# Patient Record
Sex: MALE | Race: WHITE | NOT HISPANIC OR LATINO | Employment: OTHER | URBAN - METROPOLITAN AREA
[De-identification: names, ages, dates, MRNs, and addresses within clinical notes are randomized per-mention and may not be internally consistent; named-entity substitution may affect disease eponyms.]

---

## 2018-07-26 ENCOUNTER — TRANSCRIBE ORDERS (OUTPATIENT)
Dept: ADMINISTRATIVE | Facility: HOSPITAL | Age: 78
End: 2018-07-26

## 2018-07-26 DIAGNOSIS — M77.9 TENDONITIS: ICD-10-CM

## 2018-07-26 DIAGNOSIS — I82.401 ACUTE DEEP VEIN THROMBOSIS (DVT) OF RIGHT LOWER EXTREMITY, UNSPECIFIED VEIN (HCC): Primary | ICD-10-CM

## 2018-07-30 ENCOUNTER — HOSPITAL ENCOUNTER (OUTPATIENT)
Dept: NON INVASIVE DIAGNOSTICS | Facility: CLINIC | Age: 78
Discharge: HOME | End: 2018-07-30
Payer: MEDICARE

## 2018-07-30 DIAGNOSIS — I82.401 ACUTE DEEP VEIN THROMBOSIS (DVT) OF RIGHT LOWER EXTREMITY, UNSPECIFIED VEIN (HCC): ICD-10-CM

## 2018-07-30 PROCEDURE — 93971 EXTREMITY STUDY: CPT | Performed by: SURGERY

## 2018-07-30 PROCEDURE — 93971 EXTREMITY STUDY: CPT

## 2018-08-09 ENCOUNTER — HOSPITAL ENCOUNTER (OUTPATIENT)
Dept: NON INVASIVE DIAGNOSTICS | Facility: CLINIC | Age: 78
Discharge: HOME | End: 2018-08-09
Payer: MEDICARE

## 2018-08-09 DIAGNOSIS — M77.9 TENDONITIS: ICD-10-CM

## 2018-08-09 DIAGNOSIS — I82.401 ACUTE DEEP VEIN THROMBOSIS (DVT) OF RIGHT LOWER EXTREMITY, UNSPECIFIED VEIN (HCC): ICD-10-CM

## 2018-08-09 PROCEDURE — 93925 LOWER EXTREMITY STUDY: CPT

## 2018-08-09 PROCEDURE — 93923 UPR/LXTR ART STDY 3+ LVLS: CPT

## 2018-08-10 PROCEDURE — 93925 LOWER EXTREMITY STUDY: CPT | Performed by: SURGERY

## 2018-08-10 PROCEDURE — 93922 UPR/L XTREMITY ART 2 LEVELS: CPT | Performed by: SURGERY

## 2020-01-13 ENCOUNTER — TELEPHONE (OUTPATIENT)
Dept: FAMILY MEDICINE CLINIC | Facility: CLINIC | Age: 80
End: 2020-01-13

## 2020-01-13 ENCOUNTER — OFFICE VISIT (OUTPATIENT)
Dept: FAMILY MEDICINE CLINIC | Facility: CLINIC | Age: 80
End: 2020-01-13
Payer: MEDICARE

## 2020-01-13 VITALS
WEIGHT: 219 LBS | OXYGEN SATURATION: 92 % | TEMPERATURE: 98.7 F | HEART RATE: 72 BPM | HEIGHT: 69 IN | BODY MASS INDEX: 32.44 KG/M2 | DIASTOLIC BLOOD PRESSURE: 80 MMHG | SYSTOLIC BLOOD PRESSURE: 120 MMHG

## 2020-01-13 DIAGNOSIS — J06.9 VIRAL URI WITH COUGH: Primary | ICD-10-CM

## 2020-01-13 PROBLEM — I25.10 CORONARY ARTERY DISEASE WITHOUT ANGINA PECTORIS: Status: ACTIVE | Noted: 2020-01-13

## 2020-01-13 PROBLEM — I73.9 PAD (PERIPHERAL ARTERY DISEASE) (HCC): Status: ACTIVE | Noted: 2020-01-13

## 2020-01-13 PROCEDURE — 99202 OFFICE O/P NEW SF 15 MIN: CPT | Performed by: NURSE PRACTITIONER

## 2020-01-13 RX ORDER — AZITHROMYCIN 250 MG/1
TABLET, FILM COATED ORAL
Qty: 6 TABLET | Refills: 0 | Status: SHIPPED | OUTPATIENT
Start: 2020-01-13 | End: 2020-01-17

## 2020-01-13 RX ORDER — FEBUXOSTAT 40 MG/1
1 TABLET ORAL DAILY
COMMUNITY
End: 2020-01-31 | Stop reason: ALTCHOICE

## 2020-01-13 RX ORDER — LISINOPRIL 10 MG/1
1 TABLET ORAL DAILY
COMMUNITY
End: 2020-08-26 | Stop reason: HOSPADM

## 2020-01-13 NOTE — ASSESSMENT & PLAN NOTE
Did discuss viral symptoms vs bacterial  He is to start flonase and otc cough and cold medication for the next three days  If no improvement or symptoms worsen, may start antbx

## 2020-01-13 NOTE — PROGRESS NOTES
OFFICE VISIT  Alexa Rausch 78 y o  male MRN: [id number]          Assessment / Plan:  Problem List Items Addressed This Visit        Respiratory    Viral URI with cough - Primary     Did discuss viral symptoms vs bacterial  He is to start flonase and otc cough and cold medication for the next three days  If no improvement or symptoms worsen, may start antbx  Relevant Medications    azithromycin (ZITHROMAX) 250 mg tablet        Here for sick visit only, [facility]  Reason For Visit / Chief Complaint  Chief Complaint   Patient presents with   [address]     pt is in office today to [facility] pt is c/o cough and sinus drainage for the past couple weeks  HPI:  Alexa Rausch is a 78 y o  male who presents today for acute sick visit  He reports feeling ill for one week  He reports having a cough, sinus pressure and drainage  He has been using otc allergy medication with no relief  He has a productive cough  He has stopped his lisinopril for a short while with no improvement  He is est with the [facility], for chronic medical problems  Historical Information   History reviewed  No pertinent past medical history  History reviewed  No pertinent surgical history    Social History   Social History     Substance and Sexual Activity   Alcohol Use Not Currently    Frequency: Never     Social History     Substance and Sexual Activity   Drug Use Never     Social History     Tobacco Use   Smoking Status Never Smoker   Smokeless Tobacco Never Used     Family History   Problem Relation Age of Onset    No Known Problems Mother     No Known Problems Father        Meds/Allergies   Allergies   Allergen Reactions    Shellfish-Derived Products        Meds:    Current Outpatient Medications:     aspirin 81 MG tablet, Take 2 tablets by mouth daily, Disp: , Rfl:     febuxostat (ULORIC) 40 mg tablet, Take 1 tablet by mouth daily, Disp: , Rfl:     lisinopril (ZESTRIL) 10 mg tablet, Take 1 tablet by mouth daily, Disp: , Rfl:     metoprolol tartrate (LOPRESSOR) 25 mg tablet, Take 1 tablet by mouth 2 (two) times a day, Disp: , Rfl:     Omega-3 Fatty Acids (FISH OIL) 645 MG CAPS, Take by mouth, Disp: , Rfl:     azithromycin (ZITHROMAX) 250 mg tablet, Take 2 tablets today then 1 tablet daily x 4 days, Disp: 6 tablet, Rfl: 0      REVIEW OF SYSTEMS  Review of Systems   Constitutional: Negative for appetite change, fatigue and fever  HENT: Positive for congestion and rhinorrhea  Negative for ear discharge, ear pain and postnasal drip  Eyes: Negative for pain, discharge, redness, itching and visual disturbance  Respiratory: Positive for cough  Negative for chest tightness, shortness of breath and wheezing  Cardiovascular: Negative for chest pain, palpitations and leg swelling  Gastrointestinal: Negative for abdominal distention, abdominal pain, blood in stool, diarrhea, nausea and vomiting  Endocrine: Negative for cold intolerance, heat intolerance, polydipsia, polyphagia and polyuria  Genitourinary: Negative for decreased urine volume, difficulty urinating, dysuria, frequency, hematuria, testicular pain and urgency  Musculoskeletal: Negative for arthralgias, back pain, myalgias, neck pain and neck stiffness  Skin: Negative for color change, pallor, rash and wound  Neurological: Negative for dizziness, light-headedness, numbness and headaches  Hematological: Negative for adenopathy  Does not bruise/bleed easily  Psychiatric/Behavioral: Negative for agitation, behavioral problems, self-injury, sleep disturbance and suicidal ideas  The patient is not nervous/anxious              Current Vitals:   Blood Pressure: 120/80 (01/13/20 1251)  Pulse: 72 (01/13/20 1251)  Temperature: 98 7 °F (37 1 °C) (01/13/20 1251)  Height: 5' 9" (175 3 cm) (01/13/20 1251)  Weight - Scale: 99 3 kg (219 lb) (01/13/20 1251)  SpO2: 92 % (01/13/20 1251)  [unfilled]    [facility] [place] EXAMS:  Physical Exam   Constitutional: He is oriented to person, place, and time  He appears well-developed and well-nourished  HENT:   Head: Normocephalic and atraumatic  Right Ear: External ear normal    Left Ear: External ear normal    Nose: Nose normal    Mouth/Throat: Oropharynx is clear and moist    PND   Eyes: Pupils are equal, round, and reactive to light  Conjunctivae are normal  Right eye exhibits no discharge  Left eye exhibits no discharge  Neck: Normal range of motion  Neck supple  No thyromegaly present  Cardiovascular: Normal rate, regular rhythm and normal heart sounds  Pulmonary/Chest: Effort normal and breath sounds normal    Abdominal: Soft  Bowel sounds are normal  He exhibits no distension  There is no tenderness  Musculoskeletal: Normal range of motion  He exhibits no edema, tenderness or deformity  Neurological: He is alert and oriented to person, place, and time  Skin: Skin is warm and dry  No rash noted  No erythema  Psychiatric: He has a normal mood and affect  His behavior is normal            Lab, imaging and other studies: I have personally reviewed pertinent reports  Silvana Coyle BMI Counseling: Body mass index is 32 34 kg/m²  The BMI is above normal  Nutrition recommendations include reducing portion sizes  Exercise recommendations include exercising 3-5 times per week

## 2020-01-31 ENCOUNTER — OFFICE VISIT (OUTPATIENT)
Dept: FAMILY MEDICINE CLINIC | Facility: CLINIC | Age: 80
End: 2020-01-31
Payer: MEDICARE

## 2020-01-31 VITALS
TEMPERATURE: 97.8 F | HEART RATE: 68 BPM | OXYGEN SATURATION: 98 % | DIASTOLIC BLOOD PRESSURE: 84 MMHG | BODY MASS INDEX: 32.05 KG/M2 | HEIGHT: 69 IN | WEIGHT: 216.4 LBS | SYSTOLIC BLOOD PRESSURE: 142 MMHG

## 2020-01-31 DIAGNOSIS — J45.20 MILD INTERMITTENT REACTIVE AIRWAY DISEASE WITHOUT COMPLICATION: Primary | ICD-10-CM

## 2020-01-31 PROBLEM — J45.909 REACTIVE AIRWAY DISEASE WITHOUT COMPLICATION: Status: ACTIVE | Noted: 2020-01-31

## 2020-01-31 PROCEDURE — 99213 OFFICE O/P EST LOW 20 MIN: CPT | Performed by: FAMILY MEDICINE

## 2020-01-31 RX ORDER — ALLOPURINOL 300 MG/1
300 TABLET ORAL DAILY
COMMUNITY
End: 2022-01-01

## 2020-01-31 RX ORDER — SIMVASTATIN 80 MG
80 TABLET ORAL
COMMUNITY
End: 2020-08-26 | Stop reason: HOSPADM

## 2020-01-31 NOTE — ASSESSMENT & PLAN NOTE
Reactive airway disease with cough productive at times with clear white mucus production at this point will initiate a beta agonist with steroid inhaler and he will contact me if not improved after 5 days,Anoro inhaler 7 days

## 2020-01-31 NOTE — PROGRESS NOTES
Assessment/Plan:       Problem List Items Addressed This Visit        Respiratory    Reactive airway disease without complication - Primary     Reactive airway disease with cough productive at times with clear white mucus production at this point will initiate a beta agonist with steroid inhaler and he will contact me if not improved after 5 days,Anoro inhaler 7 days  Subjective:      Patient ID: Ayaka Howell is a 78 y o  male  Patient presents for general coughing non infectious no fever associated with this he does work in a wood shop regularly however he has had a slight sore throat with this and production of mucus ongoing over several weeks not resolving here for follow-up evaluation and discussion on treatment plan      The following portions of the patient's history were reviewed and updated as appropriate: allergies, current medications, past family history, past medical history, past social history, past surgical history and problem list     Review of Systems   Constitutional: Negative for chills, fatigue and fever  HENT: Negative for congestion, nosebleeds, rhinorrhea, sinus pressure and sore throat  Eyes: Negative for discharge and redness  Respiratory: Positive for cough  Negative for shortness of breath  Cardiovascular: Negative for chest pain, palpitations and leg swelling  Gastrointestinal: Negative for abdominal pain, blood in stool and nausea  Endocrine: Negative for cold intolerance, heat intolerance and polyuria  Genitourinary: Negative for dysuria and frequency  Musculoskeletal: Negative for arthralgias, back pain and myalgias  Skin: Negative for rash  Neurological: Negative for dizziness, weakness and headaches  Hematological: Negative for adenopathy  Psychiatric/Behavioral: Negative for behavioral problems and sleep disturbance  The patient is not nervous/anxious            Objective:      /84   Pulse 68   Temp 97 8 °F (36 6 °C)   Ht 5' 9" (1 753 m)   Wt 98 2 kg (216 lb 6 4 oz)   SpO2 98%   BMI 31 96 kg/m²        Physical Exam   Constitutional: He is oriented to person, place, and time  He appears well-developed and well-nourished  HENT:   Head: Normocephalic and atraumatic  Right Ear: External ear normal    Left Ear: External ear normal    Nose: Nose normal    Mouth/Throat: Oropharynx is clear and moist    Eyes: Pupils are equal, round, and reactive to light  Conjunctivae and EOM are normal  No scleral icterus  Neck: Normal range of motion  Neck supple  No JVD present  No thyromegaly present  Cardiovascular: Normal rate, regular rhythm and normal heart sounds  No murmur heard  Pulmonary/Chest: Effort normal  He has no wheezes  He has rales  He exhibits no tenderness  Abdominal: Soft  Bowel sounds are normal  He exhibits no distension and no mass  There is no tenderness  There is no rebound and no guarding  Musculoskeletal: Normal range of motion  He exhibits no edema, tenderness or deformity  Lymphadenopathy:     He has no cervical adenopathy  Neurological: He is alert and oriented to person, place, and time  He has normal reflexes  He displays normal reflexes  No cranial nerve deficit  Skin: Skin is warm and dry  No rash noted  No erythema  Psychiatric: He has a normal mood and affect  His behavior is normal  Judgment and thought content normal    Nursing note and vitals reviewed  Data:    Laboratory Results: I have personally reviewed the pertinent laboratory results/reports   Radiology/Other Diagnostic Testing Results: I have personally reviewed pertinent reports         No results found for: WBC, HGB, HCT, MCV, PLT  No results found for: NA, K, CL, CO2, ANIONGAP, BUN, CREATININE, GLUCOSE, GLUF, CALCIUM, CORRECTEDCA, AST, ALT, ALKPHOS, PROT, BILITOT, EGFR  No results found for: CHOLESTEROL  No results found for: HDL  No results found for: LDLCALC  No results found for: TRIG  No results found for: CHOLHDL  No results found for: [id number], TSH  No results found for: HGBA1C  No results found for: PSA    [facility] Lola, DO

## 2020-01-31 NOTE — PATIENT INSTRUCTIONS
Acute Cough   AMBULATORY CARE:   An acute cough  can last up to 3 weeks  Common causes of an acute cough include a cold, allergies, or a lung infection  Seek care immediately if:   · You have trouble breathing or feel short of breath  · You cough up blood, or you see blood in your mucus  · You faint or feel weak or dizzy  · You have chest pain when you cough or take a deep breath  · You have new wheezing  Contact your healthcare provider if:   · You have a fever  · Your cough lasts longer than 4 weeks  · Your symptoms do not improve with treatment  · You have questions or concerns about your condition or care  Treatment:  An acute cough usually goes away on its own  Ask your healthcare provider about medicines you can take to decrease your cough  You may need medicine to stop the cough, decrease swelling in your airways, or help open your airways  Medicine may also be given to help you cough up mucus  If you have an infection caused by bacteria, you may need antibiotics  Manage your symptoms:   · Do not smoke and stay away from others who smoke  Nicotine and other chemicals in cigarettes and cigars can cause lung damage and make your cough worse  Ask your healthcare provider for information if you currently smoke and need help to quit  E-cigarettes or smokeless tobacco still contain nicotine  Talk to your healthcare provider before you use these products  · Drink extra liquids as directed  Liquids will help thin and loosen mucus so you can cough it up  Liquids will also help prevent dehydration  Examples of good liquids to drink include water, fruit juice, and broth  Do not drink liquids that contain caffeine  Caffeine can increase your risk for dehydration  Ask your healthcare provider how much liquid to drink each day  · Rest as directed  Do not do activities that make your cough worse, such as exercise  · Use a humidifier or vaporizer    Use a cool mist humidifier or a vaporizer to increase air moisture in your home  This may make it easier for you to breathe and help decrease your cough  · Eat 2 to 5 mL of honey 2 times each day  Honey can help thin mucus and decrease your cough  · Use cough drops or lozenges  These can help decrease throat irritation and your cough  Follow up with your healthcare provider as directed:  Write down your questions so you remember to ask them during your visits  © 2017 [facility] Information is for End User's use only and may not be sold, redistributed or otherwise used for commercial purposes  All illustrations and images included in CareNotes® are the copyrighted property of A D A M , Inc  or Basil Larkin  The above information is an  only  It is not intended as medical advice for individual conditions or treatments  Talk to your doctor, nurse or pharmacist before following any medical regimen to see if it is safe and effective for you

## 2020-02-10 ENCOUNTER — TELEPHONE (OUTPATIENT)
Dept: FAMILY MEDICINE CLINIC | Facility: CLINIC | Age: 80
End: 2020-02-10

## 2020-02-10 NOTE — TELEPHONE ENCOUNTER
This patient can stop in for a Spiriva inhaler the ANOROs out of SAMPLE and both inhalers would be expensive for him on Medicare I can give him a sample to use for just 1 or 2 more weeks and this should help prior to him having a need to buy something

## 2020-02-10 NOTE — TELEPHONE ENCOUNTER
Wife stated that the inhaler worked and is asking if he can have another sample or if you would send in a RX for another one  Pt feels better but still not 100% and is unsure if he was using it correctly in the first place, but now Is more comfortable

## 2020-02-20 ENCOUNTER — TELEPHONE (OUTPATIENT)
Dept: FAMILY MEDICINE CLINIC | Facility: CLINIC | Age: 80
End: 2020-02-20

## 2020-02-20 ENCOUNTER — HOSPITAL ENCOUNTER (OUTPATIENT)
Dept: RADIOLOGY | Facility: HOSPITAL | Age: 80
Discharge: HOME | End: 2020-02-20
Attending: FAMILY MEDICINE
Payer: MEDICARE

## 2020-02-20 DIAGNOSIS — J06.9 VIRAL URI WITH COUGH: Primary | ICD-10-CM

## 2020-02-20 DIAGNOSIS — J45.20 MILD INTERMITTENT REACTIVE AIRWAY DISEASE WITHOUT COMPLICATION: ICD-10-CM

## 2020-02-20 DIAGNOSIS — J06.9 VIRAL URI WITH COUGH: ICD-10-CM

## 2020-02-20 PROCEDURE — 71046 X-RAY EXAM CHEST 2 VIEWS: CPT

## 2020-02-20 NOTE — TELEPHONE ENCOUNTER
Seen on 1/31/20 for chest congestion and cough   Inhaler was helping now it is getting worse  Requesting a chest xray   Also if agreeable please make xray stat due to 5-7 day turn around time with radiology Please advise

## 2020-02-20 NOTE — TELEPHONE ENCOUNTER
This patient is a new patient here and he is 78years old have him go for chest x-ray at the [facility] so that I get the report tomorrow otherwise he will be waiting till after the weekend if he goes to an outpatient urgent care center explained that to him and ask him to call me tomorrow by noon to go over the results and if he does not feel better he can come in for me to re-evaluate him today or tomorrow

## 2020-02-21 ENCOUNTER — TELEPHONE (OUTPATIENT)
Dept: FAMILY MEDICINE CLINIC | Facility: CLINIC | Age: 80
End: 2020-02-21

## 2020-02-21 DIAGNOSIS — J45.20 MILD INTERMITTENT REACTIVE AIRWAY DISEASE WITHOUT COMPLICATION: Primary | ICD-10-CM

## 2020-02-21 RX ORDER — LEVOFLOXACIN 500 MG/1
500 TABLET, FILM COATED ORAL EVERY 24 HOURS
Qty: 7 TABLET | Refills: 1 | Status: SHIPPED | OUTPATIENT
Start: 2020-02-21 | End: 2020-02-28

## 2020-02-21 NOTE — TELEPHONE ENCOUNTER
Wife wants to know if there are any restrictions or recommendations on treatment, such as staying home, bed rest or anything else

## 2020-02-21 NOTE — TELEPHONE ENCOUNTER
I contacted Radiology to have them read the x-ray and call our office or send a report to us watch for this and notify me

## 2020-02-21 NOTE — TELEPHONE ENCOUNTER
No change in treatment continue normal activities better to keep moving  Avoid crowds for nest 3 days

## 2020-03-03 ENCOUNTER — OFFICE VISIT (OUTPATIENT)
Dept: FAMILY MEDICINE CLINIC | Facility: CLINIC | Age: 80
End: 2020-03-03
Payer: MEDICARE

## 2020-03-03 VITALS
HEIGHT: 69 IN | BODY MASS INDEX: 31.81 KG/M2 | HEART RATE: 56 BPM | DIASTOLIC BLOOD PRESSURE: 62 MMHG | OXYGEN SATURATION: 96 % | SYSTOLIC BLOOD PRESSURE: 104 MMHG | WEIGHT: 214.8 LBS

## 2020-03-03 DIAGNOSIS — I73.9 PAD (PERIPHERAL ARTERY DISEASE) (HCC): ICD-10-CM

## 2020-03-03 DIAGNOSIS — I10 ESSENTIAL HYPERTENSION: Primary | ICD-10-CM

## 2020-03-03 DIAGNOSIS — E78.2 MIXED HYPERLIPIDEMIA: ICD-10-CM

## 2020-03-03 DIAGNOSIS — Z00.00 MEDICARE ANNUAL WELLNESS VISIT, SUBSEQUENT: ICD-10-CM

## 2020-03-03 DIAGNOSIS — I51.7 CARDIOMEGALY: ICD-10-CM

## 2020-03-03 DIAGNOSIS — F41.9 ANXIETY: ICD-10-CM

## 2020-03-03 PROCEDURE — 1123F ACP DISCUSS/DSCN MKR DOCD: CPT | Performed by: FAMILY MEDICINE

## 2020-03-03 PROCEDURE — 1160F RVW MEDS BY RX/DR IN RCRD: CPT | Performed by: FAMILY MEDICINE

## 2020-03-03 PROCEDURE — 1125F AMNT PAIN NOTED PAIN PRSNT: CPT | Performed by: FAMILY MEDICINE

## 2020-03-03 PROCEDURE — 3078F DIAST BP <80 MM HG: CPT | Performed by: FAMILY MEDICINE

## 2020-03-03 PROCEDURE — 1170F FXNL STATUS ASSESSED: CPT | Performed by: FAMILY MEDICINE

## 2020-03-03 PROCEDURE — 1036F TOBACCO NON-USER: CPT | Performed by: FAMILY MEDICINE

## 2020-03-03 PROCEDURE — 99214 OFFICE O/P EST MOD 30 MIN: CPT | Performed by: FAMILY MEDICINE

## 2020-03-03 PROCEDURE — 3074F SYST BP LT 130 MM HG: CPT | Performed by: FAMILY MEDICINE

## 2020-03-03 PROCEDURE — G0438 PPPS, INITIAL VISIT: HCPCS | Performed by: FAMILY MEDICINE

## 2020-03-03 RX ORDER — LEVOFLOXACIN 500 MG/1
TABLET, FILM COATED ORAL
COMMUNITY
Start: 2020-02-28 | End: 2020-08-26 | Stop reason: HOSPADM

## 2020-03-03 NOTE — PROGRESS NOTES
Assessment and Plan:     Problem List Items Addressed This Visit        Cardiovascular and Mediastinum    Hypertension - Primary    Cardiomegaly    PAD (peripheral artery disease) ([facility] [address] )       Other    Hyperlipidemia    Anxiety           Preventive health issues were discussed with patient, and age appropriate screening tests were ordered as noted in patient's After Visit Summary  Personalized health advice and appropriate referrals for health education or preventive services given if needed, as noted in patient's After Visit Summary  History of Present Illness:     Patient presents for Medicare Annual Wellness visit    Patient Care Team:  Gissel Davila DO as PCP - General (Family Medicine)  Carolyn Parkinson MD     Problem List:     Patient Active Problem List   Diagnosis    Hypertension    Hyperlipidemia    Cardiomegaly    Anxiety    Abnormal electrocardiogram    Coronary artery disease without angina pectoris    PAD (peripheral artery disease) ([facility] [address] )    Viral URI with cough    Reactive airway disease without complication      Past Medical and Surgical History:     History reviewed  No pertinent past medical history  History reviewed  No pertinent surgical history     Family History:     Family History   Problem Relation Age of Onset    No Known Problems Mother     No Known Problems Father       Social History:        Social History     Socioeconomic History    Marital status: /Civil Union     Spouse name: None    Number of children: None    Years of education: None    Highest education level: None   Occupational History    None   Social Needs    Financial resource strain: None    Food insecurity:     Worry: None     Inability: None    Transportation needs:     Medical: None     Non-medical: None   Tobacco Use    Smoking status: Never Smoker    Smokeless tobacco: Never Used   Substance and Sexual Activity    Alcohol use: Not Currently     Frequency: Never    Drug use: Never  Sexual activity: None   Lifestyle    Physical activity:     Days per week: None     Minutes per session: None    Stress: None   Relationships    Social connections:     Talks on phone: None     Gets together: None     Attends [employer] service: None     Active member of club or organization: None     Attends meetings of clubs or organizations: None     Relationship status: None    Intimate partner violence:     Fear of current or ex partner: None     Emotionally abused: None     Physically abused: None     Forced sexual activity: None   Other Topics Concern    None   Social History Narrative    None      Medications and Allergies:     Current Outpatient Medications   Medication Sig Dispense Refill    allopurinol (ZYLOPRIM) 300 mg tablet Take 300 mg by mouth daily      aspirin 81 MG tablet Take 2 tablets by mouth daily      levofloxacin (LEVAQUIN) 500 mg tablet       lisinopril (ZESTRIL) 10 mg tablet Take 1 tablet by mouth daily      metoprolol tartrate (LOPRESSOR) 25 mg tablet Take 1 tablet by mouth 2 (two) times a day      Omega-3 Fatty Acids (FISH OIL) 645 MG CAPS Take by mouth      simvastatin (ZOCOR) 80 mg tablet Take 80 mg by mouth daily at bedtime       No current facility-administered medications for this visit  Allergies   Allergen Reactions    Shellfish-Derived Products       Immunizations:     Immunization History   Administered Date(s) Administered    INFLUENZA 11/01/2019    Influenza Split High Dose Preservative Free IM 10/26/2019    Zoster 11/15/2013      Health Maintenance: There are no preventive care reminders to display for this patient        Topic Date Due    DTaP,Tdap,and Td Vaccines (1 - Tdap) 07/19/1951    Pneumococcal Vaccine: 65+ Years (1 of 2 - PCV13) 07/19/2003      Medicare Health Risk Assessment:     /62   Pulse 56   Ht 5' 9" (1 753 m)   Wt 97 4 kg (214 lb 12 8 oz)   SpO2 96%   BMI 31 72 kg/m²          Health Risk Assessment:   Patient rates overall health as good  Patient feels that their physical health rating is same  Eyesight was rated as same  Hearing was rated as same  Patient feels that their emotional and mental health rating is same  Pain experienced in the last 7 days has been none  Patient states that he has experienced no weight loss or gain in last 6 months  Depression Screening:   PHQ-2 Score: 0      Fall Risk Screening: In the past year, patient has experienced: no history of falling in past year      Home Safety:  Patient does not have trouble with stairs inside or outside of their home  Patient has working smoke alarms and has working carbon monoxide detector  Home safety hazards include: none  Nutrition:   Current diet is Regular  Medications:   Patient is currently taking over-the-counter supplements  OTC medications include: see medication list  Patient is able to manage medications  Activities of Daily Living (ADLs)/Instrumental Activities of Daily Living (IADLs):   Walk and transfer into and out of bed and chair?: Yes  Dress and groom yourself?: Yes    Bathe or shower yourself?: Yes    Feed yourself? Yes  Do your laundry/housekeeping?: Yes  Manage your money, pay your bills and track your expenses?: Yes  Make your own meals?: Yes    Do your own shopping?: Yes    Previous Hospitalizations:   Any hospitalizations or ED visits within the last 12 months?: No      Advance Care Planning:   Living will: Yes    Durable POA for healthcare:  Yes    Advanced directive: No      PREVENTIVE SCREENINGS      Cardiovascular Screening:    General: Screening Not Indicated and History Lipid Disorder      Prostate Cancer Screening:    General: Screening Not Indicated      Osteoporosis Screening:    General: Risks and Benefits Discussed      Lung Cancer Screening:     General: Risks and Benefits Discussed      Hepatitis C Screening:    General: Risks and Benefits Discussed    Other Counseling Topics:   Regular weightbearing exercise and calcium and vitamin D intake         [facility] LOUPanfilo

## 2020-03-03 NOTE — PROGRESS NOTES
Assessment/Plan:       Problem List Items Addressed This Visit        Cardiovascular and Mediastinum    Hypertension - Primary    Cardiomegaly    PAD (peripheral artery disease) ([address] )       Other    Hyperlipidemia    Anxiety            Subjective:      Patient ID: Amelia Pallas is a 78 y o  male  Patient presents for Medicare wellness visit today and overall review on laboratory work      The following portions of the patient's history were reviewed and updated as appropriate: allergies, current medications, past family history, past medical history, past social history, past surgical history and problem list     Review of Systems   Constitutional: Negative for chills, fatigue and fever  HENT: Negative for congestion, nosebleeds, rhinorrhea, sinus pressure and sore throat  Eyes: Negative for discharge and redness  Respiratory: Negative for cough and shortness of breath  Cardiovascular: Negative for chest pain, palpitations and leg swelling  Gastrointestinal: Negative for abdominal pain, blood in stool and nausea  Endocrine: Negative for cold intolerance, heat intolerance and polyuria  Genitourinary: Negative for dysuria and frequency  Musculoskeletal: Positive for arthralgias  Negative for back pain and myalgias  Skin: Negative for rash  Neurological: Negative for dizziness, weakness and headaches  Hematological: Negative for adenopathy  Psychiatric/Behavioral: Negative for behavioral problems and sleep disturbance  The patient is not nervous/anxious  Objective:      /62   Pulse 56   Ht 5' 9" (1 753 m)   Wt 97 4 kg (214 lb 12 8 oz)   SpO2 96%   BMI 31 72 kg/m²        Physical Exam   Constitutional: He is oriented to person, place, and time  He appears well-developed and well-nourished  HENT:   Head: Normocephalic and atraumatic     Right Ear: External ear normal    Left Ear: External ear normal    Nose: Nose normal    Mouth/Throat: Oropharynx is clear and moist  Eyes: Pupils are equal, round, and reactive to light  Conjunctivae and EOM are normal  No scleral icterus  Neck: Normal range of motion  Neck supple  No JVD present  No thyromegaly present  Cardiovascular: Normal rate, regular rhythm and normal heart sounds  No murmur heard  Pulmonary/Chest: Effort normal and breath sounds normal  He has no wheezes  He has no rales  He exhibits no tenderness  Abdominal: Soft  Bowel sounds are normal  He exhibits no distension and no mass  There is no tenderness  There is no rebound and no guarding  Musculoskeletal: Normal range of motion  He exhibits no edema, tenderness or deformity  Lymphadenopathy:     He has no cervical adenopathy  Neurological: He is alert and oriented to person, place, and time  He has normal reflexes  He displays normal reflexes  No cranial nerve deficit  Skin: Skin is warm and dry  No rash noted  No erythema  Psychiatric: He has a normal mood and affect  His behavior is normal  Judgment and thought content normal    Nursing note and vitals reviewed  Data:    Laboratory Results: I have personally reviewed the pertinent laboratory results/reports   Radiology/Other Diagnostic Testing Results: I have personally reviewed pertinent reports         No results found for: WBC, HGB, HCT, MCV, PLT  No results found for: NA, K, CL, CO2, ANIONGAP, BUN, CREATININE, GLUCOSE, GLUF, CALCIUM, CORRECTEDCA, AST, ALT, ALKPHOS, PROT, BILITOT, EGFR  No results found for: CHOLESTEROL  No results found for: HDL  No results found for: LDLCALC  No results found for: TRIG  No results found for: CHOLHDL  No results found for: [id number], TSH  No results found for: HGBA1C  No results found for: PSA    Sharmin Otoole DO

## 2020-03-05 NOTE — PATIENT INSTRUCTIONS
Heart Healthy Diet   WHAT YOU NEED TO KNOW:   A heart healthy diet is an eating plan low in total fat, unhealthy fats, and sodium (salt)  A heart healthy diet helps decrease your risk for heart disease and stroke  Limit the amount of fat you eat to 25% to 35% of your total daily calories  Limit sodium to less than 2,300 mg each day  DISCHARGE INSTRUCTIONS:   Healthy fats:  Healthy fats can help improve cholesterol levels  The risk for heart disease is decreased when cholesterol levels are normal  Choose healthy fats, such as the following:  · Unsaturated fat  is found in foods such as soybean, canola, olive, corn, and safflower oils  It is also found in soft tub margarine that is made with liquid vegetable oil  · Omega-3 fat  is found in certain fish, such as salmon, tuna, and trout, and in walnuts and flaxseed  Unhealthy fats:  Unhealthy fats can cause unhealthy cholesterol levels in your blood and increase your risk of heart disease  Limit unhealthy fats, such as the following:  · Cholesterol  is found in animal foods, such as eggs and lobster, and in dairy products made from whole milk  Limit cholesterol to less than 300 milligrams (mg) each day  You may need to limit cholesterol to 200 mg each day if you have heart disease  · Saturated fat  is found in meats, such as anton and hamburger  It is also found in chicken or turkey skin, whole milk, and butter  Limit saturated fat to less than 7% of your total daily calories  Limit saturated fat to less than 6% if you have heart disease or are at increased risk for it  · Trans fat  is found in packaged foods, such as potato chips and cookies  It is also in hard margarine, some fried foods, and shortening  Avoid trans fats as much as possible    Heart healthy foods and drinks to include:  Ask your dietitian or healthcare provider how many servings to have from each of the following food groups:  · Grains:      ¨ Whole-wheat breads, cereals, and pastas, and brown rice    ¨ Low-fat, low-sodium crackers and chips    · Vegetables:      ¨ Broccoli, green beans, green peas, and spinach    ¨ Collards, kale, and lima beans    ¨ Carrots, sweet potatoes, tomatoes, and peppers    ¨ Canned vegetables with no salt added    · Fruits:      ¨ Bananas, peaches, pears, and pineapple    ¨ Grapes, raisins, and dates    ¨ Oranges, tangerines, grapefruit, orange juice, and grapefruit juice    ¨ Apricots, mangoes, melons, and papaya    ¨ Raspberries and strawberries    ¨ Canned fruit with no added sugar    · Low-fat dairy products:      ¨ Nonfat (skim) milk, 1% milk, and low-fat almond, cashew, or soy milks fortified with calcium    ¨ Low-fat cheese, regular or frozen yogurt, and cottage cheese    · Meats and proteins , such as lean cuts of beef and pork (loin, leg, round), skinless chicken and turkey, legumes, soy products, egg whites, and nuts  Foods and drinks to limit or avoid:  Ask your dietitian or healthcare provider about these and other foods that are high in unhealthy fat, sodium, and sugar:  · Snack or packaged foods , such as frozen dinners, cookies, macaroni and cheese, and cereals with more than 300 mg of sodium per serving    · Canned or dry mixes  for cakes, soups, sauces, or gravies    · Vegetables with added sodium , such as instant potatoes, vegetables with added sauces, or regular canned vegetables    · Other foods high in sodium , such as ketchup, barbecue sauce, salad dressing, pickles, olives, soy sauce, and miso    · High-fat dairy foods  such as whole or 2% milk, cream cheese, or sour cream, and cheeses     · High-fat protein foods  such as high-fat cuts of beef (T-bone steaks, ribs), chicken or turkey with skin, and organ meats, such as liver    · Cured or smoked meats , such as hot dogs, anton, and sausage    · Unhealthy fats and oils , such as butter, stick margarine, shortening, and cooking oils such as coconut or palm oil    · Food and drinks high in sugar , such as soft drinks (soda), sports drinks, sweetened tea, candy, cake, cookies, pies, and doughnuts  Other diet guidelines to follow:   · Eat more foods containing omega-3 fats  Eat fish high in omega-3 fats at least 2 times a week  · Limit alcohol  Too much alcohol can damage your heart and raise your blood pressure  Women should limit alcohol to 1 drink a day  Men should limit alcohol to 2 drinks a day  A drink of alcohol is 12 ounces of beer, 5 ounces of wine, or 1½ ounces of liquor  · Choose low-sodium foods  High-sodium foods can lead to high blood pressure  Add little or no salt to food you prepare  Use herbs and spices in place of salt  · Eat more fiber  to help lower cholesterol levels  Eat at least 5 servings of fruits and vegetables each day  Eat 3 ounces of whole-grain foods each day  Legumes (beans) are also a good source of fiber  Lifestyle guidelines:   · Do not smoke  Nicotine and other chemicals in cigarettes and cigars can cause lung and heart damage  Ask your healthcare provider for information if you currently smoke and need help to quit  E-cigarettes or smokeless tobacco still contain nicotine  Talk to your healthcare provider before you use these products  · Exercise regularly  to help you maintain a healthy weight and improve your blood pressure and cholesterol levels  Ask your healthcare provider about the best exercise plan for you  Do not start an exercise program without asking your healthcare provider  Follow up with your healthcare provider as directed:  Write down your questions so you remember to ask them during your visits  © 2017 [address] [employer] Information is for End User's use only and may not be sold, redistributed or otherwise used for commercial purposes  All illustrations and images included in CareNotes® are the copyrighted property of A D [employer] M [employer]  or Basil Larkin  The above information is an  only   It is not intended as medical advice for individual conditions or treatments  Talk to your doctor, nurse or pharmacist before following any medical regimen to see if it is safe and effective for you

## 2020-06-02 LAB — HBA1C MFR BLD HPLC: 7 %

## 2020-08-05 ENCOUNTER — OFFICE VISIT (OUTPATIENT)
Dept: FAMILY MEDICINE CLINIC | Facility: CLINIC | Age: 80
End: 2020-08-05
Payer: MEDICARE

## 2020-08-05 VITALS
OXYGEN SATURATION: 97 % | HEART RATE: 56 BPM | HEIGHT: 69 IN | WEIGHT: 214 LBS | SYSTOLIC BLOOD PRESSURE: 130 MMHG | BODY MASS INDEX: 31.7 KG/M2 | DIASTOLIC BLOOD PRESSURE: 80 MMHG | TEMPERATURE: 97 F

## 2020-08-05 DIAGNOSIS — I10 ESSENTIAL HYPERTENSION: ICD-10-CM

## 2020-08-05 DIAGNOSIS — I73.9 PAD (PERIPHERAL ARTERY DISEASE) (HCC): ICD-10-CM

## 2020-08-05 DIAGNOSIS — R73.9 HYPERGLYCEMIA: ICD-10-CM

## 2020-08-05 DIAGNOSIS — J45.20 MILD INTERMITTENT REACTIVE AIRWAY DISEASE WITHOUT COMPLICATION: Primary | ICD-10-CM

## 2020-08-05 DIAGNOSIS — I51.7 CARDIOMEGALY: ICD-10-CM

## 2020-08-05 DIAGNOSIS — M25.471 ANKLE EDEMA, BILATERAL: ICD-10-CM

## 2020-08-05 DIAGNOSIS — I25.10 CORONARY ARTERY DISEASE INVOLVING NATIVE HEART WITHOUT ANGINA PECTORIS, UNSPECIFIED VESSEL OR LESION TYPE: ICD-10-CM

## 2020-08-05 DIAGNOSIS — E78.2 MIXED HYPERLIPIDEMIA: ICD-10-CM

## 2020-08-05 DIAGNOSIS — M25.472 ANKLE EDEMA, BILATERAL: ICD-10-CM

## 2020-08-05 PROCEDURE — 99214 OFFICE O/P EST MOD 30 MIN: CPT | Performed by: FAMILY MEDICINE

## 2020-08-05 PROCEDURE — 1036F TOBACCO NON-USER: CPT | Performed by: FAMILY MEDICINE

## 2020-08-05 PROCEDURE — 4040F PNEUMOC VAC/ADMIN/RCVD: CPT | Performed by: FAMILY MEDICINE

## 2020-08-05 PROCEDURE — 3008F BODY MASS INDEX DOCD: CPT | Performed by: FAMILY MEDICINE

## 2020-08-05 PROCEDURE — 3079F DIAST BP 80-89 MM HG: CPT | Performed by: FAMILY MEDICINE

## 2020-08-05 PROCEDURE — 3075F SYST BP GE 130 - 139MM HG: CPT | Performed by: FAMILY MEDICINE

## 2020-08-05 PROCEDURE — 1160F RVW MEDS BY RX/DR IN RCRD: CPT | Performed by: FAMILY MEDICINE

## 2020-08-05 RX ORDER — KETOCONAZOLE 20 MG/ML
SHAMPOO TOPICAL
COMMUNITY
Start: 2020-06-30 | End: 2020-08-26 | Stop reason: HOSPADM

## 2020-08-05 NOTE — ASSESSMENT & PLAN NOTE
Generalized shortness of breath at times with exacerbations brought on by various allergic factors causing reactive airway disease    He is stable currently no worsening shortness of breath wheezing or need for any inhaler at this time remains stable he understands to avoid certain environmental sources that would stimulator aggravate his symptoms

## 2020-08-05 NOTE — ASSESSMENT & PLAN NOTE
Mild bilateral ankle edema patient eats a lot of sodium in his diet between pickles herring and locks among other items he will cut back on his sodium intake and re-evaluate this at next office visit additionally he will start walking more and becoming more active

## 2020-08-05 NOTE — PATIENT INSTRUCTIONS
DASH Eating Plan   WHAT YOU NEED TO KNOW:   The DASH (Dietary Approaches to Stop Hypertension) Eating Plan is designed to help prevent or lower high blood pressure  It can also help to lower LDL (bad) cholesterol and decrease your risk of heart disease  The plan is low in sodium, sugar, unhealthy fats, and total fat  It is high in potassium, calcium, magnesium, and fiber  These nutrients are added when you eat more fruits, vegetables, and whole grains  DISCHARGE INSTRUCTIONS:   Your sodium limit each day: Your dietitian will tell you how much sodium is safe for you to have each day  People with high blood pressure should have no more than 1,500 to 2,300 mg of sodium in a day  A teaspoon (tsp) of salt has 2,300 mg of sodium  This may seem like a difficult goal, but small changes to the foods you eat can make a big difference  Your healthcare provider or dietitian can help you create a meal plan that follows your sodium limit  How to limit sodium:   · Read food labels  Food labels can help you choose foods that are low in sodium  The amount of sodium is listed in milligrams (mg)  The % Daily Value (DV) column tells you how much of your daily needs are met by 1 serving of the food for each nutrient listed  Choose foods that have less than 5% of the DV of sodium  These foods are considered low in sodium  Foods that have 20% or more of the DV of sodium are considered high in sodium  Avoid foods that have more than 300 mg of sodium in each serving  Choose foods that say low-sodium, reduced-sodium, or no salt added on the food label  · Avoid salt  Do not salt food at the table, and add very little salt to foods during cooking  Use herbs and spices, such as onions, garlic, and salt-free seasonings to add flavor to foods  Try lemon or lime juice or vinegar to give foods a tart flavor  Use hot peppers or a small amount of hot pepper sauce to add a spicy flavor to foods  · Ask about salt substitutes    Ask your healthcare provider if you may use salt substitutes  Some salt substitutes have ingredients that can be harmful if you have certain health conditions  · Choose foods carefully at restaurants  Meals from restaurants, especially fast food restaurants, are often high in sodium  Some restaurants have nutrition information that tells you the amount of sodium in their foods  Ask to have your food prepared with less, or no salt  What you need to know about fats:   · Include healthy fats  Examples are unsaturated fats and omega-3 fatty acids  Unsaturated fats are found in soybean, canola, olive, or sunflower oil, and liquid and soft tub margarines  Omega-3 fatty acids are found in fatty fish, such as salmon, tuna, mackerel, and sardines  It is also found in flaxseed oil and ground flaxseed  · Avoid unhealthy fats  Do not eat unhealthy fats, such as saturated fats and trans fats  Saturated fats are found in foods that contain fat from animals  Examples are fatty meats, whole milk, butter, cream, and other dairy foods  It is also found in shortening, stick margarine, palm oil, and coconut oil  Trans fats are found in fried foods, crackers, chips, and baked goods made with margarine or shortening  Foods to include: With the DASH eating plan, you need to eat a certain number of servings from each food group  This will help you get enough of certain nutrients and limit others  The amount of servings you should eat depends on how many calories you need  Your dietitian can tell you how many calories you need  The number of servings listed next to the food groups below are for people who need about 2,000 calories each day    · Grains:  6 to 8 servings (3 of these servings should be whole-grain foods)    ¨ 1 slice of whole-grain bread     ¨ 1 ounce of dry cereal    ¨ ½ cup of cooked cereal, pasta, or brown rice    · Vegetables and fruits:  4 to 5 servings of fruits and 4 to 5 servings of vegetables    ¨ 1 medium fruit    ¨ ½ cup of frozen, canned (no added salt), or chopped fresh vegetables     ¨ ½ cup of fresh, frozen, dried, or canned fruit (canned in light syrup or fruit juice)    ¨ ½ cup of vegetable or fruit juice    · Dairy:  2 to 3 servings    ¨ 1 cup of nonfat (skim) or 1% milk    ¨ 1½ ounces of fat-free or low-fat cheese    ¨ 6 ounces of nonfat or low-fat yogurt    · Lean meat, poultry, and fish:  6 ounces or less    [employer] (chicken, turkey) with no skin    ¨ Fish (especially fatty fish, such as salmon, fresh tuna, or mackerel)    ¨ Lean beef and pork (loin, round, extra lean hamburger)    ¨ Egg whites and egg substitutes    · Nuts, seeds, and legumes:  4 to 5 servings each week    ¨ ½ cup of cooked beans and peas    ¨ 1½ ounces of unsalted nuts    ¨ 2 tablespoons of peanut butter or seeds    · Sweets and added sugars:  5 or less each week    ¨ 1 tablespoon of sugar, jelly, or jam    ¨ ½ cup of sorbet or gelatin    ¨ 1 cup of lemonade    · Fats:  2 to 3 servings each week    ¨ 1 teaspoon of soft margarine or vegetable oil    ¨ 1 tablespoon of mayonnaise    ¨ 2 tablespoons of salad dressing  Foods to avoid:   · Grains:      [employer], such as doughnuts, pastries, cookies, and biscuits (high in fat and sugar)    ¨ Mixes for cornbread and biscuits, packaged foods, such as bread stuffing, rice and pasta mixes, macaroni and cheese, and instant cereals (high in sodium)    · Fruits and vegetables:      ¨ Regular, canned vegetables (high in sodium)    ¨ Sauerkraut, pickled vegetables, and other foods prepared in brine (high in sodium)    ¨ Fried vegetables or vegetables in butter or high-fat sauces    ¨ Fruit in cream or butter sauce (high in fat)    · Dairy:      ¨ Whole milk, 2% milk, and cream (high in fat)    ¨ Regular cheese and processed cheese (high in fat and sodium)    · Meats and protein foods:      ¨ Smoked or cured meat, such as corned beef, anton, ham, hot dogs, and sausage (high in fat and sodium)    ¨ Canned beans and canned meats or spreads, such as potted meats, sardines, anchovies, and imitation seafood (high in sodium)    ¨ Deli or lunch meats, such as bologna, ham, turkey, and roast beef (high in sodium)    ¨ High-fat meat (T-bone steak, regular hamburger, and ribs)    ¨ Whole eggs and egg yolks (high in fat)    · Other:      ¨ Seasonings made with salt, such as garlic salt, celery salt, onion salt, seasoned salt, meat tenderizers, and monosodium glutamate (MSG)    ¨ Miso soup and canned or dried soup mixes (high in sodium)    ¨ Regular soy sauce, barbecue sauce, teriyaki sauce, steak sauce, Worcestershire sauce, and most flavored vinegars (high in sodium)    ¨ Regular condiments, such as mustard, ketchup, and salad dressings (high in sodium)    ¨ Gravy and sauces, such as Andre or cheese sauces (high in sodium and fat)    ¨ Drinks high in sugar, such as soda or fruit drinks    [employer] foods, such as salted chips, popcorn, pretzels, pork rinds, salted crackers, and salted nuts    ¨ Frozen foods, such as dinners, entrees, vegetables with sauces, and breaded meats (high in sodium)  Other guidelines to follow:   · Maintain a healthy weight  Your risk for heart disease is higher if you are overweight  Your healthcare provider may suggest that you lose weight if you are overweight  You can lose weight by eating fewer calories and foods that have added sugars and fat  The DASH meal plan can help you do this  Decrease calories by eating smaller portions at each meal and fewer snacks  Ask your healthcare provider for more information about how to lose weight  · Exercise regularly  Regular exercise can help you reach or maintain a healthy weight  Regular exercise can also help decrease your blood pressure and improve your cholesterol levels  Get 30 minutes or more of moderate exercise each day of the week  To lose weight, get at least 60 minutes of exercise   Talk to your healthcare provider about the best exercise program for you  · Limit alcohol  Women should limit alcohol to 1 drink a day  Men should limit alcohol to 2 drinks a day  A drink of alcohol is 12 ounces of beer, 5 ounces of wine, or 1½ ounces of liquor  © 2017 [address] [employer] Information is for End User's use only and may not be sold, redistributed or otherwise used for commercial purposes  All illustrations and images included in CareNotes® are the copyrighted property of A D A M [employer] Inc  or Basil Larkin  The above information is an  only  It is not intended as medical advice for individual conditions or treatments  Talk to your doctor, nurse or pharmacist before following any medical regimen to see if it is safe and effective for you

## 2020-08-05 NOTE — PROGRESS NOTES
Assessment/Plan:       Problem List Items Addressed This Visit        Respiratory    Reactive airway disease without complication - Primary      Generalized shortness of breath at times with exacerbations brought on by various allergic factors causing reactive airway disease  He is stable currently no worsening shortness of breath wheezing or need for any inhaler at this time remains stable he understands to avoid certain environmental sources that would stimulator aggravate his symptoms            Cardiovascular and Mediastinum    Hypertension      Hypertension stable continue with current medications as directed         Cardiomegaly    Coronary artery disease without angina pectoris      Coronary artery disease by history patient will continue with his current medication regimen including 81 mg aspirin and follow-up with Cardiology as scheduled         PAD (peripheral artery disease) ([facility] [address] )       Other    Hyperlipidemia      Mixed hyperlipidemia maintain good profile with Omega 3 fatty acids and Zocor 80 mg         Hyperglycemia     Hyperglycemia with A1c at 6 0 he will be watching diet now and working and moving more frequently his lab work is done through the [place] and he will have a follow-up in 6 months         Ankle edema, bilateral     Mild bilateral ankle edema patient eats a lot of sodium in his diet between pickles herring and locks among other items he will cut back on his sodium intake and re-evaluate this at next office visit additionally he will start walking more and becoming more active                 Subjective:      Patient ID: Chaparro Real is a 78 y o  male       Patient presents for general checkup today evaluation of medications it review of laboratory work he is doing well without shortness of breath recently      The following portions of the patient's history were reviewed and updated as appropriate: allergies, current medications, past family history, past medical history, past social history, past surgical history and problem list     Review of Systems   Constitutional: Negative for chills, fatigue and fever  HENT: Negative for congestion, nosebleeds, rhinorrhea, sinus pressure and sore throat  Eyes: Negative for discharge and redness  Respiratory: Negative for cough and shortness of breath  Cardiovascular: Negative for chest pain, palpitations and leg swelling  Gastrointestinal: Negative for abdominal pain, blood in stool and nausea  Endocrine: Negative for cold intolerance, heat intolerance and polyuria  Genitourinary: Negative for dysuria and frequency  Musculoskeletal: Positive for arthralgias and myalgias  Negative for back pain  Skin: Negative for rash  Neurological: Negative for dizziness, weakness and headaches  Hematological: Negative for adenopathy  Psychiatric/Behavioral: Negative for behavioral problems and sleep disturbance  The patient is not nervous/anxious  Objective:      /80 (BP Location: Left arm, Patient Position: Sitting)   Pulse 56   Temp (!) 97 °F (36 1 °C)   Ht 5' 9"   Wt 97 1 kg (214 lb)   SpO2 97%   BMI 31 60 kg/m²        Physical Exam   Constitutional: He is oriented to person, place, and time  He appears well-developed  HENT:   Head: Normocephalic and atraumatic  Right Ear: Tympanic membrane, external ear and ear canal normal    Left Ear: Tympanic membrane, external ear and ear canal normal    Nose: Nose normal    Mouth/Throat: Mucous membranes are moist  Oropharynx is clear  Eyes: Pupils are equal, round, and reactive to light  Conjunctivae are normal  No scleral icterus  Neck: Normal range of motion  Neck supple  No JVD present  No thyromegaly present  Cardiovascular: Normal rate, regular rhythm and normal heart sounds  No murmur heard  Pulmonary/Chest: Effort normal and breath sounds normal  He has no wheezes  He has no rales  He exhibits no tenderness  Abdominal: Soft   Normal appearance and bowel sounds are normal  He exhibits no distension and no mass  There is no abdominal tenderness  There is no rebound and no guarding  Musculoskeletal: Normal range of motion  General: No tenderness or deformity  Lymphadenopathy:     He has no cervical adenopathy  Neurological: He is alert and oriented to person, place, and time  He has normal reflexes  He displays normal reflexes  No cranial nerve deficit  Skin: Skin is warm and dry  Rash noted  No erythema  Psychiatric: His behavior is normal  Judgment and thought content normal    Nursing note and vitals reviewed  Data:    Laboratory Results: I have personally reviewed the pertinent laboratory results/reports   Radiology/Other Diagnostic Testing Results: I have personally reviewed pertinent reports         No results found for: WBC, HGB, HCT, MCV, PLT  No results found for: NA, K, CL, CO2, ANIONGAP, BUN, CREATININE, GLUCOSE, GLUF, CALCIUM, CORRECTEDCA, AST, ALT, ALKPHOS, PROT, BILITOT, EGFR  No results found for: CHOLESTEROL  No results found for: HDL  No results found for: LDLCALC  No results found for: TRIG  No results found for: CHOLHDL  No results found for: [id number], TSH  No results found for: HGBA1C  No results found for: PSA    [place], DO

## 2020-08-05 NOTE — PROGRESS NOTES
BMI Counseling: Body mass index is 31 6 kg/m²  The BMI is above normal  Nutrition recommendations include reducing portion sizes, decreasing overall calorie intake, 3-5 servings of fruits/vegetables daily, increasing intake of lean protein, reducing intake of saturated fat and trans fat and reducing intake of cholesterol  Exercise recommendations include exercising 3-5 times per week

## 2020-08-05 NOTE — ASSESSMENT & PLAN NOTE
Hyperglycemia with A1c at 6 0 he will be watching diet now and working and moving more frequently his lab work is done through the [place] and he will have a follow-up in 6 months

## 2020-08-05 NOTE — ASSESSMENT & PLAN NOTE
Coronary artery disease by history patient will continue with his current medication regimen including 81 mg aspirin and follow-up with Cardiology as scheduled

## 2020-08-22 ENCOUNTER — HOSPITAL ENCOUNTER (INPATIENT)
Facility: HOSPITAL | Age: 80
LOS: 3 days | Discharge: HOME HEALTH | DRG: 871 | End: 2020-08-26
Attending: EMERGENCY MEDICINE | Admitting: ANESTHESIOLOGY
Payer: MEDICARE

## 2020-08-22 ENCOUNTER — APPOINTMENT (EMERGENCY)
Dept: RADIOLOGY | Facility: HOSPITAL | Age: 80
DRG: 871 | End: 2020-08-22
Payer: MEDICARE

## 2020-08-22 ENCOUNTER — APPOINTMENT (EMERGENCY)
Dept: CT IMAGING | Facility: HOSPITAL | Age: 80
DRG: 871 | End: 2020-08-22
Payer: MEDICARE

## 2020-08-22 DIAGNOSIS — R16.0 HYPODENSE MASS OF LIVER: ICD-10-CM

## 2020-08-22 DIAGNOSIS — R65.21 SEPTIC SHOCK (HCC): Primary | ICD-10-CM

## 2020-08-22 DIAGNOSIS — A41.9 SEPTIC SHOCK (HCC): Primary | ICD-10-CM

## 2020-08-22 DIAGNOSIS — R78.81 GRAM-NEGATIVE BACTEREMIA: ICD-10-CM

## 2020-08-22 DIAGNOSIS — K76.9 HEPATIC LESION: ICD-10-CM

## 2020-08-22 DIAGNOSIS — R74.01 TRANSAMINITIS: ICD-10-CM

## 2020-08-22 DIAGNOSIS — I25.10 CORONARY ARTERY DISEASE WITHOUT ANGINA PECTORIS: ICD-10-CM

## 2020-08-22 LAB
ALBUMIN SERPL BCP-MCNC: 3 G/DL (ref 3.5–5)
ALP SERPL-CCNC: 204 U/L (ref 46–116)
ALT SERPL W P-5'-P-CCNC: 526 U/L (ref 12–78)
ANION GAP SERPL CALCULATED.3IONS-SCNC: 15 MMOL/L (ref 4–13)
APTT PPP: 32 SECONDS (ref 23–37)
AST SERPL W P-5'-P-CCNC: 258 U/L (ref 5–45)
BASOPHILS # BLD MANUAL: 0 THOUSAND/UL (ref 0–0.1)
BASOPHILS NFR MAR MANUAL: 0 % (ref 0–1)
BILIRUB SERPL-MCNC: 1.8 MG/DL (ref 0.2–1)
BUN SERPL-MCNC: 31 MG/DL (ref 5–25)
CALCIUM SERPL-MCNC: 8.1 MG/DL (ref 8.3–10.1)
CHLORIDE SERPL-SCNC: 103 MMOL/L (ref 100–108)
CO2 SERPL-SCNC: 21 MMOL/L (ref 21–32)
CREAT SERPL-MCNC: 2.11 MG/DL (ref 0.6–1.3)
EOSINOPHIL # BLD MANUAL: 0 THOUSAND/UL (ref 0–0.4)
EOSINOPHIL NFR BLD MANUAL: 0 % (ref 0–6)
ERYTHROCYTE [DISTWIDTH] IN BLOOD BY AUTOMATED COUNT: 14.9 % (ref 11.6–15.1)
GFR SERPL CREATININE-BSD FRML MDRD: 28 ML/MIN/1.73SQ M
GLUCOSE SERPL-MCNC: 146 MG/DL (ref 65–140)
HCT VFR BLD AUTO: 42.9 % (ref 36.5–49.3)
HGB BLD-MCNC: 14.6 G/DL (ref 12–17)
INR PPP: 1.15 (ref 0.84–1.19)
LACTATE SERPL-SCNC: 4 MMOL/L (ref 0.5–2)
LYMPHOCYTES # BLD AUTO: 0.31 THOUSAND/UL (ref 0.6–4.47)
LYMPHOCYTES # BLD AUTO: 4 % (ref 14–44)
MCH RBC QN AUTO: 31.7 PG (ref 26.8–34.3)
MCHC RBC AUTO-ENTMCNC: 34 G/DL (ref 31.4–37.4)
MCV RBC AUTO: 93 FL (ref 82–98)
MONOCYTES # BLD AUTO: 0.08 THOUSAND/UL (ref 0–1.22)
MONOCYTES NFR BLD: 1 % (ref 4–12)
NEUTROPHILS # BLD MANUAL: 7.43 THOUSAND/UL (ref 1.85–7.62)
NEUTS BAND NFR BLD MANUAL: 5 % (ref 0–8)
NEUTS SEG NFR BLD AUTO: 90 % (ref 43–75)
PLATELET # BLD AUTO: 120 THOUSANDS/UL (ref 149–390)
PLATELET BLD QL SMEAR: ABNORMAL
PMV BLD AUTO: 11.4 FL (ref 8.9–12.7)
POTASSIUM SERPL-SCNC: 3.6 MMOL/L (ref 3.5–5.3)
PROT SERPL-MCNC: 6.8 G/DL (ref 6.4–8.2)
PROTHROMBIN TIME: 14.9 SECONDS (ref 11.6–14.5)
RBC # BLD AUTO: 4.61 MILLION/UL (ref 3.88–5.62)
RBC MORPH BLD: NORMAL
SODIUM SERPL-SCNC: 139 MMOL/L (ref 136–145)
TOTAL CELLS COUNTED SPEC: 100
TROPONIN I SERPL-MCNC: 0.2 NG/ML
WBC # BLD AUTO: 7.82 THOUSAND/UL (ref 4.31–10.16)

## 2020-08-22 PROCEDURE — 84145 PROCALCITONIN (PCT): CPT | Performed by: EMERGENCY MEDICINE

## 2020-08-22 PROCEDURE — 93005 ELECTROCARDIOGRAM TRACING: CPT

## 2020-08-22 PROCEDURE — 94760 N-INVAS EAR/PLS OXIMETRY 1: CPT

## 2020-08-22 PROCEDURE — 36415 COLL VENOUS BLD VENIPUNCTURE: CPT | Performed by: EMERGENCY MEDICINE

## 2020-08-22 PROCEDURE — 87186 SC STD MICRODIL/AGAR DIL: CPT | Performed by: EMERGENCY MEDICINE

## 2020-08-22 PROCEDURE — 85730 THROMBOPLASTIN TIME PARTIAL: CPT | Performed by: EMERGENCY MEDICINE

## 2020-08-22 PROCEDURE — 85007 BL SMEAR W/DIFF WBC COUNT: CPT | Performed by: EMERGENCY MEDICINE

## 2020-08-22 PROCEDURE — 87040 BLOOD CULTURE FOR BACTERIA: CPT | Performed by: EMERGENCY MEDICINE

## 2020-08-22 PROCEDURE — 87077 CULTURE AEROBIC IDENTIFY: CPT | Performed by: EMERGENCY MEDICINE

## 2020-08-22 PROCEDURE — 71045 X-RAY EXAM CHEST 1 VIEW: CPT

## 2020-08-22 PROCEDURE — 96365 THER/PROPH/DIAG IV INF INIT: CPT

## 2020-08-22 PROCEDURE — 80053 COMPREHEN METABOLIC PANEL: CPT | Performed by: EMERGENCY MEDICINE

## 2020-08-22 PROCEDURE — 87635 SARS-COV-2 COVID-19 AMP PRB: CPT | Performed by: EMERGENCY MEDICINE

## 2020-08-22 PROCEDURE — 94660 CPAP INITIATION&MGMT: CPT

## 2020-08-22 PROCEDURE — 84484 ASSAY OF TROPONIN QUANT: CPT | Performed by: EMERGENCY MEDICINE

## 2020-08-22 PROCEDURE — 99285 EMERGENCY DEPT VISIT HI MDM: CPT | Performed by: EMERGENCY MEDICINE

## 2020-08-22 PROCEDURE — 80329 ANALGESICS NON-OPIOID 1 OR 2: CPT | Performed by: INTERNAL MEDICINE

## 2020-08-22 PROCEDURE — 99285 EMERGENCY DEPT VISIT HI MDM: CPT

## 2020-08-22 PROCEDURE — 83605 ASSAY OF LACTIC ACID: CPT | Performed by: EMERGENCY MEDICINE

## 2020-08-22 PROCEDURE — 85027 COMPLETE CBC AUTOMATED: CPT | Performed by: EMERGENCY MEDICINE

## 2020-08-22 PROCEDURE — 85610 PROTHROMBIN TIME: CPT | Performed by: EMERGENCY MEDICINE

## 2020-08-22 RX ORDER — ACETAMINOPHEN 325 MG/1
975 TABLET ORAL ONCE
Status: COMPLETED | OUTPATIENT
Start: 2020-08-22 | End: 2020-08-22

## 2020-08-22 RX ORDER — ASPIRIN 81 MG/1
81 TABLET, CHEWABLE ORAL ONCE
Status: COMPLETED | OUTPATIENT
Start: 2020-08-22 | End: 2020-08-23

## 2020-08-22 RX ORDER — SODIUM CHLORIDE 9 MG/ML
3 INJECTION INTRAVENOUS
Status: DISCONTINUED | OUTPATIENT
Start: 2020-08-22 | End: 2020-08-26 | Stop reason: HOSPADM

## 2020-08-22 RX ADMIN — ACETAMINOPHEN 975 MG: 325 TABLET, FILM COATED ORAL at 23:04

## 2020-08-22 RX ADMIN — CEFEPIME HYDROCHLORIDE 2000 MG: 2 INJECTION, POWDER, FOR SOLUTION INTRAVENOUS at 23:08

## 2020-08-23 ENCOUNTER — APPOINTMENT (EMERGENCY)
Dept: CT IMAGING | Facility: HOSPITAL | Age: 80
DRG: 871 | End: 2020-08-23
Payer: MEDICARE

## 2020-08-23 ENCOUNTER — APPOINTMENT (INPATIENT)
Dept: ULTRASOUND IMAGING | Facility: HOSPITAL | Age: 80
DRG: 871 | End: 2020-08-23
Payer: MEDICARE

## 2020-08-23 ENCOUNTER — APPOINTMENT (INPATIENT)
Dept: RADIOLOGY | Facility: HOSPITAL | Age: 80
DRG: 871 | End: 2020-08-23
Payer: MEDICARE

## 2020-08-23 ENCOUNTER — APPOINTMENT (INPATIENT)
Dept: NON INVASIVE DIAGNOSTICS | Facility: HOSPITAL | Age: 80
DRG: 871 | End: 2020-08-23
Payer: MEDICARE

## 2020-08-23 ENCOUNTER — APPOINTMENT (INPATIENT)
Dept: MRI IMAGING | Facility: HOSPITAL | Age: 80
DRG: 871 | End: 2020-08-23
Payer: MEDICARE

## 2020-08-23 PROBLEM — E87.20 LACTIC ACIDOSIS: Status: ACTIVE | Noted: 2020-08-23

## 2020-08-23 PROBLEM — A41.9 SEPTIC SHOCK (HCC): Status: ACTIVE | Noted: 2020-08-23

## 2020-08-23 PROBLEM — R65.21 SEPTIC SHOCK (HCC): Status: ACTIVE | Noted: 2020-08-23

## 2020-08-23 PROBLEM — E87.2 LACTIC ACIDOSIS: Status: ACTIVE | Noted: 2020-08-23

## 2020-08-23 PROBLEM — N17.9 AKI (ACUTE KIDNEY INJURY) (HCC): Status: ACTIVE | Noted: 2020-08-23

## 2020-08-23 PROBLEM — R57.9 SHOCK (HCC): Status: ACTIVE | Noted: 2020-08-23

## 2020-08-23 PROBLEM — R77.8 ELEVATED TROPONIN: Status: ACTIVE | Noted: 2020-08-23

## 2020-08-23 PROBLEM — R74.01 TRANSAMINITIS: Status: ACTIVE | Noted: 2020-08-23

## 2020-08-23 PROBLEM — R06.89 RESPIRATORY INSUFFICIENCY: Status: ACTIVE | Noted: 2020-08-23

## 2020-08-23 LAB
ALBUMIN SERPL BCP-MCNC: 2.5 G/DL (ref 3.5–5)
ALP SERPL-CCNC: 131 U/L (ref 46–116)
ALT SERPL W P-5'-P-CCNC: 407 U/L (ref 12–78)
AMORPH URATE CRY URNS QL MICRO: ABNORMAL /HPF
ANION GAP SERPL CALCULATED.3IONS-SCNC: 9 MMOL/L (ref 4–13)
ANION GAP SERPL CALCULATED.3IONS-SCNC: 9 MMOL/L (ref 4–13)
APAP SERPL-MCNC: <2 UG/ML (ref 10–20)
AST SERPL W P-5'-P-CCNC: 195 U/L (ref 5–45)
ATRIAL RATE: 128 BPM
ATRIAL RATE: 86 BPM
BACTERIA UR QL AUTO: ABNORMAL /HPF
BASE EX.OXY STD BLDV CALC-SCNC: 81.4 % (ref 60–80)
BASE EX.OXY STD BLDV CALC-SCNC: 95.3 % (ref 60–80)
BASE EXCESS BLDV CALC-SCNC: -5 MMOL/L
BASE EXCESS BLDV CALC-SCNC: -5.3 MMOL/L
BASOPHILS # BLD AUTO: 0.03 THOUSANDS/ΜL (ref 0–0.1)
BASOPHILS NFR BLD AUTO: 0 % (ref 0–1)
BILIRUB SERPL-MCNC: 1.4 MG/DL (ref 0.2–1)
BILIRUB UR QL STRIP: NEGATIVE
BODY TEMPERATURE: 97.4 DEGREES FEHRENHEIT
BUN SERPL-MCNC: 23 MG/DL (ref 5–25)
BUN SERPL-MCNC: 28 MG/DL (ref 5–25)
CA-I BLD-SCNC: 1.06 MMOL/L (ref 1.12–1.32)
CALCIUM SERPL-MCNC: 7.6 MG/DL (ref 8.3–10.1)
CALCIUM SERPL-MCNC: 8 MG/DL (ref 8.3–10.1)
CHLORIDE SERPL-SCNC: 106 MMOL/L (ref 100–108)
CHLORIDE SERPL-SCNC: 107 MMOL/L (ref 100–108)
CLARITY UR: ABNORMAL
CO2 SERPL-SCNC: 23 MMOL/L (ref 21–32)
CO2 SERPL-SCNC: 24 MMOL/L (ref 21–32)
COARSE GRAN CASTS URNS QL MICRO: ABNORMAL /LPF
COLOR UR: YELLOW
CREAT SERPL-MCNC: 1.47 MG/DL (ref 0.6–1.3)
CREAT SERPL-MCNC: 2.09 MG/DL (ref 0.6–1.3)
EOSINOPHIL # BLD AUTO: 0 THOUSAND/ΜL (ref 0–0.61)
EOSINOPHIL NFR BLD AUTO: 0 % (ref 0–6)
ERYTHROCYTE [DISTWIDTH] IN BLOOD BY AUTOMATED COUNT: 14.4 % (ref 11.6–15.1)
FINE GRAN CASTS URNS QL MICRO: ABNORMAL /LPF
GFR SERPL CREATININE-BSD FRML MDRD: 29 ML/MIN/1.73SQ M
GFR SERPL CREATININE-BSD FRML MDRD: 44 ML/MIN/1.73SQ M
GLUCOSE SERPL-MCNC: 107 MG/DL (ref 65–140)
GLUCOSE SERPL-MCNC: 148 MG/DL (ref 65–140)
GLUCOSE UR STRIP-MCNC: NEGATIVE MG/DL
HBV SURFACE AB SER-ACNC: <3.1 MIU/ML
HBV SURFACE AG SER QL: NORMAL
HCO3 BLDV-SCNC: 19.7 MMOL/L (ref 24–30)
HCO3 BLDV-SCNC: 20.6 MMOL/L (ref 24–30)
HCT VFR BLD AUTO: 39.6 % (ref 36.5–49.3)
HCV AB SER QL: NORMAL
HFNC FLOW LPM: 40
HGB BLD-MCNC: 12.6 G/DL (ref 12–17)
HGB UR QL STRIP.AUTO: ABNORMAL
HYALINE CASTS #/AREA URNS LPF: ABNORMAL /LPF
IMM GRANULOCYTES # BLD AUTO: 0.21 THOUSAND/UL (ref 0–0.2)
IMM GRANULOCYTES NFR BLD AUTO: 1 % (ref 0–2)
KETONES UR STRIP-MCNC: ABNORMAL MG/DL
LACTATE SERPL-SCNC: 1.5 MMOL/L (ref 0.5–2)
LACTATE SERPL-SCNC: 2.8 MMOL/L (ref 0.5–2)
LEUKOCYTE ESTERASE UR QL STRIP: NEGATIVE
LYMPHOCYTES # BLD AUTO: 0.96 THOUSANDS/ΜL (ref 0.6–4.47)
LYMPHOCYTES NFR BLD AUTO: 5 % (ref 14–44)
MAGNESIUM SERPL-MCNC: 1.8 MG/DL (ref 1.6–2.6)
MCH RBC QN AUTO: 31 PG (ref 26.8–34.3)
MCHC RBC AUTO-ENTMCNC: 31.8 G/DL (ref 31.4–37.4)
MCV RBC AUTO: 97 FL (ref 82–98)
MONOCYTES # BLD AUTO: 1.71 THOUSAND/ΜL (ref 0.17–1.22)
MONOCYTES NFR BLD AUTO: 8 % (ref 4–12)
NEUTROPHILS # BLD AUTO: 17.74 THOUSANDS/ΜL (ref 1.85–7.62)
NEUTS SEG NFR BLD AUTO: 86 % (ref 43–75)
NITRITE UR QL STRIP: NEGATIVE
NON VENT HFNC FIO2: 50
NON VENT TYPE HFNC: ABNORMAL
NON-SQ EPI CELLS URNS QL MICRO: ABNORMAL /HPF
NRBC BLD AUTO-RTO: 0 /100 WBCS
O2 CT BLDV-SCNC: 15.3 ML/DL
O2 CT BLDV-SCNC: 17.5 ML/DL
P AXIS: 51 DEGREES
P AXIS: 52 DEGREES
PCO2 BLDV: 35.3 MM HG (ref 42–50)
PCO2 BLDV: 41.2 MM HG (ref 42–50)
PH BLDV: 7.32 [PH] (ref 7.3–7.4)
PH BLDV: 7.36 [PH] (ref 7.3–7.4)
PH UR STRIP.AUTO: 5.5 [PH]
PLATELET # BLD AUTO: 112 THOUSANDS/UL (ref 149–390)
PMV BLD AUTO: 10.9 FL (ref 8.9–12.7)
PO2 BLDV: 100.9 MM HG (ref 35–45)
PO2 BLDV: 48.7 MM HG (ref 35–45)
POTASSIUM SERPL-SCNC: 3.9 MMOL/L (ref 3.5–5.3)
POTASSIUM SERPL-SCNC: 4.5 MMOL/L (ref 3.5–5.3)
PR INTERVAL: 128 MS
PR INTERVAL: 148 MS
PROCALCITONIN SERPL-MCNC: 5.83 NG/ML
PROT SERPL-MCNC: 5.9 G/DL (ref 6.4–8.2)
PROT UR STRIP-MCNC: ABNORMAL MG/DL
QRS AXIS: 77 DEGREES
QRS AXIS: 81 DEGREES
QRSD INTERVAL: 76 MS
QRSD INTERVAL: 82 MS
QT INTERVAL: 280 MS
QT INTERVAL: 370 MS
QTC INTERVAL: 408 MS
QTC INTERVAL: 442 MS
RBC # BLD AUTO: 4.07 MILLION/UL (ref 3.88–5.62)
RBC #/AREA URNS AUTO: ABNORMAL /HPF
SARS-COV-2 RNA RESP QL NAA+PROBE: NEGATIVE
SODIUM SERPL-SCNC: 139 MMOL/L (ref 136–145)
SODIUM SERPL-SCNC: 139 MMOL/L (ref 136–145)
SP GR UR STRIP.AUTO: 1.02 (ref 1–1.03)
T WAVE AXIS: 10 DEGREES
T WAVE AXIS: 58 DEGREES
TROPONIN I SERPL-MCNC: 0.37 NG/ML
TROPONIN I SERPL-MCNC: 0.41 NG/ML
UROBILINOGEN UR QL STRIP.AUTO: 0.2 E.U./DL
VENTRICULAR RATE: 128 BPM
VENTRICULAR RATE: 86 BPM
WBC # BLD AUTO: 20.65 THOUSAND/UL (ref 4.31–10.16)
WBC #/AREA URNS AUTO: ABNORMAL /HPF

## 2020-08-23 PROCEDURE — 83735 ASSAY OF MAGNESIUM: CPT | Performed by: PHYSICIAN ASSISTANT

## 2020-08-23 PROCEDURE — 74176 CT ABD & PELVIS W/O CONTRAST: CPT

## 2020-08-23 PROCEDURE — 84484 ASSAY OF TROPONIN QUANT: CPT | Performed by: PHYSICIAN ASSISTANT

## 2020-08-23 PROCEDURE — 81001 URINALYSIS AUTO W/SCOPE: CPT | Performed by: EMERGENCY MEDICINE

## 2020-08-23 PROCEDURE — 36556 INSERT NON-TUNNEL CV CATH: CPT | Performed by: PHYSICIAN ASSISTANT

## 2020-08-23 PROCEDURE — G1004 CDSM NDSC: HCPCS

## 2020-08-23 PROCEDURE — 76705 ECHO EXAM OF ABDOMEN: CPT

## 2020-08-23 PROCEDURE — 82330 ASSAY OF CALCIUM: CPT | Performed by: PHYSICIAN ASSISTANT

## 2020-08-23 PROCEDURE — 86803 HEPATITIS C AB TEST: CPT | Performed by: INTERNAL MEDICINE

## 2020-08-23 PROCEDURE — 36415 COLL VENOUS BLD VENIPUNCTURE: CPT | Performed by: EMERGENCY MEDICINE

## 2020-08-23 PROCEDURE — 93010 ELECTROCARDIOGRAM REPORT: CPT | Performed by: INTERNAL MEDICINE

## 2020-08-23 PROCEDURE — 80053 COMPREHEN METABOLIC PANEL: CPT | Performed by: PHYSICIAN ASSISTANT

## 2020-08-23 PROCEDURE — 71045 X-RAY EXAM CHEST 1 VIEW: CPT

## 2020-08-23 PROCEDURE — 02HV33Z INSERTION OF INFUSION DEVICE INTO SUPERIOR VENA CAVA, PERCUTANEOUS APPROACH: ICD-10-PCS | Performed by: ANESTHESIOLOGY

## 2020-08-23 PROCEDURE — 82805 BLOOD GASES W/O2 SATURATION: CPT | Performed by: PHYSICIAN ASSISTANT

## 2020-08-23 PROCEDURE — 87340 HEPATITIS B SURFACE AG IA: CPT | Performed by: INTERNAL MEDICINE

## 2020-08-23 PROCEDURE — 99291 CRITICAL CARE FIRST HOUR: CPT | Performed by: ANESTHESIOLOGY

## 2020-08-23 PROCEDURE — 85025 COMPLETE CBC W/AUTO DIFF WBC: CPT | Performed by: PHYSICIAN ASSISTANT

## 2020-08-23 PROCEDURE — 83605 ASSAY OF LACTIC ACID: CPT | Performed by: EMERGENCY MEDICINE

## 2020-08-23 PROCEDURE — 93005 ELECTROCARDIOGRAM TRACING: CPT

## 2020-08-23 PROCEDURE — 83605 ASSAY OF LACTIC ACID: CPT | Performed by: PHYSICIAN ASSISTANT

## 2020-08-23 PROCEDURE — 86706 HEP B SURFACE ANTIBODY: CPT | Performed by: INTERNAL MEDICINE

## 2020-08-23 PROCEDURE — 93306 TTE W/DOPPLER COMPLETE: CPT | Performed by: INTERNAL MEDICINE

## 2020-08-23 PROCEDURE — 96365 THER/PROPH/DIAG IV INF INIT: CPT

## 2020-08-23 PROCEDURE — 94760 N-INVAS EAR/PLS OXIMETRY 1: CPT

## 2020-08-23 PROCEDURE — 80048 BASIC METABOLIC PNL TOTAL CA: CPT | Performed by: NURSE PRACTITIONER

## 2020-08-23 PROCEDURE — A9585 GADOBUTROL INJECTION: HCPCS | Performed by: NURSE PRACTITIONER

## 2020-08-23 PROCEDURE — 99223 1ST HOSP IP/OBS HIGH 75: CPT | Performed by: INTERNAL MEDICINE

## 2020-08-23 PROCEDURE — 74183 MRI ABD W/O CNTR FLWD CNTR: CPT

## 2020-08-23 PROCEDURE — C8929 TTE W OR WO FOL WCON,DOPPLER: HCPCS

## 2020-08-23 RX ORDER — CHLORHEXIDINE GLUCONATE 0.12 MG/ML
15 RINSE ORAL EVERY 12 HOURS SCHEDULED
Status: DISCONTINUED | OUTPATIENT
Start: 2020-08-23 | End: 2020-08-24

## 2020-08-23 RX ORDER — SODIUM CHLORIDE, SODIUM GLUCONATE, SODIUM ACETATE, POTASSIUM CHLORIDE, MAGNESIUM CHLORIDE, SODIUM PHOSPHATE, DIBASIC, AND POTASSIUM PHOSPHATE .53; .5; .37; .037; .03; .012; .00082 G/100ML; G/100ML; G/100ML; G/100ML; G/100ML; G/100ML; G/100ML
50 INJECTION, SOLUTION INTRAVENOUS CONTINUOUS
Status: DISCONTINUED | OUTPATIENT
Start: 2020-08-23 | End: 2020-08-24

## 2020-08-23 RX ORDER — LORAZEPAM 2 MG/ML
0.5 INJECTION INTRAMUSCULAR ONCE
Status: COMPLETED | OUTPATIENT
Start: 2020-08-23 | End: 2020-08-23

## 2020-08-23 RX ORDER — MAGNESIUM SULFATE HEPTAHYDRATE 40 MG/ML
2 INJECTION, SOLUTION INTRAVENOUS ONCE
Status: COMPLETED | OUTPATIENT
Start: 2020-08-23 | End: 2020-08-23

## 2020-08-23 RX ORDER — SODIUM CHLORIDE, SODIUM GLUCONATE, SODIUM ACETATE, POTASSIUM CHLORIDE, MAGNESIUM CHLORIDE, SODIUM PHOSPHATE, DIBASIC, AND POTASSIUM PHOSPHATE .53; .5; .37; .037; .03; .012; .00082 G/100ML; G/100ML; G/100ML; G/100ML; G/100ML; G/100ML; G/100ML
1000 INJECTION, SOLUTION INTRAVENOUS ONCE
Status: COMPLETED | OUTPATIENT
Start: 2020-08-23 | End: 2020-08-23

## 2020-08-23 RX ORDER — ASPIRIN 81 MG/1
81 TABLET, CHEWABLE ORAL DAILY
Status: DISCONTINUED | OUTPATIENT
Start: 2020-08-23 | End: 2020-08-26 | Stop reason: HOSPADM

## 2020-08-23 RX ORDER — HEPARIN SODIUM 5000 [USP'U]/ML
5000 INJECTION, SOLUTION INTRAVENOUS; SUBCUTANEOUS EVERY 8 HOURS SCHEDULED
Status: DISCONTINUED | OUTPATIENT
Start: 2020-08-23 | End: 2020-08-26 | Stop reason: HOSPADM

## 2020-08-23 RX ORDER — POTASSIUM CHLORIDE 14.9 MG/ML
20 INJECTION INTRAVENOUS ONCE
Status: COMPLETED | OUTPATIENT
Start: 2020-08-23 | End: 2020-08-23

## 2020-08-23 RX ADMIN — PERFLUTREN 0.4 ML/MIN: 6.52 INJECTION, SUSPENSION INTRAVENOUS at 08:35

## 2020-08-23 RX ADMIN — VANCOMYCIN HYDROCHLORIDE 1250 MG: 5 INJECTION, POWDER, LYOPHILIZED, FOR SOLUTION INTRAVENOUS at 23:20

## 2020-08-23 RX ADMIN — HEPARIN SODIUM 5000 UNITS: 5000 INJECTION INTRAVENOUS; SUBCUTANEOUS at 06:09

## 2020-08-23 RX ADMIN — HEPARIN SODIUM 5000 UNITS: 5000 INJECTION INTRAVENOUS; SUBCUTANEOUS at 13:16

## 2020-08-23 RX ADMIN — SODIUM CHLORIDE 1000 ML: 0.9 INJECTION, SOLUTION INTRAVENOUS at 01:50

## 2020-08-23 RX ADMIN — HEPARIN SODIUM 5000 UNITS: 5000 INJECTION INTRAVENOUS; SUBCUTANEOUS at 22:32

## 2020-08-23 RX ADMIN — SODIUM CHLORIDE 500 ML: 0.9 INJECTION, SOLUTION INTRAVENOUS at 01:12

## 2020-08-23 RX ADMIN — CHLORHEXIDINE GLUCONATE 0.12% ORAL RINSE 15 ML: 1.2 LIQUID ORAL at 22:32

## 2020-08-23 RX ADMIN — POTASSIUM CHLORIDE 20 MEQ: 14.9 INJECTION, SOLUTION INTRAVENOUS at 10:16

## 2020-08-23 RX ADMIN — CEFEPIME HYDROCHLORIDE 2000 MG: 2 INJECTION, POWDER, FOR SOLUTION INTRAVENOUS at 11:47

## 2020-08-23 RX ADMIN — CEFEPIME HYDROCHLORIDE 2000 MG: 2 INJECTION, POWDER, FOR SOLUTION INTRAVENOUS at 22:32

## 2020-08-23 RX ADMIN — METRONIDAZOLE 500 MG: 500 INJECTION, SOLUTION INTRAVENOUS at 02:11

## 2020-08-23 RX ADMIN — SODIUM CHLORIDE, SODIUM GLUCONATE, SODIUM ACETATE, POTASSIUM CHLORIDE, MAGNESIUM CHLORIDE, SODIUM PHOSPHATE, DIBASIC, AND POTASSIUM PHOSPHATE 1000 ML: .53; .5; .37; .037; .03; .012; .00082 INJECTION, SOLUTION INTRAVENOUS at 06:34

## 2020-08-23 RX ADMIN — LORAZEPAM 0.5 MG: 2 INJECTION INTRAMUSCULAR; INTRAVENOUS at 14:42

## 2020-08-23 RX ADMIN — CHLORHEXIDINE GLUCONATE 0.12% ORAL RINSE 15 ML: 1.2 LIQUID ORAL at 04:02

## 2020-08-23 RX ADMIN — GADOBUTROL 6 ML: 604.72 INJECTION INTRAVENOUS at 15:48

## 2020-08-23 RX ADMIN — SODIUM CHLORIDE 1000 ML: 0.9 INJECTION, SOLUTION INTRAVENOUS at 00:07

## 2020-08-23 RX ADMIN — ASPIRIN 81 MG 81 MG: 81 TABLET ORAL at 08:47

## 2020-08-23 RX ADMIN — NOREPINEPHRINE BITARTRATE 2 MCG/MIN: 1 INJECTION INTRAVENOUS at 04:02

## 2020-08-23 RX ADMIN — MAGNESIUM SULFATE IN WATER 2 G: 40 INJECTION, SOLUTION INTRAVENOUS at 09:27

## 2020-08-23 RX ADMIN — ASPIRIN 81 MG 81 MG: 81 TABLET ORAL at 00:14

## 2020-08-23 RX ADMIN — VANCOMYCIN HYDROCHLORIDE 1500 MG: 1 INJECTION, POWDER, LYOPHILIZED, FOR SOLUTION INTRAVENOUS at 00:08

## 2020-08-23 RX ADMIN — METRONIDAZOLE 500 MG: 500 INJECTION, SOLUTION INTRAVENOUS at 16:58

## 2020-08-23 RX ADMIN — METRONIDAZOLE 500 MG: 500 INJECTION, SOLUTION INTRAVENOUS at 08:55

## 2020-08-23 RX ADMIN — SODIUM CHLORIDE, SODIUM GLUCONATE, SODIUM ACETATE, POTASSIUM CHLORIDE, MAGNESIUM CHLORIDE, SODIUM PHOSPHATE, DIBASIC, AND POTASSIUM PHOSPHATE 125 ML/HR: .53; .5; .37; .037; .03; .012; .00082 INJECTION, SOLUTION INTRAVENOUS at 13:49

## 2020-08-23 NOTE — H&P
Progress Note - Arleen Russell 1940, 78 y o  male MRN: [id number]    Unit/Bed#:  Encounter: [id number]    Primary Care Provider: Gissel Davila DO   Date and time admitted to hospital: 8/22/2020 10:32 PM        * Shock [facility])  Assessment & Plan  · Septic vs Cardiogenic  · Continue vasopressor therapy to support hemodynamics  ·  Levophed 4 mcg/min  · Will place central line and check SVO2 to help guide fluid management  · Continue broad-spectrum antibiotics cefepime/Flagyl antibiotic day 1  · Trend procalcitonin  Deescalate antibiotics as able  · Will check echocardiogram     Transaminitis  Assessment & Plan  · CT abdomen without acute etiology however concern for cholangitis versus congestive hepatopathy  · Continue broad-spectrum antibiotics  · Trend LFTs  · GI consultation    Lactic acidosis  Assessment & Plan  · Trend and points of resuscitation  · Q2H lactic acid      Elevated troponin  Assessment & Plan  · Trend troponins  · Daily EKG  · Check echocardiogram     Respiratory insufficiency  Assessment & Plan  · Continue high-flow nasal cannula to support oxygen requirements  · Maintain Fio2> 92%  · Encourage good incentive spirometry/pulmonary toileting  EMILIANO (acute kidney injury) ([facility] [address] )  Assessment & Plan  · Gentle IV fluid hydration  · Strict I&Os  · Trend daily be/creatinine    Coronary artery disease without angina pectoris  Assessment & Plan  · 81 mg asa daily  · Hold home BB/ACEI    Hypertension  Assessment & Plan  · Hold home antihypertensive regimen  Hyperlipidemia  Assessment & Plan  · Hold statin (Zocor 80 mg daily) in setting of transaminitis         -------------------------------------------------------------------------------------------------------------  Chief Complaint: Im fine     History of Present Illness   HX and PE limited by: Belinda Leyva is a 78 y o  male who presented to [place] [address] ED 8/23/20 for evaluation generalized fatigue and altered mental status  Patient has significant past medical history of CAD s/p CABG (1996), HTN  HLD, and PAD  History provided by patient's wife  She reports that approximately 2-3 days ago patient experienced chest tightness that has waxed and waned leading up to admission  She noted increasing fatigue and generalized malaise  Yesterday patient was notably confused and did not "make sense " He fell multiple times attempting to get up from a seated position prompting EMS activation  Upon arrival patient was noted to have fever of 103  1  He was tachycardic and borderline hypotensive  Patient was aggressively fluid resuscitated and started on broad-spectrum antibiotics  For elevated lactic acid of 4 0, a new EMILIANO of 2 1, and transaminitis  CT abdomen pelvis were obtained which did not reveal obvious etiology  Throughout the course of his stay here acquired increasing FiO2 requirements and was trialed on high-flow nasal cannula  Patient was subsequently referred to [address] for further evaluation and workup  History obtained from chart review and the patient and spouse   -------------------------------------------------------------------------------------------------------------  Dispo: Admit to Critical Care     Code Status: Level 1 - Full Code  --------------------------------------------------------------------------------------------------------------  Review of Systems   Constitutional: Positive for activity change, fatigue and fever  HENT: Negative  Respiratory: Positive for shortness of breath  Cardiovascular: Positive for chest pain  Gastrointestinal: Negative  Genitourinary: Negative  Musculoskeletal: Negative  Skin: Negative  Neurological: Negative  Physical Exam  Constitutional:       Appearance: Normal appearance  He is not ill-appearing  HENT:      Head: Normocephalic and atraumatic  Eyes:      Extraocular Movements: Extraocular movements intact        Pupils: Pupils are equal, round, and reactive to light  Neck:      Musculoskeletal: Neck supple  Cardiovascular:      Rate and Rhythm: Normal rate and regular rhythm  Pulses: Normal pulses  Heart sounds: No murmur  Pulmonary:      Comments: Fine crackles at the bilateral posterior bases  Abdominal:      General: Bowel sounds are normal  There is no distension  Palpations: Abdomen is soft  Tenderness: There is no abdominal tenderness  There is no guarding  Skin:     General: Skin is warm  Capillary Refill: Capillary refill takes less than 2 seconds  Comments: Abrasion over left and left shin   Neurological:      General: No focal deficit present  Mental Status: He is alert and oriented to person, place, and time  --------------------------------------------------------------------------------------------------------------  Vitals:   Vitals:    08/23/20 0331 08/23/20 0400 08/23/20 0500 08/23/20 0600   BP:  (!) 85/51 (!) 87/52 93/53   BP Location:  Right arm     Pulse: 90 84 76 70   Resp: (!) 28 (!) 24 22 22   Temp:  (!) 97 4 °F (36 3 °C)     TempSrc:  Oral     SpO2: 100% 99% 99% 99%   Weight:       Height:         Temp  Min: 97 4 °F (36 3 °C)  Max: 103 1 °F (39 5 °C)  IBW: 70 7 kg  Height: 5' 9" (175 3 cm)  Body mass index is 31 68 kg/m²      Laboratory and Diagnostics:  Results from last 7 days   Lab Units 08/23/20  0456 08/22/20  2303   WBC Thousand/uL 20 65* 7 82   HEMOGLOBIN g/dL 12 6 14 6   HEMATOCRIT % 39 6 42 9   PLATELETS Thousands/uL 112* 120*   NEUTROS PCT % 86*  --    BANDS PCT %  --  5   MONOS PCT % 8  --    MONO PCT %  --  1*     Results from last 7 days   Lab Units 08/23/20  0456 08/22/20  2303   SODIUM mmol/L 139 139   POTASSIUM mmol/L 3 9 3 6   CHLORIDE mmol/L 107 103   CO2 mmol/L 23 21   ANION GAP mmol/L 9 15*   BUN mg/dL 28* 31*   CREATININE mg/dL 2 09* 2 11*   CALCIUM mg/dL 7 6* 8 1*   GLUCOSE RANDOM mg/dL 148* 146*   ALT U/L 407* 526*   AST U/L 195* 258*   ALK PHOS U/L 131* 204*   ALBUMIN g/dL 2 5* 3 0*   TOTAL BILIRUBIN mg/dL 1 40* 1 80*     Results from last 7 days   Lab Units 08/23/20  0456   MAGNESIUM mg/dL 1 8      Results from last 7 days   Lab Units 08/22/20  2303   INR  1 15   PTT seconds 32      Results from last 7 days   Lab Units 08/23/20  0456 08/23/20  0328 08/22/20  2303   TROPONIN I ng/mL 0 37* 0 41* 0 20*     Results from last 7 days   Lab Units 08/23/20  0456 08/23/20  0112 08/22/20  2303   LACTIC ACID mmol/L 1 5 2 8* 4 0*     ABG:    VBG:  Results from last 7 days   Lab Units 08/23/20  0611   PH TIFFANIE  7 316   PCO2 TIFFANIE mm Hg 41 2*   PO2 TIFFANIE mm Hg 48 7*   HCO3 TIFFANIE mmol/L 20 6*   BASE EXC TIFFANIE mmol/L -5 3           Micro:        EKG: NSR  Imaging: I have personally reviewed pertinent reports  Historical Information   No past medical history on file    Past Surgical History:   Procedure Laterality Date    ABDOMINAL SURGERY      appy    CORONARY ARTERY BYPASS GRAFT       Social History   Social History     Substance and Sexual Activity   Alcohol Use Not Currently    Frequency: Never     Social History     Substance and Sexual Activity   Drug Use Never     Social History     Tobacco Use   Smoking Status Never Smoker   Smokeless Tobacco Never Used     Exercise History: no assistance with ADLs  Family History:   Family History   Problem Relation Age of Onset    No Known Problems Mother     No Known Problems Father        Medications:  Current Facility-Administered Medications   Medication Dose Route Frequency    cefepime (MAXIPIME) 2,000 mg in dextrose 5 % 50 mL IVPB  2,000 mg Intravenous Q12H    chlorhexidine (PERIDEX) 0 12 % oral rinse 15 mL  15 mL Swish & Spit Q12H [address]    heparin (porcine) subcutaneous injection 5,000 Units  5,000 Units Subcutaneous Q8H [address]    metroNIDAZOLE (FLAGYL) IVPB (premix) 500 mg 100 mL  500 mg Intravenous Q8H    multi-electrolyte (ISOLYTE-S PH 7 4) bolus 1,000 mL  1,000 mL Intravenous Once    norepinephrine (LEVOPHED) 4 mg (STANDARD CONCENTRATION) IV in sodium chloride 0 9% 250 mL  1-30 mcg/min Intravenous Titrated    sodium chloride (PF) 0 9 % injection 3 mL  3 mL Intravenous Q1H PRN     Home medications:  Prior to Admission Medications   Prescriptions Last Dose Informant Patient Reported? Taking? Omega-3 Fatty Acids (FISH OIL) 645 MG CAPS   Yes No   Sig: Take by mouth   allopurinol (ZYLOPRIM) 300 mg tablet   Yes No   Sig: Take 300 mg by mouth daily   aspirin 81 MG tablet   Yes No   Sig: Take 2 tablets by mouth daily   ketoconazole (NIZORAL) 2 % shampoo   Yes No   levofloxacin (LEVAQUIN) 500 mg tablet   Yes No   lisinopril (ZESTRIL) 10 mg tablet   Yes No   Sig: Take 1 tablet by mouth daily   metoprolol tartrate (LOPRESSOR) 25 mg tablet   Yes No   Sig: Take 1 tablet by mouth 2 (two) times a day   simvastatin (ZOCOR) 80 mg tablet   Yes No   Sig: Take 80 mg by mouth daily at bedtime      Facility-Administered Medications: None     Allergies: Allergies   Allergen Reactions    Shellfish-Derived Products        ------------------------------------------------------------------------------------------------------------  Advance Directive and Living Will:      Power of :    POLST:    ------------------------------------------------------------------------------------------------------------  Anticipated Length of Stay is > 2 midnights    Care Time Delivered:   Upon my evaluation, this patient had a high probability of imminent or life-threatening deterioration due to Shock,, which required my direct attention, intervention, and personal management  I have personally provided 25 minutes (0300 to 0500) of critical care time, exclusive of procedures, teaching, family meetings, and any prior time recorded by providers other than myself  Neda Mcgarry PA-C        Portions of the record may have been created with voice recognition software    Occasional wrong word or "sound a like" substitutions may have occurred due to the inherent limitations of voice recognition software    Read the chart carefully and recognize, using context, where substitutions have occurred

## 2020-08-23 NOTE — PLAN OF CARE
Problem: PAIN - ADULT  Goal: Verbalizes/displays adequate comfort level or baseline comfort level  Description: Interventions:  - Encourage patient to monitor pain and request assistance  - Assess pain using appropriate pain scale  - Administer analgesics based on type and severity of pain and evaluate response  - Implement non-pharmacological measures as appropriate and evaluate response  - Consider cultural and social influences on pain and pain management  - Notify physician/advanced practitioner if interventions unsuccessful or patient reports new pain  8/23/2020 0730 by Brenna Stephen RN  Outcome: Progressing  8/23/2020 0729 by Brenna Stephen RN  Outcome: Progressing     Problem: INFECTION - ADULT  Goal: Absence or prevention of progression during hospitalization  Description: INTERVENTIONS:  - Assess and monitor for signs and symptoms of infection  - Monitor lab/diagnostic results  - Monitor all insertion sites, i e  indwelling lines, tubes, and drains  - Monitor endotracheal if appropriate and nasal secretions for changes in amount and color  - [place] appropriate cooling/warming therapies per order  - Administer medications as ordered  - Instruct and encourage patient and family to use good hand hygiene technique  - Identify and instruct in appropriate isolation precautions for identified infection/condition  8/23/2020 0730 by Brenna Stephen RN  Outcome: Progressing  8/23/2020 0729 by Brenna Stephen RN  Outcome: Progressing  Goal: Absence of fever/infection during neutropenic period  Description: INTERVENTIONS:  - Monitor WBC    8/23/2020 0730 by Brenna Stephen RN  Outcome: Progressing  8/23/2020 0729 by Brenna Stephen RN  Outcome: Progressing     Problem: SAFETY ADULT  Goal: Patient will remain free of falls  Description: INTERVENTIONS:  - Assess patient frequently for physical needs  -  Identify cognitive and physical deficits and behaviors that affect risk of falls    -  [place] fall precautions as indicated by assessment   - Educate patient/family on patient safety including physical limitations  - Instruct patient to call for assistance with activity based on assessment  - Modify environment to reduce risk of injury  - Consider OT/PT consult to assist with strengthening/mobility  8/23/2020 0730 by Rylan Ramirez RN  Outcome: Progressing  8/23/2020 0729 by Rylan Ramirez RN  Outcome: Progressing  Goal: Maintain or return to baseline ADL function  Description: INTERVENTIONS:  -  Assess patient's ability to carry out ADLs; assess patient's baseline for ADL function and identify physical deficits which impact ability to perform ADLs (bathing, care of mouth/teeth, toileting, grooming, dressing, etc )  - Assess/evaluate cause of self-care deficits   - Assess range of motion  - Assess patient's mobility; develop plan if impaired  - Assess patient's need for assistive devices and provide as appropriate  - Encourage maximum independence but intervene and supervise when necessary  - Involve family in performance of ADLs  - Assess for home care needs following discharge   - Consider OT consult to assist with ADL evaluation and planning for discharge  - Provide patient education as appropriate  8/23/2020 0730 by Rylan Ramirez RN  Outcome: Progressing  8/23/2020 0729 by Rylan Ramirez RN  Outcome: Progressing  Goal: Maintain or return mobility status to optimal level  Description: INTERVENTIONS:  - Assess patient's baseline mobility status (ambulation, transfers, stairs, etc )    - Identify cognitive and physical deficits and behaviors that affect mobility  - Identify mobility aids required to assist with transfers and/or ambulation (gait belt, sit-to-stand, lift, walker, cane, etc )  - [place] fall precautions as indicated by assessment  - Record patient progress and toleration of activity level on Mobility SBAR; progress patient to next Phase/Stage  - Instruct patient to call for assistance with activity based on assessment  - Consider rehabilitation consult to assist with strengthening/weightbearing, etc   8/23/2020 0730 by Davian Stacy RN  Outcome: Progressing  8/23/2020 0729 by Davian Stacy RN  Outcome: Progressing     Problem: DISCHARGE PLANNING  Goal: Discharge to home or other facility with appropriate resources  Description: INTERVENTIONS:  - Identify barriers to discharge w/patient and caregiver  - Arrange for needed discharge resources and transportation as appropriate  - Identify discharge learning needs (meds, wound care, etc )  - Arrange for interpretive services to assist at discharge as needed  - Refer to Case Management Department for coordinating discharge planning if the patient needs post-hospital services based on physician/advanced practitioner order or complex needs related to functional status, cognitive ability, or social support system  8/23/2020 0730 by Davian Stacy RN  Outcome: Progressing  8/23/2020 0729 by Davian Stacy RN  Outcome: Progressing     Problem: Knowledge Deficit  Goal: Patient/family/caregiver demonstrates understanding of disease process, treatment plan, medications, and discharge instructions  Description: Complete learning assessment and assess knowledge base  Interventions:  - Provide teaching at level of understanding  - Provide teaching via preferred learning methods  8/23/2020 0730 by Davian Stacy RN  Outcome: Progressing  8/23/2020 0729 by Davian Stacy RN  Outcome: Progressing     Problem: Nutrition/Hydration-ADULT  Goal: Nutrient/Hydration intake appropriate for improving, restoring or maintaining nutritional needs  Description: Monitor and assess patient's nutrition/hydration status for malnutrition  Collaborate with interdisciplinary team and initiate plan and interventions as ordered  Monitor patient's weight and dietary intake as ordered or per policy  Utilize nutrition screening tool and intervene as necessary   Determine patient's food preferences and provide high-protein, high-caloric foods as appropriate       INTERVENTIONS:  - Monitor oral intake, urinary output, labs, and treatment plans  - Assess nutrition and hydration status and recommend course of action  - Evaluate amount of meals eaten  - Assist patient with eating if necessary   - Allow adequate time for meals  - Recommend/ encourage appropriate diets, oral nutritional supplements, and vitamin/mineral supplements  - Order, calculate, and assess calorie counts as needed  - Recommend, monitor, and adjust tube feedings and TPN/PPN based on assessed needs  - Assess need for intravenous fluids  - Provide specific nutrition/hydration education as appropriate  - Include patient/family/caregiver in decisions related to nutrition  8/23/2020 0730 by Parth Shah RN  Outcome: Progressing  8/23/2020 0729 by Parth Shah RN  Outcome: Progressing

## 2020-08-23 NOTE — PLAN OF CARE
Problem: PAIN - ADULT  Goal: Verbalizes/displays adequate comfort level or baseline comfort level  Description: Interventions:  - Encourage patient to monitor pain and request assistance  - Assess pain using appropriate pain scale  - Administer analgesics based on type and severity of pain and evaluate response  - Implement non-pharmacological measures as appropriate and evaluate response  - Consider cultural and social influences on pain and pain management  - Notify physician/advanced practitioner if interventions unsuccessful or patient reports new pain  8/23/2020 0730 by Reta Strickland RN  Outcome: Progressing  8/23/2020 0730 by Reta Strickland RN  Outcome: Progressing  8/23/2020 0729 by Reta Strickland RN  Outcome: Progressing     Problem: INFECTION - ADULT  Goal: Absence or prevention of progression during hospitalization  Description: INTERVENTIONS:  - Assess and monitor for signs and symptoms of infection  - Monitor lab/diagnostic results  - Monitor all insertion sites, i e  indwelling lines, tubes, and drains  - Monitor endotracheal if appropriate and nasal secretions for changes in amount and color  - [place] appropriate cooling/warming therapies per order  - Administer medications as ordered  - Instruct and encourage patient and family to use good hand hygiene technique  - Identify and instruct in appropriate isolation precautions for identified infection/condition  8/23/2020 0730 by Reta Strickland RN  Outcome: Progressing  8/23/2020 0730 by Reta Strickland RN  Outcome: Progressing  8/23/2020 0729 by Reta Strickland RN  Outcome: Progressing  Goal: Absence of fever/infection during neutropenic period  Description: INTERVENTIONS:  - Monitor WBC    8/23/2020 0730 by Reta Strickland RN  Outcome: Progressing  8/23/2020 0730 by Reta Strickland RN  Outcome: Progressing  8/23/2020 0729 by Reta Strickland RN  Outcome: Progressing     Problem: SAFETY ADULT  Goal: Patient will remain free of falls  Description: INTERVENTIONS:  - Assess patient frequently for physical needs  -  Identify cognitive and physical deficits and behaviors that affect risk of falls    -  [place] fall precautions as indicated by assessment   - Educate patient/family on patient safety including physical limitations  - Instruct patient to call for assistance with activity based on assessment  - Modify environment to reduce risk of injury  - Consider OT/PT consult to assist with strengthening/mobility  8/23/2020 0730 by Rhonda Brian RN  Outcome: Progressing  8/23/2020 0730 by Rhonda Brian RN  Outcome: Progressing  8/23/2020 0729 by Rhonda Brian RN  Outcome: Progressing  Goal: Maintain or return to baseline ADL function  Description: INTERVENTIONS:  -  Assess patient's ability to carry out ADLs; assess patient's baseline for ADL function and identify physical deficits which impact ability to perform ADLs (bathing, care of mouth/teeth, toileting, grooming, dressing, etc )  - Assess/evaluate cause of self-care deficits   - Assess range of motion  - Assess patient's mobility; develop plan if impaired  - Assess patient's need for assistive devices and provide as appropriate  - Encourage maximum independence but intervene and supervise when necessary  - Involve family in performance of ADLs  - Assess for home care needs following discharge   - Consider OT consult to assist with ADL evaluation and planning for discharge  - Provide patient education as appropriate  8/23/2020 0730 by Rhonda Brian RN  Outcome: Progressing  8/23/2020 0730 by Rhonda Brian RN  Outcome: Progressing  8/23/2020 0729 by Rhonda Brian RN  Outcome: Progressing  Goal: Maintain or return mobility status to optimal level  Description: INTERVENTIONS:  - Assess patient's baseline mobility status (ambulation, transfers, stairs, etc )    - Identify cognitive and physical deficits and behaviors that affect mobility  - Identify mobility aids required to assist with transfers and/or ambulation (gait belt, sit-to-stand, lift, walker, cane, etc )  - [place] fall precautions as indicated by assessment  - Record patient progress and toleration of activity level on Mobility SBAR; progress patient to next Phase/Stage  - Instruct patient to call for assistance with activity based on assessment  - Consider rehabilitation consult to assist with strengthening/weightbearing, etc   8/23/2020 0730 by Brenna Stephen RN  Outcome: Progressing  8/23/2020 0730 by Brenna Stephen RN  Outcome: Progressing  8/23/2020 0729 by Brenna Stephen RN  Outcome: Progressing     Problem: DISCHARGE PLANNING  Goal: Discharge to home or other facility with appropriate resources  Description: INTERVENTIONS:  - Identify barriers to discharge w/patient and caregiver  - Arrange for needed discharge resources and transportation as appropriate  - Identify discharge learning needs (meds, wound care, etc )  - Arrange for interpretive services to assist at discharge as needed  - Refer to Case Management Department for coordinating discharge planning if the patient needs post-hospital services based on physician/advanced practitioner order or complex needs related to functional status, cognitive ability, or social support system  8/23/2020 0730 by Brenna Stephen RN  Outcome: Progressing  8/23/2020 0730 by Brenna Stephen RN  Outcome: Progressing  8/23/2020 0729 by Brenna Stephen RN  Outcome: Progressing     Problem: Knowledge Deficit  Goal: Patient/family/caregiver demonstrates understanding of disease process, treatment plan, medications, and discharge instructions  Description: Complete learning assessment and assess knowledge base    Interventions:  - Provide teaching at level of understanding  - Provide teaching via preferred learning methods  8/23/2020 0730 by Brenna Stephen RN  Outcome: Progressing  8/23/2020 0730 by Brenna Stephen RN  Outcome: Progressing  8/23/2020 0729 by Brenna Stephen RN  Outcome: Progressing     Problem: Nutrition/Hydration-ADULT  Goal: Nutrient/Hydration intake appropriate for improving, restoring or maintaining nutritional needs  Description: Monitor and assess patient's nutrition/hydration status for malnutrition  Collaborate with interdisciplinary team and initiate plan and interventions as ordered  Monitor patient's weight and dietary intake as ordered or per policy  Utilize nutrition screening tool and intervene as necessary  Determine patient's food preferences and provide high-protein, high-caloric foods as appropriate       INTERVENTIONS:  - Monitor oral intake, urinary output, labs, and treatment plans  - Assess nutrition and hydration status and recommend course of action  - Evaluate amount of meals eaten  - Assist patient with eating if necessary   - Allow adequate time for meals  - Recommend/ encourage appropriate diets, oral nutritional supplements, and vitamin/mineral supplements  - Order, calculate, and assess calorie counts as needed  - Recommend, monitor, and adjust tube feedings and TPN/PPN based on assessed needs  - Assess need for intravenous fluids  - Provide specific nutrition/hydration education as appropriate  - Include patient/family/caregiver in decisions related to nutrition  8/23/2020 0730 by Shae Fermin, RN  Outcome: Progressing  8/23/2020 0730 by Shae Fermin, RN  Outcome: Progressing  8/23/2020 0729 by Shae Fermin, RN  Outcome: Progressing

## 2020-08-23 NOTE — ASSESSMENT & PLAN NOTE
· Septic vs Cardiogenic  · Continue vasopressor therapy to support hemodynamics  ·  Levophed 4 mcg/min  · Will place central line and check SVO2 to help guide fluid management  · Continue broad-spectrum antibiotics cefepime/Flagyl antibiotic day 1  · Trend procalcitonin  Deescalate antibiotics as able    · Will check echocardiogram

## 2020-08-23 NOTE — PROGRESS NOTES
Vancomycin Assessment    Claudeen Cables is a 78 y o  male who is currently receiving vancomycin vanco 1500mg q24h for MRSA suspected, bacteremia     Relevant clinical data and objective history reviewed:  Creatinine   Date Value Ref Range Status   08/23/2020 2 09 (H) 0 60 - 1 30 mg/dL Final     Comment:     Standardized to IDMS reference method   08/22/2020 2 11 (H) 0 60 - 1 30 mg/dL Final     Comment:     Standardized to IDMS reference method     /55   Pulse 73   Temp 97 7 °F (36 5 °C) (Oral)   Resp 20   Ht 5' 9" (1 753 m)   Wt 97 3 kg (214 lb 8 1 oz)   SpO2 97%   BMI 31 68 kg/m²   I/O last 3 completed shifts: In: 2036 4 [I V :1036 4; IV Piggyback:1000]  Out: -   Lab Results   Component Value Date/Time    BUN 28 (H) 08/23/2020 04:56 AM    WBC 20 65 (H) 08/23/2020 04:56 AM    HGB 12 6 08/23/2020 04:56 AM    HCT 39 6 08/23/2020 04:56 AM    MCV 97 08/23/2020 04:56 AM     (L) 08/23/2020 04:56 AM     Temp Readings from Last 3 Encounters:   08/23/20 97 7 °F (36 5 °C) (Oral)   08/05/20 (!) 97 °F (36 1 °C)   01/31/20 97 8 °F (36 6 °C)     Vancomycin Days of Therapy: 2    Assessment/Plan  The patient is currently on vancomycin utilizing scheduled dosing  Baseline risks associated with therapy include:  EMILIANO   The patient is receiving 1500 mg q24h based on a goal of 15-20 (appropriate for most indications) ; therefore, after clinical evaluation will be changed to vanco 1250mg q24h (used Adj BW pt >130% IBW) vanco T due 8/25 22:30   Pharmacy will continue to follow closely for s/sx of nephrotoxicity, infusion reactions, and appropriateness of therapy  BMP and CBC will be ordered per protocol  Plan for trough as patient approaches steady state, prior to the 4th  dose at approximately 2230 8/25  Pharmacy will continue to follow the patients culture results and clinical progress daily      Doreen Murray, Pharmacist

## 2020-08-23 NOTE — PROCEDURES
Central Line Insertion    Date/Time: 8/23/2020 6:47 AM  Performed by: Adarsh Hinkle PA-C  Authorized by: Adarsh Hinkle PA-C     Patient location:  Bedside  Consent:     Consent obtained:  Verbal    Consent given by:  Patient    Risks discussed:  Arterial puncture, incorrect placement, bleeding, pneumothorax and infection  Universal protocol:     Procedure explained and questions answered to patient or proxy's satisfaction: yes      Relevant documents present and verified: yes      Test results available and properly labeled: yes      Radiology Images displayed and confirmed  If images not available, report reviewed: yes      Site/side marked: yes      Immediately prior to procedure, a time out was called: yes      Patient identity confirmed:  Verbally with patient and arm band  Pre-procedure details:     Skin preparation:  ChloraPrep  Indications:     Central line indications: medications requiring central line and hemodynamic monitoring    Anesthesia (see MAR for exact dosages): Anesthesia method:  Local infiltration    Local anesthetic:  Lidocaine 1% w/o epi  Procedure details:     Location:  Right internal jugular    Vessel type: vein      Laterality:  Right    Approach: percutaneous technique used      Patient position:  Reverse Trendelenburg    Catheter type:  Triple lumen 16cm    Catheter size:  7 Fr    Landmarks identified: yes      Ultrasound guidance: yes      Sterile ultrasound techniques: Sterile gel and sterile probe covers were used      Number of attempts:  1    Successful placement: yes      Vessel of catheter tip end:  Superior vena cava  Post-procedure details:     Post-procedure:  Dressing applied and line sutured    Assessment:  Blood return through all ports, no pneumothorax on x-ray and free fluid flow    Patient tolerance of procedure:   Tolerated well, no immediate complications

## 2020-08-23 NOTE — CONSULTS
Consultation - [facility]  Nu Coronado 78 y o  male MRN: [id number]  Unit/Bed#:  Encounter: [id number]        Consults    Reason for Consult / Principal Problem:     Principal Problem:    Shock [facility])  Active Problems:    Hypertension    Hyperlipidemia    Coronary artery disease without angina pectoris    PAD (peripheral artery disease) (HCC)    Transaminitis    Respiratory insufficiency    Lactic acidosis    EMILIANO (acute kidney injury) ([address] )    Elevated troponin        ASSESSMENT AND PLAN:      Elevated liver enzymes  - differential include shock/ischemic liver versus drug-induced versus congestive hepatopathy vs liver abscess versus choledocholithiasis/cholangitis (less likely)   - concern for liver abscess  - treat underlying cause of shock per primary  - continue hemodynamic support, avoid hypotension, MAP > 65  - hold antihypertensives  - viral serologies ordered  - trend daily liver enzymes    Abnormal CT imaging  Hepatic lobe mass  - unclear etiology, likely will need IR guided liver biopsy/aspiration when hemodynamically stable  - will defer MRI per primary  - right upper quadrant ultrasound ordered and pending  - may be contributing to elevated liver enzymes    ______________________________________________________________________    HPI:      Patient is an 78-year-old male with CAD status post CABG, HTN, HLD, peripheral arterial disease admitted with fatigue and altered mental status, GI is consulted for elevated liver enzymes, abnormal CT imaging  His wife is present at bedside and contributes to history, she describes subacute onset of chest tightness, falls, altered mental status, fatigue and fevers  Upon admission, he was found to be febrile, tachycardic, borderline hypotensive  He was found to have an elevated lactate, and EMILIANO, with transaminitis    He was admitted to the ICU for septic shock, and is currently on Levophed, to support his hemodynamics, as well as broad-spectrum antibiotics with cefepime/Flagyl  CT imaging demonstrates right hepatic lobe, which is concerning for abscess/source of shock per primary  He is otherwise comfortable and denies abdominal pain  REVIEW OF SYSTEMS:    CONSTITUTIONAL: Denies any fever, chills, rigors, and weight loss  HEENT: No earache or tinnitus  Denies hearing loss or visual disturbances  CARDIOVASCULAR: No chest pain or palpitations  RESPIRATORY: Denies any cough, hemoptysis, shortness of breath or dyspnea on exertion  GASTROINTESTINAL: As noted in the History of Present Illness  GENITOURINARY: No problems with urination  Denies any hematuria or dysuria  NEUROLOGIC: No dizziness or vertigo, denies headaches  MUSCULOSKELETAL: Denies any muscle or joint pain  SKIN: Denies skin rashes or itching  ENDOCRINE: Denies excessive thirst  Denies intolerance to heat or cold  PSYCHOSOCIAL: Denies depression or anxiety  Denies any recent memory loss  Historical Information   No past medical history on file    Past Surgical History:   Procedure Laterality Date    ABDOMINAL SURGERY      appy    CORONARY ARTERY BYPASS GRAFT       Social History   Social History     Substance and Sexual Activity   Alcohol Use Not Currently    Frequency: Never     Social History     Substance and Sexual Activity   Drug Use Never     Social History     Tobacco Use   Smoking Status Never Smoker   Smokeless Tobacco Never Used     Family History   Problem Relation Age of Onset    No Known Problems Mother     No Known Problems Father        Meds/Allergies     Medications Prior to Admission   Medication    allopurinol (ZYLOPRIM) 300 mg tablet    aspirin 81 MG tablet    ketoconazole (NIZORAL) 2 % shampoo    levofloxacin (LEVAQUIN) 500 mg tablet    lisinopril (ZESTRIL) 10 mg tablet    metoprolol tartrate (LOPRESSOR) 25 mg tablet    Omega-3 Fatty Acids (FISH OIL) 645 MG CAPS    simvastatin (ZOCOR) 80 mg tablet     Current Facility-Administered Medications   Medication Dose Route Frequency    aspirin chewable tablet 81 mg  81 mg Oral Daily    cefepime (MAXIPIME) 2,000 mg in dextrose 5 % 50 mL IVPB  2,000 mg Intravenous Q12H    chlorhexidine (PERIDEX) 0 12 % oral rinse 15 mL  15 mL Swish & Spit Q12H [address]    heparin (porcine) subcutaneous injection 5,000 Units  5,000 Units Subcutaneous Q8H [address]    metroNIDAZOLE (FLAGYL) IVPB (premix) 500 mg 100 mL  500 mg Intravenous Q8H    norepinephrine (LEVOPHED) 4 mg (STANDARD CONCENTRATION) IV in sodium chloride 0 9% 250 mL  1-30 mcg/min Intravenous Titrated    sodium chloride (PF) 0 9 % injection 3 mL  3 mL Intravenous Q1H PRN       Allergies   Allergen Reactions    Shellfish-Derived Products            Objective     Blood pressure 116/55, pulse 73, temperature 97 7 °F (36 5 °C), temperature source Oral, resp  rate 20, height 5' 9" (1 753 m), weight 97 3 kg (214 lb 8 1 oz), SpO2 97 %  Body mass index is 31 68 kg/m²  Intake/Output Summary (Last 24 hours) at 8/23/2020 7347  Last data filed at 8/23/2020 6906  Gross per 24 hour   Intake 2071 88 ml   Output    Net 2071 88 ml         PHYSICAL EXAM:      General Appearance:   Alert, cooperative, no distress   HEENT:   Normocephalic, atraumatic, anicteric      Neck:  Supple, symmetrical, trachea midline   Lungs:   Clear to auscultation bilaterally; no rales, rhonchi or wheezing; respirations unlabored    Heart[de-identified]   Regular rate and rhythm; no murmur, rub, or gallop     Abdomen:   Soft, non-tender, non-distended; normal bowel sounds; no masses, no organomegaly    Genitalia:   Deferred    Rectal:   Deferred    Extremities:  No cyanosis, clubbing or edema    Pulses:  2+ and symmetric all extremities    Skin:  No jaundice, rashes, or lesions    Lymph nodes:  No palpable cervical lymphadenopathy        Lab Results:     Results from last 7 days   Lab Units 08/23/20  0456 08/22/20  2303   WBC Thousand/uL 20 65* 7 82   HEMOGLOBIN g/dL 12 6 14 6 HEMATOCRIT % 39 6 42 9   PLATELETS Thousands/uL 112* 120*     Results from last 7 days   Lab Units 08/23/20  0456 08/22/20  2303   SODIUM mmol/L 139 139   POTASSIUM mmol/L 3 9 3 6   CHLORIDE mmol/L 107 103   CO2 mmol/L 23 21   BUN mg/dL 28* 31*   CREATININE mg/dL 2 09* 2 11*   CALCIUM mg/dL 7 6* 8 1*   ALK PHOS U/L 131* 204*   ALT U/L 407* 526*   AST U/L 195* 258*     Lab Results   Component Value Date    INR 1 15 08/22/2020    PROTIME 14 9 (H) 08/22/2020       Imaging Studies:     Ct Abdomen Pelvis Wo Contrast    Result Date: 8/23/2020  Narrative: CT ABDOMEN AND PELVIS WITHOUT IV CONTRAST INDICATION:   fever, elevated lfts  COMPARISON:  None  TECHNIQUE:  CT examination of the abdomen and pelvis was performed without intravenous contrast   Axial, sagittal, and coronal 2D reformatted images were created from the source data and submitted for interpretation  Radiation dose length product (DLP) for this visit:  911 mGy-cm   This examination, like all CT scans performed in the [facility], was performed utilizing techniques to minimize radiation dose exposure, including the use of iterative reconstruction and automated exposure control  Enteric contrast was administered  FINDINGS: ABDOMEN LOWER CHEST:  Interstitial markings throughout the visualized lung fields  Pleural calcifications at the left hemithorax  LIVER/BILIARY TREE:  7 2 x 7 7 x 7 1 cm isodense slightly heterogeneous mass within the medial right hepatic lobe  GALLBLADDER:  There are gallstone(s) within the gallbladder, without pericholecystic inflammatory changes  SPLEEN:  Unremarkable  PANCREAS:  Unremarkable  ADRENAL GLANDS:  Unremarkable  KIDNEYS/URETERS:  One or more simple renal cyst(s) is noted  8 mm nonobstructing stone at the inferior pole of left kidney  No hydronephrosis  STOMACH AND BOWEL:  There is colonic diverticulosis without evidence of acute diverticulitis  APPENDIX:  No findings to suggest appendicitis   ABDOMINOPELVIC CAVITY:  No ascites  No pneumoperitoneum  No lymphadenopathy  VESSELS:  Atherosclerotic changes are present  No evidence of aneurysm  PELVIS REPRODUCTIVE ORGANS:  Unremarkable for patient's age  URINARY BLADDER:  Unremarkable  ABDOMINAL WALL/INGUINAL REGIONS:  Unremarkable  OSSEOUS STRUCTURES:  No acute fracture or destructive osseous lesion  Prominent osteophytic ridging and osteophyte complexes at L2-L3 and L5-S1 resulting in at least moderate canal stenosis at these levels  Impression: Indeterminant 7 7 cm mass the medial right hepatic lobe  Contrast-enhanced MR abdomen is recommended for further evaluation  Coarse interstitial markings throughout the visualized lung fields which may represent underlying interstitial lung disease   Workstation performed: [id number]

## 2020-08-23 NOTE — RESPIRATORY THERAPY NOTE
RT Ventilator Management Note  Narciso Teresa 78 y o  male MRN: [id number]  Unit/Bed#: ED 19 Encounter: [id number]      Daily Screen     No data found in the last 10 encounters              Physical Exam:   Assessment Type: Assess only  General Appearance: Awake  Respiratory Pattern: Irregular, Tachypneic, Spontaneous, Assisted  Chest Assessment: Chest expansion symmetrical  Bilateral Breath Sounds: Clear, Diminished  L Breath Sounds: Diminished  Cough: None  O2 Device: HFNC  Subjective Data: awake with mild tachypnea      Resp Comments: called to ED 19 for pateint with hypoxia placed on HFNc pert order DR Brasher in attemept to impoprve pulmonary status pateint awake and alert with noted tachypnea slightly improved with HFNc work of breathing defienetly improved pateint appears to be resting wihtout great apparent respiratory distress

## 2020-08-23 NOTE — PLAN OF CARE
Problem: PAIN - ADULT  Goal: Verbalizes/displays adequate comfort level or baseline comfort level  Description: Interventions:  - Encourage patient to monitor pain and request assistance  - Assess pain using appropriate pain scale  - Administer analgesics based on type and severity of pain and evaluate response  - Implement non-pharmacological measures as appropriate and evaluate response  - Consider cultural and social influences on pain and pain management  - Notify physician/advanced practitioner if interventions unsuccessful or patient reports new pain  8/23/2020 0934 by Tacy Apley, RN  Outcome: Progressing  8/23/2020 0730 by Tacy Apley, RN  Outcome: Progressing  8/23/2020 0730 by Tacy Apley, RN  Outcome: Progressing  8/23/2020 0729 by Tacy Apley, RN  Outcome: Progressing     Problem: INFECTION - ADULT  Goal: Absence or prevention of progression during hospitalization  Description: INTERVENTIONS:  - Assess and monitor for signs and symptoms of infection  - Monitor lab/diagnostic results  - Monitor all insertion sites, i e  indwelling lines, tubes, and drains  - Monitor endotracheal if appropriate and nasal secretions for changes in amount and color  - [place] appropriate cooling/warming therapies per order  - Administer medications as ordered  - Instruct and encourage patient and family to use good hand hygiene technique  - Identify and instruct in appropriate isolation precautions for identified infection/condition  8/23/2020 0934 by Tacy Apley, RN  Outcome: Progressing  8/23/2020 0730 by Tacy Apley, RN  Outcome: Progressing  8/23/2020 0730 by Tacy Apley, RN  Outcome: Progressing  8/23/2020 0729 by Tacy Apley, RN  Outcome: Progressing  Goal: Absence of fever/infection during neutropenic period  Description: INTERVENTIONS:  - Monitor WBC    8/23/2020 0934 by Tacy Apley, RN  Outcome: Progressing  8/23/2020 0730 by Tacy Apley, RN  Outcome: Progressing  8/23/2020 0730 by Tacy Apley, RN  Outcome: Progressing  8/23/2020 0729 by Pedro Solis RN  Outcome: Progressing     Problem: SAFETY ADULT  Goal: Patient will remain free of falls  Description: INTERVENTIONS:  - Assess patient frequently for physical needs  -  Identify cognitive and physical deficits and behaviors that affect risk of falls    -  [place] fall precautions as indicated by assessment   - Educate patient/family on patient safety including physical limitations  - Instruct patient to call for assistance with activity based on assessment  - Modify environment to reduce risk of injury  - Consider OT/PT consult to assist with strengthening/mobility  8/23/2020 0934 by Pedro Solis RN  Outcome: Progressing  8/23/2020 0730 by Pedro Solis RN  Outcome: Progressing  8/23/2020 0730 by Pedro Solis RN  Outcome: Progressing  8/23/2020 0729 by Pedro Solis RN  Outcome: Progressing  Goal: Maintain or return to baseline ADL function  Description: INTERVENTIONS:  -  Assess patient's ability to carry out ADLs; assess patient's baseline for ADL function and identify physical deficits which impact ability to perform ADLs (bathing, care of mouth/teeth, toileting, grooming, dressing, etc )  - Assess/evaluate cause of self-care deficits   - Assess range of motion  - Assess patient's mobility; develop plan if impaired  - Assess patient's need for assistive devices and provide as appropriate  - Encourage maximum independence but intervene and supervise when necessary  - Involve family in performance of ADLs  - Assess for home care needs following discharge   - Consider OT consult to assist with ADL evaluation and planning for discharge  - Provide patient education as appropriate  8/23/2020 0934 by Pedro Solis RN  Outcome: Progressing  8/23/2020 0730 by Pedro Solis RN  Outcome: Progressing  8/23/2020 0730 by Pedro Solis RN  Outcome: Progressing  8/23/2020 0729 by Pedro Solis RN  Outcome: Progressing  Goal: Maintain or return mobility status to optimal level  Description: INTERVENTIONS:  - Assess patient's baseline mobility status (ambulation, transfers, stairs, etc )    - Identify cognitive and physical deficits and behaviors that affect mobility  - Identify mobility aids required to assist with transfers and/or ambulation (gait belt, sit-to-stand, lift, walker, cane, etc )  - [place] fall precautions as indicated by assessment  - Record patient progress and toleration of activity level on Mobility SBAR; progress patient to next Phase/Stage  - Instruct patient to call for assistance with activity based on assessment  - Consider rehabilitation consult to assist with strengthening/weightbearing, etc   8/23/2020 0934 by Davian Stacy RN  Outcome: Progressing  8/23/2020 0730 by Davian Stacy RN  Outcome: Progressing  8/23/2020 0730 by Davian Stacy RN  Outcome: Progressing  8/23/2020 0729 by Davian Stacy RN  Outcome: Progressing     Problem: DISCHARGE PLANNING  Goal: Discharge to home or other facility with appropriate resources  Description: INTERVENTIONS:  - Identify barriers to discharge w/patient and caregiver  - Arrange for needed discharge resources and transportation as appropriate  - Identify discharge learning needs (meds, wound care, etc )  - Arrange for interpretive services to assist at discharge as needed  - Refer to Case Management Department for coordinating discharge planning if the patient needs post-hospital services based on physician/advanced practitioner order or complex needs related to functional status, cognitive ability, or social support system  8/23/2020 0934 by Davian Stacy RN  Outcome: Progressing  8/23/2020 0730 by Davian Stacy RN  Outcome: Progressing  8/23/2020 0730 by Davian Stacy RN  Outcome: Progressing  8/23/2020 0729 by Davian Stacy RN  Outcome: Progressing     Problem: Knowledge Deficit  Goal: Patient/family/caregiver demonstrates understanding of disease process, treatment plan, medications, and discharge instructions  Description: Complete learning assessment and assess knowledge base  Interventions:  - Provide teaching at level of understanding  - Provide teaching via preferred learning methods  8/23/2020 0934 by Linette Gutierrez RN  Outcome: Progressing  8/23/2020 0730 by Linette Gutierrez RN  Outcome: Progressing  8/23/2020 0730 by Linette Gutierrez RN  Outcome: Progressing  8/23/2020 0729 by Linette Gutierrez RN  Outcome: Progressing     Problem: Nutrition/Hydration-ADULT  Goal: Nutrient/Hydration intake appropriate for improving, restoring or maintaining nutritional needs  Description: Monitor and assess patient's nutrition/hydration status for malnutrition  Collaborate with interdisciplinary team and initiate plan and interventions as ordered  Monitor patient's weight and dietary intake as ordered or per policy  Utilize nutrition screening tool and intervene as necessary  Determine patient's food preferences and provide high-protein, high-caloric foods as appropriate       INTERVENTIONS:  - Monitor oral intake, urinary output, labs, and treatment plans  - Assess nutrition and hydration status and recommend course of action  - Evaluate amount of meals eaten  - Assist patient with eating if necessary   - Allow adequate time for meals  - Recommend/ encourage appropriate diets, oral nutritional supplements, and vitamin/mineral supplements  - Order, calculate, and assess calorie counts as needed  - Recommend, monitor, and adjust tube feedings and TPN/PPN based on assessed needs  - Assess need for intravenous fluids  - Provide specific nutrition/hydration education as appropriate  - Include patient/family/caregiver in decisions related to nutrition  8/23/2020 0934 by Linette Gutierrez RN  Outcome: Progressing  8/23/2020 0730 by Linette Gutierrez RN  Outcome: Progressing  8/23/2020 0730 by Linette Gutierrez RN  Outcome: Progressing  8/23/2020 0729 by Linette Gutierrez RN  Outcome: Progressing     Problem: Prexisting or High Potential for Compromised Skin Integrity  Goal: Skin integrity is maintained or improved  Description: INTERVENTIONS:  - Identify patients at risk for skin breakdown  - Assess and monitor skin integrity  - Assess and monitor nutrition and hydration status  - Monitor labs   - Assess for incontinence   - Turn and reposition patient  - Assist with mobility/ambulation  - Relieve pressure over bony prominences  - Avoid friction and shearing  - Provide appropriate hygiene as needed including keeping skin clean and dry  - Evaluate need for skin moisturizer/barrier cream  - Collaborate with interdisciplinary team   - Patient/family teaching  - Consider wound care consult   Outcome: Progressing     Problem: Potential for Falls  Goal: Patient will remain free of falls  Description: INTERVENTIONS:  - Assess patient frequently for physical needs  -  Identify cognitive and physical deficits and behaviors that affect risk of falls    -  [place] fall precautions as indicated by assessment   - Educate patient/family on patient safety including physical limitations  - Instruct patient to call for assistance with activity based on assessment  - Modify environment to reduce risk of injury  - Consider OT/PT consult to assist with strengthening/mobility  8/23/2020 0934 by Pedro Solis RN  Outcome: Progressing  8/23/2020 0730 by Pedro Solis RN  Outcome: Progressing  8/23/2020 0730 by Pedro Solis RN  Outcome: Progressing  8/23/2020 0729 by Pedro Solis RN  Outcome: Progressing

## 2020-08-23 NOTE — ED PROVIDER NOTES
History  Chief Complaint   Patient presents with    Medical Problem     patient brought in by EMS  per EMS patient was disorient, tachypneic and febrile  33-year-old male presenting to the emergency department for evaluation of altered mental status  Per EMS, wife called because he was confused at home, he was found to be disoriented, tachypneic and febrile, he received Tylenol and some fluids and had, now is alert and oriented x3, complains of some shortness of breath and some upper abdominal pain  Prior to Admission Medications   Prescriptions Last Dose Informant Patient Reported? Taking? Omega-3 Fatty Acids (FISH OIL) 645 MG CAPS   Yes No   Sig: Take by mouth   allopurinol (ZYLOPRIM) 300 mg tablet  Self Yes No   Sig: Take 300 mg by mouth daily   aspirin 81 MG tablet   Yes No   Sig: Take 2 tablets by mouth daily   ketoconazole (NIZORAL) 2 % shampoo   Yes No   levofloxacin (LEVAQUIN) 500 mg tablet   Yes No   lisinopril (ZESTRIL) 10 mg tablet   Yes No   Sig: Take 1 tablet by mouth daily   metoprolol tartrate (LOPRESSOR) 25 mg tablet  Self Yes No   Sig: Take 1 tablet by mouth 2 (two) times a day   simvastatin (ZOCOR) 80 mg tablet   Yes No   Sig: Take 80 mg by mouth daily at bedtime      Facility-Administered Medications: None       No past medical history on file  Past Surgical History:   Procedure Laterality Date    ABDOMINAL SURGERY      appy    CORONARY ARTERY BYPASS GRAFT      IR BIOPSY LIVER MASS  8/25/2020       Family History   Problem Relation Age of Onset    No Known Problems Mother     No Known Problems Father      I have reviewed and agree with the history as documented      E-Cigarette/Vaping    E-Cigarette Use Never User      E-Cigarette/Vaping Substances    Nicotine No     THC No     CBD No     Flavoring No     Other No     Unknown No      Social History     Tobacco Use    Smoking status: Never Smoker    Smokeless tobacco: Never Used   Substance Use Topics    Alcohol use: Not Currently     Frequency: Never    Drug use: Never       Review of Systems   Constitutional: Positive for diaphoresis and fever  Negative for appetite change, chills and fatigue  HENT: Negative for sneezing and sore throat  Eyes: Negative for visual disturbance  Respiratory: Negative for cough, choking, chest tightness, shortness of breath and wheezing  Cardiovascular: Negative for chest pain and palpitations  Gastrointestinal: Negative for abdominal pain, constipation, diarrhea, nausea and vomiting  Genitourinary: Negative for difficulty urinating and dysuria  Neurological: Negative for dizziness, weakness, light-headedness, numbness and headaches  Psychiatric/Behavioral: Positive for confusion  All other systems reviewed and are negative  Physical Exam  Physical Exam  Vitals signs and nursing note reviewed  Constitutional:       General: He is not in acute distress  Appearance: He is well-developed  He is not diaphoretic  HENT:      Head: Normocephalic and atraumatic  Eyes:      Pupils: Pupils are equal, round, and reactive to light  Neck:      Vascular: No JVD  Trachea: No tracheal deviation  Cardiovascular:      Rate and Rhythm: Normal rate and regular rhythm  Heart sounds: Normal heart sounds  No murmur  No friction rub  No gallop  Pulmonary:      Effort: Pulmonary effort is normal  No respiratory distress  Breath sounds: Normal breath sounds  No wheezing or rales  Abdominal:      General: Bowel sounds are normal  There is no distension  Palpations: Abdomen is soft  Tenderness: There is no abdominal tenderness  There is no guarding or rebound  Skin:     General: Skin is warm and dry  Coloration: Skin is not pale  Neurological:      Mental Status: He is alert and oriented to person, place, and time  Cranial Nerves: No cranial nerve deficit  Motor: No abnormal muscle tone     Psychiatric:         Behavior: Behavior normal          Vital Signs  ED Triage Vitals   Temperature Pulse Respirations Blood Pressure SpO2   08/22/20 2243 08/22/20 2243 08/22/20 2243 08/22/20 2243 08/22/20 2243   (!) 103 1 °F (39 5 °C) (!) 132 (!) 40 121/57 94 %      Temp Source Heart Rate Source Patient Position - Orthostatic VS BP Location FiO2 (%)   08/23/20 0012 08/22/20 2243 08/23/20 0321 08/23/20 0321 --   Oral Monitor Lying Right arm       Pain Score       08/22/20 2304       Worst Possible Pain           Vitals:    08/25/20 1609 08/25/20 2203 08/25/20 2251 08/26/20 0739   BP: 151/73 118/91 134/73 149/84   Pulse: 65 95 86 95   Patient Position - Orthostatic VS:             Visual Acuity      ED Medications  Medications   acetaminophen (TYLENOL) tablet 975 mg (975 mg Oral Given 8/22/20 2304)   vancomycin (VANCOCIN) 1,500 mg in sodium chloride 0 9 % 250 mL IVPB (0 mg/kg × 101 kg Intravenous Stopped 8/23/20 0138)   cefepime (MAXIPIME) 2,000 mg in dextrose 5 % 50 mL IVPB (0 mg Intravenous Stopped 8/22/20 2338)   sodium chloride 0 9 % bolus 1,000 mL (0 mL Intravenous Stopped 8/23/20 0007)   aspirin chewable tablet 81 mg (81 mg Oral Given 8/23/20 0014)   sodium chloride 0 9 % bolus 500 mL (0 mL Intravenous Stopped 8/23/20 0248)   metroNIDAZOLE (FLAGYL) IVPB (premix) 500 mg 100 mL (0 mg Intravenous Stopped 8/23/20 0241)   sodium chloride 0 9 % bolus 1,000 mL (0 mL Intravenous Stopped 8/23/20 0248)   multi-electrolyte (ISOLYTE-S PH 7 4) bolus 1,000 mL (0 mL Intravenous Stopped 8/23/20 0856)   perflutren lipid microsphere (DEFINITY) injection (0 4 mL/min Intravenous Given 8/23/20 0835)   magnesium sulfate 2 g/50 mL IVPB (premix) 2 g (0 g Intravenous Stopped 8/23/20 1148)   potassium chloride 20 mEq IVPB (premix) (0 mEq Intravenous Stopped 8/23/20 1311)   LORazepam (ATIVAN) injection 0 5 mg (0 5 mg Intravenous Given 8/23/20 1442)   gadobutrol injection (MULTI-DOSE) SOLN 6 mL (6 mL Intravenous Given 8/23/20 1548)   midazolam (VERSED) injection (0 5 mg Intravenous Given 8/25/20 1330)   fentanyl citrate (PF) 100 MCG/2ML (25 mcg Intravenous Given 8/25/20 1330)       Diagnostic Studies  Results Reviewed     Procedure Component Value Units Date/Time    Blood culture #2 [[id number]]  (Abnormal)  (Susceptibility) Collected:  08/22/20 2303    Lab Status:  Final result Specimen:  Blood from Arm, Right Updated:  08/26/20 0932     Blood Culture Escherichia coli     Gram Stain Result Gram negative rods    Susceptibility     Escherichia coli (1)     Antibiotic Interpretation Microscan Method Status    ZID Performed  Yes TEJA Final                   Blood culture #1 [[id number]]  (Abnormal)  (Susceptibility) Collected:  08/22/20 2308    Lab Status:  Final result Specimen:  Blood from Arm, Left Updated:  08/26/20 0931     Blood Culture Escherichia coli     Gram Stain Result Gram negative rods    Susceptibility     Escherichia coli (1)     Antibiotic Interpretation Microscan Method Status    ZID Performed  Yes  TEJA Final    Ampicillin ($$) Susceptible <=8 00 ug/ml TEJA Final    Aztreonam ($$$)  Susceptible <=4 ug/ml TEJA Final    Cefazolin ($) Susceptible <=2 00 ug/ml TEJA Final    Ciprofloxacin ($)  Susceptible <=1 00 ug/ml TEJA Final    Gentamicin ($$) Susceptible <=1 ug/ml TEJA Final    Levofloxacin ($) Susceptible <=0 25 ug/ml TEJA Final    Tetracycline Susceptible <=4 ug/ml TEJA Final    Tobramycin ($) Susceptible <=1 ug/ml TEJA Final    Trimethoprim + Sulfamethoxazole ($$$) Susceptible <=2/38 ug/ml TEJA Final                   Acetaminophen level-"If concentration is detectable, please discuss with medical  on call " [[id number]]  (Abnormal) Collected:  08/22/20 2303    Lab Status:  Final result Specimen:  Blood from Arm, Right Updated:  08/23/20 1328     Acetaminophen Level <2 ug/mL     Procalcitonin [[id number]]  (Abnormal) Collected:  08/22/20 2303    Lab Status:  Final result Specimen:  Blood from Arm, Right Updated:  08/23/20 1321     Procalcitonin 5 83 ng/ml     UA w Reflex to Microscopic w Reflex to Culture [[id number]]  (Abnormal) Collected:  08/23/20 0851    Lab Status:  Final result Specimen:  Urine, Clean Catch Updated:  08/23/20 0858     Color, UA Yellow     Clarity, UA Slightly Cloudy     Specific Gravity, UA 1 025     pH, UA 5 5     Leukocytes, UA Negative     Nitrite, UA Negative     Protein, UA 30 (1+) mg/dl      Glucose, UA Negative mg/dl      Ketones, UA Trace mg/dl      Urobilinogen, UA 0 2 E U /dl      Bilirubin, UA Negative     Blood, UA Moderate    Troponin I [[id number]]  (Abnormal) Collected:  08/23/20 0456    Lab Status:  Final result Specimen:  Blood from Arm, Left Updated:  08/23/20 0520     Troponin I 0 37 ng/mL     Blood gas, venous [[id number]]  (Abnormal) Collected:  08/23/20 0209    Lab Status:  Final result Specimen:  Blood from Arm, Right Updated:  08/23/20 0221     pH, Tre 7 364     pCO2, Tre 35 3 mm Hg      pO2, Tre 100 9 mm Hg      HCO3, Tre 19 7 mmol/L      Base Excess, Tre -5 0 mmol/L      O2 Content, Tre 17 5 ml/dL      O2 HGB, VENOUS 95 3 %     Lactic acid 2 Hours [[id number]]  (Abnormal) Collected:  08/23/20 0112    Lab Status:  Final result Specimen:  Blood from Arm, Left Updated:  08/23/20 0153     LACTIC ACID 2 8 mmol/L     Narrative:       Result may be elevated if tourniquet was used during collection  [facility] [[id number]]  (Normal) Collected:  08/22/20 2303    Lab Status:  Final result Specimen:  Nares from Nasopharyngeal Swab Updated:  08/23/20 0006     SARS-CoV-2 Negative    Narrative: The specimen collection materials, transport medium, and/or testing methodology utilized in the production of these test results have been proven to be reliable in a limited validation with an abbreviated program under the Emergency Utilization Authorization provided by the FDA  Testing reported as "Presumptive positive" will be confirmed with secondary testing with a reference laboratory to ensure result accuracy  Clinical caution and judgement should be used with the interpretation of these results with consideration of the clinical impression and other laboratory testing  Testing reported as "Positive" or "Negative" has been proven to be accurate according to standard laboratory validation requirements  All testing is performed with control materials showing appropriate reactivity at standard intervals  CBC and differential [[id number]]  (Abnormal) Collected:  08/22/20 2303    Lab Status:  Final result Specimen:  Blood from Arm, Right Updated:  08/22/20 2351     WBC 7 82 Thousand/uL      RBC 4 61 Million/uL      Hemoglobin 14 6 g/dL      Hematocrit 42 9 %      MCV 93 fL      MCH 31 7 pg      MCHC 34 0 g/dL      RDW 14 9 %      MPV 11 4 fL      Platelets 853 Thousands/uL     Lactic Acid [[id number]]  (Abnormal) Collected:  08/22/20 2303    Lab Status:  Final result Specimen:  Blood from Arm, Right Updated:  08/22/20 2346     LACTIC ACID 4 0 mmol/L     Narrative:       Result may be elevated if tourniquet was used during collection      Troponin I [[id number]]  (Abnormal) Collected:  08/22/20 2303    Lab Status:  Final result Specimen:  Blood from Arm, Right Updated:  08/22/20 2333     Troponin I 0 20 ng/mL     Comprehensive metabolic panel [[id number]]  (Abnormal) Collected:  08/22/20 2303    Lab Status:  Final result Specimen:  Blood from Arm, Right Updated:  08/22/20 2331     Sodium 139 mmol/L      Potassium 3 6 mmol/L      Chloride 103 mmol/L      CO2 21 mmol/L      ANION GAP 15 mmol/L      BUN 31 mg/dL      Creatinine 2 11 mg/dL      Glucose 146 mg/dL      Calcium 8 1 mg/dL       U/L       U/L      Alkaline Phosphatase 204 U/L      Total Protein 6 8 g/dL      Albumin 3 0 g/dL      Total Bilirubin 1 80 mg/dL      eGFR 28 ml/min/1 73sq m     Narrative:       [place] guidelines for Chronic Kidney Disease (CKD):     Stage 1 with normal or high GFR (GFR > 90 mL/min/1 73 square meters)    Stage 2 Mild CKD (GFR = 60-89 mL/min/1 73 square meters)    Stage 3A Moderate CKD (GFR = 45-59 mL/min/1 73 square meters)    Stage 3B Moderate CKD (GFR = 30-44 mL/min/1 73 square meters)    Stage 4 Severe CKD (GFR = 15-29 mL/min/1 73 square meters)    Stage 5 End Stage CKD (GFR <15 mL/min/1 73 square meters)  Note: GFR calculation is accurate only with a steady state creatinine    Protime-INR [[id number]]  (Abnormal) Collected:  08/22/20 2303    Lab Status:  Final result Specimen:  Blood from Arm, Right Updated:  08/22/20 2329     Protime 14 9 seconds      INR 1 15    APTT [[id number]]  (Normal) Collected:  08/22/20 2303    Lab Status:  Final result Specimen:  Blood from Arm, Right Updated:  08/22/20 2329     PTT 32 seconds                  IR biopsy liver mass   Final Result by Shane Hood MD (08/25 1410)   Impression:    Successful ultrasound-guided right liver mass biopsy  Workstation performed: [id number]         MRI abdomen w wo contrast   Final Result by Kartik Evans MD (08/23 6060)      Postcontrast imaging severely degraded by motion  1   Mixed cystic and solid lesion in the central liver abutting the vijay hepatis and gallbladder with arterial enhancement of the solid portion and wall  This is highly suspicious for primary liver neoplasm, possibly a biliary cystadenocarcinoma  Tissue sampling of the solid portion is recommended  2   Cholelithiasis  The study was marked in [facility] for immediate notification  Workstation performed: [id number]         US right upper quadrant   Final Result by Erica Edwards MD (08/23 6993)      Corresponding to the CT finding, there is a 8 2 x 6 7 x 8 5 cm heterogeneously hypoechoic solid hepatic mass  This remains indeterminate etiology, for which malignancy must be excluded  Recommend abdomen MRI with and without IV contrast, and/or biopsy  Cholelithiasis without evidence of acute cholecystitis        Workstation performed: [id number]         XR chest portable ICU   Final Result by Ricardo Murray MD (08/23 1514)      Right IJ central line has been inserted with tip in the SVC  No pneumothorax  There is interstitial pulmonary edema  Workstation performed: [id number]         CT abdomen pelvis wo contrast    by  (09/06 0954)   Addendum (preliminary) 1 of 1 by Fredy Viera MD (08/23 1336)      Final      XR chest 1 view portable   Final Result by Ricardo Murray MD (08/23 1108)      Mild linear atelectasis at the lung bases, otherwise clear lungs  Workstation performed: [id number]                    Procedures  Procedures         ED Course  ED Course as of Sep 06 0954   Sat Aug 22, 2020   2314 Patient had drop in oxygen saturations with persistent tachypnea, cough respiratory bedside to place patient on NIPPV      2323 60 L 90% on HFNC, wob improved       2343 More likely type 2 from sepsis   Troponin I(!): 0 20       US AUDIT      Most Recent Value   Initial Alcohol Screen: US AUDIT-C    1  How often do you have a drink containing alcohol?  0 Filed at: 08/22/2020 2246   2  How many drinks containing alcohol do you have on a typical day you are drinking? 0 Filed at: 08/22/2020 2246   3a  Male UNDER 65: How often do you have five or more drinks on one occasion? 0 Filed at: 08/22/2020 2246   3b  FEMALE Any Age, or MALE 65+: How often do you have 4 or more drinks on one occassion? 0 Filed at: 08/22/2020 2246   Audit-C Score  0 Filed at: 08/22/2020 2246                  GILBERTO/DAST-10      Most Recent Value   How many times in the past year have you    Used an illegal drug or used a prescription medication for non-medical reasons?   Never Filed at: 08/22/2020 2246                                MDM  Number of Diagnoses or Management Options  Gram-negative bacteremia:   Hepatic lesion:   Septic shock [facility]):   Transaminitis:   Diagnosis management comments: 56-year-old male with fever and mental status, likely from sepsis, will check septic workup including labs, urine, chest x-ray, reassess  Patient has marked transaminitis, will check imaging, concern for possible hepatobiliary obstruction or infection  Disposition  Final diagnoses:   [facility]   Transaminitis   Hepatic lesion   Gram-negative bacteremia     Time reflects when diagnosis was documented in both MDM as applicable and the Disposition within this note     Time User Action Codes Description Comment    8/23/2020  1:42 AM Chris Brasher Add [A41 9,  R65 21] Septic shock ([facility] )     8/23/2020  3:57 AM [facility] Add [R74 0] Transaminitis     8/23/2020 12:03 PM Alan Puffer E Add [R16 0] Hypodense mass of liver     8/24/2020  3:08 PM Cheryl Dustin Add [K76 9] Hepatic lesion     8/24/2020  3:09 PM Cheryl Dustin Add [R78 81] Gram-negative bacteremia     8/25/2020  1:19 PM Karen Wadsworth Modify [A41 9,  R65 21] Septic shock ([facility] )     8/25/2020  1:19 PM Dimajohanna Hawkins Modify [R74 0] Transaminitis     8/25/2020  1:19 PM Dima Lesser Modify [R16 0] Hypodense mass of liver     8/25/2020  1:19 PM Karen Wadsworth Modify [K76 9] Hepatic lesion     8/25/2020  1:19 PM Karen Wadsworth Modify [R78 81] Gram-negative bacteremia     8/26/2020 11:37 AM Byron Valente Modify [A41 9,  R65 21] Septic shock ([facility] )     8/26/2020 11:37 AM Byron Valente Modify [R74 0] Transaminitis     8/26/2020 11:37 AM Byron Valente Modify [R16 0] Hypodense mass of liver     8/26/2020 11:37 AM Byron Valente Modify [K76 9] Hepatic lesion     8/26/2020 11:37 AM Byron Valente Modify [R78 81] Gram-negative bacteremia     8/26/2020 11:37 AM Byron Valente Add [I25 10] Coronary artery disease without angina pectoris       ED Disposition     ED Disposition Condition Date/Time Comment    Admit Stable Sun Aug 23, 2020  1:42 AM Case was discussed with CCM and the patient's admission status was agreed to be Admission Status: inpatient status to the service of Dr Jennifer Robins           Follow-up Information Follow up With Specialties Details Why [address] [facility]  Follow up  [address] [facility]  [address], DO Family Medicine Schedule an appointment as soon as possible for a visit in 1 week(s)  [address] [facility] [address]  [phone]            Discharge Medication List as of 8/26/2020 12:25 PM      START taking these medications    Details   aspirin 81 mg chewable tablet Chew 1 tablet (81 mg total) daily, Starting Thu 8/27/2020, Normal      cephalexin (KEFLEX) 500 mg capsule Take 1 capsule (500 mg total) by mouth every 6 (six) hours for 4 days, Starting Wed 8/26/2020, Until Sun 8/30/2020, Normal         CONTINUE these medications which have NOT CHANGED    Details   allopurinol (ZYLOPRIM) 300 mg tablet Take 300 mg by mouth daily, Historical Med      metoprolol tartrate (LOPRESSOR) 25 mg tablet Take 1 tablet by mouth 2 (two) times a day, Historical Med         STOP taking these medications       aspirin 81 MG tablet Comments:   Reason for Stopping:         ketoconazole (NIZORAL) 2 % shampoo Comments:   Reason for Stopping:         levofloxacin (LEVAQUIN) 500 mg tablet Comments:   Reason for Stopping:         lisinopril (ZESTRIL) 10 mg tablet Comments:   Reason for Stopping:         Omega-3 Fatty Acids (FISH OIL) 645 MG CAPS Comments:   Reason for Stopping:         simvastatin (ZOCOR) 80 mg tablet Comments:   Reason for Stopping:             No discharge procedures on file      PDMP Review     None          ED Provider  Electronically Signed by           Fady John MD  09/06/20 7891

## 2020-08-23 NOTE — ASSESSMENT & PLAN NOTE
· CT abdomen without acute etiology however concern for cholangitis versus congestive hepatopathy  · Continue broad-spectrum antibiotics  · Trend LFTs    · GI consultation

## 2020-08-23 NOTE — ASSESSMENT & PLAN NOTE
· Continue high-flow nasal cannula to support oxygen requirements  · Maintain Fio2> 92%  · Encourage good incentive spirometry/pulmonary toileting

## 2020-08-23 NOTE — RESPIRATORY THERAPY NOTE
Pt transitioned from [facility] to [facility] then weaned to [facility]  Tolerating well  No apparent distress

## 2020-08-23 NOTE — PLAN OF CARE
Problem: PAIN - ADULT  Goal: Verbalizes/displays adequate comfort level or baseline comfort level  Description: Interventions:  - Encourage patient to monitor pain and request assistance  - Assess pain using appropriate pain scale  - Administer analgesics based on type and severity of pain and evaluate response  - Implement non-pharmacological measures as appropriate and evaluate response  - Consider cultural and social influences on pain and pain management  - Notify physician/advanced practitioner if interventions unsuccessful or patient reports new pain  Outcome: Progressing     Problem: INFECTION - ADULT  Goal: Absence or prevention of progression during hospitalization  Description: INTERVENTIONS:  - Assess and monitor for signs and symptoms of infection  - Monitor lab/diagnostic results  - Monitor all insertion sites, i e  indwelling lines, tubes, and drains  - Monitor endotracheal if appropriate and nasal secretions for changes in amount and color  - [place] appropriate cooling/warming therapies per order  - Administer medications as ordered  - Instruct and encourage patient and family to use good hand hygiene technique  - Identify and instruct in appropriate isolation precautions for identified infection/condition  Outcome: Progressing  Goal: Absence of fever/infection during neutropenic period  Description: INTERVENTIONS:  - Monitor WBC    Outcome: Progressing     Problem: SAFETY ADULT  Goal: Patient will remain free of falls  Description: INTERVENTIONS:  - Assess patient frequently for physical needs  -  Identify cognitive and physical deficits and behaviors that affect risk of falls    -  [place] fall precautions as indicated by assessment   - Educate patient/family on patient safety including physical limitations  - Instruct patient to call for assistance with activity based on assessment  - Modify environment to reduce risk of injury  - Consider OT/PT consult to assist with strengthening/mobility  Outcome: Progressing  Goal: Maintain or return to baseline ADL function  Description: INTERVENTIONS:  -  Assess patient's ability to carry out ADLs; assess patient's baseline for ADL function and identify physical deficits which impact ability to perform ADLs (bathing, care of mouth/teeth, toileting, grooming, dressing, etc )  - Assess/evaluate cause of self-care deficits   - Assess range of motion  - Assess patient's mobility; develop plan if impaired  - Assess patient's need for assistive devices and provide as appropriate  - Encourage maximum independence but intervene and supervise when necessary  - Involve family in performance of ADLs  - Assess for home care needs following discharge   - Consider OT consult to assist with ADL evaluation and planning for discharge  - Provide patient education as appropriate  Outcome: Progressing  Goal: Maintain or return mobility status to optimal level  Description: INTERVENTIONS:  - Assess patient's baseline mobility status (ambulation, transfers, stairs, etc )    - Identify cognitive and physical deficits and behaviors that affect mobility  - Identify mobility aids required to assist with transfers and/or ambulation (gait belt, sit-to-stand, lift, walker, cane, etc )  - [place] fall precautions as indicated by assessment  - Record patient progress and toleration of activity level on Mobility SBAR; progress patient to next Phase/Stage  - Instruct patient to call for assistance with activity based on assessment  - Consider rehabilitation consult to assist with strengthening/weightbearing, etc   Outcome: Progressing     Problem: DISCHARGE PLANNING  Goal: Discharge to home or other facility with appropriate resources  Description: INTERVENTIONS:  - Identify barriers to discharge w/patient and caregiver  - Arrange for needed discharge resources and transportation as appropriate  - Identify discharge learning needs (meds, wound care, etc )  - Arrange for interpretive services to assist at discharge as needed  - Refer to Case Management Department for coordinating discharge planning if the patient needs post-hospital services based on physician/advanced practitioner order or complex needs related to functional status, cognitive ability, or social support system  Outcome: Progressing     Problem: Knowledge Deficit  Goal: Patient/family/caregiver demonstrates understanding of disease process, treatment plan, medications, and discharge instructions  Description: Complete learning assessment and assess knowledge base  Interventions:  - Provide teaching at level of understanding  - Provide teaching via preferred learning methods  Outcome: Progressing     Problem: Nutrition/Hydration-ADULT  Goal: Nutrient/Hydration intake appropriate for improving, restoring or maintaining nutritional needs  Description: Monitor and assess patient's nutrition/hydration status for malnutrition  Collaborate with interdisciplinary team and initiate plan and interventions as ordered  Monitor patient's weight and dietary intake as ordered or per policy  Utilize nutrition screening tool and intervene as necessary  Determine patient's food preferences and provide high-protein, high-caloric foods as appropriate       INTERVENTIONS:  - Monitor oral intake, urinary output, labs, and treatment plans  - Assess nutrition and hydration status and recommend course of action  - Evaluate amount of meals eaten  - Assist patient with eating if necessary   - Allow adequate time for meals  - Recommend/ encourage appropriate diets, oral nutritional supplements, and vitamin/mineral supplements  - Order, calculate, and assess calorie counts as needed  - Recommend, monitor, and adjust tube feedings and TPN/PPN based on assessed needs  - Assess need for intravenous fluids  - Provide specific nutrition/hydration education as appropriate  - Include patient/family/caregiver in decisions related to nutrition  Outcome: Progressing

## 2020-08-24 PROBLEM — E87.2 LACTIC ACIDOSIS: Status: RESOLVED | Noted: 2020-08-23 | Resolved: 2020-08-24

## 2020-08-24 PROBLEM — E87.20 LACTIC ACIDOSIS: Status: RESOLVED | Noted: 2020-08-23 | Resolved: 2020-08-24

## 2020-08-24 PROBLEM — K76.9 HEPATIC LESION: Status: ACTIVE | Noted: 2020-08-24

## 2020-08-24 LAB
ANION GAP SERPL CALCULATED.3IONS-SCNC: 7 MMOL/L (ref 4–13)
BUN SERPL-MCNC: 22 MG/DL (ref 5–25)
CALCIUM SERPL-MCNC: 8 MG/DL (ref 8.3–10.1)
CHLORIDE SERPL-SCNC: 105 MMOL/L (ref 100–108)
CO2 SERPL-SCNC: 26 MMOL/L (ref 21–32)
CREAT SERPL-MCNC: 1.22 MG/DL (ref 0.6–1.3)
ERYTHROCYTE [DISTWIDTH] IN BLOOD BY AUTOMATED COUNT: 14.5 % (ref 11.6–15.1)
GFR SERPL CREATININE-BSD FRML MDRD: 55 ML/MIN/1.73SQ M
GLUCOSE SERPL-MCNC: 111 MG/DL (ref 65–140)
HAV AB SER QL IA: NORMAL
HBV CORE AB SER QL: NORMAL
HBV CORE IGM SER QL: NORMAL
HCT VFR BLD AUTO: 38.1 % (ref 36.5–49.3)
HGB BLD-MCNC: 12.4 G/DL (ref 12–17)
MAGNESIUM SERPL-MCNC: 2 MG/DL (ref 1.6–2.6)
MCH RBC QN AUTO: 31.2 PG (ref 26.8–34.3)
MCHC RBC AUTO-ENTMCNC: 32.5 G/DL (ref 31.4–37.4)
MCV RBC AUTO: 96 FL (ref 82–98)
PLATELET # BLD AUTO: 92 THOUSANDS/UL (ref 149–390)
PMV BLD AUTO: 11.4 FL (ref 8.9–12.7)
POTASSIUM SERPL-SCNC: 4.4 MMOL/L (ref 3.5–5.3)
PROCALCITONIN SERPL-MCNC: 16.31 NG/ML
RBC # BLD AUTO: 3.97 MILLION/UL (ref 3.88–5.62)
SODIUM SERPL-SCNC: 138 MMOL/L (ref 136–145)
WBC # BLD AUTO: 11.84 THOUSAND/UL (ref 4.31–10.16)

## 2020-08-24 PROCEDURE — 99233 SBSQ HOSP IP/OBS HIGH 50: CPT | Performed by: STUDENT IN AN ORGANIZED HEALTH CARE EDUCATION/TRAINING PROGRAM

## 2020-08-24 PROCEDURE — 86704 HEP B CORE ANTIBODY TOTAL: CPT | Performed by: INTERNAL MEDICINE

## 2020-08-24 PROCEDURE — 85027 COMPLETE CBC AUTOMATED: CPT | Performed by: NURSE PRACTITIONER

## 2020-08-24 PROCEDURE — 99232 SBSQ HOSP IP/OBS MODERATE 35: CPT | Performed by: INTERNAL MEDICINE

## 2020-08-24 PROCEDURE — 97116 GAIT TRAINING THERAPY: CPT

## 2020-08-24 PROCEDURE — 86256 FLUORESCENT ANTIBODY TITER: CPT | Performed by: INTERNAL MEDICINE

## 2020-08-24 PROCEDURE — 84145 PROCALCITONIN (PCT): CPT | Performed by: EMERGENCY MEDICINE

## 2020-08-24 PROCEDURE — 86708 HEPATITIS A ANTIBODY: CPT | Performed by: INTERNAL MEDICINE

## 2020-08-24 PROCEDURE — 97163 PT EVAL HIGH COMPLEX 45 MIN: CPT

## 2020-08-24 PROCEDURE — 99223 1ST HOSP IP/OBS HIGH 75: CPT | Performed by: INTERNAL MEDICINE

## 2020-08-24 PROCEDURE — 86705 HEP B CORE ANTIBODY IGM: CPT | Performed by: INTERNAL MEDICINE

## 2020-08-24 PROCEDURE — 83735 ASSAY OF MAGNESIUM: CPT | Performed by: NURSE PRACTITIONER

## 2020-08-24 PROCEDURE — 86038 ANTINUCLEAR ANTIBODIES: CPT | Performed by: INTERNAL MEDICINE

## 2020-08-24 PROCEDURE — 86235 NUCLEAR ANTIGEN ANTIBODY: CPT | Performed by: INTERNAL MEDICINE

## 2020-08-24 PROCEDURE — 80048 BASIC METABOLIC PNL TOTAL CA: CPT | Performed by: NURSE PRACTITIONER

## 2020-08-24 RX ORDER — DOCUSATE SODIUM 100 MG/1
100 CAPSULE, LIQUID FILLED ORAL 2 TIMES DAILY
Status: DISCONTINUED | OUTPATIENT
Start: 2020-08-24 | End: 2020-08-26 | Stop reason: HOSPADM

## 2020-08-24 RX ADMIN — HEPARIN SODIUM 5000 UNITS: 5000 INJECTION INTRAVENOUS; SUBCUTANEOUS at 06:28

## 2020-08-24 RX ADMIN — METRONIDAZOLE 500 MG: 500 INJECTION, SOLUTION INTRAVENOUS at 09:06

## 2020-08-24 RX ADMIN — HEPARIN SODIUM 5000 UNITS: 5000 INJECTION INTRAVENOUS; SUBCUTANEOUS at 14:44

## 2020-08-24 RX ADMIN — METOPROLOL TARTRATE 12.5 MG: 25 TABLET ORAL at 09:06

## 2020-08-24 RX ADMIN — ASPIRIN 81 MG 81 MG: 81 TABLET ORAL at 09:06

## 2020-08-24 RX ADMIN — DOCUSATE SODIUM 100 MG: 100 CAPSULE, LIQUID FILLED ORAL at 09:06

## 2020-08-24 RX ADMIN — CEFEPIME HYDROCHLORIDE 2000 MG: 2 INJECTION, POWDER, FOR SOLUTION INTRAVENOUS at 22:58

## 2020-08-24 RX ADMIN — CEFEPIME HYDROCHLORIDE 2000 MG: 2 INJECTION, POWDER, FOR SOLUTION INTRAVENOUS at 11:29

## 2020-08-24 RX ADMIN — METOPROLOL TARTRATE 12.5 MG: 25 TABLET ORAL at 21:08

## 2020-08-24 RX ADMIN — HEPARIN SODIUM 5000 UNITS: 5000 INJECTION INTRAVENOUS; SUBCUTANEOUS at 21:08

## 2020-08-24 RX ADMIN — CHLORHEXIDINE GLUCONATE 0.12% ORAL RINSE 15 ML: 1.2 LIQUID ORAL at 09:06

## 2020-08-24 RX ADMIN — METRONIDAZOLE 500 MG: 500 INJECTION, SOLUTION INTRAVENOUS at 02:50

## 2020-08-24 NOTE — PROGRESS NOTES
GI Progress Note - Alem Sheikh 78 y o  male MRN: [id number]    Unit/Bed#:  Encounter: [id number]    Subjective: He is well appearing, his wife is at bedside  He has no complaints, denies abdominal pain, nausea, vomiting, diarrhea  He ate breakfast this morning  Objective:     Vitals: Blood pressure 140/64, pulse 86, temperature 98 4 °F (36 9 °C), temperature source Oral, resp  rate (!) 33, height 5' 9" (1 753 m), weight 97 3 kg (214 lb 8 1 oz), SpO2 92 %  ,Body mass index is 31 68 kg/m²        Intake/Output Summary (Last 24 hours) at 8/24/2020 1145  Last data filed at 8/24/2020 0906  Gross per 24 hour   Intake 2182 92 ml   Output 2450 ml   Net -267 08 ml       Physical Exam:     General Appearance: Alert, appears stated age and cooperative  Lungs: Clear to auscultation bilaterally, no rales or rhonchi, no labored breathing/accessory muscle use  Heart: Regular rate and rhythm, S1, S2 normal, no murmur, click, rub or gallop  Abdomen: Soft, non-tender, non-distended; bowel sounds normal; no masses or no organomegaly  Extremities: No cyanosis, edema    Invasive Devices     Central Venous Catheter Line            CVC Central Lines 08/23/20 Triple 16cm 1 day          Peripheral Intravenous Line            Peripheral IV 08/22/20 Left Arm 2 days    Peripheral IV 08/22/20 Right Arm 1 day                Lab Results:  Results from last 7 days   Lab Units 08/24/20  0628 08/23/20  0456   WBC Thousand/uL 11 84* 20 65*   HEMOGLOBIN g/dL 12 4 12 6   HEMATOCRIT % 38 1 39 6   PLATELETS Thousands/uL 92* 112*   NEUTROS PCT %  --  86*   LYMPHS PCT %  --  5*   MONOS PCT %  --  8   EOS PCT %  --  0     Results from last 7 days   Lab Units 08/24/20  0628  08/23/20  0456   POTASSIUM mmol/L 4 4   < > 3 9   CHLORIDE mmol/L 105   < > 107   CO2 mmol/L 26   < > 23   BUN mg/dL 22   < > 28*   CREATININE mg/dL 1 22   < > 2 09*   CALCIUM mg/dL 8 0*   < > 7 6*   ALK PHOS U/L  --   --  131*   ALT U/L  --   --  407*   AST U/L  --   -- 195*    < > = values in this interval not displayed  Results from last 7 days   Lab Units 08/22/20  2303   INR  1 15           Imaging Studies: I have personally reviewed pertinent imaging studies  Ct Abdomen Pelvis Wo Contrast    Result Date: 8/23/2020  Impression: Indeterminant 7 7 cm mass the medial right hepatic lobe  Contrast-enhanced MR abdomen is recommended for further evaluation  Coarse interstitial markings throughout the visualized lung fields which may represent underlying interstitial lung disease  Workstation performed: [id number]     Xr Chest 1 View Portable    Result Date: 8/23/2020  Impression: Mild linear atelectasis at the lung bases, otherwise clear lungs  Workstation performed: [id number]     Mri Abdomen W Wo Contrast    Result Date: 8/23/2020  Impression: Postcontrast imaging severely degraded by motion  1   Mixed cystic and solid lesion in the central liver abutting the vijay hepatis and gallbladder with arterial enhancement of the solid portion and wall  This is highly suspicious for primary liver neoplasm, possibly a biliary cystadenocarcinoma  Tissue sampling of the solid portion is recommended  2   Cholelithiasis  The study was marked in [facility] for immediate notification  Workstation performed: [id number]     Xr Chest Portable Icu    Result Date: 8/23/2020  Impression: Right IJ central line has been inserted with tip in the SVC  No pneumothorax  There is interstitial pulmonary edema  Workstation performed: [id number]     Us Right Upper Quadrant    Result Date: 8/23/2020  Impression: Corresponding to the CT finding, there is a 8 2 x 6 7 x 8 5 cm heterogeneously hypoechoic solid hepatic mass  This remains indeterminate etiology, for which malignancy must be excluded  Recommend abdomen MRI with and without IV contrast, and/or biopsy  Cholelithiasis without evidence of acute cholecystitis   Workstation performed: [id number]       Assessment and Plan:     Liver Mass  Elevated LFTs  GNR Bacteremia  - He presented in septic shock with GNR bacteremia and abdominal imaging showing a mixed cystic and solid lesion in the central liver concerning for a primary liver neoplasm but no biliary dilatation  - Based on MRI findings, no clear indication for ERCP  - Recommend IR evaluation for biopsy of the mass  - Continue abx per ICU team  - LFTs improving, continue to trend, will follow up serologies for viral hepatitis, AIH  - Discussed plan above with patient and wife at bedside  - Diet as tolerated      The patient will be seen by Dr Christine Gifford

## 2020-08-24 NOTE — PLAN OF CARE
Problem: PHYSICAL THERAPY ADULT  Goal: Performs mobility at highest level of function for planned discharge setting  See evaluation for individualized goals  Description: Treatment/Interventions: Functional transfer training, LE strengthening/ROM, Elevations, Therapeutic exercise, Endurance training, Patient/family training, Gait training, Spoke to nursing, Spoke to case management  Equipment Recommended: (TBD, none at this time)       See flowsheet documentation for full assessment, interventions and recommendations  Note: Prognosis: Good  Problem List: Decreased strength, Decreased endurance, Impaired balance, Decreased mobility, Impaired hearing  Assessment: Pt is 78 y o  male seen for PT evaluation on 8/24/2020 s/p admit to [facility] on 8/22/2020 w/ Septic shock [facility]); pt presented to ED for evaluation of generalized fatigue and AMS  PT consulted to assess pt's functional mobility and d/c needs  Order placed for PT eval and tx, w/ up w/ A order  PT performed at least 2 patient identifiers during session: Name and wristband  Comorbidities affecting pt's physical performance at time of assessment include: HTN, HLD, CAD s/p CABG, DM type 2, obesity, h/o gout, EMILIANO  PTA, pt was independent w/ all functional mobility w/ no AD or DME, ambulates unrestricted distances and all terrain and lives w/ spouse in bi- level home  Personal factors affecting pt at time of IE include: lives in multi story house, ambulating w/ assistive device, positive fall history and limited insight into impairments  Please find objective findings from PT assessment regarding body systems outlined above with impairments and limitations including weakness, impaired balance, decreased endurance, decreased activity tolerance, decreased functional mobility tolerance and fall risk, as well as mobility assessment (need for cueing for mobility technique)   The following objective measures performed on IE also reveal limitations: Barthel Index: 65/100  Pt's clinical presentation is currently unstable/unpredictable seen in pt's presentation of need for input for task focus and mobility technique, on telemetry monitoring and ongoing medical assessment  Pt to benefit from continued PT tx to address deficits as defined above and maximize level of functional independent mobility and consistency  From PT/mobility standpoint, recommendation at time of d/c would be anticipate no needs pending progress in order to facilitate return to PLOF  Barriers to Discharge: Inaccessible home environment     PT Discharge Recommendation: Return to previous environment with social support(anticipate no skilled PT needs after d/c)     PT - OK to Discharge: No    See flowsheet documentation for full assessment

## 2020-08-24 NOTE — PROGRESS NOTES
Vancomycin Assessment    Macrina Govea is a 78 y o  male who is currently receiving vancomycin 1250 mg IV q24h for MRSA suspected, bacteremia     Relevant clinical data and objective history reviewed:  Creatinine   Date Value Ref Range Status   08/24/2020 1 22 0 60 - 1 30 mg/dL Final     Comment:     Standardized to IDMS reference method   08/23/2020 1 47 (H) 0 60 - 1 30 mg/dL Final     Comment:     Standardized to IDMS reference method   08/23/2020 2 09 (H) 0 60 - 1 30 mg/dL Final     Comment:     Standardized to IDMS reference method     /64   Pulse 86   Temp 98 4 °F (36 9 °C) (Oral)   Resp (!) 33   Ht 5' 9" (1 753 m)   Wt 97 3 kg (214 lb 8 1 oz)   SpO2 92%   BMI 31 68 kg/m²   I/O last 3 completed shifts: In: 4104 8 [I V :2304 8; IV Piggyback:1800]  Out: 2850 [Urine:2850]  Lab Results   Component Value Date/Time    BUN 22 08/24/2020 06:28 AM    WBC 11 84 (H) 08/24/2020 06:28 AM    HGB 12 4 08/24/2020 06:28 AM    HCT 38 1 08/24/2020 06:28 AM    MCV 96 08/24/2020 06:28 AM    PLT 92 (L) 08/24/2020 06:28 AM     Temp Readings from Last 3 Encounters:   08/23/20 98 4 °F (36 9 °C) (Oral)   08/05/20 (!) 97 °F (36 1 °C)   01/31/20 97 8 °F (36 6 °C)     Vancomycin Days of Therapy: 3    Assessment/Plan  The patient is currently on vancomycin utilizing scheduled dosing  Baseline risks associated with therapy include: pre-existing renal impairment, concomitant nephrotoxic medications, and advanced age  The patient is receiving 1250 mg IV q24h  and after clinical evaluation will be changed to 750 mg IV q12h (improved SCr)  Pharmacy will continue to follow closely for s/sx of nephrotoxicity, infusion reactions, and appropriateness of therapy  BMP and CBC will be ordered per protocol  Plan for trough as patient approaches steady state, 30 min before vanco dose due at 2330 on 8-  Pharmacy will continue to follow the patients culture results and clinical progress daily      Claudene Simple, Pharmacist

## 2020-08-24 NOTE — SOCIAL WORK
CM met with pt and wife Rigo Urban at bedside  Pt lives with his wife in a bilevel house with 1 SAÚL and then 7 steps-landing-another 7 steps to reach the main living area  Pt is able to navigate steps and is independent with ADL's  He is normally very active taking care of 20 acres of property and working in his woodworking shop  He went to cardiac rehab in [place] following a CABG  He has never used [facility] services  Denies substance abuse or mental health issues  He quit smoking in the 1970's  Dr Veronika Brooks is his PCP  He gets his medications through the mail from the [place] and uses [address] for immed need medications  He does not have a POA or Advanced Directive  CM supplied info on both  He is retired, but still drives  Wife will transport home when he is medically cleared  CM discussed d/c needs and pt may be open to [facility] [address] [facility] with [facility]  Understands that he has freedom of choice  CM will continue to follow  CM reviewed discharge planning process including the following: identifying help at home, patient preference for discharge planning needs, pharmacy preference, and availability of treatment team to discuss questions or concerns patient and/or family may have regarding understanding medications and recognizing signs and symptoms once discharged  CM also encouraged patient to follow up with all recommended appointments after discharge  Patient advised of importance for patient and family to participate in managing patients medical well being

## 2020-08-24 NOTE — CONSULTS
Consultation - Infectious Disease   Lionel Vasquez 78 y o  male MRN: [id number]  Unit/Bed#:  Encounter: [id number]      IMPRESSION & RECOMMENDATIONS:   1  Septic shock  Patient presented on admission acutely ill and eventually developed hypotension requiring pressors  He has clinically improved  Source of decompensation problem 2  Continue antibiotics as below  Continue to trend fever curve/vitals  Repeat CBC/chemistry tomorrow  Follow up pending culture data  No further repeat cultures required  Additional supportive care as per    2  E coli bacteremia  Two of 2 blood cultures from admission with E coli  Suspect that this is due to translocation in the setting of problem 5  Patient has clinically improved  Susceptibilities are pending  Will continue on cefepime, renally dose adjusted  Will discontinue Flagyl  Continue to trend fever curve/vitals  Repeat CBC/chemistry tomorrow  Follow-up culture susceptibilities  No further repeat cultures required  Patient will ultimately require 7 days of therapy for this issue    3  Acute encephalopathy  Reportedly had acute confusion on admission  Likely due to acute illness as above  Wife present at bedside and reports patient returned to baseline  Continue monitor mental status  Antibiotics as above    4  Acute kidney injury  Acute elevation in creatinine noted from baseline  Likely in the setting of acute illness  This has since down trended  Continue cefepime, renally dose adjust  Repeat chemistry tomorrow  Further dose adjust antibiotics as needed    5  Transaminitis and liver mass  Acute elevation in LFTs noted which is likely multifactorial in setting of liver mass noted on MRI as well as acute illness as above  Recently down trending  Patient pending biopsy to define lesion  Repeat LFTs tomorrow  Serial abdominal exam  IR evaluation pending    Above plan discussed in detail with the patient and his wife at bedside    Will update primary service of the above plan  ID consult service will continue to follow  HISTORY OF PRESENT ILLNESS:  Reason for Consult:  Gram-negative bacteremia    HPI: Jean Pierre Rowe is a 78y o  year old male who presented to the hospital on 08/23 for generalized fatigue along with change in mental status  Patient reportedly also fell a few times at home  On arrival patient was found to be febrile and tachycardic borderline hypotensive  Antibiotics were initiated  Patient was found to have acute kidney injury and transaminitis  Patient was admitted to the critical care service  White count was noted to be elevated  Patient unfortunately ultimately required pressors  Patient was seen by GI given elevated LFTs and imaging was concerning for hepatic lobe mass/abscess  MRI was later done which showed a semi solid lesion concerning for malignancy  Vancomycin was later discontinued  Patient has been recommended for IR biopsy  Fever curve and white count have significantly down trended  Two hundred two blood cultures with E coli  No new imaging overnight  Patient's other vitals are stable  Hepatitis testing negative  Creatinine improved  Labs otherwise unremarkable  Patient has not been seen by our service previously  No prior significant culture data  No other significant data in Care everywhere  We are consulted at this time for further assistance in management  On evaluation, the patient currently denies having any nausea, vomiting, chest pain or shortness of breath  He denies having allergies to antibiotics  His wife is present at bedside and she reports that his mental status has returned to baseline  The patient has been tolerating p o  Without issue  They are aware of the blood culture findings as well as imaging findings in the possibility of biopsy  REVIEW OF SYSTEMS:  A complete 12 point system-based review of systems is negative other than that noted in the HPI      PAST MEDICAL HISTORY:  No past medical history on file  Past Surgical History:   Procedure Laterality Date    ABDOMINAL SURGERY      appy    CORONARY ARTERY BYPASS GRAFT         FAMILY HISTORY:  Non-contributory    SOCIAL HISTORY:  Social History   Social History     Substance and Sexual Activity   Alcohol Use Not Currently    Frequency: Never     Social History     Substance and Sexual Activity   Drug Use Never     Social History     Tobacco Use   Smoking Status Never Smoker   Smokeless Tobacco Never Used       ALLERGIES:  Allergies   Allergen Reactions    Shellfish-Derived Products        MEDICATIONS:  All current active medications have been reviewed  PHYSICAL EXAM:  Temp:  [97 9 °F (36 6 °C)-99 °F (37 2 °C)] 98 1 °F (36 7 °C)  HR:  [] 81  Resp:  [16-33] 32  BP: (102-148)/(52-91) 131/64  SpO2:  [89 %-95 %] 94 %  Temp (24hrs), Av 4 °F (36 9 °C), Min:97 9 °F (36 6 °C), Max:99 °F (37 2 °C)  Current: Temperature: 98 1 °F (36 7 °C)    Intake/Output Summary (Last 24 hours) at 2020 1759  Last data filed at 2020 1201  Gross per 24 hour   Intake 1932 92 ml   Output 2050 ml   Net -117 08 ml       General Appearance:  Appearing well, nontoxic, and in no distress; patient is sitting up in chair comfortably  Head:  Normocephalic, without obvious abnormality, atraumatic   Eyes:  Conjunctiva pink and sclera anicteric, both eyes   Nose: Nares normal, mucosa normal, no drainage   Throat: Oropharynx moist without lesions   Neck: Supple, symmetrical, no adenopathy, no tenderness/mass/nodules   Back:   Symmetric, no curvature, ROM normal, no CVA tenderness; no spinal or paraspinal muscle tenderness to palpation  Lungs:   Clear to auscultation bilaterally, respirations unlabored   Chest Wall:  No tenderness or deformity   Heart:  RRR; no murmur, rub or gallop appreciated   Abdomen:   Soft, non-tender, non-distended, positive bowel sounds    Extremities: No cyanosis, clubbing or edema   Skin: No rashes or lesions  No draining wounds noted  Lymph nodes: Cervical, supraclavicular nodes normal   Neurologic: Alert and oriented times 3, extremity strength 5/5 and symmetric       LABS, IMAGING, & OTHER STUDIES:  Lab Results:  I have personally reviewed pertinent labs  Results from last 7 days   Lab Units 08/24/20  0628 08/23/20 0456 08/22/20  2303   WBC Thousand/uL 11 84* 20 65* 7 82   HEMOGLOBIN g/dL 12 4 12 6 14 6   PLATELETS Thousands/uL 92* 112* 120*     Results from last 7 days   Lab Units 08/24/20  0628 08/23/20 0456 08/22/20  2303   POTASSIUM mmol/L 4 4   < > 3 9 3 6   CHLORIDE mmol/L 105   < > 107 103   CO2 mmol/L 26   < > 23 21   BUN mg/dL 22   < > 28* 31*   CREATININE mg/dL 1 22   < > 2 09* 2 11*   EGFR ml/min/1 73sq m 55   < > 29 28   CALCIUM mg/dL 8 0*   < > 7 6* 8 1*   AST U/L  --   --  195* 258*   ALT U/L  --   --  407* 526*   ALK PHOS U/L  --   --  131* 204*    < > = values in this interval not displayed  Results from last 7 days   Lab Units 08/22/20 2308 08/22/20  2303   BLOOD CULTURE  Escherichia coli* Escherichia coli*   GRAM STAIN RESULT  Gram negative rods* Gram negative rods*       Imaging Studies:   I have personally reviewed pertinent imaging study reports and images in PACS  Other Studies:   I have personally reviewed pertinent reports

## 2020-08-24 NOTE — PLAN OF CARE
Problem: PAIN - ADULT  Goal: Verbalizes/displays adequate comfort level or baseline comfort level  Description: Interventions:  - Encourage patient to monitor pain and request assistance  - Assess pain using appropriate pain scale  - Administer analgesics based on type and severity of pain and evaluate response  - Implement non-pharmacological measures as appropriate and evaluate response  - Consider cultural and social influences on pain and pain management  - Notify physician/advanced practitioner if interventions unsuccessful or patient reports new pain  Outcome: Progressing     Problem: INFECTION - ADULT  Goal: Absence or prevention of progression during hospitalization  Description: INTERVENTIONS:  - Assess and monitor for signs and symptoms of infection  - Monitor lab/diagnostic results  - Monitor all insertion sites, i e  indwelling lines, tubes, and drains  - Monitor endotracheal if appropriate and nasal secretions for changes in amount and color  - [place] appropriate cooling/warming therapies per order  - Administer medications as ordered  - Instruct and encourage patient and family to use good hand hygiene technique  - Identify and instruct in appropriate isolation precautions for identified infection/condition  Outcome: Progressing  Goal: Absence of fever/infection during neutropenic period  Description: INTERVENTIONS:  - Monitor WBC    Outcome: Progressing     Problem: SAFETY ADULT  Goal: Patient will remain free of falls  Description: INTERVENTIONS:  - Assess patient frequently for physical needs  -  Identify cognitive and physical deficits and behaviors that affect risk of falls    -  [place] fall precautions as indicated by assessment   - Educate patient/family on patient safety including physical limitations  - Instruct patient to call for assistance with activity based on assessment  - Modify environment to reduce risk of injury  - Consider OT/PT consult to assist with strengthening/mobility  Outcome: Progressing  Goal: Maintain or return to baseline ADL function  Description: INTERVENTIONS:  -  Assess patient's ability to carry out ADLs; assess patient's baseline for ADL function and identify physical deficits which impact ability to perform ADLs (bathing, care of mouth/teeth, toileting, grooming, dressing, etc )  - Assess/evaluate cause of self-care deficits   - Assess range of motion  - Assess patient's mobility; develop plan if impaired  - Assess patient's need for assistive devices and provide as appropriate  - Encourage maximum independence but intervene and supervise when necessary  - Involve family in performance of ADLs  - Assess for home care needs following discharge   - Consider OT consult to assist with ADL evaluation and planning for discharge  - Provide patient education as appropriate  Outcome: Progressing  Goal: Maintain or return mobility status to optimal level  Description: INTERVENTIONS:  - Assess patient's baseline mobility status (ambulation, transfers, stairs, etc )    - Identify cognitive and physical deficits and behaviors that affect mobility  - Identify mobility aids required to assist with transfers and/or ambulation (gait belt, sit-to-stand, lift, walker, cane, etc )  - [place] fall precautions as indicated by assessment  - Record patient progress and toleration of activity level on Mobility SBAR; progress patient to next Phase/Stage  - Instruct patient to call for assistance with activity based on assessment  - Consider rehabilitation consult to assist with strengthening/weightbearing, etc   Outcome: Progressing     Problem: DISCHARGE PLANNING  Goal: Discharge to home or other facility with appropriate resources  Description: INTERVENTIONS:  - Identify barriers to discharge w/patient and caregiver  - Arrange for needed discharge resources and transportation as appropriate  - Identify discharge learning needs (meds, wound care, etc )  - Arrange for interpretive services to assist at discharge as needed  - Refer to Case Management Department for coordinating discharge planning if the patient needs post-hospital services based on physician/advanced practitioner order or complex needs related to functional status, cognitive ability, or social support system  Outcome: Progressing     Problem: Knowledge Deficit  Goal: Patient/family/caregiver demonstrates understanding of disease process, treatment plan, medications, and discharge instructions  Description: Complete learning assessment and assess knowledge base  Interventions:  - Provide teaching at level of understanding  - Provide teaching via preferred learning methods  Outcome: Progressing     Problem: Nutrition/Hydration-ADULT  Goal: Nutrient/Hydration intake appropriate for improving, restoring or maintaining nutritional needs  Description: Monitor and assess patient's nutrition/hydration status for malnutrition  Collaborate with interdisciplinary team and initiate plan and interventions as ordered  Monitor patient's weight and dietary intake as ordered or per policy  Utilize nutrition screening tool and intervene as necessary  Determine patient's food preferences and provide high-protein, high-caloric foods as appropriate       INTERVENTIONS:  - Monitor oral intake, urinary output, labs, and treatment plans  - Assess nutrition and hydration status and recommend course of action  - Evaluate amount of meals eaten  - Assist patient with eating if necessary   - Allow adequate time for meals  - Recommend/ encourage appropriate diets, oral nutritional supplements, and vitamin/mineral supplements  - Order, calculate, and assess calorie counts as needed  - Recommend, monitor, and adjust tube feedings and TPN/PPN based on assessed needs  - Assess need for intravenous fluids  - Provide specific nutrition/hydration education as appropriate  - Include patient/family/caregiver in decisions related to nutrition  Outcome: Progressing     Problem: Prexisting or High Potential for Compromised Skin Integrity  Goal: Skin integrity is maintained or improved  Description: INTERVENTIONS:  - Identify patients at risk for skin breakdown  - Assess and monitor skin integrity  - Assess and monitor nutrition and hydration status  - Monitor labs   - Assess for incontinence   - Turn and reposition patient  - Assist with mobility/ambulation  - Relieve pressure over bony prominences  - Avoid friction and shearing  - Provide appropriate hygiene as needed including keeping skin clean and dry  - Evaluate need for skin moisturizer/barrier cream  - Collaborate with interdisciplinary team   - Patient/family teaching  - Consider wound care consult   Outcome: Progressing     Problem: Potential for Falls  Goal: Patient will remain free of falls  Description: INTERVENTIONS:  - Assess patient frequently for physical needs  -  Identify cognitive and physical deficits and behaviors that affect risk of falls    -  [place] fall precautions as indicated by assessment   - Educate patient/family on patient safety including physical limitations  - Instruct patient to call for assistance with activity based on assessment  - Modify environment to reduce risk of injury  - Consider OT/PT consult to assist with strengthening/mobility  Outcome: Progressing     Problem: CARDIOVASCULAR - ADULT  Goal: Maintains optimal cardiac output and hemodynamic stability  Description: INTERVENTIONS:  - Monitor I/O, vital signs and rhythm  - Monitor for S/S and trends of decreased cardiac output  - Administer and titrate ordered vasoactive medications to optimize hemodynamic stability  - Assess quality of pulses, skin color and temperature  - Assess for signs of decreased coronary artery perfusion  - Instruct patient to report change in severity of symptoms  Outcome: Progressing  Goal: Absence of cardiac dysrhythmias or at baseline rhythm  Description: INTERVENTIONS:  - Continuous cardiac monitoring, vital signs, obtain 12 lead EKG if ordered  - Administer antiarrhythmic and heart rate control medications as ordered  - Monitor electrolytes and administer replacement therapy as ordered  Outcome: Progressing     Problem: RESPIRATORY - ADULT  Goal: Achieves optimal ventilation and oxygenation  Description: INTERVENTIONS:  - Assess for changes in respiratory status  - Assess for changes in mentation and behavior  - Position to facilitate oxygenation and minimize respiratory effort  - Oxygen administered by appropriate delivery if ordered  - Initiate smoking cessation education as indicated  - Encourage broncho-pulmonary hygiene including cough, deep breathe, Incentive Spirometry  - Assess the need for suctioning and aspirate as needed  - Assess and instruct to report SOB or any respiratory difficulty  - Respiratory Therapy support as indicated  Outcome: Progressing

## 2020-08-24 NOTE — PROGRESS NOTES
Progress Note - Claudeen Cables 1940, 78 y o  male MRN: [id number]    Unit/Bed#:  Encounter: [id number]    Primary Care Provider: Rafa Mays DO   Date and time admitted to hospital: 8/22/2020 10:32 PM        * Septic shock [facility])  Assessment & Plan  · Present on admission evident by fever,tachycardia, and hypotension in setting of + blood cultures  · Etiology of GNR bacteremia suspicious for translocation from hepatic/biliary source  · Continue broad spectrum antibiotics while awaiting speciation; cefepime/Flagyl/vancomycin antibiotic day 2  Would consider discontinuing vancomycin  · No longer requiring vasopressor therapy to support hemodynamics  Okay to discontinue central line  · Trend procalcitonin  · Monitor fever curve and WBC count  Hepatic lesion  Assessment & Plan  · MRI 8/23 revealing mixed cystic and solid lesion in the central liver;  highly suspicious for primary liver neoplasm, specifically biliary cystadenocarcinoma  · Will likely need IR guided biopsy based on discussions with patient and family  · Appreciate GI consultation/recommendations  Transaminitis  Assessment & Plan  · Hepatic lesion concerning for malignancy likely primary etiology of transaminitis  · LFTs are improving; will continue trend  · Appreciate GI consultation/recommendations  Elevated troponin  Assessment & Plan  · Likely secondary to demand  · Troponin peaking at 0 4  Now downtrending  No EKG changes  Respiratory insufficiency  Assessment & Plan  · Continue supplement O2 to maintain maintain Fio2> 92%  · Encourage good incentive spirometry/pulmonary toileting  Lactic acidosisresolved as of 8/24/2020  Assessment & Plan  · Trend and points of resuscitation  · Q2H lactic acid      EMILIANO (acute kidney injury) ([facility] [address] )  Assessment & Plan  · Gentle IV fluid hydration  · Strict I&Os    · Trend daily be/creatinine    Coronary artery disease without angina pectoris  Assessment & Plan  · 81 mg asa daily  · Continue beta blockade metoprolol 25 mg b i d  · Hold ACE-inhibitor in setting acute kidney injury  Resume with improvement in renal function  Hypertension  Assessment & Plan  · Resume home metoprolol 25 mg b i d  · Hold ACE-inhibitor in setting ACEI    Hyperlipidemia  Assessment & Plan  · Hold statin (Zocor 80 mg daily) in setting of transaminitis       ----------------------------------------------------------------------------------------  HPI/24hr events:  Patient admitting in setting of septic shock  Two of 2 blood cultures positive for Gram-positive cocci in cluster  He has remained off vasopressors times 24 hours  Source initially believed to be secondary to hepatic abscess however follow-up MRI concerning for semi solid lesion suspected to be malignant  No acute issues overnight    Disposition: Transfer to Med-Surg   Code Status: Level 1 - Full Code  ---------------------------------------------------------------------------------------  SUBJECTIVE  " I dont have any pain"    ---------------------------------------------------------------------------------------  OBJECTIVE    Vitals   Vitals:    20 0400 20 0500 20 0600 20 0700   BP: 125/59 124/62 135/62 140/64   BP Location: Right arm      Pulse: 78 81 74 86   Resp: (!) 29 (!) 29 (!) 25 (!) 33   Temp:       TempSrc:       SpO2: 91% 92% 92% 92%   Weight:       Height:         Temp (24hrs), Av 9 °F (36 6 °C), Min:97 7 °F (36 5 °C), Max:98 4 °F (36 9 °C)  Current: Temperature: 98 4 °F (36 9 °C)          Respiratory:  Nasal Cannula O2 Flow Rate (L/min): 2 L/min    Invasive/non-invasive ventilation settings   Respiratory    Lab Data (Last 4 hours)    None         O2/Vent Data (Last 4 hours)    None                Physical Exam  Constitutional:       Comments: Pleasant conversational gentleman   HENT:      Head: Normocephalic and atraumatic     Eyes:      Extraocular Movements: Extraocular movements intact  Pupils: Pupils are equal, round, and reactive to light  Neck:      Musculoskeletal: Normal range of motion  Cardiovascular:      Rate and Rhythm: Normal rate  Pulmonary:      Comments: Fine crackles bilateral posterior bases  Abdominal:      General: Abdomen is flat  Bowel sounds are normal  There is no distension  Palpations: There is no mass  Tenderness: There is no abdominal tenderness  There is no guarding or rebound  Musculoskeletal:      Right lower leg: No edema  Left lower leg: No edema  Skin:     General: Skin is warm  Capillary Refill: Capillary refill takes less than 2 seconds  Neurological:      General: No focal deficit present  Mental Status: He is oriented to person, place, and time           Laboratory and Diagnostics:  Results from last 7 days   Lab Units 08/24/20 0628 08/23/20  0456 08/22/20  2303   WBC Thousand/uL 11 84* 20 65* 7 82   HEMOGLOBIN g/dL 12 4 12 6 14 6   HEMATOCRIT % 38 1 39 6 42 9   PLATELETS Thousands/uL 92* 112* 120*   NEUTROS PCT %  --  86*  --    BANDS PCT %  --   --  5   MONOS PCT %  --  8  --    MONO PCT %  --   --  1*     Results from last 7 days   Lab Units 08/24/20  0628 08/23/20  1341 08/23/20  0456 08/22/20  2303   SODIUM mmol/L 138 139 139 139   POTASSIUM mmol/L 4 4 4 5 3 9 3 6   CHLORIDE mmol/L 105 106 107 103   CO2 mmol/L 26 24 23 21   ANION GAP mmol/L 7 9 9 15*   BUN mg/dL 22 23 28* 31*   CREATININE mg/dL 1 22 1 47* 2 09* 2 11*   CALCIUM mg/dL 8 0* 8 0* 7 6* 8 1*   GLUCOSE RANDOM mg/dL 111 107 148* 146*   ALT U/L  --   --  407* 526*   AST U/L  --   --  195* 258*   ALK PHOS U/L  --   --  131* 204*   ALBUMIN g/dL  --   --  2 5* 3 0*   TOTAL BILIRUBIN mg/dL  --   --  1 40* 1 80*     Results from last 7 days   Lab Units 08/24/20 0628 08/23/20 0456   MAGNESIUM mg/dL 2 0 1 8      Results from last 7 days   Lab Units 08/22/20  2303   INR  1 15   PTT seconds 32      Results from last 7 days   Lab Units 08/23/20  0456 08/23/20  0328 08/22/20  2303   TROPONIN I ng/mL 0 37* 0 41* 0 20*     Results from last 7 days   Lab Units 08/23/20  0456 08/23/20  0112 08/22/20  2303   LACTIC ACID mmol/L 1 5 2 8* 4 0*     ABG:    VBG:  Results from last 7 days   Lab Units 08/23/20  0611   PH TIFFANIE  7 316   PCO2 TIFFANIE mm Hg 41 2*   PO2 TIFFANIE mm Hg 48 7*   HCO3 TIFFANIE mmol/L 20 6*   BASE EXC TIFFANIE mmol/L -5 3     Results from last 7 days   Lab Units 08/22/20  2303   PROCALCITONIN ng/ml 5 83*       Micro  Results from last 7 days   Lab Units 08/22/20  2308 08/22/20  2303   GRAM STAIN RESULT  Gram negative rods* Gram negative rods*       EKG: NSR  Imaging: I have personally reviewed pertinent reports  and I have personally reviewed pertinent films in PACS    Intake and Output  I/O       08/22 0701 - 08/23 0700 08/23 0701 - 08/24 0700    I V  (mL/kg) 1036 4 (10 7) 868 4 (8 9)    IV Piggyback 1000 450    Total Intake(mL/kg) 2036 4 (20 9) 1318 4 (13 6)    Urine (mL/kg/hr)  2250 (1)    Total Output  2250    Net +2036 4 -931 6                Height and Weights   Height: 5' 9" (175 3 cm)  IBW: 70 7 kg  Body mass index is 31 68 kg/m²  Weight (last 2 days)     Date/Time   Weight    08/23/20 0232   97 3 (214 51)    08/22/20 2243   101 (221 56)                Nutrition       Diet Orders   (From admission, onward)             Start     Ordered    08/23/20 1653  Diet Cardiovascular; Cardiac  Diet effective now     Question Answer Comment   Diet Type Cardiovascular    Cardiac Cardiac    RD to adjust diet per protocol?  Yes        08/23/20 1652                  Active Medications  Scheduled Meds:  Current Facility-Administered Medications   Medication Dose Route Frequency Provider Last Rate    aspirin  81 mg Oral Daily Zia Aggarwal PA-C      cefepime  2,000 mg Intravenous Q12H Zia Aggarwal PA-C Stopped (08/23/20 2255)    chlorhexidine  15 mL Swish & Spit Q12H [address] Mikaela Byrnes      heparin (porcine)  5,000 Units Subcutaneous [facility] Zia Aggarwal PA-C      metroNIDAZOLE  500 mg Intravenous Q8H Cesar Gonzáles PA-C Stopped (08/24/20 0400)    multi-electrolyte  50 mL/hr Intravenous Continuous Cesar Gonzáles PA-C 50 mL/hr (08/23/20 1904)    sodium chloride (PF)  3 mL Intravenous Q1H PRN Jolynn Agarwal MD      vancomycin  15 mg/kg (Adjusted) Intravenous Q24H BEKA Merino Stopped (08/24/20 0000)     Continuous Infusions:  multi-electrolyte, 50 mL/hr, Last Rate: 50 mL/hr (08/23/20 1904)      PRN Meds:   sodium chloride (PF), 3 mL, Q1H PRN        Invasive Devices Review  Invasive Devices     Central Venous Catheter Line            CVC Central Lines 08/23/20 Triple 16cm 1 day          Peripheral Intravenous Line            Peripheral IV 08/22/20 Left Arm 2 days    Peripheral IV 08/22/20 Right Arm 1 day                Rationale for remaining devices: Okay to remove central line  ---------------------------------------------------------------------------------------  Advance Directive and Living Will:      Power of :    POLST:    ---------------------------------------------------------------------------------------  Care Time Delivered:   Upon my evaluation, this patient had a high probability of imminent or life-threatening deterioration due to 10, which required my direct attention, intervention, and personal management  I have personally provided 10 minutes (0000 to 0700) of critical care time, exclusive of procedures, teaching, family meetings, and any prior time recorded by providers other than myself  Cesar Gonzáles PA-C      Portions of the record may have been created with voice recognition software  Occasional wrong word or "sound a like" substitutions may have occurred due to the inherent limitations of voice recognition software    Read the chart carefully and recognize, using context, where substitutions have occurred

## 2020-08-24 NOTE — ASSESSMENT & PLAN NOTE
· 81 mg asa daily  · Continue beta blockade metoprolol 25 mg b i d  · Hold ACE-inhibitor in setting acute kidney injury  Resume with improvement in renal function

## 2020-08-24 NOTE — ASSESSMENT & PLAN NOTE
· Continue supplement O2 to maintain maintain Fio2> 92%  · Encourage good incentive spirometry/pulmonary toileting

## 2020-08-24 NOTE — PLAN OF CARE
Problem: PAIN - ADULT  Goal: Verbalizes/displays adequate comfort level or baseline comfort level  Description: Interventions:  - Encourage patient to monitor pain and request assistance  - Assess pain using appropriate pain scale  - Administer analgesics based on type and severity of pain and evaluate response  - Implement non-pharmacological measures as appropriate and evaluate response  - Consider cultural and social influences on pain and pain management  - Notify physician/advanced practitioner if interventions unsuccessful or patient reports new pain  Outcome: Progressing     Problem: INFECTION - ADULT  Goal: Absence or prevention of progression during hospitalization  Description: INTERVENTIONS:  - Assess and monitor for signs and symptoms of infection  - Monitor lab/diagnostic results  - Monitor all insertion sites, i e  indwelling lines, tubes, and drains  - Monitor endotracheal if appropriate and nasal secretions for changes in amount and color  - [place] appropriate cooling/warming therapies per order  - Administer medications as ordered  - Instruct and encourage patient and family to use good hand hygiene technique  - Identify and instruct in appropriate isolation precautions for identified infection/condition  Outcome: Progressing  Goal: Absence of fever/infection during neutropenic period  Description: INTERVENTIONS:  - Monitor WBC    Outcome: Progressing     Problem: SAFETY ADULT  Goal: Patient will remain free of falls  Description: INTERVENTIONS:  - Assess patient frequently for physical needs  -  Identify cognitive and physical deficits and behaviors that affect risk of falls    -  [place] fall precautions as indicated by assessment   - Educate patient/family on patient safety including physical limitations  - Instruct patient to call for assistance with activity based on assessment  - Modify environment to reduce risk of injury  - Consider OT/PT consult to assist with strengthening/mobility  Outcome: Progressing  Goal: Maintain or return to baseline ADL function  Description: INTERVENTIONS:  -  Assess patient's ability to carry out ADLs; assess patient's baseline for ADL function and identify physical deficits which impact ability to perform ADLs (bathing, care of mouth/teeth, toileting, grooming, dressing, etc )  - Assess/evaluate cause of self-care deficits   - Assess range of motion  - Assess patient's mobility; develop plan if impaired  - Assess patient's need for assistive devices and provide as appropriate  - Encourage maximum independence but intervene and supervise when necessary  - Involve family in performance of ADLs  - Assess for home care needs following discharge   - Consider OT consult to assist with ADL evaluation and planning for discharge  - Provide patient education as appropriate  Outcome: Progressing  Goal: Maintain or return mobility status to optimal level  Description: INTERVENTIONS:  - Assess patient's baseline mobility status (ambulation, transfers, stairs, etc )    - Identify cognitive and physical deficits and behaviors that affect mobility  - Identify mobility aids required to assist with transfers and/or ambulation (gait belt, sit-to-stand, lift, walker, cane, etc )  - [place] fall precautions as indicated by assessment  - Record patient progress and toleration of activity level on Mobility SBAR; progress patient to next Phase/Stage  - Instruct patient to call for assistance with activity based on assessment  - Consider rehabilitation consult to assist with strengthening/weightbearing, etc   Outcome: Progressing     Problem: DISCHARGE PLANNING  Goal: Discharge to home or other facility with appropriate resources  Description: INTERVENTIONS:  - Identify barriers to discharge w/patient and caregiver  - Arrange for needed discharge resources and transportation as appropriate  - Identify discharge learning needs (meds, wound care, etc )  - Arrange for interpretive services to assist at discharge as needed  - Refer to Case Management Department for coordinating discharge planning if the patient needs post-hospital services based on physician/advanced practitioner order or complex needs related to functional status, cognitive ability, or social support system  Outcome: Progressing     Problem: Knowledge Deficit  Goal: Patient/family/caregiver demonstrates understanding of disease process, treatment plan, medications, and discharge instructions  Description: Complete learning assessment and assess knowledge base  Interventions:  - Provide teaching at level of understanding  - Provide teaching via preferred learning methods  Outcome: Progressing     Problem: Nutrition/Hydration-ADULT  Goal: Nutrient/Hydration intake appropriate for improving, restoring or maintaining nutritional needs  Description: Monitor and assess patient's nutrition/hydration status for malnutrition  Collaborate with interdisciplinary team and initiate plan and interventions as ordered  Monitor patient's weight and dietary intake as ordered or per policy  Utilize nutrition screening tool and intervene as necessary  Determine patient's food preferences and provide high-protein, high-caloric foods as appropriate       INTERVENTIONS:  - Monitor oral intake, urinary output, labs, and treatment plans  - Assess nutrition and hydration status and recommend course of action  - Evaluate amount of meals eaten  - Assist patient with eating if necessary   - Allow adequate time for meals  - Recommend/ encourage appropriate diets, oral nutritional supplements, and vitamin/mineral supplements  - Order, calculate, and assess calorie counts as needed  - Recommend, monitor, and adjust tube feedings and TPN/PPN based on assessed needs  - Assess need for intravenous fluids  - Provide specific nutrition/hydration education as appropriate  - Include patient/family/caregiver in decisions related to nutrition  Outcome: Progressing     Problem: Prexisting or High Potential for Compromised Skin Integrity  Goal: Skin integrity is maintained or improved  Description: INTERVENTIONS:  - Identify patients at risk for skin breakdown  - Assess and monitor skin integrity  - Assess and monitor nutrition and hydration status  - Monitor labs   - Assess for incontinence   - Turn and reposition patient  - Assist with mobility/ambulation  - Relieve pressure over bony prominences  - Avoid friction and shearing  - Provide appropriate hygiene as needed including keeping skin clean and dry  - Evaluate need for skin moisturizer/barrier cream  - Collaborate with interdisciplinary team   - Patient/family teaching  - Consider wound care consult   Outcome: Progressing     Problem: Potential for Falls  Goal: Patient will remain free of falls  Description: INTERVENTIONS:  - Assess patient frequently for physical needs  -  Identify cognitive and physical deficits and behaviors that affect risk of falls    -  [place] fall precautions as indicated by assessment   - Educate patient/family on patient safety including physical limitations  - Instruct patient to call for assistance with activity based on assessment  - Modify environment to reduce risk of injury  - Consider OT/PT consult to assist with strengthening/mobility  Outcome: Progressing

## 2020-08-24 NOTE — PHYSICAL THERAPY NOTE
Physical Therapy Evaluation     Patient's Name: Lauren De Leon    Admitting Diagnosis  Chest pain [R07 9]  Septic shock ([facility] ) [A41 9, R65 21]    Problem List  Patient Active Problem List   Diagnosis    Hypertension    Hyperlipidemia    Cardiomegaly    Anxiety    Abnormal electrocardiogram    Coronary artery disease without angina pectoris    PAD (peripheral artery disease) ([facility] )    Viral URI with cough    Reactive airway disease without complication    Medicare annual wellness visit, subsequent    Hyperglycemia    Ankle edema, bilateral    Transaminitis    Respiratory insufficiency    EMILIANO (acute kidney injury) ([place] [address] )    Elevated troponin    Septic shock ([place] [address] )    Hepatic lesion       Past Medical History  No past medical history on file  Past Surgical History  Past Surgical History:   Procedure Laterality Date    ABDOMINAL SURGERY      appy    CORONARY ARTERY BYPASS GRAFT            08/24/20 1028   Note Type   Note type Eval/Treat   Pain Assessment   Pain Assessment Tool Pain Assessment not indicated - pt denies pain   Pain Score No Pain   Home Living   Type of Home House   Home Layout Two level;Bed/bath upstairs; Access  (FF to 2nd floor)   Bathroom Shower/Tub Tub/shower unit   [employer] Raised   Bathroom Equipment Grab bars in [facility]   (no AD at baseline)   Prior Function   Level of [place] Independent with ADLs and functional mobility   [facility] From Family  (close family support)   ADL Assistance Independent   IADLs Independent   Falls in the last 6 months 0   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)   Comments pt drives   Restrictions/Precautions   [place] Bearing Precautions Per Order No   Other Precautions Fall Risk;Multiple lines;Telemetry; Chair Alarm;Hard of hearing   General   Family/Caregiver Present Yes  (spouse)   Cognition   Overall Cognitive Status [facility] Arousal/Participation Alert   Orientation Level Oriented X4   Memory Within functional limits   Following Commands Follows one step commands without difficulty   Comments pt agreeable to PT session   RLE Assessment   RLE Assessment WFL  (4/5 throughout)   LLE Assessment   LLE Assessment WFL  (4/5 throughout)   Coordination   Movements are Fluid and Coordinated 1   Sensation WFL   Light Touch   RLE Light Touch Grossly intact   LLE Light Touch Grossly intact   Bed Mobility   Supine to Sit Unable to assess  (pt received seated OOB in recliner)   Transfers   Sit to Stand 5  Supervision   Additional items Assist x 1; Armrests; Verbal cues   Stand to Sit 5  Supervision   Additional items Assist x 1; Armrests; Verbal cues   Ambulation/Elevation   Gait pattern Improper Weight shift; Short stride   Gait Assistance 5  Supervision   Additional items Assist x 1;Verbal cues; Tactile cues   Assistive Device   (U/L UE support on IV pole)   Distance 40 ft   Stair Management Assistance Not tested   Balance   Static Sitting Normal   Dynamic Sitting Good   Static Standing Fair +   Dynamic Standing Fair   Ambulatory Fair   Endurance Deficit   Endurance Deficit Yes   Activity Tolerance   Activity Tolerance Patient tolerated treatment well   Nurse Made Aware Yes, RN Edison verbalized pt appropriate for PT session, made aware of outcomes/recs   Assessment   Prognosis Good   Problem List Decreased strength;Decreased endurance; Impaired balance;Decreased mobility; Impaired hearing   Assessment Pt is 78 y o  male seen for PT evaluation on 8/24/2020 s/[facility] w/ Sept[facility]); pt presented to ED for evaluation of generalized fatigue and AMS  PT consulted to assess pt's functional mobility and d/c needs  Order placed for PT eval and tx, w/ up w/ A order  PT performed at least 2 patient identifiers during session: Name and wristband   Comorbidities affecting pt's physical performance at time of assessment include: HTN, HLD, CAD s/p CABG, DM type 2, obesity, h/o gout, EMILIANO  PTA, pt was independent w/ all functional mobility w/ no AD or DME, ambulates unrestricted distances and all terrain and lives w/ spouse in bi- level home  Personal factors affecting pt at time of IE include: lives in multi story house, ambulating w/ assistive device, positive fall history and limited insight into impairments  Please find objective findings from PT assessment regarding body systems outlined above with impairments and limitations including weakness, impaired balance, decreased endurance, decreased activity tolerance, decreased functional mobility tolerance and fall risk, as well as mobility assessment (need for cueing for mobility technique)  The following objective measures performed on IE also reveal limitations: Barthel Index: 65/100  Pt's clinical presentation is currently unstable/unpredictable seen in pt's presentation of need for input for task focus and mobility technique, on telemetry monitoring and ongoing medical assessment  Pt to benefit from continued PT tx to address deficits as defined above and maximize level of functional independent mobility and consistency  From PT/mobility standpoint, recommendation at time of d/c would be anticipate no needs pending progress in order to facilitate return to PLOF     Barriers to Discharge Inaccessible home environment   Goals   Patient Goals "to return home"   STG Expiration Date 09/03/20   Short Term Goal #1 In 7-10 days: Increase bilateral LE strength 1/2 grade to facilitate independent mobility, Perform all transfers modified independent to improve independence, Ambulate > 250 ft  with least restrictive assistive device modified independent w/o LOB and w/ normalized gait pattern 100% of the time, Navigate 7+7 stairs modified independent with unilateral handrail to facilitate return to previous living environment, Increase all balance 1/2 grade to decrease risk for falls, Complete exercise program independently and Tolerate 4 hr OOB to faciliate upright tolerance   PT Treatment Day 1   Plan   Treatment/Interventions Functional transfer training;LE strengthening/ROM; Elevations; Therapeutic exercise; Endurance training;Patient/family training;Gait training;Spoke to nursing;Spoke to case management   PT Frequency   (3-5x/wk)   Recommendation   PT Discharge Recommendation Return to previous environment with social support  (anticipate no skilled PT needs after d/c)   Equipment Recommended   (TBD, none at this time)   PT - OK to Discharge No   Modified Zee Scale   Modified [place] Scale 3   Barthel Index   Feeding 10   Bathing 0   Grooming Score 5   Dressing Score 5   Bladder Score 10   Bowels Score 10   Toilet Use Score 5   Transfers (Bed/Chair) Score 10   Mobility (Level Surface) Score 10   Stairs Score 0   Barthel Index Score 65     Physical Therapy Treatment Note  Time In: 1045  Time Out: 1054  Total Time: 9 min     S:  Pt agreeable to PT treatment session s/p PT eval, in no apparent distress and responsive      O:  Pt seen for PT treatment session this date with interventions consisting of gait training w/ emphasis on improving pt's ability to ambulate level surfaces x 160 ft with close S provided by therapist with U/L UE support on IV pole  No pain reported throughout  VC required for technique      A:  Post session: pt returned back to recliner, chair alarm engaged, all needs in reach and RN notified of session findings/recommendations     P:  Continue to recommend anticipate no needs at time of d/c in order to maximize pt's functional independence and safety w/ mobility  Pt continues to be functioning below baseline level, and remains limited 2* factors listed above  PT will continue to see pt while here in order to address the deficits listed above and provide interventions consistent w/ POC in effort to achieve STGs      Juvencio Smith, PT

## 2020-08-24 NOTE — ASSESSMENT & PLAN NOTE
· MRI 8/23 revealing mixed cystic and solid lesion in the central liver;  highly suspicious for primary liver neoplasm, specifically biliary cystadenocarcinoma  · Will likely need IR guided biopsy based on discussions with patient and family  · Appreciate GI consultation/recommendations

## 2020-08-24 NOTE — ASSESSMENT & PLAN NOTE
· Hepatic lesion concerning for malignancy likely primary etiology of transaminitis  · LFTs are improving; will continue trend  · Appreciate GI consultation/recommendations

## 2020-08-24 NOTE — ASSESSMENT & PLAN NOTE
· Present on admission evident by fever,tachycardia, and hypotension in setting of + blood cultures  · Etiology of GNR bacteremia suspicious for translocation from hepatic/biliary source  · Continue broad spectrum antibiotics while awaiting speciation; cefepime/Flagyl/vancomycin antibiotic day 2  Would consider discontinuing vancomycin  · No longer requiring vasopressor therapy to support hemodynamics  Okay to discontinue central line  · Trend procalcitonin  · Monitor fever curve and WBC count

## 2020-08-25 ENCOUNTER — APPOINTMENT (INPATIENT)
Dept: INTERVENTIONAL RADIOLOGY/VASCULAR | Facility: HOSPITAL | Age: 80
DRG: 871 | End: 2020-08-25
Attending: RADIOLOGY
Payer: MEDICARE

## 2020-08-25 LAB
ACTIN IGG SERPL-ACNC: 19 UNITS (ref 0–19)
ALBUMIN SERPL BCP-MCNC: 2.6 G/DL (ref 3.5–5)
ALP SERPL-CCNC: 116 U/L (ref 46–116)
ALT SERPL W P-5'-P-CCNC: 230 U/L (ref 12–78)
ANION GAP SERPL CALCULATED.3IONS-SCNC: 7 MMOL/L (ref 4–13)
AST SERPL W P-5'-P-CCNC: 85 U/L (ref 5–45)
BASOPHILS # BLD AUTO: 0.03 THOUSANDS/ΜL (ref 0–0.1)
BASOPHILS NFR BLD AUTO: 0 % (ref 0–1)
BILIRUB SERPL-MCNC: 0.6 MG/DL (ref 0.2–1)
BUN SERPL-MCNC: 20 MG/DL (ref 5–25)
CALCIUM SERPL-MCNC: 8.6 MG/DL (ref 8.3–10.1)
CHLORIDE SERPL-SCNC: 104 MMOL/L (ref 100–108)
CO2 SERPL-SCNC: 25 MMOL/L (ref 21–32)
CREAT SERPL-MCNC: 1.13 MG/DL (ref 0.6–1.3)
EOSINOPHIL # BLD AUTO: 0.15 THOUSAND/ΜL (ref 0–0.61)
EOSINOPHIL NFR BLD AUTO: 2 % (ref 0–6)
ERYTHROCYTE [DISTWIDTH] IN BLOOD BY AUTOMATED COUNT: 14.3 % (ref 11.6–15.1)
GFR SERPL CREATININE-BSD FRML MDRD: 60 ML/MIN/1.73SQ M
GLUCOSE SERPL-MCNC: 109 MG/DL (ref 65–140)
HCT VFR BLD AUTO: 41.7 % (ref 36.5–49.3)
HGB BLD-MCNC: 13.5 G/DL (ref 12–17)
IMM GRANULOCYTES # BLD AUTO: 0.03 THOUSAND/UL (ref 0–0.2)
IMM GRANULOCYTES NFR BLD AUTO: 0 % (ref 0–2)
LYMPHOCYTES # BLD AUTO: 1.04 THOUSANDS/ΜL (ref 0.6–4.47)
LYMPHOCYTES NFR BLD AUTO: 12 % (ref 14–44)
MAGNESIUM SERPL-MCNC: 2.1 MG/DL (ref 1.6–2.6)
MCH RBC QN AUTO: 31.1 PG (ref 26.8–34.3)
MCHC RBC AUTO-ENTMCNC: 32.4 G/DL (ref 31.4–37.4)
MCV RBC AUTO: 96 FL (ref 82–98)
MITOCHONDRIA M2 IGG SER-ACNC: <20 UNITS (ref 0–20)
MONOCYTES # BLD AUTO: 0.83 THOUSAND/ΜL (ref 0.17–1.22)
MONOCYTES NFR BLD AUTO: 10 % (ref 4–12)
NEUTROPHILS # BLD AUTO: 6.3 THOUSANDS/ΜL (ref 1.85–7.62)
NEUTS SEG NFR BLD AUTO: 76 % (ref 43–75)
NRBC BLD AUTO-RTO: 0 /100 WBCS
PLATELET # BLD AUTO: 108 THOUSANDS/UL (ref 149–390)
PMV BLD AUTO: 11.3 FL (ref 8.9–12.7)
POTASSIUM SERPL-SCNC: 4.2 MMOL/L (ref 3.5–5.3)
PROT SERPL-MCNC: 6.4 G/DL (ref 6.4–8.2)
RBC # BLD AUTO: 4.34 MILLION/UL (ref 3.88–5.62)
SODIUM SERPL-SCNC: 136 MMOL/L (ref 136–145)
WBC # BLD AUTO: 8.38 THOUSAND/UL (ref 4.31–10.16)

## 2020-08-25 PROCEDURE — 88333 PATH CONSLTJ SURG CYTO XM 1: CPT | Performed by: PATHOLOGY

## 2020-08-25 PROCEDURE — 97116 GAIT TRAINING THERAPY: CPT

## 2020-08-25 PROCEDURE — 88341 IMHCHEM/IMCYTCHM EA ADD ANTB: CPT | Performed by: PATHOLOGY

## 2020-08-25 PROCEDURE — 88341 IMHCHEM/IMCYTCHM EA ADD ANTB: CPT

## 2020-08-25 PROCEDURE — 88307 TISSUE EXAM BY PATHOLOGIST: CPT | Performed by: PATHOLOGY

## 2020-08-25 PROCEDURE — 76942 ECHO GUIDE FOR BIOPSY: CPT | Performed by: RADIOLOGY

## 2020-08-25 PROCEDURE — 88342 IMHCHEM/IMCYTCHM 1ST ANTB: CPT | Performed by: PATHOLOGY

## 2020-08-25 PROCEDURE — 88313 SPECIAL STAINS GROUP 2: CPT

## 2020-08-25 PROCEDURE — NC001 PR NO CHARGE: Performed by: INTERNAL MEDICINE

## 2020-08-25 PROCEDURE — 97165 OT EVAL LOW COMPLEX 30 MIN: CPT

## 2020-08-25 PROCEDURE — 99232 SBSQ HOSP IP/OBS MODERATE 35: CPT | Performed by: INTERNAL MEDICINE

## 2020-08-25 PROCEDURE — 88313 SPECIAL STAINS GROUP 2: CPT | Performed by: PATHOLOGY

## 2020-08-25 PROCEDURE — 80053 COMPREHEN METABOLIC PANEL: CPT | Performed by: PHYSICIAN ASSISTANT

## 2020-08-25 PROCEDURE — 99233 SBSQ HOSP IP/OBS HIGH 50: CPT | Performed by: INTERNAL MEDICINE

## 2020-08-25 PROCEDURE — 99152 MOD SED SAME PHYS/QHP 5/>YRS: CPT | Performed by: RADIOLOGY

## 2020-08-25 PROCEDURE — 97530 THERAPEUTIC ACTIVITIES: CPT

## 2020-08-25 PROCEDURE — 47000 NEEDLE BIOPSY OF LIVER PERQ: CPT | Performed by: RADIOLOGY

## 2020-08-25 PROCEDURE — 88342 IMHCHEM/IMCYTCHM 1ST ANTB: CPT

## 2020-08-25 PROCEDURE — 99024 POSTOP FOLLOW-UP VISIT: CPT | Performed by: RADIOLOGY

## 2020-08-25 PROCEDURE — 0FB03ZX EXCISION OF LIVER, PERCUTANEOUS APPROACH, DIAGNOSTIC: ICD-10-PCS | Performed by: RADIOLOGY

## 2020-08-25 PROCEDURE — 83735 ASSAY OF MAGNESIUM: CPT | Performed by: PHYSICIAN ASSISTANT

## 2020-08-25 PROCEDURE — 85025 COMPLETE CBC W/AUTO DIFF WBC: CPT | Performed by: PHYSICIAN ASSISTANT

## 2020-08-25 RX ORDER — MIDAZOLAM HYDROCHLORIDE 2 MG/2ML
INJECTION, SOLUTION INTRAMUSCULAR; INTRAVENOUS CODE/TRAUMA/SEDATION MEDICATION
Status: COMPLETED | OUTPATIENT
Start: 2020-08-25 | End: 2020-08-25

## 2020-08-25 RX ORDER — FENTANYL CITRATE 50 UG/ML
INJECTION, SOLUTION INTRAMUSCULAR; INTRAVENOUS CODE/TRAUMA/SEDATION MEDICATION
Status: COMPLETED | OUTPATIENT
Start: 2020-08-25 | End: 2020-08-25

## 2020-08-25 RX ADMIN — ASPIRIN 81 MG 81 MG: 81 TABLET ORAL at 08:42

## 2020-08-25 RX ADMIN — HEPARIN SODIUM 5000 UNITS: 5000 INJECTION INTRAVENOUS; SUBCUTANEOUS at 22:03

## 2020-08-25 RX ADMIN — DOCUSATE SODIUM 100 MG: 100 CAPSULE, LIQUID FILLED ORAL at 18:10

## 2020-08-25 RX ADMIN — MIDAZOLAM HYDROCHLORIDE 0.5 MG: 1 INJECTION, SOLUTION INTRAMUSCULAR; INTRAVENOUS at 13:30

## 2020-08-25 RX ADMIN — FENTANYL CITRATE 25 MCG: 50 INJECTION, SOLUTION INTRAMUSCULAR; INTRAVENOUS at 13:30

## 2020-08-25 RX ADMIN — HEPARIN SODIUM 5000 UNITS: 5000 INJECTION INTRAVENOUS; SUBCUTANEOUS at 05:55

## 2020-08-25 RX ADMIN — MIDAZOLAM HYDROCHLORIDE 1 MG: 1 INJECTION, SOLUTION INTRAMUSCULAR; INTRAVENOUS at 13:14

## 2020-08-25 RX ADMIN — DOCUSATE SODIUM 100 MG: 100 CAPSULE, LIQUID FILLED ORAL at 08:42

## 2020-08-25 RX ADMIN — CEFEPIME HYDROCHLORIDE 2000 MG: 2 INJECTION, POWDER, FOR SOLUTION INTRAVENOUS at 12:00

## 2020-08-25 RX ADMIN — CEFEPIME HYDROCHLORIDE 2000 MG: 2 INJECTION, POWDER, FOR SOLUTION INTRAVENOUS at 22:03

## 2020-08-25 RX ADMIN — METOPROLOL TARTRATE 12.5 MG: 25 TABLET ORAL at 22:03

## 2020-08-25 RX ADMIN — HEPARIN SODIUM 5000 UNITS: 5000 INJECTION INTRAVENOUS; SUBCUTANEOUS at 14:40

## 2020-08-25 RX ADMIN — FENTANYL CITRATE 50 MCG: 50 INJECTION, SOLUTION INTRAMUSCULAR; INTRAVENOUS at 13:15

## 2020-08-25 RX ADMIN — METOPROLOL TARTRATE 12.5 MG: 25 TABLET ORAL at 08:42

## 2020-08-25 NOTE — PLAN OF CARE
Problem: PHYSICAL THERAPY ADULT  Goal: Performs mobility at highest level of function for planned discharge setting  See evaluation for individualized goals  Description: Treatment/Interventions: Functional transfer training, LE strengthening/ROM, Elevations, Therapeutic exercise, Endurance training, Patient/family training, Gait training, Spoke to nursing, Spoke to case management  Equipment Recommended: (GISELL, none at this time)       See flowsheet documentation for full assessment, interventions and recommendations  Outcome: Completed  Note: Prognosis: Good  Problem List: Decreased strength, Decreased endurance, Impaired balance, Decreased mobility, Impaired hearing  Assessment: Pt seen for PT treatment session this date with interventions consisting of gait training w/ emphasis on improving pt's ability to ambulate level surfaces x 250 ft independently with no AD and therapeutic activity consisting of training: supine<>sit transfers, sit<>stand transfers and vc and tactile cues for static standing posture faciliation  Pt agreeable to PT treatment session upon arrival, pt found supine in bed w/ HOB elevated, in no apparent distress, A&O x 4 and responsive  In comparison to previous session, pt with significant improvements in gait quality and ease of movement and transitional phases c no overt LOB or SOB noted  Post session: pt returned back to recliner, all needs in reach and RN notified of session findings/recommendations  From PT/mobility standpoint, pt appears to be functioning close to or at mobility baseline, therefore no further immediate skilled PT needs are warranted at this time  Pt currently performing all phases of functional mobility at independent level without need for AD  Recommend pt continue to mobilize ad toro while here  Recommend pt return to previous living environment once medically cleared and with continued family support  Will sign off, D/C PT   Please reconsult if further immediate skilled PT needs are warranted  Barriers to Discharge: None     PT Discharge Recommendation: Return to previous environment with social support(no skilled PT needs after d/c warranted at this time)     PT - OK to Discharge: Yes(when medically cleared)    See flowsheet documentation for full assessment

## 2020-08-25 NOTE — PROGRESS NOTES
Progress Note - [place] [facility] 1940, 78 y o  male MRN: [id number]    Unit/Bed#: -01 Encounter: [id number]    Primary Care Provider: Gissel Davila DO   Date and time admitted to hospital: 8/22/2020 10:32 PM        Hepatic lesion  Assessment & Plan  MRI on August 23rd showed a mixed cystic and solid lesion suspicious for liver neoplasm might be the cause of patient's sepsis  Will need IR guided biopsy    EMILIANO (acute kidney injury) ([facility] )  Assessment & Plan  Gentle IVF  Continue trending creatinine    Respiratory insufficiency  Assessment & Plan  Wean oxygen as able  Likely secondary sepsis    Transaminitis  Assessment & Plan  LFTs are improving will continue to trend    Coronary artery disease without angina pectoris  Assessment & Plan  Continue aspirin, beta-blocker      Hyperlipidemia  Assessment & Plan  Continue to hold statin setting of elevated AST/ALT    Hypertension  Assessment & Plan  Resume home metoprolol  Hold ACE-inhibitor in setting of EMILIANO    * Septic shock ([facility] )  Assessment & Plan  Presented on admission with fever, tachycardia, hypotension in the setting of bacteremia which was gram-negative  Sources found to be hepatic/biliary source  Currently on cefepime and Flagyl  GI evaluated the patient and recommended IR biopsy of the cystic lesion        VTE Pharmacologic Prophylaxis:   Pharmacologic: Heparin  Mechanical VTE Prophylaxis in Place: Yes    Patient Centered Rounds: I have performed bedside rounds with nursing staff today  Discussions with Specialists or Other Care Team Provider: cm, nursing    Education and Discussions with Family / Patient: pt    Time Spent for Care: 1 hour  More than 50% of total time spent on counseling and coordination of care as described above      Current Length of Stay: 2 day(s)    Current Patient Status: Inpatient   Certification Statement: The patient will continue to require additional inpatient hospital stay due to Further workup for sepsis needing IR biopsy    Discharge Plan: Will depend on when patient gets IR guided biopsy  Code Status: Level 1 - Full Code      Subjective:   Patient seen examined  No acute overnight events  Denies any abdominal pain, nausea, vomiting, chest pain, shortness of    Objective:     Vitals:   Temp (24hrs), Av 1 °F (36 7 °C), Min:97 7 °F (36 5 °C), Max:98 6 °F (37 °C)    Temp:  [97 7 °F (36 5 °C)-98 6 °F (37 °C)] 97 7 °F (36 5 °C)  HR:  [] 101  Resp:  [12-32] 16  BP: (131-149)/(62-78) 149/76  SpO2:  [94 %-95 %] 95 %  Body mass index is 31 64 kg/m²  Input and Output Summary (last 24 hours): Intake/Output Summary (Last 24 hours) at 2020 1528  Last data filed at 2020 1201  Gross per 24 hour   Intake 350 83 ml   Output 300 ml   Net 50 83 ml       Physical Exam:     Physical Exam  Constitutional:       General: He is not in acute distress  Appearance: He is well-developed  He is not diaphoretic  HENT:      Head: Normocephalic and atraumatic  Nose: Nose normal       Mouth/Throat:      Pharynx: No oropharyngeal exudate  Eyes:      General: No scleral icterus  Conjunctiva/sclera: Conjunctivae normal    Neck:      Musculoskeletal: Normal range of motion and neck supple  Cardiovascular:      Rate and Rhythm: Normal rate and regular rhythm  Heart sounds: Normal heart sounds  No murmur  No friction rub  No gallop  Pulmonary:      Effort: Pulmonary effort is normal  No respiratory distress  Breath sounds: Normal breath sounds  No wheezing or rales  Chest:      Chest wall: No tenderness  Abdominal:      General: Bowel sounds are normal  There is no distension  Palpations: Abdomen is soft  Tenderness: There is no abdominal tenderness  There is no guarding  Musculoskeletal: Normal range of motion  General: No tenderness or deformity  Skin:     General: Skin is warm and dry  Findings: No erythema     Neurological:      Mental Status: He is alert and oriented to person, place, and time  Additional Data:     Labs:    Results from last 7 days   Lab Units 08/25/20  0546  08/22/20  2303   WBC Thousand/uL 8 38   < > 7 82   HEMOGLOBIN g/dL 13 5   < > 14 6   HEMATOCRIT % 41 7   < > 42 9   PLATELETS Thousands/uL 108*   < > 120*   BANDS PCT %  --   --  5   NEUTROS PCT % 76*   < >  --    LYMPHS PCT % 12*   < >  --    LYMPHO PCT %  --   --  4*   MONOS PCT % 10   < >  --    MONO PCT %  --   --  1*   EOS PCT % 2   < > 0    < > = values in this interval not displayed  Results from last 7 days   Lab Units 08/25/20  0546   SODIUM mmol/L 136   POTASSIUM mmol/L 4 2   CHLORIDE mmol/L 104   CO2 mmol/L 25   BUN mg/dL 20   CREATININE mg/dL 1 13   ANION GAP mmol/L 7   CALCIUM mg/dL 8 6   ALBUMIN g/dL 2 6*   TOTAL BILIRUBIN mg/dL 0 60   ALK PHOS U/L 116   ALT U/L 230*   AST U/L 85*   GLUCOSE RANDOM mg/dL 109     Results from last 7 days   Lab Units 08/22/20  2303   INR  1 15             Results from last 7 days   Lab Units 08/24/20  0628 08/23/20  0456 08/23/20  0112 08/22/20  2303   LACTIC ACID mmol/L  --  1 5 2 8* 4 0*   PROCALCITONIN ng/ml 16 31*  --   --  5 83*           * I Have Reviewed All Lab Data Listed Above  * Additional Pertinent Lab Tests Reviewed:  All Labs Within Last 24 Hours Reviewed    Imaging:    Imaging Reports Reviewed Today Include: na  Imaging Personally Reviewed by Myself Includes:  na    Recent Cultures (last 7 days):     Results from last 7 days   Lab Units 08/22/20  2308 08/22/20  2303   BLOOD CULTURE  Escherichia coli* Escherichia coli*   GRAM STAIN RESULT  Gram negative rods* Gram negative rods*       Last 24 Hours Medication List:   Current Facility-Administered Medications   Medication Dose Route Frequency Provider Last Rate    aspirin  81 mg Oral Daily Mardy Najjar Cross, PA-C      cefepime  2,000 mg Intravenous Q12H Mardy Najjar Cross, PA-C 2,000 mg (08/24/20 2258)    docusate sodium  100 mg Oral BID Tutu Decker PA-C      heparin (porcine) 5,000 Units Subcutaneous [facility] Derek PATEL [place], [place]      metoprolol tartrate  12 5 mg Oral Q12H [facility] Derek PATEL [place], [place]      sodium chloride (PF)  3 mL Intravenous Q1H PRN Bonnye Gosselin, PA-C          Today, Patient Was Seen By: Laith Coy MD    ** Please Note: Dictation voice to text software may have been used in the creation of this document   **

## 2020-08-25 NOTE — CONSULTS
Interventional Radiology  Consultation 8/25/2020     IP Consult to IR  Consult performed by: Lj Ribera MD  Consult ordered by: Ming Gallardo PA-C        Alem Sheikh   1940   [id number]       Assessment/Plan:  24-year-old male admitted with septic shock found to have bacteremia and CT showed a large liver mass concerning for malignancy  Patient will require biopsy of the liver mass for further evaluation  Problem List     * (Principal) Septic shock ([facility] )    Overview Signed 8/23/2020 11:44 PM by Kameron Deleon PA-C     · present on admission evident by fever,tachycardia, and hypotension in setting of + blood cultures  · Continue broad spectrum antibiotics in the setting of GNR bacteremia  ·          Hypertension    Hyperlipidemia    Cardiomegaly    Anxiety    Abnormal electrocardiogram    Coronary artery disease without angina pectoris    PAD (peripheral artery disease) (HCC)    Viral URI with cough    Reactive airway disease without complication    Medicare annual wellness visit, subsequent    Hyperglycemia    Ankle edema, bilateral    Transaminitis    Respiratory insufficiency    EMILIANO (acute kidney injury) ([facility] )    Elevated troponin    Overview Deleted 8/23/2020  7:01 AM by Kameron Deleon PA-C            Hepatic lesion            Subjective:     Patient ID: Alem Sheikh is a 78 y o  male  History of Present Illness  Patient admitted with septic shock was found to have a large liver mass concerning for malignancy and is referred for biopsy of the liver mass  Review of Systems   Constitutional: Negative for chills and fever  No past medical history on file       Past Surgical History:   Procedure Laterality Date    ABDOMINAL SURGERY      appy    CORONARY ARTERY BYPASS GRAFT          Social History     Tobacco Use   Smoking Status Never Smoker   Smokeless Tobacco Never Used        Social History     Substance and Sexual Activity   Alcohol Use Not Currently    Frequency: Never Social History     Substance and Sexual Activity   Drug Use Never        Allergies   Allergen Reactions    Shellfish-Derived Products        Current Facility-Administered Medications   Medication Dose Route Frequency Provider Last Rate Last Dose    aspirin chewable tablet 81 mg  81 mg Oral Daily Judy Cantu PA-C   81 mg at 08/25/20 9730    cefepime (MAXIPIME) 2,000 mg in dextrose 5 % 50 mL IVPB  2,000 mg Intravenous Q12H Judy Cantu PA-C 100 mL/hr at 08/24/20 2258 2,000 mg at 08/24/20 2258    docusate sodium (COLACE) capsule 100 mg  100 mg Oral BID Dania Saravia PA-C   100 mg at 08/25/20 0824    heparin (porcine) subcutaneous injection 5,000 Units  5,000 Units Subcutaneous [facility] Dania Saravia PA-C   5,000 Units at 08/25/20 0555    metoprolol tartrate (LOPRESSOR) partial tablet 12 5 mg  12 5 mg Oral Q12H [address] Kristianshayy Cantu PA-C   12 5 mg at 08/25/20 4473    sodium chloride (PF) 0 9 % injection 3 mL  3 mL Intravenous Q1H PRN Dania Saravia PA-C              Objective:    Vitals:    08/24/20 2308 08/25/20 0600 08/25/20 0700 08/25/20 1049   BP: 142/77  149/76    BP Location:   Right arm    Pulse: 80  101 (!) 106   Resp:   16    Temp: 98 1 °F (36 7 °C)  97 7 °F (36 5 °C)    TempSrc:   Oral    SpO2: 95%   97%   Weight:  97 2 kg (214 lb 4 6 oz)     Height:            Physical Exam  Constitutional:       Appearance: Normal appearance  Pulmonary:      Effort: Pulmonary effort is normal    Abdominal:      Palpations: Abdomen is soft  No results found for: BNP   Lab Results   Component Value Date    WBC 8 38 08/25/2020    HGB 13 5 08/25/2020    HCT 41 7 08/25/2020    MCV 96 08/25/2020     (L) 08/25/2020     Lab Results   Component Value Date    INR 1 15 08/22/2020    PROTIME 14 9 (H) 08/22/2020     Lab Results   Component Value Date    PTT 32 08/22/2020         I have personally reviewed pertinent imaging and laboratory results       This procedure has been fully reviewed with the patient and written informed consent will be obtained  Code Status: Level 1 - Full Code  Advance Directive and Living Will:      Power of :    POLST:      IR has been consulted to evaluate the patient, determine the appropriate procedure, and whether or not a procedure can and should be performed  Thank you for allowing me to participate in the care of Carley Shannon  Please don't hesitate to call, text, email, or TigerText with any questions  This text is generated with voice recognition software  There may be translation, syntax,  or grammatical errors  If you have any questions, please contact the dictating provider

## 2020-08-25 NOTE — OCCUPATIONAL THERAPY NOTE
Occupational Therapy Evaluation Note    Patient Name: Megan Rios  PJAQS'V Date: 8/25/2020 08/25/20 6608   Note Type   Note type Eval only   Restrictions/Precautions   Weight Bearing Precautions Per Order No   Braces or Orthoses Other (Comment)  (none reported )   Other Precautions Fall Risk;Multiple lines;Telemetry; Bed Alarm; Chair Alarm;Hard of hearing   Pain Assessment   Pain Assessment Tool Pain Assessment not indicated - pt denies pain   Pain Score No Pain   Home Living   Type of Home House   Home Layout Two level;Bed/bath upstairs  (0 SAÚL from side door; full flight upstairs )   Bathroom Shower/Tub Tub/shower unit   Bathroom Toilet Raised   Bathroom Equipment Grab bars in shower   [address] Other (Comment)  (no AD at baseline )   Additional Comments Pt ambulatory with no AD at baseline    Prior Function   Level of [place] Independent with ADLs and functional mobility   Lives With Spouse   Receives Help From Family   ADL Assistance Independent   IADLs Independent   Falls in the last 6 months 0   Vocational Retired   Comments Pt currently diriving    Lifestyle   Autonomy Pt lives with spouse in a two-level home with 0 SAÚL from side door  At baseline, pt was ambulatory without use of AD  Prior to admission, pt was independent in all ADLs and functional mobility  Pt reported that family all lives within a block of each other      Reciprocal Relationships Supportive Family    Service to Others Wood work    [address] Enjoys working in the [employer]; high end craft shows    Psychosocial   Psychosocial (WDL) WDL   ADL   Where Assessed Edge of bed   Equipment Provided Other (Comment)  (none )   Eating Assistance 7  Independent   Grooming Assistance 7  Independent   UB Bathing Assistance 7  Independent   LB Bathing Assistance [address] [facility] Independent   Functional Assistance 7  Independent   Bed Mobility   Additional Comments Pt received seated EOB   Transfers   Sit to Stand 6  Modified independent   Additional items Increased time required; Bedrails   Stand to Sit 6  Modified independent   Additional items Bedrails; Increased time required;Verbal cues   Functional Mobility   Functional Mobility 7  Independent   Additional Comments Pt functionally ambulated without use of AD independently  Additional items   (none )   Balance   Static Sitting Normal   Dynamic Sitting Normal   Static Standing Normal   Dynamic Standing Good   Activity Tolerance   Activity Tolerance Patient tolerated treatment well   Nurse Made Aware RN verbalized pt was appropriate for therapy  RN made aware of therapy outcomes  RUE Assessment   RUE Assessment WFL   LUE Assessment   LUE Assessment WFL   Hand Function   Gross Motor Coordination Functional   Fine Motor Coordination Functional   Sensation   Light Touch No apparent deficits  (BUEs)   Vision-Basic Assessment   Current Vision Wears glasses for distance only   Visual History Other (Comment)  (none )   Patient Visual Report Other (Comment)  (no changes in vision )   Cognition   Overall Cognitive Status WFL   Arousal/Participation Alert; Responsive; Cooperative   Attention Within functional limits   Orientation Level Oriented X4   Memory Within functional limits   Following Commands Follows all commands and directions without difficulty   Comments Pt alert and oriented to person, place, time, and situation  Pt demonstrated ability to attend and follow all directives  Assessment   Prognosis Good   Assessment Pt is a 78 y o  M admitted to [address] [place] [address] on 8/22/2020 with tachypnea, febrile, and disoriented  Cormorbidites limiting pt performance include: shock, lactic acidosis, elevated troponin, respiratory insufficiency, EMILIANO, coronary artery disease without angina pectoris, hypertension, and hyperlipidemia   OT orders received  Pt chart reviewed  Performed at least two patient identifiers including name and wrist band  Pt lives with spouse in a two-level home with 0 SAÚL from side door  At baseline, pt was ambulatory without use of AD  Prior to admission, pt was independent in all ADLs and functional mobility  Pt reported that family all lives within a block of each other  Upon evaluation, pt was received seated EOB, alert and oriented to person, place, time, and situation, without s/s of distress  Pt was independent with eating, grooming, UB ADLs, LB ADLs, toileting, and functional assistance  Pt transferred with mod I assist, with increased time and use of bed rails  Pt functional ambulated independently without use of AD at time of evaluation  At this time, there are no current deficits limiting pt occupational performance  Pt reported that he does not have any concerns with returning home  D/C OT services  From an OT standpoint, recommendation at d/c would be return to previous environment with family support once medically stable      Goals   Patient Goals "to go back to my great grandchildren"    Recommendation   OT Discharge Recommendation Return to previous environment with social support   Equipment Recommended Other (comment)  (none )   OT - OK to Discharge Yes   Barthel Index   Feeding 10   Bathing 5   Grooming Score 5   Dressing Score 10   Bladder Score 10   Bowels Score 10   Toilet Use Score 10   Transfers (Bed/Chair) Score 15   Mobility (Level Surface) Score 0   Stairs Score 0   Barthel Index Score 75   Modified [place] Scale   Modified [place] Scale 1

## 2020-08-25 NOTE — BRIEF OP NOTE (RAD/CATH)
INTERVENTIONAL RADIOLOGY PROCEDURE NOTE    Date: 8/25/2020    Procedure: Procedure name not found  Preoperative diagnosis:   1  Septic shock ([address] )    2  Transaminitis    3  Hypodense mass of liver    4  Hepatic lesion    5  Gram-negative bacteremia         Postoperative diagnosis: Same  Surgeon: Zaida Melendez MD     Assistant: None  No qualified resident was available  Blood loss: 4 mL    Specimens: 4 core needle samples of right liver mass placed into formalin  Findings: Successful right liver mass biopsy  Complications: None immediate      Anesthesia: Conscious sedation and Local

## 2020-08-25 NOTE — PLAN OF CARE
Problem: PAIN - ADULT  Goal: Verbalizes/displays adequate comfort level or baseline comfort level  Description: Interventions:  - Encourage patient to monitor pain and request assistance  - Assess pain using appropriate pain scale  - Administer analgesics based on type and severity of pain and evaluate response  - Implement non-pharmacological measures as appropriate and evaluate response  - Consider cultural and social influences on pain and pain management  - Notify physician/advanced practitioner if interventions unsuccessful or patient reports new pain  Outcome: Progressing     Problem: INFECTION - ADULT  Goal: Absence or prevention of progression during hospitalization  Description: INTERVENTIONS:  - Assess and monitor for signs and symptoms of infection  - Monitor lab/diagnostic results  - Monitor all insertion sites, i e  indwelling lines, tubes, and drains  - Monitor endotracheal if appropriate and nasal secretions for changes in amount and color  - [place] appropriate cooling/warming therapies per order  - Administer medications as ordered  - Instruct and encourage patient and family to use good hand hygiene technique  - Identify and instruct in appropriate isolation precautions for identified infection/condition  Outcome: Progressing  Goal: Absence of fever/infection during neutropenic period  Description: INTERVENTIONS:  - Monitor WBC    Outcome: Progressing     Problem: SAFETY ADULT  Goal: Patient will remain free of falls  Description: INTERVENTIONS:  - Assess patient frequently for physical needs  -  Identify cognitive and physical deficits and behaviors that affect risk of falls    -  [place] fall precautions as indicated by assessment   - Educate patient/family on patient safety including physical limitations  - Instruct patient to call for assistance with activity based on assessment  - Modify environment to reduce risk of injury  - Consider OT/PT consult to assist with strengthening/mobility  Outcome: Progressing  Goal: Maintain or return to baseline ADL function  Description: INTERVENTIONS:  -  Assess patient's ability to carry out ADLs; assess patient's baseline for ADL function and identify physical deficits which impact ability to perform ADLs (bathing, care of mouth/teeth, toileting, grooming, dressing, etc )  - Assess/evaluate cause of self-care deficits   - Assess range of motion  - Assess patient's mobility; develop plan if impaired  - Assess patient's need for assistive devices and provide as appropriate  - Encourage maximum independence but intervene and supervise when necessary  - Involve family in performance of ADLs  - Assess for home care needs following discharge   - Consider OT consult to assist with ADL evaluation and planning for discharge  - Provide patient education as appropriate  Outcome: Progressing  Goal: Maintain or return mobility status to optimal level  Description: INTERVENTIONS:  - Assess patient's baseline mobility status (ambulation, transfers, stairs, etc )    - Identify cognitive and physical deficits and behaviors that affect mobility  - Identify mobility aids required to assist with transfers and/or ambulation (gait belt, sit-to-stand, lift, walker, cane, etc )  - [place] fall precautions as indicated by assessment  - Record patient progress and toleration of activity level on Mobility SBAR; progress patient to next Phase/Stage  - Instruct patient to call for assistance with activity based on assessment  - Consider rehabilitation consult to assist with strengthening/weightbearing, etc   Outcome: Progressing     Problem: DISCHARGE PLANNING  Goal: Discharge to home or other facility with appropriate resources  Description: INTERVENTIONS:  - Identify barriers to discharge w/patient and caregiver  - Arrange for needed discharge resources and transportation as appropriate  - Identify discharge learning needs (meds, wound care, etc )  - Arrange for interpretive services to assist at discharge as needed  - Refer to Case Management Department for coordinating discharge planning if the patient needs post-hospital services based on physician/advanced practitioner order or complex needs related to functional status, cognitive ability, or social support system  Outcome: Progressing     Problem: Knowledge Deficit  Goal: Patient/family/caregiver demonstrates understanding of disease process, treatment plan, medications, and discharge instructions  Description: Complete learning assessment and assess knowledge base  Interventions:  - Provide teaching at level of understanding  - Provide teaching via preferred learning methods  Outcome: Progressing     Problem: Nutrition/Hydration-ADULT  Goal: Nutrient/Hydration intake appropriate for improving, restoring or maintaining nutritional needs  Description: Monitor and assess patient's nutrition/hydration status for malnutrition  Collaborate with interdisciplinary team and initiate plan and interventions as ordered  Monitor patient's weight and dietary intake as ordered or per policy  Utilize nutrition screening tool and intervene as necessary  Determine patient's food preferences and provide high-protein, high-caloric foods as appropriate       INTERVENTIONS:  - Monitor oral intake, urinary output, labs, and treatment plans  - Assess nutrition and hydration status and recommend course of action  - Evaluate amount of meals eaten  - Assist patient with eating if necessary   - Allow adequate time for meals  - Recommend/ encourage appropriate diets, oral nutritional supplements, and vitamin/mineral supplements  - Order, calculate, and assess calorie counts as needed  - Recommend, monitor, and adjust tube feedings and TPN/PPN based on assessed needs  - Assess need for intravenous fluids  - Provide specific nutrition/hydration education as appropriate  - Include patient/family/caregiver in decisions related to nutrition  Outcome: Progressing     Problem: Prexisting or High Potential for Compromised Skin Integrity  Goal: Skin integrity is maintained or improved  Description: INTERVENTIONS:  - Identify patients at risk for skin breakdown  - Assess and monitor skin integrity  - Assess and monitor nutrition and hydration status  - Monitor labs   - Assess for incontinence   - Turn and reposition patient  - Assist with mobility/ambulation  - Relieve pressure over bony prominences  - Avoid friction and shearing  - Provide appropriate hygiene as needed including keeping skin clean and dry  - Evaluate need for skin moisturizer/barrier cream  - Collaborate with interdisciplinary team   - Patient/family teaching  - Consider wound care consult   Outcome: Progressing     Problem: Potential for Falls  Goal: Patient will remain free of falls  Description: INTERVENTIONS:  - Assess patient frequently for physical needs  -  Identify cognitive and physical deficits and behaviors that affect risk of falls    -  [place] fall precautions as indicated by assessment   - Educate patient/family on patient safety including physical limitations  - Instruct patient to call for assistance with activity based on assessment  - Modify environment to reduce risk of injury  - Consider OT/PT consult to assist with strengthening/mobility  Outcome: Progressing     Problem: CARDIOVASCULAR - ADULT  Goal: Maintains optimal cardiac output and hemodynamic stability  Description: INTERVENTIONS:  - Monitor I/O, vital signs and rhythm  - Monitor for S/S and trends of decreased cardiac output  - Administer and titrate ordered vasoactive medications to optimize hemodynamic stability  - Assess quality of pulses, skin color and temperature  - Assess for signs of decreased coronary artery perfusion  - Instruct patient to report change in severity of symptoms  Outcome: Progressing  Goal: Absence of cardiac dysrhythmias or at baseline rhythm  Description: INTERVENTIONS:  - Continuous cardiac monitoring, vital signs, obtain 12 lead EKG if ordered  - Administer antiarrhythmic and heart rate control medications as ordered  - Monitor electrolytes and administer replacement therapy as ordered  Outcome: Progressing     Problem: RESPIRATORY - ADULT  Goal: Achieves optimal ventilation and oxygenation  Description: INTERVENTIONS:  - Assess for changes in respiratory status  - Assess for changes in mentation and behavior  - Position to facilitate oxygenation and minimize respiratory effort  - Oxygen administered by appropriate delivery if ordered  - Initiate smoking cessation education as indicated  - Encourage broncho-pulmonary hygiene including cough, deep breathe, Incentive Spirometry  - Assess the need for suctioning and aspirate as needed  - Assess and instruct to report SOB or any respiratory difficulty  - Respiratory Therapy support as indicated  Outcome: Progressing

## 2020-08-25 NOTE — ASSESSMENT & PLAN NOTE
Presented on admission with fever, tachycardia, hypotension in the setting of bacteremia which was gram-negative  Sources found to be hepatic/biliary source    Currently on cefepime and Flagyl  GI evaluated the patient and recommended IR biopsy of the cystic lesion

## 2020-08-25 NOTE — PROGRESS NOTES
GI Progress Note - Ming Benitez 78 y o  male MRN: [id number]    Unit/Bed#: -01 Encounter: [id number]    Subjective: He is going for a IR liver biopsy this afternoon  He feels well, no abdominal pain  Objective:     Vitals: Blood pressure 149/76, pulse (!) 106, temperature 97 7 °F (36 5 °C), temperature source Oral, resp  rate 16, height 5' 9" (1 753 m), weight 97 2 kg (214 lb 4 6 oz), SpO2 97 %  ,Body mass index is 31 64 kg/m²  No intake or output data in the 24 hours ending 08/25/20 1241    Physical Exam:     General Appearance: Alert, oriented x3, no acute distress, nontoxic appearing  Lungs: Clear to auscultation bilaterally, no rales or rhonchi, no labored breathing/accessory muscle use  Heart: Regular rate and rhythm, S1, S2 normal, no murmur, click, rub or gallop  Abdomen: Non-distended, soft, BS active, NTTP  Extremities: No cyanosis or LE edema    Invasive Devices     Peripheral Intravenous Line            Peripheral IV 08/22/20 Left Arm 3 days    Peripheral IV 08/22/20 Right Arm 2 days                Lab Results:  Results from last 7 days   Lab Units 08/25/20  0546   WBC Thousand/uL 8 38   HEMOGLOBIN g/dL 13 5   HEMATOCRIT % 41 7   PLATELETS Thousands/uL 108*   NEUTROS PCT % 76*   LYMPHS PCT % 12*   MONOS PCT % 10   EOS PCT % 2     Results from last 7 days   Lab Units 08/25/20  0546   POTASSIUM mmol/L 4 2   CHLORIDE mmol/L 104   CO2 mmol/L 25   BUN mg/dL 20   CREATININE mg/dL 1 13   CALCIUM mg/dL 8 6   ALK PHOS U/L 116   ALT U/L 230*   AST U/L 85*     Results from last 7 days   Lab Units 08/22/20  2303   INR  1 15           Imaging Studies: I have personally reviewed pertinent imaging studies  Ct Abdomen Pelvis Wo Contrast  Result Date: 8/23/2020  Impression: Indeterminant 7 7 cm mass the medial right hepatic lobe  Contrast-enhanced MR abdomen is recommended for further evaluation   Coarse interstitial markings throughout the visualized lung fields which may represent underlying interstitial lung disease  Workstation performed: [id number]     Xr Chest 1 View Portable  Result Date: 8/23/2020  Impression: Mild linear atelectasis at the lung bases, otherwise clear lungs  Workstation performed: [id number]     Mri Abdomen W Wo Contrast  Result Date: 8/23/2020  Impression: Postcontrast imaging severely degraded by motion  1   Mixed cystic and solid lesion in the central liver abutting the [place] hepatis and gallbladder with arterial enhancement of the solid portion and wall  This is highly suspicious for primary liver neoplasm, 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 for immediate notification  Workstation performed: [id number]     Xr Chest Portable Icu  Result Date: 8/23/2020  Impression: Right IJ central line has been inserted with tip in the SVC  No pneumothorax  There is interstitial pulmonary edema  Workstation performed: [id number]     Us Right Upper Quadrant  Result Date: 8/23/2020  Impression: Corresponding to the CT finding, there is a 8 2 x 6 7 x 8 5 cm heterogeneously hypoechoic solid hepatic mass  This remains indeterminate etiology, for which malignancy must be excluded  Recommend abdomen MRI with and without IV contrast, and/or biopsy  Cholelithiasis without evidence of acute cholecystitis   Workstation performed: [id number]       Assessment and Plan:     Liver Mass  Elevated LFTs  GNR Bacteremia  - He presented in septic shock with E  Coli bacteremia and abdominal imaging showing a mixed cystic and solid lesion in the central liver concerning for a primary liver neoplasm and source for bacteremia from translocation  - Based on MRI findings, there is no indication for ERCP  - IR consultation for biopsy of the mass/cyst  - Continue abx per ID recommendations, repeat blood cultures per ID  - LFTs improving daily, viral hepatitis workup negative, waiting on IMANI, anti-smooth muscle ab, and AMA  - Check AFP  - Diet as tolerated  - The patient will be stable for discharge from a GI standpoint once the biopsy is complete and pending recommendations from ID for abx course/repeat blood cultures      The patient will be seen by Dr Dimitry Paez

## 2020-08-25 NOTE — PLAN OF CARE
Problem: PAIN - ADULT  Goal: Verbalizes/displays adequate comfort level or baseline comfort level  Description: Interventions:  - Encourage patient to monitor pain and request assistance  - Assess pain using appropriate pain scale  - Administer analgesics based on type and severity of pain and evaluate response  - Implement non-pharmacological measures as appropriate and evaluate response  - Consider cultural and social influences on pain and pain management  - Notify physician/advanced practitioner if interventions unsuccessful or patient reports new pain  Outcome: Progressing     Problem: INFECTION - ADULT  Goal: Absence or prevention of progression during hospitalization  Description: INTERVENTIONS:  - Assess and monitor for signs and symptoms of infection  - Monitor lab/diagnostic results  - Monitor all insertion sites, i e  indwelling lines, tubes, and drains  - Monitor endotracheal if appropriate and nasal secretions for changes in amount and color  - [place] appropriate cooling/warming therapies per order  - Administer medications as ordered  - Instruct and encourage patient and family to use good hand hygiene technique  - Identify and instruct in appropriate isolation precautions for identified infection/condition  Outcome: Progressing  Goal: Absence of fever/infection during neutropenic period  Description: INTERVENTIONS:  - Monitor WBC    Outcome: Progressing     Problem: SAFETY ADULT  Goal: Patient will remain free of falls  Description: INTERVENTIONS:  - Assess patient frequently for physical needs  -  Identify cognitive and physical deficits and behaviors that affect risk of falls    -  [place] fall precautions as indicated by assessment   - Educate patient/family on patient safety including physical limitations  - Instruct patient to call for assistance with activity based on assessment  - Modify environment to reduce risk of injury  - Consider OT/PT consult to assist with strengthening/mobility  Outcome: Progressing  Goal: Maintain or return to baseline ADL function  Description: INTERVENTIONS:  -  Assess patient's ability to carry out ADLs; assess patient's baseline for ADL function and identify physical deficits which impact ability to perform ADLs (bathing, care of mouth/teeth, toileting, grooming, dressing, etc )  - Assess/evaluate cause of self-care deficits   - Assess range of motion  - Assess patient's mobility; develop plan if impaired  - Assess patient's need for assistive devices and provide as appropriate  - Encourage maximum independence but intervene and supervise when necessary  - Involve family in performance of ADLs  - Assess for home care needs following discharge   - Consider OT consult to assist with ADL evaluation and planning for discharge  - Provide patient education as appropriate  Outcome: Progressing  Goal: Maintain or return mobility status to optimal level  Description: INTERVENTIONS:  - Assess patient's baseline mobility status (ambulation, transfers, stairs, etc )    - Identify cognitive and physical deficits and behaviors that affect mobility  - Identify mobility aids required to assist with transfers and/or ambulation (gait belt, sit-to-stand, lift, walker, cane, etc )  - [place] fall precautions as indicated by assessment  - Record patient progress and toleration of activity level on Mobility SBAR; progress patient to next Phase/Stage  - Instruct patient to call for assistance with activity based on assessment  - Consider rehabilitation consult to assist with strengthening/weightbearing, etc   Outcome: Progressing     Problem: DISCHARGE PLANNING  Goal: Discharge to home or other facility with appropriate resources  Description: INTERVENTIONS:  - Identify barriers to discharge w/patient and caregiver  - Arrange for needed discharge resources and transportation as appropriate  - Identify discharge learning needs (meds, wound care, etc )  - Arrange for interpretive services to assist at discharge as needed  - Refer to Case Management Department for coordinating discharge planning if the patient needs post-hospital services based on physician/advanced practitioner order or complex needs related to functional status, cognitive ability, or social support system  Outcome: Progressing     Problem: Knowledge Deficit  Goal: Patient/family/caregiver demonstrates understanding of disease process, treatment plan, medications, and discharge instructions  Description: Complete learning assessment and assess knowledge base  Interventions:  - Provide teaching at level of understanding  - Provide teaching via preferred learning methods  Outcome: Progressing     Problem: Nutrition/Hydration-ADULT  Goal: Nutrient/Hydration intake appropriate for improving, restoring or maintaining nutritional needs  Description: Monitor and assess patient's nutrition/hydration status for malnutrition  Collaborate with interdisciplinary team and initiate plan and interventions as ordered  Monitor patient's weight and dietary intake as ordered or per policy  Utilize nutrition screening tool and intervene as necessary  Determine patient's food preferences and provide high-protein, high-caloric foods as appropriate       INTERVENTIONS:  - Monitor oral intake, urinary output, labs, and treatment plans  - Assess nutrition and hydration status and recommend course of action  - Evaluate amount of meals eaten  - Assist patient with eating if necessary   - Allow adequate time for meals  - Recommend/ encourage appropriate diets, oral nutritional supplements, and vitamin/mineral supplements  - Order, calculate, and assess calorie counts as needed  - Recommend, monitor, and adjust tube feedings and TPN/PPN based on assessed needs  - Assess need for intravenous fluids  - Provide specific nutrition/hydration education as appropriate  - Include patient/family/caregiver in decisions related to nutrition  Outcome: Progressing     Problem: Prexisting or High Potential for Compromised Skin Integrity  Goal: Skin integrity is maintained or improved  Description: INTERVENTIONS:  - Identify patients at risk for skin breakdown  - Assess and monitor skin integrity  - Assess and monitor nutrition and hydration status  - Monitor labs   - Assess for incontinence   - Turn and reposition patient  - Assist with mobility/ambulation  - Relieve pressure over bony prominences  - Avoid friction and shearing  - Provide appropriate hygiene as needed including keeping skin clean and dry  - Evaluate need for skin moisturizer/barrier cream  - Collaborate with interdisciplinary team   - Patient/family teaching  - Consider wound care consult   Outcome: Progressing     Problem: Potential for Falls  Goal: Patient will remain free of falls  Description: INTERVENTIONS:  - Assess patient frequently for physical needs  -  Identify cognitive and physical deficits and behaviors that affect risk of falls    -  [place] fall precautions as indicated by assessment   - Educate patient/family on patient safety including physical limitations  - Instruct patient to call for assistance with activity based on assessment  - Modify environment to reduce risk of injury  - Consider OT/PT consult to assist with strengthening/mobility  Outcome: Progressing     Problem: CARDIOVASCULAR - ADULT  Goal: Maintains optimal cardiac output and hemodynamic stability  Description: INTERVENTIONS:  - Monitor I/O, vital signs and rhythm  - Monitor for S/S and trends of decreased cardiac output  - Administer and titrate ordered vasoactive medications to optimize hemodynamic stability  - Assess quality of pulses, skin color and temperature  - Assess for signs of decreased coronary artery perfusion  - Instruct patient to report change in severity of symptoms  Outcome: Progressing  Goal: Absence of cardiac dysrhythmias or at baseline rhythm  Description: INTERVENTIONS:  - Continuous cardiac monitoring, vital signs, obtain 12 lead EKG if ordered  - Administer antiarrhythmic and heart rate control medications as ordered  - Monitor electrolytes and administer replacement therapy as ordered  Outcome: Progressing     Problem: RESPIRATORY - ADULT  Goal: Achieves optimal ventilation and oxygenation  Description: INTERVENTIONS:  - Assess for changes in respiratory status  - Assess for changes in mentation and behavior  - Position to facilitate oxygenation and minimize respiratory effort  - Oxygen administered by appropriate delivery if ordered  - Initiate smoking cessation education as indicated  - Encourage broncho-pulmonary hygiene including cough, deep breathe, Incentive Spirometry  - Assess the need for suctioning and aspirate as needed  - Assess and instruct to report SOB or any respiratory difficulty  - Respiratory Therapy support as indicated  Outcome: Progressing

## 2020-08-25 NOTE — PHYSICAL THERAPY NOTE
Physical Therapy Treatment Note       08/25/20 1051   Pain Assessment   Pain Assessment Tool Pain Assessment not indicated - pt denies pain   Pain Score No Pain   Restrictions/Precautions   Weight Bearing Precautions Per Order No   Other Precautions Hard of hearing   General   Chart Reviewed Yes   Response to Previous Treatment Patient with no complaints from previous session  Family/Caregiver Present Yes  (spouse)   Cognition   Overall Cognitive Status WFL   Arousal/Participation Alert; Responsive; Cooperative   Attention Attends with cues to redirect   Orientation Level Oriented X4   Memory Within functional limits   Following Commands Follows all commands and directions without difficulty   Comments pt agreeable to PT session, motivated to participate   Subjective   Subjective "I feel really good today"   Bed Mobility   Supine to Sit 7  Independent   Transfers   Sit to Stand 6  Modified independent   Stand to Sit 6  Modified independent   Ambulation/Elevation   Gait pattern WNL  (no overt LOB, WNL gait speed and symmetrical arm swing)   Gait Assistance 7  Independent   Assistive Device None   Distance 250 ft   Stair Management Assistance Not tested   Balance   Static Sitting Normal   Dynamic Sitting Normal   Static Standing Normal   Dynamic Standing Good   Ambulatory Good   Activity Tolerance   Activity Tolerance Patient tolerated treatment well   Nurse Made Aware Yes, RN Blank verbalized pt appropriate for PT session, made aware of outcomes/recs   Assessment   Prognosis Good   Assessment Pt seen for PT treatment session this date with interventions consisting of gait training w/ emphasis on improving pt's ability to ambulate level surfaces x 250 ft independently with no AD and therapeutic activity consisting of training: supine<>sit transfers, sit<>stand transfers and vc and tactile cues for static standing posture faciliation   Pt agreeable to PT treatment session upon arrival, pt found supine in bed / [facility] elevated, in no apparent distress, A&O x 4 and responsive  In comparison to previous session, pt with significant improvements in gait quality and ease of movement and transitional phases c no overt LOB or SOB noted  Post session: pt returned back to recliner, all needs in reach and RN notified of session findings/recommendations  From PT/mobility standpoint, pt appears to be functioning close to or at mobility baseline, therefore no further immediate skilled PT needs are warranted at this time  Pt currently performing all phases of functional mobility at independent level without need for AD  Recommend pt continue to mobilize ad toro while here  Recommend pt return to previous living environment once medically cleared and with continued family support  Will sign off, D/C PT  Please reconsult if further immediate skilled PT needs are warranted     Barriers to Discharge None   Goals   Patient Goals "to go back to my family"   STG Expiration Date 09/03/20   Short Term Goal #1 goals achieved except for elevations - pt deferred need for trialing at this time   PT Treatment Day 2   Plan   Treatment/Interventions Spoke to case management;Spoke to nursing   Progress Discontinue PT   Recommendation   PT Discharge Recommendation Return to previous environment with social support  (no skilled PT needs after d/c warranted at this time)   Equipment Recommended   (none at this time)   PT - OK to Discharge Yes  (when medically cleared)       Yoana Hussein, PT    Time of PT treatment session: 6420-0532

## 2020-08-25 NOTE — DISCHARGE INSTRUCTIONS
Percutaneous Liver Biopsy   WHAT YOU NEED TO KNOW:   A PLB is a procedure to remove a sample of tissue from your liver  The sample can be sent to a lab and tested for liver disease, cancer, or infection  After the procedure you may have pain and bruising at the biopsy site  You may also have pain in your right shoulder  These symptoms should get better in 48 to 72 hours  DISCHARGE INSTRUCTIONS:     [id number] and [place] patients,  Contact Interventional Radiology at [phone] PATIENTS: Contact Interventional Radiology at [phone]   [facility] PATIENTS: Contact Interventional Radiology at [phone] if:    · Fever greater than 101 or chills  · You have severe pain in your abdomen  · Your abdomen is larger than usual and feels hard  · Your neck is more swollen and you have trouble swallowing  · You feel weak or dizzy  · Your heart is beating faster than usual    · Your pain does not get better after you take pain medicine  · Your wound is red, swollen, or draining pus  · You have nausea or are vomiting  · Your skin is itchy, swollen, or you have a rash  · You have questions or concerns about your condition or care  Medicines:   · Acetaminophen decreases pain and fever  It is available without a doctor's order  Acetaminophen can cause liver damage if not taken correctly  · Take your home medicine as directed  · Resume your normal diet  Small sips of flat soda will help with mild nausea  Self-care:   · Rest as directed  Do not play sports, exercise, or lift anything heavier than 5 pounds for up to 1 week  · Apply firm, steady pressure if bleeding occurs  A small amount of bleeding from your wound is possible  Apply pressure with a clean gauze or towel for 5 to 10 minutes  Call 911 if bleeding becomes heavy or does not stop  · Ask your healthcare provider when to take your blood thinner or antiplatelet medicine   You may need to wait 24 to 72 hours to take your medicine  This will prevent bleeding  Follow up with your healthcare provider as directed: Write down your questions so you remember to ask them during your visits

## 2020-08-25 NOTE — PROGRESS NOTES
Progress Note - Infectious Disease   America Livings 78 y o  male MRN: [id number]  Unit/Bed#: -01 Encounter: [id number]      Impression/Plan:  1  Septic shock  Patient presented on admission acutely ill and eventually developed hypotension requiring pressors  He has clinically improved  Source of decompensation problem 2  Continue antibiotics as below  Continue to trend fever curve/vitals  Repeat CBC/chemistry tomorrow  Follow up pending culture data  Additional supportive care as per     2  E coli bacteremia  Two of 2 blood cultures from admission with E coli  Suspect that this is due to translocation in the setting of problem 5  Patient has clinically improved  Susceptibilities are pending  Will continue on cefepime, renally dose adjusted  Continue to trend fever curve/vitals  Repeat CBC/chemistry tomorrow  Follow-up culture susceptibilities  No further repeat cultures required  Patient will ultimately require 7 days of therapy for this issue     3  Acute encephalopathy  Reportedly had acute confusion on admission  Likely due to acute illness as above  Wife present at bedside and reports patient returned to baseline  Continue monitor mental status  Antibiotics as above     4  Acute kidney injury  Acute elevation in creatinine noted from baseline  Likely in the setting of acute illness  This has since down trended  Continue cefepime, renally dose adjust  Repeat chemistry tomorrow  Further dose adjust antibiotics as needed     5  Transaminitis and liver mass  Acute elevation in LFTs noted which is likely multifactorial in setting of liver mass noted on MRI as well as acute illness as above  Recently down trending  IR biopsy completed today  Repeat LFTs tomorrow  Serial abdominal exam  Follow-up biopsy result  GI evaluation appreciated     Above plan discussed in detail with the patient and his wife at bedside      ID consult service will continue to follow       Antibiotics:  Cefepime 4    24 hour events:  No acute events noted overnight on chart review  Patient underwent biopsy with IR  Currently afebrile  White blood cell count 8 3  Culture susceptibilities pending  Patient's other vitals are stable  LFTs down trending, labs otherwise unremarkable    Subjective:  Patient currently denies having any nausea vomiting, chest pain or shortness of breath  Tolerating antibiotic without acute issue  Objective:  Vitals:  Temp:  [97 7 °F (36 5 °C)-98 6 °F (37 °C)] 97 7 °F (36 5 °C)  HR:  [] 70  Resp:  [12-32] 22  BP: (121-156)/(60-78) 127/63  SpO2:  [94 %-99 %] 96 %  Temp (24hrs), Av 1 °F (36 7 °C), Min:97 7 °F (36 5 °C), Max:98 6 °F (37 °C)  Current: Temperature: 97 7 °F (36 5 °C)    Physical Exam:   General Appearance:  Alert, interactive, nontoxic, no acute distress  Throat: Oropharynx moist without lesions  Lungs:   Clear to auscultation bilaterally; no wheezes, rhonchi or rales; respirations unlabored on room air   Heart:  RRR; no murmur, rub or gallop appreciated   Abdomen:   Soft, non-tender, non-distended, positive bowel sounds  Extremities: No clubbing, cyanosis or edema   Skin: No new rashes or lesions  No new draining wounds noted         Labs, Imaging, & Other studies:   All pertinent labs and imaging studies were personally reviewed  Results from last 7 days   Lab Units 20  0546 20  0628 20  0456   WBC Thousand/uL 8 38 11 84* 20 65*   HEMOGLOBIN g/dL 13 5 12 4 12 6   PLATELETS Thousands/uL 108* 92* 112*     Results from last 7 days   Lab Units 20  0546  20  0456 20  2303   POTASSIUM mmol/L 4 2   < > 3 9 3 6   CHLORIDE mmol/L 104   < > 107 103   CO2 mmol/L 25   < > 23 21   BUN mg/dL 20   < > 28* 31*   CREATININE mg/dL 1 13   < > 2 09* 2 11*   EGFR ml/min/1 73sq m 60   < > 29 28   CALCIUM mg/dL 8 6   < > 7 6* 8 1*   AST U/L 85*  --  195* 258*   ALT U/L 230*  --  407* 526*   ALK PHOS U/L 116  --  131* 204*    < > = values in this interval not displayed       Results from last 7 days   Lab Units 08/22/20 2308 08/22/20  2303   BLOOD CULTURE  Escherichia coli* Escherichia coli*   GRAM STAIN RESULT  Gram negative rods* Gram negative rods*

## 2020-08-25 NOTE — ASSESSMENT & PLAN NOTE
MRI on August 23rd showed a mixed cystic and solid lesion suspicious for liver neoplasm might be the cause of patient's sepsis  Will need IR guided biopsy

## 2020-08-26 VITALS
RESPIRATION RATE: 18 BRPM | BODY MASS INDEX: 31.74 KG/M2 | WEIGHT: 214.29 LBS | OXYGEN SATURATION: 95 % | SYSTOLIC BLOOD PRESSURE: 149 MMHG | TEMPERATURE: 98.1 F | HEIGHT: 69 IN | HEART RATE: 95 BPM | DIASTOLIC BLOOD PRESSURE: 84 MMHG

## 2020-08-26 LAB
AFP-TM SERPL-MCNC: 2.4 NG/ML (ref 0.5–8)
ALBUMIN SERPL BCP-MCNC: 2.8 G/DL (ref 3.5–5)
ALP SERPL-CCNC: 135 U/L (ref 46–116)
ALT SERPL W P-5'-P-CCNC: 189 U/L (ref 12–78)
ANION GAP SERPL CALCULATED.3IONS-SCNC: 8 MMOL/L (ref 4–13)
AST SERPL W P-5'-P-CCNC: 75 U/L (ref 5–45)
BACTERIA BLD CULT: ABNORMAL
BACTERIA BLD CULT: ABNORMAL
BILIRUB SERPL-MCNC: 0.6 MG/DL (ref 0.2–1)
BUN SERPL-MCNC: 19 MG/DL (ref 5–25)
CALCIUM SERPL-MCNC: 8.8 MG/DL (ref 8.3–10.1)
CHLORIDE SERPL-SCNC: 101 MMOL/L (ref 100–108)
CO2 SERPL-SCNC: 28 MMOL/L (ref 21–32)
CREAT SERPL-MCNC: 1.19 MG/DL (ref 0.6–1.3)
ERYTHROCYTE [DISTWIDTH] IN BLOOD BY AUTOMATED COUNT: 14 % (ref 11.6–15.1)
GFR SERPL CREATININE-BSD FRML MDRD: 57 ML/MIN/1.73SQ M
GLUCOSE SERPL-MCNC: 112 MG/DL (ref 65–140)
GRAM STN SPEC: ABNORMAL
GRAM STN SPEC: ABNORMAL
HCT VFR BLD AUTO: 41 % (ref 36.5–49.3)
HGB BLD-MCNC: 13.8 G/DL (ref 12–17)
INR PPP: 1.02 (ref 0.84–1.19)
MCH RBC QN AUTO: 31.3 PG (ref 26.8–34.3)
MCHC RBC AUTO-ENTMCNC: 33.7 G/DL (ref 31.4–37.4)
MCV RBC AUTO: 93 FL (ref 82–98)
PLATELET # BLD AUTO: 136 THOUSANDS/UL (ref 149–390)
PMV BLD AUTO: 11.9 FL (ref 8.9–12.7)
POTASSIUM SERPL-SCNC: 4.1 MMOL/L (ref 3.5–5.3)
PROT SERPL-MCNC: 6.6 G/DL (ref 6.4–8.2)
PROTHROMBIN TIME: 12.9 SECONDS (ref 11.6–14.5)
RBC # BLD AUTO: 4.41 MILLION/UL (ref 3.88–5.62)
RYE IGE QN: NEGATIVE
SODIUM SERPL-SCNC: 137 MMOL/L (ref 136–145)
WBC # BLD AUTO: 7.83 THOUSAND/UL (ref 4.31–10.16)

## 2020-08-26 PROCEDURE — 99239 HOSP IP/OBS DSCHRG MGMT >30: CPT | Performed by: INTERNAL MEDICINE

## 2020-08-26 PROCEDURE — 99232 SBSQ HOSP IP/OBS MODERATE 35: CPT | Performed by: INTERNAL MEDICINE

## 2020-08-26 PROCEDURE — 82105 ALPHA-FETOPROTEIN SERUM: CPT | Performed by: PHYSICIAN ASSISTANT

## 2020-08-26 PROCEDURE — 85610 PROTHROMBIN TIME: CPT | Performed by: PHYSICIAN ASSISTANT

## 2020-08-26 PROCEDURE — 80053 COMPREHEN METABOLIC PANEL: CPT | Performed by: PHYSICIAN ASSISTANT

## 2020-08-26 PROCEDURE — 85027 COMPLETE CBC AUTOMATED: CPT | Performed by: PHYSICIAN ASSISTANT

## 2020-08-26 RX ORDER — CEPHALEXIN 500 MG/1
500 CAPSULE ORAL EVERY 6 HOURS SCHEDULED
Qty: 16 CAPSULE | Refills: 0 | Status: SHIPPED | OUTPATIENT
Start: 2020-08-26 | End: 2020-08-30

## 2020-08-26 RX ORDER — CEPHALEXIN 500 MG/1
500 CAPSULE ORAL EVERY 6 HOURS SCHEDULED
Status: DISCONTINUED | OUTPATIENT
Start: 2020-08-26 | End: 2020-08-26 | Stop reason: HOSPADM

## 2020-08-26 RX ORDER — ASPIRIN 81 MG/1
81 TABLET, CHEWABLE ORAL DAILY
Qty: 30 TABLET | Refills: 0 | Status: SHIPPED | OUTPATIENT
Start: 2020-08-27 | End: 2022-01-01 | Stop reason: ALTCHOICE

## 2020-08-26 RX ADMIN — ASPIRIN 81 MG 81 MG: 81 TABLET ORAL at 11:08

## 2020-08-26 RX ADMIN — HEPARIN SODIUM 5000 UNITS: 5000 INJECTION INTRAVENOUS; SUBCUTANEOUS at 05:08

## 2020-08-26 RX ADMIN — CEPHALEXIN 500 MG: 500 CAPSULE ORAL at 12:38

## 2020-08-26 RX ADMIN — DOCUSATE SODIUM 100 MG: 100 CAPSULE, LIQUID FILLED ORAL at 11:08

## 2020-08-26 RX ADMIN — METOPROLOL TARTRATE 12.5 MG: 25 TABLET ORAL at 11:08

## 2020-08-26 RX ADMIN — CEFEPIME HYDROCHLORIDE 2000 MG: 2 INJECTION, POWDER, FOR SOLUTION INTRAVENOUS at 11:17

## 2020-08-26 NOTE — ASSESSMENT & PLAN NOTE
Presented on admission with fever, tachycardia, hypotension in the setting of bacteremia which was gram-negative  Sources found to be hepatic/biliary source      Resolved  GI evaluated the patient and recommended IR biopsy of the cystic lesion  Was on IV cefepime and Flagyl  Switch to oral antibiotics

## 2020-08-26 NOTE — DISCHARGE SUMMARY
Discharge- Yumiko Carney 1940, 78 y o  male MRN: [id number]    Unit/Bed#: -01 Encounter: [id number]    Primary Care Provider: Kelvin Johnson DO   Date and time admitted to hospital: 8/22/2020 10:32 PM        Hepatocellular carcinoma [facility])  Assessment & Plan  MRI on August 23rd showed a mixed cystic and solid lesion suspicious for liver neoplasm might be the cause of patient's sepsis  IR guided biopsy results revealed hepatocellular carcinoma  Will need close follow-up with outpatient Oncology/GI    EMILIANO (acute kidney injury) [facility])  Assessment & Plan  Resolved  Secondary to volume depletion from infection  Discontinue IVF    Respiratory insufficiency  Assessment & Plan  Likely secondary sepsis  resolved    Transaminitis  Assessment & Plan  Secondary to sepsis  LFTs are improving will continue to trend    Coronary artery disease without angina pectoris  Assessment & Plan  Continue aspirin, beta-blocker      Hyperlipidemia  Assessment & Plan  Continue to hold statin setting of elevated AST/ALT    Hypertension  Assessment & Plan  Resume home metoprolol  Can resume ACE-inhibitor    * Septic shock (HCC)  Assessment & Plan  Presented on admission with fever, tachycardia, hypotension in the setting of bacteremia which was gram-negative  Sources found to be hepatic/biliary source      Resolved  GI evaluated the patient and recommended IR biopsy of the cystic lesion  Was on IV cefepime and Flagyl  Switch to oral antibiotics    Discharging Physician / Practitioner: Johnathan Goss MD  PCP: Kelvin Johnson DO  Admission Date:   Admission Orders (From admission, onward)     Ordered        08/23/20 0142  Inpatient Admission  Once                   Discharge Date: 09/14/20    Resolved Problems  Date Reviewed: 9/1/2020          Resolved    Lactic acidosis 8/24/2020     Resolved by  Tyshawn Arndt PA-C          Consultations During Hospital Stay:  · ICU, Gastroenterology, Infectious Disease    Procedures Performed: · IR biopsy    Significant Findings / Test Results:   Ct Abdomen Pelvis Wo Contrast    Result Date: 8/23/2020  Impression: Indeterminant 7 7 cm mass the medial right hepatic lobe  Contrast-enhanced MR abdomen is recommended for further evaluation  Coarse interstitial markings throughout the visualized lung fields which may represent underlying interstitial lung disease  Workstation performed: [id number]     Xr Chest 1 View Portable    Result Date: 8/23/2020  Impression: Mild linear atelectasis at the lung bases, otherwise clear lungs  Workstation performed: [id number]     Mri Abdomen W Wo Contrast    Result Date: 8/23/2020  Impression: Postcontrast imaging severely degraded by motion  1   Mixed cystic and solid lesion in the central liver abutting the vijay hepatis and gallbladder with arterial enhancement of the solid portion and wall  This is highly suspicious for primary liver neoplasm, possibly a biliary cystadenocarcinoma  Tissue sampling of the solid portion is recommended  2   Cholelithiasis  The study was marked in [facility] for immediate notification  Workstation performed: [id number]     Ir Biopsy Liver Mass    Result Date: 8/25/2020  Impression: Impression: Successful ultrasound-guided right liver mass biopsy  Workstation performed: [id number]     Xr Chest Portable Icu    Result Date: 8/23/2020  Impression: Right IJ central line has been inserted with tip in the SVC  No pneumothorax  There is interstitial pulmonary edema  Workstation performed: [id number]     Us Right Upper Quadrant    Result Date: 8/23/2020  · Impression: Corresponding to the CT finding, there is a 8 2 x 6 7 x 8 5 cm heterogeneously hypoechoic solid hepatic mass  This remains indeterminate etiology, for which malignancy must be excluded  Recommend abdomen MRI with and without IV contrast, and/or biopsy  Cholelithiasis without evidence of acute cholecystitis   Workstation performed: [id number]     Incidental Findings: none  Test Results Pending at Discharge (will require follow up):   · IR biopsy results     Outpatient Tests Requested:  · none    Complications:  none    Reason for Admission:  Septic shock    Hospital Course:     Arleen Russell is a 78 y o  male patient who originally presented to the hospital on 8/22/2020 due who initially presented with septic shock  Initially presented with fever chills hypotension  Required ICU level of care  He is found have E coli bacteremia and required pressors and cefepime  He had an ERCP performed to rule out GI pathology  Which was negative  He had MRI performed and CT scan which showed a solid hepatic mass  IR did a biopsy  Results pending and patient will to follow-up outpatient  Mass possibly the source of the E coli bacteremia  He initially was treated with cefepime significantly improved and was switched over to oral antibiotics      Please see above list of diagnoses and related plan for additional information  Condition at Discharge: stable     Discharge Day Visit / Exam:     Subjective:  Patient seen examined  Reports significant improvement in symptoms  Denies any shortness of breath, cough, fevers, chills, dizziness or any other symptoms at this time  Vitals: Blood Pressure: 149/84 (08/26/20 0739)  Pulse: 95 (08/26/20 0739)  Temperature: 98 1 °F (36 7 °C) (08/26/20 0739)  Temp Source: Oral (08/25/20 0700)  Respirations: 18 (08/26/20 0739)  Height: 5' 9" (175 3 cm) (08/23/20 0321)  Weight - Scale: 97 2 kg (214 lb 4 6 oz) (08/26/20 0600)  SpO2: 95 % (08/26/20 0739)  Exam:   Physical Exam  Constitutional:       General: He is not in acute distress  Appearance: He is well-developed  He is not diaphoretic  HENT:      Head: Normocephalic and atraumatic  Nose: Nose normal       Mouth/Throat:      Pharynx: No oropharyngeal exudate  Eyes:      General: No scleral icterus       Conjunctiva/sclera: Conjunctivae normal    Neck:      Musculoskeletal: Normal range of motion and neck supple  Cardiovascular:      Rate and Rhythm: Normal rate and regular rhythm  Heart sounds: Normal heart sounds  No murmur  No friction rub  No gallop  Pulmonary:      Effort: Pulmonary effort is normal  No respiratory distress  Breath sounds: Normal breath sounds  No wheezing or rales  Chest:      Chest wall: No tenderness  Abdominal:      General: Bowel sounds are normal  There is no distension  Palpations: Abdomen is soft  Tenderness: There is no abdominal tenderness  There is no guarding  Musculoskeletal: Normal range of motion  General: No tenderness or deformity  Skin:     General: Skin is warm and dry  Findings: No erythema  Neurological:      Mental Status: He is alert and oriented to person, place, and time  Discussion with Family: patient and wife    Discharge instructions/Information to patient and family:   See after visit summary for information provided to patient and family  Provisions for Follow-Up Care:  See after visit summary for information related to follow-up care and any pertinent home health orders  Disposition:     Home    For Discharges to   [address] SN[facility]:   · Not Applicable to this Patient - Not Applicable to this Patient    Planned Readmission: none     Discharge Statement:  I spent 60 minutes discharging the patient  This time was spent on the day of discharge  I had direct contact with the patient on the day of discharge  Greater than 50% of the total time was spent examining patient, answering all patient questions, arranging and discussing plan of care with patient as well as directly providing post-discharge instructions  Additional time then spent on discharge activities  Discharge Medications:  See after visit summary for reconciled discharge medications provided to patient and family        ** Please Note: This note has been constructed using a voice recognition system **

## 2020-08-26 NOTE — PLAN OF CARE
Problem: PAIN - ADULT  Goal: Verbalizes/displays adequate comfort level or baseline comfort level  Description: Interventions:  - Encourage patient to monitor pain and request assistance  - Assess pain using appropriate pain scale  - Administer analgesics based on type and severity of pain and evaluate response  - Implement non-pharmacological measures as appropriate and evaluate response  - Consider cultural and social influences on pain and pain management  - Notify physician/advanced practitioner if interventions unsuccessful or patient reports new pain  Outcome: Progressing     Problem: INFECTION - ADULT  Goal: Absence or prevention of progression during hospitalization  Description: INTERVENTIONS:  - Assess and monitor for signs and symptoms of infection  - Monitor lab/diagnostic results  - Monitor all insertion sites, i e  indwelling lines, tubes, and drains  - Monitor endotracheal if appropriate and nasal secretions for changes in amount and color  - [place] appropriate cooling/warming therapies per order  - Administer medications as ordered  - Instruct and encourage patient and family to use good hand hygiene technique  - Identify and instruct in appropriate isolation precautions for identified infection/condition  Outcome: Progressing  Goal: Absence of fever/infection during neutropenic period  Description: INTERVENTIONS:  - Monitor WBC    Outcome: Progressing     Problem: SAFETY ADULT  Goal: Patient will remain free of falls  Description: INTERVENTIONS:  - Assess patient frequently for physical needs  -  Identify cognitive and physical deficits and behaviors that affect risk of falls    -  [place] fall precautions as indicated by assessment   - Educate patient/family on patient safety including physical limitations  - Instruct patient to call for assistance with activity based on assessment  - Modify environment to reduce risk of injury  - Consider OT/PT consult to assist with strengthening/mobility  Outcome: Progressing  Goal: Maintain or return to baseline ADL function  Description: INTERVENTIONS:  -  Assess patient's ability to carry out ADLs; assess patient's baseline for ADL function and identify physical deficits which impact ability to perform ADLs (bathing, care of mouth/teeth, toileting, grooming, dressing, etc )  - Assess/evaluate cause of self-care deficits   - Assess range of motion  - Assess patient's mobility; develop plan if impaired  - Assess patient's need for assistive devices and provide as appropriate  - Encourage maximum independence but intervene and supervise when necessary  - Involve family in performance of ADLs  - Assess for home care needs following discharge   - Consider OT consult to assist with ADL evaluation and planning for discharge  - Provide patient education as appropriate  Outcome: Progressing  Goal: Maintain or return mobility status to optimal level  Description: INTERVENTIONS:  - Assess patient's baseline mobility status (ambulation, transfers, stairs, etc )    - Identify cognitive and physical deficits and behaviors that affect mobility  - Identify mobility aids required to assist with transfers and/or ambulation (gait belt, sit-to-stand, lift, walker, cane, etc )  - [place] fall precautions as indicated by assessment  - Record patient progress and toleration of activity level on Mobility SBAR; progress patient to next Phase/Stage  - Instruct patient to call for assistance with activity based on assessment  - Consider rehabilitation consult to assist with strengthening/weightbearing, etc   Outcome: Progressing     Problem: DISCHARGE PLANNING  Goal: Discharge to home or other facility with appropriate resources  Description: INTERVENTIONS:  - Identify barriers to discharge w/patient and caregiver  - Arrange for needed discharge resources and transportation as appropriate  - Identify discharge learning needs (meds, wound care, etc )  - Arrange for interpretive services to assist at discharge as needed  - Refer to Case Management Department for coordinating discharge planning if the patient needs post-hospital services based on physician/advanced practitioner order or complex needs related to functional status, cognitive ability, or social support system  Outcome: Progressing     Problem: Knowledge Deficit  Goal: Patient/family/caregiver demonstrates understanding of disease process, treatment plan, medications, and discharge instructions  Description: Complete learning assessment and assess knowledge base  Interventions:  - Provide teaching at level of understanding  - Provide teaching via preferred learning methods  Outcome: Progressing     Problem: Nutrition/Hydration-ADULT  Goal: Nutrient/Hydration intake appropriate for improving, restoring or maintaining nutritional needs  Description: Monitor and assess patient's nutrition/hydration status for malnutrition  Collaborate with interdisciplinary team and initiate plan and interventions as ordered  Monitor patient's weight and dietary intake as ordered or per policy  Utilize nutrition screening tool and intervene as necessary  Determine patient's food preferences and provide high-protein, high-caloric foods as appropriate       INTERVENTIONS:  - Monitor oral intake, urinary output, labs, and treatment plans  - Assess nutrition and hydration status and recommend course of action  - Evaluate amount of meals eaten  - Assist patient with eating if necessary   - Allow adequate time for meals  - Recommend/ encourage appropriate diets, oral nutritional supplements, and vitamin/mineral supplements  - Order, calculate, and assess calorie counts as needed  - Recommend, monitor, and adjust tube feedings and TPN/PPN based on assessed needs  - Assess need for intravenous fluids  - Provide specific nutrition/hydration education as appropriate  - Include patient/family/caregiver in decisions related to nutrition  Outcome: Progressing     Problem: Prexisting or High Potential for Compromised Skin Integrity  Goal: Skin integrity is maintained or improved  Description: INTERVENTIONS:  - Identify patients at risk for skin breakdown  - Assess and monitor skin integrity  - Assess and monitor nutrition and hydration status  - Monitor labs   - Assess for incontinence   - Turn and reposition patient  - Assist with mobility/ambulation  - Relieve pressure over bony prominences  - Avoid friction and shearing  - Provide appropriate hygiene as needed including keeping skin clean and dry  - Evaluate need for skin moisturizer/barrier cream  - Collaborate with interdisciplinary team   - Patient/family teaching  - Consider wound care consult   Outcome: Progressing     Problem: Potential for Falls  Goal: Patient will remain free of falls  Description: INTERVENTIONS:  - Assess patient frequently for physical needs  -  Identify cognitive and physical deficits and behaviors that affect risk of falls    -  [place] fall precautions as indicated by assessment   - Educate patient/family on patient safety including physical limitations  - Instruct patient to call for assistance with activity based on assessment  - Modify environment to reduce risk of injury  - Consider OT/PT consult to assist with strengthening/mobility  Outcome: Progressing     Problem: CARDIOVASCULAR - ADULT  Goal: Maintains optimal cardiac output and hemodynamic stability  Description: INTERVENTIONS:  - Monitor I/O, vital signs and rhythm  - Monitor for S/S and trends of decreased cardiac output  - Administer and titrate ordered vasoactive medications to optimize hemodynamic stability  - Assess quality of pulses, skin color and temperature  - Assess for signs of decreased coronary artery perfusion  - Instruct patient to report change in severity of symptoms  Outcome: Progressing  Goal: Absence of cardiac dysrhythmias or at baseline rhythm  Description: INTERVENTIONS:  - Continuous cardiac monitoring, vital signs, obtain 12 lead EKG if ordered  - Administer antiarrhythmic and heart rate control medications as ordered  - Monitor electrolytes and administer replacement therapy as ordered  Outcome: Progressing     Problem: RESPIRATORY - ADULT  Goal: Achieves optimal ventilation and oxygenation  Description: INTERVENTIONS:  - Assess for changes in respiratory status  - Assess for changes in mentation and behavior  - Position to facilitate oxygenation and minimize respiratory effort  - Oxygen administered by appropriate delivery if ordered  - Initiate smoking cessation education as indicated  - Encourage broncho-pulmonary hygiene including cough, deep breathe, Incentive Spirometry  - Assess the need for suctioning and aspirate as needed  - Assess and instruct to report SOB or any respiratory difficulty  - Respiratory Therapy support as indicated  Outcome: Progressing

## 2020-08-26 NOTE — PROGRESS NOTES
Progress Note - Infectious Disease   Arlin Barajas 78 y o  male MRN: [id number]  Unit/Bed#: -01 Encounter: [id number]      Impression/Plan:  1  Septic shock   Patient presented on admission acutely ill and eventually developed hypotension requiring pressors   He has clinically improved   Source of decompensation problem 2  Continue antibiotics as below  Additional supportive care as per     2  E coli bacteremia   Two of 2 blood cultures from admission with E coli   Suspect that this is due to translocation in the setting of problem 5   Patient has clinically improved  Susceptibilities reviewed  Switch to Keflex 500 mg every 6 hours  Plan for total of 7 days of therapy through 8/28  Continue to trend fever curve/WBC while admitted  Patient otherwise cleared from ID standpoint for discharge  Recommend follow-up with GI/PCP as outpatient     3  Acute encephalopathy   Reportedly had acute confusion on admission   Likely due to acute illness as above   Wife present at bedside and reports patient returned to baseline  Continue monitor mental status  Antibiotics as above     4  Acute kidney injury   Acute elevation in creatinine noted from baseline   Likely in the setting of acute illness   This has since down trended  Keflex dosing as above  Will Further dose adjust antibiotics as needed     5  Transaminitis and liver mass   Acute elevation in LFTs noted which is likely multifactorial in setting of liver mass noted on MRI as well as acute illness as above   Recently down trending    IR biopsy completed yesterday  Follow-up biopsy result as outpatient  GI evaluation appreciated     Above plan discussed in detail with the patient, his wife and primary service attending      ID consult service will sign off at this time  Please call if questions  Antibiotics:  Keflex 1  Total antibiotic 5    Subjective:  No acute events noted overnight on chart review    The patient denies having any fevers, chills, nausea, vomiting, chest pain or shortness of breath  He has been tolerating antibiotic without issue  Objective:  Vitals:  Temp:  [97 8 °F (36 6 °C)-98 1 °F (36 7 °C)] 98 1 °F (36 7 °C)  HR:  [] 95  Resp:  [13-26] 18  BP: (118-156)/(60-91) 149/84  SpO2:  [93 %-99 %] 95 %  Temp (24hrs), Av °F (36 7 °C), Min:97 8 °F (36 6 °C), Max:98 1 °F (36 7 °C)  Current: Temperature: 98 1 °F (36 7 °C)    Physical Exam:   General Appearance:  Alert, interactive, nontoxic, no acute distress  Throat: Oropharynx moist without lesions  Lungs:   Clear to auscultation bilaterally; no wheezes, rhonchi or rales; respirations unlabored on room air   Heart:  RRR; no murmur, rub or gallop appreciated   Abdomen:   Soft, non-tender, non-distended, positive bowel sounds  Extremities: No clubbing, cyanosis or edema   Skin: No new rashes or lesions  No new draining wounds noted  Labs, Imaging, & Other studies:   All pertinent labs and imaging studies were personally reviewed  Results from last 7 days   Lab Units 20  0450 20  0546 20  0628   WBC Thousand/uL 7 83 8 38 11 84*   HEMOGLOBIN g/dL 13 8 13 5 12 4   PLATELETS Thousands/uL 136* 108* 92*     Results from last 7 days   Lab Units 20  0450 20  0546  20  0456   POTASSIUM mmol/L 4 1 4 2   < > 3 9   CHLORIDE mmol/L 101 104   < > 107   CO2 mmol/L 28 25   < > 23   BUN mg/dL 19 20   < > 28*   CREATININE mg/dL 1 19 1 13   < > 2 09*   EGFR ml/min/1 73sq m 57 60   < > 29   CALCIUM mg/dL 8 8 8 6   < > 7 6*   AST U/L 75* 85*  --  195*   ALT U/L 189* 230*  --  407*   ALK PHOS U/L 135* 116  --  131*    < > = values in this interval not displayed       Results from last 7 days   Lab Units 20  2308 20  2303   BLOOD CULTURE  Escherichia coli* Escherichia coli*   GRAM STAIN RESULT  Gram negative rods* Gram negative rods*

## 2020-08-26 NOTE — ASSESSMENT & PLAN NOTE
MRI on August 23rd showed a mixed cystic and solid lesion suspicious for liver neoplasm might be the cause of patient's sepsis  IR guided biopsy performed yesterday  Will have follow-up outpatient for results

## 2020-08-27 ENCOUNTER — TELEPHONE (OUTPATIENT)
Dept: FAMILY MEDICINE CLINIC | Facility: CLINIC | Age: 80
End: 2020-08-27

## 2020-08-27 NOTE — TELEPHONE ENCOUNTER
This is a FYI form Home health they called pt and was going to set up and appt, as he was just in the hospital, pt declined service as he told her I am fine I am out working in my wood shop

## 2020-08-31 ENCOUNTER — TRANSITIONAL CARE MANAGEMENT (OUTPATIENT)
Dept: FAMILY MEDICINE CLINIC | Facility: CLINIC | Age: 80
End: 2020-08-31

## 2020-09-01 ENCOUNTER — OFFICE VISIT (OUTPATIENT)
Dept: FAMILY MEDICINE CLINIC | Facility: CLINIC | Age: 80
End: 2020-09-01
Payer: MEDICARE

## 2020-09-01 VITALS
HEIGHT: 69 IN | TEMPERATURE: 98.1 F | DIASTOLIC BLOOD PRESSURE: 75 MMHG | OXYGEN SATURATION: 97 % | WEIGHT: 214 LBS | HEART RATE: 84 BPM | SYSTOLIC BLOOD PRESSURE: 125 MMHG | BODY MASS INDEX: 31.7 KG/M2

## 2020-09-01 DIAGNOSIS — I74.3 EMBOLISM AND THROMBOSIS OF ARTERIES OF THE LOWER EXTREMITIES (HCC): ICD-10-CM

## 2020-09-01 DIAGNOSIS — J45.20 MILD INTERMITTENT REACTIVE AIRWAY DISEASE WITHOUT COMPLICATION: Primary | ICD-10-CM

## 2020-09-01 DIAGNOSIS — K76.9 HEPATIC LESION: ICD-10-CM

## 2020-09-01 DIAGNOSIS — I10 ESSENTIAL HYPERTENSION: ICD-10-CM

## 2020-09-01 PROCEDURE — 99496 TRANSJ CARE MGMT HIGH F2F 7D: CPT | Performed by: FAMILY MEDICINE

## 2020-09-01 RX ORDER — SIMVASTATIN 80 MG
TABLET ORAL
COMMUNITY
Start: 2020-06-04 | End: 2021-01-01 | Stop reason: ALTCHOICE

## 2020-09-01 RX ORDER — LISINOPRIL 10 MG/1
TABLET ORAL
COMMUNITY
Start: 2020-06-04 | End: 2021-04-08 | Stop reason: HOSPADM

## 2020-09-01 NOTE — ASSESSMENT & PLAN NOTE
Respiratory status stable post hospitalization for sepsis patient will slowly get back in to walking and physical activity

## 2020-09-01 NOTE — ASSESSMENT & PLAN NOTE
transaminitis elevated liver enzymes secondary to sepsis and ruptured liver cyst hospitalized on IV antibiotics he is doing much better at this time I would like to repeat LFTs

## 2020-09-01 NOTE — PROGRESS NOTES
Assessment/Plan:       Problem List Items Addressed This Visit        Respiratory    Reactive airway disease without complication - Primary      Respiratory status stable post hospitalization for sepsis patient will slowly get back in to walking and physical activity         Relevant Orders    Ambulatory referral to Surgical Oncology    Hepatic function panel    CBC and differential       Cardiovascular and Mediastinum    Hypertension      Hypertension stable currently with lisinopril 10 mg tablets and metoprolol 25 mg tablets continue both medications follow-up at next office visit         Relevant Medications    lisinopril (ZESTRIL) 10 mg tablet    Other Relevant Orders    Ambulatory referral to Surgical Oncology    Hepatic function panel    CBC and differential    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[address] )    Relevant Orders    Ambulatory referral to Surgical Oncology    Hepatic function panel    CBC and differential       Other    Hepatic lesion      Hepatic lesion post sepsis and elevated liver enzymes follow-up with Gastroenterology and monitor liver enzymes to watch for resolution  Review of pathology shows hepatocellular neoplasm  I would like patient evaluated by Surgical Oncology  I discussed the findings with the patient and his wife         Relevant Orders    Ambulatory referral to Surgical Oncology    Hepatic function panel    CBC and differential            Subjective:      Patient ID: Esther Prakash is a 78 y o  male  Patient presents today for transition of care management post hospitalization for septic shock source appear to be a cystic lesion in the liver    He has been worked up by Gastroenterology and Infectious Disease he was placed on IV antibiotics during the hospitalization recovered well and sent home for outpatient therapy and treatment he had elevated liver enzymes and will need a follow-up evaluation      The following portions of the patient's history were reviewed and updated as appropriate: allergies, current medications, past family history, past medical history, past social history, past surgical history and problem list     Review of Systems   Constitutional: Negative for chills, fatigue and fever  HENT: Negative for congestion, nosebleeds, rhinorrhea, sinus pressure and sore throat  Eyes: Negative for discharge and redness  Respiratory: Negative for cough and shortness of breath  Cardiovascular: Negative for chest pain, palpitations and leg swelling  Gastrointestinal: Negative for abdominal pain, blood in stool and nausea  Endocrine: Negative for cold intolerance, heat intolerance and polyuria  Genitourinary: Negative for dysuria and frequency  Musculoskeletal: Negative for arthralgias, back pain and myalgias  Skin: Negative for rash  Neurological: Negative for dizziness, weakness and headaches  Hematological: Negative for adenopathy  Psychiatric/Behavioral: Negative for behavioral problems and sleep disturbance  The patient is not nervous/anxious  Objective:      /75   Pulse 84   Temp 98 1 °F (36 7 °C) (Tympanic)   Ht 5' 9" (1 753 m)   Wt 97 1 kg (214 lb)   SpO2 97%   BMI 31 60 kg/m²        Physical Exam  Vitals signs and nursing note reviewed  Constitutional:       Appearance: He is well-developed  HENT:      Head: Normocephalic and atraumatic  Right Ear: External ear normal       Left Ear: External ear normal       Nose: Nose normal    Eyes:      General: No scleral icterus  Conjunctiva/sclera: Conjunctivae normal       Pupils: Pupils are equal, round, and reactive to light  Neck:      Musculoskeletal: Normal range of motion and neck supple  Thyroid: No thyromegaly  Vascular: No JVD  Cardiovascular:      Rate and Rhythm: Normal rate and regular rhythm  Heart sounds: Normal heart sounds  No murmur     Pulmonary:      Effort: Pulmonary effort is normal       Breath sounds: Normal breath sounds  No wheezing or rales  Chest:      Chest wall: No tenderness  Abdominal:      General: Bowel sounds are normal  There is no distension  Palpations: Abdomen is soft  There is no mass  Tenderness: There is no abdominal tenderness  There is no guarding or rebound  Musculoskeletal: Normal range of motion  General: No tenderness or deformity  Lymphadenopathy:      Cervical: No cervical adenopathy  Skin:     General: Skin is warm and dry  Findings: No erythema or rash  Neurological:      Mental Status: He is alert and oriented to person, place, and time  Cranial Nerves: No cranial nerve deficit  Deep Tendon Reflexes: Reflexes are normal and symmetric  Reflexes normal    Psychiatric:         Behavior: Behavior normal          Thought Content: Thought content normal          Judgment: Judgment normal           Data:    Laboratory Results: I have personally reviewed the pertinent laboratory results/reports   Radiology/Other Diagnostic Testing Results: I have personally reviewed pertinent reports         Lab Results   Component Value Date    WBC 7 83 08/26/2020    HGB 13 8 08/26/2020    HCT 41 0 08/26/2020    MCV 93 08/26/2020     (L) 08/26/2020     Lab Results   Component Value Date    K 4 1 08/26/2020     08/26/2020    CO2 28 08/26/2020    BUN 19 08/26/2020    CREATININE 1 19 08/26/2020    CALCIUM 8 8 08/26/2020    AST 75 (H) 08/26/2020     (H) 08/26/2020    ALKPHOS 135 (H) 08/26/2020    EGFR 57 08/26/2020     No results found for: CHOLESTEROL  No results found for: HDL  No results found for: LDLCALC  No results found for: TRIG  No results found for: CHOLHDL  No results found for: ANH2LBSHSNYF, TSH  Lab Results   Component Value Date    HGBA1C 7 0 06/02/2020     No results found for: PSA    Land O'Panfilo, DO

## 2020-09-01 NOTE — ASSESSMENT & PLAN NOTE
Hypertension stable currently with lisinopril 10 mg tablets and metoprolol 25 mg tablets continue both medications follow-up at next office visit

## 2020-09-01 NOTE — ASSESSMENT & PLAN NOTE
Hepatic lesion post sepsis and elevated liver enzymes follow-up with Gastroenterology and monitor liver enzymes to watch for resolution  Review of pathology shows hepatocellular neoplasm  I would like patient evaluated by Surgical Oncology    I discussed the findings with the patient and his wife

## 2020-09-02 ENCOUNTER — TELEPHONE (OUTPATIENT)
Dept: SURGICAL ONCOLOGY | Facility: CLINIC | Age: 80
End: 2020-09-02

## 2020-09-02 ENCOUNTER — APPOINTMENT (OUTPATIENT)
Dept: LAB | Facility: CLINIC | Age: 80
End: 2020-09-02
Payer: MEDICARE

## 2020-09-02 DIAGNOSIS — J45.20 MILD INTERMITTENT REACTIVE AIRWAY DISEASE WITHOUT COMPLICATION: ICD-10-CM

## 2020-09-02 DIAGNOSIS — I10 ESSENTIAL HYPERTENSION: ICD-10-CM

## 2020-09-02 DIAGNOSIS — K76.9 HEPATIC LESION: ICD-10-CM

## 2020-09-02 DIAGNOSIS — I74.3 EMBOLISM AND THROMBOSIS OF ARTERIES OF THE LOWER EXTREMITIES (HCC): ICD-10-CM

## 2020-09-02 LAB
ALBUMIN SERPL BCP-MCNC: 3.4 G/DL (ref 3.5–5)
ALP SERPL-CCNC: 134 U/L (ref 46–116)
ALT SERPL W P-5'-P-CCNC: 74 U/L (ref 12–78)
AST SERPL W P-5'-P-CCNC: 36 U/L (ref 5–45)
BASOPHILS # BLD AUTO: 0.04 THOUSANDS/ΜL (ref 0–0.1)
BASOPHILS NFR BLD AUTO: 1 % (ref 0–1)
BILIRUB DIRECT SERPL-MCNC: 0.22 MG/DL (ref 0–0.2)
BILIRUB SERPL-MCNC: 0.73 MG/DL (ref 0.2–1)
EOSINOPHIL # BLD AUTO: 0.17 THOUSAND/ΜL (ref 0–0.61)
EOSINOPHIL NFR BLD AUTO: 2 % (ref 0–6)
ERYTHROCYTE [DISTWIDTH] IN BLOOD BY AUTOMATED COUNT: 14.6 % (ref 11.6–15.1)
HCT VFR BLD AUTO: 48.4 % (ref 36.5–49.3)
HGB BLD-MCNC: 15.4 G/DL (ref 12–17)
IMM GRANULOCYTES # BLD AUTO: 0.07 THOUSAND/UL (ref 0–0.2)
IMM GRANULOCYTES NFR BLD AUTO: 1 % (ref 0–2)
LYMPHOCYTES # BLD AUTO: 1.4 THOUSANDS/ΜL (ref 0.6–4.47)
LYMPHOCYTES NFR BLD AUTO: 16 % (ref 14–44)
MCH RBC QN AUTO: 31.1 PG (ref 26.8–34.3)
MCHC RBC AUTO-ENTMCNC: 31.8 G/DL (ref 31.4–37.4)
MCV RBC AUTO: 98 FL (ref 82–98)
MONOCYTES # BLD AUTO: 0.72 THOUSAND/ΜL (ref 0.17–1.22)
MONOCYTES NFR BLD AUTO: 8 % (ref 4–12)
NEUTROPHILS # BLD AUTO: 6.44 THOUSANDS/ΜL (ref 1.85–7.62)
NEUTS SEG NFR BLD AUTO: 72 % (ref 43–75)
NRBC BLD AUTO-RTO: 0 /100 WBCS
PLATELET # BLD AUTO: 300 THOUSANDS/UL (ref 149–390)
PMV BLD AUTO: 11.2 FL (ref 8.9–12.7)
PROT SERPL-MCNC: 7.7 G/DL (ref 6.4–8.2)
RBC # BLD AUTO: 4.95 MILLION/UL (ref 3.88–5.62)
WBC # BLD AUTO: 8.84 THOUSAND/UL (ref 4.31–10.16)

## 2020-09-02 PROCEDURE — 85025 COMPLETE CBC W/AUTO DIFF WBC: CPT

## 2020-09-02 PROCEDURE — 36415 COLL VENOUS BLD VENIPUNCTURE: CPT

## 2020-09-02 PROCEDURE — 80076 HEPATIC FUNCTION PANEL: CPT

## 2020-09-02 NOTE — TELEPHONE ENCOUNTER
New GI Patient Encounter    New Patient GI Form   Patient Details:  Art Hutson  1940  [id number]     Background Information:  [address] starts by opening a telephone encounter and gathering the following information   Who is calling to schedule and relationship?  leah- [place] [phone]   Who is the referring provider? Hospital ref   To which specialty is the referral?  Surgical Oncology   Reason for Visit? New Diagnosis   Tumor Type?  hepatocellular neoplasm   Is there a confirmed diagnosis from biopsy/tissue reviewed by Pathology? yes   Date of Tissue Diagnosis  (If done outside of [facility] please obtain report and slides)  (If no tissue diagnosis, please stop and discuss with Navigator prior to scheduling)  8/25   Is patient aware of the diagnosis? yes   Has Imaging been completed? yes   If YES, where was the imaging done? (If outside [place] [address] obtain records)  [facility]   Have any endoscopies been done (colonoscopy, EGD, EUS)  no   If YES where were they performed? (If outside of [address] [place] [address] obtain the records)  na   Has blood work been done?  yes   If YES, where was the blood work done? (If outside of [facility] obtain records)  [facility]   Is there a personal history of cancer? (If YES please list type)  no   Is there a family history of cancer? (If YES please list type)  no   Scheduling Information:   [facility]   Are there any days the patient cannot be seen? na   Miscellaneous: na   After completing the above information, please route to finance, nurse navigation and clinical trials for review

## 2020-09-03 NOTE — TELEPHONE ENCOUNTER
Pt has active medicare part A & B effective 07/01/03  Bibi Blas pt also has a supplemental plan C thru [employer] effective 01/01/16  Bibi Blas  this plan will pay the  20% that medicare doesn't pay

## 2020-09-11 ENCOUNTER — TELEPHONE (OUTPATIENT)
Dept: SURGICAL ONCOLOGY | Facility: CLINIC | Age: 80
End: 2020-09-11

## 2020-09-14 PROBLEM — C22.0 HEPATOCELLULAR CARCINOMA (HCC): Status: ACTIVE | Noted: 2020-08-24

## 2020-09-15 NOTE — ASSESSMENT & PLAN NOTE
MRI on August 23rd showed a mixed cystic and solid lesion suspicious for liver neoplasm might be the cause of patient's sepsis  IR guided biopsy results revealed hepatocellular carcinoma  Will need close follow-up with outpatient Oncology/GI

## 2020-09-16 ENCOUNTER — TELEPHONE (OUTPATIENT)
Dept: CARDIAC SURGERY | Facility: CLINIC | Age: 80
End: 2020-09-16

## 2020-09-16 NOTE — TELEPHONE ENCOUNTER
Pt's wife, Brunilda Ibarra, called regarding a questionnaire they were asked to fill out for medications that the patient is taking  She would like to a call back to discuss this  Please advise, c/b number is [phone]

## 2020-09-17 ENCOUNTER — CONSULT (OUTPATIENT)
Dept: SURGICAL ONCOLOGY | Facility: CLINIC | Age: 80
End: 2020-09-17
Payer: MEDICARE

## 2020-09-17 VITALS
WEIGHT: 219 LBS | HEIGHT: 69 IN | RESPIRATION RATE: 18 BRPM | HEART RATE: 70 BPM | BODY MASS INDEX: 32.44 KG/M2 | SYSTOLIC BLOOD PRESSURE: 150 MMHG | TEMPERATURE: 97.7 F | DIASTOLIC BLOOD PRESSURE: 92 MMHG

## 2020-09-17 DIAGNOSIS — I74.3 EMBOLISM AND THROMBOSIS OF ARTERIES OF THE LOWER EXTREMITIES (HCC): ICD-10-CM

## 2020-09-17 DIAGNOSIS — K76.9 HEPATIC LESION: ICD-10-CM

## 2020-09-17 DIAGNOSIS — C22.0 HEPATOCELLULAR CARCINOMA (HCC): Primary | ICD-10-CM

## 2020-09-17 DIAGNOSIS — J45.20 MILD INTERMITTENT REACTIVE AIRWAY DISEASE WITHOUT COMPLICATION: ICD-10-CM

## 2020-09-17 DIAGNOSIS — I10 ESSENTIAL HYPERTENSION: ICD-10-CM

## 2020-09-17 PROCEDURE — 99205 OFFICE O/P NEW HI 60 MIN: CPT | Performed by: SURGERY

## 2020-09-17 NOTE — LETTER
September 17, 2020     [place], [address] [place] [address]    Patient: Alem Sheikh   YOB: 1940   Date of Visit: 9/17/2020       Dear Dr Bee Nab: Thank you for referring Eugenia Sotelo to me for evaluation  Below are my notes for this consultation  If you have questions, please do not hesitate to call me  I look forward to following your patient along with you  Sincerely,        Carolee Ortiz MD        CC: No Recipients  Carolee Ortiz MD  9/17/2020 10:04 AM  Incomplete               Surgical Oncology Consult       [facility] SURGICAL ONCOLOGY formerly [facility]  [address]  [place] [id number] [facility]  1940  [id number]  Stephanie Saleh [facility] SURGICAL ONCOLOGY formerly [facility]  [address]  [facility] [id number]    Diagnoses and all orders for this visit:    Hepatocellular carcinoma [facility])  -     Ambulatory referral to Radiation Oncology; Future    Mild intermittent reactive airway disease without complication  -     Ambulatory referral to Surgical Oncology    Essential hypertension  -     Ambulatory referral to Surgical Oncology    Hepatic lesion  -     Ambulatory referral to Surgical Oncology    Embolism and thrombosis of arteries of the lower extremities [facility])  -     Ambulatory referral to Surgical Oncology        Chief Complaint   Patient presents with    Consult     hepatocellular neoplasm        Return in about 4 months (around 1/17/2021) for Office Visit  Oncology History   Hepatocellular carcinoma ([facility] [address] )   8/24/2020 Initial Diagnosis    Hepatocellular carcinoma ([facility] [address] )     8/25/2020 Biopsy    IR liver biopsy  - Well-differentiated heptocellular carcinoma         History of Present Illness:   23-year-old male who recently presented to the hospital with an acute illness that required pressors because of hypotension    He was found to have E coli bacteremia as well as acute encephalopathy  Imaging revealed a liver mass  MRI on August 23, 2020 revealed a 6 9 x 7 3 cm mass there was arterial enhancement  There was no evidence of cirrhosis  I personally reviewed the films  He underwent biopsy and this revealed a well-differentiated hepatocellular neoplasm that was felt to be  Well-differentiated HCC  He comes in now to discuss further therapy  He is feeling well  He denies any abdominal pain, nausea or vomiting  Repeat liver function studies have essentially normalized  He has lost approximately 10 lb over the last few months  He did not have any jaundice  Review of Systems  Complete ROS Surg Onc:   Constitutional: The patient denies new or recent history of general fatigue, no change in appetite  There is weight loss  Eyes: No complaints of visual problems, no scleral icterus  ENT: no complaints of ear pain, no hoarseness, no difficulty swallowing,  no tinnitus and no new masses in head, oral cavity, or neck  Cardiovascular: No complaints of chest pain, no palpitations, no ankle edema  Respiratory: No complaints of shortness of breath, no cough  Gastrointestinal: No complaints of jaundice, no bloody stools, no pale stools  Genitourinary: No complaints of dysuria, no hematuria, no nocturia, no frequent urination, no urethral discharge  Musculoskeletal: No complaints of weakness, paralysis, joint stiffness or arthralgias  Integumentary: No complaints of rash, no new lesions  Neurological: No complaints of convulsions, no seizures, no dizziness  Hematologic/Lymphatic: No complaints of easy bruising  Endocrine:  No hot or cold intolerance  No polydipsia, polyphagia, or polyuria  Allergy/immunology:  No environmental allergies  No food allergies  Not immunocompromised  Skin:  No pallor or rash  No wound            Patient Active Problem List   Diagnosis    Hypertension    Hyperlipidemia    Cardiomegaly    Anxiety    Abnormal electrocardiogram    Coronary artery disease without angina pectoris    PAD (peripheral artery disease) (HCC)    Viral URI with cough    Reactive airway disease without complication    Medicare annual wellness visit, subsequent    Hyperglycemia    Ankle edema, bilateral    Transaminitis    Respiratory insufficiency    EMILIANO (acute kidney injury) ([facility] [address] )    Elevated troponin    Septic shock (HCC)    Hepatocellular carcinoma (HCC)    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[facility] [address] )     History reviewed  No pertinent past medical history    Past Surgical History:   Procedure Laterality Date    ABDOMINAL SURGERY      appy    CORONARY ARTERY BYPASS GRAFT      IR BIOPSY LIVER MASS  8/25/2020     Family History   Problem Relation Age of Onset    No Known Problems Mother     No Known Problems Father      Social History     Socioeconomic History    Marital status: /Civil Union     Spouse name: Not on file    Number of children: Not on file    Years of education: Not on file    Highest education level: Not on file   Occupational History    Not on file   Social Needs    Financial resource strain: Not on file    Food insecurity     Worry: Not on file     Inability: Not on file   [employer] needs     Medical: Not on file     Non-medical: Not on file   Tobacco Use    Smoking status: Never Smoker    Smokeless tobacco: Never Used   Substance and Sexual Activity    Alcohol use: Not Currently     Frequency: Never    Drug use: Never    Sexual activity: Not on file   Lifestyle    Physical activity     Days per week: Not on file     Minutes per session: Not on file    Stress: Not on file   Relationships    Social connections     Talks on phone: Not on file     Gets together: Not on file     Attends Orthodoxy service: Not on file     Active member of club or organization: Not on file     Attends meetings of clubs or organizations: Not on file     Relationship status: Not on file  Intimate partner violence     Fear of current or ex partner: Not on file     Emotionally abused: Not on file     Physically abused: Not on file     Forced sexual activity: Not on file   Other Topics Concern    Not on file   Social History Narrative    Not on file       Current Outpatient Medications:     allopurinol (ZYLOPRIM) 300 mg tablet, Take 300 mg by mouth daily, Disp: , Rfl:     aspirin 81 mg chewable tablet, Chew 1 tablet (81 mg total) daily, Disp: 30 tablet, Rfl: 0    lisinopril (ZESTRIL) 10 mg tablet, TAKE ONE TABLET BY MOUTH EVERY DAY FOR BLOOD PRESSURE/HEART, Disp: , Rfl:     metoprolol tartrate (LOPRESSOR) 25 mg tablet, Take 1 tablet by mouth 2 (two) times a day, Disp: , Rfl:     simvastatin (ZOCOR) 80 mg tablet, TAKE ONE TABLET BY MOUTH EVERY DAY AT 5 PM FOR CHOLESTEROL, Disp: , Rfl:   Allergies   Allergen Reactions    Shellfish-Derived Products      Vitals:    09/17/20 0927   BP: 150/92   Pulse: 70   Resp: 18   Temp: 97 7 °F (36 5 °C)       Physical Exam   Constitutional: General appearance: The Patient is well-developed and well-nourished who appears the stated age in no acute distress  Patient is pleasant and talkative  HEENT:  Normocephalic  Sclerae are anicteric  Mucous membranes are moist  Neck is supple without adenopathy  No JVD  Chest: The lungs are clear to auscultation  Cardiac: Heart is regular rate  Abdomen: Abdomen is soft, non-tender, non-distended and without masses  Extremities: There is no clubbing or cyanosis  There is no edema  Symmetric  Neuro: Grossly nonfocal  Gait is normal      Lymphatic: No evidence of cervical adenopathy bilaterally  No evidence of axillary adenopathy bilaterally  No evidence of inguinal adenopathy bilaterally  Skin: Warm, anicteric  Psych:  Patient is pleasant and talkative  Breasts:      Pathology:  Final Diagnosis    A  Liver, mass, needle core biopsy:  - Well-differentiated heptocellular carcinoma  See Note  Electronically signed by Flavio Jackson MD on 9/9/2020 at  1:19 PM    Comments: This is an appended report  These results have been appended to a previously preliminary verified report  Preliminary Diagnosis    A  Liver, mass, needle core biopsy:  - Well-differentiated hepatocellular neoplasm  See Preliminary Note       Preliminary Note:   Sections of the cores reveal a trabecular and pseudoacinar patterned mass composed of tumor cells resembling hepatocytes with enlarged nuclei containing nucleoli and clear vacuolated and eosinophilic cytoplasm  Reticulin special stain highlights widened plates and loss/fragmented reticulin framework  Special stain trichrome reveals no significant fibrosis  Immunostains are performed revealing the following tumor cell staining:  HSA, Arginase-1, Pankeratin (CK), positive;  pCEA canalicular positivity;  CD34 and CD31 sinusoidal capillarization;  Glypican-3, PAX8, D2-40, SOX10, S100, and Melan-A negative      The histology and immunoprofile are diagnostic of a well-differentiated hepatocellular neoplasm  The case is sent to [facility] for expert consultation  A final report will be issued when their interpretation is available  Salsify message sent to JANIS Oneil on 8/27/2020 @ 1630 hours  A copy of the preliminary report is also sent  Preliminary result electronically signed by Flavio Jackson MD on 8/27/2020 at  4:33 PM    Comments: This is an appended report  These results have been appended to a previously preliminary verified report  Note   [facility]    As noted in the preliminary report, this case was sent to [facility] for expert consultation  Dr Jacky Wong, a hepatobiliary pathology expert, has reviewed this case and above final diagnosis is exactly his diagnosis  I agree with Dr Bouchra Jones opinion - his original report is on file in the Medical Records Department, [place], [place] and will be scanned into the YouView message sent to Dr Sharan Andres on 9/9/2020 @ 1300 hours  A copy of the final report is also sent       Following is Dr Bouchra Jones complete diagnosis and comments on this case:   [address] DIAGNOSIS:     Review of [id number] from [address]:     Liver, mass biopsy:  Hepatocellular carcinoma  see comment      COMMENT:  Thank you for sending this case for consultation      Sections show an apparent well-differentiated hepatoid neoplasm with essentially no intratumoral portal tracts (only scant non-tumor liver is present in A2, which shows only mild non-specific lymphocytic inflammation)  There is extensive peliotic type change and expanded cell plates with eosinophilic to slightly cleared out tumor cell cytoplasm  There is no bile or mucin production  The morphologic findings are suggestive of hepatocellular carcinoma ([address] )  I considered the  possibility of renal cell carcinoma, but this is excluded based on the positive submitted arginase stain  The submitted reticulin stains show extensive reticulin fragments and plate expansion to 4-5 cells thick, to support consideration of [address]  The provided glypican-3 stain is negative  The additional stains do not point towards a non-hepatocellular  tomorrow (sic) (neoplasm)  Additional histochemical and immunohistochemical stains were performed at [facility] and show:  - Beta catenin:                  Patchy nuclear staining, suggestive of beta-catenin activation, support hepatocellular                                            carcinoma  - Glypican-3:                     Negative  - Reticulin:                        Extensive reticulin fragmentation and plate expansion to 4-5 cells,                                            support Hepatocellular carcinoma  - CRP:                                Patchy weak positive, non-specific result  - JULIANA:                                Negative    - LFABP:                             Retained  - Glutamine synthase:        Diffuse strong positive, consistent with beta-catenin activation, support hepatocellular                                             Carcinoma  - CD34:                               Diffuse sinusoidal capillarization, support hepatocellular carcinoma      The overall findings are consistent with a well-differentiated hepatocellular carcinoma      Please do not hesitate to contact us with additional questions, comments or follow-up  Labs:      Imaging  Ct Abdomen Pelvis Wo Contrast    Result Date: 8/23/2020  Narrative: CT ABDOMEN AND PELVIS WITHOUT IV CONTRAST INDICATION:   fever, elevated lfts  COMPARISON:  None  TECHNIQUE:  CT examination of the abdomen and pelvis was performed without intravenous contrast   Axial, sagittal, and coronal 2D reformatted images were created from the source data and submitted for interpretation  Radiation dose length product (DLP) for this visit:  911 mGy-cm   This examination, like all CT scans performed in the [facility], was performed utilizing techniques to minimize radiation dose exposure, including the use of iterative reconstruction and automated exposure control  Enteric contrast was administered  FINDINGS: ABDOMEN LOWER CHEST:  Interstitial markings throughout the visualized lung fields  Pleural calcifications at the left hemithorax  LIVER/BILIARY TREE:  7 2 x 7 7 x 7 1 cm isodense slightly heterogeneous mass within the medial right hepatic lobe  GALLBLADDER:  There are gallstone(s) within the gallbladder, without pericholecystic inflammatory changes  SPLEEN:  Unremarkable  PANCREAS:  Unremarkable  ADRENAL GLANDS:  Unremarkable  KIDNEYS/URETERS:  One or more simple renal cyst(s) is noted  8 mm nonobstructing stone at the inferior pole of left kidney  No hydronephrosis  STOMACH AND BOWEL:  There is colonic diverticulosis without evidence of acute diverticulitis  APPENDIX:  No findings to suggest appendicitis  ABDOMINOPELVIC CAVITY:  No ascites  No pneumoperitoneum  No lymphadenopathy  VESSELS:  Atherosclerotic changes are present  No evidence of aneurysm  PELVIS REPRODUCTIVE ORGANS:  Unremarkable for patient's age  URINARY BLADDER:  Unremarkable  ABDOMINAL WALL/INGUINAL REGIONS:  Unremarkable  OSSEOUS STRUCTURES:  No acute fracture or destructive osseous lesion  Prominent osteophytic ridging and osteophyte complexes at L2-L3 and L5-S1 resulting in at least moderate canal stenosis at these levels  Impression: Indeterminant 7 7 cm mass the medial right hepatic lobe  Contrast-enhanced MR abdomen is recommended for further evaluation  Coarse interstitial markings throughout the visualized lung fields which may represent underlying interstitial lung disease  Workstation performed: [id number]     Xr Chest 1 View Portable    Result Date: 8/23/2020  Narrative: CHEST INDICATION:   fever chest pain  COMPARISON:  Chest x-ray from 2/20/2020  EXAM PERFORMED/VIEWS:  XR CHEST PORTABLE FINDINGS:  Status post median sternotomy and CABG  Cardiomediastinal silhouette appears unremarkable  Mild linear atelectasis at the lung bases, otherwise clear lungs  No pleural effusions  No pneumothorax  Osseous structures appear within normal limits for patient age  Impression: Mild linear atelectasis at the lung bases, otherwise clear lungs   Workstation performed: [id number]     Mri Abdomen W Wo Contrast    Result Date: 8/23/2020  Narrative: MRI - ABDOMEN - WITH AND WITHOUT CONTRAST INDICATION:  Follow-up liver mass COMPARISON: CT and ultrasound from 8/23/2020 TECHNIQUE:  The following pulse sequences were obtained prior to and following the administration of intravenous contrast:     Coronal and axial T2 with TE of 90 and 180 respectively, axial T2 with fat saturation, axial FIESTA fat-sat, axial T1-weighted in-and-out-of phase, axial DWI/ADC, precontrast axial T1 with fat saturation, post-contrast dynamic axial T1 with fat saturation at 20, 70, and 180 seconds, coronal T1  with fat saturation and 7 minute delayed axial T1 with fat saturation  Imaging performed on 1 5T MRI   IV Contrast:  6 mL of gadobutrol injection (MULTI-DOSE) FINDINGS: Post contrast imaging is severely degraded by respiratory motion  LOWER CHEST:   There are small pleural effusions  LIVER: Normal in size and configuration  Again seen is a central liver mass abutting the vijay hepatis and gallbladder measuring 6 9 x 7 3 cm  The lesion is mostly T2 hyperintense with intermediate intensity along the inferior wall  There is arterial enhancement of the wall of the lesion as well as the intermediate intensity nodular focus along the wall  The hepatic veins and portal veins are patent  BILE DUCTS: No intrahepatic or extrahepatic bile duct dilation  GALLBLADDER:  Multiple gallstones are seen  PANCREAS: Normal  No main pancreatic ductal dilation  ADRENAL GLANDS:  Normal  SPLEEN:  Normal  KIDNEYS/PROXIMAL URETERS: No hydroureteronephrosis  No suspicious renal mass  There are bilateral cysts  BOWEL:   No dilated loops of bowel  PERITONEUM/RETROPERITONEUM: No ascites  LYMPH NODES: No abdominal lymphadenopathy  VASCULAR STRUCTURES:  No aneurysm  ABDOMINAL WALL:  Unremarkable  OSSEOUS STRUCTURES:  No suspicious osseous lesion  Impression: Postcontrast imaging severely degraded by motion  1   Mixed cystic and solid lesion in the central liver abutting the vijay hepatis and gallbladder with arterial enhancement of the solid portion and wall  This is highly suspicious for primary liver neoplasm, possibly a biliary cystadenocarcinoma  Tissue sampling of the solid portion is recommended  2   Cholelithiasis  The study was marked in [facility] for immediate notification   Workstation performed: [id number]     Ir Biopsy Liver Mass    Result Date: 8/25/2020  Narrative: EXAMINATION: Liver biopsy INDICATION: Patient admitted with sepsis and bacteremia was found to have large right liver mass concerning for malignancy  CONTRAST: N/A FLUOROSCOPY TIME:   N/A IMAGES:  6 ANESTHESIA: Moderate sedation and local lidocaine PROCEDURE: The patient was identified verbally and by wristband  Timeout was performed  Informed consent was obtained  Following obtaining informed consent, the patient was prepped and draped in the usual sterile fashion  All elements of maximal sterile barrier technique, cap and mask and sterile gloves and sterile drape and hand hygiene and 2% chlorhexidine for cutaneous antisepsis  Sterile ultrasound technique with sterile gel and sterile probe covers was also utilized  Under ultrasound guidance, a 17-gauge coaxial introducer needle was advanced into the right liver mass  18-gauge BioPince needle was used to obtain 4 core needle samples which were placed into formalin  Pathologist was present confirm adequacy of samples  D-Stat hemostatic agent was instilled through the introducer needle during its removal   Bandage was applied  The patient tolerated the procedure well without complication  The patient left the IR department in stable condition  Findings: Successful right liver mass core needle biopsy  Impression: Impression: Successful ultrasound-guided right liver mass biopsy  Workstation performed: [id number]     Xr Chest Portable Icu    Result Date: 8/23/2020  Narrative: CHEST INDICATION:   central line placement  COMPARISON:  Chest x-ray from 8/22/2020  EXAM PERFORMED/VIEWS:  XR CHEST PORTABLE ICU FINDINGS:  Again seen is median sternotomy wires and CABG  Right IJ central line has been inserted with tip in the SVC  Cardiomediastinal silhouette appears unremarkable  There is interstitial pulmonary edema  No pneumothorax  No pleural effusion  Osseous structures appear within normal limits for patient age  Impression: Right IJ central line has been inserted with tip in the SVC  No pneumothorax   There is interstitial pulmonary edema  Workstation performed: [id number]     Us Right Upper Quadrant    Result Date: 8/23/2020  Narrative: RIGHT UPPER QUADRANT ULTRASOUND INDICATION:     liver mass  COMPARISON:  Abdomen and pelvic CT from 8/23/2020  TECHNIQUE:   Real-time ultrasound of the right upper quadrant was performed with a curvilinear transducer with both volumetric sweeps and still imaging techniques  FINDINGS: PANCREAS:  Portions of the pancreas are obscured by bowel gas  Visualized portions of the pancreas are unremarkable  AORTA AND IVC:  Visualized portions are normal for patient age  LIVER: Size:  Mildly enlarged  The liver measures 17 5 cm in the midclavicular line  Contour:  Surface contour is smooth  Parenchyma:  Echogenicity and echotexture are within normal limits  Corresponding to the CT finding, there is a 8 2 x 6 7 x 8 5 cm heterogeneously hypoechoic solid hepatic mass  Internal vascularity is seen within the lesion  Limited imaging of the main portal vein shows it to be patent and hepatopetal   BILIARY: The gallbladder is normal in caliber  Gallbladder wall is minimally thickened to 4 mm, likely due to underdistention rather than real pathology  There are echogenic foci with ringdown artifacts in the gallbladder wall, consistent with cholesterolosis  Shadowing gallstones identified  No sonographic Whittington's sign  No intrahepatic biliary dilatation  CBD measures 4 mm  No choledocholithiasis  KIDNEY: Right kidney measures right cm  Small simple cysts in the right kidney  Otherwise unremarkable  ASCITES:   None  Impression: Corresponding to the CT finding, there is a 8 2 x 6 7 x 8 5 cm heterogeneously hypoechoic solid hepatic mass  This remains indeterminate etiology, for which malignancy must be excluded  Recommend abdomen MRI with and without IV contrast, and/or biopsy  Cholelithiasis without evidence of acute cholecystitis   Workstation performed: [id number]     I reviewed the above laboratory and imaging data     Discussion/Summary:   70-year-old male with well-differentiated HCC  The lesion is very centrally located in his liver  Given his age and the central location, I am not sure that he would tolerate hepatectomy well  We discussed multiple treatment options  He would not be a candidate for transplant based on the size of this lesion  We discussed surgical intervention as well as liver directed therapy, including TACE or Sir spheres  At this time, we will plan on presenting him at upper GI working group  I will also set up the consult with Radiation Oncology to discuss Sir spheres  I will tentatively see him back in the next 4 months  He is agreeable to this  All his questions were answered

## 2020-09-17 NOTE — PROGRESS NOTES
Surgical Oncology Consult       [facility]  [facility]  [facility] [place] [address]  [place] [address] [address]  1940  [phone]  [facility]  [facility]  [facility] [place]  [address]  [place] [id number]    Diagnoses and all orders for this visit:    Hepatocellular carcinoma [facility])  -     Ambulatory referral to Radiation Oncology; Future    Mild intermittent reactive airway disease without complication  -     Ambulatory referral to Surgical Oncology    Essential hypertension  -     Ambulatory referral to Surgical Oncology    Hepatic lesion  -     Ambulatory referral to Surgical Oncology    Embolism and thrombosis of arteries of the lower extremities [facility])  -     Ambulatory referral to Surgical Oncology        Chief Complaint   Patient presents with    Consult     hepatocellular neoplasm        Return in about 4 months (around 1/17/2021) for Office Visit  Oncology History   Hepatocellular carcinoma ([facility] [address] )   8/24/2020 Initial Diagnosis    Hepatocellular carcinoma ([facility] [address] )     8/25/2020 Biopsy    IR liver biopsy  - Well-differentiated heptocellular carcinoma         History of Present Illness:   20-year-old male who recently presented to the hospital with an acute illness that required pressors because of hypotension  He was found to have E coli bacteremia as well as acute encephalopathy  Imaging revealed a liver mass  MRI on August 23, 2020 revealed a 6 9 x 7 3 cm mass there was arterial enhancement  There was no evidence of cirrhosis  I personally reviewed the films  He underwent biopsy and this revealed a well-differentiated hepatocellular neoplasm that was felt to be  Well-differentiated HCC  He comes in now to discuss further therapy  He is feeling well  He denies any abdominal pain, nausea or vomiting  Repeat liver function studies have essentially normalized    He has lost approximately 10 lb over the last few months  He did not have any jaundice  Review of Systems  Complete ROS Surg Onc:   Constitutional: The patient denies new or recent history of general fatigue, no change in appetite  There is weight loss  Eyes: No complaints of visual problems, no scleral icterus  ENT: no complaints of ear pain, no hoarseness, no difficulty swallowing,  no tinnitus and no new masses in head, oral cavity, or neck  Cardiovascular: No complaints of chest pain, no palpitations, no ankle edema  Respiratory: No complaints of shortness of breath, no cough  Gastrointestinal: No complaints of jaundice, no bloody stools, no pale stools  Genitourinary: No complaints of dysuria, no hematuria, no nocturia, no frequent urination, no urethral discharge  Musculoskeletal: No complaints of weakness, paralysis, joint stiffness or arthralgias  Integumentary: No complaints of rash, no new lesions  Neurological: No complaints of convulsions, no seizures, no dizziness  Hematologic/Lymphatic: No complaints of easy bruising  Endocrine:  No hot or cold intolerance  No polydipsia, polyphagia, or polyuria  Allergy/immunology:  No environmental allergies  No food allergies  Not immunocompromised  Skin:  No pallor or rash  No wound  Patient Active Problem List   Diagnosis    Hypertension    Hyperlipidemia    Cardiomegaly    Anxiety    Abnormal electrocardiogram    Coronary artery disease without angina pectoris    PAD (peripheral artery disease) (HCC)    Viral URI with cough    Reactive airway disease without complication    Medicare annual wellness visit, subsequent    Hyperglycemia    Ankle edema, bilateral    Transaminitis    Respiratory insufficiency    EMILIANO (acute kidney injury) ([facility] )    Elevated troponin    Septic shock (HCC)    Hepatocellular carcinoma (HCC)    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[facility] )     History reviewed   No pertinent past medical history    Past Surgical History:   Procedure Laterality Date    ABDOMINAL SURGERY      appy    CORONARY ARTERY BYPASS GRAFT      IR BIOPSY LIVER MASS  8/25/2020     Family History   Problem Relation Age of Onset    No Known Problems Mother     No Known Problems Father      Social History     Socioeconomic History    Marital status: /Civil Union     Spouse name: Not on file    Number of children: Not on file    Years of education: Not on file    Highest education level: Not on file   Occupational History    Not on file   Social Needs    Financial resource strain: Not on file    Food insecurity     Worry: Not on file     Inability: Not on file   [employer] needs     Medical: Not on file     Non-medical: Not on file   Tobacco Use    Smoking status: Never Smoker    Smokeless tobacco: Never Used   Substance and Sexual Activity    Alcohol use: Not Currently     Frequency: Never    Drug use: Never    Sexual activity: Not on file   Lifestyle    Physical activity     Days per week: Not on file     Minutes per session: Not on file    Stress: Not on file   Relationships    Social connections     Talks on phone: Not on file     Gets together: Not on file     Attends [employer] service: Not on file     Active member of club or organization: Not on file     Attends meetings of clubs or organizations: Not on file     Relationship status: Not on file    Intimate partner violence     Fear of current or ex partner: Not on file     Emotionally abused: Not on file     Physically abused: Not on file     Forced sexual activity: Not on file   Other Topics Concern    Not on file   Social History Narrative    Not on file       Current Outpatient Medications:     allopurinol (ZYLOPRIM) 300 mg tablet, Take 300 mg by mouth daily, Disp: , Rfl:     aspirin 81 mg chewable tablet, Chew 1 tablet (81 mg total) daily, Disp: 30 tablet, Rfl: 0    lisinopril (ZESTRIL) 10 mg tablet, TAKE ONE TABLET BY MOUTH EVERY DAY FOR BLOOD PRESSURE/HEART, Disp: , Rfl:     metoprolol tartrate (LOPRESSOR) 25 mg tablet, Take 1 tablet by mouth 2 (two) times a day, Disp: , Rfl:     simvastatin (ZOCOR) 80 mg tablet, TAKE ONE TABLET BY MOUTH EVERY DAY AT 5 PM FOR CHOLESTEROL, Disp: , Rfl:   Allergies   Allergen Reactions    Shellfish-Derived Products      Vitals:    09/17/20 0927   BP: 150/92   Pulse: 70   Resp: 18   Temp: 97 7 °F (36 5 °C)       Physical Exam   Constitutional: General appearance: The Patient is well-developed and well-nourished who appears the stated age in no acute distress  Patient is pleasant and talkative  HEENT:  Normocephalic  Sclerae are anicteric  Mucous membranes are moist  Neck is supple without adenopathy  No JVD  Chest: The lungs are clear to auscultation  Cardiac: Heart is regular rate  Abdomen: Abdomen is soft, non-tender, non-distended and without masses  Extremities: There is no clubbing or cyanosis  There is no edema  Symmetric  Neuro: Grossly nonfocal  Gait is normal      Lymphatic: No evidence of cervical adenopathy bilaterally  No evidence of axillary adenopathy bilaterally  No evidence of inguinal adenopathy bilaterally  Skin: Warm, anicteric  Psych:  Patient is pleasant and talkative  Breasts:      Pathology:  Final Diagnosis    A  Liver, mass, needle core biopsy:  - Well-differentiated heptocellular carcinoma  See Note  Electronically signed by Kathy Machado MD on 9/9/2020 at  1:19 PM    Comments: This is an appended report  These results have been appended to a previously preliminary verified report  Preliminary Diagnosis    A  Liver, mass, needle core biopsy:  - Well-differentiated hepatocellular neoplasm    See Preliminary Note       Preliminary Note:   Sections of the cores reveal a trabecular and pseudoacinar patterned mass composed of tumor cells resembling hepatocytes with enlarged nuclei containing nucleoli and clear vacuolated and eosinophilic cytoplasm  Reticulin special stain highlights widened plates and loss/fragmented reticulin framework  Special stain trichrome reveals no significant fibrosis  Immunostains are performed revealing the following tumor cell staining:  HSA, Arginase-1, Pankeratin (CK), positive;  pCEA canalicular positivity;  CD34 and CD31 sinusoidal capillarization;  Glypican-3, PAX8, D2-40, SOX10, S100, and Melan-A negative      The histology and immunoprofile are diagnostic of a well-differentiated hepatocellular neoplasm  The case is sent to [facility] for expert consultation  A final report will be issued when their interpretation is available  Deed message sent to JANIS Oneil on 8/27/2020 @ 1630 hours  A copy of the preliminary report is also sent  Preliminary result electronically signed by Tamera Nesbitt MD on 8/27/2020 at  4:33 PM    Comments: This is an appended report  These results have been appended to a previously preliminary verified report  Note   [facility]    As noted in the preliminary report, this case was sent to [facility] for expert consultation  Dr Roxanna Torres, a hepatobiliary pathology expert, has reviewed this case and above final diagnosis is exactly his diagnosis  I agree with Dr Albania Tirado opinion - his original report is on file in the Medical Records Department, [place], [place] and will be scanned into the PMR  Deed message sent to Dr Mervin Hernandez on 9/9/2020 @ 1300 hours  A copy of the final report is also sent       Following is Dr Albania Tirado complete diagnosis and comments on this case:   [address] [facility] DIAGNOSIS:     Review of [id number] from [address]:     Liver, mass biopsy:  Hepatocellular carcinoma   see comment      COMMENT:  Thank you for sending this case for consultation      Sections show an apparent well-differentiated hepatoid neoplasm with essentially no intratumoral portal tracts (only scant non-tumor liver is present in A2, which shows only mild non-specific lymphocytic inflammation)  There is extensive peliotic type change and expanded cell plates with eosinophilic to slightly cleared out tumor cell cytoplasm  There is no bile or mucin production  The morphologic findings are suggestive of hepatocellular carcinoma ([address] )  I considered the  possibility of renal cell carcinoma, but this is excluded based on the positive submitted arginase stain  The submitted reticulin stains show extensive reticulin fragments and plate expansion to 4-5 cells thick, to support consideration of [address]  The provided glypican-3 stain is negative  The additional stains do not point towards a non-hepatocellular  tomorrow (sic) (neoplasm)  Additional histochemical and immunohistochemical stains were performed at [facility] and show:  - Beta catenin:                  Patchy nuclear staining, suggestive of beta-catenin activation, support hepatocellular                                            carcinoma  - Glypican-3:                     Negative  - Reticulin:                        Extensive reticulin fragmentation and plate expansion to 4-5 cells,                                            support Hepatocellular carcinoma  - CRP:                                Patchy weak positive, non-specific result  - JULIANA:                                Negative  - LFABP:                             Retained  - Glutamine synthase:        Diffuse strong positive, consistent with beta-catenin activation, support hepatocellular                                             Carcinoma  - CD34:                               Diffuse sinusoidal capillarization, support hepatocellular carcinoma      The overall findings are consistent with a well-differentiated hepatocellular carcinoma      Please do not hesitate to contact us with additional questions, comments or follow-up  Labs:      Imaging  Ct Abdomen Pelvis Wo Contrast    Result Date: 8/23/2020  Narrative: CT ABDOMEN AND PELVIS WITHOUT IV CONTRAST INDICATION:   fever, elevated lfts  COMPARISON:  None  TECHNIQUE:  CT examination of the abdomen and pelvis was performed without intravenous contrast   Axial, sagittal, and coronal 2D reformatted images were created from the source data and submitted for interpretation  Radiation dose length product (DLP) for this visit:  911 mGy-cm   This examination, like all CT scans performed in [facility], was performed utilizing techniques to minimize radiation dose exposure, including the use of iterative reconstruction and automated exposure control  Enteric contrast was administered  FINDINGS: ABDOMEN LOWER CHEST:  Interstitial markings throughout the visualized lung fields  Pleural calcifications at the left hemithorax  LIVER/BILIARY TREE:  7 2 x 7 7 x 7 1 cm isodense slightly heterogeneous mass within the medial right hepatic lobe  GALLBLADDER:  There are gallstone(s) within the gallbladder, without pericholecystic inflammatory changes  SPLEEN:  Unremarkable  PANCREAS:  Unremarkable  ADRENAL GLANDS:  Unremarkable  KIDNEYS/URETERS:  One or more simple renal cyst(s) is noted  8 mm nonobstructing stone at the inferior pole of left kidney  No hydronephrosis  STOMACH AND BOWEL:  There is colonic diverticulosis without evidence of acute diverticulitis  APPENDIX:  No findings to suggest appendicitis  ABDOMINOPELVIC CAVITY:  No ascites  No pneumoperitoneum  No lymphadenopathy  VESSELS:  Atherosclerotic changes are present  No evidence of aneurysm  PELVIS REPRODUCTIVE ORGANS:  Unremarkable for patient's age  URINARY BLADDER:  Unremarkable  ABDOMINAL WALL/INGUINAL REGIONS:  Unremarkable  OSSEOUS STRUCTURES:  No acute fracture or destructive osseous lesion    Prominent osteophytic ridging and osteophyte complexes at L2-L3 and L5-S1 resulting in at least moderate canal stenosis at these levels  Impression: Indeterminant 7 7 cm mass the medial right hepatic lobe  Contrast-enhanced MR abdomen is recommended for further evaluation  Coarse interstitial markings throughout the visualized lung fields which may represent underlying interstitial lung disease  Workstation performed: [id number]     Xr Chest 1 View Portable    Result Date: 8/23/2020  Narrative: CHEST INDICATION:   fever chest pain  COMPARISON:  Chest x-ray from 2/20/2020  EXAM PERFORMED/VIEWS:  XR CHEST PORTABLE FINDINGS:  Status post median sternotomy and CABG  Cardiomediastinal silhouette appears unremarkable  Mild linear atelectasis at the lung bases, otherwise clear lungs  No pleural effusions  No pneumothorax  Osseous structures appear within normal limits for patient age  Impression: Mild linear atelectasis at the lung bases, otherwise clear lungs  Workstation performed: [id number]     Mri Abdomen W Wo Contrast    Result Date: 8/23/2020  Narrative: MRI - ABDOMEN - WITH AND WITHOUT CONTRAST INDICATION:  Follow-up liver mass COMPARISON: CT and ultrasound from 8/23/2020 TECHNIQUE:  The following pulse sequences were obtained prior to and following the administration of intravenous contrast:     Coronal and axial T2 with TE of 90 and 180 respectively, axial T2 with fat saturation, axial FIESTA fat-sat, axial T1-weighted in-and-out-of phase, axial DWI/ADC, precontrast axial T1 with fat saturation, post-contrast dynamic axial T1 with fat saturation at 20, 70, and 180 seconds, coronal T1  with fat saturation and 7 minute delayed axial T1 with fat saturation  Imaging performed on 1 5T MRI   IV Contrast:  6 mL of gadobutrol injection (MULTI-DOSE) FINDINGS: Post contrast imaging is severely degraded by respiratory motion  LOWER CHEST:   There are small pleural effusions  LIVER: Normal in size and configuration  Again seen is a central liver mass abutting the vijay hepatis and gallbladder measuring 6 9 x 7 3 cm   The lesion is mostly T2 hyperintense with intermediate intensity along the inferior wall  There is arterial enhancement of the wall of the lesion as well as the intermediate intensity nodular focus along the wall  The hepatic veins and portal veins are patent  BILE DUCTS: No intrahepatic or extrahepatic bile duct dilation  GALLBLADDER:  Multiple gallstones are seen  PANCREAS: Normal  No main pancreatic ductal dilation  ADRENAL GLANDS:  Normal  SPLEEN:  Normal  KIDNEYS/PROXIMAL URETERS: No hydroureteronephrosis  No suspicious renal mass  There are bilateral cysts  BOWEL:   No dilated loops of bowel  PERITONEUM/RETROPERITONEUM: No ascites  LYMPH NODES: No abdominal lymphadenopathy  VASCULAR STRUCTURES:  No aneurysm  ABDOMINAL WALL:  Unremarkable  OSSEOUS STRUCTURES:  No suspicious osseous lesion  Impression: Postcontrast imaging severely degraded by motion  1   Mixed cystic and solid lesion in the central liver abutting the vijay hepatis and gallbladder with arterial enhancement of the solid portion and wall  This is highly suspicious for primary liver neoplasm, possibly a biliary cystadenocarcinoma  Tissue sampling of the solid portion is recommended  2   Cholelithiasis  The study was marked in [facility] for immediate notification  Workstation performed: [id number]     Ir Biopsy Liver Mass    Result Date: 8/25/2020  Narrative: EXAMINATION: Liver biopsy INDICATION: Patient admitted with sepsis and bacteremia was found to have large right liver mass concerning for malignancy  CONTRAST: N/A FLUOROSCOPY TIME:   N/A IMAGES:  6 ANESTHESIA: Moderate sedation and local lidocaine PROCEDURE: The patient was identified verbally and by wristband  Timeout was performed  Informed consent was obtained  Following obtaining informed consent, the patient was prepped and draped in the usual sterile fashion    All elements of maximal sterile barrier technique, cap and mask and sterile gloves and sterile drape and hand hygiene and 2% chlorhexidine for cutaneous antisepsis  Sterile ultrasound technique with sterile gel and sterile probe covers was also utilized  Under ultrasound guidance, a 17-gauge coaxial introducer needle was advanced into the right liver mass  18-gauge BioPince needle was used to obtain 4 core needle samples which were placed into formalin  Pathologist was present confirm adequacy of samples  D-Stat hemostatic agent was instilled through the introducer needle during its removal   Bandage was applied  The patient tolerated the procedure well without complication  The patient left the IR department in stable condition  Findings: Successful right liver mass core needle biopsy  Impression: Impression: Successful ultrasound-guided right liver mass biopsy  Workstation performed: [id number]     Xr Chest Portable Icu    Result Date: 8/23/2020  Narrative: CHEST INDICATION:   central line placement  COMPARISON:  Chest x-ray from 8/22/2020  EXAM PERFORMED/VIEWS:  XR CHEST PORTABLE ICU FINDINGS:  Again seen is median sternotomy wires and CABG  Right IJ central line has been inserted with tip in the SVC  Cardiomediastinal silhouette appears unremarkable  There is interstitial pulmonary edema  No pneumothorax  No pleural effusion  Osseous structures appear within normal limits for patient age  Impression: Right IJ central line has been inserted with tip in the SVC  No pneumothorax  There is interstitial pulmonary edema  Workstation performed: [id number]     Us Right Upper Quadrant    Result Date: 8/23/2020  Narrative: RIGHT UPPER QUADRANT ULTRASOUND INDICATION:     liver mass  COMPARISON:  Abdomen and pelvic CT from 8/23/2020  TECHNIQUE:   Real-time ultrasound of the right upper quadrant was performed with a curvilinear transducer with both volumetric sweeps and still imaging techniques  FINDINGS: PANCREAS:  Portions of the pancreas are obscured by bowel gas  Visualized portions of the pancreas are unremarkable   AORTA AND IVC:  Visualized portions are normal for patient age  LIVER: Size:  Mildly enlarged  The liver measures 17 5 cm in the midclavicular line  Contour:  Surface contour is smooth  Parenchyma:  Echogenicity and echotexture are within normal limits  Corresponding to the CT finding, there is a 8 2 x 6 7 x 8 5 cm heterogeneously hypoechoic solid hepatic mass  Internal vascularity is seen within the lesion  Limited imaging of the main portal vein shows it to be patent and hepatopetal   BILIARY: The gallbladder is normal in caliber  Gallbladder wall is minimally thickened to 4 mm, likely due to underdistention rather than real pathology  There are echogenic foci with ringdown artifacts in the gallbladder wall, consistent with cholesterolosis  Shadowing gallstones identified  No sonographic Whittington's sign  No intrahepatic biliary dilatation  CBD measures 4 mm  No choledocholithiasis  KIDNEY: Right kidney measures right cm  Small simple cysts in the right kidney  Otherwise unremarkable  ASCITES:   None  Impression: Corresponding to the CT finding, there is a 8 2 x 6 7 x 8 5 cm heterogeneously hypoechoic solid hepatic mass  This remains indeterminate etiology, for which malignancy must be excluded  Recommend abdomen MRI with and without IV contrast, and/or biopsy  Cholelithiasis without evidence of acute cholecystitis  Workstation performed: [id number]     I reviewed the above laboratory and imaging data  Discussion/Summary:   57-year-old male with well-differentiated HCC  The lesion is very centrally located in his liver  Given his age and the central location, I am not sure that he would tolerate hepatectomy well  We discussed multiple treatment options  He would not be a candidate for transplant based on the size of this lesion  We discussed surgical intervention as well as liver directed therapy, including TACE or Sir spheres  At this time, we will plan on presenting him at upper GI working group    I will also set up the consult with Radiation Oncology to discuss Sir spheres  I will tentatively see him back in the next 4 months  He is agreeable to this  All his questions were answered

## 2020-09-22 ENCOUNTER — CLINICAL SUPPORT (OUTPATIENT)
Dept: RADIATION ONCOLOGY | Facility: CLINIC | Age: 80
End: 2020-09-22
Attending: RADIOLOGY
Payer: MEDICARE

## 2020-09-22 VITALS
DIASTOLIC BLOOD PRESSURE: 80 MMHG | BODY MASS INDEX: 30.51 KG/M2 | TEMPERATURE: 97.5 F | HEIGHT: 69 IN | WEIGHT: 206 LBS | SYSTOLIC BLOOD PRESSURE: 132 MMHG | RESPIRATION RATE: 18 BRPM | HEART RATE: 69 BPM

## 2020-09-22 DIAGNOSIS — C22.0 HEPATOCELLULAR CARCINOMA (HCC): Primary | ICD-10-CM

## 2020-09-22 DIAGNOSIS — C22.0 HEPATOCELLULAR CARCINOMA (HCC): ICD-10-CM

## 2020-09-22 PROCEDURE — 99211 OFF/OP EST MAY X REQ PHY/QHP: CPT | Performed by: RADIOLOGY

## 2020-09-22 RX ORDER — OMEGA-3S/DHA/EPA/FISH OIL/D3 300MG-1000
1000 CAPSULE ORAL DAILY
COMMUNITY
End: 2022-01-01

## 2020-09-22 RX ORDER — CHLORAL HYDRATE 500 MG
1000 CAPSULE ORAL DAILY
COMMUNITY
End: 2022-01-01

## 2020-09-22 NOTE — PROGRESS NOTES
Grecia Bingham 1940 is a 78 y o  male    Oncology History   8/22/20 To ED with fever, confusion, tachycardia, hypotension  Gram negative bacteremia    8/22/20 Total bili 1 80  Alk Phos 204, ,     8/23/20 Total bili 1 40  Alk Phos 131, , ALT, 407    8/23/20 CT abdomen and pelvis done for fever and elevated LFT's  Indeterminant 7 7 cm mass the medial right hepatic lobe  Contrast-enhanced MR abdomen is recommended for further evaluation  Coarse interstitial markings throughout the visualized lung fields which may represent underlying interstitial lung disease  8/23/20 RUQ ultrasound  Corresponding to the CT finding, there is a 8 2 x 6 7 x 8 5 cm heterogeneously hypoechoic solid hepatic mass  This remains indeterminate etiology, for which malignancy must be excluded  Recommend abdomen MRI with and without IV contrast, and/or biopsy  8/23/20 MRI abdomen  LIVER:   Normal in size and configuration  Again seen is a central liver mass abutting the vijay hepatis and gallbladder measuring 6 9 x 7 3 cm  The lesion is mostly T2 hyperintense with intermediate intensity along the inferior wall  There is arterial enhancement of the wall of the lesion as well as the intermediate intensity nodular focus along the wall  The hepatic veins and portal veins are patent  1   Mixed cystic and solid lesion in the central liver abutting the vijay hepatis and gallbladder with arterial enhancement of the solid portion and wall  This is highly suspicious for primary liver neoplasm, possibly a biliary cystadenocarcinoma  Tissue sampling of the solid portion is recommended      8/25/20 IR liver biopsy  Well-differentiated heptocellular carcinoma    8/26/20   Total bili 0 60  Alk Phos 135, AST 75,   AFP tumor marker 2 4    9/2/20 Direct bili  0 22  Alk Phos  134, AST 36, ALT 74,     9/17/20 Divine Wade MD   Given his age and the central location, I am not sure that he would tolerate hepatectomy well  We discussed multiple treatment options  He would not be a candidate for transplant based on the size of this lesion  We discussed surgical intervention as well as liver directed therapy, including TACE or Sir spheres  At this time, we will plan on presenting him at upper GI working group  I will also set up the consult with Radiation Oncology to discuss Sir spheres  Hepatocellular carcinoma ([facility] [address] )   8/24/2020 Initial Diagnosis    Hepatocellular carcinoma ([facility] [address] )     8/25/2020 Biopsy    IR liver biopsy  - Well-differentiated heptocellular carcinoma         Clinical Trial: no    Health Maintenance   Topic Date Due    Influenza Vaccine  07/01/2020    DTaP,Tdap,and Td Vaccines (1 - Tdap) 09/01/2021 (Originally 7/19/1961)    Fall Risk  03/03/2021    Medicare Annual Wellness Visit (AWV)  03/03/2021    BMI: Followup Plan  08/05/2021    Depression Screening PHQ  09/01/2021    BMI: Adult  09/22/2021    Hepatitis C Screening  Completed    Pneumococcal Vaccine: 65+ Years  Completed    HIB Vaccine  Aged Out    Hepatitis B Vaccine  Aged Out    IPV Vaccine  Aged Out    Hepatitis A Vaccine  Aged Out    Meningococcal ACWY Vaccine  Aged Out    HPV Vaccine  Aged Out       No past medical history on file      Past Surgical History:   Procedure Laterality Date    ABDOMINAL SURGERY      appy    CORONARY ARTERY BYPASS GRAFT      IR BIOPSY LIVER MASS  8/25/2020       Family History   Problem Relation Age of Onset    No Known Problems Mother     No Known Problems Father        Social History     Tobacco Use    Smoking status: Never Smoker    Smokeless tobacco: Never Used   Substance Use Topics    Alcohol use: Not Currently     Frequency: Never    Drug use: Never          Current Outpatient Medications:     allopurinol (ZYLOPRIM) 300 mg tablet, Take 300 mg by mouth daily, Disp: , Rfl:     aspirin 81 mg chewable tablet, Chew 1 tablet (81 mg total) daily, Disp: 30 tablet, Rfl: 0   cholecalciferol (VITAMIN D3) 400 units tablet, Take 400 Units by mouth daily, Disp: , Rfl:     lisinopril (ZESTRIL) 10 mg tablet, TAKE ONE TABLET BY MOUTH EVERY DAY FOR BLOOD PRESSURE/HEART, Disp: , Rfl:     metoprolol tartrate (LOPRESSOR) 25 mg tablet, Take 1 tablet by mouth 2 (two) times a day, Disp: , Rfl:     Omega-3 Fatty Acids (fish oil) 1,000 mg, Take 1,000 mg by mouth daily, Disp: , Rfl:     simvastatin (ZOCOR) 80 mg tablet, TAKE ONE TABLET BY MOUTH EVERY DAY AT 5 PM FOR CHOLESTEROL, Disp: , Rfl:     Allergies   Allergen Reactions    Shellfish-Derived Products         Review of Systems:  Review of Systems   Constitutional: Negative  HENT: Negative  Eyes: Negative  Respiratory: Positive for cough (in mornings)  Cardiovascular: Negative  Gastrointestinal: Negative  Endocrine: Negative  Genitourinary: Negative  Musculoskeletal: Negative  Skin: Negative  Allergic/Immunologic: Negative  Neurological: Negative  Hematological: Negative  Psychiatric/Behavioral: Negative  Vitals:    09/22/20 0931   BP: 132/80   Pulse: 69   Resp: 18   Temp: 97 5 °F (36 4 °C)   Weight: 93 4 kg (206 lb)   Height: 5' 9" (1 753 m)   Oxygen 97%    Pain Score: 0-No pain    Pain assessment: 0    Imaging:No images are attached to the encounter       Teaching done

## 2020-09-22 NOTE — PROGRESS NOTES
Consultation - Radiation Oncology      SND:[id number] : 1940  Encounter: [id number]  Patient Information: Art Hutson      CHIEF COMPLAINT  Chief Complaint   Patient presents with    Consult    Hepatic Cancer     Cancer Staging  Hepatocellular carcinoma [facility])  Staging form: Liver (Excluding Intrahepatic Bile Ducts), AJCC 8th Edition  - Clinical: Stage IB (cT1b, cN0, cM0) - Signed by Irena Barragan MD on 2020           History of Present Illness   Art Hutson is a 78y o  year old male who presents with recently diagnosed well-differentiated hepatocellular carcinoma, incidentally found during hospitalization for Gram-negative bacteremia  He is not a surgical candidate or transplant candidate and presents today to discuss liver directed therapy  Workup to date as below:    20 To ED with fever, confusion, tachycardia, hypotension  Gram negative bacteremia    20 Total bili 1 80  Alk Phos 204, ,     20 Total bili 1 40  Alk Phos 131, , ALT, 407    20 CT abdomen and pelvis done for fever and elevated LFT's  Indeterminant 7 7 cm mass the medial right hepatic lobe  Contrast-enhanced MR abdomen is recommended for further evaluation  Coarse interstitial markings throughout the visualized lung fields which may represent underlying interstitial lung disease  20 RUQ ultrasound  Corresponding to the CT finding, there is a 8 2 x 6 7 x 8 5 cm heterogeneously hypoechoic solid hepatic mass  This remains indeterminate etiology, for which malignancy must be excluded  Recommend abdomen MRI with and without IV contrast, and/or biopsy  20 MRI abdomen  LIVER:   Normal in size and configuration  Again seen is a central liver mass abutting the vijay hepatis and gallbladder measuring 6 9 x 7 3 cm  The lesion is mostly T2 hyperintense with intermediate intensity along the inferior wall   There is arterial enhancement of the wall of the lesion as well as the intermediate intensity nodular focus along the wall  The hepatic veins and portal veins are patent  1   Mixed cystic and solid lesion in the central liver abutting the vijay hepatis and gallbladder with arterial enhancement of the solid portion and wall  This is highly suspicious for primary liver neoplasm, possibly a biliary cystadenocarcinoma  Tissue sampling of the solid portion is recommended  8/25/20 IR liver biopsy  Well-differentiated heptocellular carcinoma    8/26/20   Total bili 0 60  Alk Phos 135, AST 75,   AFP tumor marker 2 4    9/2/20 Direct bili  0 22  Alk Phos  134, AST 36, ALT 74,     9/17/20 Gal Quintero MD   Given his age and the central location, I am not sure that he would tolerate hepatectomy well  We discussed multiple treatment options  He would not be a candidate for transplant based on the size of this lesion  We discussed surgical intervention as well as liver directed therapy, including TACE or Sir spheres  At this time, we will plan on presenting him at upper GI working group  I will also set up the consult with Radiation Oncology to discuss Sir spheres  Historical Information   Oncology History   Hepatocellular carcinoma ([facility] [address] )   8/24/2020 Initial Diagnosis    Hepatocellular carcinoma ([facility] [address] )     8/25/2020 Biopsy    IR liver biopsy  - Well-differentiated heptocellular carcinoma     9/22/2020 -  Cancer Staged    Staging form: Liver (Excluding Intrahepatic Bile Ducts), AJCC 8th Edition  - Clinical: Stage IB (cT1b, cN0, cM0) - Signed by Damon aPrdo MD on 9/22/2020             No past medical history on file    Past Surgical History:   Procedure Laterality Date    ABDOMINAL SURGERY      appy    CORONARY ARTERY BYPASS GRAFT      IR BIOPSY LIVER MASS  8/25/2020       Family History   Problem Relation Age of Onset    No Known Problems Mother     No Known Problems Father        Social History   Social History     Substance and Sexual Activity   Alcohol Use Not Currently    Frequency: Never     Social History     Substance and Sexual Activity   Drug Use Never     Social History     Tobacco Use   Smoking Status Never Smoker   Smokeless Tobacco Never Used         Meds/Allergies     Current Outpatient Medications:     allopurinol (ZYLOPRIM) 300 mg tablet, Take 300 mg by mouth daily, Disp: , Rfl:     aspirin 81 mg chewable tablet, Chew 1 tablet (81 mg total) daily, Disp: 30 tablet, Rfl: 0    cholecalciferol (VITAMIN D3) 400 units tablet, Take 400 Units by mouth daily, Disp: , Rfl:     lisinopril (ZESTRIL) 10 mg tablet, TAKE ONE TABLET BY MOUTH EVERY DAY FOR BLOOD PRESSURE/HEART, Disp: , Rfl:     metoprolol tartrate (LOPRESSOR) 25 mg tablet, Take 1 tablet by mouth 2 (two) times a day, Disp: , Rfl:     Omega-3 Fatty Acids (fish oil) 1,000 mg, Take 1,000 mg by mouth daily, Disp: , Rfl:     simvastatin (ZOCOR) 80 mg tablet, TAKE ONE TABLET BY MOUTH EVERY DAY AT 5 PM FOR CHOLESTEROL, Disp: , Rfl:   Allergies   Allergen Reactions    Shellfish-Derived Products          Review of Systems   Review of Systems   Constitutional: Negative  HENT: Negative  Eyes: Negative  Respiratory: Positive for cough (in mornings)  Cardiovascular: Negative  Gastrointestinal: Negative  Endocrine: Negative  Genitourinary: Negative  Musculoskeletal: Negative  Skin: Negative  Allergic/Immunologic: Negative  Neurological: Negative  Hematological: Negative  Psychiatric/Behavioral: Negative  OBJECTIVE:   /80   Pulse 69   Temp 97 5 °F (36 4 °C)   Resp 18   Ht 5' 9" (1 753 m)   Wt 93 4 kg (206 lb)   BMI 30 42 kg/m²   Pain Assessment:  0  Performance Status: Karnofsky: 90 - Able to carry on normal activity; minor signs or symptoms of disease     Physical Exam   The patient presents today no apparent distress  Sclera anicteric  No palpable cervical or supraclavicular lymphadenopathy  Lungs clear to auscultation bilaterally    Normal S1-S2 regular rate and rhythm  Abdomen is soft, nontender, nondistended  No palpable masses  No swelling of the lower extremities  RESULTS  Lab Results    Chemistry        Component Value Date/Time    K 4 1 08/26/2020 0450     08/26/2020 0450    CO2 28 08/26/2020 0450    BUN 19 08/26/2020 0450    CREATININE 1 19 08/26/2020 0450        Component Value Date/Time    CALCIUM 8 8 08/26/2020 0450    ALKPHOS 134 (H) 09/02/2020 0806    AST 36 09/02/2020 0806    ALT 74 09/02/2020 0806            Lab Results   Component Value Date    WBC 8 84 09/02/2020    HGB 15 4 09/02/2020    HCT 48 4 09/02/2020    MCV 98 09/02/2020     09/02/2020         Imaging Studies  Mri Abdomen W Wo Contrast    Result Date: 8/23/2020  Narrative: MRI - ABDOMEN - WITH AND WITHOUT CONTRAST INDICATION:  Follow-up liver mass COMPARISON: CT and ultrasound from 8/23/2020 TECHNIQUE:  The following pulse sequences were obtained prior to and following the administration of intravenous contrast:     Coronal and axial T2 with TE of 90 and 180 respectively, axial T2 with fat saturation, axial FIESTA fat-sat, axial T1-weighted in-and-out-of phase, axial DWI/ADC, precontrast axial T1 with fat saturation, post-contrast dynamic axial T1 with fat saturation at 20, 70, and 180 seconds, coronal T1  with fat saturation and 7 minute delayed axial T1 with fat saturation  Imaging performed on 1 5T MRI   IV Contrast:  6 mL of gadobutrol injection (MULTI-DOSE) FINDINGS: Post contrast imaging is severely degraded by respiratory motion  LOWER CHEST:   There are small pleural effusions  LIVER: Normal in size and configuration  Again seen is a central liver mass abutting the vijay hepatis and gallbladder measuring 6 9 x 7 3 cm  The lesion is mostly T2 hyperintense with intermediate intensity along the inferior wall  There is arterial enhancement of the wall of the lesion as well as the intermediate intensity nodular focus along the wall   The hepatic veins and portal veins are patent  BILE DUCTS: No intrahepatic or extrahepatic bile duct dilation  GALLBLADDER:  Multiple gallstones are seen  PANCREAS: Normal  No main pancreatic ductal dilation  ADRENAL GLANDS:  Normal  SPLEEN:  Normal  KIDNEYS/PROXIMAL URETERS: No hydroureteronephrosis  No suspicious renal mass  There are bilateral cysts  BOWEL:   No dilated loops of bowel  PERITONEUM/RETROPERITONEUM: No ascites  LYMPH NODES: No abdominal lymphadenopathy  VASCULAR STRUCTURES:  No aneurysm  ABDOMINAL WALL:  Unremarkable  OSSEOUS STRUCTURES:  No suspicious osseous lesion  Impression: Postcontrast imaging severely degraded by motion  1   Mixed cystic and solid lesion in the central liver abutting the vijay hepatis and gallbladder with arterial enhancement of the solid portion and wall  This is highly suspicious for primary liver neoplasm, possibly a biliary cystadenocarcinoma  Tissue sampling of the solid portion is recommended  2   Cholelithiasis  The study was marked in [facility] for immediate notification  Workstation performed: [id number]     Ir Biopsy Liver Mass    Result Date: 8/25/2020  Narrative: EXAMINATION: Liver biopsy INDICATION: Patient admitted with sepsis and bacteremia was found to have large right liver mass concerning for malignancy  CONTRAST: N/A FLUOROSCOPY TIME:   N/A IMAGES:  6 ANESTHESIA: Moderate sedation and local lidocaine PROCEDURE: The patient was identified verbally and by wristband  Timeout was performed  Informed consent was obtained  Following obtaining informed consent, the patient was prepped and draped in the usual sterile fashion  All elements of maximal sterile barrier technique, cap and mask and sterile gloves and sterile drape and hand hygiene and 2% chlorhexidine for cutaneous antisepsis  Sterile ultrasound technique with sterile gel and sterile probe covers was also utilized  Under ultrasound guidance, a 17-gauge coaxial introducer needle was advanced into the right liver mass  18-gauge BioPince needle was used to obtain 4 core needle samples which were placed into formalin  Pathologist was present confirm adequacy of samples  D-Stat hemostatic agent was instilled through the introducer needle during its removal   Bandage was applied  The patient tolerated the procedure well without complication  The patient left the IR department in stable condition  Findings: Successful right liver mass core needle biopsy  Impression: Impression: Successful ultrasound-guided right liver mass biopsy  Workstation performed: [id number]       ASSESSMENT  1  Hepatocellular carcinoma [facility])  Ambulatory referral to Radiation Oncology     Cancer Staging  Hepatocellular carcinoma [facility])  Staging form: Liver (Excluding Intrahepatic Bile Ducts), AJCC 8th Edition  - Clinical: Stage IB (cT1b, cN0, cM0) - Signed by Husam Champion MD on 9/22/2020        PLAN/DISCUSSION  No orders of the defined types were placed in this encounter  Shai Meraz is a 78y o  year old male with recently diagnosed incidentally found hepatocellular carcinoma, diagnosed during recent hospitalization for gram-negative bacteremia  He has no history of cirrhosis, alcohol abuse, or hepatitis  He was found to have a large approximately 7 cm centrally located cystic appearing mass abutting the vijay hepatis and gallbladder, biopsy proven to be a well-differentiated hepatocellular carcinoma  He has been evaluated by Surgical Oncology and is not a good surgical candidate  He is also not a transplant candidate due to the size of the lesion  He does appear to be a good candidate for liver directed therapy, either with Y 90 radioembolization or TACE  Given the central location and size of the tumor, it is unclear if the entire tumor could be treated with radioembolization directed solely at the right lobe or if the whole liver would have to be treated in order to adequately treat the mass    He would 1st undergo mapping to help make this determination, with treatment to be delivered approximately one week later  His case will be discussed at the multidisciplinary upper GI working group and is decision would be made at that time between TACE and Y 90  The associated risks and toxicities of radioembolization were discussed with the patient and his wife in detail, including, but not limited to, fatigue, nausea, abdominal discomfort, vomiting, diarrhea, temporary elevation of liver enzymes, radiation pneumonitis, small bowel/gastric injury, and a low risk of liver failure  Husam Champion MD  9/22/2020,12:37 PM      Portions of the record may have been created with voice recognition software  Occasional wrong word or "sound a like" substitutions may have occurred due to the inherent limitations of voice recognition software  Read the chart carefully and recognize, using context, where substitutions have occurred

## 2020-09-23 ENCOUNTER — PREP FOR PROCEDURE (OUTPATIENT)
Dept: INTERVENTIONAL RADIOLOGY/VASCULAR | Facility: CLINIC | Age: 80
End: 2020-09-23

## 2020-09-23 DIAGNOSIS — C22.0 HEPATOCELLULAR CARCINOMA (HCC): Primary | ICD-10-CM

## 2020-09-23 RX ORDER — SODIUM CHLORIDE 9 MG/ML
75 INJECTION, SOLUTION INTRAVENOUS CONTINUOUS
Status: CANCELLED | OUTPATIENT
Start: 2020-09-23

## 2020-09-28 ENCOUNTER — TELEPHONE (OUTPATIENT)
Dept: RADIOLOGY | Facility: HOSPITAL | Age: 80
End: 2020-09-28

## 2020-10-01 ENCOUNTER — DOCUMENTATION (OUTPATIENT)
Dept: HEMATOLOGY ONCOLOGY | Facility: CLINIC | Age: 80
End: 2020-10-01

## 2020-10-05 ENCOUNTER — TELEPHONE (OUTPATIENT)
Dept: SURGERY | Facility: HOSPITAL | Age: 80
End: 2020-10-05

## 2020-10-06 ENCOUNTER — APPOINTMENT (OUTPATIENT)
Dept: RADIOLOGY | Facility: HOSPITAL | Age: 80
End: 2020-10-06
Attending: RADIOLOGY
Payer: MEDICARE

## 2020-10-06 ENCOUNTER — HOSPITAL ENCOUNTER (OUTPATIENT)
Dept: RADIOLOGY | Facility: HOSPITAL | Age: 80
Discharge: HOME | End: 2020-10-06
Attending: RADIOLOGY | Admitting: RADIOLOGY
Payer: MEDICARE

## 2020-10-06 VITALS
HEART RATE: 75 BPM | RESPIRATION RATE: 20 BRPM | OXYGEN SATURATION: 96 % | TEMPERATURE: 98.4 F | SYSTOLIC BLOOD PRESSURE: 130 MMHG | DIASTOLIC BLOOD PRESSURE: 60 MMHG

## 2020-10-06 DIAGNOSIS — C22.0 HEPATOCELLULAR CARCINOMA (HCC): ICD-10-CM

## 2020-10-06 LAB
ALBUMIN SERPL BCP-MCNC: 4 G/DL (ref 3.5–5)
ALP SERPL-CCNC: 128 U/L (ref 46–116)
ALT SERPL W P-5'-P-CCNC: 34 U/L (ref 12–78)
AST SERPL W P-5'-P-CCNC: 31 U/L (ref 5–45)
BILIRUB DIRECT SERPL-MCNC: 0.36 MG/DL (ref 0–0.2)
BILIRUB SERPL-MCNC: 0.87 MG/DL (ref 0.2–1)
PROT SERPL-MCNC: 8.6 G/DL (ref 6.4–8.2)

## 2020-10-06 PROCEDURE — 99152 MOD SED SAME PHYS/QHP 5/>YRS: CPT | Performed by: RADIOLOGY

## 2020-10-06 PROCEDURE — A9540 TC99M MAA: HCPCS

## 2020-10-06 PROCEDURE — 76376 3D RENDER W/INTRP POSTPROCES: CPT | Performed by: RADIOLOGY

## 2020-10-06 PROCEDURE — 36245 INS CATH ABD/L-EXT ART 1ST: CPT | Performed by: RADIOLOGY

## 2020-10-06 PROCEDURE — 37243 VASC EMBOLIZE/OCCLUDE ORGAN: CPT

## 2020-10-06 PROCEDURE — 80076 HEPATIC FUNCTION PANEL: CPT | Performed by: RADIOLOGY

## 2020-10-06 PROCEDURE — 36247 INS CATH ABD/L-EXT ART 3RD: CPT

## 2020-10-06 PROCEDURE — C1760 CLOSURE DEV, VASC: HCPCS

## 2020-10-06 PROCEDURE — 99152 MOD SED SAME PHYS/QHP 5/>YRS: CPT

## 2020-10-06 PROCEDURE — 99153 MOD SED SAME PHYS/QHP EA: CPT

## 2020-10-06 PROCEDURE — G1004 CDSM NDSC: HCPCS

## 2020-10-06 PROCEDURE — C1769 GUIDE WIRE: HCPCS

## 2020-10-06 PROCEDURE — 36248 INS CATH ABD/L-EXT ART ADDL: CPT | Performed by: RADIOLOGY

## 2020-10-06 PROCEDURE — 36247 INS CATH ABD/L-EXT ART 3RD: CPT | Performed by: RADIOLOGY

## 2020-10-06 PROCEDURE — C1894 INTRO/SHEATH, NON-LASER: HCPCS

## 2020-10-06 PROCEDURE — 75774 ARTERY X-RAY EACH VESSEL: CPT | Performed by: RADIOLOGY

## 2020-10-06 PROCEDURE — 78201 LIVER IMAGING STATIC ONLY: CPT

## 2020-10-06 PROCEDURE — C1887 CATHETER, GUIDING: HCPCS

## 2020-10-06 PROCEDURE — 99024 POSTOP FOLLOW-UP VISIT: CPT | Performed by: RADIOLOGY

## 2020-10-06 PROCEDURE — 36248 INS CATH ABD/L-EXT ART ADDL: CPT

## 2020-10-06 PROCEDURE — 75726 ARTERY X-RAYS ABDOMEN: CPT | Performed by: RADIOLOGY

## 2020-10-06 PROCEDURE — 76937 US GUIDE VASCULAR ACCESS: CPT

## 2020-10-06 PROCEDURE — 75726 ARTERY X-RAYS ABDOMEN: CPT

## 2020-10-06 RX ORDER — FENTANYL CITRATE 50 UG/ML
INJECTION, SOLUTION INTRAMUSCULAR; INTRAVENOUS CODE/TRAUMA/SEDATION MEDICATION
Status: COMPLETED | OUTPATIENT
Start: 2020-10-06 | End: 2020-10-06

## 2020-10-06 RX ORDER — SODIUM CHLORIDE 9 MG/ML
75 INJECTION, SOLUTION INTRAVENOUS CONTINUOUS
Status: DISCONTINUED | OUTPATIENT
Start: 2020-10-06 | End: 2020-10-06 | Stop reason: HOSPADM

## 2020-10-06 RX ORDER — MIDAZOLAM HYDROCHLORIDE 2 MG/2ML
INJECTION, SOLUTION INTRAMUSCULAR; INTRAVENOUS CODE/TRAUMA/SEDATION MEDICATION
Status: COMPLETED | OUTPATIENT
Start: 2020-10-06 | End: 2020-10-06

## 2020-10-06 RX ADMIN — FENTANYL CITRATE 25 MCG: 50 INJECTION, SOLUTION INTRAMUSCULAR; INTRAVENOUS at 14:03

## 2020-10-06 RX ADMIN — SODIUM CHLORIDE 75 ML/HR: 0.9 INJECTION, SOLUTION INTRAVENOUS at 10:58

## 2020-10-06 RX ADMIN — IODIXANOL 80 ML: 320 INJECTION, SOLUTION INTRAVASCULAR at 15:54

## 2020-10-06 RX ADMIN — FENTANYL CITRATE 50 MCG: 50 INJECTION, SOLUTION INTRAMUSCULAR; INTRAVENOUS at 13:57

## 2020-10-06 RX ADMIN — MIDAZOLAM 1 MG: 1 INJECTION INTRAMUSCULAR; INTRAVENOUS at 13:57

## 2020-10-06 RX ADMIN — FENTANYL CITRATE 25 MCG: 50 INJECTION, SOLUTION INTRAMUSCULAR; INTRAVENOUS at 14:40

## 2020-10-06 RX ADMIN — MIDAZOLAM 0.5 MG: 1 INJECTION INTRAMUSCULAR; INTRAVENOUS at 14:40

## 2020-10-06 RX ADMIN — MIDAZOLAM 0.5 MG: 1 INJECTION INTRAMUSCULAR; INTRAVENOUS at 14:03

## 2020-10-07 ENCOUNTER — TELEPHONE (OUTPATIENT)
Dept: RADIOLOGY | Facility: HOSPITAL | Age: 80
End: 2020-10-07

## 2020-10-07 DIAGNOSIS — C22.0 HEPATOCELLULAR CARCINOMA (HCC): Primary | ICD-10-CM

## 2020-10-07 RX ORDER — OMEPRAZOLE 40 MG/1
40 CAPSULE, DELAYED RELEASE ORAL DAILY
Qty: 30 CAPSULE | Refills: 0 | Status: SHIPPED | OUTPATIENT
Start: 2020-10-12 | End: 2021-01-26 | Stop reason: ALTCHOICE

## 2020-10-07 RX ORDER — METHYLPREDNISOLONE 4 MG/1
TABLET ORAL
Qty: 1 EACH | Refills: 0 | Status: SHIPPED | OUTPATIENT
Start: 2020-10-14 | End: 2021-01-26 | Stop reason: ALTCHOICE

## 2020-10-09 ENCOUNTER — TELEPHONE (OUTPATIENT)
Dept: RADIOLOGY | Facility: HOSPITAL | Age: 80
End: 2020-10-09

## 2020-10-13 ENCOUNTER — TELEPHONE (OUTPATIENT)
Dept: INPATIENT UNIT | Facility: HOSPITAL | Age: 80
End: 2020-10-13

## 2020-10-14 ENCOUNTER — HOSPITAL ENCOUNTER (OUTPATIENT)
Dept: RADIOLOGY | Facility: HOSPITAL | Age: 80
Discharge: HOME | End: 2020-10-14
Attending: RADIOLOGY
Payer: MEDICARE

## 2020-10-14 ENCOUNTER — APPOINTMENT (OUTPATIENT)
Dept: RADIATION ONCOLOGY | Facility: HOSPITAL | Age: 80
End: 2020-10-14
Attending: RADIOLOGY
Payer: MEDICARE

## 2020-10-14 ENCOUNTER — HOSPITAL ENCOUNTER (OUTPATIENT)
Dept: RADIOLOGY | Facility: HOSPITAL | Age: 80
Discharge: HOME | End: 2020-10-14
Admitting: RADIOLOGY
Payer: MEDICARE

## 2020-10-14 ENCOUNTER — HOSPITAL ENCOUNTER (OUTPATIENT)
Dept: RADIOLOGY | Facility: HOSPITAL | Age: 80
Discharge: HOME | End: 2020-10-14
Attending: RADIOLOGY | Admitting: RADIOLOGY
Payer: MEDICARE

## 2020-10-14 ENCOUNTER — TELEPHONE (OUTPATIENT)
Dept: RADIOLOGY | Facility: HOSPITAL | Age: 80
End: 2020-10-14

## 2020-10-14 VITALS
RESPIRATION RATE: 19 BRPM | BODY MASS INDEX: 29.62 KG/M2 | WEIGHT: 200 LBS | DIASTOLIC BLOOD PRESSURE: 72 MMHG | TEMPERATURE: 97.8 F | HEART RATE: 82 BPM | HEIGHT: 69 IN | SYSTOLIC BLOOD PRESSURE: 158 MMHG | OXYGEN SATURATION: 99 %

## 2020-10-14 VITALS
OXYGEN SATURATION: 100 % | HEART RATE: 61 BPM | DIASTOLIC BLOOD PRESSURE: 63 MMHG | SYSTOLIC BLOOD PRESSURE: 139 MMHG | RESPIRATION RATE: 22 BRPM

## 2020-10-14 DIAGNOSIS — C22.0 HEPATOCELLULAR CARCINOMA (HCC): ICD-10-CM

## 2020-10-14 LAB
ALBUMIN SERPL BCP-MCNC: 3.8 G/DL (ref 3.5–5)
ALP SERPL-CCNC: 139 U/L (ref 46–116)
ALT SERPL W P-5'-P-CCNC: 32 U/L (ref 12–78)
ANION GAP SERPL CALCULATED.3IONS-SCNC: 4 MMOL/L (ref 4–13)
AST SERPL W P-5'-P-CCNC: 45 U/L (ref 5–45)
BILIRUB SERPL-MCNC: 1.11 MG/DL (ref 0.2–1)
BUN SERPL-MCNC: 26 MG/DL (ref 5–25)
CALCIUM SERPL-MCNC: 9.4 MG/DL (ref 8.3–10.1)
CHLORIDE SERPL-SCNC: 103 MMOL/L (ref 100–108)
CO2 SERPL-SCNC: 31 MMOL/L (ref 21–32)
CREAT SERPL-MCNC: 1.47 MG/DL (ref 0.6–1.3)
GFR SERPL CREATININE-BSD FRML MDRD: 44 ML/MIN/1.73SQ M
GLUCOSE P FAST SERPL-MCNC: 131 MG/DL (ref 65–99)
GLUCOSE SERPL-MCNC: 131 MG/DL (ref 65–140)
POTASSIUM SERPL-SCNC: 5.3 MMOL/L (ref 3.5–5.3)
PROT SERPL-MCNC: 8.2 G/DL (ref 6.4–8.2)
SODIUM SERPL-SCNC: 138 MMOL/L (ref 136–145)

## 2020-10-14 PROCEDURE — C1769 GUIDE WIRE: HCPCS

## 2020-10-14 PROCEDURE — C1894 INTRO/SHEATH, NON-LASER: HCPCS

## 2020-10-14 PROCEDURE — 37243 VASC EMBOLIZE/OCCLUDE ORGAN: CPT | Performed by: RADIOLOGY

## 2020-10-14 PROCEDURE — C1887 CATHETER, GUIDING: HCPCS

## 2020-10-14 PROCEDURE — 99152 MOD SED SAME PHYS/QHP 5/>YRS: CPT | Performed by: RADIOLOGY

## 2020-10-14 PROCEDURE — 80053 COMPREHEN METABOLIC PANEL: CPT | Performed by: RADIOLOGY

## 2020-10-14 PROCEDURE — 76937 US GUIDE VASCULAR ACCESS: CPT

## 2020-10-14 PROCEDURE — C1982 CATH, PRESSURE,VALVE-OCCLU: HCPCS

## 2020-10-14 PROCEDURE — C2616 BRACHYTX, NON-STR,YTTRIUM-90: HCPCS | Performed by: RADIOLOGY

## 2020-10-14 PROCEDURE — 77370 RADIATION PHYSICS CONSULT: CPT | Performed by: RADIOLOGY

## 2020-10-14 PROCEDURE — 77470 SPECIAL RADIATION TREATMENT: CPT | Performed by: RADIOLOGY

## 2020-10-14 PROCEDURE — G1004 CDSM NDSC: HCPCS

## 2020-10-14 PROCEDURE — 36247 INS CATH ABD/L-EXT ART 3RD: CPT

## 2020-10-14 PROCEDURE — 76937 US GUIDE VASCULAR ACCESS: CPT | Performed by: RADIOLOGY

## 2020-10-14 PROCEDURE — C1760 CLOSURE DEV, VASC: HCPCS

## 2020-10-14 PROCEDURE — 77300 RADIATION THERAPY DOSE PLAN: CPT | Performed by: RADIOLOGY

## 2020-10-14 PROCEDURE — 79445 NUCLEAR RX INTRA-ARTERIAL: CPT | Performed by: RADIOLOGY

## 2020-10-14 PROCEDURE — 36248 INS CATH ABD/L-EXT ART ADDL: CPT | Performed by: RADIOLOGY

## 2020-10-14 PROCEDURE — 36247 INS CATH ABD/L-EXT ART 3RD: CPT | Performed by: RADIOLOGY

## 2020-10-14 PROCEDURE — 36248 INS CATH ABD/L-EXT ART ADDL: CPT

## 2020-10-14 PROCEDURE — 99153 MOD SED SAME PHYS/QHP EA: CPT

## 2020-10-14 PROCEDURE — 78803 RP LOCLZJ TUM SPECT 1 AREA: CPT

## 2020-10-14 PROCEDURE — 37243 VASC EMBOLIZE/OCCLUDE ORGAN: CPT

## 2020-10-14 PROCEDURE — 99152 MOD SED SAME PHYS/QHP 5/>YRS: CPT

## 2020-10-14 RX ORDER — SODIUM CHLORIDE 9 MG/ML
75 INJECTION, SOLUTION INTRAVENOUS CONTINUOUS
Status: DISCONTINUED | OUTPATIENT
Start: 2020-10-14 | End: 2020-10-14 | Stop reason: HOSPADM

## 2020-10-14 RX ORDER — OXYCODONE HYDROCHLORIDE 5 MG/1
5 TABLET ORAL EVERY 4 HOURS PRN
Qty: 6 TABLET | Refills: 0 | Status: SHIPPED | OUTPATIENT
Start: 2020-10-14 | End: 2021-01-26 | Stop reason: ALTCHOICE

## 2020-10-14 RX ORDER — FENTANYL CITRATE 50 UG/ML
INJECTION, SOLUTION INTRAMUSCULAR; INTRAVENOUS CODE/TRAUMA/SEDATION MEDICATION
Status: COMPLETED | OUTPATIENT
Start: 2020-10-14 | End: 2020-10-14

## 2020-10-14 RX ORDER — ONDANSETRON 4 MG/1
4 TABLET, FILM COATED ORAL EVERY 8 HOURS PRN
Qty: 12 TABLET | Refills: 0 | Status: SHIPPED | OUTPATIENT
Start: 2020-10-14 | End: 2021-01-26 | Stop reason: ALTCHOICE

## 2020-10-14 RX ORDER — CEFAZOLIN SODIUM 2 G/50ML
SOLUTION INTRAVENOUS
Status: COMPLETED | OUTPATIENT
Start: 2020-10-14 | End: 2020-10-14

## 2020-10-14 RX ORDER — MIDAZOLAM HYDROCHLORIDE 2 MG/2ML
INJECTION, SOLUTION INTRAMUSCULAR; INTRAVENOUS CODE/TRAUMA/SEDATION MEDICATION
Status: COMPLETED | OUTPATIENT
Start: 2020-10-14 | End: 2020-10-14

## 2020-10-14 RX ADMIN — FENTANYL CITRATE 50 MCG: 50 INJECTION, SOLUTION INTRAMUSCULAR; INTRAVENOUS at 10:10

## 2020-10-14 RX ADMIN — FENTANYL CITRATE 50 MCG: 50 INJECTION, SOLUTION INTRAMUSCULAR; INTRAVENOUS at 12:01

## 2020-10-14 RX ADMIN — FENTANYL CITRATE 50 MCG: 50 INJECTION, SOLUTION INTRAMUSCULAR; INTRAVENOUS at 10:57

## 2020-10-14 RX ADMIN — MIDAZOLAM 1 MG: 1 INJECTION INTRAMUSCULAR; INTRAVENOUS at 10:57

## 2020-10-14 RX ADMIN — SODIUM CHLORIDE 75 ML/HR: 0.9 INJECTION, SOLUTION INTRAVENOUS at 08:49

## 2020-10-14 RX ADMIN — MIDAZOLAM 1 MG: 1 INJECTION INTRAMUSCULAR; INTRAVENOUS at 12:00

## 2020-10-14 RX ADMIN — NITROGLYCERIN 75 MCG: 20 INJECTION INTRAVENOUS at 12:30

## 2020-10-14 RX ADMIN — FENTANYL CITRATE 25 MCG: 50 INJECTION, SOLUTION INTRAMUSCULAR; INTRAVENOUS at 12:37

## 2020-10-14 RX ADMIN — MIDAZOLAM 1 MG: 1 INJECTION INTRAMUSCULAR; INTRAVENOUS at 12:37

## 2020-10-14 RX ADMIN — MIDAZOLAM 2 MG: 1 INJECTION INTRAMUSCULAR; INTRAVENOUS at 10:10

## 2020-10-14 RX ADMIN — CEFAZOLIN SODIUM 2000 MG: 2 SOLUTION INTRAVENOUS at 10:17

## 2020-10-14 RX ADMIN — NITROGLYCERIN 150 MCG: 20 INJECTION INTRAVENOUS at 12:25

## 2020-10-14 RX ADMIN — IODIXANOL 75 ML: 320 INJECTION, SOLUTION INTRAVASCULAR at 14:27

## 2020-10-15 ENCOUNTER — TELEPHONE (OUTPATIENT)
Dept: RADIATION ONCOLOGY | Facility: HOSPITAL | Age: 80
End: 2020-10-15

## 2020-10-15 DIAGNOSIS — C22.0 HEPATOCELLULAR CARCINOMA (HCC): Primary | ICD-10-CM

## 2020-10-15 RX ORDER — MOXIFLOXACIN HYDROCHLORIDE 400 MG/1
400 TABLET ORAL DAILY
Qty: 7 TABLET | Refills: 0 | Status: SHIPPED | OUTPATIENT
Start: 2020-10-15 | End: 2020-10-22

## 2020-10-16 ENCOUNTER — TELEPHONE (OUTPATIENT)
Dept: RADIATION ONCOLOGY | Facility: CLINIC | Age: 80
End: 2020-10-16

## 2020-10-23 ENCOUNTER — CLINICAL SUPPORT (OUTPATIENT)
Dept: FAMILY MEDICINE CLINIC | Facility: CLINIC | Age: 80
End: 2020-10-23
Payer: MEDICARE

## 2020-10-23 DIAGNOSIS — Z23 ENCOUNTER FOR VACCINATION: Primary | ICD-10-CM

## 2020-10-23 PROCEDURE — 90662 IIV NO PRSV INCREASED AG IM: CPT

## 2020-10-23 PROCEDURE — G0008 ADMIN INFLUENZA VIRUS VAC: HCPCS

## 2020-10-27 ENCOUNTER — TELEPHONE (OUTPATIENT)
Dept: FAMILY MEDICINE CLINIC | Facility: CLINIC | Age: 80
End: 2020-10-27

## 2020-10-28 LAB — HBA1C MFR BLD HPLC: 6.7 %

## 2020-11-11 ENCOUNTER — OFFICE VISIT (OUTPATIENT)
Dept: FAMILY MEDICINE CLINIC | Facility: CLINIC | Age: 80
End: 2020-11-11
Payer: MEDICARE

## 2020-11-11 VITALS
DIASTOLIC BLOOD PRESSURE: 78 MMHG | WEIGHT: 202.4 LBS | HEART RATE: 71 BPM | OXYGEN SATURATION: 97 % | BODY MASS INDEX: 29.98 KG/M2 | HEIGHT: 69 IN | SYSTOLIC BLOOD PRESSURE: 128 MMHG | TEMPERATURE: 97.5 F

## 2020-11-11 DIAGNOSIS — R73.9 HYPERGLYCEMIA: ICD-10-CM

## 2020-11-11 DIAGNOSIS — I25.10 CORONARY ARTERY DISEASE INVOLVING NATIVE HEART WITHOUT ANGINA PECTORIS, UNSPECIFIED VESSEL OR LESION TYPE: ICD-10-CM

## 2020-11-11 DIAGNOSIS — J45.20 MILD INTERMITTENT REACTIVE AIRWAY DISEASE WITHOUT COMPLICATION: Primary | ICD-10-CM

## 2020-11-11 DIAGNOSIS — C22.0 HEPATOCELLULAR CARCINOMA (HCC): ICD-10-CM

## 2020-11-11 PROCEDURE — 99214 OFFICE O/P EST MOD 30 MIN: CPT | Performed by: FAMILY MEDICINE

## 2020-11-19 ENCOUNTER — TELEMEDICINE (OUTPATIENT)
Dept: RADIATION ONCOLOGY | Facility: CLINIC | Age: 80
End: 2020-11-19
Attending: RADIOLOGY

## 2020-11-19 DIAGNOSIS — C22.0 HEPATOCELLULAR CARCINOMA (HCC): Primary | ICD-10-CM

## 2021-01-01 ENCOUNTER — TELEPHONE (OUTPATIENT)
Dept: HEMATOLOGY ONCOLOGY | Facility: CLINIC | Age: 81
End: 2021-01-01

## 2021-01-01 ENCOUNTER — APPOINTMENT (OUTPATIENT)
Dept: RADIOLOGY | Facility: CLINIC | Age: 81
End: 2021-01-01
Payer: MEDICARE

## 2021-01-01 ENCOUNTER — HOSPITAL ENCOUNTER (OUTPATIENT)
Dept: NON INVASIVE DIAGNOSTICS | Facility: HOSPITAL | Age: 81
Discharge: HOME | End: 2021-12-29
Payer: MEDICARE

## 2021-01-01 ENCOUNTER — TELEPHONE (OUTPATIENT)
Dept: FAMILY MEDICINE CLINIC | Facility: CLINIC | Age: 81
End: 2021-01-01

## 2021-01-01 ENCOUNTER — HOSPITAL ENCOUNTER (INPATIENT)
Facility: HOSPITAL | Age: 81
LOS: 7 days | Discharge: HOME HEALTH | DRG: 871 | End: 2021-11-11
Attending: ANESTHESIOLOGY | Admitting: ANESTHESIOLOGY
Payer: MEDICARE

## 2021-01-01 ENCOUNTER — TELEMEDICINE (OUTPATIENT)
Dept: INTERVENTIONAL RADIOLOGY/VASCULAR | Facility: CLINIC | Age: 81
End: 2021-01-01
Payer: MEDICARE

## 2021-01-01 ENCOUNTER — TELEPHONE (OUTPATIENT)
Dept: CARDIOLOGY CLINIC | Facility: CLINIC | Age: 81
End: 2021-01-01

## 2021-01-01 ENCOUNTER — HOSPITAL ENCOUNTER (OUTPATIENT)
Dept: MRI IMAGING | Facility: HOSPITAL | Age: 81
Discharge: HOME | End: 2021-09-07
Attending: SURGERY
Payer: MEDICARE

## 2021-01-01 ENCOUNTER — APPOINTMENT (EMERGENCY)
Dept: CT IMAGING | Facility: HOSPITAL | Age: 81
End: 2021-01-01
Payer: MEDICARE

## 2021-01-01 ENCOUNTER — APPOINTMENT (OUTPATIENT)
Dept: LAB | Facility: CLINIC | Age: 81
End: 2021-01-01
Payer: MEDICARE

## 2021-01-01 ENCOUNTER — OFFICE VISIT (OUTPATIENT)
Dept: CARDIOLOGY CLINIC | Facility: CLINIC | Age: 81
End: 2021-01-01
Payer: MEDICARE

## 2021-01-01 ENCOUNTER — APPOINTMENT (INPATIENT)
Dept: VASCULAR ULTRASOUND | Facility: HOSPITAL | Age: 81
DRG: 871 | End: 2021-01-01
Payer: MEDICARE

## 2021-01-01 ENCOUNTER — APPOINTMENT (INPATIENT)
Dept: NON INVASIVE DIAGNOSTICS | Facility: HOSPITAL | Age: 81
DRG: 871 | End: 2021-01-01
Payer: MEDICARE

## 2021-01-01 ENCOUNTER — OFFICE VISIT (OUTPATIENT)
Dept: SURGICAL ONCOLOGY | Facility: CLINIC | Age: 81
End: 2021-01-01
Payer: MEDICARE

## 2021-01-01 ENCOUNTER — NURSE TRIAGE (OUTPATIENT)
Dept: OTHER | Facility: OTHER | Age: 81
End: 2021-01-01

## 2021-01-01 ENCOUNTER — CLINICAL SUPPORT (OUTPATIENT)
Dept: RADIATION ONCOLOGY | Facility: CLINIC | Age: 81
End: 2021-01-01
Attending: RADIOLOGY
Payer: MEDICARE

## 2021-01-01 ENCOUNTER — TELEPHONE (OUTPATIENT)
Dept: RADIOLOGY | Facility: HOSPITAL | Age: 81
End: 2021-01-01

## 2021-01-01 ENCOUNTER — OFFICE VISIT (OUTPATIENT)
Dept: FAMILY MEDICINE CLINIC | Facility: CLINIC | Age: 81
End: 2021-01-01
Payer: MEDICARE

## 2021-01-01 ENCOUNTER — TELEMEDICINE (OUTPATIENT)
Dept: FAMILY MEDICINE CLINIC | Facility: CLINIC | Age: 81
End: 2021-01-01
Payer: MEDICARE

## 2021-01-01 ENCOUNTER — TRANSITIONAL CARE MANAGEMENT (OUTPATIENT)
Dept: FAMILY MEDICINE CLINIC | Facility: CLINIC | Age: 81
End: 2021-01-01

## 2021-01-01 ENCOUNTER — HOSPITAL ENCOUNTER (OUTPATIENT)
Dept: RADIOLOGY | Facility: HOSPITAL | Age: 81
Discharge: HOME | End: 2021-06-10
Attending: RADIOLOGY | Admitting: RADIOLOGY
Payer: MEDICARE

## 2021-01-01 ENCOUNTER — HOSPITAL ENCOUNTER (EMERGENCY)
Facility: HOSPITAL | Age: 81
End: 2021-11-04
Attending: EMERGENCY MEDICINE | Admitting: EMERGENCY MEDICINE
Payer: MEDICARE

## 2021-01-01 ENCOUNTER — OFFICE VISIT (OUTPATIENT)
Dept: HEMATOLOGY ONCOLOGY | Facility: CLINIC | Age: 81
End: 2021-01-01
Payer: MEDICARE

## 2021-01-01 ENCOUNTER — APPOINTMENT (EMERGENCY)
Dept: RADIOLOGY | Facility: HOSPITAL | Age: 81
End: 2021-01-01
Payer: MEDICARE

## 2021-01-01 ENCOUNTER — DOCUMENTATION (OUTPATIENT)
Dept: HEMATOLOGY ONCOLOGY | Facility: CLINIC | Age: 81
End: 2021-01-01

## 2021-01-01 VITALS
RESPIRATION RATE: 18 BRPM | HEART RATE: 90 BPM | WEIGHT: 193 LBS | DIASTOLIC BLOOD PRESSURE: 66 MMHG | HEIGHT: 69 IN | BODY MASS INDEX: 28.58 KG/M2 | OXYGEN SATURATION: 89 % | TEMPERATURE: 97.6 F | SYSTOLIC BLOOD PRESSURE: 137 MMHG

## 2021-01-01 VITALS
BODY MASS INDEX: 28.91 KG/M2 | DIASTOLIC BLOOD PRESSURE: 80 MMHG | RESPIRATION RATE: 18 BRPM | TEMPERATURE: 96.2 F | SYSTOLIC BLOOD PRESSURE: 140 MMHG | HEART RATE: 85 BPM | HEIGHT: 69 IN | OXYGEN SATURATION: 96 % | WEIGHT: 195.2 LBS

## 2021-01-01 VITALS
OXYGEN SATURATION: 93 % | HEART RATE: 94 BPM | TEMPERATURE: 96.7 F | RESPIRATION RATE: 22 BRPM | SYSTOLIC BLOOD PRESSURE: 108 MMHG | HEIGHT: 69 IN | WEIGHT: 193.6 LBS | BODY MASS INDEX: 28.68 KG/M2 | DIASTOLIC BLOOD PRESSURE: 64 MMHG

## 2021-01-01 VITALS
TEMPERATURE: 97.5 F | DIASTOLIC BLOOD PRESSURE: 80 MMHG | HEIGHT: 69 IN | OXYGEN SATURATION: 98 % | BODY MASS INDEX: 30.36 KG/M2 | WEIGHT: 205 LBS | HEART RATE: 75 BPM | RESPIRATION RATE: 17 BRPM | SYSTOLIC BLOOD PRESSURE: 122 MMHG

## 2021-01-01 VITALS
DIASTOLIC BLOOD PRESSURE: 68 MMHG | SYSTOLIC BLOOD PRESSURE: 118 MMHG | BODY MASS INDEX: 30.07 KG/M2 | HEIGHT: 69 IN | OXYGEN SATURATION: 94 % | TEMPERATURE: 98.3 F | WEIGHT: 203 LBS | HEART RATE: 83 BPM | RESPIRATION RATE: 18 BRPM

## 2021-01-01 VITALS
BODY MASS INDEX: 31.07 KG/M2 | WEIGHT: 209.8 LBS | HEIGHT: 69 IN | RESPIRATION RATE: 18 BRPM | HEART RATE: 74 BPM | OXYGEN SATURATION: 94 % | SYSTOLIC BLOOD PRESSURE: 130 MMHG | TEMPERATURE: 98.9 F | DIASTOLIC BLOOD PRESSURE: 64 MMHG

## 2021-01-01 VITALS
DIASTOLIC BLOOD PRESSURE: 60 MMHG | SYSTOLIC BLOOD PRESSURE: 100 MMHG | BODY MASS INDEX: 28.2 KG/M2 | OXYGEN SATURATION: 96 % | HEIGHT: 69 IN | TEMPERATURE: 96.9 F | HEART RATE: 106 BPM | WEIGHT: 190.4 LBS

## 2021-01-01 VITALS
BODY MASS INDEX: 30.07 KG/M2 | TEMPERATURE: 99.1 F | RESPIRATION RATE: 20 BRPM | HEART RATE: 100 BPM | WEIGHT: 203 LBS | SYSTOLIC BLOOD PRESSURE: 126 MMHG | OXYGEN SATURATION: 97 % | DIASTOLIC BLOOD PRESSURE: 71 MMHG | HEIGHT: 69 IN

## 2021-01-01 VITALS
WEIGHT: 185.19 LBS | SYSTOLIC BLOOD PRESSURE: 119 MMHG | DIASTOLIC BLOOD PRESSURE: 88 MMHG | OXYGEN SATURATION: 93 % | HEART RATE: 96 BPM | RESPIRATION RATE: 18 BRPM | BODY MASS INDEX: 27.43 KG/M2 | TEMPERATURE: 97.5 F | HEIGHT: 69 IN

## 2021-01-01 VITALS
OXYGEN SATURATION: 95 % | HEIGHT: 69 IN | SYSTOLIC BLOOD PRESSURE: 162 MMHG | TEMPERATURE: 96.8 F | WEIGHT: 202 LBS | DIASTOLIC BLOOD PRESSURE: 78 MMHG | HEART RATE: 73 BPM | RESPIRATION RATE: 16 BRPM | BODY MASS INDEX: 29.92 KG/M2

## 2021-01-01 VITALS
WEIGHT: 189 LBS | TEMPERATURE: 97.7 F | HEART RATE: 88 BPM | BODY MASS INDEX: 27.99 KG/M2 | RESPIRATION RATE: 16 BRPM | DIASTOLIC BLOOD PRESSURE: 82 MMHG | OXYGEN SATURATION: 93 % | SYSTOLIC BLOOD PRESSURE: 128 MMHG | HEIGHT: 69 IN

## 2021-01-01 VITALS
WEIGHT: 189.4 LBS | OXYGEN SATURATION: 94 % | HEIGHT: 69 IN | DIASTOLIC BLOOD PRESSURE: 60 MMHG | BODY MASS INDEX: 28.05 KG/M2 | RESPIRATION RATE: 22 BRPM | HEART RATE: 106 BPM | SYSTOLIC BLOOD PRESSURE: 110 MMHG | TEMPERATURE: 98.9 F

## 2021-01-01 DIAGNOSIS — U09.9 POST-COVID CHRONIC DYSPNEA: ICD-10-CM

## 2021-01-01 DIAGNOSIS — C22.0 HEPATOCELLULAR CARCINOMA (HCC): Primary | ICD-10-CM

## 2021-01-01 DIAGNOSIS — E11.9 TYPE 2 DIABETES MELLITUS WITHOUT COMPLICATION, WITHOUT LONG-TERM CURRENT USE OF INSULIN (HCC): ICD-10-CM

## 2021-01-01 DIAGNOSIS — I73.9 PAD (PERIPHERAL ARTERY DISEASE) (HCC): ICD-10-CM

## 2021-01-01 DIAGNOSIS — J12.82 PNEUMONIA DUE TO COVID-19 VIRUS: Primary | ICD-10-CM

## 2021-01-01 DIAGNOSIS — I74.3 EMBOLISM AND THROMBOSIS OF ARTERIES OF THE LOWER EXTREMITIES (HCC): ICD-10-CM

## 2021-01-01 DIAGNOSIS — R06.09 POST-COVID CHRONIC DYSPNEA: ICD-10-CM

## 2021-01-01 DIAGNOSIS — J96.01 ACUTE RESPIRATORY FAILURE WITH HYPOXIA (HCC): Primary | ICD-10-CM

## 2021-01-01 DIAGNOSIS — I10 ESSENTIAL HYPERTENSION: ICD-10-CM

## 2021-01-01 DIAGNOSIS — I26.99 PULMONARY EMBOLI (HCC): Primary | ICD-10-CM

## 2021-01-01 DIAGNOSIS — C22.0 HEPATOCELLULAR CARCINOMA (HCC): ICD-10-CM

## 2021-01-01 DIAGNOSIS — H54.7 VISION LOSS: ICD-10-CM

## 2021-01-01 DIAGNOSIS — H54.7 VISION LOSS: Primary | ICD-10-CM

## 2021-01-01 DIAGNOSIS — I26.99 PULMONARY EMBOLISM, UNSPECIFIED CHRONICITY, UNSPECIFIED PULMONARY EMBOLISM TYPE, UNSPECIFIED WHETHER ACUTE COR PULMONALE PRESENT (HCC): ICD-10-CM

## 2021-01-01 DIAGNOSIS — U07.1 PNEUMONIA DUE TO COVID-19 VIRUS: Primary | ICD-10-CM

## 2021-01-01 DIAGNOSIS — I51.7 CARDIOMEGALY: ICD-10-CM

## 2021-01-01 DIAGNOSIS — J18.9 PNEUMONIA: ICD-10-CM

## 2021-01-01 DIAGNOSIS — R35.1 NOCTURIA: ICD-10-CM

## 2021-01-01 DIAGNOSIS — U07.1 PNEUMONIA DUE TO COVID-19 VIRUS: ICD-10-CM

## 2021-01-01 DIAGNOSIS — I50.32 CHRONIC DIASTOLIC HEART FAILURE (HCC): ICD-10-CM

## 2021-01-01 DIAGNOSIS — J12.82 PNEUMONIA DUE TO COVID-19 VIRUS: ICD-10-CM

## 2021-01-01 DIAGNOSIS — N18.31 STAGE 3A CHRONIC KIDNEY DISEASE (HCC): ICD-10-CM

## 2021-01-01 DIAGNOSIS — E78.2 MIXED HYPERLIPIDEMIA: ICD-10-CM

## 2021-01-01 DIAGNOSIS — B96.89 ACUTE BACTERIAL BRONCHITIS: Primary | ICD-10-CM

## 2021-01-01 DIAGNOSIS — I25.10 CORONARY ARTERY DISEASE INVOLVING NATIVE HEART WITHOUT ANGINA PECTORIS, UNSPECIFIED VESSEL OR LESION TYPE: ICD-10-CM

## 2021-01-01 DIAGNOSIS — I50.31 ACUTE DIASTOLIC HEART FAILURE (HCC): ICD-10-CM

## 2021-01-01 DIAGNOSIS — U07.1 COVID: Primary | ICD-10-CM

## 2021-01-01 DIAGNOSIS — J96.01 ACUTE RESPIRATORY FAILURE WITH HYPOXIA (HCC): ICD-10-CM

## 2021-01-01 DIAGNOSIS — E11.9 DIABETIC EYE EXAM (HCC): ICD-10-CM

## 2021-01-01 DIAGNOSIS — U07.1 COVID-19: ICD-10-CM

## 2021-01-01 DIAGNOSIS — E78.5 HYPERLIPIDEMIA, UNSPECIFIED HYPERLIPIDEMIA TYPE: Primary | ICD-10-CM

## 2021-01-01 DIAGNOSIS — J45.20 MILD INTERMITTENT REACTIVE AIRWAY DISEASE WITHOUT COMPLICATION: ICD-10-CM

## 2021-01-01 DIAGNOSIS — U07.1 COVID-19 VIRUS INFECTION: ICD-10-CM

## 2021-01-01 DIAGNOSIS — Z00.00 MEDICARE ANNUAL WELLNESS VISIT, SUBSEQUENT: ICD-10-CM

## 2021-01-01 DIAGNOSIS — U07.1 COVID-19 VIRUS INFECTION: Primary | ICD-10-CM

## 2021-01-01 DIAGNOSIS — Z01.00 DIABETIC EYE EXAM (HCC): ICD-10-CM

## 2021-01-01 DIAGNOSIS — I10 ESSENTIAL HYPERTENSION: Primary | ICD-10-CM

## 2021-01-01 DIAGNOSIS — J20.8 ACUTE BACTERIAL BRONCHITIS: Primary | ICD-10-CM

## 2021-01-01 DIAGNOSIS — Z23 ENCOUNTER FOR IMMUNIZATION: Primary | ICD-10-CM

## 2021-01-01 DIAGNOSIS — R05.8 ALLERGIC COUGH: ICD-10-CM

## 2021-01-01 DIAGNOSIS — R06.02 SOB (SHORTNESS OF BREATH): ICD-10-CM

## 2021-01-01 DIAGNOSIS — R09.02 HYPOXIA: ICD-10-CM

## 2021-01-01 LAB
AFP-TM SERPL-MCNC: 3.9 NG/ML (ref 0.5–8)
ALBUMIN SERPL BCP-MCNC: 2.5 G/DL (ref 3.5–5)
ALBUMIN SERPL BCP-MCNC: 2.6 G/DL (ref 3.5–5)
ALBUMIN SERPL BCP-MCNC: 2.8 G/DL (ref 3.5–5)
ALBUMIN SERPL BCP-MCNC: 3.1 G/DL (ref 3.5–5)
ALBUMIN SERPL BCP-MCNC: 3.3 G/DL (ref 3.5–5)
ALBUMIN SERPL BCP-MCNC: 3.4 G/DL (ref 3.5–5)
ALBUMIN SERPL BCP-MCNC: 3.5 G/DL (ref 3.5–5)
ALBUMIN SERPL BCP-MCNC: 3.5 G/DL (ref 3.5–5)
ALBUMIN SERPL BCP-MCNC: 3.7 G/DL (ref 3.5–5.7)
ALP SERPL-CCNC: 143 U/L (ref 46–116)
ALP SERPL-CCNC: 145 U/L (ref 46–116)
ALP SERPL-CCNC: 147 U/L (ref 46–116)
ALP SERPL-CCNC: 150 U/L (ref 46–116)
ALP SERPL-CCNC: 150 U/L (ref 46–116)
ALP SERPL-CCNC: 154 U/L (ref 55–165)
ALP SERPL-CCNC: 159 U/L (ref 46–116)
ALP SERPL-CCNC: 159 U/L (ref 46–116)
ALP SERPL-CCNC: 161 U/L (ref 46–116)
ALP SERPL-CCNC: 167 U/L (ref 46–116)
ALP SERPL-CCNC: 171 U/L (ref 46–116)
ALT SERPL W P-5'-P-CCNC: 20 U/L (ref 12–78)
ALT SERPL W P-5'-P-CCNC: 22 U/L (ref 7–52)
ALT SERPL W P-5'-P-CCNC: 30 U/L (ref 12–78)
ALT SERPL W P-5'-P-CCNC: 32 U/L (ref 12–78)
ALT SERPL W P-5'-P-CCNC: 35 U/L (ref 12–78)
ALT SERPL W P-5'-P-CCNC: 36 U/L (ref 12–78)
ALT SERPL W P-5'-P-CCNC: 36 U/L (ref 12–78)
ALT SERPL W P-5'-P-CCNC: 39 U/L (ref 12–78)
ALT SERPL W P-5'-P-CCNC: 41 U/L (ref 12–78)
ALT SERPL W P-5'-P-CCNC: 43 U/L (ref 12–78)
ALT SERPL W P-5'-P-CCNC: 45 U/L (ref 12–78)
ANION GAP SERPL CALCULATED.3IONS-SCNC: 10 MMOL/L (ref 4–13)
ANION GAP SERPL CALCULATED.3IONS-SCNC: 10 MMOL/L (ref 4–13)
ANION GAP SERPL CALCULATED.3IONS-SCNC: 13 MMOL/L (ref 4–13)
ANION GAP SERPL CALCULATED.3IONS-SCNC: 2 MMOL/L (ref 4–13)
ANION GAP SERPL CALCULATED.3IONS-SCNC: 4 MMOL/L (ref 4–13)
ANION GAP SERPL CALCULATED.3IONS-SCNC: 5 MMOL/L (ref 4–13)
ANION GAP SERPL CALCULATED.3IONS-SCNC: 5 MMOL/L (ref 4–13)
ANION GAP SERPL CALCULATED.3IONS-SCNC: 6 MMOL/L (ref 4–13)
ANION GAP SERPL CALCULATED.3IONS-SCNC: 8 MMOL/L (ref 4–13)
ANION GAP SERPL CALCULATED.3IONS-SCNC: 8 MMOL/L (ref 4–13)
ANISOCYTOSIS BLD QL SMEAR: PRESENT
AORTIC ROOT: 3.4 CM
APICAL FOUR CHAMBER EJECTION FRACTION: 64 %
APTT PPP: 100 SECONDS (ref 23–37)
APTT PPP: 139 SECONDS (ref 23–37)
APTT PPP: 33 SECONDS (ref 23–37)
APTT PPP: 42 SECONDS (ref 23–37)
APTT PPP: 69 SECONDS (ref 23–37)
APTT PPP: 81 SECONDS (ref 23–37)
APTT PPP: 83 SECONDS (ref 23–37)
APTT PPP: 97 SECONDS (ref 23–37)
ASCENDING AORTA: 2.8 CM
AST SERPL W P-5'-P-CCNC: 29 U/L (ref 5–45)
AST SERPL W P-5'-P-CCNC: 30 U/L (ref 13–39)
AST SERPL W P-5'-P-CCNC: 30 U/L (ref 5–45)
AST SERPL W P-5'-P-CCNC: 30 U/L (ref 5–45)
AST SERPL W P-5'-P-CCNC: 31 U/L (ref 5–45)
AST SERPL W P-5'-P-CCNC: 33 U/L (ref 5–45)
AST SERPL W P-5'-P-CCNC: 34 U/L (ref 5–45)
AST SERPL W P-5'-P-CCNC: 34 U/L (ref 5–45)
AST SERPL W P-5'-P-CCNC: 38 U/L (ref 5–45)
ATRIAL RATE: 121 BPM
ATRIAL RATE: 93 BPM
AV LVOT MEAN GRADIENT: 1 MMHG
AV LVOT PEAK GRADIENT: 2 MMHG
AV PEAK GRADIENT: 12 MMHG
BACTERIA BLD CULT: NORMAL
BASOPHILS # BLD AUTO: 0.02 THOUSANDS/ΜL (ref 0–0.1)
BASOPHILS # BLD AUTO: 0.03 THOUSANDS/ΜL (ref 0–0.1)
BASOPHILS # BLD AUTO: 0.05 THOUSANDS/ΜL (ref 0–0.1)
BASOPHILS # BLD AUTO: 0.05 THOUSANDS/ΜL (ref 0–0.1)
BASOPHILS # BLD AUTO: 0.07 THOUSANDS/ΜL (ref 0–0.1)
BASOPHILS # BLD AUTO: 0.09 THOUSANDS/ΜL (ref 0–0.1)
BASOPHILS # BLD MANUAL: 0 THOUSAND/UL (ref 0–0.1)
BASOPHILS NFR BLD AUTO: 0 % (ref 0–1)
BASOPHILS NFR BLD AUTO: 0 % (ref 0–1)
BASOPHILS NFR BLD AUTO: 1 % (ref 0–1)
BASOPHILS NFR MAR MANUAL: 0 % (ref 0–1)
BILIRUB SERPL-MCNC: 0.54 MG/DL (ref 0.2–1)
BILIRUB SERPL-MCNC: 0.61 MG/DL (ref 0.2–1)
BILIRUB SERPL-MCNC: 0.62 MG/DL (ref 0.2–1)
BILIRUB SERPL-MCNC: 0.64 MG/DL (ref 0.2–1)
BILIRUB SERPL-MCNC: 0.71 MG/DL (ref 0.2–1)
BILIRUB SERPL-MCNC: 0.71 MG/DL (ref 0.2–1)
BILIRUB SERPL-MCNC: 0.75 MG/DL (ref 0.2–1)
BILIRUB SERPL-MCNC: 0.9 MG/DL (ref 0.2–1)
BILIRUB SERPL-MCNC: 3.1 MG/DL (ref 0.2–1)
BNP SERPL-MCNC: 367 PG/ML (ref 1–100)
BUN SERPL-MCNC: 14 MG/DL (ref 5–25)
BUN SERPL-MCNC: 14 MG/DL (ref 5–25)
BUN SERPL-MCNC: 17 MG/DL (ref 5–25)
BUN SERPL-MCNC: 17 MG/DL (ref 5–25)
BUN SERPL-MCNC: 18 MG/DL (ref 5–25)
BUN SERPL-MCNC: 23 MG/DL (ref 5–25)
BUN SERPL-MCNC: 23 MG/DL (ref 5–25)
BUN SERPL-MCNC: 23 MG/DL (ref 7–25)
BUN SERPL-MCNC: 24 MG/DL (ref 5–25)
BUN SERPL-MCNC: 26 MG/DL (ref 5–25)
BUN SERPL-MCNC: 30 MG/DL (ref 5–25)
BUN SERPL-MCNC: 31 MG/DL (ref 5–25)
CALCIUM ALBUM COR SERPL-MCNC: 10 MG/DL (ref 8.3–10.1)
CALCIUM ALBUM COR SERPL-MCNC: 9 MG/DL (ref 8.3–10.1)
CALCIUM ALBUM COR SERPL-MCNC: 9 MG/DL (ref 8.3–10.1)
CALCIUM ALBUM COR SERPL-MCNC: 9.1 MG/DL (ref 8.3–10.1)
CALCIUM ALBUM COR SERPL-MCNC: 9.2 MG/DL (ref 8.3–10.1)
CALCIUM ALBUM COR SERPL-MCNC: 9.3 MG/DL (ref 8.3–10.1)
CALCIUM ALBUM COR SERPL-MCNC: 9.4 MG/DL (ref 8.3–10.1)
CALCIUM ALBUM COR SERPL-MCNC: 9.7 MG/DL (ref 8.3–10.1)
CALCIUM SERPL-MCNC: 7.8 MG/DL (ref 8.3–10.1)
CALCIUM SERPL-MCNC: 7.9 MG/DL (ref 8.3–10.1)
CALCIUM SERPL-MCNC: 7.9 MG/DL (ref 8.3–10.1)
CALCIUM SERPL-MCNC: 8 MG/DL (ref 8.3–10.1)
CALCIUM SERPL-MCNC: 8.2 MG/DL (ref 8.3–10.1)
CALCIUM SERPL-MCNC: 8.4 MG/DL (ref 8.3–10.1)
CALCIUM SERPL-MCNC: 8.7 MG/DL (ref 8.3–10.1)
CALCIUM SERPL-MCNC: 9.1 MG/DL (ref 8.3–10.1)
CALCIUM SERPL-MCNC: 9.3 MG/DL (ref 8.3–10.1)
CALCIUM SERPL-MCNC: 9.3 MG/DL (ref 8.3–10.1)
CALCIUM SERPL-MCNC: 9.5 MG/DL (ref 8.3–10.1)
CALCIUM SERPL-MCNC: 9.7 MG/DL (ref 8.6–10.5)
CHLORIDE SERPL-SCNC: 102 MMOL/L (ref 100–108)
CHLORIDE SERPL-SCNC: 102 MMOL/L (ref 100–108)
CHLORIDE SERPL-SCNC: 103 MMOL/L (ref 100–108)
CHLORIDE SERPL-SCNC: 104 MMOL/L (ref 100–108)
CHLORIDE SERPL-SCNC: 105 MMOL/L (ref 100–108)
CHLORIDE SERPL-SCNC: 97 MMOL/L (ref 98–107)
CHOLEST SERPL-MCNC: 181 MG/DL (ref 50–200)
CHOLEST SERPL-MCNC: 225 MG/DL (ref 50–200)
CO2 SERPL-SCNC: 26 MMOL/L (ref 21–32)
CO2 SERPL-SCNC: 28 MMOL/L (ref 21–32)
CO2 SERPL-SCNC: 29 MMOL/L (ref 21–32)
CO2 SERPL-SCNC: 29 MMOL/L (ref 21–32)
CO2 SERPL-SCNC: 30 MMOL/L (ref 21–31)
CO2 SERPL-SCNC: 30 MMOL/L (ref 21–32)
CO2 SERPL-SCNC: 31 MMOL/L (ref 21–32)
CO2 SERPL-SCNC: 32 MMOL/L (ref 21–32)
CO2 SERPL-SCNC: 32 MMOL/L (ref 21–32)
CO2 SERPL-SCNC: 33 MMOL/L (ref 21–32)
CREAT SERPL-MCNC: 0.97 MG/DL (ref 0.6–1.3)
CREAT SERPL-MCNC: 0.99 MG/DL (ref 0.6–1.3)
CREAT SERPL-MCNC: 1.03 MG/DL (ref 0.6–1.3)
CREAT SERPL-MCNC: 1.05 MG/DL (ref 0.6–1.3)
CREAT SERPL-MCNC: 1.09 MG/DL (ref 0.6–1.3)
CREAT SERPL-MCNC: 1.14 MG/DL (ref 0.6–1.3)
CREAT SERPL-MCNC: 1.14 MG/DL (ref 0.6–1.3)
CREAT SERPL-MCNC: 1.15 MG/DL (ref 0.6–1.3)
CREAT SERPL-MCNC: 1.15 MG/DL (ref 0.6–1.3)
CREAT SERPL-MCNC: 1.19 MG/DL (ref 0.6–1.3)
CREAT SERPL-MCNC: 1.22 MG/DL (ref 0.6–1.3)
CREAT SERPL-MCNC: 1.25 MG/DL (ref 0.7–1.3)
CREAT UR-MCNC: 135 MG/DL
CRP SERPL QL: 140.8 MG/L
CRP SERPL QL: 17.1 MG/L
CRP SERPL QL: 27.4 MG/L
D DIMER PPP FEU-MCNC: 8.89 UG/ML FEU
D DIMER PPP FEU-MCNC: 9.59 UG/ML FEU
D DIMER PPP FEU-MCNC: >20 UG/ML FEU
DME PARACHUTE DELIVERY DATE ACTUAL: NORMAL
DME PARACHUTE DELIVERY DATE EXPECTED: NORMAL
DME PARACHUTE DELIVERY DATE REQUESTED: NORMAL
DME PARACHUTE ITEM DESCRIPTION: NORMAL
DME PARACHUTE ORDER STATUS: NORMAL
DME PARACHUTE SUPPLIER NAME: NORMAL
DME PARACHUTE SUPPLIER PHONE: NORMAL
DOP CALC LVOT PEAK VEL VTI: 15.33 CM
DOP CALC LVOT PEAK VEL: 0.72 M/S
E WAVE DECELERATION TIME: 249 MS
EOSINOPHIL # BLD AUTO: 0 THOUSAND/ΜL (ref 0–0.61)
EOSINOPHIL # BLD AUTO: 0 THOUSAND/ΜL (ref 0–0.61)
EOSINOPHIL # BLD AUTO: 0.26 THOUSAND/ΜL (ref 0–0.61)
EOSINOPHIL # BLD AUTO: 0.28 THOUSAND/ΜL (ref 0–0.61)
EOSINOPHIL # BLD AUTO: 0.38 THOUSAND/ΜL (ref 0–0.61)
EOSINOPHIL # BLD AUTO: 0.43 THOUSAND/ΜL (ref 0–0.61)
EOSINOPHIL # BLD MANUAL: 0 THOUSAND/UL (ref 0–0.4)
EOSINOPHIL NFR BLD AUTO: 0 % (ref 0–6)
EOSINOPHIL NFR BLD AUTO: 0 % (ref 0–6)
EOSINOPHIL NFR BLD AUTO: 2 % (ref 0–6)
EOSINOPHIL NFR BLD AUTO: 4 % (ref 0–6)
EOSINOPHIL NFR BLD AUTO: 4 % (ref 0–6)
EOSINOPHIL NFR BLD AUTO: 5 % (ref 0–6)
EOSINOPHIL NFR BLD MANUAL: 0 % (ref 0–6)
ERYTHROCYTE [DISTWIDTH] IN BLOOD BY AUTOMATED COUNT: 14.4 % (ref 11.6–15.1)
ERYTHROCYTE [DISTWIDTH] IN BLOOD BY AUTOMATED COUNT: 14.5 % (ref 11.6–15.1)
ERYTHROCYTE [DISTWIDTH] IN BLOOD BY AUTOMATED COUNT: 14.6 % (ref 11.6–15.1)
ERYTHROCYTE [DISTWIDTH] IN BLOOD BY AUTOMATED COUNT: 14.7 % (ref 11.6–15.1)
ERYTHROCYTE [DISTWIDTH] IN BLOOD BY AUTOMATED COUNT: 14.8 % (ref 11.6–15.1)
ERYTHROCYTE [DISTWIDTH] IN BLOOD BY AUTOMATED COUNT: 15.2 % (ref 11.6–15.1)
ERYTHROCYTE [DISTWIDTH] IN BLOOD BY AUTOMATED COUNT: 15.7 % (ref 11.5–14.5)
ERYTHROCYTE [DISTWIDTH] IN BLOOD BY AUTOMATED COUNT: 15.7 % (ref 11.6–15.1)
ERYTHROCYTE [DISTWIDTH] IN BLOOD BY AUTOMATED COUNT: 16.3 % (ref 11.6–15.1)
EST. AVERAGE GLUCOSE BLD GHB EST-MCNC: 146 MG/DL
FERRITIN SERPL-MCNC: 575 NG/ML (ref 8–388)
FLUAV RNA RESP QL NAA+PROBE: NEGATIVE
FLUBV RNA RESP QL NAA+PROBE: NEGATIVE
FRACTIONAL SHORTENING: 28 % (ref 28–44)
GFR SERPL CREATININE-BSD FRML MDRD: 53 ML/MIN/1.73SQ M
GFR SERPL CREATININE-BSD FRML MDRD: 54 ML/MIN/1.73SQ M
GFR SERPL CREATININE-BSD FRML MDRD: 56 ML/MIN/1.73SQ M
GFR SERPL CREATININE-BSD FRML MDRD: 59 ML/MIN/1.73SQ M
GFR SERPL CREATININE-BSD FRML MDRD: 62 ML/MIN/1.73SQ M
GFR SERPL CREATININE-BSD FRML MDRD: 65 ML/MIN/1.73SQ M
GFR SERPL CREATININE-BSD FRML MDRD: 67 ML/MIN/1.73SQ M
GFR SERPL CREATININE-BSD FRML MDRD: 70 ML/MIN/1.73SQ M
GFR SERPL CREATININE-BSD FRML MDRD: 72 ML/MIN/1.73SQ M
GIANT PLATELETS BLD QL SMEAR: PRESENT
GLUCOSE P FAST SERPL-MCNC: 124 MG/DL (ref 65–99)
GLUCOSE P FAST SERPL-MCNC: 131 MG/DL (ref 65–99)
GLUCOSE P FAST SERPL-MCNC: 136 MG/DL (ref 65–99)
GLUCOSE P FAST SERPL-MCNC: 159 MG/DL (ref 65–99)
GLUCOSE SERPL-MCNC: 120 MG/DL (ref 65–140)
GLUCOSE SERPL-MCNC: 121 MG/DL (ref 65–140)
GLUCOSE SERPL-MCNC: 131 MG/DL (ref 65–140)
GLUCOSE SERPL-MCNC: 132 MG/DL (ref 65–140)
GLUCOSE SERPL-MCNC: 133 MG/DL (ref 65–140)
GLUCOSE SERPL-MCNC: 134 MG/DL (ref 65–140)
GLUCOSE SERPL-MCNC: 138 MG/DL (ref 65–140)
GLUCOSE SERPL-MCNC: 138 MG/DL (ref 65–140)
GLUCOSE SERPL-MCNC: 140 MG/DL (ref 65–140)
GLUCOSE SERPL-MCNC: 141 MG/DL (ref 65–140)
GLUCOSE SERPL-MCNC: 142 MG/DL (ref 65–140)
GLUCOSE SERPL-MCNC: 144 MG/DL (ref 65–140)
GLUCOSE SERPL-MCNC: 151 MG/DL (ref 65–140)
GLUCOSE SERPL-MCNC: 152 MG/DL (ref 65–140)
GLUCOSE SERPL-MCNC: 158 MG/DL (ref 65–140)
GLUCOSE SERPL-MCNC: 160 MG/DL (ref 65–140)
GLUCOSE SERPL-MCNC: 160 MG/DL (ref 65–140)
GLUCOSE SERPL-MCNC: 171 MG/DL (ref 65–140)
GLUCOSE SERPL-MCNC: 173 MG/DL (ref 65–140)
GLUCOSE SERPL-MCNC: 176 MG/DL (ref 65–140)
GLUCOSE SERPL-MCNC: 183 MG/DL (ref 65–140)
GLUCOSE SERPL-MCNC: 184 MG/DL (ref 65–140)
GLUCOSE SERPL-MCNC: 187 MG/DL (ref 65–140)
GLUCOSE SERPL-MCNC: 190 MG/DL (ref 65–140)
GLUCOSE SERPL-MCNC: 190 MG/DL (ref 65–140)
GLUCOSE SERPL-MCNC: 191 MG/DL (ref 65–140)
GLUCOSE SERPL-MCNC: 195 MG/DL (ref 65–140)
GLUCOSE SERPL-MCNC: 195 MG/DL (ref 65–140)
GLUCOSE SERPL-MCNC: 199 MG/DL (ref 65–140)
GLUCOSE SERPL-MCNC: 201 MG/DL (ref 65–140)
GLUCOSE SERPL-MCNC: 202 MG/DL (ref 65–140)
GLUCOSE SERPL-MCNC: 203 MG/DL (ref 65–140)
GLUCOSE SERPL-MCNC: 209 MG/DL (ref 65–99)
GLUCOSE SERPL-MCNC: 219 MG/DL (ref 65–140)
GLUCOSE SERPL-MCNC: 256 MG/DL (ref 65–140)
HBA1C MFR BLD: 6.7 %
HCT VFR BLD AUTO: 44.9 % (ref 36.5–49.3)
HCT VFR BLD AUTO: 44.9 % (ref 36.5–49.3)
HCT VFR BLD AUTO: 46.1 % (ref 36.5–49.3)
HCT VFR BLD AUTO: 46.4 % (ref 36.5–49.3)
HCT VFR BLD AUTO: 46.7 % (ref 36.5–49.3)
HCT VFR BLD AUTO: 48.1 % (ref 36.5–49.3)
HCT VFR BLD AUTO: 49.2 % (ref 36.5–49.3)
HCT VFR BLD AUTO: 49.2 % (ref 36.5–49.3)
HCT VFR BLD AUTO: 49.5 % (ref 36.5–49.3)
HCT VFR BLD AUTO: 50.8 % (ref 36.5–49.3)
HCT VFR BLD AUTO: 52.3 % (ref 42–47)
HCT VFR BLD AUTO: 53.4 % (ref 36.5–49.3)
HDLC SERPL-MCNC: 48 MG/DL
HDLC SERPL-MCNC: 58 MG/DL
HGB BLD-MCNC: 14.6 G/DL (ref 12–17)
HGB BLD-MCNC: 14.7 G/DL (ref 12–17)
HGB BLD-MCNC: 15.1 G/DL (ref 12–17)
HGB BLD-MCNC: 15.2 G/DL (ref 12–17)
HGB BLD-MCNC: 15.4 G/DL (ref 12–17)
HGB BLD-MCNC: 15.4 G/DL (ref 12–17)
HGB BLD-MCNC: 15.6 G/DL (ref 12–17)
HGB BLD-MCNC: 16 G/DL (ref 12–17)
HGB BLD-MCNC: 16.1 G/DL (ref 12–17)
HGB BLD-MCNC: 16.3 G/DL (ref 12–17)
HGB BLD-MCNC: 16.9 G/DL (ref 12–17)
HGB BLD-MCNC: 17.1 G/DL (ref 14–18)
IMM GRANULOCYTES # BLD AUTO: 0.07 THOUSAND/UL (ref 0–0.2)
IMM GRANULOCYTES # BLD AUTO: 0.07 THOUSAND/UL (ref 0–0.2)
IMM GRANULOCYTES # BLD AUTO: 0.09 THOUSAND/UL (ref 0–0.2)
IMM GRANULOCYTES # BLD AUTO: 0.14 THOUSAND/UL (ref 0–0.2)
IMM GRANULOCYTES # BLD AUTO: 0.2 THOUSAND/UL (ref 0–0.2)
IMM GRANULOCYTES # BLD AUTO: 0.23 THOUSAND/UL (ref 0–0.2)
IMM GRANULOCYTES NFR BLD AUTO: 1 % (ref 0–2)
IMM GRANULOCYTES NFR BLD AUTO: 2 % (ref 0–2)
IMM GRANULOCYTES NFR BLD AUTO: 2 % (ref 0–2)
INR PPP: 0.92 (ref 0.84–1.19)
INR PPP: 0.93 (ref 0.84–1.19)
INR PPP: 1.41 (ref 0.84–1.19)
INR PPP: 1.41 (ref 0.84–1.19)
INTERVENTRICULAR SEPTUM IN DIASTOLE (PARASTERNAL SHORT AXIS VIEW): 1 CM
LACTATE SERPL-SCNC: 2 MMOL/L (ref 0.5–2)
LACTATE SERPL-SCNC: 3.5 MMOL/L (ref 0.5–2)
LDLC SERPL CALC-MCNC: 133 MG/DL (ref 0–100)
LDLC SERPL CALC-MCNC: 95 MG/DL (ref 0–100)
LEFT INTERNAL DIMENSION IN SYSTOLE: 3.1 CM (ref 2.1–4)
LEFT VENTRICULAR INTERNAL DIMENSION IN DIASTOLE: 4.3 CM (ref 5.28–7.87)
LEFT VENTRICULAR POSTERIOR WALL IN END DIASTOLE: 1 CM
LEFT VENTRICULAR STROKE VOLUME: 44 ML
LYMPHOCYTES # BLD AUTO: 0.31 THOUSANDS/ΜL (ref 0.6–4.47)
LYMPHOCYTES # BLD AUTO: 0.33 THOUSANDS/ΜL (ref 0.6–4.47)
LYMPHOCYTES # BLD AUTO: 0.34 THOUSAND/UL (ref 0.6–4.47)
LYMPHOCYTES # BLD AUTO: 0.62 THOUSAND/UL (ref 0.6–4.47)
LYMPHOCYTES # BLD AUTO: 1.31 THOUSANDS/ΜL (ref 0.6–4.47)
LYMPHOCYTES # BLD AUTO: 1.43 THOUSANDS/ΜL (ref 0.6–4.47)
LYMPHOCYTES # BLD AUTO: 1.43 THOUSANDS/ΜL (ref 0.6–4.47)
LYMPHOCYTES # BLD AUTO: 1.49 THOUSANDS/ΜL (ref 0.6–4.47)
LYMPHOCYTES # BLD AUTO: 2 % (ref 20–51)
LYMPHOCYTES # BLD AUTO: 5 % (ref 14–44)
LYMPHOCYTES NFR BLD AUTO: 12 % (ref 14–44)
LYMPHOCYTES NFR BLD AUTO: 16 % (ref 14–44)
LYMPHOCYTES NFR BLD AUTO: 16 % (ref 14–44)
LYMPHOCYTES NFR BLD AUTO: 17 % (ref 14–44)
LYMPHOCYTES NFR BLD AUTO: 3 % (ref 14–44)
LYMPHOCYTES NFR BLD AUTO: 3 % (ref 14–44)
MCH RBC QN AUTO: 29.9 PG (ref 26.8–34.3)
MCH RBC QN AUTO: 29.9 PG (ref 26.8–34.3)
MCH RBC QN AUTO: 31 PG (ref 26.8–34.3)
MCH RBC QN AUTO: 31.1 PG (ref 26–34)
MCH RBC QN AUTO: 31.3 PG (ref 26.8–34.3)
MCH RBC QN AUTO: 31.5 PG (ref 26.8–34.3)
MCH RBC QN AUTO: 31.6 PG (ref 26.8–34.3)
MCH RBC QN AUTO: 31.6 PG (ref 26.8–34.3)
MCH RBC QN AUTO: 31.7 PG (ref 26.8–34.3)
MCH RBC QN AUTO: 31.8 PG (ref 26.8–34.3)
MCH RBC QN AUTO: 31.8 PG (ref 26.8–34.3)
MCH RBC QN AUTO: 32 PG (ref 26.8–34.3)
MCHC RBC AUTO-ENTMCNC: 31.6 G/DL (ref 31.4–37.4)
MCHC RBC AUTO-ENTMCNC: 31.7 G/DL (ref 31.4–37.4)
MCHC RBC AUTO-ENTMCNC: 32 G/DL (ref 31.4–37.4)
MCHC RBC AUTO-ENTMCNC: 32.1 G/DL (ref 31.4–37.4)
MCHC RBC AUTO-ENTMCNC: 32.3 G/DL (ref 31.4–37.4)
MCHC RBC AUTO-ENTMCNC: 32.5 G/DL (ref 31.4–37.4)
MCHC RBC AUTO-ENTMCNC: 32.7 G/DL (ref 31.4–37.4)
MCHC RBC AUTO-ENTMCNC: 32.7 G/DL (ref 31.4–37.4)
MCHC RBC AUTO-ENTMCNC: 32.7 G/DL (ref 31–37)
MCHC RBC AUTO-ENTMCNC: 32.8 G/DL (ref 31.4–37.4)
MCHC RBC AUTO-ENTMCNC: 32.8 G/DL (ref 31.4–37.4)
MCHC RBC AUTO-ENTMCNC: 33 G/DL (ref 31.4–37.4)
MCV RBC AUTO: 93 FL (ref 82–98)
MCV RBC AUTO: 94 FL (ref 82–98)
MCV RBC AUTO: 95 FL (ref 81–99)
MCV RBC AUTO: 96 FL (ref 82–98)
MCV RBC AUTO: 97 FL (ref 82–98)
MCV RBC AUTO: 98 FL (ref 82–98)
MICROALBUMIN UR-MCNC: 29.4 MG/L (ref 0–20)
MICROALBUMIN/CREAT 24H UR: 22 MG/G CREATININE (ref 0–30)
MONOCYTES # BLD AUTO: 0.25 THOUSAND/UL (ref 0–1.22)
MONOCYTES # BLD AUTO: 0.7 THOUSAND/ΜL (ref 0.17–1.22)
MONOCYTES # BLD AUTO: 0.71 THOUSAND/ΜL (ref 0.17–1.22)
MONOCYTES # BLD AUTO: 0.73 THOUSAND/ΜL (ref 0.17–1.22)
MONOCYTES # BLD AUTO: 0.74 THOUSAND/ΜL (ref 0.17–1.22)
MONOCYTES # BLD AUTO: 0.78 THOUSAND/ΜL (ref 0.17–1.22)
MONOCYTES # BLD AUTO: 1.09 THOUSAND/ΜL (ref 0.17–1.22)
MONOCYTES # BLD AUTO: 1.18 THOUSAND/UL (ref 0–1.22)
MONOCYTES NFR BLD AUTO: 10 % (ref 4–12)
MONOCYTES NFR BLD AUTO: 5 % (ref 4–12)
MONOCYTES NFR BLD AUTO: 7 % (ref 4–12)
MONOCYTES NFR BLD AUTO: 7 % (ref 4–12)
MONOCYTES NFR BLD AUTO: 8 % (ref 4–12)
MONOCYTES NFR BLD AUTO: 8 % (ref 4–12)
MONOCYTES NFR BLD AUTO: 9 % (ref 4–12)
MONOCYTES NFR BLD: 2 % (ref 4–12)
MV E'TISSUE VEL-SEP: 7 CM/S
MV PEAK A VEL: 1.08 M/S
MV PEAK E VEL: 48 CM/S
MV STENOSIS PRESSURE HALF TIME: 0 MS
NEUTROPHILS # BLD AUTO: 10.19 THOUSANDS/ΜL (ref 1.85–7.62)
NEUTROPHILS # BLD AUTO: 11.93 THOUSANDS/ΜL (ref 1.85–7.62)
NEUTROPHILS # BLD AUTO: 5.23 THOUSANDS/ΜL (ref 1.85–7.62)
NEUTROPHILS # BLD AUTO: 6.02 THOUSANDS/ΜL (ref 1.85–7.62)
NEUTROPHILS # BLD AUTO: 6.37 THOUSANDS/ΜL (ref 1.85–7.62)
NEUTROPHILS # BLD AUTO: 9.8 THOUSANDS/ΜL (ref 1.85–7.62)
NEUTROPHILS # BLD MANUAL: 11.49 THOUSAND/UL (ref 1.85–7.62)
NEUTS BAND NFR BLD MANUAL: 2 % (ref 0–8)
NEUTS BAND NFR BLD MANUAL: 5 % (ref 0–8)
NEUTS SEG # BLD: 15.38 THOUSAND/UL (ref 1.81–6.82)
NEUTS SEG NFR BLD AUTO: 67 % (ref 43–75)
NEUTS SEG NFR BLD AUTO: 69 % (ref 43–75)
NEUTS SEG NFR BLD AUTO: 70 % (ref 43–75)
NEUTS SEG NFR BLD AUTO: 75 % (ref 43–75)
NEUTS SEG NFR BLD AUTO: 88 % (ref 43–75)
NEUTS SEG NFR BLD AUTO: 88 % (ref 43–75)
NEUTS SEG NFR BLD AUTO: 89 % (ref 42–75)
NEUTS SEG NFR BLD AUTO: 90 % (ref 43–75)
NONHDLC SERPL-MCNC: 133 MG/DL
NONHDLC SERPL-MCNC: 167 MG/DL
NRBC BLD AUTO-RTO: 0 /100 WBCS
P AXIS: 51 DEGREES
P AXIS: 55 DEGREES
PA SYSTOLIC PRESSURE: 45 MMHG
PLATELET # BLD AUTO: 109 THOUSANDS/UL (ref 149–390)
PLATELET # BLD AUTO: 111 THOUSANDS/UL (ref 149–390)
PLATELET # BLD AUTO: 135 THOUSANDS/UL (ref 149–390)
PLATELET # BLD AUTO: 146 THOUSANDS/UL (ref 149–390)
PLATELET # BLD AUTO: 166 THOUSANDS/UL (ref 149–390)
PLATELET # BLD AUTO: 182 THOUSANDS/UL (ref 149–390)
PLATELET # BLD AUTO: 186 THOUSANDS/UL (ref 149–390)
PLATELET # BLD AUTO: 188 THOUSANDS/UL (ref 149–390)
PLATELET # BLD AUTO: 198 THOUSANDS/UL (ref 149–390)
PLATELET # BLD AUTO: 211 THOUSANDS/UL (ref 149–390)
PLATELET # BLD AUTO: 220 THOUSANDS/UL (ref 149–390)
PLATELET # BLD AUTO: 269 THOUSANDS/UL (ref 149–390)
PLATELET BLD QL SMEAR: ABNORMAL
PLATELET BLD QL SMEAR: ABNORMAL
PMV BLD AUTO: 10.6 FL (ref 8.9–12.7)
PMV BLD AUTO: 11.1 FL (ref 8.9–12.7)
PMV BLD AUTO: 11.6 FL (ref 8.9–12.7)
PMV BLD AUTO: 11.7 FL (ref 8.9–12.7)
PMV BLD AUTO: 11.7 FL (ref 8.9–12.7)
PMV BLD AUTO: 12 FL (ref 8.9–12.7)
PMV BLD AUTO: 12.2 FL (ref 8.9–12.7)
PMV BLD AUTO: 12.3 FL (ref 8.9–12.7)
PMV BLD AUTO: 12.7 FL (ref 8.9–12.7)
PMV BLD AUTO: 8.5 FL (ref 8.6–11.7)
POLYCHROMASIA BLD QL SMEAR: PRESENT
POLYCHROMASIA BLD QL SMEAR: PRESENT
POTASSIUM SERPL-SCNC: 3.7 MMOL/L (ref 3.5–5.5)
POTASSIUM SERPL-SCNC: 3.9 MMOL/L (ref 3.5–5.3)
POTASSIUM SERPL-SCNC: 4 MMOL/L (ref 3.5–5.3)
POTASSIUM SERPL-SCNC: 4.1 MMOL/L (ref 3.5–5.3)
POTASSIUM SERPL-SCNC: 4.2 MMOL/L (ref 3.5–5.3)
POTASSIUM SERPL-SCNC: 4.2 MMOL/L (ref 3.5–5.3)
POTASSIUM SERPL-SCNC: 4.3 MMOL/L (ref 3.5–5.3)
POTASSIUM SERPL-SCNC: 4.4 MMOL/L (ref 3.5–5.3)
POTASSIUM SERPL-SCNC: 4.5 MMOL/L (ref 3.5–5.3)
POTASSIUM SERPL-SCNC: 5.2 MMOL/L (ref 3.5–5.3)
PR INTERVAL: 132 MS
PR INTERVAL: 134 MS
PROCALCITONIN SERPL-MCNC: 0.76 NG/ML
PROCALCITONIN SERPL-MCNC: 0.83 NG/ML
PROCALCITONIN SERPL-MCNC: 0.84 NG/ML
PROCALCITONIN SERPL-MCNC: 0.98 NG/ML
PROCALCITONIN SERPL-MCNC: 1.07 NG/ML
PROT SERPL-MCNC: 5.8 G/DL (ref 6.4–8.2)
PROT SERPL-MCNC: 5.9 G/DL (ref 6.4–8.2)
PROT SERPL-MCNC: 6.4 G/DL (ref 6.4–8.2)
PROT SERPL-MCNC: 6.5 G/DL (ref 6.4–8.2)
PROT SERPL-MCNC: 6.7 G/DL (ref 6.4–8.2)
PROT SERPL-MCNC: 7.2 G/DL (ref 6.4–8.9)
PROT SERPL-MCNC: 7.4 G/DL (ref 6.4–8.2)
PROT SERPL-MCNC: 7.6 G/DL (ref 6.4–8.2)
PROT SERPL-MCNC: 7.7 G/DL (ref 6.4–8.2)
PROT SERPL-MCNC: 7.9 G/DL (ref 6.4–8.2)
PROT SERPL-MCNC: 7.9 G/DL (ref 6.4–8.2)
PROTHROMBIN TIME: 12.1 SECONDS (ref 11.6–14.5)
PROTHROMBIN TIME: 12.4 SECONDS (ref 11.6–14.5)
PROTHROMBIN TIME: 16.6 SECONDS (ref 11.6–14.5)
PROTHROMBIN TIME: 17.3 SECONDS (ref 11.6–14.5)
PSA FREE MFR SERPL: 10.6 %
PSA FREE SERPL-MCNC: 0.38 NG/ML
PSA SERPL-MCNC: 3.6 NG/ML (ref 0–4)
QRS AXIS: 85 DEGREES
QRS AXIS: 95 DEGREES
QRSD INTERVAL: 76 MS
QRSD INTERVAL: 78 MS
QT INTERVAL: 324 MS
QT INTERVAL: 362 MS
QTC INTERVAL: 450 MS
QTC INTERVAL: 460 MS
RBC # BLD AUTO: 4.6 MILLION/UL (ref 3.88–5.62)
RBC # BLD AUTO: 4.64 MILLION/UL (ref 3.88–5.62)
RBC # BLD AUTO: 4.78 MILLION/UL (ref 3.88–5.62)
RBC # BLD AUTO: 4.78 MILLION/UL (ref 3.88–5.62)
RBC # BLD AUTO: 4.84 MILLION/UL (ref 3.88–5.62)
RBC # BLD AUTO: 4.92 MILLION/UL (ref 3.88–5.62)
RBC # BLD AUTO: 5.04 MILLION/UL (ref 3.88–5.62)
RBC # BLD AUTO: 5.09 MILLION/UL (ref 3.88–5.62)
RBC # BLD AUTO: 5.21 MILLION/UL (ref 3.88–5.62)
RBC # BLD AUTO: 5.45 MILLION/UL (ref 3.88–5.62)
RBC # BLD AUTO: 5.46 MILLION/UL (ref 3.88–5.62)
RBC # BLD AUTO: 5.52 MILLION/UL (ref 4.3–5.9)
RBC MORPH BLD: ABNORMAL
RIGHT VENTRICLE ID DIMENSION: 4.2 CM
RSV RNA RESP QL NAA+PROBE: NEGATIVE
SARS-COV-2 RNA RESP QL NAA+PROBE: POSITIVE
SARS-COV-2 RNA RESP QL NAA+PROBE: POSITIVE
SL AMB POCT HEMOGLOBIN AIC: 6 (ref ?–6.5)
SL CV LV EF: 60
SL CV PED ECHO LEFT VENTRICLE DIASTOLIC VOLUME (MOD BIPLANE) 2D: 81 ML
SL CV PED ECHO LEFT VENTRICLE SYSTOLIC VOLUME (MOD BIPLANE) 2D: 38 ML
SODIUM SERPL-SCNC: 136 MMOL/L (ref 136–145)
SODIUM SERPL-SCNC: 138 MMOL/L (ref 136–145)
SODIUM SERPL-SCNC: 138 MMOL/L (ref 136–145)
SODIUM SERPL-SCNC: 139 MMOL/L (ref 136–145)
SODIUM SERPL-SCNC: 139 MMOL/L (ref 136–145)
SODIUM SERPL-SCNC: 140 MMOL/L (ref 134–143)
SODIUM SERPL-SCNC: 140 MMOL/L (ref 136–145)
SODIUM SERPL-SCNC: 140 MMOL/L (ref 136–145)
SODIUM SERPL-SCNC: 141 MMOL/L (ref 136–145)
SODIUM SERPL-SCNC: 141 MMOL/L (ref 136–145)
SODIUM SERPL-SCNC: 142 MMOL/L (ref 136–145)
SODIUM SERPL-SCNC: 143 MMOL/L (ref 136–145)
T WAVE AXIS: 112 DEGREES
T WAVE AXIS: 83 DEGREES
TOTAL CELLS COUNTED SPEC: 100
TR PEAK VELOCITY: 3 M/S
TRICUSPID VALVE PEAK REGURGITATION VELOCITY: 3.03 M/S
TRICUSPID VALVE S': 0.7 CM/S
TRIGL SERPL-MCNC: 170 MG/DL
TRIGL SERPL-MCNC: 189 MG/DL
TROPONIN I SERPL-MCNC: 0.09 NG/ML
TROPONIN I SERPL-MCNC: 0.14 NG/ML
TROPONIN I SERPL-MCNC: 0.15 NG/ML
TROPONIN I SERPL-MCNC: 0.16 NG/ML
TV PEAK GRADIENT: 37 MMHG
VENTRICULAR RATE: 121 BPM
VENTRICULAR RATE: 93 BPM
WBC # BLD AUTO: 11.43 THOUSAND/UL (ref 4.31–10.16)
WBC # BLD AUTO: 11.5 THOUSAND/UL (ref 4.31–10.16)
WBC # BLD AUTO: 11.77 THOUSAND/UL (ref 4.31–10.16)
WBC # BLD AUTO: 12.36 THOUSAND/UL (ref 4.31–10.16)
WBC # BLD AUTO: 12.87 THOUSAND/UL (ref 4.31–10.16)
WBC # BLD AUTO: 13.22 THOUSAND/UL (ref 4.31–10.16)
WBC # BLD AUTO: 14.17 THOUSAND/UL (ref 4.31–10.16)
WBC # BLD AUTO: 16.9 THOUSAND/UL (ref 4.8–10.8)
WBC # BLD AUTO: 7.68 THOUSAND/UL (ref 4.31–10.16)
WBC # BLD AUTO: 8.71 THOUSAND/UL (ref 4.31–10.16)
WBC # BLD AUTO: 9.07 THOUSAND/UL (ref 4.31–10.16)
WBC # BLD AUTO: 9.65 THOUSAND/UL (ref 4.31–10.16)
Z-SCORE OF LEFT VENTRICULAR DIMENSION IN END SYSTOLE: -4.05

## 2021-01-01 PROCEDURE — 85027 COMPLETE CBC AUTOMATED: CPT | Performed by: NURSE PRACTITIONER

## 2021-01-01 PROCEDURE — 85379 FIBRIN DEGRADATION QUANT: CPT | Performed by: STUDENT IN AN ORGANIZED HEALTH CARE EDUCATION/TRAINING PROGRAM

## 2021-01-01 PROCEDURE — 99213 OFFICE O/P EST LOW 20 MIN: CPT | Performed by: RADIOLOGY

## 2021-01-01 PROCEDURE — 80061 LIPID PANEL: CPT

## 2021-01-01 PROCEDURE — 82948 REAGENT STRIP/BLOOD GLUCOSE: CPT

## 2021-01-01 PROCEDURE — 82043 UR ALBUMIN QUANTITATIVE: CPT

## 2021-01-01 PROCEDURE — 97163 PT EVAL HIGH COMPLEX 45 MIN: CPT

## 2021-01-01 PROCEDURE — 84145 PROCALCITONIN (PCT): CPT | Performed by: STUDENT IN AN ORGANIZED HEALTH CARE EDUCATION/TRAINING PROGRAM

## 2021-01-01 PROCEDURE — 97166 OT EVAL MOD COMPLEX 45 MIN: CPT

## 2021-01-01 PROCEDURE — 99232 SBSQ HOSP IP/OBS MODERATE 35: CPT | Performed by: STUDENT IN AN ORGANIZED HEALTH CARE EDUCATION/TRAINING PROGRAM

## 2021-01-01 PROCEDURE — 99213 OFFICE O/P EST LOW 20 MIN: CPT | Performed by: FAMILY MEDICINE

## 2021-01-01 PROCEDURE — 90662 IIV NO PRSV INCREASED AG IM: CPT | Performed by: FAMILY MEDICINE

## 2021-01-01 PROCEDURE — 36248 INS CATH ABD/L-EXT ART ADDL: CPT

## 2021-01-01 PROCEDURE — 84145 PROCALCITONIN (PCT): CPT | Performed by: EMERGENCY MEDICINE

## 2021-01-01 PROCEDURE — 96367 TX/PROPH/DG ADDL SEQ IV INF: CPT

## 2021-01-01 PROCEDURE — 71275 CT ANGIOGRAPHY CHEST: CPT

## 2021-01-01 PROCEDURE — 0241U HB NFCT DS VIR RESP RNA 4 TRGT: CPT | Performed by: EMERGENCY MEDICINE

## 2021-01-01 PROCEDURE — 84153 ASSAY OF PSA TOTAL: CPT

## 2021-01-01 PROCEDURE — 84145 PROCALCITONIN (PCT): CPT | Performed by: FAMILY MEDICINE

## 2021-01-01 PROCEDURE — 99153 MOD SED SAME PHYS/QHP EA: CPT

## 2021-01-01 PROCEDURE — C1887 CATHETER, GUIDING: HCPCS

## 2021-01-01 PROCEDURE — 96375 TX/PRO/DX INJ NEW DRUG ADDON: CPT

## 2021-01-01 PROCEDURE — 85610 PROTHROMBIN TIME: CPT | Performed by: NURSE PRACTITIONER

## 2021-01-01 PROCEDURE — 85027 COMPLETE CBC AUTOMATED: CPT | Performed by: FAMILY MEDICINE

## 2021-01-01 PROCEDURE — 74183 MRI ABD W/O CNTR FLWD CNTR: CPT

## 2021-01-01 PROCEDURE — 85730 THROMBOPLASTIN TIME PARTIAL: CPT | Performed by: STUDENT IN AN ORGANIZED HEALTH CARE EDUCATION/TRAINING PROGRAM

## 2021-01-01 PROCEDURE — 85027 COMPLETE CBC AUTOMATED: CPT | Performed by: STUDENT IN AN ORGANIZED HEALTH CARE EDUCATION/TRAINING PROGRAM

## 2021-01-01 PROCEDURE — 85730 THROMBOPLASTIN TIME PARTIAL: CPT | Performed by: FAMILY MEDICINE

## 2021-01-01 PROCEDURE — 80053 COMPREHEN METABOLIC PANEL: CPT

## 2021-01-01 PROCEDURE — G1004 CDSM NDSC: HCPCS

## 2021-01-01 PROCEDURE — 85610 PROTHROMBIN TIME: CPT | Performed by: EMERGENCY MEDICINE

## 2021-01-01 PROCEDURE — 99233 SBSQ HOSP IP/OBS HIGH 50: CPT | Performed by: STUDENT IN AN ORGANIZED HEALTH CARE EDUCATION/TRAINING PROGRAM

## 2021-01-01 PROCEDURE — C1769 GUIDE WIRE: HCPCS

## 2021-01-01 PROCEDURE — 83880 ASSAY OF NATRIURETIC PEPTIDE: CPT | Performed by: EMERGENCY MEDICINE

## 2021-01-01 PROCEDURE — 84154 ASSAY OF PSA FREE: CPT

## 2021-01-01 PROCEDURE — 99291 CRITICAL CARE FIRST HOUR: CPT | Performed by: EMERGENCY MEDICINE

## 2021-01-01 PROCEDURE — 99213 OFFICE O/P EST LOW 20 MIN: CPT | Performed by: NURSE PRACTITIONER

## 2021-01-01 PROCEDURE — 85610 PROTHROMBIN TIME: CPT | Performed by: RADIOLOGY

## 2021-01-01 PROCEDURE — 36415 COLL VENOUS BLD VENIPUNCTURE: CPT

## 2021-01-01 PROCEDURE — 86140 C-REACTIVE PROTEIN: CPT | Performed by: EMERGENCY MEDICINE

## 2021-01-01 PROCEDURE — 76937 US GUIDE VASCULAR ACCESS: CPT | Performed by: RADIOLOGY

## 2021-01-01 PROCEDURE — 99442 PR PHYS/QHP TELEPHONE EVALUATION 11-20 MIN: CPT | Performed by: RADIOLOGY

## 2021-01-01 PROCEDURE — 80053 COMPREHEN METABOLIC PANEL: CPT | Performed by: STUDENT IN AN ORGANIZED HEALTH CARE EDUCATION/TRAINING PROGRAM

## 2021-01-01 PROCEDURE — 99152 MOD SED SAME PHYS/QHP 5/>YRS: CPT | Performed by: RADIOLOGY

## 2021-01-01 PROCEDURE — C1894 INTRO/SHEATH, NON-LASER: HCPCS

## 2021-01-01 PROCEDURE — 80053 COMPREHEN METABOLIC PANEL: CPT | Performed by: RADIOLOGY

## 2021-01-01 PROCEDURE — 85379 FIBRIN DEGRADATION QUANT: CPT | Performed by: EMERGENCY MEDICINE

## 2021-01-01 PROCEDURE — 99214 OFFICE O/P EST MOD 30 MIN: CPT | Performed by: INTERNAL MEDICINE

## 2021-01-01 PROCEDURE — 99214 OFFICE O/P EST MOD 30 MIN: CPT | Performed by: FAMILY MEDICINE

## 2021-01-01 PROCEDURE — XW033E5 INTRODUCTION OF REMDESIVIR ANTI-INFECTIVE INTO PERIPHERAL VEIN, PERCUTANEOUS APPROACH, NEW TECHNOLOGY GROUP 5: ICD-10-PCS | Performed by: INTERNAL MEDICINE

## 2021-01-01 PROCEDURE — 93306 TTE W/DOPPLER COMPLETE: CPT

## 2021-01-01 PROCEDURE — 85025 COMPLETE CBC W/AUTO DIFF WBC: CPT | Performed by: INTERNAL MEDICINE

## 2021-01-01 PROCEDURE — 83036 HEMOGLOBIN GLYCOSYLATED A1C: CPT | Performed by: NURSE PRACTITIONER

## 2021-01-01 PROCEDURE — 93005 ELECTROCARDIOGRAM TRACING: CPT

## 2021-01-01 PROCEDURE — 87040 BLOOD CULTURE FOR BACTERIA: CPT | Performed by: EMERGENCY MEDICINE

## 2021-01-01 PROCEDURE — 93306 TTE W/DOPPLER COMPLETE: CPT | Performed by: INTERNAL MEDICINE

## 2021-01-01 PROCEDURE — U0003 INFECTIOUS AGENT DETECTION BY NUCLEIC ACID (DNA OR RNA); SEVERE ACUTE RESPIRATORY SYNDROME CORONAVIRUS 2 (SARS-COV-2) (CORONAVIRUS DISEASE [COVID-19]), AMPLIFIED PROBE TECHNIQUE, MAKING USE OF HIGH THROUGHPUT TECHNOLOGIES AS DESCRIBED BY CMS-2020-01-R: HCPCS | Performed by: NURSE PRACTITIONER

## 2021-01-01 PROCEDURE — 99204 OFFICE O/P NEW MOD 45 MIN: CPT | Performed by: INTERNAL MEDICINE

## 2021-01-01 PROCEDURE — 86140 C-REACTIVE PROTEIN: CPT | Performed by: STUDENT IN AN ORGANIZED HEALTH CARE EDUCATION/TRAINING PROGRAM

## 2021-01-01 PROCEDURE — 36415 COLL VENOUS BLD VENIPUNCTURE: CPT | Performed by: EMERGENCY MEDICINE

## 2021-01-01 PROCEDURE — 36247 INS CATH ABD/L-EXT ART 3RD: CPT | Performed by: RADIOLOGY

## 2021-01-01 PROCEDURE — 96365 THER/PROPH/DIAG IV INF INIT: CPT

## 2021-01-01 PROCEDURE — 85027 COMPLETE CBC AUTOMATED: CPT | Performed by: EMERGENCY MEDICINE

## 2021-01-01 PROCEDURE — 36247 INS CATH ABD/L-EXT ART 3RD: CPT

## 2021-01-01 PROCEDURE — 83036 HEMOGLOBIN GLYCOSYLATED A1C: CPT | Performed by: FAMILY MEDICINE

## 2021-01-01 PROCEDURE — 99211 OFF/OP EST MAY X REQ PHY/QHP: CPT | Performed by: RADIOLOGY

## 2021-01-01 PROCEDURE — 93970 EXTREMITY STUDY: CPT | Performed by: SURGERY

## 2021-01-01 PROCEDURE — 85610 PROTHROMBIN TIME: CPT

## 2021-01-01 PROCEDURE — 82105 ALPHA-FETOPROTEIN SERUM: CPT

## 2021-01-01 PROCEDURE — 84484 ASSAY OF TROPONIN QUANT: CPT | Performed by: EMERGENCY MEDICINE

## 2021-01-01 PROCEDURE — 85730 THROMBOPLASTIN TIME PARTIAL: CPT | Performed by: EMERGENCY MEDICINE

## 2021-01-01 PROCEDURE — 85730 THROMBOPLASTIN TIME PARTIAL: CPT | Performed by: NURSE PRACTITIONER

## 2021-01-01 PROCEDURE — 85025 COMPLETE CBC W/AUTO DIFF WBC: CPT

## 2021-01-01 PROCEDURE — 99496 TRANSJ CARE MGMT HIGH F2F 7D: CPT | Performed by: FAMILY MEDICINE

## 2021-01-01 PROCEDURE — 80053 COMPREHEN METABOLIC PANEL: CPT | Performed by: INTERNAL MEDICINE

## 2021-01-01 PROCEDURE — 76937 US GUIDE VASCULAR ACCESS: CPT

## 2021-01-01 PROCEDURE — 93010 ELECTROCARDIOGRAM REPORT: CPT | Performed by: INTERNAL MEDICINE

## 2021-01-01 PROCEDURE — 85730 THROMBOPLASTIN TIME PARTIAL: CPT | Performed by: ANESTHESIOLOGY

## 2021-01-01 PROCEDURE — 99233 SBSQ HOSP IP/OBS HIGH 50: CPT | Performed by: ANESTHESIOLOGY

## 2021-01-01 PROCEDURE — 82728 ASSAY OF FERRITIN: CPT | Performed by: EMERGENCY MEDICINE

## 2021-01-01 PROCEDURE — 80048 BASIC METABOLIC PNL TOTAL CA: CPT | Performed by: FAMILY MEDICINE

## 2021-01-01 PROCEDURE — 94760 N-INVAS EAR/PLS OXIMETRY 1: CPT

## 2021-01-01 PROCEDURE — 80053 COMPREHEN METABOLIC PANEL: CPT | Performed by: NURSE PRACTITIONER

## 2021-01-01 PROCEDURE — 71046 X-RAY EXAM CHEST 2 VIEWS: CPT

## 2021-01-01 PROCEDURE — 94761 N-INVAS EAR/PLS OXIMETRY MLT: CPT

## 2021-01-01 PROCEDURE — U0005 INFEC AGEN DETEC AMPLI PROBE: HCPCS | Performed by: NURSE PRACTITIONER

## 2021-01-01 PROCEDURE — 84145 PROCALCITONIN (PCT): CPT | Performed by: NURSE PRACTITIONER

## 2021-01-01 PROCEDURE — 99215 OFFICE O/P EST HI 40 MIN: CPT | Performed by: SURGERY

## 2021-01-01 PROCEDURE — 85027 COMPLETE CBC AUTOMATED: CPT | Performed by: RADIOLOGY

## 2021-01-01 PROCEDURE — G0008 ADMIN INFLUENZA VIRUS VAC: HCPCS | Performed by: FAMILY MEDICINE

## 2021-01-01 PROCEDURE — 85007 BL SMEAR W/DIFF WBC COUNT: CPT | Performed by: EMERGENCY MEDICINE

## 2021-01-01 PROCEDURE — 87040 BLOOD CULTURE FOR BACTERIA: CPT | Performed by: NURSE PRACTITIONER

## 2021-01-01 PROCEDURE — 93880 EXTRACRANIAL BILAT STUDY: CPT | Performed by: SURGERY

## 2021-01-01 PROCEDURE — 93970 EXTREMITY STUDY: CPT

## 2021-01-01 PROCEDURE — 93880 EXTRACRANIAL BILAT STUDY: CPT

## 2021-01-01 PROCEDURE — 99233 SBSQ HOSP IP/OBS HIGH 50: CPT | Performed by: INTERNAL MEDICINE

## 2021-01-01 PROCEDURE — 84484 ASSAY OF TROPONIN QUANT: CPT

## 2021-01-01 PROCEDURE — 99239 HOSP IP/OBS DSCHRG MGMT >30: CPT | Performed by: INTERNAL MEDICINE

## 2021-01-01 PROCEDURE — 84484 ASSAY OF TROPONIN QUANT: CPT | Performed by: NURSE PRACTITIONER

## 2021-01-01 PROCEDURE — 83605 ASSAY OF LACTIC ACID: CPT | Performed by: EMERGENCY MEDICINE

## 2021-01-01 PROCEDURE — 96374 THER/PROPH/DIAG INJ IV PUSH: CPT

## 2021-01-01 PROCEDURE — 99285 EMERGENCY DEPT VISIT HI MDM: CPT

## 2021-01-01 PROCEDURE — 71045 X-RAY EXAM CHEST 1 VIEW: CPT

## 2021-01-01 PROCEDURE — 99291 CRITICAL CARE FIRST HOUR: CPT | Performed by: NURSE PRACTITIONER

## 2021-01-01 PROCEDURE — NC001 PR NO CHARGE: Performed by: INTERNAL MEDICINE

## 2021-01-01 PROCEDURE — A9585 GADOBUTROL INJECTION: HCPCS | Performed by: SURGERY

## 2021-01-01 PROCEDURE — 85007 BL SMEAR W/DIFF WBC COUNT: CPT | Performed by: NURSE PRACTITIONER

## 2021-01-01 PROCEDURE — 37243 VASC EMBOLIZE/OCCLUDE ORGAN: CPT

## 2021-01-01 PROCEDURE — 37243 VASC EMBOLIZE/OCCLUDE ORGAN: CPT | Performed by: RADIOLOGY

## 2021-01-01 PROCEDURE — 80053 COMPREHEN METABOLIC PANEL: CPT | Performed by: EMERGENCY MEDICINE

## 2021-01-01 PROCEDURE — 99152 MOD SED SAME PHYS/QHP 5/>YRS: CPT

## 2021-01-01 PROCEDURE — 82570 ASSAY OF URINE CREATININE: CPT

## 2021-01-01 RX ORDER — BISACODYL 10 MG
10 SUPPOSITORY, RECTAL RECTAL DAILY PRN
Status: DISCONTINUED | OUTPATIENT
Start: 2021-01-01 | End: 2021-01-01 | Stop reason: HOSPADM

## 2021-01-01 RX ORDER — LISINOPRIL 2.5 MG/1
2.5 TABLET ORAL DAILY
Qty: 30 TABLET | Refills: 5 | Status: SHIPPED | OUTPATIENT
Start: 2021-01-01 | End: 2022-01-01

## 2021-01-01 RX ORDER — PREDNISONE 20 MG/1
40 TABLET ORAL DAILY
Qty: 10 TABLET | Refills: 0 | Status: SHIPPED | OUTPATIENT
Start: 2021-01-01 | End: 2021-01-01

## 2021-01-01 RX ORDER — HEPARIN SODIUM 10000 [USP'U]/100ML
3-30 INJECTION, SOLUTION INTRAVENOUS
Status: DISCONTINUED | OUTPATIENT
Start: 2021-01-01 | End: 2021-01-01

## 2021-01-01 RX ORDER — SODIUM CHLORIDE 9 MG/ML
75 INJECTION, SOLUTION INTRAVENOUS CONTINUOUS
Status: DISCONTINUED | OUTPATIENT
Start: 2021-01-01 | End: 2021-01-01 | Stop reason: HOSPADM

## 2021-01-01 RX ORDER — NITROGLYCERIN 20 MG/100ML
INJECTION INTRAVENOUS CODE/TRAUMA/SEDATION MEDICATION
Status: COMPLETED | OUTPATIENT
Start: 2021-01-01 | End: 2021-01-01

## 2021-01-01 RX ORDER — OXYCODONE HYDROCHLORIDE 5 MG/1
5 TABLET ORAL EVERY 4 HOURS PRN
Qty: 6 TABLET | Refills: 0 | Status: SHIPPED | OUTPATIENT
Start: 2021-01-01 | End: 2021-01-01 | Stop reason: ALTCHOICE

## 2021-01-01 RX ORDER — CEFAZOLIN SODIUM 2 G/50ML
2000 SOLUTION INTRAVENOUS ONCE
Status: COMPLETED | OUTPATIENT
Start: 2021-01-01 | End: 2021-01-01

## 2021-01-01 RX ORDER — DEXAMETHASONE SODIUM PHOSPHATE 4 MG/ML
0.1 INJECTION, SOLUTION INTRA-ARTICULAR; INTRALESIONAL; INTRAMUSCULAR; INTRAVENOUS; SOFT TISSUE EVERY 12 HOURS
Status: DISCONTINUED | OUTPATIENT
Start: 2021-01-01 | End: 2021-01-01 | Stop reason: HOSPADM

## 2021-01-01 RX ORDER — ONDANSETRON 2 MG/ML
4 INJECTION INTRAMUSCULAR; INTRAVENOUS ONCE
OUTPATIENT
Start: 2021-01-01 | End: 2021-01-01

## 2021-01-01 RX ORDER — AZITHROMYCIN 500 MG/1
500 TABLET, FILM COATED ORAL DAILY
Qty: 5 TABLET | Refills: 1 | Status: SHIPPED | OUTPATIENT
Start: 2021-01-01 | End: 2021-01-01

## 2021-01-01 RX ORDER — LEVOFLOXACIN 500 MG/1
500 TABLET, FILM COATED ORAL EVERY 24 HOURS
Qty: 7 TABLET | Refills: 0 | Status: SHIPPED | OUTPATIENT
Start: 2021-01-01 | End: 2021-01-01

## 2021-01-01 RX ORDER — ALBUTEROL SULFATE 90 UG/1
2 AEROSOL, METERED RESPIRATORY (INHALATION) EVERY 6 HOURS PRN
Qty: 18 G | Refills: 0 | Status: SHIPPED | OUTPATIENT
Start: 2021-01-01 | End: 2021-01-01

## 2021-01-01 RX ORDER — LISINOPRIL 5 MG/1
5 TABLET ORAL DAILY
Status: DISCONTINUED | OUTPATIENT
Start: 2021-01-01 | End: 2021-01-01 | Stop reason: HOSPADM

## 2021-01-01 RX ORDER — AMOXICILLIN 250 MG
2 CAPSULE ORAL 2 TIMES DAILY
Status: DISCONTINUED | OUTPATIENT
Start: 2021-01-01 | End: 2021-01-01 | Stop reason: HOSPADM

## 2021-01-01 RX ORDER — ATORVASTATIN CALCIUM 40 MG/1
40 TABLET, FILM COATED ORAL
Status: DISCONTINUED | OUTPATIENT
Start: 2021-01-01 | End: 2021-01-01 | Stop reason: HOSPADM

## 2021-01-01 RX ORDER — DIPHENHYDRAMINE HYDROCHLORIDE 50 MG/ML
INJECTION INTRAMUSCULAR; INTRAVENOUS CODE/TRAUMA/SEDATION MEDICATION
Status: COMPLETED | OUTPATIENT
Start: 2021-01-01 | End: 2021-01-01

## 2021-01-01 RX ORDER — LISINOPRIL 5 MG/1
5 TABLET ORAL DAILY
Qty: 60 TABLET | Refills: 0 | Status: SHIPPED | OUTPATIENT
Start: 2021-01-01 | End: 2021-01-01 | Stop reason: SDUPTHER

## 2021-01-01 RX ORDER — ASPIRIN 81 MG/1
81 TABLET, CHEWABLE ORAL DAILY
Status: DISCONTINUED | OUTPATIENT
Start: 2021-01-01 | End: 2021-01-01 | Stop reason: HOSPADM

## 2021-01-01 RX ORDER — ONDANSETRON 2 MG/ML
4 INJECTION INTRAMUSCULAR; INTRAVENOUS ONCE
Status: COMPLETED | OUTPATIENT
Start: 2021-01-01 | End: 2021-01-01

## 2021-01-01 RX ORDER — HEPARIN SODIUM 1000 [USP'U]/ML
6800 INJECTION, SOLUTION INTRAVENOUS; SUBCUTANEOUS
Status: DISCONTINUED | OUTPATIENT
Start: 2021-01-01 | End: 2021-01-01 | Stop reason: HOSPADM

## 2021-01-01 RX ORDER — HEPARIN SODIUM 1000 [USP'U]/ML
6800 INJECTION, SOLUTION INTRAVENOUS; SUBCUTANEOUS
Status: DISCONTINUED | OUTPATIENT
Start: 2021-01-01 | End: 2021-01-01

## 2021-01-01 RX ORDER — ALBUTEROL SULFATE 90 UG/1
2 AEROSOL, METERED RESPIRATORY (INHALATION) EVERY 6 HOURS PRN
Status: DISCONTINUED | OUTPATIENT
Start: 2021-01-01 | End: 2021-01-01 | Stop reason: HOSPADM

## 2021-01-01 RX ORDER — HEPARIN SODIUM 1000 [USP'U]/ML
6800 INJECTION, SOLUTION INTRAVENOUS; SUBCUTANEOUS ONCE
Status: COMPLETED | OUTPATIENT
Start: 2021-01-01 | End: 2021-01-01

## 2021-01-01 RX ORDER — HEPARIN SODIUM 10000 [USP'U]/100ML
3-30 INJECTION, SOLUTION INTRAVENOUS
Status: DISCONTINUED | OUTPATIENT
Start: 2021-01-01 | End: 2021-01-01 | Stop reason: HOSPADM

## 2021-01-01 RX ORDER — DOXYCYCLINE HYCLATE 100 MG/1
100 CAPSULE ORAL EVERY 12 HOURS
Status: COMPLETED | OUTPATIENT
Start: 2021-01-01 | End: 2021-01-01

## 2021-01-01 RX ORDER — ATORVASTATIN CALCIUM 40 MG/1
40 TABLET, FILM COATED ORAL DAILY
Qty: 30 TABLET | Refills: 5 | Status: SHIPPED | OUTPATIENT
Start: 2021-01-01 | End: 2022-01-01

## 2021-01-01 RX ORDER — LIDOCAINE WITH 8.4% SOD BICARB 0.9%(10ML)
SYRINGE (ML) INJECTION CODE/TRAUMA/SEDATION MEDICATION
Status: COMPLETED | OUTPATIENT
Start: 2021-01-01 | End: 2021-01-01

## 2021-01-01 RX ORDER — FENTANYL CITRATE 50 UG/ML
INJECTION, SOLUTION INTRAMUSCULAR; INTRAVENOUS CODE/TRAUMA/SEDATION MEDICATION
Status: COMPLETED | OUTPATIENT
Start: 2021-01-01 | End: 2021-01-01

## 2021-01-01 RX ORDER — HEPARIN SODIUM 1000 [USP'U]/ML
3400 INJECTION, SOLUTION INTRAVENOUS; SUBCUTANEOUS
Status: DISCONTINUED | OUTPATIENT
Start: 2021-01-01 | End: 2021-01-01

## 2021-01-01 RX ORDER — ALLOPURINOL 300 MG/1
300 TABLET ORAL DAILY
Status: DISCONTINUED | OUTPATIENT
Start: 2021-01-01 | End: 2021-01-01 | Stop reason: HOSPADM

## 2021-01-01 RX ORDER — ONDANSETRON 4 MG/1
4 TABLET, FILM COATED ORAL EVERY 8 HOURS PRN
Qty: 12 TABLET | Refills: 0 | Status: SHIPPED | OUTPATIENT
Start: 2021-01-01 | End: 2021-01-01 | Stop reason: ALTCHOICE

## 2021-01-01 RX ORDER — IPRATROPIUM BROMIDE AND ALBUTEROL SULFATE .5; 3 MG/3ML; MG/3ML
1 SOLUTION RESPIRATORY (INHALATION) ONCE
Status: COMPLETED | OUTPATIENT
Start: 2021-01-01 | End: 2021-01-01

## 2021-01-01 RX ORDER — HEPARIN SODIUM 1000 [USP'U]/ML
3400 INJECTION, SOLUTION INTRAVENOUS; SUBCUTANEOUS
Status: DISCONTINUED | OUTPATIENT
Start: 2021-01-01 | End: 2021-01-01 | Stop reason: HOSPADM

## 2021-01-01 RX ORDER — HEPARIN SODIUM 1000 [USP'U]/ML
INJECTION, SOLUTION INTRAVENOUS; SUBCUTANEOUS CODE/TRAUMA/SEDATION MEDICATION
Status: COMPLETED | OUTPATIENT
Start: 2021-01-01 | End: 2021-01-01

## 2021-01-01 RX ORDER — DEXAMETHASONE SODIUM PHOSPHATE 4 MG/ML
12 INJECTION, SOLUTION INTRA-ARTICULAR; INTRALESIONAL; INTRAMUSCULAR; INTRAVENOUS; SOFT TISSUE ONCE
Status: COMPLETED | OUTPATIENT
Start: 2021-01-01 | End: 2021-01-01

## 2021-01-01 RX ORDER — GUAIFENESIN 600 MG
600 TABLET, EXTENDED RELEASE 12 HR ORAL EVERY 12 HOURS SCHEDULED
Status: DISCONTINUED | OUTPATIENT
Start: 2021-01-01 | End: 2021-01-01 | Stop reason: HOSPADM

## 2021-01-01 RX ORDER — POLYETHYLENE GLYCOL 3350 17 G/17G
17 POWDER, FOR SOLUTION ORAL DAILY
Status: DISCONTINUED | OUTPATIENT
Start: 2021-01-01 | End: 2021-01-01 | Stop reason: HOSPADM

## 2021-01-01 RX ORDER — CEFTRIAXONE 1 G/50ML
1000 INJECTION, SOLUTION INTRAVENOUS EVERY 24 HOURS
Status: DISCONTINUED | OUTPATIENT
Start: 2021-01-01 | End: 2021-01-01 | Stop reason: HOSPADM

## 2021-01-01 RX ORDER — DEXTROMETHORPHAN HYDROBROMIDE AND PROMETHAZINE HYDROCHLORIDE 15; 6.25 MG/5ML; MG/5ML
10 SOLUTION ORAL 3 TIMES DAILY PRN
Qty: 240 ML | Refills: 1 | Status: SHIPPED | OUTPATIENT
Start: 2021-01-01 | End: 2022-01-01 | Stop reason: SDUPTHER

## 2021-01-01 RX ORDER — SODIUM CHLORIDE 9 MG/ML
75 INJECTION, SOLUTION INTRAVENOUS CONTINUOUS
OUTPATIENT
Start: 2021-01-01

## 2021-01-01 RX ORDER — MIDAZOLAM HYDROCHLORIDE 2 MG/2ML
INJECTION, SOLUTION INTRAMUSCULAR; INTRAVENOUS CODE/TRAUMA/SEDATION MEDICATION
Status: COMPLETED | OUTPATIENT
Start: 2021-01-01 | End: 2021-01-01

## 2021-01-01 RX ORDER — VERAPAMIL HYDROCHLORIDE 2.5 MG/ML
INJECTION, SOLUTION INTRAVENOUS CODE/TRAUMA/SEDATION MEDICATION
Status: COMPLETED | OUTPATIENT
Start: 2021-01-01 | End: 2021-01-01

## 2021-01-01 RX ORDER — AZITHROMYCIN 250 MG/1
TABLET, FILM COATED ORAL
Qty: 6 TABLET | Refills: 0 | Status: SHIPPED | OUTPATIENT
Start: 2021-01-01 | End: 2021-01-01

## 2021-01-01 RX ORDER — DEXAMETHASONE SODIUM PHOSPHATE 4 MG/ML
12 INJECTION, SOLUTION INTRA-ARTICULAR; INTRALESIONAL; INTRAMUSCULAR; INTRAVENOUS; SOFT TISSUE ONCE
OUTPATIENT
Start: 2021-01-01 | End: 2021-01-01

## 2021-01-01 RX ORDER — ASPIRIN 325 MG
325 TABLET ORAL ONCE
Status: COMPLETED | OUTPATIENT
Start: 2021-01-01 | End: 2021-01-01

## 2021-01-01 RX ORDER — DOXYCYCLINE HYCLATE 100 MG/1
100 CAPSULE ORAL EVERY 12 HOURS
Status: DISCONTINUED | OUTPATIENT
Start: 2021-01-01 | End: 2021-01-01 | Stop reason: HOSPADM

## 2021-01-01 RX ADMIN — DEXAMETHASONE SODIUM PHOSPHATE 12 MG: 4 INJECTION INTRA-ARTICULAR; INTRALESIONAL; INTRAMUSCULAR; INTRAVENOUS; SOFT TISSUE at 08:25

## 2021-01-01 RX ADMIN — HEPARIN SODIUM 3000 UNITS: 1000 INJECTION INTRAVENOUS; SUBCUTANEOUS at 09:24

## 2021-01-01 RX ADMIN — ALLOPURINOL 300 MG: 300 TABLET ORAL at 09:00

## 2021-01-01 RX ADMIN — APIXABAN 10 MG: 5 TABLET, FILM COATED ORAL at 17:15

## 2021-01-01 RX ADMIN — ALLOPURINOL 300 MG: 300 TABLET ORAL at 10:17

## 2021-01-01 RX ADMIN — HEPARIN SODIUM 18 UNITS/KG/HR: 10000 INJECTION, SOLUTION INTRAVENOUS at 19:40

## 2021-01-01 RX ADMIN — ALLOPURINOL 300 MG: 300 TABLET ORAL at 08:39

## 2021-01-01 RX ADMIN — DEXAMETHASONE SODIUM PHOSPHATE 9.2 MG: 4 INJECTION, SOLUTION INTRAMUSCULAR; INTRAVENOUS at 10:34

## 2021-01-01 RX ADMIN — LISINOPRIL 5 MG: 5 TABLET ORAL at 09:57

## 2021-01-01 RX ADMIN — GADOBUTROL 8 ML: 604.72 INJECTION INTRAVENOUS at 11:32

## 2021-01-01 RX ADMIN — HEPARIN SODIUM 15 UNITS/KG/HR: 10000 INJECTION, SOLUTION INTRAVENOUS at 11:53

## 2021-01-01 RX ADMIN — APIXABAN 10 MG: 5 TABLET, FILM COATED ORAL at 09:37

## 2021-01-01 RX ADMIN — DEXAMETHASONE SODIUM PHOSPHATE 9.2 MG: 4 INJECTION, SOLUTION INTRAMUSCULAR; INTRAVENOUS at 09:01

## 2021-01-01 RX ADMIN — DOCUSATE SODIUM AND SENNOSIDES 2 TABLET: 8.6; 5 TABLET ORAL at 17:40

## 2021-01-01 RX ADMIN — METOPROLOL TARTRATE 25 MG: 25 TABLET, FILM COATED ORAL at 21:36

## 2021-01-01 RX ADMIN — HEPARIN SODIUM 2000 UNITS: 1000 INJECTION INTRAVENOUS; SUBCUTANEOUS at 09:28

## 2021-01-01 RX ADMIN — ONDANSETRON 4 MG: 2 INJECTION INTRAMUSCULAR; INTRAVENOUS at 08:25

## 2021-01-01 RX ADMIN — INSULIN LISPRO 2 UNITS: 100 INJECTION, SOLUTION INTRAVENOUS; SUBCUTANEOUS at 21:26

## 2021-01-01 RX ADMIN — DEXAMETHASONE SODIUM PHOSPHATE 9.2 MG: 4 INJECTION, SOLUTION INTRAMUSCULAR; INTRAVENOUS at 08:41

## 2021-01-01 RX ADMIN — DEXAMETHASONE SODIUM PHOSPHATE 9.2 MG: 4 INJECTION, SOLUTION INTRAMUSCULAR; INTRAVENOUS at 13:58

## 2021-01-01 RX ADMIN — IOHEXOL 100 ML: 350 INJECTION, SOLUTION INTRAVENOUS at 11:48

## 2021-01-01 RX ADMIN — ASPIRIN 325 MG: 325 TABLET ORAL at 13:56

## 2021-01-01 RX ADMIN — INSULIN LISPRO 1 UNITS: 100 INJECTION, SOLUTION INTRAVENOUS; SUBCUTANEOUS at 17:41

## 2021-01-01 RX ADMIN — INSULIN LISPRO 2 UNITS: 100 INJECTION, SOLUTION INTRAVENOUS; SUBCUTANEOUS at 11:35

## 2021-01-01 RX ADMIN — ASPIRIN 81 MG: 81 TABLET, CHEWABLE ORAL at 09:36

## 2021-01-01 RX ADMIN — DOXYCYCLINE 100 MG: 100 CAPSULE ORAL at 08:41

## 2021-01-01 RX ADMIN — GUAIFENESIN 600 MG: 600 TABLET ORAL at 21:13

## 2021-01-01 RX ADMIN — ATORVASTATIN CALCIUM 40 MG: 40 TABLET, FILM COATED ORAL at 21:13

## 2021-01-01 RX ADMIN — ASPIRIN 81 MG: 81 TABLET, CHEWABLE ORAL at 08:03

## 2021-01-01 RX ADMIN — DEXAMETHASONE SODIUM PHOSPHATE 9.2 MG: 4 INJECTION, SOLUTION INTRAMUSCULAR; INTRAVENOUS at 22:03

## 2021-01-01 RX ADMIN — DEXAMETHASONE SODIUM PHOSPHATE 9.2 MG: 4 INJECTION, SOLUTION INTRAMUSCULAR; INTRAVENOUS at 08:04

## 2021-01-01 RX ADMIN — FENTANYL CITRATE 25 MCG: 50 INJECTION INTRAMUSCULAR; INTRAVENOUS at 09:24

## 2021-01-01 RX ADMIN — INSULIN LISPRO 1 UNITS: 100 INJECTION, SOLUTION INTRAVENOUS; SUBCUTANEOUS at 13:46

## 2021-01-01 RX ADMIN — GUAIFENESIN 600 MG: 600 TABLET ORAL at 08:03

## 2021-01-01 RX ADMIN — DOXYCYCLINE 100 MG: 100 CAPSULE ORAL at 22:02

## 2021-01-01 RX ADMIN — ASPIRIN 81 MG: 81 TABLET, CHEWABLE ORAL at 10:17

## 2021-01-01 RX ADMIN — ASPIRIN 81 MG: 81 TABLET, CHEWABLE ORAL at 08:40

## 2021-01-01 RX ADMIN — ASPIRIN 81 MG: 81 TABLET, CHEWABLE ORAL at 09:01

## 2021-01-01 RX ADMIN — HEPARIN SODIUM 18 UNITS/KG/HR: 10000 INJECTION, SOLUTION INTRAVENOUS at 17:24

## 2021-01-01 RX ADMIN — ASPIRIN 81 MG: 81 TABLET, CHEWABLE ORAL at 08:39

## 2021-01-01 RX ADMIN — INSULIN LISPRO 1 UNITS: 100 INJECTION, SOLUTION INTRAVENOUS; SUBCUTANEOUS at 16:16

## 2021-01-01 RX ADMIN — POLYETHYLENE GLYCOL 3350 17 G: 17 POWDER, FOR SOLUTION ORAL at 09:02

## 2021-01-01 RX ADMIN — FENTANYL CITRATE 25 MCG: 50 INJECTION INTRAMUSCULAR; INTRAVENOUS at 10:18

## 2021-01-01 RX ADMIN — GUAIFENESIN 600 MG: 600 TABLET ORAL at 08:39

## 2021-01-01 RX ADMIN — GUAIFENESIN 600 MG: 600 TABLET ORAL at 09:56

## 2021-01-01 RX ADMIN — Medication 200 MCG: at 09:24

## 2021-01-01 RX ADMIN — APIXABAN 10 MG: 5 TABLET, FILM COATED ORAL at 17:41

## 2021-01-01 RX ADMIN — METOPROLOL TARTRATE 25 MG: 25 TABLET, FILM COATED ORAL at 17:40

## 2021-01-01 RX ADMIN — GUAIFENESIN 600 MG: 600 TABLET ORAL at 22:03

## 2021-01-01 RX ADMIN — ATORVASTATIN CALCIUM 40 MG: 40 TABLET, FILM COATED ORAL at 22:04

## 2021-01-01 RX ADMIN — METOPROLOL TARTRATE 25 MG: 25 TABLET, FILM COATED ORAL at 17:15

## 2021-01-01 RX ADMIN — HEPARIN SODIUM 17 UNITS/KG/HR: 10000 INJECTION, SOLUTION INTRAVENOUS at 06:13

## 2021-01-01 RX ADMIN — DOXYCYCLINE 100 MG: 100 CAPSULE ORAL at 08:03

## 2021-01-01 RX ADMIN — Medication 10 ML: at 09:19

## 2021-01-01 RX ADMIN — DOXYCYCLINE 100 MG: 100 CAPSULE ORAL at 20:00

## 2021-01-01 RX ADMIN — INSULIN LISPRO 2 UNITS: 100 INJECTION, SOLUTION INTRAVENOUS; SUBCUTANEOUS at 21:14

## 2021-01-01 RX ADMIN — DOXYCYCLINE 100 MG: 100 CAPSULE ORAL at 13:56

## 2021-01-01 RX ADMIN — CEFAZOLIN SODIUM 2000 MG: 2 SOLUTION INTRAVENOUS at 08:46

## 2021-01-01 RX ADMIN — ALLOPURINOL 300 MG: 300 TABLET ORAL at 09:57

## 2021-01-01 RX ADMIN — DEXAMETHASONE SODIUM PHOSPHATE 9.2 MG: 4 INJECTION, SOLUTION INTRAMUSCULAR; INTRAVENOUS at 22:16

## 2021-01-01 RX ADMIN — INSULIN LISPRO 2 UNITS: 100 INJECTION, SOLUTION INTRAVENOUS; SUBCUTANEOUS at 12:18

## 2021-01-01 RX ADMIN — IOHEXOL 100 ML: 350 INJECTION, SOLUTION INTRAVENOUS at 16:00

## 2021-01-01 RX ADMIN — INSULIN LISPRO 2 UNITS: 100 INJECTION, SOLUTION INTRAVENOUS; SUBCUTANEOUS at 21:16

## 2021-01-01 RX ADMIN — DEXAMETHASONE SODIUM PHOSPHATE 9.2 MG: 4 INJECTION, SOLUTION INTRAMUSCULAR; INTRAVENOUS at 20:00

## 2021-01-01 RX ADMIN — APIXABAN 10 MG: 5 TABLET, FILM COATED ORAL at 08:40

## 2021-01-01 RX ADMIN — GUAIFENESIN 600 MG: 600 TABLET ORAL at 21:10

## 2021-01-01 RX ADMIN — DIPHENHYDRAMINE HYDROCHLORIDE 50 MG: 50 INJECTION, SOLUTION INTRAMUSCULAR; INTRAVENOUS at 09:04

## 2021-01-01 RX ADMIN — DOXYCYCLINE 100 MG: 100 CAPSULE ORAL at 21:10

## 2021-01-01 RX ADMIN — SODIUM CHLORIDE 75 ML/HR: 0.9 INJECTION, SOLUTION INTRAVENOUS at 07:33

## 2021-01-01 RX ADMIN — DOXORUBICIN HYDROCHLORIDE: 2 INJECTION, POWDER, LYOPHILIZED, FOR SOLUTION INTRAVENOUS at 10:18

## 2021-01-01 RX ADMIN — Medication 10 ML: at 10:20

## 2021-01-01 RX ADMIN — GUAIFENESIN 600 MG: 600 TABLET ORAL at 21:50

## 2021-01-01 RX ADMIN — INSULIN LISPRO 2 UNITS: 100 INJECTION, SOLUTION INTRAVENOUS; SUBCUTANEOUS at 17:15

## 2021-01-01 RX ADMIN — GUAIFENESIN 600 MG: 600 TABLET ORAL at 08:41

## 2021-01-01 RX ADMIN — GUAIFENESIN 600 MG: 600 TABLET ORAL at 10:17

## 2021-01-01 RX ADMIN — METOPROLOL TARTRATE 25 MG: 25 TABLET, FILM COATED ORAL at 09:00

## 2021-01-01 RX ADMIN — ATORVASTATIN CALCIUM 40 MG: 40 TABLET, FILM COATED ORAL at 21:36

## 2021-01-01 RX ADMIN — DEXAMETHASONE SODIUM PHOSPHATE 9.2 MG: 4 INJECTION, SOLUTION INTRAMUSCULAR; INTRAVENOUS at 21:10

## 2021-01-01 RX ADMIN — GUAIFENESIN 600 MG: 600 TABLET ORAL at 09:37

## 2021-01-01 RX ADMIN — INSULIN LISPRO 1 UNITS: 100 INJECTION, SOLUTION INTRAVENOUS; SUBCUTANEOUS at 21:50

## 2021-01-01 RX ADMIN — HEPARIN SODIUM 17 UNITS/KG/HR: 10000 INJECTION, SOLUTION INTRAVENOUS at 02:40

## 2021-01-01 RX ADMIN — INSULIN LISPRO 2 UNITS: 100 INJECTION, SOLUTION INTRAVENOUS; SUBCUTANEOUS at 12:30

## 2021-01-01 RX ADMIN — METRONIDAZOLE 500 MG: 500 INJECTION, SOLUTION INTRAVENOUS at 08:00

## 2021-01-01 RX ADMIN — DEXAMETHASONE SODIUM PHOSPHATE 9.2 MG: 4 INJECTION, SOLUTION INTRAMUSCULAR; INTRAVENOUS at 21:13

## 2021-01-01 RX ADMIN — ALLOPURINOL 300 MG: 300 TABLET ORAL at 08:04

## 2021-01-01 RX ADMIN — DOCUSATE SODIUM AND SENNOSIDES 2 TABLET: 8.6; 5 TABLET ORAL at 08:39

## 2021-01-01 RX ADMIN — METOPROLOL TARTRATE 25 MG: 25 TABLET, FILM COATED ORAL at 10:16

## 2021-01-01 RX ADMIN — METOPROLOL TARTRATE 25 MG: 25 TABLET, FILM COATED ORAL at 18:13

## 2021-01-01 RX ADMIN — MIDAZOLAM 1 MG: 1 INJECTION INTRAMUSCULAR; INTRAVENOUS at 10:18

## 2021-01-01 RX ADMIN — ATORVASTATIN CALCIUM 40 MG: 40 TABLET, FILM COATED ORAL at 21:50

## 2021-01-01 RX ADMIN — DOXYCYCLINE 100 MG: 100 CAPSULE ORAL at 21:36

## 2021-01-01 RX ADMIN — DOCUSATE SODIUM AND SENNOSIDES 2 TABLET: 8.6; 5 TABLET ORAL at 10:34

## 2021-01-01 RX ADMIN — VERAPAMIL HYDROCHLORIDE 2.5 MG: 2.5 INJECTION, SOLUTION INTRAVENOUS at 09:24

## 2021-01-01 RX ADMIN — DEXAMETHASONE SODIUM PHOSPHATE 9.2 MG: 4 INJECTION, SOLUTION INTRAMUSCULAR; INTRAVENOUS at 21:14

## 2021-01-01 RX ADMIN — APIXABAN 10 MG: 5 TABLET, FILM COATED ORAL at 18:13

## 2021-01-01 RX ADMIN — APIXABAN 10 MG: 5 TABLET, FILM COATED ORAL at 09:56

## 2021-01-01 RX ADMIN — INSULIN LISPRO 1 UNITS: 100 INJECTION, SOLUTION INTRAVENOUS; SUBCUTANEOUS at 13:02

## 2021-01-01 RX ADMIN — BARICITINIB 2 MG: 2 TABLET, FILM COATED ORAL at 21:39

## 2021-01-01 RX ADMIN — ATORVASTATIN CALCIUM 40 MG: 40 TABLET, FILM COATED ORAL at 21:10

## 2021-01-01 RX ADMIN — METOPROLOL TARTRATE 25 MG: 25 TABLET, FILM COATED ORAL at 17:41

## 2021-01-01 RX ADMIN — ATORVASTATIN CALCIUM 40 MG: 40 TABLET, FILM COATED ORAL at 21:27

## 2021-01-01 RX ADMIN — ALLOPURINOL 300 MG: 300 TABLET ORAL at 09:38

## 2021-01-01 RX ADMIN — CEFTRIAXONE SODIUM 1000 MG: 10 INJECTION, POWDER, FOR SOLUTION INTRAVENOUS at 15:00

## 2021-01-01 RX ADMIN — ATORVASTATIN CALCIUM 40 MG: 40 TABLET, FILM COATED ORAL at 21:14

## 2021-01-01 RX ADMIN — METOPROLOL TARTRATE 25 MG: 25 TABLET, FILM COATED ORAL at 08:04

## 2021-01-01 RX ADMIN — INSULIN LISPRO 1 UNITS: 100 INJECTION, SOLUTION INTRAVENOUS; SUBCUTANEOUS at 17:43

## 2021-01-01 RX ADMIN — DOCUSATE SODIUM AND SENNOSIDES 2 TABLET: 8.6; 5 TABLET ORAL at 17:41

## 2021-01-01 RX ADMIN — INSULIN LISPRO 2 UNITS: 100 INJECTION, SOLUTION INTRAVENOUS; SUBCUTANEOUS at 12:11

## 2021-01-01 RX ADMIN — DEXAMETHASONE SODIUM PHOSPHATE 9.2 MG: 4 INJECTION, SOLUTION INTRAMUSCULAR; INTRAVENOUS at 10:03

## 2021-01-01 RX ADMIN — DEXAMETHASONE SODIUM PHOSPHATE 9.2 MG: 4 INJECTION, SOLUTION INTRAMUSCULAR; INTRAVENOUS at 09:38

## 2021-01-01 RX ADMIN — INSULIN LISPRO 2 UNITS: 100 INJECTION, SOLUTION INTRAVENOUS; SUBCUTANEOUS at 18:18

## 2021-01-01 RX ADMIN — MIDAZOLAM 1 MG: 1 INJECTION INTRAMUSCULAR; INTRAVENOUS at 09:05

## 2021-01-01 RX ADMIN — INSULIN LISPRO 1 UNITS: 100 INJECTION, SOLUTION INTRAVENOUS; SUBCUTANEOUS at 10:34

## 2021-01-01 RX ADMIN — HEPARIN SODIUM 6800 UNITS: 1000 INJECTION INTRAVENOUS; SUBCUTANEOUS at 12:54

## 2021-01-01 RX ADMIN — GUAIFENESIN 600 MG: 600 TABLET ORAL at 21:27

## 2021-01-01 RX ADMIN — CEFTRIAXONE SODIUM 1000 MG: 10 INJECTION, POWDER, FOR SOLUTION INTRAVENOUS at 15:18

## 2021-01-01 RX ADMIN — APIXABAN 10 MG: 5 TABLET, FILM COATED ORAL at 12:11

## 2021-01-01 RX ADMIN — LISINOPRIL 5 MG: 5 TABLET ORAL at 11:53

## 2021-01-01 RX ADMIN — CEFTRIAXONE SODIUM 1000 MG: 10 INJECTION, POWDER, FOR SOLUTION INTRAVENOUS at 14:13

## 2021-01-01 RX ADMIN — METOPROLOL TARTRATE 25 MG: 25 TABLET, FILM COATED ORAL at 08:39

## 2021-01-01 RX ADMIN — METOPROLOL TARTRATE 25 MG: 25 TABLET, FILM COATED ORAL at 08:40

## 2021-01-01 RX ADMIN — DOXYCYCLINE 100 MG: 100 CAPSULE ORAL at 09:01

## 2021-01-01 RX ADMIN — DOXYCYCLINE 100 MG: 100 CAPSULE ORAL at 10:16

## 2021-01-01 RX ADMIN — CEFTRIAXONE SODIUM 1000 MG: 10 INJECTION, POWDER, FOR SOLUTION INTRAVENOUS at 13:01

## 2021-01-01 RX ADMIN — DOCUSATE SODIUM AND SENNOSIDES 2 TABLET: 8.6; 5 TABLET ORAL at 17:56

## 2021-01-01 RX ADMIN — INSULIN LISPRO 1 UNITS: 100 INJECTION, SOLUTION INTRAVENOUS; SUBCUTANEOUS at 21:11

## 2021-01-01 RX ADMIN — POLYETHYLENE GLYCOL 3350 17 G: 17 POWDER, FOR SOLUTION ORAL at 08:39

## 2021-01-01 RX ADMIN — CEFTRIAXONE 1000 MG: 1 INJECTION, SOLUTION INTRAVENOUS at 14:03

## 2021-01-01 RX ADMIN — ASPIRIN 81 MG: 81 TABLET, CHEWABLE ORAL at 09:57

## 2021-01-01 RX ADMIN — DOCUSATE SODIUM AND SENNOSIDES 2 TABLET: 8.6; 5 TABLET ORAL at 09:01

## 2021-01-01 RX ADMIN — POLYETHYLENE GLYCOL 3350 17 G: 17 POWDER, FOR SOLUTION ORAL at 10:17

## 2021-01-01 RX ADMIN — DEXAMETHASONE SODIUM PHOSPHATE 9.2 MG: 4 INJECTION, SOLUTION INTRAMUSCULAR; INTRAVENOUS at 21:00

## 2021-01-01 RX ADMIN — APIXABAN 10 MG: 5 TABLET, FILM COATED ORAL at 17:40

## 2021-01-01 RX ADMIN — GUAIFENESIN 600 MG: 600 TABLET ORAL at 11:00

## 2021-01-01 RX ADMIN — DOXYCYCLINE 100 MG: 100 CAPSULE ORAL at 08:40

## 2021-01-01 RX ADMIN — INSULIN LISPRO 1 UNITS: 100 INJECTION, SOLUTION INTRAVENOUS; SUBCUTANEOUS at 09:03

## 2021-01-01 RX ADMIN — HEPARIN SODIUM 6800 UNITS: 1000 INJECTION, SOLUTION INTRAVENOUS; SUBCUTANEOUS at 17:28

## 2021-01-01 RX ADMIN — ALLOPURINOL 300 MG: 300 TABLET ORAL at 08:40

## 2021-01-01 RX ADMIN — DOXYCYCLINE 100 MG: 100 CAPSULE ORAL at 21:00

## 2021-01-01 RX ADMIN — INSULIN LISPRO 2 UNITS: 100 INJECTION, SOLUTION INTRAVENOUS; SUBCUTANEOUS at 17:40

## 2021-01-01 RX ADMIN — APIXABAN 10 MG: 5 TABLET, FILM COATED ORAL at 08:03

## 2021-01-01 RX ADMIN — METOPROLOL TARTRATE 25 MG: 25 TABLET, FILM COATED ORAL at 09:57

## 2021-01-01 RX ADMIN — FENTANYL CITRATE 50 MCG: 50 INJECTION INTRAMUSCULAR; INTRAVENOUS at 09:05

## 2021-01-01 RX ADMIN — METOPROLOL TARTRATE 25 MG: 25 TABLET, FILM COATED ORAL at 17:56

## 2021-01-01 RX ADMIN — GUAIFENESIN 600 MG: 600 TABLET ORAL at 21:14

## 2021-01-16 ENCOUNTER — LAB (OUTPATIENT)
Dept: LAB | Facility: CLINIC | Age: 81
End: 2021-01-16
Payer: MEDICARE

## 2021-01-16 DIAGNOSIS — C22.0 HEPATOCELLULAR CARCINOMA (HCC): ICD-10-CM

## 2021-01-16 LAB
BUN SERPL-MCNC: 21 MG/DL (ref 5–25)
CREAT SERPL-MCNC: 1.21 MG/DL (ref 0.6–1.3)
GFR SERPL CREATININE-BSD FRML MDRD: 55 ML/MIN/1.73SQ M

## 2021-01-16 PROCEDURE — 82565 ASSAY OF CREATININE: CPT

## 2021-01-16 PROCEDURE — 36415 COLL VENOUS BLD VENIPUNCTURE: CPT

## 2021-01-16 PROCEDURE — 84520 ASSAY OF UREA NITROGEN: CPT

## 2021-01-19 ENCOUNTER — TELEPHONE (OUTPATIENT)
Dept: HEMATOLOGY ONCOLOGY | Facility: CLINIC | Age: 81
End: 2021-01-19

## 2021-01-20 ENCOUNTER — IMMUNIZATIONS (OUTPATIENT)
Dept: FAMILY MEDICINE CLINIC | Facility: HOSPITAL | Age: 81
End: 2021-01-20

## 2021-01-20 ENCOUNTER — TELEPHONE (OUTPATIENT)
Dept: SURGICAL ONCOLOGY | Facility: CLINIC | Age: 81
End: 2021-01-20

## 2021-01-20 DIAGNOSIS — Z23 ENCOUNTER FOR IMMUNIZATION: Primary | ICD-10-CM

## 2021-01-20 PROCEDURE — 0011A SARS-COV-2 / COVID-19 MRNA VACCINE (MODERNA) 100 MCG: CPT

## 2021-01-20 PROCEDURE — 91301 SARS-COV-2 / COVID-19 MRNA VACCINE (MODERNA) 100 MCG: CPT

## 2021-01-21 ENCOUNTER — OFFICE VISIT (OUTPATIENT)
Dept: SURGICAL ONCOLOGY | Facility: CLINIC | Age: 81
End: 2021-01-21
Payer: MEDICARE

## 2021-01-21 VITALS
RESPIRATION RATE: 18 BRPM | HEART RATE: 84 BPM | WEIGHT: 209 LBS | DIASTOLIC BLOOD PRESSURE: 70 MMHG | TEMPERATURE: 98.7 F | HEIGHT: 69 IN | SYSTOLIC BLOOD PRESSURE: 110 MMHG | BODY MASS INDEX: 30.96 KG/M2

## 2021-01-21 DIAGNOSIS — C22.0 HEPATOCELLULAR CARCINOMA (HCC): Primary | ICD-10-CM

## 2021-01-21 PROCEDURE — 99214 OFFICE O/P EST MOD 30 MIN: CPT | Performed by: SURGERY

## 2021-01-21 NOTE — LETTER
January 21, 2021     Hemalatha Levine, [address] [facility] [address]    Patient: Amelia Pallas   YOB: 1940   Date of Visit: 1/21/2021       Dear Dr Yoel Hernandez: Thank you for referring Malina Alvarez to me for evaluation  Below are my notes for this consultation  If you have questions, please do not hesitate to call me  I look forward to following your patient along with you  Sincerely,        Carlyn Ramirez MD        CC: MD Carlyn Michael MD  1/21/2021 10:39 AM  Sign when Signing Visit               Surgical Oncology Follow Up       [place] [id number] [facility] [address]  1940  [id number]  [facility]  [facility]  [address]  [facility] [address]    Diagnoses and all orders for this visit:    Hepatocellular carcinoma ([facility] [address] )  -     MRI abdomen w wo contrast; Future  -     BUN; Future  -     AFP tumor marker; Future  -     Creatinine, serum; Future        Chief Complaint   Patient presents with    Follow-up       Return in about 3 months (around 4/21/2021) for Office Visit, Imaging - See orders, Labs - See Treatment Plan        Oncology History   Hepatocellular carcinoma ([address] )   8/24/2020 Initial Diagnosis    Hepatocellular carcinoma ([facility] [address] )     8/25/2020 Biopsy    IR liver biopsy  - Well-differentiated heptocellular carcinoma     9/22/2020 -  Cancer Staged    Staging form: Liver (Excluding Intrahepatic Bile Ducts), AJCC 8th Edition  - Clinical: Stage IB (cT1b, cN0, cM0) - Signed by Fidelina Wilson MD on 9/22/2020       10/14/2020 - 10/14/2020 Radiation    Field Numbers Energy Treatment Site Starting  Ending  Elapsed  Fraction Total  Overlap Site Overlap         Date Date Days Dose Dose   Dose    SirSphere  Y90 Rt Lobe + Middle Robe   10/14/20 10/14/20    1 51 GBq  1 51 GBq Date / Time stamp:  10/14/2020  2:43 PM         Staging:  Well-differentiated HCC, August 2020  Treatment history:  Sir spheres, October 2020  Current treatment:  Observation  Disease status: MARIA M    History of Present Illness:  Patient returns in follow-up of his [facility] [address]  He underwent Sir spheres embolization in October of 2020  He tolerated this well  He is feeling well  He denies any abdominal pain, nausea or vomiting  His appetite is good  No unintentional weight loss  He did have a follow-up MRI that shows mild diffuse enhancement  I personally reviewed the films  Review of Systems  Complete ROS Surg Onc:   Complete ROS Surg Onc:   Constitutional: The patient denies new or recent history of general fatigue, no recent weight loss, no change in appetite  Eyes: No complaints of visual problems, no scleral icterus  ENT: no complaints of ear pain, no hoarseness, no difficulty swallowing,  no tinnitus and no new masses in head, oral cavity, or neck  Cardiovascular: No complaints of chest pain, no palpitations, no ankle edema  Respiratory: No complaints of shortness of breath, no cough  Gastrointestinal: No complaints of jaundice, no bloody stools, no pale stools  Genitourinary: No complaints of dysuria, no hematuria, no nocturia, no frequent urination, no urethral discharge  Musculoskeletal: No complaints of weakness, paralysis, joint stiffness or arthralgias  Integumentary: No complaints of rash, no new lesions  Neurological: No complaints of convulsions, no seizures, no dizziness  Hematologic/Lymphatic: No complaints of easy bruising  Endocrine:  No hot or cold intolerance  No polydipsia, polyphagia, or polyuria  Allergy/immunology:  No environmental allergies  No food allergies  Not immunocompromised  Skin:  No pallor or rash  No wound          Patient Active Problem List   Diagnosis    Hypertension    Hyperlipidemia    Cardiomegaly    Anxiety    Abnormal electrocardiogram    Coronary artery disease without angina pectoris    PAD (peripheral artery disease) (HCC)    Viral URI with cough    Reactive airway disease without complication    Medicare annual wellness visit, subsequent    Hyperglycemia    Ankle edema, bilateral    Transaminitis    Respiratory insufficiency    EMILIANO (acute kidney injury) ([facility] [address] [facility] )    Elevated troponin    Septic shock (HCC)    Hepatocellular carcinoma (HCC)    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HCC)     No past medical history on file    Past Surgical History:   Procedure Laterality Date    ABDOMINAL SURGERY      appy    CORONARY ARTERY BYPASS GRAFT      IR BIOPSY LIVER MASS  8/25/2020    IR Y-90 PRE-ANGIO/EMBO W/ LUNG SCAN  10/6/2020    IR Y-90 RADIOEMBOLIZATION  10/14/2020     Family History   Problem Relation Age of Onset    No Known Problems Mother     No Known Problems Father      Social History     Socioeconomic History    Marital status: /Civil Union     Spouse name: Not on file    Number of children: Not on file    Years of education: Not on file    Highest education level: Not on file   Occupational History    Not on file   Social Needs    Financial resource strain: Not on file    Food insecurity     Worry: Not on file     Inability: Not on file   [employer] needs     Medical: Not on file     Non-medical: Not on file   Tobacco Use    Smoking status: Never Smoker    Smokeless tobacco: Never Used   Substance and Sexual Activity    Alcohol use: Not Currently     Frequency: Never    Drug use: Never    Sexual activity: Not on file   Lifestyle    Physical activity     Days per week: Not on file     Minutes per session: Not on file    Stress: Not on file   Relationships    Social connections     Talks on phone: Not on file     Gets together: Not on file     Attends Anabaptism service: Not on file     Active member of club or organization: Not on file     Attends meetings of clubs or organizations: Not on file     Relationship status: Not on file    Intimate partner violence     Fear of current or ex partner: Not on file     Emotionally abused: Not on file     Physically abused: Not on file     Forced sexual activity: Not on file   Other Topics Concern    Not on file   Social History Narrative    Not on file       Current Outpatient Medications:     allopurinol (ZYLOPRIM) 300 mg tablet, Take 300 mg by mouth daily, Disp: , Rfl:     aspirin 81 mg chewable tablet, Chew 1 tablet (81 mg total) daily, Disp: 30 tablet, Rfl: 0    cholecalciferol (VITAMIN D3) 400 units tablet, Take 1,000 Units by mouth daily , Disp: , Rfl:     lisinopril (ZESTRIL) 10 mg tablet, TAKE ONE TABLET BY MOUTH EVERY DAY FOR BLOOD PRESSURE/HEART, Disp: , Rfl:     metoprolol tartrate (LOPRESSOR) 25 mg tablet, Take 1 tablet by mouth 2 (two) times a day, Disp: , Rfl:     Omega-3 Fatty Acids (fish oil) 1,000 mg, Take 1,000 mg by mouth daily, Disp: , Rfl:     simvastatin (ZOCOR) 80 mg tablet, TAKE ONE TABLET BY MOUTH EVERY DAY AT 5 PM FOR CHOLESTEROL, Disp: , Rfl:     methylPREDNISolone 4 MG tablet therapy pack, Use as directed on package (Patient not taking: Reported on 11/11/2020), Disp: 1 each, Rfl: 0    omeprazole (PriLOSEC) 40 MG capsule, Take 1 capsule (40 mg total) by mouth daily Please start on 10/12 and may take morning of procedure (Patient not taking: Reported on 11/11/2020), Disp: 30 capsule, Rfl: 0    ondansetron (ZOFRAN) 4 mg tablet, Take 1 tablet (4 mg total) by mouth every 8 (eight) hours as needed for nausea or vomiting (Patient not taking: Reported on 11/11/2020), Disp: 12 tablet, Rfl: 0    oxyCODONE (ROXICODONE) 5 mg immediate release tablet, Take 1 tablet (5 mg total) by mouth every 4 (four) hours as needed for moderate pain or severe pain for up to 6 dosesMax Daily Amount: 30 mg (Patient not taking: Reported on 11/11/2020), Disp: 6 tablet, Rfl: 0  Allergies   Allergen Reactions    Shellfish-Derived Products      Vitals:    01/21/21 1017   BP: 110/70   Pulse: 84   Resp: 18   Temp: 98 7 °F (37 1 °C)       Physical Exam  Constitutional: General appearance: The Patient is well-developed and well-nourished who appears the stated age in no acute distress  Patient is pleasant and talkative  HEENT:  Normocephalic  Sclerae are anicteric  Mucous membranes are moist  Neck is supple without adenopathy  No JVD  Chest: The lungs are clear to auscultation  Cardiac: Heart is regular rate  Abdomen: Abdomen is soft, non-tender, non-distended and without masses  Extremities: There is no clubbing or cyanosis  There is no edema  Symmetric  Neuro: Grossly nonfocal  Gait is normal      Lymphatic: No evidence of cervical adenopathy bilaterally  No evidence of axillary adenopathy bilaterally  Skin: Warm, anicteric  Psych:  Patient is pleasant and talkative  Breasts:        Pathology:  [unfilled]    Labs:      Imaging  MRI is as above  I personally reviewed the films  I reviewed the above laboratory and imaging data  Discussion/Summary: 23-year-old male with well-differentiated HCC  The lesion is very centrally located in his liver  Given his age and the central location, I am not sure that he would tolerate hepatectomy well, consequently, we elected on Sir spheres embolization  He tolerated this well in October of 2020  His follow-up imaging is likely all treatment related changes  I will review this with 1 of our body MRI images  I will call him with the results  I will tentatively plan on repeating the MRI in 3 months with an AFP level  I will see him back at that time for another clinical exam   He is agreeable to this  All his questions were answered

## 2021-01-21 NOTE — PROGRESS NOTES
Surgical Oncology Follow Up       [place]  [address]  [facility]  [facility]  [address] [facility] [address] [facility]  1940  [phone]  [place] [address]  [address]  [facility]  [facility]  [address]  [facility] [address]    Diagnoses and all orders for this visit:    Hepatocellular carcinoma ([place] [address] )  -     MRI abdomen w wo contrast; Future  -     BUN; Future  -     AFP tumor marker; Future  -     Creatinine, serum; Future        Chief Complaint   Patient presents with    Follow-up       Return in about 3 months (around 4/21/2021) for Office Visit, Imaging - See orders, Labs - See Treatment Plan  Oncology History   Hepatocellular carcinoma ([place] [address] )   8/24/2020 Initial Diagnosis    Hepatocellular carcinoma ([place] [address] )     8/25/2020 Biopsy    IR liver biopsy  - Well-differentiated heptocellular carcinoma     9/22/2020 -  Cancer Staged    Staging form: Liver (Excluding Intrahepatic Bile Ducts), AJCC 8th Edition  - Clinical: Stage IB (cT1b, cN0, cM0) - Signed by Pavel Chappell MD on 9/22/2020       10/14/2020 - 10/14/2020 Radiation    Field Numbers Energy Treatment Site Starting  Ending  Elapsed  Fraction Total  Overlap Site Overlap         Date Date Days Dose Dose   Dose    SirSphere  Y90 Rt Lobe + Middle Robe   10/14/20 10/14/20    1 51 GBq  1 51 GBq                                                                                        Date / Time stamp:  10/14/2020  2:43 PM         Staging:  Well-differentiated HCC, August 2020  Treatment history:  Sir spheres, October 2020  Current treatment:  Observation  Disease status: MARIA M    History of Present Illness:  Patient returns in follow-up of his [facility]  He underwent Sir spheres embolization in October of 2020  He tolerated this well  He is feeling well  He denies any abdominal pain, nausea or vomiting  His appetite is good  No unintentional weight loss    He did have a follow-up MRI that shows mild diffuse enhancement  I personally reviewed the films  Review of Systems  Complete ROS Surg Onc:   Complete ROS Surg Onc:   Constitutional: The patient denies new or recent history of general fatigue, no recent weight loss, no change in appetite  Eyes: No complaints of visual problems, no scleral icterus  ENT: no complaints of ear pain, no hoarseness, no difficulty swallowing,  no tinnitus and no new masses in head, oral cavity, or neck  Cardiovascular: No complaints of chest pain, no palpitations, no ankle edema  Respiratory: No complaints of shortness of breath, no cough  Gastrointestinal: No complaints of jaundice, no bloody stools, no pale stools  Genitourinary: No complaints of dysuria, no hematuria, no nocturia, no frequent urination, no urethral discharge  Musculoskeletal: No complaints of weakness, paralysis, joint stiffness or arthralgias  Integumentary: No complaints of rash, no new lesions  Neurological: No complaints of convulsions, no seizures, no dizziness  Hematologic/Lymphatic: No complaints of easy bruising  Endocrine:  No hot or cold intolerance  No polydipsia, polyphagia, or polyuria  Allergy/immunology:  No environmental allergies  No food allergies  Not immunocompromised  Skin:  No pallor or rash  No wound          Patient Active Problem List   Diagnosis    Hypertension    Hyperlipidemia    Cardiomegaly    Anxiety    Abnormal electrocardiogram    Coronary artery disease without angina pectoris    PAD (peripheral artery disease) (HCC)    Viral URI with cough    Reactive airway disease without complication    Medicare annual wellness visit, subsequent    Hyperglycemia    Ankle edema, bilateral    Transaminitis    Respiratory insufficiency    EMILIANO (acute kidney injury) ([facility] )    Elevated troponin    Septic shock (HCC)    Hepatocellular carcinoma (HCC)    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[facility] [address] )     No past medical history on file    Past Surgical History:   Procedure Laterality Date    ABDOMINAL SURGERY      appy    CORONARY ARTERY BYPASS GRAFT      IR BIOPSY LIVER MASS  8/25/2020    IR Y-90 PRE-ANGIO/EMBO W/ LUNG SCAN  10/6/2020    IR Y-90 RADIOEMBOLIZATION  10/14/2020     Family History   Problem Relation Age of Onset    No Known Problems Mother     No Known Problems Father      Social History     Socioeconomic History    Marital status: /Civil Union     Spouse name: Not on file    Number of children: Not on file    Years of education: Not on file    Highest education level: Not on file   Occupational History    Not on file   Social Needs    Financial resource strain: Not on file    Food insecurity     Worry: Not on file     Inability: Not on file   [employer] needs     Medical: Not on file     Non-medical: Not on file   Tobacco Use    Smoking status: Never Smoker    Smokeless tobacco: Never Used   Substance and Sexual Activity    Alcohol use: Not Currently     Frequency: Never    Drug use: Never    Sexual activity: Not on file   Lifestyle    Physical activity     Days per week: Not on file     Minutes per session: Not on file    Stress: Not on file   Relationships    Social connections     Talks on phone: Not on file     Gets together: Not on file     Attends Spiritism service: Not on file     Active member of club or organization: Not on file     Attends meetings of clubs or organizations: Not on file     Relationship status: Not on file    Intimate partner violence     Fear of current or ex partner: Not on file     Emotionally abused: Not on file     Physically abused: Not on file     Forced sexual activity: Not on file   Other Topics Concern    Not on file   Social History Narrative    Not on file       Current Outpatient Medications:     allopurinol (ZYLOPRIM) 300 mg tablet, Take 300 mg by mouth daily, Disp: , Rfl:     aspirin 81 mg chewable tablet, Chew 1 tablet (81 mg total) daily, Disp: 30 tablet, Rfl: 0    cholecalciferol (VITAMIN D3) 400 units tablet, Take 1,000 Units by mouth daily , Disp: , Rfl:     lisinopril (ZESTRIL) 10 mg tablet, TAKE ONE TABLET BY MOUTH EVERY DAY FOR BLOOD PRESSURE/HEART, Disp: , Rfl:     metoprolol tartrate (LOPRESSOR) 25 mg tablet, Take 1 tablet by mouth 2 (two) times a day, Disp: , Rfl:     Omega-3 Fatty Acids (fish oil) 1,000 mg, Take 1,000 mg by mouth daily, Disp: , Rfl:     simvastatin (ZOCOR) 80 mg tablet, TAKE ONE TABLET BY MOUTH EVERY DAY AT 5 PM FOR CHOLESTEROL, Disp: , Rfl:     methylPREDNISolone 4 MG tablet therapy pack, Use as directed on package (Patient not taking: Reported on 11/11/2020), Disp: 1 each, Rfl: 0    omeprazole (PriLOSEC) 40 MG capsule, Take 1 capsule (40 mg total) by mouth daily Please start on 10/12 and may take morning of procedure (Patient not taking: Reported on 11/11/2020), Disp: 30 capsule, Rfl: 0    ondansetron (ZOFRAN) 4 mg tablet, Take 1 tablet (4 mg total) by mouth every 8 (eight) hours as needed for nausea or vomiting (Patient not taking: Reported on 11/11/2020), Disp: 12 tablet, Rfl: 0    oxyCODONE (ROXICODONE) 5 mg immediate release tablet, Take 1 tablet (5 mg total) by mouth every 4 (four) hours as needed for moderate pain or severe pain for up to 6 dosesMax Daily Amount: 30 mg (Patient not taking: Reported on 11/11/2020), Disp: 6 tablet, Rfl: 0  Allergies   Allergen Reactions    Shellfish-Derived Products      Vitals:    01/21/21 1017   BP: 110/70   Pulse: 84   Resp: 18   Temp: 98 7 °F (37 1 °C)       Physical Exam  Constitutional: General appearance: The Patient is well-developed and well-nourished who appears the stated age in no acute distress  Patient is pleasant and talkative  HEENT:  Normocephalic  Sclerae are anicteric  Mucous membranes are moist  Neck is supple without adenopathy  No JVD  Chest: The lungs are clear to auscultation  Cardiac: Heart is regular rate  Abdomen: Abdomen is soft, non-tender, non-distended and without masses  Extremities: There is no clubbing or cyanosis  There is no edema  Symmetric  Neuro: Grossly nonfocal  Gait is normal      Lymphatic: No evidence of cervical adenopathy bilaterally  No evidence of axillary adenopathy bilaterally  Skin: Warm, anicteric  Psych:  Patient is pleasant and talkative  Breasts:        Pathology:  [unfilled]    Labs:      Imaging  MRI is as above  I personally reviewed the films  I reviewed the above laboratory and imaging data  Discussion/Summary: 78-year-old male with well-differentiated HCC  The lesion is very centrally located in his liver  Given his age and the central location, I am not sure that he would tolerate hepatectomy well, consequently, we elected on Sir spheres embolization  He tolerated this well in October of 2020  His follow-up imaging is likely all treatment related changes  I will review this with 1 of our body MRI images  I will call him with the results  I will tentatively plan on repeating the MRI in 3 months with an AFP level  I will see him back at that time for another clinical exam   He is agreeable to this  All his questions were answered  Addendum:  I discussed the MRI with Radiology as well as IR  While the MRI would classify this as LIRADS, viable, this is somewhat equivocal based on the outside MRI  In discussion with IR, they feel the embolization was very good  Ultimately we will not take any intervention based on this MRI  We will plan on repeating the MRI within the [facility] system in 3 months  If at that time there is any viable disease, this will be addressed  This was discussed in detail with the patient's wife  She and her  agree with the plan

## 2021-01-26 ENCOUNTER — CLINICAL SUPPORT (OUTPATIENT)
Dept: RADIATION ONCOLOGY | Facility: CLINIC | Age: 81
End: 2021-01-26
Attending: RADIOLOGY
Payer: MEDICARE

## 2021-01-26 VITALS
RESPIRATION RATE: 16 BRPM | SYSTOLIC BLOOD PRESSURE: 128 MMHG | TEMPERATURE: 97.8 F | DIASTOLIC BLOOD PRESSURE: 68 MMHG | BODY MASS INDEX: 30.36 KG/M2 | HEART RATE: 96 BPM | HEIGHT: 69 IN | WEIGHT: 205 LBS

## 2021-01-26 DIAGNOSIS — C22.0 HEPATOCELLULAR CARCINOMA (HCC): Primary | ICD-10-CM

## 2021-01-26 PROCEDURE — 99211 OFF/OP EST MAY X REQ PHY/QHP: CPT | Performed by: RADIOLOGY

## 2021-01-26 NOTE — PROGRESS NOTES
Crystal Bauer 1940 is a 78 y o  male     Follow up visit     Oncology History Overview Note   Follow up post sirSpheres to right lobe and middle lobe on 10/14/20  Phone follow up on 11/19/20 1/19/21 MRI abdomen  Large liver lesion, roughly stable when compared with the prior outside examination allowing for technical differences  There is mild adjacent biliary dilatation,    1/21/21 Shandra Travis MD  Feeling well  MRI shows mild diffuse enhancement  MRI in 3 months   AFP ordered    Last labs in October         Hepatocellular carcinoma ([facility] [address] )   8/24/2020 Initial Diagnosis    Hepatocellular carcinoma ([facility] [address] )     8/25/2020 Biopsy    IR liver biopsy  - Well-differentiated heptocellular carcinoma     9/22/2020 -  Cancer Staged    Staging form: Liver (Excluding Intrahepatic Bile Ducts), AJCC 8th Edition  - Clinical: Stage IB (cT1b, cN0, cM0) - Signed by Susan Bagley MD on 9/22/2020       10/14/2020 - 10/14/2020 Radiation    Field Numbers Energy Treatment Site Starting  Ending  Elapsed  Fraction Total  Overlap Site Overlap         Date Date Days Dose Dose   Dose    SirSphere  Y90 Rt Lobe + Middle Robe   10/14/20 10/14/20    1 51 GBq  1 51 GBq                                                                                        Date / Time stamp:  10/14/2020  2:43 PM         Clinical Trial: no    Health Maintenance   Topic Date Due    Fall Risk  03/03/2021   Nicolasa Romo Medicare Annual Wellness Visit (AWV)  03/03/2021    DTaP,Tdap,and Td Vaccines (1 - Tdap) 09/01/2021 (Originally 7/19/1961)    COVID-19 Vaccine (2 of 2 - Moderna series) 02/17/2021    BMI: Followup Plan  08/05/2021    Depression Screening PHQ  09/22/2021    BMI: Adult  01/21/2022    Hepatitis C Screening  Completed    Pneumococcal Vaccine: 65+ Years  Completed    Influenza Vaccine  Completed    HIB Vaccine  Aged Out    Hepatitis B Vaccine  Aged Out    IPV Vaccine  Aged Out    Hepatitis A Vaccine  Aged Out    Meningococcal ACWY Vaccine  Aged Out    HPV Vaccine  Aged Out       Patient Active Problem List   Diagnosis    Hypertension    Hyperlipidemia    Cardiomegaly    Anxiety    Abnormal electrocardiogram    Coronary artery disease without angina pectoris    PAD (peripheral artery disease) (HCC)    Viral URI with cough    Reactive airway disease without complication    Medicare annual wellness visit, subsequent    Hyperglycemia    Ankle edema, bilateral    Transaminitis    Respiratory insufficiency    EMILIANO (acute kidney injury) ([facility] [address] [facility]    Elevated troponin    Septic shock (HCC)    Hepatocellular carcinoma (HCC)    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HCC)     No past medical history on file    Past Surgical History:   Procedure Laterality Date    ABDOMINAL SURGERY      appy    CORONARY ARTERY BYPASS GRAFT      IR BIOPSY LIVER MASS  8/25/2020    IR Y-90 PRE-ANGIO/EMBO W/ LUNG SCAN  10/6/2020    IR Y-90 RADIOEMBOLIZATION  10/14/2020     Family History   Problem Relation Age of Onset    No Known Problems Mother     No Known Problems Father      Social History     Socioeconomic History    Marital status: /Civil Union     Spouse name: Not on file    Number of children: Not on file    Years of education: Not on file    Highest education level: Not on file   Occupational History    Not on file   Social Needs    Financial resource strain: Not on file    Food insecurity     Worry: Not on file     Inability: Not on file   [employer] needs     Medical: Not on file     Non-medical: Not on file   Tobacco Use    Smoking status: Never Smoker    Smokeless tobacco: Never Used   Substance and Sexual Activity    Alcohol use: Not Currently     Frequency: Never    Drug use: Never    Sexual activity: Not on file   Lifestyle    Physical activity     Days per week: Not on file     Minutes per session: Not on file    Stress: Not on file   Relationships    Social connections     Talks on phone: Not on file     Gets together: Not on file     Attends Restoration service: Not on file     Active member of club or organization: Not on file     Attends meetings of clubs or organizations: Not on file     Relationship status: Not on file    Intimate partner violence     Fear of current or ex partner: Not on file     Emotionally abused: Not on file     Physically abused: Not on file     Forced sexual activity: Not on file   Other Topics Concern    Not on file   Social History Narrative    Not on file       Current Outpatient Medications:     allopurinol (ZYLOPRIM) 300 mg tablet, Take 300 mg by mouth daily, Disp: , Rfl:     aspirin 81 mg chewable tablet, Chew 1 tablet (81 mg total) daily, Disp: 30 tablet, Rfl: 0    cholecalciferol (VITAMIN D3) 400 units tablet, Take 1,000 Units by mouth daily , Disp: , Rfl:     lisinopril (ZESTRIL) 10 mg tablet, TAKE ONE TABLET BY MOUTH EVERY DAY FOR BLOOD PRESSURE/HEART, Disp: , Rfl:     Omega-3 Fatty Acids (fish oil) 1,000 mg, Take 1,000 mg by mouth daily, Disp: , Rfl:     simvastatin (ZOCOR) 80 mg tablet, TAKE ONE TABLET BY MOUTH EVERY DAY AT 5 PM FOR CHOLESTEROL, Disp: , Rfl:     metoprolol tartrate (LOPRESSOR) 25 mg tablet, Take 1 tablet by mouth 2 (two) times a day, Disp: , Rfl:   Allergies   Allergen Reactions    Shellfish-Derived Products        Review of Systems:  Review of Systems   Constitutional: Positive for fatigue  HENT: Negative  Eyes: Negative  Respiratory: Negative  Cardiovascular: Negative  Gastrointestinal: Negative  Endocrine: Negative  Genitourinary: Negative  Musculoskeletal: Negative  Skin: Negative  Allergic/Immunologic: Negative  Neurological: Negative  Hematological: Negative  Psychiatric/Behavioral: Negative          Vitals:    01/26/21 1003   BP: 128/68   Pulse: 96   Resp: 16   Temp: 97 8 °F (36 6 °C)   Weight: 93 kg (205 lb)   Height: 5' 9" (1 753 m)    Oxygen 98%  Pain Score: 0-No pain      Imaging:No results found

## 2021-01-26 NOTE — PROGRESS NOTES
Follow-up - Radiation Oncology   Rhonda Duff 1940 78 y o  male [id number]      History of Present Illness   Cancer Staging  Hepatocellular carcinoma ([facility] [address] )  Staging form: Liver (Excluding Intrahepatic Bile Ducts), AJCC 8th Edition  - Clinical: Stage IB (cT1b, cN0, cM0) - Signed by Walter Marcelino MD on 9/22/2020      Rhonda Duff returns today for routine follow-up approximately 3 months status post radioembolization of his large centrally located hepatocellular carcinoma  Overall the patient feels quite well and is without new complaints  He denies any nausea, vomiting, diarrhea, abdominal pain, fluid retention, or jaundice  Follow up post sirSpheres to right lobe and middle lobe on 10/14/20  Phone follow up on 11/19/20 1/19/21 MRI abdomen  Large liver lesion, roughly stable when compared with the prior outside examination allowing for technical differences  There is mild adjacent biliary dilatation,    1/21/21 Tong Mitchell MD  Feeling well  MRI shows mild diffuse enhancement  MRI in 3 months  AFP ordered    Last labs in October        Historical Information   Oncology History Overview Note   Follow up post sirSpheres to right lobe and middle lobe on 10/14/20  Phone follow up on 11/19/20 1/19/21 MRI abdomen  Large liver lesion, roughly stable when compared with the prior outside examination allowing for technical differences  There is mild adjacent biliary dilatation,    1/21/21 Tong Mitchell MD  Feeling well  MRI shows mild diffuse enhancement  MRI in 3 months   AFP ordered    Last labs in October         Hepatocellular carcinoma ([facility] [address] )   8/24/2020 Initial Diagnosis    Hepatocellular carcinoma ([facility] [address] )     8/25/2020 Biopsy    IR liver biopsy  - Well-differentiated heptocellular carcinoma     9/22/2020 -  Cancer Staged    Staging form: Liver (Excluding Intrahepatic Bile Ducts), AJCC 8th Edition  - Clinical: Stage IB (cT1b, cN0, cM0) - Signed by Walter Marcelino MD on 9/22/2020       10/14/2020 - 10/14/2020 Radiation    Field Numbers Energy Treatment Site Starting  Ending  Elapsed  Fraction Total  Overlap Site Overlap         Date Date Days Dose Dose   Dose    SirSphere  Y90 Rt Lobe + Middle Robe   10/14/20 10/14/20    1 51 GBq  1 51 GBq                                                                                        Date / Time stamp:  10/14/2020  2:43 PM         No past medical history on file  Past Surgical History:   Procedure Laterality Date    ABDOMINAL SURGERY      appy    CORONARY ARTERY BYPASS GRAFT      IR BIOPSY LIVER MASS  8/25/2020    IR Y-90 PRE-ANGIO/EMBO W/ LUNG SCAN  10/6/2020    IR Y-90 RADIOEMBOLIZATION  10/14/2020       Social History   Social History     Substance and Sexual Activity   Alcohol Use Not Currently    Frequency: Never     Social History     Substance and Sexual Activity   Drug Use Never     Social History     Tobacco Use   Smoking Status Never Smoker   Smokeless Tobacco Never Used         Meds/Allergies     Current Outpatient Medications:     allopurinol (ZYLOPRIM) 300 mg tablet, Take 300 mg by mouth daily, Disp: , Rfl:     aspirin 81 mg chewable tablet, Chew 1 tablet (81 mg total) daily, Disp: 30 tablet, Rfl: 0    cholecalciferol (VITAMIN D3) 400 units tablet, Take 1,000 Units by mouth daily , Disp: , Rfl:     lisinopril (ZESTRIL) 10 mg tablet, TAKE ONE TABLET BY MOUTH EVERY DAY FOR BLOOD PRESSURE/HEART, Disp: , Rfl:     Omega-3 Fatty Acids (fish oil) 1,000 mg, Take 1,000 mg by mouth daily, Disp: , Rfl:     simvastatin (ZOCOR) 80 mg tablet, TAKE ONE TABLET BY MOUTH EVERY DAY AT 5 PM FOR CHOLESTEROL, Disp: , Rfl:     metoprolol tartrate (LOPRESSOR) 25 mg tablet, Take 1 tablet by mouth 2 (two) times a day, Disp: , Rfl:   Allergies   Allergen Reactions    Shellfish-Derived Products          Review of Systems   Review of Systems   Constitutional: Positive for fatigue  HENT: Negative  Eyes: Negative  Respiratory: Negative      Cardiovascular: Negative  Gastrointestinal: Negative  Endocrine: Negative  Genitourinary: Negative  Musculoskeletal: Negative  Skin: Negative  Allergic/Immunologic: Negative  Neurological: Negative  Hematological: Negative  Psychiatric/Behavioral: Negative  OBJECTIVE:   /68   Pulse 96   Temp 97 8 °F (36 6 °C)   Resp 16   Ht 5' 9" (1 753 m)   Wt 93 kg (205 lb)   BMI 30 27 kg/m²   Pain Assessment:  0  Karnofsky: 90 - Able to carry on normal activity; minor signs or symptoms of disease     Physical Exam   The patient presents today no apparent distress  Sclera anicteric  Normal respiratory effort  Abdomen soft nontender  No swelling of the lower extremities  RESULTS    Lab Results:   Recent Results (from the past 672 hour(s))   BUN    Collection Time: 01/16/21 10:02 AM   Result Value Ref Range    BUN 21 5 - 25 mg/dL   Creatinine, serum    Collection Time: 01/16/21 10:02 AM   Result Value Ref Range    Creatinine 1 21 0 60 - 1 30 mg/dL    eGFR 55 ml/min/1 73sq m       Imaging Studies:No results found  Assessment/Plan:  No orders of the defined types were placed in this encounter  Sunday Moody continues to clinically feel quite well in follow-up  He has now undergone his 1st post treatment surveillance MRI, which unfortunately was performed at an outside institution with significant differences in technique  None the less, the lesion appears largely stable in size now displaying more diffuse arterial phase enhancement  Some of the nodular tissue around the edges of the lesion does appear to be somewhat improved although viable disease certainly remains a concern  We agree with the recommendation from Surgical Oncology for repeat MRI in 3 months  The patient should also have repeat liver function testing performed at that time although there is nothing clinically to suggest progressive liver failure    He will return to our department following his new imaging  Paco Denise MD  1/26/2021,10:54 AM    Portions of the record may have been created with voice recognition software   Occasional wrong word or "sound a like" substitutions may have occurred due to the inherent limitations of voice recognition software   Read the chart carefully and recognize, using context, where substitutions have occurred

## 2021-02-04 ENCOUNTER — DOCUMENTATION (OUTPATIENT)
Dept: HEMATOLOGY ONCOLOGY | Facility: CLINIC | Age: 81
End: 2021-02-04

## 2021-02-04 NOTE — PROGRESS NOTES
RECTAL/GI MULTIDISCIPLINARY CASE REVIEW    DATE: 2/4/21      PRESENTING DOCTOR: Dr Logan King      DIAGNOSIS: Hepatocellular Carcinoma      Juli Herbert was presented at the Rectal/GI Multidisciplinary Conference today  PHYSICIAN RECOMMENDED PLAN:    -3 month follow up MRI    -MRI schedule for 4/22/21  -Follow up with both Dr Logan King and Dr Paloma Neumann scheduled for 4/29/21    Team agreed to plan      NCCN guidelines were readily available for review at this discussion

## 2021-02-17 ENCOUNTER — IMMUNIZATIONS (OUTPATIENT)
Dept: FAMILY MEDICINE CLINIC | Facility: HOSPITAL | Age: 81
End: 2021-02-17

## 2021-02-17 DIAGNOSIS — Z23 ENCOUNTER FOR IMMUNIZATION: Primary | ICD-10-CM

## 2021-02-17 PROCEDURE — 91301 SARS-COV-2 / COVID-19 MRNA VACCINE (MODERNA) 100 MCG: CPT

## 2021-02-17 PROCEDURE — 0012A SARS-COV-2 / COVID-19 MRNA VACCINE (MODERNA) 100 MCG: CPT

## 2021-02-25 ENCOUNTER — LAB (OUTPATIENT)
Dept: LAB | Facility: CLINIC | Age: 81
End: 2021-02-25
Payer: MEDICARE

## 2021-02-25 DIAGNOSIS — R73.9 HYPERGLYCEMIA: ICD-10-CM

## 2021-02-25 DIAGNOSIS — J45.20 MILD INTERMITTENT REACTIVE AIRWAY DISEASE WITHOUT COMPLICATION: ICD-10-CM

## 2021-02-25 DIAGNOSIS — I25.10 CORONARY ARTERY DISEASE INVOLVING NATIVE HEART WITHOUT ANGINA PECTORIS, UNSPECIFIED VESSEL OR LESION TYPE: ICD-10-CM

## 2021-02-25 DIAGNOSIS — C22.0 HEPATOCELLULAR CARCINOMA (HCC): ICD-10-CM

## 2021-02-25 LAB
ALBUMIN SERPL BCP-MCNC: 3.8 G/DL (ref 3.5–5)
ALP SERPL-CCNC: 111 U/L (ref 46–116)
ALT SERPL W P-5'-P-CCNC: 39 U/L (ref 12–78)
ANION GAP SERPL CALCULATED.3IONS-SCNC: 2 MMOL/L (ref 4–13)
AST SERPL W P-5'-P-CCNC: 40 U/L (ref 5–45)
BASOPHILS # BLD AUTO: 0.04 THOUSANDS/ΜL (ref 0–0.1)
BASOPHILS NFR BLD AUTO: 1 % (ref 0–1)
BILIRUB SERPL-MCNC: 0.92 MG/DL (ref 0.2–1)
BUN SERPL-MCNC: 22 MG/DL (ref 5–25)
CALCIUM SERPL-MCNC: 9.6 MG/DL (ref 8.3–10.1)
CHLORIDE SERPL-SCNC: 105 MMOL/L (ref 100–108)
CO2 SERPL-SCNC: 32 MMOL/L (ref 21–32)
CREAT SERPL-MCNC: 1.42 MG/DL (ref 0.6–1.3)
EOSINOPHIL # BLD AUTO: 0.16 THOUSAND/ΜL (ref 0–0.61)
EOSINOPHIL NFR BLD AUTO: 2 % (ref 0–6)
ERYTHROCYTE [DISTWIDTH] IN BLOOD BY AUTOMATED COUNT: 14.4 % (ref 11.6–15.1)
GFR SERPL CREATININE-BSD FRML MDRD: 46 ML/MIN/1.73SQ M
GLUCOSE P FAST SERPL-MCNC: 123 MG/DL (ref 65–99)
HCT VFR BLD AUTO: 50.4 % (ref 36.5–49.3)
HGB BLD-MCNC: 15.7 G/DL (ref 12–17)
IMM GRANULOCYTES # BLD AUTO: 0.04 THOUSAND/UL (ref 0–0.2)
IMM GRANULOCYTES NFR BLD AUTO: 1 % (ref 0–2)
LYMPHOCYTES # BLD AUTO: 0.78 THOUSANDS/ΜL (ref 0.6–4.47)
LYMPHOCYTES NFR BLD AUTO: 10 % (ref 14–44)
MCH RBC QN AUTO: 30.1 PG (ref 26.8–34.3)
MCHC RBC AUTO-ENTMCNC: 31.2 G/DL (ref 31.4–37.4)
MCV RBC AUTO: 97 FL (ref 82–98)
MONOCYTES # BLD AUTO: 0.97 THOUSAND/ΜL (ref 0.17–1.22)
MONOCYTES NFR BLD AUTO: 13 % (ref 4–12)
NEUTROPHILS # BLD AUTO: 5.57 THOUSANDS/ΜL (ref 1.85–7.62)
NEUTS SEG NFR BLD AUTO: 73 % (ref 43–75)
NRBC BLD AUTO-RTO: 0 /100 WBCS
PLATELET # BLD AUTO: 206 THOUSANDS/UL (ref 149–390)
PMV BLD AUTO: 11.7 FL (ref 8.9–12.7)
POTASSIUM SERPL-SCNC: 4.5 MMOL/L (ref 3.5–5.3)
PROT SERPL-MCNC: 7.7 G/DL (ref 6.4–8.2)
RBC # BLD AUTO: 5.22 MILLION/UL (ref 3.88–5.62)
SODIUM SERPL-SCNC: 139 MMOL/L (ref 136–145)
WBC # BLD AUTO: 7.56 THOUSAND/UL (ref 4.31–10.16)

## 2021-02-25 PROCEDURE — 36415 COLL VENOUS BLD VENIPUNCTURE: CPT

## 2021-02-25 PROCEDURE — 85025 COMPLETE CBC W/AUTO DIFF WBC: CPT

## 2021-02-25 PROCEDURE — 80053 COMPREHEN METABOLIC PANEL: CPT

## 2021-03-01 ENCOUNTER — OFFICE VISIT (OUTPATIENT)
Dept: FAMILY MEDICINE CLINIC | Facility: CLINIC | Age: 81
End: 2021-03-01
Payer: MEDICARE

## 2021-03-01 VITALS
WEIGHT: 206 LBS | HEIGHT: 69 IN | SYSTOLIC BLOOD PRESSURE: 126 MMHG | TEMPERATURE: 97.7 F | DIASTOLIC BLOOD PRESSURE: 56 MMHG | OXYGEN SATURATION: 92 % | HEART RATE: 90 BPM | BODY MASS INDEX: 30.51 KG/M2

## 2021-03-01 DIAGNOSIS — R06.89 RESPIRATORY INSUFFICIENCY: ICD-10-CM

## 2021-03-01 DIAGNOSIS — I25.10 CORONARY ARTERY DISEASE INVOLVING NATIVE HEART WITHOUT ANGINA PECTORIS, UNSPECIFIED VESSEL OR LESION TYPE: ICD-10-CM

## 2021-03-01 DIAGNOSIS — C22.0 HEPATOCELLULAR CARCINOMA (HCC): Primary | ICD-10-CM

## 2021-03-01 DIAGNOSIS — J96.01 ACUTE RESPIRATORY FAILURE WITH HYPOXIA (HCC): ICD-10-CM

## 2021-03-01 DIAGNOSIS — I74.3 EMBOLISM AND THROMBOSIS OF ARTERIES OF THE LOWER EXTREMITIES (HCC): ICD-10-CM

## 2021-03-01 DIAGNOSIS — E78.2 MIXED HYPERLIPIDEMIA: ICD-10-CM

## 2021-03-01 DIAGNOSIS — I10 ESSENTIAL HYPERTENSION: ICD-10-CM

## 2021-03-01 PROCEDURE — 99214 OFFICE O/P EST MOD 30 MIN: CPT | Performed by: FAMILY MEDICINE

## 2021-03-01 NOTE — ASSESSMENT & PLAN NOTE
Has a lot of congestion and a daily cough every morning will recommend Robitussin DM and deep breathing exercises and add additional cough medicine as needed

## 2021-03-01 NOTE — PROGRESS NOTES
Assessment/Plan:       Problem List Items Addressed This Visit        Digestive    Hepatocellular carcinoma ([facility] [address] ) - Primary      Follow-up Hematology-Oncology as scheduled            Respiratory    Respiratory insufficiency     Has a lot of congestion and a daily cough every morning will recommend Robitussin DM and deep breathing exercises and add additional cough medicine as needed            Cardiovascular and Mediastinum    Hypertension      Hypertension stable continue with current medications as directed         Coronary artery disease without angina pectoris      Coronary disease stable no chest pain follow-up with Cardiology follow heart healthy diet avoidance of saturated fats repeat laboratory work at next office visit            Other    Hyperlipidemia      Continue simvastatin at current dosage follow lipid profile CMP at next office visit follow heart healthy diet                 Subjective:      Patient ID: Crystal Bauer is a 78 y o  male  Patient presents today for general checkup review of laboratory work doing well overall      The following portions of the patient's history were reviewed and updated as appropriate: allergies, current medications, past family history, past medical history, past social history, past surgical history and problem list     Review of Systems   Constitutional: Negative for chills, fatigue and fever  HENT: Negative for congestion, nosebleeds, rhinorrhea, sinus pressure and sore throat  Eyes: Negative for discharge and redness  Respiratory: Negative for cough and shortness of breath  Cardiovascular: Negative for chest pain, palpitations and leg swelling  Gastrointestinal: Negative for abdominal pain, blood in stool and nausea  Endocrine: Negative for cold intolerance, heat intolerance and polyuria  Genitourinary: Negative for dysuria and frequency  Musculoskeletal: Negative for arthralgias, back pain and myalgias  Skin: Negative for rash  Neurological: Negative for dizziness, weakness and headaches  Hematological: Negative for adenopathy  Psychiatric/Behavioral: Negative for behavioral problems and sleep disturbance  The patient is not nervous/anxious  Objective:      /56   Pulse 90   Temp 97 7 °F (36 5 °C)   Ht 5' 9" (1 753 m)   Wt 93 4 kg (206 lb)   SpO2 92%   BMI 30 42 kg/m²        Physical Exam  Vitals signs and nursing note reviewed  Constitutional:       Appearance: He is well-developed  HENT:      Head: Normocephalic and atraumatic  Right Ear: Tympanic membrane and external ear normal       Left Ear: Tympanic membrane and external ear normal       Nose: Nose normal       Mouth/Throat:      Mouth: Mucous membranes are moist       Pharynx: Oropharynx is clear  Eyes:      General: No scleral icterus  Conjunctiva/sclera: Conjunctivae normal       Pupils: Pupils are equal, round, and reactive to light  Neck:      Musculoskeletal: Normal range of motion and neck supple  Thyroid: No thyromegaly  Vascular: No JVD  Cardiovascular:      Rate and Rhythm: Normal rate and regular rhythm  Heart sounds: Normal heart sounds  No murmur  Pulmonary:      Effort: Pulmonary effort is normal       Breath sounds: Normal breath sounds  No wheezing or rales  Chest:      Chest wall: No tenderness  Abdominal:      General: Bowel sounds are normal  There is no distension  Palpations: Abdomen is soft  There is no mass  Tenderness: There is no abdominal tenderness  There is no guarding or rebound  Musculoskeletal: Normal range of motion  General: No tenderness or deformity  Lymphadenopathy:      Cervical: No cervical adenopathy  Skin:     General: Skin is warm and dry  Findings: No erythema or rash  Neurological:      Mental Status: He is alert and oriented to person, place, and time  Cranial Nerves: No cranial nerve deficit        Deep Tendon Reflexes: Reflexes are normal and symmetric  Reflexes normal    Psychiatric:         Behavior: Behavior normal          Thought Content: Thought content normal          Judgment: Judgment normal           Data:    Laboratory Results: I have personally reviewed the pertinent laboratory results/reports   Radiology/Other Diagnostic Testing Results: I have personally reviewed pertinent reports         Lab Results   Component Value Date    WBC 7 56 02/25/2021    HGB 15 7 02/25/2021    HCT 50 4 (H) 02/25/2021    MCV 97 02/25/2021     02/25/2021     Lab Results   Component Value Date    K 4 5 02/25/2021     02/25/2021    CO2 32 02/25/2021    BUN 22 02/25/2021    CREATININE 1 42 (H) 02/25/2021    GLUF 123 (H) 02/25/2021    CALCIUM 9 6 02/25/2021    AST 40 02/25/2021    ALT 39 02/25/2021    ALKPHOS 111 02/25/2021    EGFR 46 02/25/2021     No results found for: CHOLESTEROL  No results found for: HDL  No results found for: LDLCALC  No results found for: TRIG  No results found for: CHOLHDL  No results found for: [id number], TSH  Lab Results   Component Value Date    HGBA1C 6 7 10/28/2020     No results found for: PSA    Land Lola, DO

## 2021-03-01 NOTE — ASSESSMENT & PLAN NOTE
Continue simvastatin at current dosage follow lipid profile CMP at next office visit follow heart healthy diet

## 2021-03-01 NOTE — PROGRESS NOTES
BMI Counseling: Body mass index is 30 42 kg/m²  The BMI is above normal  Nutrition recommendations include moderation in carbohydrate intake, increasing intake of lean protein and reducing intake of saturated fat and trans fat  Exercise recommendations include joining a gym and strength training exercises

## 2021-03-01 NOTE — ASSESSMENT & PLAN NOTE
Coronary disease stable no chest pain follow-up with Cardiology follow heart healthy diet avoidance of saturated fats repeat laboratory work at next office visit

## 2021-04-05 ENCOUNTER — HOSPITAL ENCOUNTER (INPATIENT)
Facility: HOSPITAL | Age: 81
LOS: 3 days | Discharge: HOME HEALTH | DRG: 871 | End: 2021-04-08
Attending: EMERGENCY MEDICINE | Admitting: FAMILY MEDICINE
Payer: MEDICARE

## 2021-04-05 ENCOUNTER — APPOINTMENT (EMERGENCY)
Dept: RADIOLOGY | Facility: HOSPITAL | Age: 81
DRG: 871 | End: 2021-04-05
Payer: MEDICARE

## 2021-04-05 DIAGNOSIS — R74.01 TRANSAMINITIS: ICD-10-CM

## 2021-04-05 DIAGNOSIS — R78.81 BACTEREMIA: ICD-10-CM

## 2021-04-05 DIAGNOSIS — C22.0 HEPATOCELLULAR CARCINOMA (HCC): ICD-10-CM

## 2021-04-05 DIAGNOSIS — R05.9 COUGH: ICD-10-CM

## 2021-04-05 DIAGNOSIS — N18.31 STAGE 3A CHRONIC KIDNEY DISEASE (HCC): ICD-10-CM

## 2021-04-05 DIAGNOSIS — I10 ESSENTIAL HYPERTENSION: ICD-10-CM

## 2021-04-05 DIAGNOSIS — J18.9 PNEUMONIA: Primary | ICD-10-CM

## 2021-04-05 PROBLEM — A41.9 SEPSIS (HCC): Status: ACTIVE | Noted: 2021-04-05

## 2021-04-05 LAB
ALBUMIN SERPL BCP-MCNC: 3.5 G/DL (ref 3.5–5.7)
ALP SERPL-CCNC: 365 U/L (ref 55–165)
ALT SERPL W P-5'-P-CCNC: 360 U/L (ref 7–52)
ANION GAP SERPL CALCULATED.3IONS-SCNC: 11 MMOL/L (ref 4–13)
APTT PPP: 28 SECONDS (ref 23–37)
AST SERPL W P-5'-P-CCNC: 263 U/L (ref 13–39)
BASOPHILS # BLD AUTO: 0 THOUSANDS/ΜL (ref 0–0.1)
BASOPHILS NFR BLD AUTO: 0 % (ref 0–2)
BILIRUB SERPL-MCNC: 5.3 MG/DL (ref 0.2–1)
BUN SERPL-MCNC: 21 MG/DL (ref 7–25)
CALCIUM SERPL-MCNC: 9.5 MG/DL (ref 8.6–10.5)
CHLORIDE SERPL-SCNC: 96 MMOL/L (ref 98–107)
CO2 SERPL-SCNC: 27 MMOL/L (ref 21–31)
CREAT SERPL-MCNC: 1.26 MG/DL (ref 0.7–1.3)
EOSINOPHIL # BLD AUTO: 0 THOUSAND/ΜL (ref 0–0.61)
EOSINOPHIL NFR BLD AUTO: 0 % (ref 0–5)
ERYTHROCYTE [DISTWIDTH] IN BLOOD BY AUTOMATED COUNT: 15.5 % (ref 11.5–14.5)
FLUAV RNA RESP QL NAA+PROBE: NEGATIVE
FLUBV RNA RESP QL NAA+PROBE: NEGATIVE
GFR SERPL CREATININE-BSD FRML MDRD: 53 ML/MIN/1.73SQ M
GLUCOSE SERPL-MCNC: 221 MG/DL (ref 65–99)
HCT VFR BLD AUTO: 46.1 % (ref 42–47)
HGB BLD-MCNC: 15.4 G/DL (ref 14–18)
INR PPP: 1.02 (ref 0.84–1.19)
LACTATE SERPL-SCNC: 1.7 MMOL/L (ref 0.5–2)
LACTATE SERPL-SCNC: 2.1 MMOL/L (ref 0.5–2)
LYMPHOCYTES # BLD AUTO: 0.4 THOUSANDS/ΜL (ref 0.6–4.47)
LYMPHOCYTES NFR BLD AUTO: 3 % (ref 21–51)
MCH RBC QN AUTO: 30.8 PG (ref 26–34)
MCHC RBC AUTO-ENTMCNC: 33.4 G/DL (ref 31–37)
MCV RBC AUTO: 92 FL (ref 81–99)
MONOCYTES # BLD AUTO: 0.8 THOUSAND/ΜL (ref 0.17–1.22)
MONOCYTES NFR BLD AUTO: 7 % (ref 2–12)
NEUTROPHILS # BLD AUTO: 10.7 THOUSANDS/ΜL (ref 1.4–6.5)
NEUTS SEG NFR BLD AUTO: 90 % (ref 42–75)
PLATELET # BLD AUTO: 178 THOUSANDS/UL (ref 149–390)
PMV BLD AUTO: 9.1 FL (ref 8.6–11.7)
POTASSIUM SERPL-SCNC: 3.9 MMOL/L (ref 3.5–5.5)
PROCALCITONIN SERPL-MCNC: 1.94 NG/ML
PROT SERPL-MCNC: 7 G/DL (ref 6.4–8.9)
PROTHROMBIN TIME: 13.3 SECONDS (ref 11.6–14.5)
RBC # BLD AUTO: 5.01 MILLION/UL (ref 4.3–5.9)
RSV RNA RESP QL NAA+PROBE: NEGATIVE
SARS-COV-2 RNA RESP QL NAA+PROBE: NEGATIVE
SODIUM SERPL-SCNC: 134 MMOL/L (ref 134–143)
TROPONIN I SERPL-MCNC: <0.03 NG/ML
WBC # BLD AUTO: 11.8 THOUSAND/UL (ref 4.8–10.8)

## 2021-04-05 PROCEDURE — 0241U HB NFCT DS VIR RESP RNA 4 TRGT: CPT | Performed by: PHYSICIAN ASSISTANT

## 2021-04-05 PROCEDURE — 87077 CULTURE AEROBIC IDENTIFY: CPT | Performed by: PHYSICIAN ASSISTANT

## 2021-04-05 PROCEDURE — 96374 THER/PROPH/DIAG INJ IV PUSH: CPT

## 2021-04-05 PROCEDURE — 99285 EMERGENCY DEPT VISIT HI MDM: CPT | Performed by: PHYSICIAN ASSISTANT

## 2021-04-05 PROCEDURE — 85610 PROTHROMBIN TIME: CPT | Performed by: PHYSICIAN ASSISTANT

## 2021-04-05 PROCEDURE — 99285 EMERGENCY DEPT VISIT HI MDM: CPT

## 2021-04-05 PROCEDURE — 80053 COMPREHEN METABOLIC PANEL: CPT | Performed by: PHYSICIAN ASSISTANT

## 2021-04-05 PROCEDURE — 93005 ELECTROCARDIOGRAM TRACING: CPT

## 2021-04-05 PROCEDURE — 99223 1ST HOSP IP/OBS HIGH 75: CPT | Performed by: FAMILY MEDICINE

## 2021-04-05 PROCEDURE — 85025 COMPLETE CBC W/AUTO DIFF WBC: CPT | Performed by: PHYSICIAN ASSISTANT

## 2021-04-05 PROCEDURE — 83605 ASSAY OF LACTIC ACID: CPT | Performed by: FAMILY MEDICINE

## 2021-04-05 PROCEDURE — 87186 SC STD MICRODIL/AGAR DIL: CPT | Performed by: PHYSICIAN ASSISTANT

## 2021-04-05 PROCEDURE — 71045 X-RAY EXAM CHEST 1 VIEW: CPT

## 2021-04-05 PROCEDURE — 83605 ASSAY OF LACTIC ACID: CPT | Performed by: PHYSICIAN ASSISTANT

## 2021-04-05 PROCEDURE — 36415 COLL VENOUS BLD VENIPUNCTURE: CPT | Performed by: PHYSICIAN ASSISTANT

## 2021-04-05 PROCEDURE — 84484 ASSAY OF TROPONIN QUANT: CPT | Performed by: PHYSICIAN ASSISTANT

## 2021-04-05 PROCEDURE — 87449 NOS EACH ORGANISM AG IA: CPT | Performed by: FAMILY MEDICINE

## 2021-04-05 PROCEDURE — 85730 THROMBOPLASTIN TIME PARTIAL: CPT | Performed by: PHYSICIAN ASSISTANT

## 2021-04-05 PROCEDURE — 84145 PROCALCITONIN (PCT): CPT | Performed by: PHYSICIAN ASSISTANT

## 2021-04-05 PROCEDURE — 87040 BLOOD CULTURE FOR BACTERIA: CPT | Performed by: PHYSICIAN ASSISTANT

## 2021-04-05 RX ORDER — ATORVASTATIN CALCIUM 40 MG/1
40 TABLET, FILM COATED ORAL
Status: DISCONTINUED | OUTPATIENT
Start: 2021-04-05 | End: 2021-04-06

## 2021-04-05 RX ORDER — LISINOPRIL 10 MG/1
10 TABLET ORAL DAILY
Status: DISCONTINUED | OUTPATIENT
Start: 2021-04-05 | End: 2021-04-06

## 2021-04-05 RX ORDER — GUAIFENESIN 600 MG
600 TABLET, EXTENDED RELEASE 12 HR ORAL EVERY 12 HOURS SCHEDULED
Status: DISCONTINUED | OUTPATIENT
Start: 2021-04-05 | End: 2021-04-08 | Stop reason: HOSPADM

## 2021-04-05 RX ORDER — MELATONIN
1000 DAILY
Status: DISCONTINUED | OUTPATIENT
Start: 2021-04-05 | End: 2021-04-08 | Stop reason: HOSPADM

## 2021-04-05 RX ORDER — SODIUM CHLORIDE 9 MG/ML
125 INJECTION, SOLUTION INTRAVENOUS CONTINUOUS
Status: DISCONTINUED | OUTPATIENT
Start: 2021-04-05 | End: 2021-04-06

## 2021-04-05 RX ORDER — ALLOPURINOL 300 MG/1
300 TABLET ORAL DAILY
Status: DISCONTINUED | OUTPATIENT
Start: 2021-04-05 | End: 2021-04-08 | Stop reason: HOSPADM

## 2021-04-05 RX ORDER — CHLORAL HYDRATE 500 MG
1000 CAPSULE ORAL DAILY
Status: DISCONTINUED | OUTPATIENT
Start: 2021-04-05 | End: 2021-04-08 | Stop reason: HOSPADM

## 2021-04-05 RX ORDER — ASPIRIN 81 MG/1
81 TABLET, CHEWABLE ORAL DAILY
Status: DISCONTINUED | OUTPATIENT
Start: 2021-04-05 | End: 2021-04-08 | Stop reason: HOSPADM

## 2021-04-05 RX ORDER — CEFTRIAXONE 1 G/50ML
1000 INJECTION, SOLUTION INTRAVENOUS EVERY 24 HOURS
Status: DISCONTINUED | OUTPATIENT
Start: 2021-04-06 | End: 2021-04-06

## 2021-04-05 RX ORDER — ACETAMINOPHEN 325 MG/1
650 TABLET ORAL EVERY 6 HOURS PRN
Status: DISCONTINUED | OUTPATIENT
Start: 2021-04-05 | End: 2021-04-08 | Stop reason: HOSPADM

## 2021-04-05 RX ORDER — CEFTRIAXONE 1 G/50ML
1000 INJECTION, SOLUTION INTRAVENOUS ONCE
Status: COMPLETED | OUTPATIENT
Start: 2021-04-05 | End: 2021-04-05

## 2021-04-05 RX ADMIN — ENOXAPARIN SODIUM 40 MG: 40 INJECTION SUBCUTANEOUS at 16:16

## 2021-04-05 RX ADMIN — SODIUM CHLORIDE 1000 ML: 0.9 INJECTION, SOLUTION INTRAVENOUS at 14:27

## 2021-04-05 RX ADMIN — SODIUM CHLORIDE 700 ML: 0.9 INJECTION, SOLUTION INTRAVENOUS at 20:00

## 2021-04-05 RX ADMIN — ATORVASTATIN CALCIUM 40 MG: 40 TABLET, FILM COATED ORAL at 16:16

## 2021-04-05 RX ADMIN — AZITHROMYCIN MONOHYDRATE 500 MG: 500 INJECTION, POWDER, LYOPHILIZED, FOR SOLUTION INTRAVENOUS at 16:23

## 2021-04-05 RX ADMIN — SODIUM CHLORIDE 1000 ML: 0.9 INJECTION, SOLUTION INTRAVENOUS at 16:16

## 2021-04-05 RX ADMIN — CEFTRIAXONE 1000 MG: 1 INJECTION, SOLUTION INTRAVENOUS at 14:31

## 2021-04-05 RX ADMIN — GUAIFENESIN 600 MG: 600 TABLET, EXTENDED RELEASE ORAL at 20:33

## 2021-04-05 RX ADMIN — METOPROLOL TARTRATE 25 MG: 25 TABLET, FILM COATED ORAL at 17:26

## 2021-04-05 NOTE — ED PROVIDER NOTES
History  Chief Complaint   Patient presents with    Fever - 9 weeks to 74 years    Cough     with chest discomfort    Loss of Appetite    Fatigue    Shortness of Breath     33-year-old male history of hypertension, liver cancer presents complaining of cough  Patient reports is been going on for approximately the past 2 weeks  Reports body aches, low-grade fever and lack of appetite for the past few days  Has not tried anything for his symptoms  Reports some mild chest discomfort with coughing but denies any chest pain at rest   Does report some dyspnea on exertion but denies any pain with deep inspiration  Denies any recent lower leg pain or swelling, hospitalizations or surgeries  Has not had antibiotics for the past 3 months  Prior to Admission Medications   Prescriptions Last Dose Informant Patient Reported? Taking?    Omega-3 Fatty Acids (fish oil) 1,000 mg 4/5/2021 at Unknown time Self Yes Yes   Sig: Take 1,000 mg by mouth daily   allopurinol (ZYLOPRIM) 300 mg tablet 4/5/2021 at Unknown time Self Yes Yes   Sig: Take 300 mg by mouth daily   aspirin 81 mg chewable tablet 4/5/2021 at Unknown time Self No Yes   Sig: Chew 1 tablet (81 mg total) daily   cholecalciferol (VITAMIN D3) 400 units tablet 4/5/2021 at Unknown time Self Yes Yes   Sig: Take 1,000 Units by mouth daily    lisinopril (ZESTRIL) 10 mg tablet 4/5/2021 at Unknown time Self Yes Yes   Sig: TAKE ONE TABLET BY MOUTH EVERY DAY FOR BLOOD PRESSURE/HEART   metoprolol tartrate (LOPRESSOR) 25 mg tablet 4/5/2021 at Unknown time Self Yes Yes   Sig: Take 1 tablet by mouth 2 (two) times a day   simvastatin (ZOCOR) 80 mg tablet 4/5/2021 at Unknown time Self Yes Yes   Sig: TAKE ONE TABLET BY MOUTH EVERY DAY AT 5 PM FOR CHOLESTEROL      Facility-Administered Medications: None       Past Medical History:   Diagnosis Date    Coronary artery disease     Gout     Hypercholesterolemia     Hypertension     Liver cancer [facility])        Past Surgical History:   Procedure Laterality Date    ABDOMINAL SURGERY      appy    CORONARY ARTERY BYPASS GRAFT      IR BIOPSY LIVER MASS  8/25/2020    IR Y-90 PRE-ANGIO/EMBO W/ LUNG SCAN  10/6/2020    IR Y-90 RADIOEMBOLIZATION  10/14/2020       Family History   Problem Relation Age of Onset    No Known Problems Mother     No Known Problems Father      I have reviewed and agree with the history as documented  E-Cigarette/Vaping    E-Cigarette Use Never User      E-Cigarette/Vaping Substances    Nicotine No     THC No     CBD No     Flavoring No     Other No     Unknown No      Social History     Tobacco Use    Smoking status: Never Smoker    Smokeless tobacco: Never Used   Substance Use Topics    Alcohol use: Not Currently     Frequency: Never    Drug use: Never       Review of Systems   Constitutional: Positive for fatigue and fever  Negative for chills  HENT: Negative for congestion and sore throat  Eyes: Negative for pain  Respiratory: Positive for cough and shortness of breath  Negative for chest tightness and wheezing  Cardiovascular: Negative for chest pain, palpitations and leg swelling  Gastrointestinal: Negative for abdominal pain, constipation, diarrhea, nausea and vomiting  Endocrine: Negative for polyuria  Genitourinary: Negative for dysuria  Musculoskeletal: Negative for arthralgias, back pain, myalgias and neck pain  Skin: Negative for rash  Neurological: Negative for dizziness, syncope, light-headedness and headaches  All other systems reviewed and are negative  Physical Exam  Physical Exam  Vitals signs reviewed  Constitutional:       Appearance: He is well-developed  HENT:      Head: Normocephalic and atraumatic  Mouth/Throat:      Mouth: Mucous membranes are moist    Eyes:      Conjunctiva/sclera: Conjunctivae normal    Neck:      Musculoskeletal: Normal range of motion  Cardiovascular:      Rate and Rhythm: Normal rate and regular rhythm        Heart sounds: Normal heart sounds  Pulmonary:      Effort: Pulmonary effort is normal       Breath sounds: Examination of the right-lower field reveals decreased breath sounds  Examination of the left-lower field reveals decreased breath sounds  Decreased breath sounds present  Abdominal:      General: Bowel sounds are normal       Palpations: Abdomen is soft  Tenderness: There is no abdominal tenderness  Musculoskeletal: Normal range of motion  Skin:     General: Skin is warm and dry  Capillary Refill: Capillary refill takes less than 2 seconds  Neurological:      General: No focal deficit present  Mental Status: He is alert and oriented to person, place, and time  Vital Signs  ED Triage Vitals [04/05/21 1310]   Temperature Pulse Respirations Blood Pressure SpO2   99 4 °F (37 4 °C) 90 16 129/58 94 %      Temp Source Heart Rate Source Patient Position - Orthostatic VS BP Location FiO2 (%)   Temporal Monitor Sitting Left arm --      Pain Score       --           Vitals:    04/05/21 1310 04/05/21 1315 04/05/21 1400   BP: 129/58 123/58 102/51   Pulse: 90 85 72   Patient Position - Orthostatic VS: Sitting           Visual Acuity      ED Medications  Medications   cefTRIAXone (ROCEPHIN) IVPB (premix in dextrose) 1,000 mg 50 mL (1,000 mg Intravenous New Bag 4/5/21 1431)   sodium chloride 0 9 % bolus 1,000 mL (1,000 mL Intravenous New Bag 4/5/21 1427)       Diagnostic Studies  Results Reviewed     Procedure Component Value Units Date/Time    Lactic acid [[id number]]  (Abnormal) Collected: 04/05/21 1326    Lab Status: Final result Specimen: Blood from Arm, Right Updated: 04/05/21 1424     LACTIC ACID 2 1 mmol/L     Narrative:      Result may be elevated if tourniquet was used during collection      Lactic acid 2 Hours [[id number]]     Lab Status: No result Specimen: Blood     COVID19, Influenza A/B, RSV PCR, [facility] [[id number]]  (Normal) Collected: 04/05/21 1325    Lab Status: Final result Specimen: Nasopharyngeal Swab Updated: 04/05/21 1423     SARS-CoV-2 Negative     INFLUENZA A PCR Negative     INFLUENZA B PCR Negative     RSV PCR Negative    Narrative: This test has been authorized by FDA under an EUA (Emergency Use Assay) for use by authorized laboratories  Clinical caution and judgement should be used with the interpretation of these results with consideration of the clinical impression and other laboratory testing  Testing reported as "Positive" or "Negative" has been proven to be accurate according to standard laboratory validation requirements  All testing is performed with control materials showing appropriate reactivity at standard intervals      Troponin I [[id number]]  (Normal) Collected: 04/05/21 1326    Lab Status: Final result Specimen: Blood from Arm, Right Updated: 04/05/21 1403     Troponin I <0 03 ng/mL     Comprehensive metabolic panel [[id number]]  (Abnormal) Collected: 04/05/21 1326    Lab Status: Final result Specimen: Blood from Arm, Right Updated: 04/05/21 1401     Sodium 134 mmol/L      Potassium 3 9 mmol/L      Chloride 96 mmol/L      CO2 27 mmol/L      ANION GAP 11 mmol/L      BUN 21 mg/dL      Creatinine 1 26 mg/dL      Glucose 221 mg/dL      Calcium 9 5 mg/dL       U/L       U/L      Alkaline Phosphatase 365 U/L      Total Protein 7 0 g/dL      Albumin 3 5 g/dL      Total Bilirubin 5 30 mg/dL      eGFR 53 ml/min/1 73sq m     Narrative:      [place] guidelines for Chronic Kidney Disease (CKD):     Stage 1 with normal or high GFR (GFR > 90 mL/min/1 73 square meters)    Stage 2 Mild CKD (GFR = 60-89 mL/min/1 73 square meters)    Stage 3A Moderate CKD (GFR = 45-59 mL/min/1 73 square meters)    Stage 3B Moderate CKD (GFR = 30-44 mL/min/1 73 square meters)    Stage 4 Severe CKD (GFR = 15-29 mL/min/1 73 square meters)    Stage 5 End Stage CKD (GFR <15 mL/min/1 73 square meters)  Note: GFR calculation is accurate only with a steady state creatinine    Protime-INR [[id number]]  (Normal) Collected: 04/05/21 1326    Lab Status: Final result Specimen: Blood from Arm, Right Updated: 04/05/21 1356     Protime 13 3 seconds      INR 1 02    APTT [[id number]]  (Normal) Collected: 04/05/21 1326    Lab Status: Final result Specimen: Blood from Arm, Right Updated: 04/05/21 1356     PTT 28 seconds     CBC and differential [[id number]]  (Abnormal) Collected: 04/05/21 1326    Lab Status: Final result Specimen: Blood from Arm, Right Updated: 04/05/21 1346     WBC 11 80 Thousand/uL      RBC 5 01 Million/uL      Hemoglobin 15 4 g/dL      Hematocrit 46 1 %      MCV 92 fL      MCH 30 8 pg      MCHC 33 4 g/dL      RDW 15 5 %      MPV 9 1 fL      Platelets 296 Thousands/uL      Neutrophils Relative 90 %      Lymphocytes Relative 3 %      Monocytes Relative 7 %      Eosinophils Relative 0 %      Basophils Relative 0 %      Neutrophils Absolute 10 70 Thousands/µL      Lymphocytes Absolute 0 40 Thousands/µL      Monocytes Absolute 0 80 Thousand/µL      Eosinophils Absolute 0 00 Thousand/µL      Basophils Absolute 0 00 Thousands/µL     Blood culture #2 [[id number]] Collected: 04/05/21 1326    Lab Status: In process Specimen: Blood from Arm, Right Updated: 04/05/21 1337    Blood culture #1 [[id number]] Collected: 04/05/21 1330    Lab Status: In process Specimen: Blood from Arm, Left Updated: 04/05/21 1337    Procalcitonin with AM Reflex [[id number]] Collected: 04/05/21 1326    Lab Status: In process Specimen: Blood from Arm, Right Updated: 04/05/21 1336                 XR chest portable   ED Interpretation by Cindy Mcknight PA-C (04/05 1419)   Suspected left lower lobe pneumonia                 Procedures  ECG 12 Lead Documentation Only    Date/Time: 4/5/2021 2:44 PM  Performed by: Cindy Mcknight PA-C  Authorized by:  Cindy Mcknight PA-C     ECG reviewed by me, the ED Provider: yes    Patient location:  ED  Previous ECG:     Previous ECG:  Compared to current Similarity:  No change    Comparison to cardiac monitor: Yes    Interpretation:     Interpretation: normal    Rate:     ECG rate:  85    ECG rate assessment: normal    Rhythm:     Rhythm: sinus rhythm    Ectopy:     Ectopy: none    QRS:     QRS axis:  Normal  Conduction:     Conduction: normal    ST segments:     ST segments:  Normal  T waves:     T waves: normal    Comments:      No evidence of acute cardiac ischemia             ED Course  ED Course as of Apr 05 1448   Mon Apr 05, 2021   1420 Curb-65 of 2      1420 PSI of 142, 27-29% mortality, hospitalization recommended per MD Calc                                SBIRT 20yo+      Most Recent Value   SBIRT (23 yo +)   In order to provide better care to our patients, we are screening all of our patients for alcohol and drug use  Would it be okay to ask you these screening questions? Yes Filed at: 04/05/2021 1335   Initial Alcohol Screen: US AUDIT-C    1  How often do you have a drink containing alcohol?  0 Filed at: 04/05/2021 1335   2  How many drinks containing alcohol do you have on a typical day you are drinking? 0 Filed at: 04/05/2021 1335   3a  Male UNDER 65: How often do you have five or more drinks on one occasion? 0 Filed at: 04/05/2021 1335   3b  FEMALE Any Age, or MALE 65+: How often do you have 4 or more drinks on one occassion? 0 Filed at: 04/05/2021 1335   Audit-C Score  0 Filed at: 04/05/2021 1335   GILBERTO: How many times in the past year have you    Used an illegal drug or used a prescription medication for non-medical reasons? Never Filed at: 04/05/2021 1335                    MDM  Number of Diagnoses or Management Options  Pneumonia:   Diagnosis management comments: Differential included but was not limited to COVID-19 bacterial community-acquired pneumonia  Patient found to have low-grade temperature, mild leukocytosis and suspected left lower lobe infiltrate on chest x-ray    Chest x-ray not significantly worse than prior however clinically patient appears to have pneumonia  No recent hospital admissions or antibiotics so will treat as community-acquired pneumonia  Wife reports patient is tachypneic at baseline routinely having quick and shallow breaths  Reports recent weight loss secondary to not eating  Patient did not think he is fluid overloaded at this time  Psi of 142 and curb 65 score of to an almost 3 due to tachypnea  Patient agreeable to admission  Disposition  Final diagnoses:   Pneumonia     Time reflects when diagnosis was documented in both MDM as applicable and the Disposition within this note     Time User Action Codes Description Comment    4/5/2021  2:41 PM Thien Billing Add [J18 [id number]] Pneumonia       ED Disposition     ED Disposition Condition Date/Time Comment    Admit Stable Mon Apr 5, 2021  2:41 PM Case was discussed with Dr Kristy Che and the patient's admission status was agreed to be Admission Status: inpatient status to the service of Dr Kristy Che         Follow-up Information    None         Patient's Medications   Discharge Prescriptions    No medications on file     No discharge procedures on file      PDMP Review     None          ED Provider  Electronically Signed by           Mira West PA-C  04/05/21 6359

## 2021-04-05 NOTE — ASSESSMENT & PLAN NOTE
Likely to hepatocellular carcinoma possible component of sepsis  Will trend  Is no improvement we are consult GI

## 2021-04-05 NOTE — PLAN OF CARE
Problem: Potential for Falls  Goal: Patient will remain free of falls  Description: INTERVENTIONS:  - Assess patient frequently for physical needs  -  Identify cognitive and physical deficits and behaviors that affect risk of falls    -  [place] fall precautions as indicated by assessment   - Educate patient/family on patient safety including physical limitations  - Instruct patient to call for assistance with activity based on assessment  - Modify environment to reduce risk of injury  - Consider OT/PT consult to assist with strengthening/mobility  Outcome: Progressing     Problem: PAIN - ADULT  Goal: Verbalizes/displays adequate comfort level or baseline comfort level  Description: Interventions:  - Encourage patient to monitor pain and request assistance  - Assess pain using appropriate pain scale  - Administer analgesics based on type and severity of pain and evaluate response  - Implement non-pharmacological measures as appropriate and evaluate response  - Consider cultural and social influences on pain and pain management  - Notify physician/advanced practitioner if interventions unsuccessful or patient reports new pain  Outcome: Progressing     Problem: INFECTION - ADULT  Goal: Absence or prevention of progression during hospitalization  Description: INTERVENTIONS:  - Assess and monitor for signs and symptoms of infection  - Monitor lab/diagnostic results  - Monitor all insertion sites, i e  indwelling lines, tubes, and drains  - Monitor endotracheal if appropriate and nasal secretions for changes in amount and color  - [place] appropriate cooling/warming therapies per order  - Administer medications as ordered  - Instruct and encourage patient and family to use good hand hygiene technique  - Identify and instruct in appropriate isolation precautions for identified infection/condition  Outcome: Progressing  Goal: Absence of fever/infection during neutropenic period  Description: INTERVENTIONS:  - Monitor WBC    Outcome: Progressing     Problem: SAFETY ADULT  Goal: Patient will remain free of falls  Description: INTERVENTIONS:  - Assess patient frequently for physical needs  -  Identify cognitive and physical deficits and behaviors that affect risk of falls    -  [place] fall precautions as indicated by assessment   - Educate patient/family on patient safety including physical limitations  - Instruct patient to call for assistance with activity based on assessment  - Modify environment to reduce risk of injury  - Consider OT/PT consult to assist with strengthening/mobility  Outcome: Progressing  Goal: Maintain or return to baseline ADL function  Description: INTERVENTIONS:  -  Assess patient's ability to carry out ADLs; assess patient's baseline for ADL function and identify physical deficits which impact ability to perform ADLs (bathing, care of mouth/teeth, toileting, grooming, dressing, etc )  - Assess/evaluate cause of self-care deficits   - Assess range of motion  - Assess patient's mobility; develop plan if impaired  - Assess patient's need for assistive devices and provide as appropriate  - Encourage maximum independence but intervene and supervise when necessary  - Involve family in performance of ADLs  - Assess for home care needs following discharge   - Consider OT consult to assist with ADL evaluation and planning for discharge  - Provide patient education as appropriate  Outcome: Progressing  Goal: Maintain or return mobility status to optimal level  Description: INTERVENTIONS:  - Assess patient's baseline mobility status (ambulation, transfers, stairs, etc )    - Identify cognitive and physical deficits and behaviors that affect mobility  - Identify mobility aids required to assist with transfers and/or ambulation (gait belt, sit-to-stand, lift, walker, cane, etc )  - [place] fall precautions as indicated by assessment  - Record patient progress and toleration of activity level on Mobility SBAR; progress patient to next Phase/Stage  - Instruct patient to call for assistance with activity based on assessment  - Consider rehabilitation consult to assist with strengthening/weightbearing, etc   Outcome: Progressing     Problem: DISCHARGE PLANNING  Goal: Discharge to home or other facility with appropriate resources  Description: INTERVENTIONS:  - Identify barriers to discharge w/patient and caregiver  - Arrange for needed discharge resources and transportation as appropriate  - Identify discharge learning needs (meds, wound care, etc )  - Refer to Case Management Department for coordinating discharge planning if the patient needs post-hospital services based on physician/advanced practitioner order or complex needs related to functional status, cognitive ability, or social support system  Outcome: Progressing     Problem: Knowledge Deficit  Goal: Patient/family/caregiver demonstrates understanding of disease process, treatment plan, medications, and discharge instructions  Description: Complete learning assessment and assess knowledge base    Interventions:  - Provide teaching at level of understanding  - Provide teaching via preferred learning methods  Outcome: Progressing

## 2021-04-05 NOTE — ASSESSMENT & PLAN NOTE
Lab Results   Component Value Date    EGFR 53 04/05/2021    EGFR 46 02/25/2021    EGFR 55 01/16/2021    CREATININE 1 26 04/05/2021    CREATININE 1 42 (H) 02/25/2021    CREATININE 1 21 01/16/2021   Creatinine baseline  Continue to monitor

## 2021-04-05 NOTE — H&P
[facility]  H&P- Ming Benitez 1940, 78 y o  male MRN: [id number]  Unit/Bed#: ED 06 Encounter: [id number]  Primary Care Provider: Gisel Culver DO   Date and time admitted to hospital: 4/5/2021  1:06 PM    * Sepsis [facility])  Assessment & Plan  Criteria met by tachycardia 90, respiratory rate 28 source being right lower lobe pneumonia  Likely 2 1  Status post 1 L IV fluids  Will continue fluids at 125 an hour  Trend lactate  Started on ceftriaxone and azithromycin  Will check strep and Legionella antigen  Follow-up blood cultures  Follow-up procalcitonin       Pneumonia  Assessment & Plan  Treated as above    Stage 3a chronic kidney disease  Assessment & Plan  Lab Results   Component Value Date    EGFR 53 04/05/2021    EGFR 46 02/25/2021    EGFR 55 01/16/2021    CREATININE 1 26 04/05/2021    CREATININE 1 42 (H) 02/25/2021    CREATININE 1 21 01/16/2021   Creatinine baseline  Continue to monitor    Hepatocellular carcinoma [facility])  Assessment & Plan  Follows up with Hematology-Oncology    Transaminitis  Assessment & Plan  Likely to hepatocellular carcinoma possible component of sepsis  Will trend  Is no improvement we are consult GI    Hypertension  Assessment & Plan  Well controlled  Continue home medications    VTE Prophylaxis: Enoxaparin (Lovenox)  / sequential compression device   Code Status:  Full code  POLST: POLST form is not discussed and not completed at this time  Discussion with family:  With patient and patient's wife at bedside    Anticipated Length of Stay:  Patient will be admitted on an Inpatient basis with an anticipated length of stay of  more than 2 midnights  Justification for Hospital Stay:  Sepsis    Total Time for Visit, including Counseling / Coordination of Care: 45 minutes  Greater than 50% of this total time spent on direct patient counseling and coordination of care      Chief Complaint:   Cough, fever    History of Present Illness:    Zulay Dominguez Anamaria Kim is a 78 y o  male with past medical history of hepatocellular carcinoma, hypertension, CKD who presents with 7 day history of cough with yellow sputum production, decreased appetite over last 3 days and and fevers up to 100 3  Denies any chest pain   denies recent hospitalization  No recent travel    Review of Systems:    Review of Systems   Constitutional: Positive for activity change  Negative for chills and fever  HENT: Negative for hearing loss and sore throat  Respiratory: Positive for cough  Negative for shortness of breath  Cardiovascular: Negative for chest pain and palpitations  Gastrointestinal: Negative for abdominal pain and nausea  Neurological: Negative for dizziness and numbness  Past Medical and Surgical History:     Past Medical History:   Diagnosis Date    Coronary artery disease     Gout     Hypercholesterolemia     Hypertension     Liver [facility])        Past Surgical History:   Procedure Laterality Date    ABDOMINAL SURGERY      appy    CORONARY ARTERY BYPASS GRAFT      IR BIOPSY LIVER MASS  8/25/2020    IR Y-90 PRE-ANGIO/EMBO W/ LUNG SCAN  10/6/2020    IR Y-90 RADIOEMBOLIZATION  10/14/2020       Meds/Allergies:    Prior to Admission medications    Medication Sig Start Date End Date Taking?  Authorizing Provider   allopurinol (ZYLOPRIM) 300 mg tablet Take 300 mg by mouth daily   Yes Historical Provider, MD   aspirin 81 mg chewable tablet Chew 1 tablet (81 mg total) daily 8/27/20  Yes Gail Rossi MD   cholecalciferol (VITAMIN D3) 400 units tablet Take 1,000 Units by mouth daily    Yes Historical Provider, MD   lisinopril (ZESTRIL) 10 mg tablet TAKE ONE TABLET BY MOUTH EVERY DAY FOR BLOOD PRESSURE/HEART 6/4/20  Yes Historical Provider, MD   metoprolol tartrate (LOPRESSOR) 25 mg tablet Take 1 tablet by mouth 2 (two) times a day   Yes Historical Provider, MD   Omega-3 Fatty Acids (fish oil) 1,000 mg Take 1,000 mg by mouth daily   Yes Historical Provider, MD simvastatin (ZOCOR) 80 mg tablet TAKE ONE TABLET BY MOUTH EVERY DAY AT 5 PM FOR CHOLESTEROL 6/4/20  Yes Historical Provider, MD     I have reviewed home medications with patient personally  Allergies: Allergies   Allergen Reactions    Shellfish-Derived Products - Food Allergy        Social History:     Marital Status: /Civil Union   Occupation:    Patient Pre-hospital Living Situation:  Lives with wife  Patient Pre-hospital Level of Mobility:  Normal  Patient Pre-hospital Diet Restrictions:  None  Substance Use History:   Social History     Substance and Sexual Activity   Alcohol Use Not Currently    Frequency: Never     Social History     Tobacco Use   Smoking Status Never Smoker   Smokeless Tobacco Never Used     Social History     Substance and Sexual Activity   Drug Use Never       Family History:    non-contributory    Physical Exam:     Vitals:   Blood Pressure: 102/51 (04/05/21 1400)  Pulse: 72 (04/05/21 1400)  Temperature: 99 4 °F (37 4 °C) (04/05/21 1310)  Temp Source: Temporal (04/05/21 1310)  Respirations: (!) 28 (04/05/21 1400)  Height: 5' 9" (175 3 cm) (04/05/21 1310)  Weight - Scale: 90 7 kg (200 lb) (04/05/21 1310)  SpO2: 93 % (04/05/21 1400)    Physical Exam  Vitals signs and nursing note reviewed  Constitutional:       General: He is not in acute distress  Appearance: Normal appearance  HENT:      Head: Normocephalic  Nose: Nose normal       Mouth/Throat:      Mouth: Mucous membranes are moist    Eyes:      Conjunctiva/sclera: Conjunctivae normal    Cardiovascular:      Rate and Rhythm: Normal rate  Heart sounds: Normal heart sounds  No murmur  Pulmonary:      Effort: Pulmonary effort is normal  No respiratory distress  Breath sounds: Normal breath sounds  No wheezing  Abdominal:      General: There is no distension  Tenderness: There is no abdominal tenderness  There is no guarding  Musculoskeletal:         General: No swelling        Right lower leg: No edema  Skin:     General: Skin is warm  Neurological:      General: No focal deficit present  Mental Status: He is alert and oriented to person, place, and time  Psychiatric:         Mood and Affect: Mood normal              Additional Data:     Lab Results: I have personally reviewed pertinent reports  Results from last 7 days   Lab Units 04/05/21  1326   WBC Thousand/uL 11 80*   HEMOGLOBIN g/dL 15 4   HEMATOCRIT % 46 1   PLATELETS Thousands/uL 178   NEUTROS PCT % 90*   LYMPHS PCT % 3*   MONOS PCT % 7   EOS PCT % 0     Results from last 7 days   Lab Units 04/05/21  1326   SODIUM mmol/L 134   POTASSIUM mmol/L 3 9   CHLORIDE mmol/L 96*   CO2 mmol/L 27   BUN mg/dL 21   CREATININE mg/dL 1 26   ANION GAP mmol/L 11   CALCIUM mg/dL 9 5   ALBUMIN g/dL 3 5   TOTAL BILIRUBIN mg/dL 5 30*   ALK PHOS U/L 365*   ALT U/L 360*   AST U/L 263*   GLUCOSE RANDOM mg/dL 221*     Results from last 7 days   Lab Units 04/05/21  1326   INR  1 02             Results from last 7 days   Lab Units 04/05/21  1326   LACTIC ACID mmol/L 2 1*       Imaging: I have personally reviewed pertinent reports  XR chest portable   ED Interpretation by Feliciano Beth PA-C (04/05 9329)   Suspected left lower lobe pneumonia          EKG, Pathology, and Other Studies Reviewed on Admission:   · EKG:  Not applicable    Allscripts / Epic Records Reviewed: Yes     ** Please Note: This note has been constructed using a voice recognition system   **

## 2021-04-05 NOTE — ASSESSMENT & PLAN NOTE
Criteria met by tachycardia 90, respiratory rate 28 source being right lower lobe pneumonia  Likely 2 1  IV fluids 30 cc/kg of  Trend lactate  Started on ceftriaxone and azithromycin  Will check strep and Legionella antigen  Follow-up blood cultures  Follow-up procalcitonin  Follow-up sputum culture

## 2021-04-06 ENCOUNTER — APPOINTMENT (INPATIENT)
Dept: ULTRASOUND IMAGING | Facility: HOSPITAL | Age: 81
DRG: 871 | End: 2021-04-06
Payer: MEDICARE

## 2021-04-06 LAB
ALBUMIN SERPL BCP-MCNC: 2.7 G/DL (ref 3.5–5.7)
ALP SERPL-CCNC: 315 U/L (ref 55–165)
ALT SERPL W P-5'-P-CCNC: 294 U/L (ref 7–52)
ANION GAP SERPL CALCULATED.3IONS-SCNC: 8 MMOL/L (ref 4–13)
AST SERPL W P-5'-P-CCNC: 242 U/L (ref 13–39)
ATRIAL RATE: 85 BPM
BASOPHILS # BLD AUTO: 0 THOUSANDS/ΜL (ref 0–0.1)
BASOPHILS NFR BLD AUTO: 0 % (ref 0–2)
BILIRUB SERPL-MCNC: 4.3 MG/DL (ref 0.2–1)
BUN SERPL-MCNC: 16 MG/DL (ref 7–25)
CALCIUM ALBUM COR SERPL-MCNC: 9 MG/DL (ref 8.3–10.1)
CALCIUM SERPL-MCNC: 8 MG/DL (ref 8.6–10.5)
CHLORIDE SERPL-SCNC: 103 MMOL/L (ref 98–107)
CO2 SERPL-SCNC: 25 MMOL/L (ref 21–31)
CREAT SERPL-MCNC: 0.96 MG/DL (ref 0.7–1.3)
EOSINOPHIL # BLD AUTO: 0 THOUSAND/ΜL (ref 0–0.61)
EOSINOPHIL NFR BLD AUTO: 0 % (ref 0–5)
ERYTHROCYTE [DISTWIDTH] IN BLOOD BY AUTOMATED COUNT: 15.3 % (ref 11.5–14.5)
GFR SERPL CREATININE-BSD FRML MDRD: 73 ML/MIN/1.73SQ M
GLUCOSE SERPL-MCNC: 102 MG/DL (ref 65–99)
HCT VFR BLD AUTO: 37.5 % (ref 42–47)
HGB BLD-MCNC: 12.6 G/DL (ref 14–18)
L PNEUMO1 AG UR QL IA.RAPID: NEGATIVE
LYMPHOCYTES # BLD AUTO: 0.4 THOUSANDS/ΜL (ref 0.6–4.47)
LYMPHOCYTES NFR BLD AUTO: 7 % (ref 21–51)
MCH RBC QN AUTO: 31 PG (ref 26–34)
MCHC RBC AUTO-ENTMCNC: 33.6 G/DL (ref 31–37)
MCV RBC AUTO: 93 FL (ref 81–99)
MONOCYTES # BLD AUTO: 0.6 THOUSAND/ΜL (ref 0.17–1.22)
MONOCYTES NFR BLD AUTO: 10 % (ref 2–12)
NEUTROPHILS # BLD AUTO: 4.7 THOUSANDS/ΜL (ref 1.4–6.5)
NEUTS SEG NFR BLD AUTO: 83 % (ref 42–75)
P AXIS: 55 DEGREES
PLATELET # BLD AUTO: 122 THOUSANDS/UL (ref 149–390)
PMV BLD AUTO: 9.7 FL (ref 8.6–11.7)
POTASSIUM SERPL-SCNC: 3.8 MMOL/L (ref 3.5–5.5)
PR INTERVAL: 128 MS
PROCALCITONIN SERPL-MCNC: 1.49 NG/ML
PROT SERPL-MCNC: 5.5 G/DL (ref 6.4–8.9)
QRS AXIS: 73 DEGREES
QRSD INTERVAL: 80 MS
QT INTERVAL: 348 MS
QTC INTERVAL: 414 MS
RBC # BLD AUTO: 4.06 MILLION/UL (ref 4.3–5.9)
S PNEUM AG UR QL: NEGATIVE
SODIUM SERPL-SCNC: 136 MMOL/L (ref 134–143)
T WAVE AXIS: 61 DEGREES
VENTRICULAR RATE: 85 BPM
WBC # BLD AUTO: 5.6 THOUSAND/UL (ref 4.8–10.8)

## 2021-04-06 PROCEDURE — 84145 PROCALCITONIN (PCT): CPT | Performed by: PHYSICIAN ASSISTANT

## 2021-04-06 PROCEDURE — 99232 SBSQ HOSP IP/OBS MODERATE 35: CPT | Performed by: NURSE PRACTITIONER

## 2021-04-06 PROCEDURE — G0427 INPT/ED TELECONSULT70: HCPCS | Performed by: INTERNAL MEDICINE

## 2021-04-06 PROCEDURE — 93010 ELECTROCARDIOGRAM REPORT: CPT | Performed by: INTERNAL MEDICINE

## 2021-04-06 PROCEDURE — 87205 SMEAR GRAM STAIN: CPT | Performed by: NURSE PRACTITIONER

## 2021-04-06 PROCEDURE — 87081 CULTURE SCREEN ONLY: CPT | Performed by: NURSE PRACTITIONER

## 2021-04-06 PROCEDURE — 76705 ECHO EXAM OF ABDOMEN: CPT

## 2021-04-06 PROCEDURE — 97166 OT EVAL MOD COMPLEX 45 MIN: CPT

## 2021-04-06 PROCEDURE — 87070 CULTURE OTHR SPECIMN AEROBIC: CPT | Performed by: NURSE PRACTITIONER

## 2021-04-06 PROCEDURE — 85025 COMPLETE CBC W/AUTO DIFF WBC: CPT | Performed by: FAMILY MEDICINE

## 2021-04-06 PROCEDURE — 97163 PT EVAL HIGH COMPLEX 45 MIN: CPT

## 2021-04-06 PROCEDURE — 83690 ASSAY OF LIPASE: CPT | Performed by: NURSE PRACTITIONER

## 2021-04-06 PROCEDURE — 80053 COMPREHEN METABOLIC PANEL: CPT | Performed by: FAMILY MEDICINE

## 2021-04-06 RX ORDER — SODIUM CHLORIDE 9 MG/ML
75 INJECTION, SOLUTION INTRAVENOUS CONTINUOUS
Status: DISCONTINUED | OUTPATIENT
Start: 2021-04-06 | End: 2021-04-07

## 2021-04-06 RX ORDER — CEFEPIME HYDROCHLORIDE 1 G/50ML
1000 INJECTION, SOLUTION INTRAVENOUS EVERY 12 HOURS
Status: DISCONTINUED | OUTPATIENT
Start: 2021-04-06 | End: 2021-04-08

## 2021-04-06 RX ORDER — CEFEPIME HYDROCHLORIDE 1 G/1
1000 INJECTION, POWDER, FOR SOLUTION INTRAMUSCULAR; INTRAVENOUS EVERY 12 HOURS
Status: DISCONTINUED | OUTPATIENT
Start: 2021-04-06 | End: 2021-04-06 | Stop reason: CLARIF

## 2021-04-06 RX ADMIN — METOPROLOL TARTRATE 25 MG: 25 TABLET, FILM COATED ORAL at 09:51

## 2021-04-06 RX ADMIN — METOPROLOL TARTRATE 25 MG: 25 TABLET, FILM COATED ORAL at 18:24

## 2021-04-06 RX ADMIN — GUAIFENESIN 600 MG: 600 TABLET, EXTENDED RELEASE ORAL at 09:51

## 2021-04-06 RX ADMIN — ALLOPURINOL 300 MG: 300 TABLET ORAL at 09:51

## 2021-04-06 RX ADMIN — ENOXAPARIN SODIUM 40 MG: 40 INJECTION SUBCUTANEOUS at 09:51

## 2021-04-06 RX ADMIN — OMEGA-3 FATTY ACIDS CAP 1000 MG 1000 MG: 1000 CAP at 09:51

## 2021-04-06 RX ADMIN — CEFEPIME HYDROCHLORIDE 1000 MG: 1 INJECTION, SOLUTION INTRAVENOUS at 09:52

## 2021-04-06 RX ADMIN — SODIUM CHLORIDE 75 ML/HR: 0.9 INJECTION, SOLUTION INTRAVENOUS at 09:51

## 2021-04-06 RX ADMIN — ASPIRIN 81 MG: 81 TABLET, CHEWABLE ORAL at 09:51

## 2021-04-06 RX ADMIN — GUAIFENESIN 600 MG: 600 TABLET, EXTENDED RELEASE ORAL at 21:41

## 2021-04-06 RX ADMIN — CEFEPIME HYDROCHLORIDE 1000 MG: 1 INJECTION, SOLUTION INTRAVENOUS at 21:41

## 2021-04-06 RX ADMIN — Medication 1000 UNITS: at 09:51

## 2021-04-06 NOTE — PLAN OF CARE
Problem: OCCUPATIONAL THERAPY ADULT  Goal: Performs self-care activities at highest level of function for planned discharge setting  See evaluation for individualized goals  Description: Treatment Interventions: ADL retraining, Functional transfer training, Endurance training, Patient/family training          See flowsheet documentation for full assessment, interventions and recommendations  Note: Limitation: Decreased ADL status, Decreased endurance, Decreased self-care trans, Decreased high-level ADLs  Prognosis: Good  Assessment: Patient is a 78 y o  male seen for OT evaluation at [address] [place] [address] on 4/5/2021  s/p Sepsis ([facility] [address] )  Comorbidities impacting functional performance include: HTN  Transaminates, hepatocellular carcinoma, stage 3 CKD, and pneumonia  Patient presents with active orders for OT eval and treat and Activity as tolerated   Performed at least 2 patient identifiers during session including name and wristband  Patient reports prior level of function as independent  with ADLs, requires light assistance with IADLs, ambulatory without AD , retired, and lives with wife and daughter in a 1 story house  with 1-2 SAÚL  Upon initial evaluation, patient requires supervision for UB/LB ADLs, and CGA for transfers and functional mobility without AD  Based on functional eval, patient presents A&Ox4 with intact  attention, safety awareness, and memory  Occupational performance is affected by the following deficits: endurance  and balance   Patient would benefit from OT services to maximize independence in self-care and transfers  Personal factors impacting performance include: SAÚL home  and decreased IADL independence  Occupational areas to address include:bathing/showering, toileting, dressing , personal hygiene/grooming , bed mobility , functional mobility, meal preparation, medication management  and home management   Recommending return to previous environment with skilled therapy upon D/C  OT Discharge Recommendation: Home with skilled therapy  OT - OK to Discharge: Yes(Once medically clear)  Shawna Zhou, OTS

## 2021-04-06 NOTE — ASSESSMENT & PLAN NOTE
· Likely due to stress from acute infection with underlying hepatocellular carcinoma versus possible biliary dilatation/obstruction  · Consult gastroenterology; case discussed with GI on-call- will obtain right upper quadrant ultrasound  · Trend LFTs and bilirubin  · The patient as benign abdomen without any nausea vomiting

## 2021-04-06 NOTE — CONSULTS
Consultation - GI   Lord Lott 78 y o  male MRN: [id number]  Unit/Bed#: -01 Encounter: [id number]      Assessment/Plan     Assessment:  Hepatocellular carcinoma with increase in liver enzymes along with pneumonia  Plan:  Check abdominal ultrasound today to rule out biliary tract dilatation  History of Present Illness   Physician Requesting Consult: Susan Black MD  Reason for Consult / Principal Problem:  Hepatocellular carcinoma with increase in liver enzymes and bilirubin of 5  Hx and PE limited by:   HPI: Lord Lott is a 78y o  year old male who presents with cough and fatigue and was found to have area the right lower lobe pneumonia  He is started on antibiotics  Procalcitonin was elevated  Transaminases and bilirubin were higher than normal   Bilirubin yesterday was 5 3 and today is down to 4 3  Transaminases are also lower today with  and   The patient denies any abdominal pain  He is being treated at [facility] for his hepatocellular carcinoma  Consults    Review of Systems   Constitutional: Positive for activity change and fatigue  HENT: Negative  Respiratory: Positive for cough and shortness of breath  Cardiovascular: Negative  Gastrointestinal: Negative  Endocrine: Negative  Genitourinary: Negative  Musculoskeletal: Negative  Neurological: Negative  Psychiatric/Behavioral: Negative          Historical Information   Past Medical History:   Diagnosis Date    Coronary artery disease     Gout     Hypercholesterolemia     Hypertension     Liver cancer [facility])      Past Surgical History:   Procedure Laterality Date    ABDOMINAL SURGERY      appy    APPENDECTOMY      CORONARY ARTERY BYPASS GRAFT      IR BIOPSY LIVER MASS  8/25/2020    IR Y-90 PRE-ANGIO/EMBO W/ LUNG SCAN  10/6/2020    IR Y-90 RADIOEMBOLIZATION  10/14/2020     Social History   Social History     Substance and Sexual Activity   Alcohol Use Not Currently    Frequency: Never     Social History     Substance and Sexual Activity   Drug Use Never     E-Cigarette/Vaping    E-Cigarette Use Never User      E-Cigarette/Vaping Substances    Nicotine No     THC No     CBD No     Flavoring No     Other No     Unknown No      Social History     Tobacco Use   Smoking Status Never Smoker   Smokeless Tobacco Never Used     Family History: non-contributory    Meds/Allergies   all current active meds have been reviewed    Allergies   Allergen Reactions    Shellfish-Derived Products - Food Allergy        Objective       Intake/Output Summary (Last 24 hours) at 4/6/2021 1248  Last data filed at 4/6/2021 1231  Gross per 24 hour   Intake 2050 ml   Output 500 ml   Net 1550 ml       Invasive Devices:   Peripheral IV 04/05/21 Proximal;Right;Ventral (anterior) Forearm (Active)   Site Assessment WDL 04/06/21 1012   Dressing Type Transparent 04/06/21 1012   Line Status Flushed; Infusing 04/06/21 1012   Dressing Status Clean;Dry; Intact 04/06/21 1012   Dressing Change Due 04/09/21 04/05/21 2000       Physical Exam  Constitutional:       Appearance: Normal appearance  HENT:      Head: Normocephalic and atraumatic  Cardiovascular:      Rate and Rhythm: Normal rate and regular rhythm  Pulmonary:      Effort: Pulmonary effort is normal    Abdominal:      General: Abdomen is flat  Bowel sounds are normal       Palpations: Abdomen is soft  Skin:     General: Skin is warm and dry  Neurological:      General: No focal deficit present  Mental Status: He is alert  Psychiatric:         Mood and Affect: Mood normal          Behavior: Behavior normal          Lab Results: I have personally reviewed pertinent reports  Imaging Studies: I have personally reviewed pertinent reports  EKG, Pathology, and Other Studies: I have personally reviewed pertinent reports        VTE Prophylaxis: Enoxaparin (Lovenox)    Counseling / Coordination of Care  Total floor / unit time spent today 45 minutes  Greater than 50% of total time was spent with the patient and / or family counseling and / or coordination of care  A description of the counseling / coordination of care:  Case discussed with patient and hospital physician assistant

## 2021-04-06 NOTE — NURSING NOTE
Patient up to bedside chair  ID tele consult completed, RN present, patient participated  Offers no complaints at this time  Call bell and belongings within reach  Will continue to monitor

## 2021-04-06 NOTE — PLAN OF CARE
Problem: Nutrition/Hydration-ADULT  Goal: Nutrient/Hydration intake appropriate for improving, restoring or maintaining nutritional needs  Description: Monitor and assess patient's nutrition/hydration status for malnutrition  Collaborate with interdisciplinary team and initiate plan and interventions as ordered  Monitor patient's weight and dietary intake as ordered or per policy  Utilize nutrition screening tool and intervene as necessary  Determine patient's food preferences and provide high-protein, high-caloric foods as appropriate       INTERVENTIONS:  - Monitor oral intake, urinary output, labs, and treatment plans  - Assess nutrition and hydration status and recommend course of action  - Evaluate amount of meals eaten  - Assist patient with eating if necessary   - Allow adequate time for meals  - Recommend/ encourage appropriate diets, oral nutritional supplements, and vitamin/mineral supplements  - Order, calculate, and assess calorie counts as needed  - Recommend, monitor, and adjust tube feedings and TPN/PPN based on assessed needs  - Assess need for intravenous fluids  - Provide specific nutrition/hydration education as appropriate  - Include patient/family/caregiver in decisions related to nutrition  Outcome: Progressing   RD scheduled ensure enlive bid

## 2021-04-06 NOTE — CASE MANAGEMENT
Pt is not a 3o day readmission- risk score 12, pt was admitted for pneumonia/sepsis, cm met with the pt and his family at the bedside, cm made them aware of my role as a cm, pt lives with his wife and daughter in a 1 story home with 12 basement steps, no steps outside, pt drives and is independent, DME: none, no hx of HHC, RX plan [address], pt denies smoking and alcohol, pt has a pcp Dr Sheth, A post acute care recommendation was made by your care team for [facility]  Discussed [place] of Choice with patient  List of agencies given to patient via in person  patient aware the list is custom filtered for them by zip code location and that [facility] post acute providers are designated  Cm met again with the pt and family and they were given a list of hhc agencies, their 1st choice of hhc was [facility], referral was sent, family will transport the pt home when stable for d/c , pt is not stable for d/c cm will continue to work on a safe d/c plan, CM reviewed d/c planning process including the following: identifying help at home, patient preference for d/c planning needs, availability of treatment team to discuss questions or concerns patient and/or family may have regarding understanding medications and recognizing signs and symptoms once discharged  CM also encouraged patient to follow up with all recommended appointments after discharge  Patient advised of importance for patient and family to participate in managing patients medical well being

## 2021-04-06 NOTE — ASSESSMENT & PLAN NOTE
· Criteria met by tachycardia 90, respiratory rate 28 with suspected underlying bronchitis vs pneumonia with yellow productive cough  · The patient received IV fluid resuscitation  · Lactic acidosis resolved after IV fluid  · Started on ceftriaxone and azithromycin; will broaden abx regimen to cefepime  Check MRSA     · strep and Legionella antigen- negative; azithromycin stopped  · procalcitonin 1 94; will trend   · blood cultures shows 1/2 set gram negative rods; will consult ID   · sputum culture- pending   · Sepsis parameters are improving

## 2021-04-06 NOTE — PLAN OF CARE
Problem: Potential for Falls  Goal: Patient will remain free of falls  Description: INTERVENTIONS:  - Assess patient frequently for physical needs  -  Identify cognitive and physical deficits and behaviors that affect risk of falls    -  [place] fall precautions as indicated by assessment   - Educate patient/family on patient safety including physical limitations  - Instruct patient to call for assistance with activity based on assessment  - Modify environment to reduce risk of injury  - Consider OT/PT consult to assist with strengthening/mobility  Outcome: Progressing     Problem: PAIN - ADULT  Goal: Verbalizes/displays adequate comfort level or baseline comfort level  Description: Interventions:  - Encourage patient to monitor pain and request assistance  - Assess pain using appropriate pain scale  - Administer analgesics based on type and severity of pain and evaluate response  - Implement non-pharmacological measures as appropriate and evaluate response  - Consider cultural and social influences on pain and pain management  - Notify physician/advanced practitioner if interventions unsuccessful or patient reports new pain  Outcome: Progressing     Problem: INFECTION - ADULT  Goal: Absence or prevention of progression during hospitalization  Description: INTERVENTIONS:  - Assess and monitor for signs and symptoms of infection  - Monitor lab/diagnostic results  - Monitor all insertion sites, i e  indwelling lines, tubes, and drains  - Monitor endotracheal if appropriate and nasal secretions for changes in amount and color  - [place] appropriate cooling/warming therapies per order  - Administer medications as ordered  - Instruct and encourage patient and family to use good hand hygiene technique  - Identify and instruct in appropriate isolation precautions for identified infection/condition  Outcome: Progressing  Goal: Absence of fever/infection during neutropenic period  Description: INTERVENTIONS:  - Monitor WBC    Outcome: Progressing     Problem: SAFETY ADULT  Goal: Patient will remain free of falls  Description: INTERVENTIONS:  - Assess patient frequently for physical needs  -  Identify cognitive and physical deficits and behaviors that affect risk of falls    -  [place] fall precautions as indicated by assessment   - Educate patient/family on patient safety including physical limitations  - Instruct patient to call for assistance with activity based on assessment  - Modify environment to reduce risk of injury  - Consider OT/PT consult to assist with strengthening/mobility  Outcome: Progressing  Goal: Maintain or return to baseline ADL function  Description: INTERVENTIONS:  -  Assess patient's ability to carry out ADLs; assess patient's baseline for ADL function and identify physical deficits which impact ability to perform ADLs (bathing, care of mouth/teeth, toileting, grooming, dressing, etc )  - Assess/evaluate cause of self-care deficits   - Assess range of motion  - Assess patient's mobility; develop plan if impaired  - Assess patient's need for assistive devices and provide as appropriate  - Encourage maximum independence but intervene and supervise when necessary  - Involve family in performance of ADLs  - Assess for home care needs following discharge   - Consider OT consult to assist with ADL evaluation and planning for discharge  - Provide patient education as appropriate  Outcome: Progressing  Goal: Maintain or return mobility status to optimal level  Description: INTERVENTIONS:  - Assess patient's baseline mobility status (ambulation, transfers, stairs, etc )    - Identify cognitive and physical deficits and behaviors that affect mobility  - Identify mobility aids required to assist with transfers and/or ambulation (gait belt, sit-to-stand, lift, walker, cane, etc )  - [place] fall precautions as indicated by assessment  - Record patient progress and toleration of activity level on Mobility SBAR; progress patient to next Phase/Stage  - Instruct patient to call for assistance with activity based on assessment  - Consider rehabilitation consult to assist with strengthening/weightbearing, etc   Outcome: Progressing     Problem: DISCHARGE PLANNING  Goal: Discharge to home or other facility with appropriate resources  Description: INTERVENTIONS:  - Identify barriers to discharge w/patient and caregiver  - Arrange for needed discharge resources and transportation as appropriate  - Identify discharge learning needs (meds, wound care, etc )  - Refer to Case Management Department for coordinating discharge planning if the patient needs post-hospital services based on physician/advanced practitioner order or complex needs related to functional status, cognitive ability, or social support system  Outcome: Progressing     Problem: Knowledge Deficit  Goal: Patient/family/caregiver demonstrates understanding of disease process, treatment plan, medications, and discharge instructions  Description: Complete learning assessment and assess knowledge base  Interventions:  - Provide teaching at level of understanding  - Provide teaching via preferred learning methods  Outcome: Progressing     Problem: Nutrition/Hydration-ADULT  Goal: Nutrient/Hydration intake appropriate for improving, restoring or maintaining nutritional needs  Description: Monitor and assess patient's nutrition/hydration status for malnutrition  Collaborate with interdisciplinary team and initiate plan and interventions as ordered  Monitor patient's weight and dietary intake as ordered or per policy  Utilize nutrition screening tool and intervene as necessary  Determine patient's food preferences and provide high-protein, high-caloric foods as appropriate       INTERVENTIONS:  - Monitor oral intake, urinary output, labs, and treatment plans  - Assess nutrition and hydration status and recommend course of action  - Evaluate amount of meals eaten  - Assist patient with eating if necessary   - Allow adequate time for meals  - Recommend/ encourage appropriate diets, oral nutritional supplements, and vitamin/mineral supplements  - Order, calculate, and assess calorie counts as needed  - Recommend, monitor, and adjust tube feedings and TPN/PPN based on assessed needs  - Assess need for intravenous fluids  - Provide specific nutrition/hydration education as appropriate  - Include patient/family/caregiver in decisions related to nutrition  Outcome: Progressing

## 2021-04-06 NOTE — NURSING NOTE
Lab called, Positive blood cultures with gram negative rods in 1 set drawn on 4/5/21  CRNP notified

## 2021-04-06 NOTE — PROGRESS NOTES
[facility]  Progress Note - Grecia Bingham 1940, 78 y o  male MRN: [id number]  Unit/Bed#: -01 Encounter: [id number]  Primary Care Provider: Philipp Barone DO   Date and time admitted to hospital: 4/5/2021  1:06 PM    * Sepsis [facility])  Assessment & Plan  · Criteria met by tachycardia 90, respiratory rate 28 with suspected underlying bronchitis vs pneumonia with yellow productive cough  · The patient received IV fluid resuscitation  · Lactic acidosis resolved after IV fluid  · Started on ceftriaxone and azithromycin; will broaden abx regimen to cefepime  Check MRSA  · strep and Legionella antigen- negative; azithromycin stopped  · procalcitonin 1 94; will trend   · blood cultures shows 1/2 set gram negative rods; will consult ID   · sputum culture- pending   · Sepsis parameters are improving        Stage 3a chronic kidney disease  Assessment & Plan  Lab Results   Component Value Date    EGFR 73 04/06/2021    EGFR 53 04/05/2021    EGFR 46 02/25/2021    CREATININE 0 96 04/06/2021    CREATININE 1 26 04/05/2021    CREATININE 1 42 (H) 02/25/2021   · Creatinine baseline is approximately 1 0 to 1 2 mg/dL  · Currently is at baseline  · Continue to monitor renal function closely    Hepatocellular carcinoma ([facility] [address] )  Assessment & Plan  · Follows up with Hematology-Oncology outpatient   · Currently on radiation therapy    Transaminitis  Assessment & Plan  · Likely due to stress from acute infection with underlying hepatocellular carcinoma versus possible biliary dilatation/obstruction  · Consult gastroenterology; case discussed with GI on-call- will obtain right upper quadrant ultrasound  · Trend LFTs and bilirubin  · The patient as benign abdomen without any nausea vomiting      Essential hypertension  Assessment & Plan  · Well controlled  · Continue home medications  · Will hold lisinopril for now due to sepsis        VTE Prophylaxis:  Enoxaparin (Lovenox)    Patient Centered Rounds: I have performed bedside rounds with nursing staff today  Discussions with Specialists or Other Care Team Provider:  GI, id, nursing, case management, PT/OT  Education and Discussions with Family / Patient:  Patient his wife    Current Length of Stay: 1 day(s)    Current Patient Status: Inpatient   Certification Statement: The patient will continue to require additional inpatient hospital stay due to Sepsis    Discharge Plan:  Pending hospital course    Code Status: Level 1 - Full Code    Subjective:   Feeling much improved since admission  Denies any nausea, vomiting, abdominal pain  Objective:     Vitals:   Temp (24hrs), Av 6 °F (37 °C), Min:97 7 °F (36 5 °C), Max:99 4 °F (37 4 °C)    Temp:  [97 7 °F (36 5 °C)-99 4 °F (37 4 °C)] 97 7 °F (36 5 °C)  HR:  [72-90] 83  Resp:  [16-28] 18  BP: (102-129)/(51-60) 114/58  SpO2:  [93 %-96 %] 95 %  Body mass index is 29 56 kg/m²  Input and Output Summary (last 24 hours): Intake/Output Summary (Last 24 hours) at 2021 0826  Last data filed at 2021  Gross per 24 hour   Intake 1050 ml   Output 300 ml   Net 750 ml       Physical Exam:   Physical Exam  Vitals signs and nursing note reviewed  Constitutional:       General: He is not in acute distress  Appearance: Normal appearance  HENT:      Head: Normocephalic and atraumatic  Right Ear: External ear normal       Left Ear: External ear normal       Nose: Nose normal       Mouth/Throat:      Mouth: Mucous membranes are moist       Pharynx: Oropharynx is clear  Eyes:      General:         Right eye: No discharge  Left eye: No discharge  Extraocular Movements: Extraocular movements intact  Pupils: Pupils are equal, round, and reactive to light  Neck:      Musculoskeletal: Normal range of motion and neck supple  No neck rigidity  Cardiovascular:      Rate and Rhythm: Normal rate and regular rhythm  Pulses: Normal pulses        Heart sounds: Normal heart sounds  No murmur  Pulmonary:      Effort: Pulmonary effort is normal  No respiratory distress  Breath sounds: Rhonchi present  No wheezing or rales  Abdominal:      General: Bowel sounds are normal  There is no distension  Palpations: Abdomen is soft  There is no mass  Tenderness: There is no abdominal tenderness  Musculoskeletal: Normal range of motion  General: No swelling, tenderness or deformity  Skin:     General: Skin is warm and dry  Capillary Refill: Capillary refill takes less than 2 seconds  Coloration: Skin is not pale  Findings: No erythema  Neurological:      General: No focal deficit present  Mental Status: He is alert and oriented to person, place, and time  Mental status is at baseline  Psychiatric:         Mood and Affect: Mood normal          Behavior: Behavior normal          Thought Content: Thought content normal          Judgment: Judgment normal          Additional Data:     Labs:    Results from last 7 days   Lab Units 04/06/21  0511   WBC Thousand/uL 5 60   HEMOGLOBIN g/dL 12 6*   HEMATOCRIT % 37 5*   PLATELETS Thousands/uL 122*   NEUTROS PCT % 83*   LYMPHS PCT % 7*   MONOS PCT % 10   EOS PCT % 0     Results from last 7 days   Lab Units 04/06/21  0511   SODIUM mmol/L 136   POTASSIUM mmol/L 3 8   CHLORIDE mmol/L 103   CO2 mmol/L 25   BUN mg/dL 16   CREATININE mg/dL 0 96   CALCIUM mg/dL 8 0*   ALK PHOS U/L 315*   ALT U/L 294*   AST U/L 242*     Results from last 7 days   Lab Units 04/05/21  1326   INR  1 02               * I Have Reviewed All Lab Data Listed Above  * Additional Pertinent Lab Tests Reviewed: Joanie 64 Admission  Reviewed    Imaging:  Imaging Reports Reviewed Today Include: CXR     Recent Cultures (last 7 days):     Results from last 7 days   Lab Units 04/05/21  2025 04/05/21  1330 04/05/21  1326   BLOOD CULTURE   --   --  Received in Microbiology Lab  Culture in Progress     GRAM STAIN RESULT   --  Gram negative rods*  --    LEGIONELLA URINARY ANTIGEN  Negative  --   --        Last 24 Hours Medication List:   Current Facility-Administered Medications   Medication Dose Route Frequency Provider Last Rate    acetaminophen  650 mg Oral Q6H PRN Santhosh Costa MD      allopurinol  300 mg Oral Daily Santhosh Costa MD      aspirin  81 mg Oral Daily Santhosh Costa MD      cefepime  1,000 mg Intravenous Q12H BEKA Milan      cholecalciferol  1,000 Units Oral Daily Brooks Carolyn Tellez MD      enoxaparin  40 mg Subcutaneous Daily Santhosh Costa MD      fish oil  1,000 mg Oral Daily Santhosh Costa MD      guaiFENesin  600 mg Oral Q12H [address] Santhosh Costa MD      metoprolol tartrate  25 mg Oral BID BEKA Milan      sodium chloride  75 mL/hr Intravenous Continuous BEKA Milan          Today, Patient Was Seen By: BEKA Milan    ** Please Note: Dictation voice to text software may have been used in the creation of this document   **

## 2021-04-06 NOTE — TELEMEDICINE
Consultation - Infectious Disease   Shaylee Doshi 78 y o  male MRN: [id number]  Unit/Bed#: -01 Encounter: [id number]      Inpatient consult to Infectious Diseases  Consult performed by: Tru Floyd MD  Consult ordered by: BEKA Kirk          REQUIRED DOCUMENTATION:     1  This service was provided via Telemedicine  2  Provider located at [facility]  3  TeleMed provider: Tru Floyd MD   4  Identify all parties in room with patient during tele consult:RN  5  After connecting through [employer], patient was identified by name and date of birth and assistant checked wristband  Patient was then informed that this was a Telemedicine visit and that the exam was being conducted confidentially over secure lines  My office door was closed  No one else was in the room  Patient acknowledged consent and understanding of privacy and security of the Telemedicine visit, and gave us permission to have the assistant stay in the room in order to assist with the history and to conduct the exam   I informed the patient that I have reviewed their record in Epic and presented the opportunity for them to ask any questions regarding the visit today  The patient agreed to participate  Assessment/Recommendations     1  Sepsis, present on admission with tachycardia, tachypnea and leukocytosis  - Source of sepsis is bacteremia  - Clinically improving, afebrile without leukocytosis    · Management as below    2  Gram negative bacteremia  - Source of bacteremia is possibly evolving pneumonia and/or tracheobronchitis  No radiographic evidence of pneumonia  No other localizing source of infection  - Clinically improving, afebrile without leukocytosis    · Continue current antibiotic therapy with Cefepime  · FU  blood cultures  · FU sputum culture  · Final choice and duration of antibiotics to be determined    3  Elevated LFTs  - Possibly secondary to underlying malignancy and/or sepsis    No clinical signs of acute cholecystitis/acute cholangitis    · Continue supportive care  · Await results of right upper quadrant ultrasound  · Trend LFTs    4  Hepatocellular carcinoma  - s/p radioembolization, currently MARIA M    · Management per Oncology with    Thank you for involving me in the care of your patient  Please call with questions, change in clinical status or if tests recommended above are abnormal      Discussed with the primary service  History     Reason for Consult:  Bacteremia  HPI: Leon Myrick is a 78y o  year old male with hepatocellular carcinoma s/p radioembolization, CAD, hypertension  He presented to the ER on 04/05 with 2 weeks of productive cough with yellow phlegm, mild chest discomfort, mild dyspnea, intermittent fever, body ache and fatigue  In the ER, vitals were notable for tachycardia  Labs were notable for lactic acidosis with leukocytosis and new onset elevated LFTs  EKG noted no acute ST changes  CXR showed no infiltrate  He was admitted and started on ceftriaxone, Flagyl  He was admitted and started on ceftriaxone and azithromycin  Blood cultures from admission, single set, is growing gram-negative rods  Antibiotics were switched to cefepime  His sputum culture is  Pending  He has been afebrile overnight without leukocytosis  A right upper quadrant ultrasound is pending  He reports significant improvement and cough has nearly resolved  Denies nausea, vomiting, diarrhea or rash and is tolerating antibiotics well  Infectious disease is being consulted for diagnostic work up and antibiotic management  Review of Systems  Pertinent positives and negatives as noted in HPI  Rest complete 12 point system-based review of systems is otherwise negative      PAST MEDICAL HISTORY:  Past Medical History:   Diagnosis Date    Coronary artery disease     Gout     Hypercholesterolemia     Hypertension     Liver cancer [facility])      Past Surgical History:   Procedure Laterality Date  ABDOMINAL SURGERY      appy    APPENDECTOMY      CORONARY ARTERY BYPASS GRAFT      IR BIOPSY LIVER MASS  2020    IR Y-90 PRE-ANGIO/EMBO W/ LUNG SCAN  10/6/2020    IR Y-90 RADIOEMBOLIZATION  10/14/2020       FAMILY HISTORY:  Non-contributory    SOCIAL HISTORY:  Social History   /Civil Union  Social History     Substance and Sexual Activity   Alcohol Use Not Currently    Frequency: Never     Social History     Substance and Sexual Activity   Drug Use Never     Social History     Tobacco Use   Smoking Status Never Smoker   Smokeless Tobacco Never Used       ALLERGIES:  Allergies   Allergen Reactions    Shellfish-Derived Products - Food Allergy        MEDICATIONS:  All current active medications have been reviewed      Physical Exam     Temp:  [97 7 °F (36 5 °C)-99 4 °F (37 4 °C)] 97 7 °F (36 5 °C)  HR:  [72-90] 83  Resp:  [16-28] 18  BP: (102-129)/(51-60) 114/58  SpO2:  [93 %-96 %] 95 %  Temp (24hrs), Av 6 °F (37 °C), Min:97 7 °F (36 5 °C), Max:99 4 °F (37 4 °C)  Current: Temperature: 97 7 °F (36 5 °C)    Intake/Output Summary (Last 24 hours) at 2021 9042  Last data filed at 2021  Gross per 24 hour   Intake 1050 ml   Output 300 ml   Net 750 ml       Physical exam findings reported by bedside and primary medical team staff    General Appearance:  Appearing well, nontoxic, and in no distress, appears stated age   Head:  Normocephalic, without obvious abnormality, atraumatic   Eyes:  PERRL, conjunctiva pink and sclera anicteric, both eyes   Nose: Nares normal, mucosa normal, no drainage   Throat: Oropharynx moist without lesions; lips, mucosa, and tongue normal; teeth and gums normal   Neck: Supple, symmetrical, trachea midline, no adenopathy, no tenderness/mass/nodules   Back:   Symmetric, no curvature, ROM normal, no CVA tenderness   Lungs:   Clear to auscultation bilaterally, no audible wheezes, rhonchi and rales, respirations unlabored   Chest Wall:  No tenderness or deformity Heart:  Regular rate and rhythm, S1, S2 normal, no murmur, rub or gallop   Abdomen:   Soft, non-tender, non-distended, positive bowel sounds, no masses, no organomegaly    No CVA tenderness   Extremities: Extremities normal, atraumatic, no cyanosis, clubbing or edema   Skin: Skin color, texture, turgor normal, no rashes or lesions  No draining wounds noted  Lymph nodes: Cervical, supraclavicular, and axillary nodes normal   Neurologic: Alert and oriented times 3, extremity strength 5/5 and symmetric       Invasive Devices:   Peripheral IV 04/05/21 Proximal;Right;Ventral (anterior) Forearm (Active)   Site Assessment WDL; Clean;Dry; Intact 04/05/21 2000   Dressing Type Transparent 04/05/21 2000   Line Status Blood return noted; Flushed; Infusing 04/05/21 2000   Dressing Status Clean;Dry; Intact 04/05/21 2000   Dressing Change Due 04/09/21 04/05/21 2000       Labs, Imaging, & Other Studies     Lab Results:    I have personally reviewed pertinent labs  Results from last 7 days   Lab Units 04/06/21  0511 04/05/21  1326   WBC Thousand/uL 5 60 11 80*   HEMOGLOBIN g/dL 12 6* 15 4   PLATELETS Thousands/uL 122* 178     Results from last 7 days   Lab Units 04/06/21  0511 04/05/21  1326   POTASSIUM mmol/L 3 8 3 9   CHLORIDE mmol/L 103 96*   CO2 mmol/L 25 27   BUN mg/dL 16 21   CREATININE mg/dL 0 96 1 26   EGFR ml/min/1 73sq m 73 53   CALCIUM mg/dL 8 0* 9 5   AST U/L 242* 263*   ALT U/L 294* 360*   ALK PHOS U/L 315* 365*     Results from last 7 days   Lab Units 04/05/21 2025 04/05/21  1330 04/05/21  1326   BLOOD CULTURE   --   --  Received in Microbiology Lab  Culture in Progress  GRAM STAIN RESULT   --  Gram negative rods*  --    LEGIONELLA URINARY ANTIGEN  Negative  --   --        Imaging Studies:   I have personally reviewed pertinent imaging study reports and images in PACS  EKG, Pathology, and Other Studies:   I have personally reviewed pertinent reports  Counseling/Coordination of care:        Total 70 minutes communication with the patient via telehealth  Labs, medical tests and imaging studies were independently and extensively reviewed by me as noted above in HPI and old records were obtained and summarized as noted above in HPI  My recommendations were discussed with the patient in detail who verbalized understanding

## 2021-04-06 NOTE — PLAN OF CARE
Problem: PHYSICAL THERAPY ADULT  Goal: Performs mobility at highest level of function for planned discharge setting  See evaluation for individualized goals  Description: Treatment/Interventions: Functional transfer training, LE strengthening/ROM, Therapeutic exercise, Endurance training, Bed mobility, Gait training, Spoke to advanced practitioner, Spoke to nursing, Patient/family training          See flowsheet documentation for full assessment, interventions and recommendations  Note: Prognosis: Good  Problem List: Decreased endurance, Impaired balance, Decreased safety awareness  Assessment: Pt is 78 y o  male seen for high complexity PT evaluation on 4/6/2021 s/p admit to [address] on 4/5/2021 w/ Sepsis ([address] )  PT consulted to assess pt's functional mobility and d/c needs  Order placed for PT eval and tx  PT performed at least 2 patient identifiers during session: Name and wristband  Comorbidities affecting pt's physical performance at time of assessment include: hypertension, hyperlipidemia, PAD, stage 3 CKD, and hepatocellular carcinoma  Before his admission, pt was independent w/ all functional mobility w/ no AD, ambulates unrestricted distances and all terrain, has 1 SAÚL, lives w/ daughter and wife in 1 level home and retired  Personal factors affecting pt at time of IE include: lives in 1 story house, stairs to enter home, unable to perform dynamic tasks in community and increased recall time  Please find objective findings from PT assessment regarding body systems outlined above with impairments and limitations including impaired balance, decreased endurance, gait deviations, decreased activity tolerance, decreased functional mobility tolerance and fall risk, as well as mobility assessment (need for contact guard assist w/ all phases of mobility when usually ambulating independently, tolerance to only 40 feet of ambulation and need for cueing for mobility technique)   The following objective measures performed on IE also reveal limitations: Modified [place]: 1 (no significant disability) and AM-PAC 6-Clicks: 81/00  Pt's clinical presentation is currently unstable/unpredictable seen in pt's presentation of abnormal lab value(s), need for input for task focus and mobility technique and need for contact guard assist w/ all phases of mobility when usually mobilizing independently  Pt to benefit from continued PT tx to address deficits as defined above and maximize level of functional independent mobility and consistency  From PT/mobility standpoint, recommendation at time of d/c would be Home PT pending progress in order to facilitate return to PLOF  Barriers to Discharge: Inaccessible home environment  Barriers to Discharge Comments: 2 SAÚL  PT Discharge Recommendation: Home with skilled therapy(home health PT)     PT - OK to Discharge: Yes(when medically stable)    See flowsheet documentation for full assessment

## 2021-04-06 NOTE — OCCUPATIONAL THERAPY NOTE
Occupational Therapy Evaluation      Ming Benitez    4/6/2021    Patient Active Problem List   Diagnosis    Essential hypertension    Hyperlipidemia    Cardiomegaly    Anxiety    Abnormal electrocardiogram    Coronary artery disease without angina pectoris    PAD (peripheral artery disease) (HCC)    Viral URI with cough    Reactive airway disease without complication    Medicare annual wellness visit, subsequent    Hyperglycemia    Ankle edema, bilateral    Transaminitis    Respiratory insufficiency    EM[facility]IANO [facility]acute [facility]) [facility]    Elevated troponin    Septic shock (HCC)    Hepatocellular carcinoma ([facility] )    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    Acute respiratory failure with hypoxia (HCC)    Stage 3a chronic kidney disease    Sepsis ([facility] )    Pneumonia       Past Medical History:   Diagnosis Date    Coronary artery disease     Gout     Hypercholesterolemia     Hypertension     [facility] cancer [facility]        Past Surgical History:   Procedure Laterality Date    ABDOMINAL SURGERY      appy    APPENDECTOMY      CORONARY ARTERY BYPASS GRAFT      IR BIOPSY LIVER MASS  8/25/2020    IR Y-90 PRE-ANGIO/EMBO W/ LUNG SCAN  10/6/2020    IR Y-90 RADIOEMBOLIZATION  10/14/2020        04/06/21 0752   OT Last Visit   OT Visit Date 04/06/21   Note Type   Note type Evaluation   Restrictions/Precautions   Weight Bearing Precautions Per Order No   Other Precautions Chair Alarm; Bed Alarm;Telemetry; Fall Risk   Pain Assessment   Pain Assessment Tool Pain Assessment not indicated - pt denies pain   Pain Score No Pain   Home Living   Type of [address] One level;Performs ADLs on one level; Able to live on main level with bedroom/bathroom;Stairs to enter with rails  (1-2 SAÚL )   Bathroom Shower/Tub Tub/shower unit   [employer] Raised   Bathroom Equipment   (No DME at baseline )   [facility]  (RW available; no AD use at baseline )   Prior Function   Level of [place] Independent with ADLs and functional mobility   Lives With Family  (Pt reports him and his wife just moved in c daughter )   Ples Button Help From Family   ADL Assistance Independent   IADLs Needs assistance  (light assistance for all IADLs )   Falls in the last 6 months 0   Vocational Retired   Lifestyle   Autonomy Pt lives with wife and daughter in 1 story house c 1-2 SAÚL  Pt reports PLOF as (I) c ADLs, needs assistance with IADLs, and is retired  Reports no use of AD at baseline    Reciprocal Relationships wife, daughter    Service to Others retired   Intrinsic Gratification enjoys woodworking daily    ADL   Eating Assistance 5  Supervision/Setup   Grooming Assistance 5  [address]  [address] [place] 5  [address]  5  [address]  5  [phone]  4  Minimal Assistance   Additional Comments Pt limited by decreased strength and endurance    Bed Mobility   Supine to Sit 5  Supervision   Additional items Assist x 1;Verbal cues;LE management   Sit to Supine   (DNT: Pt seated OOB in chair at end of session )   Transfers   Sit to Stand 5  Supervision   Additional items Assist x 1; Increased time required; Bedrails   Stand to Sit 5  Supervision   Additional items Increased time required;Armrests   Additional Comments Pt denied lightheaded/dizziness c positional changes    Functional Mobility   Functional Mobility   (CGA )   Additional Comments Pt ambulated household distance within hallway with no s/s of exertion  Pt demonstrated LOBx1 during directional change with self correction        Additional items   (No AD )   Balance   Static Sitting Good   Dynamic Sitting Fair +   Static Standing Fair +   Dynamic Standing Fair +   Activity Tolerance   Activity Tolerance Patient tolerated treatment well   Medical Staff Made Aware PT April, PTS Man present during session  Kee Nicoleast made aware of findings    Nurse Made Aware RN made aware of outcomes    RUE Assessment   RUE Assessment WFL  (Full AROM, MMT 4+/5 )   LUE Assessment   LUE Assessment WFL  (Full AROM, MMT 4+/5 )   Hand Function   Gross Motor Coordination Functional   Fine Motor Coordination Functional   Sensation   Light Touch No apparent deficits  (per pt )   Vision-Basic Assessment   Current Vision Does not wear glasses   Cognition   Overall Cognitive Status WFL   Arousal/Participation Alert; Responsive; Cooperative   Attention Within functional limits   Orientation Level Oriented X4  (pt required increased time )   Memory Within functional limits  (Pt able to recall 3/3 words per Mini Cog )   Following Commands Follows all commands and directions without difficulty   Comments Mini-Cog assessment performed today  Version 2 was given  Patient able to recall 3/3 words  Patient able to draw a normal clock with the correct time  Total score of test was 5/5 indicating no further screening of cognition is required   Cognition Assessment Tools Other (Comment)  (Mini Cog )   Score 5   Assessment   Limitation Decreased ADL status; Decreased endurance;Decreased self-care trans;Decreased high-level ADLs   Prognosis Good   Assessment Patient is a 78 y o  male seen for OT evaluation at [address] [place] [address] on 4/5/2021  s/p Sepsis ([address] )  Comorbidities impacting functional performance include: HTN  Transaminates, hepatocellular carcinoma, stage 3 CKD, and pneumonia  Patient presents with active orders for OT eval and treat and Activity as tolerated   Performed at least 2 patient identifiers during session including name and wristband  Patient reports prior level of function as independent  with ADLs, requires light assistance with IADLs, ambulatory without AD , retired, and lives with wife and daughter in a 1 story house  with 1-2 SAÚL   Upon initial evaluation, patient requires supervision for UB/LB ADLs, and CGA for transfers and functional mobility without AD  Based on functional eval, patient presents A&Ox4 with intact  attention, safety awareness, and memory  Occupational performance is affected by the following deficits: endurance  and balance   Patient would benefit from OT services to maximize independence in self-care and transfers  Personal factors impacting performance include: SAÚL home  and decreased IADL independence  Occupational areas to address include:bathing/showering, toileting, dressing , personal hygiene/grooming , bed mobility , functional mobility, meal preparation, medication management  and home management   Recommending return to previous environment with skilled therapy upon D/C  Goals   Patient Goals To go home    Plan   Treatment Interventions ADL retraining;Functional transfer training; Endurance training;Patient/family training   Goal Expiration Date 04/16/21   OT Treatment Day 0   OT Frequency 3-5x/wk   Recommendation   OT Discharge Recommendation Home with skilled therapy   OT - OK to Discharge Yes  (Once medically clear)   Additional Comments  Pt seated OOB in chair at end of session  Chair alarm activated and call button in reach    Additional Comments 2 The patient's raw score on the AM-PAC Daily Activity inpatient short form is 20, standardized score is 42 03, greater than 39 4  Patients at this level are likely to benefit from discharge to home  Please refer to the recommendation of the Occupational Therapist for safe discharge planning     AM-PAC Daily Activity Inpatient   Lower Body Dressing 3   Bathing 3   Toileting 3   Upper Body Dressing 3   Grooming 4   Eating 4   Daily Activity Raw Score 20   Daily Activity Standardized Score (Calc for Raw Score >=11) 42 03   AM-PAC Applied Cognition Inpatient   Following a Speech/Presentation 4   Understanding Ordinary Conversation 4   Taking Medications 4   Remembering Where Things Are Placed or Put Away 4 Remembering List of 4-5 Errands 3   Taking Care of Complicated Tasks 3   Applied Cognition Raw Score 22   Applied Cognition Standardized Score 47 83   Barthel Index   Feeding 10   Bathing 5   Grooming Score 5   Dressing Score 10   Bladder Score 10   Bowels Score 10   Toilet Use Score 10   Transfers (Bed/Chair) Score 10   Mobility (Level Surface) Score 0   Stairs Score 0  (DNT)   Barthel Index Score 70     Pt will complete UB ADLs Mod independent  with use of AE as needed for increased ADL independence within 10 days  Pt will complete LB ADLs Mod independent   with use of AE as needed for increased ADL independence within 10 days  Pt will complete toileting Mod independent   with use of DME for increased ADL independence within 10 days  Pt will demonstrate proper body mechanics to complete transfers and functional mobility with Mod independent  and use of AD for increased safety and functional mobility within 10 days  Pt will demonstrate standing tolerance of 6 min with Mod independent  and use of AD for increased endurance and activity tolerance within 10 days  Pt will demonstrate OOB sitting tolerance of 2-4 hr/day for increased activity tolerance and engagement within 10 days  Pt will participate in ongoing cognitive assessments to assist with safe D/C planning and recommendations       Pt will demonstrate proper body mechanics and fall prevention strategies during 100% of tx sessions for increased safety awareness during ADL/IADLs    Iyer Blank, OTS

## 2021-04-06 NOTE — PHYSICAL THERAPY NOTE
Physical Therapy Evaluation     Patient's Name: Jean Pierre Rowe    Admitting Diagnosis  Pneumonia [J18 9]  Fever [R50 9]    Problem List  Patient Active Problem List   Diagnosis    Essential hypertension    Hyperlipidemia    Cardiomegaly    Anxiety    Abnormal electrocardiogram    Coronary artery disease without angina pectoris    PAD (peripheral artery disease) (HCC)    Viral URI with cough    Reactive airway disease without complication    Medicare annual wellness visit, subsequent    Hyperglycemia    Ankle edema, bilateral    Transaminitis    Respiratory insufficiency    [facility]) [facility]    Elevated troponin    Septic shock (HCC)    Hepatocellular carcinoma (HCC)    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HCC)    Acute respiratory failure with hypoxia (HCC)    Stage 3a chronic kidney disease    Sepsis [facility]    Pneumonia       Past Medical History  Past Medical History:   Diagnosis Date    Coronary artery disease     Gout     Hypercholesterolemia     Hypertension     [facility]        Past Surgical History  Past Surgical History:   Procedure Laterality Date    ABDOMINAL SURGERY      appy    APPENDECTOMY      CORONARY ARTERY BYPASS GRAFT      IR BIOPSY LIVER MASS  8/25/2020    IR Y-90 PRE-ANGIO/EMBO W/ LUNG SCAN  10/6/2020    IR Y-90 RADIOEMBOLIZATION  10/14/2020          04/06/21 0750   PT Last Visit   PT Visit Date 04/06/21   Note Type   Note type Evaluation   Pain Assessment   Pain Assessment Tool Pain Assessment not indicated - pt denies pain   Pain Score No Pain   Home Living   Type of [address] [place] [address] One level; Able to live on main level with bedroom/bathroom; Performs ADLs on one level;Stairs to enter with rails  (1-2 SAÚL)   Bathroom Shower/Tub Tub/shower unit   Bathroom Toilet Raised   Bathroom Equipment   (no reported DME)   [facility]  (No AD at baseline (believes daughter has walker at her home))   Prior Function   Level of [place] Independent with ADLs and functional mobility; Needs assistance with IADLs   Lives With Family  (wife and him moved in with daugther)   Receives Help From Family   ADL Assistance Independent   IADLs Needs assistance  (gets help from daughter for shopping, cleaning, cooking )   Falls in the last 6 months 0   Vocational Retired   Comments likes to do woodworking daily   Restrictions/Precautions   [place] Bearing Precautions Per Order No   Other Precautions Multiple lines; Fall Risk;Bed Alarm; Chair Alarm;Telemetry   General   Family/Caregiver Present No   Cognition   Overall Cognitive Status WFL   Arousal/Participation Alert   Orientation Level Oriented X4   Memory Within functional limits  (increase of time to recall month)   Following Commands Follows one step commands without difficulty   Comments pt agreeable to PT evaluation   RUE Assessment   RUE Assessment WFL   LUE Assessment   LUE Assessment WFL   RLE Assessment   RLE Assessment WFL   LLE Assessment   LLE Assessment WFL   Coordination   Movements are Fluid and Coordinated 1   Bed Mobility   Supine to Sit 5  Supervision   Additional items Assist x 1;Verbal cues;LE management   Transfers   Sit to Stand 5  Supervision   Additional items Assist x 1; Increased time required; Bedrails;HOB elevated   Stand to Sit 5  Supervision   Additional items Assist x 1; Increased time required;Armrests; Bedrails   Ambulation/Elevation   Gait pattern Short stride; Improper Weight shift; Inconsistent ileana  (slight LOB while turning, recovered w/ 1 step independently)   Gait Assistance 5  Supervision  (CGA x1)   Additional items Assist x 1   Assistive Device None   Distance 40 ft   Balance   Static Sitting Good   Dynamic Sitting Fair +   Static Standing Fair +   Dynamic Standing Fair +   Ambulatory Fair   Endurance Deficit   Endurance Deficit No   Activity Tolerance   Activity Tolerance Patient tolerated treatment well   Medical Staff Made Aware OT Claire Matilde and OTS Minal present for session; Hospitalist made aware of findings   Nurse Made Aware RN made aware of outcomes   Assessment   Prognosis Good   Problem List Decreased endurance; Impaired balance;Decreased safety awareness   Assessment Pt is 78 y o  male seen for high complexity PT evaluation on 4/6/2021 s/p admit [address] on 4/5/2021 w/ Sepsis ([address] )  PT consulted to assess pt's functional mobility and d/c needs  Order placed for PT eval and tx  PT performed at least 2 patient identifiers during session: Name and wristband  Comorbidities affecting pt's physical performance at time of assessment include: hypertension, hyperlipidemia, PAD, stage 3 CKD, and hepatocellular carcinoma  Before his admission, pt was independent w/ all functional mobility w/ no AD, ambulates unrestricted distances and all terrain, has 1 SAÚL, lives w/ daughter and wife in 1 level home and retired  Personal factors affecting pt at time of IE include: lives in 1 story house, stairs to enter home, unable to perform dynamic tasks in community and increased recall time  Please find objective findings from PT assessment regarding body systems outlined above with impairments and limitations including impaired balance, decreased endurance, gait deviations, decreased activity tolerance, decreased functional mobility tolerance and fall risk, as well as mobility assessment (need for contact guard assist w/ all phases of mobility when usually ambulating independently, tolerance to only 40 feet of ambulation and need for cueing for mobility technique)  The following objective measures performed on IE also reveal limitations: Modified [place]: 1 (no significant disability) and AM-PAC 6-Clicks: 66/77   Pt's clinical presentation is currently unstable/unpredictable seen in pt's presentation of abnormal lab value(s), need for input for task focus and mobility technique and need for contact guard assist w/ all phases of mobility when usually mobilizing independently  Pt to benefit from continued PT tx to address deficits as defined above and maximize level of functional independent mobility and consistency  From PT/mobility standpoint, recommendation at time of d/c would be Home PT pending progress in order to facilitate return to PLOF  Barriers to Discharge Inaccessible home environment   Barriers to Discharge Comments 2 SAÚL   Goals   Patient Goals "to go home"   STG Expiration Date 04/16/21   Short Term Goal #1 In 7-10 days: Perform all bed mobility tasks modified independent to decrease caregiver burden, Perform all transfers modified independent to improve independence, Ambulate > 150 ft  no AD with close S w/o LOB and w/ normalized gait pattern 100% of the time, Navigate 2 stairs with close S with unilateral handrail to facilitate return to previous living environment, Increase all balance 1/2 grade to decrease risk for falls and Tolerate standing 10 minutes to facilitate functional task performance    PT Treatment Day 0   Plan   Treatment/Interventions Functional transfer training;LE strengthening/ROM; Therapeutic exercise; Endurance training;Bed mobility;Gait training;Spoke to advanced practitioner;Spoke to nursing; Patient/family training   PT Frequency   (3-5x/ week)   Recommendation   PT Discharge Recommendation Home with skilled therapy  (home health PT)   PT - OK to Discharge Yes  (when medically stable)   Additional Comments Pt's raw score on the [address] inpatient short form is 20, standardized score is 43 99  Patients at this level are likely to benefit from DC to home, however, please refer to therapist recommendation for safe DC planning     AM-PAC Basic Mobility Inpatient   Turning in Bed Without Bedrails 4   Lying on Back to Sitting on Edge of Flat Bed 4   Moving Bed to Chair 3   Standing Up From Chair 3   Walk in Room 3   Climb 3-5 Stairs 3   Basic Mobility Inpatient Raw Score 20 Basic Mobility Standardized Score 43 99   Modified [place] Scale   Modified Zee Scale 1         Man Conway, SPT

## 2021-04-06 NOTE — ASSESSMENT & PLAN NOTE
Lab Results   Component Value Date    EGFR 73 04/06/2021    EGFR 53 04/05/2021    EGFR 46 02/25/2021    CREATININE 0 96 04/06/2021    CREATININE 1 26 04/05/2021    CREATININE 1 42 (H) 02/25/2021   · Creatinine baseline is approximately 1 0 to 1 2 mg/dL  · Currently is at baseline  · Continue to monitor renal function closely

## 2021-04-07 PROBLEM — R05.9 COUGH: Status: ACTIVE | Noted: 2021-04-07

## 2021-04-07 LAB
ALBUMIN SERPL BCP-MCNC: 2.9 G/DL (ref 3.5–5.7)
ALP SERPL-CCNC: 384 U/L (ref 55–165)
ALT SERPL W P-5'-P-CCNC: 259 U/L (ref 7–52)
ANION GAP SERPL CALCULATED.3IONS-SCNC: 9 MMOL/L (ref 4–13)
AST SERPL W P-5'-P-CCNC: 192 U/L (ref 13–39)
BILIRUB SERPL-MCNC: 3.6 MG/DL (ref 0.2–1)
BUN SERPL-MCNC: 16 MG/DL (ref 7–25)
CALCIUM ALBUM COR SERPL-MCNC: 9.4 MG/DL (ref 8.3–10.1)
CALCIUM SERPL-MCNC: 8.5 MG/DL (ref 8.6–10.5)
CHLORIDE SERPL-SCNC: 101 MMOL/L (ref 98–107)
CO2 SERPL-SCNC: 26 MMOL/L (ref 21–31)
CREAT SERPL-MCNC: 1 MG/DL (ref 0.7–1.3)
ERYTHROCYTE [DISTWIDTH] IN BLOOD BY AUTOMATED COUNT: 15.2 % (ref 11.5–14.5)
GFR SERPL CREATININE-BSD FRML MDRD: 70 ML/MIN/1.73SQ M
GLUCOSE SERPL-MCNC: 121 MG/DL (ref 65–99)
HCT VFR BLD AUTO: 39.7 % (ref 42–47)
HGB BLD-MCNC: 13.1 G/DL (ref 14–18)
LIPASE SERPL-CCNC: 233 U/L (ref 73–393)
MCH RBC QN AUTO: 30.6 PG (ref 26–34)
MCHC RBC AUTO-ENTMCNC: 32.9 G/DL (ref 31–37)
MCV RBC AUTO: 93 FL (ref 81–99)
PLATELET # BLD AUTO: 140 THOUSANDS/UL (ref 149–390)
PMV BLD AUTO: 9.5 FL (ref 8.6–11.7)
POTASSIUM SERPL-SCNC: 3.7 MMOL/L (ref 3.5–5.5)
PROT SERPL-MCNC: 5.8 G/DL (ref 6.4–8.9)
RBC # BLD AUTO: 4.27 MILLION/UL (ref 4.3–5.9)
SODIUM SERPL-SCNC: 136 MMOL/L (ref 134–143)
WBC # BLD AUTO: 6.9 THOUSAND/UL (ref 4.8–10.8)

## 2021-04-07 PROCEDURE — 97535 SELF CARE MNGMENT TRAINING: CPT

## 2021-04-07 PROCEDURE — 97530 THERAPEUTIC ACTIVITIES: CPT

## 2021-04-07 PROCEDURE — 80053 COMPREHEN METABOLIC PANEL: CPT | Performed by: NURSE PRACTITIONER

## 2021-04-07 PROCEDURE — 85027 COMPLETE CBC AUTOMATED: CPT | Performed by: NURSE PRACTITIONER

## 2021-04-07 PROCEDURE — 99232 SBSQ HOSP IP/OBS MODERATE 35: CPT | Performed by: NURSE PRACTITIONER

## 2021-04-07 RX ORDER — BENZONATATE 100 MG/1
100 CAPSULE ORAL 3 TIMES DAILY
Status: DISCONTINUED | OUTPATIENT
Start: 2021-04-07 | End: 2021-04-08 | Stop reason: HOSPADM

## 2021-04-07 RX ORDER — AMLODIPINE BESYLATE 5 MG/1
5 TABLET ORAL DAILY
Status: DISCONTINUED | OUTPATIENT
Start: 2021-04-07 | End: 2021-04-08 | Stop reason: HOSPADM

## 2021-04-07 RX ORDER — PANTOPRAZOLE SODIUM 40 MG/1
40 TABLET, DELAYED RELEASE ORAL
Status: DISCONTINUED | OUTPATIENT
Start: 2021-04-07 | End: 2021-04-08 | Stop reason: HOSPADM

## 2021-04-07 RX ADMIN — Medication 1000 UNITS: at 08:14

## 2021-04-07 RX ADMIN — ALLOPURINOL 300 MG: 300 TABLET ORAL at 08:14

## 2021-04-07 RX ADMIN — CEFEPIME HYDROCHLORIDE 1000 MG: 1 INJECTION, SOLUTION INTRAVENOUS at 21:00

## 2021-04-07 RX ADMIN — ENOXAPARIN SODIUM 40 MG: 40 INJECTION SUBCUTANEOUS at 08:13

## 2021-04-07 RX ADMIN — GUAIFENESIN 600 MG: 600 TABLET, EXTENDED RELEASE ORAL at 21:00

## 2021-04-07 RX ADMIN — ASPIRIN 81 MG: 81 TABLET, CHEWABLE ORAL at 08:14

## 2021-04-07 RX ADMIN — OMEGA-3 FATTY ACIDS CAP 1000 MG 1000 MG: 1000 CAP at 08:14

## 2021-04-07 RX ADMIN — METOPROLOL TARTRATE 25 MG: 25 TABLET, FILM COATED ORAL at 08:14

## 2021-04-07 RX ADMIN — AMLODIPINE BESYLATE 5 MG: 5 TABLET ORAL at 18:25

## 2021-04-07 RX ADMIN — CEFEPIME HYDROCHLORIDE 1000 MG: 1 INJECTION, SOLUTION INTRAVENOUS at 08:16

## 2021-04-07 RX ADMIN — BENZONATATE 100 MG: 100 CAPSULE ORAL at 22:00

## 2021-04-07 RX ADMIN — METOPROLOL TARTRATE 25 MG: 25 TABLET, FILM COATED ORAL at 18:26

## 2021-04-07 RX ADMIN — BENZONATATE 100 MG: 100 CAPSULE ORAL at 18:26

## 2021-04-07 RX ADMIN — PANTOPRAZOLE SODIUM 40 MG: 40 TABLET, DELAYED RELEASE ORAL at 18:26

## 2021-04-07 RX ADMIN — GUAIFENESIN 600 MG: 600 TABLET, EXTENDED RELEASE ORAL at 08:14

## 2021-04-07 NOTE — ASSESSMENT & PLAN NOTE
· Family reports that this has been ongoing for quite some time  · Will hold off on Ace for now  · Start on cough medication  · Will try PPI for worsening GERD

## 2021-04-07 NOTE — PROGRESS NOTES
04/07/21 0736   Charting Type   Charting Type Shift assessment   Neurological   Neuro (WDL) WDL   Orientation Level Oriented X4   Extraocular Movements Full   Delirium Assessment- CAM    Acute Onset and Fluctuating Course (1) No   Inattention (2) No   Disorganized Thinking (3) No   Rate Patient's Level of Consciousness (4) Alert (Normal), No   Delirium Present No   HEENT   HEENT (WDL) X   R Eye Mildly impaired vision   L Eye Mildly impaired vision   Vision - Corrective Lenses (at bedside) Other (Comment)   R Ear Moderately impaired hearing   L Ear Moderately impaired hearing   Nose Intact   Teeth Missing teeth   Respiratory   Respiratory (WDL) X   Respiratory Pattern Normal   Respiratory Additional Assessments Yes   Localized Breath Sounds   R Basilar Diminished;Crackles   L Basilar Diminished;Crackles   Cough Description   Sputum Amount Small   Sputum Color Green   Sputum Consistency Thick   Respiratory Interventions   Respiratory Interventions Cough and deep breathe   Cough and Deep Breathe   Cough and Deep Breathe Yes   Cardiac   Cardiac (WDL) WDL   Cardiac Regularity Regular   Heart Sounds No adventitious heart sounds   Pacemaker/ICD No   Peripheral Vascular   Peripheral Vascular (WDL) WDL   Cyanosis None   PVS Additional Assessments No   RUE Neurovascular Assessment   R Radial Pulse +2   LUE Neurovascular Assessment   L Radial Pulse +2   RLE Neurovascular Assessment   R Pedal Pulse +2   LLE Neurovascular Assessment   L Pedal Pulse +2   Integumentary   Integumentary (WDL) WDL   Tattoos/Piercings   Does patient have tattoos?  Yes   Musculoskeletal   Musculoskeletal (WDL) WDL   Level of Assistance Independent   Gastrointestinal   Gastrointestinal (WDL) WDL   Last BM Date 04/04/21   Bowel Shift Assessment   Assistance Needed None   Bowel Incontinence No   Genitourinary   Genitourinary (WDL) WDL   Bladder Shift Assessment   Bladder Device None   Bladder Incontinence No   Urine Assessment   Urine Color Ashley Urine Appearance Clear   Anal/Rectal   Anal/Rectal (WDL) WDL   Psychosocial   Psychosocial (WDL) X   Patient Behaviors/Mood Calm; Cooperative   Needs Expressed Denies   Ability to Express Feelings Able to express   Ability to Express Needs Able to express   Ability to Express Thoughts Able to express   Ability to Understand Others Understands   Holly Suicide Severity Rating Scale   1  Wish to be Dead No   Cough   Cough Productive     In no distress  Resp  Easy and regular  Slight PHILLIPS at times  Lungs with fine crackles bibasilar  Tolerating therapy  Educated on all  meds and safety  Callbell within reach

## 2021-04-07 NOTE — ASSESSMENT & PLAN NOTE
· Well controlled  · Continue home medications  · As sepsis parameters are improving; will resume blood pressure medication    · The patient and family report chronic cough which appears to be increasing in severity- will stop lisinopril for now and will add amlodipine

## 2021-04-07 NOTE — PROGRESS NOTES
[address] [facility]  Progress Note - Marycarmen Tijerina 1940, 78 y o  male MRN: [id number]  Unit/Bed#: -01 Encounter: [id number]  Primary Care Provider: Lucio Ring DO   Date and time admitted to hospital: 4/5/2021  1:06 PM    * Sepsis [facility])  Assessment & Plan  · Criteria met by tachycardia 90, respiratory rate 28 with suspected underlying bronchitis vs pneumonia with yellow productive cough  · The patient received IV fluid resuscitation  · Lactic acidosis resolved after IV fluid  · Started on ceftriaxone and azithromycin; broaden abx regimen to cefepime day #2  MRSA- pending     · strep and Legionella antigen- negative; azithromycin stopped  · procalcitonin 1 94, 1 49; will trend   · blood cultures shows 1/2 set E coli- pending sensitivities  · Infectious Disease input appreciated  · sputum culture- pending   · Sepsis parameters are improving        Cough  Assessment & Plan  · Family reports that this has been ongoing for quite some time  · Will hold off on Ace for now  · Start on cough medication  · Will try PPI for worsening GERD    Stage 3a chronic kidney disease  Assessment & Plan  Lab Results   Component Value Date    EGFR 70 04/07/2021    EGFR 73 04/06/2021    EGFR 53 04/05/2021    CREATININE 1 00 04/07/2021    CREATININE 0 96 04/06/2021    CREATININE 1 26 04/05/2021   · Creatinine baseline is approximately 1 0 to 1 2 mg/dL  · Currently is at baseline  · Continue to monitor renal function closely     Hepatocellular carcinoma ([facility] [address] )  Assessment & Plan  · Follows up with Hematology-Oncology outpatient   · Currently on radiation therapy     Transaminitis  Assessment & Plan  · Likely due to stress from acute infection with underlying hepatocellular carcinoma versus possible biliary dilatation/obstruction  · GI input appreciated  · Ultrasound right upper quadrant show no biliary obstruction  · Trend LFTs and bilirubin  · The patient as benign abdomen without any nausea vomiting  Essential hypertension  Assessment & Plan  · Well controlled  · Continue home medications  · As sepsis parameters are improving; will resume blood pressure medication  · The patient and family report chronic cough which appears to be increasing in severity- will stop lisinopril for now and will add amlodipine        VTE Prophylaxis:  Enoxaparin (Lovenox)    Patient Centered Rounds: I have performed bedside rounds with nursing staff today  Discussions with Specialists or Other Care Team Provider:  Infectious Disease, nursing, case management, PT/OT  Education and Discussions with Family / Patient:  Patient and family at bedside    Current Length of Stay: 2 day(s)    Current Patient Status: Inpatient   Certification Statement: The patient will continue to require additional inpatient hospital stay due to Sepsis    Discharge Plan:  Pending hospital course hopeful within the next 24-48 hours    Code Status: Level 1 - Full Code    Subjective:   Feeling much improved and would like to go home    Objective:     Vitals:   Temp (24hrs), Av °F (36 7 °C), Min:96 9 °F (36 1 °C), Max:98 6 °F (37 °C)    Temp:  [96 9 °F (36 1 °C)-98 6 °F (37 °C)] 96 9 °F (36 1 °C)  HR:  [76-91] 76  Resp:  [18] 18  BP: (120-141)/(59-73) 141/73  SpO2:  [91 %-96 %] 96 %  Body mass index is 29 32 kg/m²  Input and Output Summary (last 24 hours): Intake/Output Summary (Last 24 hours) at 2021 1704  Last data filed at 2021 1141  Gross per 24 hour   Intake 480 ml   Output 400 ml   Net 80 ml       Physical Exam:   Physical Exam  Vitals signs and nursing note reviewed  Constitutional:       General: He is not in acute distress  Appearance: Normal appearance  HENT:      Head: Normocephalic and atraumatic  Right Ear: External ear normal       Left Ear: External ear normal       Nose: Nose normal       Mouth/Throat:      Mouth: Mucous membranes are moist       Pharynx: Oropharynx is clear     Eyes: General:         Right eye: No discharge  Left eye: No discharge  Extraocular Movements: Extraocular movements intact  Pupils: Pupils are equal, round, and reactive to light  Neck:      Musculoskeletal: Normal range of motion and neck supple  No neck rigidity  Cardiovascular:      Rate and Rhythm: Normal rate and regular rhythm  Pulses: Normal pulses  Heart sounds: Normal heart sounds  No murmur  Pulmonary:      Effort: Pulmonary effort is normal  No respiratory distress  Breath sounds: Normal breath sounds  No wheezing or rales  Abdominal:      General: Bowel sounds are normal  There is no distension  Palpations: Abdomen is soft  There is no mass  Tenderness: There is no abdominal tenderness  Musculoskeletal: Normal range of motion  General: No swelling, tenderness or deformity  Skin:     General: Skin is warm and dry  Capillary Refill: Capillary refill takes less than 2 seconds  Coloration: Skin is not pale  Findings: No erythema  Neurological:      General: No focal deficit present  Mental Status: He is alert and oriented to person, place, and time  Mental status is at baseline  Psychiatric:         Mood and Affect: Mood normal          Behavior: Behavior normal          Thought Content:  Thought content normal          Judgment: Judgment normal          Additional Data:     Labs:    Results from last 7 days   Lab Units 04/07/21  0539 04/06/21  0511   WBC Thousand/uL 6 90 5 60   HEMOGLOBIN g/dL 13 1* 12 6*   HEMATOCRIT % 39 7* 37 5*   PLATELETS Thousands/uL 140* 122*   NEUTROS PCT %  --  83*   LYMPHS PCT %  --  7*   MONOS PCT %  --  10   EOS PCT %  --  0     Results from last 7 days   Lab Units 04/07/21  0539   SODIUM mmol/L 136   POTASSIUM mmol/L 3 7   CHLORIDE mmol/L 101   CO2 mmol/L 26   BUN mg/dL 16   CREATININE mg/dL 1 00   CALCIUM mg/dL 8 5*   ALK PHOS U/L 384*   ALT U/L 259*   AST U/L 192*     Results from last 7 days Lab Units 04/05/21  1326   INR  1 02               * I Have Reviewed All Lab Data Listed Above  * Additional Pertinent Lab Tests Reviewed: Joanie 66 Admission  Reviewed    Imaging:  Imaging Reports Reviewed Today Include: US ruq    Recent Cultures (last 7 days):     Results from last 7 days   Lab Units 04/06/21  1439 04/05/21  2025 04/05/21  1330 04/05/21  1326   BLOOD CULTURE   --   --  Escherichia coli* No Growth at 24 hrs  SPUTUM CULTURE  Culture too young- will reincubate  --   --   --    GRAM STAIN RESULT  1+ Epithelial cells per low power field*  1+ Gram positive rods*  1+ Gram negative rods*  No polys seen*  --  Gram negative rods*  --    LEGIONELLA URINARY ANTIGEN   --  Negative  --   --        Last 24 Hours Medication List:   Current Facility-Administered Medications   Medication Dose Route Frequency Provider Last Rate    acetaminophen  650 mg Oral Q6H PRN Sonda Councilman, MD      allopurinol  300 mg Oral Daily Sonda Councilman, MD      amLODIPine  5 mg Oral Daily BEKA Melo      aspirin  81 mg Oral Daily Sonda Councilman, MD      benzonatate  100 mg Oral TID BEKA Melo      cefepime  1,000 mg Intravenous Q12H BEKA Melo 1,000 mg (04/07/21 2169)    cholecalciferol  1,000 Units Oral Daily Sonda Councilman, MD      enoxaparin  40 mg Subcutaneous Daily Brooks Arambula MD      fish oil  1,000 mg Oral Daily Sonda Councilman, MD      guaiFENesin  600 mg Oral Q12H [address] Sonda Councilman, MD      metoprolol tartrate  25 mg Oral BID BEKA Melo      pantoprazole  40 mg Oral Early Morning BEKA Melo          Today, Patient Was Seen By: BEKA Melo    ** Please Note: Dictation voice to text software may have been used in the creation of this document   **

## 2021-04-07 NOTE — ASSESSMENT & PLAN NOTE
· Criteria met by tachycardia 90, respiratory rate 28 with suspected underlying bronchitis vs pneumonia with yellow productive cough  · The patient received IV fluid resuscitation  · Lactic acidosis resolved after IV fluid  · Started on ceftriaxone and azithromycin; broaden abx regimen to cefepime day #2  MRSA- pending     · strep and Legionella antigen- negative; azithromycin stopped  · procalcitonin 1 94, 1 49; will trend   · blood cultures shows 1/2 set E coli- pending sensitivities  · Infectious Disease input appreciated  · sputum culture- pending   · Sepsis parameters are improving

## 2021-04-07 NOTE — ASSESSMENT & PLAN NOTE
Lab Results   Component Value Date    EGFR 70 04/07/2021    EGFR 73 04/06/2021    EGFR 53 04/05/2021    CREATININE 1 00 04/07/2021    CREATININE 0 96 04/06/2021    CREATININE 1 26 04/05/2021   · Creatinine baseline is approximately 1 0 to 1 2 mg/dL  · Currently is at baseline  · Continue to monitor renal function closely

## 2021-04-07 NOTE — ASSESSMENT & PLAN NOTE
· Likely due to stress from acute infection with underlying hepatocellular carcinoma versus possible biliary dilatation/obstruction  · GI input appreciated  · Ultrasound right upper quadrant show no biliary obstruction  · Trend LFTs and bilirubin  · The patient as benign abdomen without any nausea vomiting

## 2021-04-07 NOTE — OCCUPATIONAL THERAPY NOTE
04/07/21 0920   OT Last Visit   OT Visit Date 04/07/21   Note Type   Note Type Treatment   Restrictions/Precautions   Weight Bearing Precautions Per Order No   Other Precautions Fall Risk   General   Response to Previous Treatment Patient with no complaints from previous session   Pain Assessment   Pain Assessment Tool Pain Assessment not indicated - pt denies pain   ADL   Where Assessed Standing at sink  (patient insisted on same )   Grooming Comments declined mouth care at this time - but requested mouthwash for later    UB Bathing Assistance 5  Supervision/Setup   UB Bathing Deficit Setup;Supervision/safety   LB Bathing Assistance 5  Supervision/Setup   LB Bathing Deficit Setup;Supervision/safety   UB Dressing Comments patient had Already changed hospital gown this morning    LB Dressing Comments patient demos  d ability to bring feet to opposite knees for doff/don socks    Transfers   Sit to Stand 5  Supervision   Additional items Armrests   Stand to Sit 6  Modified independent   Additional items Armrests   Functional Mobility   Additional Comments patient ambulated to and from bathroom sink (approx  10 feet from chair) - he safely also managed the IV pole upon return to his chair  (Reports taking IV pole into bathroom with no issues)   Coordination   Gross Motor appears WFL   Dexterity appears WFL   Cognition   Overall Cognitive Status WFL   Arousal/Participation Alert; Cooperative   Attention Within functional limits   Following Commands Follows all commands and directions without difficulty   Comments admits being [facility]   Activity Tolerance   Activity Tolerance Patient tolerated treatment well   Assessment   Assessment Patient participated in Skilled OT session this date with interventions consisting of ADL re training with the use of correct body mechnaics, safety awareness and fall prevention techniques and  therapeutic activities to: increase activity tolerance    Patient agreeable to OT treatment session, upon arrival patient was found seated OOB to Chair  Patient requiring ocassional safety reminders  Standing tolerance is > 6 minutes  Patient continues to be functioning below baseline level, occupational performance remains limited secondary to factors listed above and increased risk for falls and injury  From OT standpoint, recommendation at time of d/c would be Home with family support, and therapies as appropriate at time of discharge  Patient to benefit from continued Occupational Therapy treatment while in the hospital to address deficits as defined above and maximize level of functional independence with ADLs and functional mobility    (good progress toward goals )   Plan   Treatment Interventions ADL retraining;Functional transfer training; Activityengagement   Goal Expiration Date 04/16/21   OT Treatment Day 172 YANET Schneider/L

## 2021-04-07 NOTE — PLAN OF CARE
Problem: Potential for Falls  Goal: Patient will remain free of falls  Description: INTERVENTIONS:  - Assess patient frequently for physical needs  -  Identify cognitive and physical deficits and behaviors that affect risk of falls    -  [place] fall precautions as indicated by assessment   - Educate patient/family on patient safety including physical limitations  - Instruct patient to call for assistance with activity based on assessment  - Modify environment to reduce risk of injury  - Consider OT/PT consult to assist with strengthening/mobility  Outcome: Progressing     Problem: PAIN - ADULT  Goal: Verbalizes/displays adequate comfort level or baseline comfort level  Description: Interventions:  - Encourage patient to monitor pain and request assistance  - Assess pain using appropriate pain scale  - Administer analgesics based on type and severity of pain and evaluate response  - Implement non-pharmacological measures as appropriate and evaluate response  - Consider cultural and social influences on pain and pain management  - Notify physician/advanced practitioner if interventions unsuccessful or patient reports new pain  Outcome: Progressing     Problem: INFECTION - ADULT  Goal: Absence or prevention of progression during hospitalization  Description: INTERVENTIONS:  - Assess and monitor for signs and symptoms of infection  - Monitor lab/diagnostic results  - Monitor all insertion sites, i e  indwelling lines, tubes, and drains  - Monitor endotracheal if appropriate and nasal secretions for changes in amount and color  - [place] appropriate cooling/warming therapies per order  - Administer medications as ordered  - Instruct and encourage patient and family to use good hand hygiene technique  - Identify and instruct in appropriate isolation precautions for identified infection/condition  Outcome: Progressing  Goal: Absence of fever/infection during neutropenic period  Description: INTERVENTIONS:  - Monitor WBC    Outcome: Progressing     Problem: SAFETY ADULT  Goal: Patient will remain free of falls  Description: INTERVENTIONS:  - Assess patient frequently for physical needs  -  Identify cognitive and physical deficits and behaviors that affect risk of falls    -  [place] fall precautions as indicated by assessment   - Educate patient/family on patient safety including physical limitations  - Instruct patient to call for assistance with activity based on assessment  - Modify environment to reduce risk of injury  - Consider OT/PT consult to assist with strengthening/mobility  Outcome: Progressing  Goal: Maintain or return to baseline ADL function  Description: INTERVENTIONS:  -  Assess patient's ability to carry out ADLs; assess patient's baseline for ADL function and identify physical deficits which impact ability to perform ADLs (bathing, care of mouth/teeth, toileting, grooming, dressing, etc )  - Assess/evaluate cause of self-care deficits   - Assess range of motion  - Assess patient's mobility; develop plan if impaired  - Assess patient's need for assistive devices and provide as appropriate  - Encourage maximum independence but intervene and supervise when necessary  - Involve family in performance of ADLs  - Assess for home care needs following discharge   - Consider OT consult to assist with ADL evaluation and planning for discharge  - Provide patient education as appropriate  Outcome: Progressing  Goal: Maintain or return mobility status to optimal level  Description: INTERVENTIONS:  - Assess patient's baseline mobility status (ambulation, transfers, stairs, etc )    - Identify cognitive and physical deficits and behaviors that affect mobility  - Identify mobility aids required to assist with transfers and/or ambulation (gait belt, sit-to-stand, lift, walker, cane, etc )  - [place] fall precautions as indicated by assessment  - Record patient progress and toleration of activity level on Mobility SBAR; progress patient to next Phase/Stage  - Instruct patient to call for assistance with activity based on assessment  - Consider rehabilitation consult to assist with strengthening/weightbearing, etc   Outcome: Progressing     Problem: DISCHARGE PLANNING  Goal: Discharge to home or other facility with appropriate resources  Description: INTERVENTIONS:  - Identify barriers to discharge w/patient and caregiver  - Arrange for needed discharge resources and transportation as appropriate  - Identify discharge learning needs (meds, wound care, etc )  - Refer to Case Management Department for coordinating discharge planning if the patient needs post-hospital services based on physician/advanced practitioner order or complex needs related to functional status, cognitive ability, or social support system  Outcome: Progressing     Problem: Knowledge Deficit  Goal: Patient/family/caregiver demonstrates understanding of disease process, treatment plan, medications, and discharge instructions  Description: Complete learning assessment and assess knowledge base  Interventions:  - Provide teaching at level of understanding  - Provide teaching via preferred learning methods  Outcome: Progressing     Problem: Nutrition/Hydration-ADULT  Goal: Nutrient/Hydration intake appropriate for improving, restoring or maintaining nutritional needs  Description: Monitor and assess patient's nutrition/hydration status for malnutrition  Collaborate with interdisciplinary team and initiate plan and interventions as ordered  Monitor patient's weight and dietary intake as ordered or per policy  Utilize nutrition screening tool and intervene as necessary  Determine patient's food preferences and provide high-protein, high-caloric foods as appropriate       INTERVENTIONS:  - Monitor oral intake, urinary output, labs, and treatment plans  - Assess nutrition and hydration status and recommend course of action  - Evaluate amount of meals eaten  - Assist patient with eating if necessary   - Allow adequate time for meals  - Recommend/ encourage appropriate diets, oral nutritional supplements, and vitamin/mineral supplements  - Order, calculate, and assess calorie counts as needed  - Recommend, monitor, and adjust tube feedings and TPN/PPN based on assessed needs  - Assess need for intravenous fluids  - Provide specific nutrition/hydration education as appropriate  - Include patient/family/caregiver in decisions related to nutrition  Outcome: Progressing

## 2021-04-07 NOTE — PLAN OF CARE
Problem: OCCUPATIONAL THERAPY ADULT  Goal: Performs self-care activities at highest level of function for planned discharge setting  See evaluation for individualized goals  Description: Treatment Interventions: ADL retraining, Functional transfer training, Endurance training, Patient/family training          See flowsheet documentation for full assessment, interventions and recommendations  Outcome: Progressing  Note: Limitation: Decreased ADL status, Decreased endurance, Decreased self-care trans, Decreased high-level ADLs  Prognosis: Good  Assessment: Patient participated in Skilled OT session this date with interventions consisting of ADL re training with the use of correct body mechnaics, safety awareness and fall prevention techniques and  therapeutic activities to: increase activity tolerance   Patient agreeable to OT treatment session, upon arrival patient was found seated OOB to Chair  Patient requiring ocassional safety reminders  Standing tolerance is > 6 minutes  Patient continues to be functioning below baseline level, occupational performance remains limited secondary to factors listed above and increased risk for falls and injury  From OT standpoint, recommendation at time of d/c would be Home with family support, and therapies as appropriate at time of discharge     Patient to benefit from continued Occupational Therapy treatment while in the hospital to address deficits as defined above and maximize level of functional independence with ADLs and functional mobility  (good progress toward goals )     OT Discharge Recommendation: Home with skilled therapy  OT - OK to Discharge: Yes(Once medically clear)     YANET Nuñez/[place]

## 2021-04-07 NOTE — CASE MANAGEMENT
[facility], pt remains on Iv antibiotics, cm will continue to follow and assess for any additional d/c needs

## 2021-04-08 VITALS
OXYGEN SATURATION: 92 % | DIASTOLIC BLOOD PRESSURE: 64 MMHG | TEMPERATURE: 97.8 F | HEIGHT: 69 IN | HEART RATE: 84 BPM | RESPIRATION RATE: 16 BRPM | WEIGHT: 202.38 LBS | BODY MASS INDEX: 29.98 KG/M2 | SYSTOLIC BLOOD PRESSURE: 137 MMHG

## 2021-04-08 LAB
ALBUMIN SERPL BCP-MCNC: 3 G/DL (ref 3.5–5.7)
ALP SERPL-CCNC: 402 U/L (ref 55–165)
ALT SERPL W P-5'-P-CCNC: 243 U/L (ref 7–52)
ANION GAP SERPL CALCULATED.3IONS-SCNC: 8 MMOL/L (ref 4–13)
AST SERPL W P-5'-P-CCNC: 145 U/L (ref 13–39)
BACTERIA BLD CULT: ABNORMAL
BACTERIA SPT RESP CULT: ABNORMAL
BILIRUB SERPL-MCNC: 2 MG/DL (ref 0.2–1)
BUN SERPL-MCNC: 11 MG/DL (ref 7–25)
CALCIUM ALBUM COR SERPL-MCNC: 9.2 MG/DL (ref 8.3–10.1)
CALCIUM SERPL-MCNC: 8.4 MG/DL (ref 8.6–10.5)
CHLORIDE SERPL-SCNC: 101 MMOL/L (ref 98–107)
CO2 SERPL-SCNC: 28 MMOL/L (ref 21–31)
CREAT SERPL-MCNC: 0.92 MG/DL (ref 0.7–1.3)
ERYTHROCYTE [DISTWIDTH] IN BLOOD BY AUTOMATED COUNT: 15.3 % (ref 11.5–14.5)
GFR SERPL CREATININE-BSD FRML MDRD: 77 ML/MIN/1.73SQ M
GLUCOSE SERPL-MCNC: 117 MG/DL (ref 65–99)
GRAM STN SPEC: ABNORMAL
HCT VFR BLD AUTO: 40.7 % (ref 42–47)
HGB BLD-MCNC: 13.7 G/DL (ref 14–18)
MCH RBC QN AUTO: 31.2 PG (ref 26–34)
MCHC RBC AUTO-ENTMCNC: 33.7 G/DL (ref 31–37)
MCV RBC AUTO: 93 FL (ref 81–99)
MRSA NOSE QL CULT: NORMAL
PLATELET # BLD AUTO: 147 THOUSANDS/UL (ref 149–390)
PMV BLD AUTO: 9.7 FL (ref 8.6–11.7)
POTASSIUM SERPL-SCNC: 3.6 MMOL/L (ref 3.5–5.5)
PROT SERPL-MCNC: 6.1 G/DL (ref 6.4–8.9)
RBC # BLD AUTO: 4.4 MILLION/UL (ref 4.3–5.9)
SODIUM SERPL-SCNC: 137 MMOL/L (ref 134–143)
WBC # BLD AUTO: 7.3 THOUSAND/UL (ref 4.8–10.8)

## 2021-04-08 PROCEDURE — 80053 COMPREHEN METABOLIC PANEL: CPT | Performed by: NURSE PRACTITIONER

## 2021-04-08 PROCEDURE — 85027 COMPLETE CBC AUTOMATED: CPT | Performed by: NURSE PRACTITIONER

## 2021-04-08 PROCEDURE — 99239 HOSP IP/OBS DSCHRG MGMT >30: CPT | Performed by: NURSE PRACTITIONER

## 2021-04-08 RX ORDER — CEFDINIR 300 MG/1
300 CAPSULE ORAL EVERY 12 HOURS SCHEDULED
Qty: 20 CAPSULE | Refills: 0 | Status: SHIPPED | OUTPATIENT
Start: 2021-04-08 | End: 2021-04-18

## 2021-04-08 RX ORDER — AMLODIPINE BESYLATE 5 MG/1
5 TABLET ORAL DAILY
Qty: 30 TABLET | Refills: 0 | Status: SHIPPED | OUTPATIENT
Start: 2021-04-09 | End: 2021-05-04 | Stop reason: SDUPTHER

## 2021-04-08 RX ORDER — PANTOPRAZOLE SODIUM 40 MG/1
40 TABLET, DELAYED RELEASE ORAL
Qty: 30 TABLET | Refills: 0 | Status: SHIPPED | OUTPATIENT
Start: 2021-04-09 | End: 2022-01-01

## 2021-04-08 RX ORDER — GUAIFENESIN 600 MG
600 TABLET, EXTENDED RELEASE 12 HR ORAL EVERY 12 HOURS SCHEDULED
Qty: 60 TABLET | Refills: 0 | Status: SHIPPED | OUTPATIENT
Start: 2021-04-08 | End: 2021-04-08

## 2021-04-08 RX ORDER — CEFDINIR 300 MG/1
300 CAPSULE ORAL EVERY 12 HOURS SCHEDULED
Status: DISCONTINUED | OUTPATIENT
Start: 2021-04-08 | End: 2021-04-08 | Stop reason: HOSPADM

## 2021-04-08 RX ORDER — BENZONATATE 100 MG/1
100 CAPSULE ORAL 3 TIMES DAILY
Qty: 90 CAPSULE | Refills: 0 | Status: SHIPPED | OUTPATIENT
Start: 2021-04-08 | End: 2021-05-08

## 2021-04-08 RX ORDER — GUAIFENESIN 600 MG
600 TABLET, EXTENDED RELEASE 12 HR ORAL EVERY 12 HOURS SCHEDULED
Qty: 20 TABLET | Refills: 0 | Status: SHIPPED | OUTPATIENT
Start: 2021-04-08 | End: 2021-04-18

## 2021-04-08 RX ADMIN — PANTOPRAZOLE SODIUM 40 MG: 40 TABLET, DELAYED RELEASE ORAL at 05:19

## 2021-04-08 RX ADMIN — METOPROLOL TARTRATE 25 MG: 25 TABLET, FILM COATED ORAL at 08:50

## 2021-04-08 RX ADMIN — CEFEPIME HYDROCHLORIDE 1000 MG: 1 INJECTION, SOLUTION INTRAVENOUS at 08:50

## 2021-04-08 RX ADMIN — AMLODIPINE BESYLATE 5 MG: 5 TABLET ORAL at 08:50

## 2021-04-08 RX ADMIN — ALLOPURINOL 300 MG: 300 TABLET ORAL at 08:50

## 2021-04-08 RX ADMIN — OMEGA-3 FATTY ACIDS CAP 1000 MG 1000 MG: 1000 CAP at 08:50

## 2021-04-08 RX ADMIN — ENOXAPARIN SODIUM 40 MG: 40 INJECTION SUBCUTANEOUS at 08:50

## 2021-04-08 RX ADMIN — GUAIFENESIN 600 MG: 600 TABLET, EXTENDED RELEASE ORAL at 08:50

## 2021-04-08 RX ADMIN — BENZONATATE 100 MG: 100 CAPSULE ORAL at 08:50

## 2021-04-08 RX ADMIN — Medication 1000 UNITS: at 08:50

## 2021-04-08 RX ADMIN — ASPIRIN 81 MG: 81 TABLET, CHEWABLE ORAL at 08:50

## 2021-04-08 NOTE — ASSESSMENT & PLAN NOTE
· Family reports that this has been ongoing for quite some time- much improved  · Will hold off on Ace for now  · Start on cough medication  · Added PPI for worsening GERD  · Recommend to follow up with PCP as an outpatient; may consider EGD if GERD is worsening

## 2021-04-08 NOTE — ASSESSMENT & PLAN NOTE
· Well controlled  · Continue home medications  · As sepsis parameters are improving; will resume blood pressure medication    · The patient and family report chronic cough which appears to be increasing in severity- lisinopril stopped and amlodipine added

## 2021-04-08 NOTE — OCCUPATIONAL THERAPY NOTE
Patient declined OT interventions - he feels he's at baseline for ADLs and anticipates being discharged to home today      YANET Moreno/STANTON

## 2021-04-08 NOTE — PLAN OF CARE
Problem: Potential for Falls  Goal: Patient will remain free of falls  Description: INTERVENTIONS:  - Assess patient frequently for physical needs  -  Identify cognitive and physical deficits and behaviors that affect risk of falls    -  [place] fall precautions as indicated by assessment   - Educate patient/family on patient safety including physical limitations  - Instruct patient to call for assistance with activity based on assessment  - Modify environment to reduce risk of injury  - Consider OT/PT consult to assist with strengthening/mobility  4/8/2021 1111 by Keron Johnson RN  Outcome: Adequate for Discharge  4/8/2021 0951 by Keron Johnson RN  Outcome: Progressing     Problem: PAIN - ADULT  Goal: Verbalizes/displays adequate comfort level or baseline comfort level  Description: Interventions:  - Encourage patient to monitor pain and request assistance  - Assess pain using appropriate pain scale  - Administer analgesics based on type and severity of pain and evaluate response  - Implement non-pharmacological measures as appropriate and evaluate response  - Consider cultural and social influences on pain and pain management  - Notify physician/advanced practitioner if interventions unsuccessful or patient reports new pain  4/8/2021 1111 by Keron Johnson RN  Outcome: Adequate for Discharge  4/8/2021 0951 by Keron Johnson RN  Outcome: Progressing     Problem: INFECTION - ADULT  Goal: Absence or prevention of progression during hospitalization  Description: INTERVENTIONS:  - Assess and monitor for signs and symptoms of infection  - Monitor lab/diagnostic results  - Monitor all insertion sites, i e  indwelling lines, tubes, and drains  - Monitor endotracheal if appropriate and nasal secretions for changes in amount and color  - [place] appropriate cooling/warming therapies per order  - Administer medications as ordered  - Instruct and encourage patient and family to use good hand hygiene technique  - Identify and instruct in appropriate isolation precautions for identified infection/condition  4/8/2021 1111 by Delmar Feliciano RN  Outcome: Adequate for Discharge  4/8/2021 0951 by Delmar Feliciano RN  Outcome: Progressing  Goal: Absence of fever/infection during neutropenic period  Description: INTERVENTIONS:  - Monitor WBC    4/8/2021 1111 by Delmar Feliciano RN  Outcome: Adequate for Discharge  4/8/2021 0951 by Delmar Feliciano RN  Outcome: Progressing     Problem: SAFETY ADULT  Goal: Patient will remain free of falls  Description: INTERVENTIONS:  - Assess patient frequently for physical needs  -  Identify cognitive and physical deficits and behaviors that affect risk of falls    -  [place] fall precautions as indicated by assessment   - Educate patient/family on patient safety including physical limitations  - Instruct patient to call for assistance with activity based on assessment  - Modify environment to reduce risk of injury  - Consider OT/PT consult to assist with strengthening/mobility  4/8/2021 1111 by Delmar Feliciano RN  Outcome: Adequate for Discharge  4/8/2021 0951 by Delmar Feliciano RN  Outcome: Progressing  Goal: Maintain or return to baseline ADL function  Description: INTERVENTIONS:  -  Assess patient's ability to carry out ADLs; assess patient's baseline for ADL function and identify physical deficits which impact ability to perform ADLs (bathing, care of mouth/teeth, toileting, grooming, dressing, etc )  - Assess/evaluate cause of self-care deficits   - Assess range of motion  - Assess patient's mobility; develop plan if impaired  - Assess patient's need for assistive devices and provide as appropriate  - Encourage maximum independence but intervene and supervise when necessary  - Involve family in performance of ADLs  - Assess for home care needs following discharge   - Consider OT consult to assist with ADL evaluation and planning for discharge  - Provide patient education as appropriate  4/8/2021 1111 by Keron Johnson RN  Outcome: Adequate for Discharge  4/8/2021 0951 by Keron Johnson RN  Outcome: Progressing  Goal: Maintain or return mobility status to optimal level  Description: INTERVENTIONS:  - Assess patient's baseline mobility status (ambulation, transfers, stairs, etc )    - Identify cognitive and physical deficits and behaviors that affect mobility  - Identify mobility aids required to assist with transfers and/or ambulation (gait belt, sit-to-stand, lift, walker, cane, etc )  - [place] fall precautions as indicated by assessment  - Record patient progress and toleration of activity level on Mobility SBAR; progress patient to next Phase/Stage  - Instruct patient to call for assistance with activity based on assessment  - Consider rehabilitation consult to assist with strengthening/weightbearing, etc   4/8/2021 1111 by Keron Johnson RN  Outcome: Adequate for Discharge  4/8/2021 0951 by Keron Johnson RN  Outcome: Progressing     Problem: DISCHARGE PLANNING  Goal: Discharge to home or other facility with appropriate resources  Description: INTERVENTIONS:  - Identify barriers to discharge w/patient and caregiver  - Arrange for needed discharge resources and transportation as appropriate  - Identify discharge learning needs (meds, wound care, etc )  - Refer to Case Management Department for coordinating discharge planning if the patient needs post-hospital services based on physician/advanced practitioner order or complex needs related to functional status, cognitive ability, or social support system  4/8/2021 1111 by Keron Johnson RN  Outcome: Adequate for Discharge  4/8/2021 0951 by Keron Johnson RN  Outcome: Progressing     Problem: Knowledge Deficit  Goal: Patient/family/caregiver demonstrates understanding of disease process, treatment plan, medications, and discharge instructions  Description: Complete learning assessment and assess knowledge base  Interventions:  - Provide teaching at level of understanding  - Provide teaching via preferred learning methods  4/8/2021 1111 by Errol Miles RN  Outcome: Adequate for Discharge  4/8/2021 0951 by Errol Miles RN  Outcome: Progressing     Problem: Nutrition/Hydration-ADULT  Goal: Nutrient/Hydration intake appropriate for improving, restoring or maintaining nutritional needs  Description: Monitor and assess patient's nutrition/hydration status for malnutrition  Collaborate with interdisciplinary team and initiate plan and interventions as ordered  Monitor patient's weight and dietary intake as ordered or per policy  Utilize nutrition screening tool and intervene as necessary  Determine patient's food preferences and provide high-protein, high-caloric foods as appropriate       INTERVENTIONS:  - Monitor oral intake, urinary output, labs, and treatment plans  - Assess nutrition and hydration status and recommend course of action  - Evaluate amount of meals eaten  - Assist patient with eating if necessary   - Allow adequate time for meals  - Recommend/ encourage appropriate diets, oral nutritional supplements, and vitamin/mineral supplements  - Order, calculate, and assess calorie counts as needed  - Recommend, monitor, and adjust tube feedings and TPN/PPN based on assessed needs  - Assess need for intravenous fluids  - Provide specific nutrition/hydration education as appropriate  - Include patient/family/caregiver in decisions related to nutrition  4/8/2021 1111 by Errol Miles RN  Outcome: Adequate for Discharge  4/8/2021 0951 by Errol Miles RN  Outcome: Progressing

## 2021-04-08 NOTE — ASSESSMENT & PLAN NOTE
Lab Results   Component Value Date    EGFR 77 04/08/2021    EGFR 70 04/07/2021    EGFR 73 04/06/2021    CREATININE 0 92 04/08/2021    CREATININE 1 00 04/07/2021    CREATININE 0 96 04/06/2021   · Creatinine baseline is approximately 1 0 to 1 2 mg/dL  · Currently is at baseline  · Continue to monitor renal function outpatient

## 2021-04-08 NOTE — CASE MANAGEMENT
Cm met with the pt and his family IMM was reviewed and they stated they understood and agree with there d/c   IMM reviewed with patient  patient agree with discharge determination, pt has been accepted by [facility], [facility] was made aware of the d/c , family will transport the pt home, pt and family in agreement with the d/c an d/c plan home with [facility] and he will continue with oncology, family will transport the pt

## 2021-04-08 NOTE — ASSESSMENT & PLAN NOTE
· Criteria met by tachycardia 90, respiratory rate 28 with suspected underlying bronchitis vs pneumonia with yellow productive cough  · The patient received IV fluid resuscitation  · Lactic acidosis resolved after IV fluid  · Started on ceftriaxone and azithromycin; received 3 days of cefepime  MRSA- pending  · strep and Legionella antigen- negative; azithromycin stopped  · Will transition to oral cefdinir 300 mg p o  B i d  for total of 10 days    · procalcitonin 1 94, 1 49  · blood cultures shows 1/2 set E coli  · Infectious Disease input appreciated  · sputum culture- pending   · Sepsis parameters resolved

## 2021-04-08 NOTE — NURSING NOTE
Patient was evaluated by the hospitalist, who gave the okay for the patient to be discharged home today  IV was removed with catheter tip intact, DSD and pressure applied  AVS reviewed with the patient, along with his wife and granddaughter  All questions and concerns addressed

## 2021-04-08 NOTE — DISCHARGE INSTRUCTIONS
Amlodipine (By mouth)   Amlodipine ([id number]-do-peen)  Lowers high blood pressure and relieves chronic stable angina (chest pain)  This medicine is a calcium channel blocker  Brand Name(s): Nicole Flores   There may be other brand names for this medicine  When This Medicine Should Not Be Used: This medicine is not right for everyone  Do not use it if you had an allergic reaction to amlodipine  How to Use This Medicine:   Liquid, Tablet, Dissolving Tablet  · Take your medicine as directed  Your dose may need to be changed several times to find what works best for you  Take this medicine at the same time each day  · Read and follow the patient instructions that come with this medicine  Talk to your doctor or pharmacist if you have any questions  · Missed dose: Take a dose as soon as you remember  If it is almost time for your next dose, wait until then and take a regular dose  Do not take extra medicine to make up for a missed dose  · Store the medicine in a closed container at room temperature, away from heat, moisture, and direct light  Store the oral liquid in the refrigerator  Do not freeze  Drugs and Foods to Avoid:   Ask your doctor or pharmacist before using any other medicine, including over-the-counter medicines, vitamins, and herbal products  · Some medicines can affect how amlodipine works  Tell your doctor if you are also using clarithromycin, cyclosporine, diltiazem, itraconazole, ritonavir, sildenafil, simvastatin, or tacrolimus  Warnings While Using This Medicine:   · Tell your doctor if you are pregnant or breastfeeding, or if you have liver disease, or heart or blood vessel disease (including coronary artery disease, aortic stenosis)  · This medicine may cause the following problems:  ? Low blood pressure  ? Worsening chest pain  ? Increased risk of heart attack  · This medicine could lower your blood pressure too much, especially when you first use it or if you are dehydrated  Stand or sit up slowly if you feel lightheaded or dizzy  · Do not stop using this medicine without asking your doctor, even if you feel well  This medicine will not cure high blood pressure, but it will help keep it in normal range  You may have to take blood pressure medicine for the rest of your life  · Your doctor will check your progress and the effects of this medicine at regular visits  Keep all appointments  · Keep all medicine out of the reach of children  Never share your medicine with anyone  Possible Side Effects While Using This Medicine:   Call your doctor right away if you notice any of these side effects:  · Allergic reaction: Itching or hives, swelling in your face or hands, swelling or tingling in your mouth or throat, chest tightness, trouble breathing  · Lightheadedness, dizziness, fainting  · New or worsening chest pain  · Swelling in your hands, ankles, or legs  · Trouble breathing, nausea, unusual sweating  If you notice other side effects that you think are caused by this medicine, tell your doctor  Call your doctor for medical advice about side effects  You may report side effects to FDA at 2-725-FDA-4356  © Copyright [address] [place] [address] Drive Information is for End User's use only and may not be sold, redistributed or otherwise used for commercial purposes  The above information is an  only  It is not intended as medical advice for individual conditions or treatments  Talk to your doctor, nurse or pharmacist before following any medical regimen to see if it is safe and effective for you  Benzonatate (By mouth)   Benzonatate (joo-UES-hb-parry)  Treats cough  Brand Name(s): Yulia Tristan   There may be other brand names for this medicine  When This Medicine Should Not Be Used:    You should not use this medicine if you have had an allergic reaction to benzonatate in the past    How to Use This Medicine:   Capsule, Liquid Filled Capsule  · Your doctor will tell you how much medicine to take and how often  · Swallow the capsules whole; do not crush or chew  Chewing the capsule may numb your mouth and throat and you could choke  If a dose is missed:   · Take the missed dose as soon as possible  · If it is almost time for the next dose, wait until then to take your medicine and skip the missed dose  · You should not use two doses at the same time  How to Store and Dispose of This Medicine:   · Keep this medication in the original tightly closed, light-resistant container  · Store at room temperature, away from heat, moisture, and light  · Ask your pharmacist, doctor, or health caregiver about the best way to dispose of any outdated medicine or medicine no longer needed  · Keep all medicine away from children  Drugs and Foods to Avoid:   Ask your doctor or pharmacist before using any other medicine, including over-the-counter medicines, vitamins, and herbal products  · Avoid drinking alcohol while taking this medicine  Warnings While Using This Medicine:   · If you are pregnant or breastfeeding, talk to your doctor before taking this medicine  · Benzonatate is not recommended for children younger than 8years old  Possible Side Effects While Using This Medicine:   Call your doctor right away if you notice any of these side effects:  · Trouble breathing  · Tightness in the chest or throat  · Tremors, shakiness, seizures  · Skin rash, itching  If you notice these less serious side effects, talk with your doctor:   · Nausea, upset stomach  · Headache  · Mild dizziness  · Drowsiness  · Constipation  · Stuffy nose  If you notice other side effects that you think are caused by this medicine, tell your doctor  Call your doctor for medical advice about side effects   You may report side effects to FDA at 3-282-FDA-2107  © Copyright [address] [place] [address] Drive Information is for End User's use only and may not be sold, redistributed or otherwise used for commercial purposes  The above information is an  only  It is not intended as medical advice for individual conditions or treatments  Talk to your doctor, nurse or pharmacist before following any medical regimen to see if it is safe and effective for you  Pantoprazole (By mouth)   Pantoprazole (pan-TOE-pra-zole)  Treats gastroesophageal reflux disease (GERD), a damaged esophagus, and conditions that cause your stomach to make too much acid, including Zollinger-Villaseñor syndrome  This medicine is a proton pump inhibitor (PPI)  Brand Name(s): Protonix   There may be other brand names for this medicine  When This Medicine Should Not Be Used: This medicine is not right for everyone  Do not use it if you had an allergic reaction to pantoprazole or similar medicines  How to Use This Medicine:   Packet, Tablet, Delayed Release Tablet, Long Acting Tablet  · Your doctor will tell you how much medicine to use  Do not use more than directed  · Delayed-release tablet: Swallow the tablet whole  Do not crush, break, or chew it  · Delayed-release packet: Take the medicine mixed in apple juice or applesauce at least 30 minutes before a meal  It may also be given using a nasogastric tube when mixed in apple juice only  ? To prepare with applesauce:   § Mix the packet contents with 1 teaspoon of applesauce  Do not mix with water or other liquids or food  Do not divide the packet contents to make smaller doses  § Swallow the mixture within 10 minutes after you mix it  Do not chew or crush the granules  § Sip some water after you take the mixture to make sure you swallow all of the medicine  ? To prepare with apple juice:   § Mix the packet contents with 1 teaspoon of apple juice in a small cup  Do not divide the packet contents to make smaller doses  § Stir for 5 seconds and drink the mixture immediately  Do not chew or crush the granules    § To make sure you get all of the medicine, add more apple juice to the cup  Drink it immediately  ? To prepare for a feeding tube:   § Pour the packet contents in a 2-ounce (60 milliliter [mL]) catheter-tip syringe  § Add 10 mL of apple juice to the syringe  Add the mixture to the tube  Gently tap or shake the barrel of the syringe to help empty it  § Add 10 mL of apple juice to the syringe and put it in the tube  Do this at least 2 times  There should be no granules left in the syringe  · This medicine should come with a Medication Guide  Ask your pharmacist for a copy if you do not have one  · Missed dose: Take a dose as soon as you remember  If it is almost time for your next dose, wait until then and take a regular dose  Do not take extra medicine to make up for a missed dose  · Store the medicine in a closed container at room temperature, away from heat, moisture, and direct light  Drugs and Foods to Avoid:   Ask your doctor or pharmacist before using any other medicine, including over-the-counter medicines, vitamins, and herbal products  · Do not use pantoprazole together with medicine that contains rilpivirine  · Some medicines can affect how pantoprazole works  Tell your doctor if you are using any of the following:   ? Ampicillin, atazanavir, dasatinib, digoxin, erlotinib, itraconazole, ketoconazole, methotrexate, mycophenolate mofetil, nelfinavir, nilotinib, saquinavir  ? Blood thinner (including warfarin)  ? Diuretic (water pill)  ? Iron supplements  Warnings While Using This Medicine:   · Tell your doctor if you are pregnant or breastfeeding, or if you have kidney disease, liver disease, lupus, or osteoporosis  · This medicine may cause the following problems:  ? Kidney problems  ? Increased risk of broken bones in the hip, wrist, or spine (more likely if used several times per day or longer than 1 year)  ? Lupus  ? Fundic gland polyps (abnormal growth in the upper part of your stomach)  · This medicine can cause diarrhea   Call your doctor if the diarrhea becomes severe, does not stop, or is bloody  Do not take any medicine to stop diarrhea until you have talked to your doctor  Diarrhea can occur 2 months or more after you stop taking this medicine  · Tell any doctor or dentist who treats you that you are using this medicine  This medicine may affect certain medical test results  · Your doctor will do lab tests at regular visits to check on the effects of this medicine  Keep all appointments  · Keep all medicine out of the reach of children  Never share your medicine with anyone  Possible Side Effects While Using This Medicine:   Call your doctor right away if you notice any of these side effects:  · Allergic reaction: Itching or hives, swelling in your face or hands, swelling or tingling in your mouth or throat, chest tightness, trouble breathing  · Blistering, peeling, red skin rash  · Fever, joint pain, swelling in your body, unusual weight gain, change in how much or how often you urinate  · Joint pain, rash on your cheeks or arms that gets worse in the sun  · Seizures, dizziness, uneven heartbeat, muscle cramps or twitching  · Severe diarrhea, stomach cramp or pain, nausea, vomiting, weight loss  If you notice these less serious side effects, talk with your doctor:   · Headache  If you notice other side effects that you think are caused by this medicine, tell your doctor  Call your doctor for medical advice about side effects  You may report side effects to FDA at 2-723-FDA-5881  © Copyright [address] Information is for End User's use only and may not be sold, redistributed or otherwise used for commercial purposes  The above information is an  only  It is not intended as medical advice for individual conditions or treatments  Talk to your doctor, nurse or pharmacist before following any medical regimen to see if it is safe and effective for you        Guaifenesin/Phenylephrine (By mouth)   Guaifenesin (lirg-WVM-q-sin), Phenylephrine (nbq-px-GW-rin)  Treats cough and stuffy nose caused by hay fever or the common cold  Also thins mucus to make a cough more productive  This medicine is a combination of an expectorant and a decongestant  Brand Name(s): Chest Congestion Relief PE, Children's Mucinex Cold, Children's Triaminic Chest & Nasal Congestion, Deconex IR, ED Bron GP, Entex LQ, Gilphex TR, Giltuss Sinus and Chest Congestion, [facility] Mucus Relief PE, J-Max, Liquibid D-R, Liquibid PD-R, MucaphEd, Mucus Relief Sinus, Quality Choice Medifin PE   There may be other brand names for this medicine  When This Medicine Should Not Be Used: You should not use this medicine if you have had an allergic reaction to guaifenesin or phenylephrine, or to other cold medicines, diet pills, or medicines to treat asthma, bronchitis, or attention deficit disorder  You should not use this medicine if you have used an MAO inhibitor (MAOI) such as Eldepryl®, Marplan®, Nardil®, or Parnate® within the past 14 days  You should not use this medicine if you have high blood pressure, heart or blood vessel disorders, or overactive thyroid  Do not give any over-the-counter (OTC) cough and cold medicine to a baby or child under 3years old  Using these medicines in very young children might cause serious or possibly life-threatening side effects  How to Use This Medicine:   Long Acting Tablet, Tablet, Syrup, Liquid, Solution, Capsule, 12 Hour Tablet  · Your doctor will tell you how much medicine to use  Do not use more than directed  · Follow the instructions on the medicine label if you are using this medicine without a prescription  · Swallow the capsule or tablet whole  Do not crush, break, or chew it  · Measure the oral liquid medicine with a marked measuring spoon, oral syringe, or medicine cup  · You may take this medicine with food if it upsets your stomach  If a dose is missed:   · Take a dose as soon as you remember   If it is almost time for your next dose, wait until then and take a regular dose  Do not take extra medicine to make up for a missed dose  How to Store and Dispose of This Medicine:   · Store the medicine in a closed container at room temperature, away from heat, moisture, and direct light  Do not refrigerate or freeze the liquid form of this medicine  · Ask your pharmacist, doctor, or health caregiver about the best way to dispose of any outdated medicine or medicine no longer needed  · Keep all medicine out of the reach of children  Never share your medicine with anyone  Drugs and Foods to Avoid:   Ask your doctor or pharmacist before using any other medicine, including over-the-counter medicines, vitamins, and herbal products  · Make sure your doctor knows if you are also using a beta-blocker such as atenolol, labetalol, metoprolol, propranolol, timolol, Inderal®, or Toprol®  Tell your doctor if you are using digoxin (Lanoxin®), guanethidine (Ismelin®), mecamylamine (Inversine), methyldopa (Aldomet®), or reserpine  Your doctor should know if you use medicine for depression such as amitriptyline, doxepin, nortriptyline, Elavil®, Pamelor®, or Sinequan®  Warnings While Using This Medicine:   · Make sure your doctor knows if you are pregnant or breastfeeding  · Tell your doctor if you have diabetes, thyroid problems, high blood pressure, glaucoma, an enlarged prostate, or a history of heart disease  · Older adults may be more likely to have side effects from this medicine  · Do not give this medicine to a child under 15years of age, unless your doctor tells you to  · Tell any doctor or dentist who treats you that you are using this medicine  This medicine may affect certain medical test results    Possible Side Effects While Using This Medicine:   Call your doctor right away if you notice any of these side effects:  · Allergic reaction: Itching or hives, swelling in your face or hands, swelling or tingling in your mouth or throat, chest tightness, trouble breathing  · Fast, pounding, or uneven heartbeat  · Feeling extremely confused, fearful, or anxious  · High fever, skin rash, sore throat, ongoing headache  · Lightheadedness or fainting  · No improvement in your symptoms after using the medicine for 7 days  · Seizure, hallucinations, vomiting  · Shallow breathing, pale skin  · Unusual weakness, tremors, or shaking  If you notice these less serious side effects, talk with your doctor:   · Anxiety, fear, depression, nervousness  · Dizziness, trouble sleeping  · Feelings of excitement, elevated mood, or increased energy  · Loss of appetite  · Mild fever or headache  · Nausea  If you notice other side effects that you think are caused by this medicine, tell your doctor  Call your doctor for medical advice about side effects  You may report side effects to FDA at 1-281-FDA-2584  © Copyright [address] [place] [address] Information is for End User's use only and may not be sold, redistributed or otherwise used for commercial purposes  The above information is an  only  It is not intended as medical advice for individual conditions or treatments  Talk to your doctor, nurse or pharmacist before following any medical regimen to see if it is safe and effective for you  Heart Healthy Diet   WHAT YOU NEED TO KNOW:   A heart healthy diet is an eating plan low in unhealthy fats and sodium (salt)  The plan is high in healthy fats and fiber  A heart healthy diet helps improve your cholesterol levels and lowers your risk for heart disease and stroke  A dietitian will teach you how to read and understand food labels  DISCHARGE INSTRUCTIONS:   Heart healthy diet guidelines to follow:   · Choose foods that contain healthy fats  ? Unsaturated fats  include monounsaturated and polyunsaturated fats  Unsaturated fat is found in foods such as soybean, canola, olive, corn, and safflower oils   It is also found in soft tub margarine that is made with liquid vegetable oil  ? Omega-3 fat  is found in certain fish, such as salmon, tuna, and trout, and in walnuts and flaxseed  Eat fish high in omega-3 fats at least 2 times a week  · Get 20 to 30 grams of fiber each day  Fruits, vegetables, whole-grain foods, and legumes (cooked beans) are good sources of fiber  · Limit or do not have unhealthy fats  ? Cholesterol  is found in animal foods, such as eggs and lobster, and in dairy products made from whole milk  Limit cholesterol to less than 200 mg each day  ? Saturated fat  is found in meats, such as anton and hamburger  It is also found in chicken or turkey skin, whole milk, and butter  Limit saturated fat to less than 7% of your total daily calories  ? Trans fat  is found in packaged foods, such as potato chips and cookies  It is also in hard margarine, some fried foods, and shortening  Do not eat foods that contain trans fats  · Limit sodium as directed  You may be told to limit sodium to 2,000 to 2,300 mg each day  Choose low-sodium or no-salt-added foods  Add little or no salt to food you prepare  Use herbs and spices in place of salt  Include the following in your heart healthy plan:  Ask your dietitian or healthcare provider how many servings to have from each of the following food groups:  · Grains:      ? Whole-wheat breads, cereals, and pastas, and brown rice    ? Low-fat, low-sodium crackers and chips    · Vegetables:      ? Broccoli, green beans, green peas, and spinach    ? Collards, kale, and lima beans    ? Carrots, sweet potatoes, tomatoes, and peppers    ? Canned vegetables with no salt added    · Fruits:      ? Bananas, peaches, pears, and pineapple    ? Grapes, raisins, and dates    ? Oranges, tangerines, grapefruit, orange juice, and grapefruit juice    ? Apricots, mangoes, melons, and papaya    ? Raspberries and strawberries    ?  Canned fruit with no added sugar    · Low-fat dairy:      ? Nonfat (skim) milk, 1% milk, and low-fat almond, cashew, or soy milks fortified with calcium    ? Low-fat cheese, regular or frozen yogurt, and cottage cheese    · Meats and proteins:      ? Lean cuts of beef and pork (loin, leg, round), skinless chicken and turkey    ? Legumes, soy products, egg whites, or nuts    Limit or do not include the following in your heart healthy plan:   · Unhealthy fats and oils:      ? Whole or 2% milk, cream cheese, sour cream, or cheese    ? High-fat cuts of beef (T-bone steaks, ribs), chicken or turkey with skin, and organ meats such as liver    ? Butter, stick margarine, shortening, and cooking oils such as coconut or palm oil    · Foods and liquids high in sodium:      ? Packaged foods, such as frozen dinners, cookies, macaroni and cheese, and cereals with more than 300 mg of sodium per serving    ? Vegetables with added sodium, such as instant potatoes, vegetables with added sauces, or regular canned vegetables    ? Cured or smoked meats, such as hot dogs, anton, and sausage    ? High-sodium ketchup, barbecue sauce, salad dressing, pickles, olives, soy sauce, or miso    · Foods and liquids high in sugar:      ? Candy, cake, cookies, pies, or doughnuts    ? Soft drinks (soda), sports drinks, or sweetened tea    ? Canned or dry mixes for cakes, soups, sauces, or gravies    Other healthy heart guidelines:   · Do not smoke  Nicotine and other chemicals in cigarettes and cigars can cause lung and heart damage  Ask your healthcare provider for information if you currently smoke and need help to quit  E-cigarettes or smokeless tobacco still contain nicotine  Talk to your healthcare provider before you use these products  · Limit or do not drink alcohol as directed  Alcohol can damage your heart and raise your blood pressure  Your healthcare provider may give you specific daily and weekly limits   The general recommended limit is 1 drink a day for women 24 or older and for men 72 or older  Do not have more than 3 drinks in a day or 7 in a week  The recommended limit is 2 drinks a day for men 24to 59years of age  Do not have more than 4 drinks in a day or 14 in a week  A drink of alcohol is 12 ounces of beer, 5 ounces of wine, or 1½ ounces of liquor  · Exercise regularly  Exercise can help you maintain a healthy weight and improve your blood pressure and cholesterol levels  Regular exercise can also decrease your risk for heart problems  Ask your healthcare provider about the best exercise plan for you  Do not start an exercise program without asking your healthcare provider  Follow up with your doctor or cardiologist as directed:  Write down your questions so you remember to ask them during your visits  © Copyright [address] Information is for End User's use only and may not be sold, redistributed or otherwise used for commercial purposes  All illustrations and images included in CareNotes® are the copyrighted property of [employer]  or [address] [place], [place] [address]   The above information is an  only  It is not intended as medical advice for individual conditions or treatments  Talk to your doctor, nurse or pharmacist before following any medical regimen to see if it is safe and effective for you

## 2021-04-08 NOTE — PLAN OF CARE
Problem: Potential for Falls  Goal: Patient will remain free of falls  Description: INTERVENTIONS:  - Assess patient frequently for physical needs  -  Identify cognitive and physical deficits and behaviors that affect risk of falls    -  [place] fall precautions as indicated by assessment   - Educate patient/family on patient safety including physical limitations  - Instruct patient to call for assistance with activity based on assessment  - Modify environment to reduce risk of injury  - Consider OT/PT consult to assist with strengthening/mobility  Outcome: Progressing     Problem: PAIN - ADULT  Goal: Verbalizes/displays adequate comfort level or baseline comfort level  Description: Interventions:  - Encourage patient to monitor pain and request assistance  - Assess pain using appropriate pain scale  - Administer analgesics based on type and severity of pain and evaluate response  - Implement non-pharmacological measures as appropriate and evaluate response  - Consider cultural and social influences on pain and pain management  - Notify physician/advanced practitioner if interventions unsuccessful or patient reports new pain  Outcome: Progressing     Problem: INFECTION - ADULT  Goal: Absence or prevention of progression during hospitalization  Description: INTERVENTIONS:  - Assess and monitor for signs and symptoms of infection  - Monitor lab/diagnostic results  - Monitor all insertion sites, i e  indwelling lines, tubes, and drains  - Monitor endotracheal if appropriate and nasal secretions for changes in amount and color  - [place] appropriate cooling/warming therapies per order  - Administer medications as ordered  - Instruct and encourage patient and family to use good hand hygiene technique  - Identify and instruct in appropriate isolation precautions for identified infection/condition  Outcome: Progressing  Goal: Absence of fever/infection during neutropenic period  Description: INTERVENTIONS:  - Monitor WBC    Outcome: Progressing     Problem: SAFETY ADULT  Goal: Patient will remain free of falls  Description: INTERVENTIONS:  - Assess patient frequently for physical needs  -  Identify cognitive and physical deficits and behaviors that affect risk of falls    -  [place] fall precautions as indicated by assessment   - Educate patient/family on patient safety including physical limitations  - Instruct patient to call for assistance with activity based on assessment  - Modify environment to reduce risk of injury  - Consider OT/PT consult to assist with strengthening/mobility  Outcome: Progressing  Goal: Maintain or return to baseline ADL function  Description: INTERVENTIONS:  -  Assess patient's ability to carry out ADLs; assess patient's baseline for ADL function and identify physical deficits which impact ability to perform ADLs (bathing, care of mouth/teeth, toileting, grooming, dressing, etc )  - Assess/evaluate cause of self-care deficits   - Assess range of motion  - Assess patient's mobility; develop plan if impaired  - Assess patient's need for assistive devices and provide as appropriate  - Encourage maximum independence but intervene and supervise when necessary  - Involve family in performance of ADLs  - Assess for home care needs following discharge   - Consider OT consult to assist with ADL evaluation and planning for discharge  - Provide patient education as appropriate  Outcome: Progressing  Goal: Maintain or return mobility status to optimal level  Description: INTERVENTIONS:  - Assess patient's baseline mobility status (ambulation, transfers, stairs, etc )    - Identify cognitive and physical deficits and behaviors that affect mobility  - Identify mobility aids required to assist with transfers and/or ambulation (gait belt, sit-to-stand, lift, walker, cane, etc )  - [place] fall precautions as indicated by assessment  - Record patient progress and toleration of activity level on Mobility SBAR; progress patient to next Phase/Stage  - Instruct patient to call for assistance with activity based on assessment  - Consider rehabilitation consult to assist with strengthening/weightbearing, etc   Outcome: Progressing     Problem: DISCHARGE PLANNING  Goal: Discharge to home or other facility with appropriate resources  Description: INTERVENTIONS:  - Identify barriers to discharge w/patient and caregiver  - Arrange for needed discharge resources and transportation as appropriate  - Identify discharge learning needs (meds, wound care, etc )  - Refer to Case Management Department for coordinating discharge planning if the patient needs post-hospital services based on physician/advanced practitioner order or complex needs related to functional status, cognitive ability, or social support system  Outcome: Progressing     Problem: Knowledge Deficit  Goal: Patient/family/caregiver demonstrates understanding of disease process, treatment plan, medications, and discharge instructions  Description: Complete learning assessment and assess knowledge base  Interventions:  - Provide teaching at level of understanding  - Provide teaching via preferred learning methods  Outcome: Progressing     Problem: Nutrition/Hydration-ADULT  Goal: Nutrient/Hydration intake appropriate for improving, restoring or maintaining nutritional needs  Description: Monitor and assess patient's nutrition/hydration status for malnutrition  Collaborate with interdisciplinary team and initiate plan and interventions as ordered  Monitor patient's weight and dietary intake as ordered or per policy  Utilize nutrition screening tool and intervene as necessary  Determine patient's food preferences and provide high-protein, high-caloric foods as appropriate       INTERVENTIONS:  - Monitor oral intake, urinary output, labs, and treatment plans  - Assess nutrition and hydration status and recommend course of action  - Evaluate amount of meals eaten  - Assist patient with eating if necessary   - Allow adequate time for meals  - Recommend/ encourage appropriate diets, oral nutritional supplements, and vitamin/mineral supplements  - Order, calculate, and assess calorie counts as needed  - Recommend, monitor, and adjust tube feedings and TPN/PPN based on assessed needs  - Assess need for intravenous fluids  - Provide specific nutrition/hydration education as appropriate  - Include patient/family/caregiver in decisions related to nutrition  Outcome: Progressing

## 2021-04-08 NOTE — DISCHARGE SUMMARY
[facility]  Discharge- Ming Benitez 1940, 78 y o  male MRN: [id number]  Unit/Bed#: -01 Encounter: [id number]  Primary Care Provider: Gisel Culver DO   Date and time admitted to hospital: 4/5/2021  1:06 PM    * Sepsis [facility])  Assessment & Plan  · Criteria met by tachycardia 90, respiratory rate 28 with suspected underlying bronchitis vs pneumonia with yellow productive cough  · The patient received IV fluid resuscitation  · Lactic acidosis resolved after IV fluid  · Started on ceftriaxone and azithromycin; received 3 days of cefepime  MRSA- pending  · strep and Legionella antigen- negative; azithromycin stopped  · Will transition to oral cefdinir 300 mg p o  B i d  for total of 10 days    · procalcitonin 1 94, 1 49  · blood cultures shows 1/2 set E coli  · Infectious Disease input appreciated  · sputum culture- pending   · Sepsis parameters resolved       Cough  Assessment & Plan  · Family reports that this has been ongoing for quite some time- much improved  · Will hold off on Ace for now  · Start on cough medication  · Added PPI for worsening GERD  · Recommend to follow up with PCP as an outpatient; may consider EGD if GERD is worsening    Stage 3a chronic kidney disease  Assessment & Plan  Lab Results   Component Value Date    EGFR 77 04/08/2021    EGFR 70 04/07/2021    EGFR 73 04/06/2021    CREATININE 0 92 04/08/2021    CREATININE 1 00 04/07/2021    CREATININE 0 96 04/06/2021   · Creatinine baseline is approximately 1 0 to 1 2 mg/dL  · Currently is at baseline  · Continue to monitor renal function outpatient    [facility]  Assessment & Plan  · Follows up with Hematology-Oncology outpatient   · Currently on radiation therapy      Transaminitis  Assessment & Plan  · Likely due to stress from acute infection with underlying hepatocellular carcinoma versus possible biliary dilatation/obstruction  · GI input appreciated  · Ultrasound right upper quadrant show no biliary obstruction  · Follow up with outpatient blood work   · The patient as benign abdomen without any nausea vomiting  · Recommend holding statin until repeat blood work    Essential hypertension  Assessment & Plan  · Well controlled  · Continue home medications  · As sepsis parameters are improving; will resume blood pressure medication  · The patient and family report chronic cough which appears to be increasing in severity- lisinopril stopped and amlodipine added         Discharging Physician / Practitioner: Hannah Archibald  PCP: Philipp Barone DO  Admission Date:   Admission Orders (From admission, onward)     Ordered        04/05/21 1441  Inpatient Admission  Once         04/05/21 1443  Inpatient Admission  Once                   Discharge Date: 04/08/21    Resolved Problems  Date Reviewed: 4/8/2021    None          Consultations During Hospital Stay:  · Infectious Disease  · GI    Procedures Performed:   · None    Significant Findings / Test Results:   · Right upper quadrant ultrasound   Cholelithiasis        Adenomyomatosis  Redemonstrated 7 2 x 6 6 x 6 6 cm solid mass in the central liver  1 7 x 1 4 x 1 3 cm heterogeneous lesion in the posterior right hepatic lobe   This can be further evaluated on follow-up abdominal MRI  Pancreas obscured by overlying bowel gas  · Chest x-ray shows Stable interstitial prominence bilaterally again thought to be on the basis of pulmonary edema   No definite new focal airspace consolidation identified  Incidental Findings:   · None      Test Results Pending at Discharge (will require follow up): · Final blood cultures  · Final sputum culture  · MRSA culture     Outpatient Tests Requested:  · Follow-up PCP    Complications:     None    Reason for Admission:  Cough and fever    Hospital Course:     Grecia Bingham is a 78 y o  male patient who originally presented to the hospital on 4/5/2021 due to cough and fever    Please refer to H&P for initial presenting complaint  Brief the patient presented with 7 day history of cough with productive yellow sputum and fever as high as 100 3° and subsequently was admitted for sepsis likely due to possible bacterial pneumonia and/or respiratory infection  The patient was started on IV antibiotic  One set of his blood cultures shows E coli  Infectious Disease evaluation was done  This time recommend to transition to oral cefdinir 300 mg b i d  For total of 10 days  The patient with history of hepatocellular carcinoma with elevated LFTs and total bilirubin on admission  Gastroenterology evaluation was done  Right upper quadrant ultrasound shows no biliary duct dilatation or obstruction  Suspected that this is reactive to his acute infection  His statin regimen was on hold  LFTs and total bilirubin values are improving  Recommend to follow up with PCP and have repeat CMP done on Monday 04/12/2021  Additionally, the patient family complain of cough that has been persisting and appears to be worse  ACE-inhibitor was stopped  The patient was started on amlodipine for blood pressure control  Additionally pantoprazole and Tessalon Perles were started with improvement  GERD/acid reflux could also contribute to chronic cough  I discussed this in detail with the patient and family that if this is worsening recommend to consider/discussed with PCP for an EGD  Overall clinically is much improved sepsis parameters are resolved  The patient is medically cleared to be discharged home today  Please see above list of diagnoses and related plan for additional information  Condition at Discharge: good     Discharge Day Visit / Exam:     Subjective:  Feeling well  Like to go home    Vitals: Blood Pressure: 137/64 (04/08/21 0700)  Pulse: 84 (04/08/21 0700)  Temperature: 97 8 °F (36 6 °C) (04/08/21 0700)  Temp Source: Temporal (04/08/21 0700)  Respirations: 16 (04/08/21 0700)  Height: 5' 9" (175 3 cm) (04/05/21 1532)  Weight - Scale: 91 8 kg (202 lb 6 1 oz) (04/08/21 0600)  SpO2: 92 % (04/08/21 0700)  Exam:   Physical Exam  Vitals signs and nursing note reviewed  Constitutional:       General: He is not in acute distress  Appearance: Normal appearance  HENT:      Head: Normocephalic and atraumatic  Right Ear: External ear normal       Left Ear: External ear normal       Nose: Nose normal       Mouth/Throat:      Mouth: Mucous membranes are moist       Pharynx: Oropharynx is clear  Eyes:      General:         Right eye: No discharge  Left eye: No discharge  Extraocular Movements: Extraocular movements intact  Pupils: Pupils are equal, round, and reactive to light  Neck:      Musculoskeletal: Normal range of motion and neck supple  No neck rigidity  Cardiovascular:      Rate and Rhythm: Normal rate and regular rhythm  Pulses: Normal pulses  Heart sounds: Normal heart sounds  No murmur  Pulmonary:      Effort: Pulmonary effort is normal  No respiratory distress  Breath sounds: Normal breath sounds  No wheezing or rales  Abdominal:      General: Bowel sounds are normal  There is no distension  Palpations: Abdomen is soft  There is no mass  Tenderness: There is no abdominal tenderness  Musculoskeletal: Normal range of motion  General: No swelling, tenderness or deformity  Skin:     General: Skin is warm and dry  Capillary Refill: Capillary refill takes less than 2 seconds  Coloration: Skin is not pale  Findings: No erythema  Neurological:      General: No focal deficit present  Mental Status: He is alert and oriented to person, place, and time  Mental status is at baseline  Psychiatric:         Mood and Affect: Mood normal          Behavior: Behavior normal          Thought Content:  Thought content normal          Judgment: Judgment normal          Discussion with Family: wife via phone     Discharge instructions/Information to patient and family:   See after visit summary for information provided to patient and family  Provisions for Follow-Up Care:  See after visit summary for information related to follow-up care and any pertinent home health orders  Disposition:     Home with [facility]NA Services (Reminder: Complete face to face encounter)      Planned Readmission:    No      Discharge Statement:  I spent 38 minutes discharging the patient  This time was spent on the day of discharge  I had direct contact with the patient on the day of discharge  Greater than 50% of the total time was spent examining patient, answering all patient questions, arranging and discussing plan of care with patient as well as directly providing post-discharge instructions  Additional time then spent on discharge activities  Discharge Medications:  See after visit summary for reconciled discharge medications provided to patient and family        ** Please Note: This note has been constructed using a voice recognition system **

## 2021-04-08 NOTE — DISCHARGE INSTR - AVS FIRST PAGE
Dear Bryan Fermin,     It was our pleasure to care for you here at [facility]  It is our hope that we were always able to exceed the expected standards for your care during your stay  You were hospitalized due to sepsis  You were cared for on the 1st floor by BEKA Cheung with the [address] [place] [address] Internal [facility] who covers for your primary care physician (PCP), Gissel Davila DO, while you were hospitalized  If you have any questions or concerns related to this hospitalization, you may contact us at [phone]  For follow up as well as any medication refills, we recommend that you follow up with your primary care physician  A registered nurse will reach out to you by phone within a few days after your discharge to answer any additional questions that you may have after going home  However, at this time we provide for you here, the most important instructions / recommendations at discharge:     · Notable Medication Adjustments -   · Cefdinir 300 mg twice daily for total of 10 days-antibiotic  · Pantoprazole 40 mg daily  · Tessalon Perle may take 3 times daily; cough is improved can take this as needed  · Amlodipine 5 mg daily- for blood pressure; please watch for any increased swelling in your legs  · Mucinex 600 mg every 12 hours  · Testing Required after Discharge -   · Repeat CMP 4/12/2021  · Important follow up information -   · Follow up with PCP   · Other Instructions -   · Please stop taking simvastatin 80 mg for now until you have blood work done with your PCP   · Please review this entire after visit summary as additional general instructions including medication list, appointments, activity, diet, any pertinent wound care, and other additional recommendations from your care team that may be provided for you        Sincerely,     BEKA Cheung

## 2021-04-08 NOTE — ASSESSMENT & PLAN NOTE
· Likely due to stress from acute infection with underlying hepatocellular carcinoma versus possible biliary dilatation/obstruction  · GI input appreciated  · Ultrasound right upper quadrant show no biliary obstruction  · Follow up with outpatient blood work   · The patient as benign abdomen without any nausea vomiting     · Recommend holding statin until repeat blood work

## 2021-04-09 ENCOUNTER — TRANSITIONAL CARE MANAGEMENT (OUTPATIENT)
Dept: FAMILY MEDICINE CLINIC | Facility: CLINIC | Age: 81
End: 2021-04-09

## 2021-04-10 LAB — BACTERIA BLD CULT: NORMAL

## 2021-04-12 ENCOUNTER — APPOINTMENT (OUTPATIENT)
Dept: LAB | Facility: CLINIC | Age: 81
End: 2021-04-12
Payer: MEDICARE

## 2021-04-12 DIAGNOSIS — C22.0 HEPATOCELLULAR CARCINOMA (HCC): ICD-10-CM

## 2021-04-12 DIAGNOSIS — I74.3 EMBOLISM AND THROMBOSIS OF ARTERIES OF THE LOWER EXTREMITIES (HCC): ICD-10-CM

## 2021-04-12 DIAGNOSIS — J96.01 ACUTE RESPIRATORY FAILURE WITH HYPOXIA (HCC): ICD-10-CM

## 2021-04-12 DIAGNOSIS — I10 ESSENTIAL HYPERTENSION: ICD-10-CM

## 2021-04-12 DIAGNOSIS — R74.01 TRANSAMINITIS: ICD-10-CM

## 2021-04-12 DIAGNOSIS — I25.10 CORONARY ARTERY DISEASE INVOLVING NATIVE HEART WITHOUT ANGINA PECTORIS, UNSPECIFIED VESSEL OR LESION TYPE: ICD-10-CM

## 2021-04-12 DIAGNOSIS — E78.2 MIXED HYPERLIPIDEMIA: ICD-10-CM

## 2021-04-12 DIAGNOSIS — R06.89 RESPIRATORY INSUFFICIENCY: ICD-10-CM

## 2021-04-12 LAB
AFP-TM SERPL-MCNC: 3.3 NG/ML (ref 0.5–8)
ALBUMIN SERPL BCP-MCNC: 2.9 G/DL (ref 3.5–5)
ALP SERPL-CCNC: 432 U/L (ref 46–116)
ALT SERPL W P-5'-P-CCNC: 151 U/L (ref 12–78)
ANION GAP SERPL CALCULATED.3IONS-SCNC: 5 MMOL/L (ref 4–13)
AST SERPL W P-5'-P-CCNC: 73 U/L (ref 5–45)
BILIRUB SERPL-MCNC: 0.96 MG/DL (ref 0.2–1)
BUN SERPL-MCNC: 13 MG/DL (ref 5–25)
CALCIUM ALBUM COR SERPL-MCNC: 10 MG/DL (ref 8.3–10.1)
CALCIUM SERPL-MCNC: 9.1 MG/DL (ref 8.3–10.1)
CHLORIDE SERPL-SCNC: 101 MMOL/L (ref 100–108)
CO2 SERPL-SCNC: 31 MMOL/L (ref 21–32)
CREAT SERPL-MCNC: 1.03 MG/DL (ref 0.6–1.3)
GFR SERPL CREATININE-BSD FRML MDRD: 67 ML/MIN/1.73SQ M
GLUCOSE P FAST SERPL-MCNC: 125 MG/DL (ref 65–99)
POTASSIUM SERPL-SCNC: 4 MMOL/L (ref 3.5–5.3)
PROT SERPL-MCNC: 7.3 G/DL (ref 6.4–8.2)
SODIUM SERPL-SCNC: 137 MMOL/L (ref 136–145)

## 2021-04-12 PROCEDURE — 36415 COLL VENOUS BLD VENIPUNCTURE: CPT

## 2021-04-12 PROCEDURE — 82105 ALPHA-FETOPROTEIN SERUM: CPT

## 2021-04-12 PROCEDURE — 80053 COMPREHEN METABOLIC PANEL: CPT

## 2021-04-21 ENCOUNTER — OFFICE VISIT (OUTPATIENT)
Dept: FAMILY MEDICINE CLINIC | Facility: CLINIC | Age: 81
End: 2021-04-21
Payer: MEDICARE

## 2021-04-21 VITALS
DIASTOLIC BLOOD PRESSURE: 58 MMHG | TEMPERATURE: 98.7 F | SYSTOLIC BLOOD PRESSURE: 116 MMHG | BODY MASS INDEX: 28.73 KG/M2 | HEIGHT: 69 IN | OXYGEN SATURATION: 94 % | WEIGHT: 194 LBS | HEART RATE: 78 BPM

## 2021-04-21 DIAGNOSIS — N18.31 STAGE 3A CHRONIC KIDNEY DISEASE (HCC): ICD-10-CM

## 2021-04-21 DIAGNOSIS — R05.9 COUGH: ICD-10-CM

## 2021-04-21 DIAGNOSIS — E11.9 TYPE 2 DIABETES MELLITUS WITHOUT COMPLICATION, WITHOUT LONG-TERM CURRENT USE OF INSULIN (HCC): ICD-10-CM

## 2021-04-21 DIAGNOSIS — D69.6 PLATELETS DECREASED (HCC): ICD-10-CM

## 2021-04-21 DIAGNOSIS — A41.9 SEPSIS, DUE TO UNSPECIFIED ORGANISM, UNSPECIFIED WHETHER ACUTE ORGAN DYSFUNCTION PRESENT (HCC): Primary | ICD-10-CM

## 2021-04-21 DIAGNOSIS — C22.0 HEPATOCELLULAR CARCINOMA (HCC): ICD-10-CM

## 2021-04-21 PROCEDURE — 99495 TRANSJ CARE MGMT MOD F2F 14D: CPT | Performed by: FAMILY MEDICINE

## 2021-04-21 RX ORDER — LORAZEPAM 1 MG/1
TABLET ORAL
Qty: 2 TABLET | Refills: 0 | Status: SHIPPED | OUTPATIENT
Start: 2021-04-21 | End: 2022-01-01 | Stop reason: ALTCHOICE

## 2021-04-21 NOTE — PROGRESS NOTES
Assessment/Plan:       Problem List Items Addressed This Visit        Digestive    Hepatocellular carcinoma [facility])      Patient is clinically stage 1 be and will be followed up by Hematology-Oncology         Relevant Orders    Comprehensive metabolic panel       Endocrine    Type 2 diabetes mellitus without complication, without long-term current use of insulin (HCC)    Relevant Orders    Comprehensive metabolic panel       Genitourinary    Stage 3a chronic kidney disease     Lab Results   Component Value Date    EGFR 67 04/12/2021    EGFR 77 04/08/2021    EGFR 70 04/07/2021    CREATININE 1 03 04/12/2021    CREATININE 0 92 04/08/2021    CREATININE 1 00 04/07/2021    kidneys disease stable currently no change in medications follow-up in 1 month         Relevant Orders    Comprehensive metabolic panel       Other    Sepsis ([facility] ) - Primary      Post hospitalization transition of care management for sepsis septic shock and pneumonia patient has recovered and still has mild shortness of breath and cough which will take time as I explained to him he will continue with current medications without change other than stopping simvastatin due to elevated liver enzymes repeat laboratory work will be done in 1 month         Relevant Orders    Comprehensive metabolic panel    Cough      Persistent cough improving since sepsis pneumonia continue current regimen of medications no change follow-up in 1 month         Relevant Orders    Comprehensive metabolic panel    Platelets decreased ([facility] )    Relevant Orders    Comprehensive metabolic panel            Subjective:      Patient ID: Patricia Garland is a 78 y o  male      Patient presents today for transition of care management post sepsis in the hospital after he developed pneumonia was given IV antibiotics E coli came back in his blood he did relatively well throughout the hospitalization recovery he did have transaminitis his simvastatin has been held since discharge before he has follow-up laboratory work he does have underlying recent history of hepatocellular carcinoma complicating his medical situation  He comes in today and questions the need to stop the simvastatin      The following portions of the patient's history were reviewed and updated as appropriate: allergies, current medications, past family history, past medical history, past social history, past surgical history and problem list     Review of Systems   Constitutional: Negative for chills, fatigue and fever  HENT: Negative for congestion, nosebleeds, rhinorrhea, sinus pressure and sore throat  Eyes: Negative for discharge and redness  Respiratory: Negative for cough and shortness of breath  Cardiovascular: Negative for chest pain, palpitations and leg swelling  Gastrointestinal: Negative for abdominal pain, blood in stool and nausea  Endocrine: Negative for cold intolerance, heat intolerance and polyuria  Genitourinary: Negative for dysuria and frequency  Musculoskeletal: Negative for arthralgias, back pain and myalgias  Skin: Negative for rash  Neurological: Negative for dizziness, weakness and headaches  Hematological: Negative for adenopathy  Psychiatric/Behavioral: Negative for behavioral problems and sleep disturbance  The patient is not nervous/anxious  Objective:      /58   Pulse 78   Temp 98 7 °F (37 1 °C)   Ht 5' 9" (1 753 m)   Wt 88 kg (194 lb)   SpO2 94%   BMI 28 65 kg/m²        Physical Exam  Vitals signs and nursing note reviewed  Constitutional:       Appearance: Normal appearance  He is well-developed and normal weight  HENT:      Head: Normocephalic and atraumatic  Right Ear: Tympanic membrane and external ear normal       Left Ear: Tympanic membrane and external ear normal       Nose: Nose normal       Mouth/Throat:      Mouth: Mucous membranes are moist       Pharynx: Oropharynx is clear  Eyes:      General: No scleral icterus  Conjunctiva/sclera: Conjunctivae normal       Pupils: Pupils are equal, round, and reactive to light  Neck:      Musculoskeletal: Normal range of motion and neck supple  Thyroid: No thyromegaly  Vascular: No JVD  Cardiovascular:      Rate and Rhythm: Normal rate and regular rhythm  Heart sounds: Normal heart sounds  No murmur  Pulmonary:      Effort: Pulmonary effort is normal       Breath sounds: Normal breath sounds  No wheezing or rales  Chest:      Chest wall: No tenderness  Abdominal:      General: Bowel sounds are normal  There is no distension  Palpations: Abdomen is soft  There is no mass  Tenderness: There is no abdominal tenderness  There is no guarding or rebound  Musculoskeletal: Normal range of motion  General: No tenderness or deformity  Lymphadenopathy:      Cervical: No cervical adenopathy  Skin:     General: Skin is warm and dry  Findings: No erythema or rash  Neurological:      General: No focal deficit present  Mental Status: He is alert and oriented to person, place, and time  Mental status is at baseline  Cranial Nerves: No cranial nerve deficit  Deep Tendon Reflexes: Reflexes are normal and symmetric  Reflexes normal    Psychiatric:         Mood and Affect: Mood normal          Behavior: Behavior normal          Thought Content: Thought content normal          Judgment: Judgment normal           Data:    Laboratory Results: I have personally reviewed the pertinent laboratory results/reports   Radiology/Other Diagnostic Testing Results: I have personally reviewed pertinent reports         Lab Results   Component Value Date    WBC 7 30 04/08/2021    HGB 13 7 (L) 04/08/2021    HCT 40 7 (L) 04/08/2021    MCV 93 04/08/2021     (L) 04/08/2021     Lab Results   Component Value Date    K 4 0 04/12/2021     04/12/2021    CO2 31 04/12/2021    BUN 13 04/12/2021    CREATININE 1 03 04/12/2021    GLUF 125 (H) 04/12/2021 CALCIUM 9 1 04/12/2021    CORRECTEDCA 10 0 04/12/2021    AST 73 (H) 04/12/2021     (H) 04/12/2021    ALKPHOS 432 (H) 04/12/2021    EGFR 67 04/12/2021     No results found for: CHOLESTEROL  No results found for: HDL  No results found for: LDLCALC  No results found for: TRIG  No results found for: CHOLHDL  No results found for: [id number], TSH  Lab Results   Component Value Date    HGBA1C 6 7 10/28/2020     No results found for: PSA    [facility], DO

## 2021-04-21 NOTE — ASSESSMENT & PLAN NOTE
Persistent cough improving since sepsis pneumonia continue current regimen of medications no change follow-up in 1 month

## 2021-04-21 NOTE — ASSESSMENT & PLAN NOTE
Lab Results   Component Value Date    EGFR 67 04/12/2021    EGFR 77 04/08/2021    EGFR 70 04/07/2021    CREATININE 1 03 04/12/2021    CREATININE 0 92 04/08/2021    CREATININE 1 00 04/07/2021    kidneys disease stable currently no change in medications follow-up in 1 month

## 2021-04-21 NOTE — ASSESSMENT & PLAN NOTE
Post hospitalization transition of care management for sepsis septic shock and pneumonia patient has recovered and still has mild shortness of breath and cough which will take time as I explained to him he will continue with current medications without change other than stopping simvastatin due to elevated liver enzymes repeat laboratory work will be done in 1 month

## 2021-04-22 ENCOUNTER — HOSPITAL ENCOUNTER (OUTPATIENT)
Dept: MRI IMAGING | Facility: HOSPITAL | Age: 81
Discharge: HOME | End: 2021-04-22
Attending: SURGERY
Payer: MEDICARE

## 2021-04-22 DIAGNOSIS — C22.0 HEPATOCELLULAR CARCINOMA (HCC): ICD-10-CM

## 2021-04-22 PROCEDURE — 74183 MRI ABD W/O CNTR FLWD CNTR: CPT

## 2021-04-22 PROCEDURE — A9585 GADOBUTROL INJECTION: HCPCS | Performed by: SURGERY

## 2021-04-22 PROCEDURE — G1004 CDSM NDSC: HCPCS

## 2021-04-22 RX ADMIN — GADOBUTROL 8 ML: 604.72 INJECTION INTRAVENOUS at 11:02

## 2021-04-29 ENCOUNTER — CLINICAL SUPPORT (OUTPATIENT)
Dept: RADIATION ONCOLOGY | Facility: CLINIC | Age: 81
End: 2021-04-29
Attending: RADIOLOGY
Payer: MEDICARE

## 2021-04-29 VITALS
SYSTOLIC BLOOD PRESSURE: 138 MMHG | RESPIRATION RATE: 18 BRPM | DIASTOLIC BLOOD PRESSURE: 68 MMHG | BODY MASS INDEX: 29.1 KG/M2 | TEMPERATURE: 97.5 F | HEART RATE: 98 BPM | WEIGHT: 196.5 LBS | HEIGHT: 69 IN

## 2021-04-29 DIAGNOSIS — C22.0 HEPATOCELLULAR CARCINOMA (HCC): Primary | ICD-10-CM

## 2021-04-29 PROCEDURE — 99211 OFF/OP EST MAY X REQ PHY/QHP: CPT | Performed by: RADIOLOGY

## 2021-04-29 NOTE — PROGRESS NOTES
Arlin Barajas 1940 is a 78 y o  male     Follow up visit     Oncology History Overview Note   Follow up post sirSpheres to right lobe and middle lobe on 10/14/20  Phone follow up on 11/19/20  Last seen  on 1/26/21    Admitted 4/5-4/8 with cough and fever  Sepsis  Transaminitis    4/12/21 AFP was 3 3 (8/26/20 was 2 4)  AST was 73 (145 on 4/8/21)    Alk Phos 432    4/22/21 MRI abdomen  7 1 x 7 8 cm hepatic mass with peripheral and irregular areas of enhancement, not measurably changed in size since 1/19/2021 (LR-TR viable)       5/6/21 Luna Monge MD     Hepatocellular carcinoma ([facility] )   8/24/2020 Initial Diagnosis    Hepatocellular carcinoma ([facility] )     8/25/2020 Biopsy    IR liver biopsy  - Well-differentiated heptocellular carcinoma     9/22/2020 -  Cancer Staged    Staging form: Liver (Excluding Intrahepatic Bile Ducts), AJCC 8th Edition  - Clinical: Stage IB (cT1b, cN0, cM0) - Signed by Deya Milton MD on 9/22/2020       10/14/2020 - 10/14/2020 Radiation    Field Numbers Energy Treatment Site Starting  Ending  Elapsed  Fraction Total  Overlap Site Overlap         Date Date Days Dose Dose   Dose    SirSphere  Y90 Rt Lobe + Middle Robe   10/14/20 10/14/20    1 51 GBq  1 51 GBq                                                                                        Date / Time stamp:  10/14/2020  2:43 PM         Clinical Trial: no    Health Maintenance   Topic Date Due    Diabetic Foot Exam  Never done    DM Eye Exam  Never done    URINE MICROALBUMIN  Never done    Fall Risk  03/03/2021    HEMOGLOBIN A1C  04/28/2021    Medicare Annual Wellness Visit (AWV)  05/05/2021 (Originally 3/3/2021)    DTaP,Tdap,and Td Vaccines (1 - Tdap) 09/01/2021 (Originally 7/19/1961)    Depression Screening PHQ  01/26/2022    BMI: Followup Plan  03/01/2022    BMI: Adult  04/22/2022    Hepatitis C Screening  Completed    Pneumococcal Vaccine: 65+ Years  Completed    Influenza Vaccine  Completed    COVID-19 Vaccine  Completed    HIB Vaccine  Aged Out    Hepatitis B Vaccine  Aged Out    IPV Vaccine  Aged Out    Hepatitis A Vaccine  Aged Out    Meningococcal ACWY Vaccine  Aged Out    HPV Vaccine  Aged Out       Patient Active Problem List   Diagnosis    Essential hypertension    Hyperlipidemia    Cardiomegaly    Anxiety    Abnormal electrocardiogram    Coronary artery disease without angina pectoris    PAD (peripheral artery disease) ([place] [address] )    Viral URI with cough    Reactive airway disease without complication    Medicare annual wellness visit, subsequent    Hyperglycemia    Ankle edema, bilateral    Transaminitis    Respiratory insufficiency    EMILIANO (acute kidney injury) ([place] [address] )    Elevated troponin    Septic shock (HCC)    Hepatocellular carcinoma (HCC)    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HCC)    Acute respiratory failure with hypoxia (HCC)    Stage 3a chronic kidney disease ([place] [address] )    Sepsis ([place] [address] )    Pneumonia    Cough    Platelets decreased ([place] [address] )    Type 2 diabetes mellitus without complication, without long-term current use of insulin (HCC)     Past Medical History:   Diagnosis Date    Coronary artery disease     Gout     Hypercholesterolemia     Hypertension     Liver cancer ([place] [address] )      Past Surgical History:   Procedure Laterality Date    ABDOMINAL SURGERY      appy    APPENDECTOMY      CORONARY ARTERY BYPASS GRAFT      IR BIOPSY LIVER MASS  8/25/2020    IR Y-90 PRE-ANGIO/EMBO W/ LUNG SCAN  10/6/2020    IR Y-90 RADIOEMBOLIZATION  10/14/2020     Family History   Problem Relation Age of Onset    No Known Problems Mother     No Known Problems Father      Social History     Socioeconomic History    Marital status: /Civil Union     Spouse name: Not on file    Number of children: Not on file    Years of education: Not on file    Highest education level: Not on file   Occupational History    Not on file   Social Needs    Financial resource strain: Not on file    Food insecurity     Worry: Not on file     Inability: Not on file    Transportation needs     Medical: Not on file     Non-medical: Not on file   Tobacco Use    Smoking status: Never Smoker    Smokeless tobacco: Never Used   Substance and Sexual Activity    Alcohol use: Not Currently     Frequency: Never    Drug use: Never    Sexual activity: Not on file   Lifestyle    Physical activity     Days per week: Not on file     Minutes per session: Not on file    Stress: Not on file   Relationships    Social connections     Talks on phone: Not on file     Gets together: Not on file     Attends Yazdanism service: Not on file     Active member of club or organization: Not on file     Attends meetings of clubs or organizations: Not on file     Relationship status: Not on file    Intimate partner violence     Fear of current or ex partner: Not on file     Emotionally abused: Not on file     Physically abused: Not on file     Forced sexual activity: Not on file   Other Topics Concern    Not on file   Social History Narrative    Not on file       Current Outpatient Medications:     allopurinol (ZYLOPRIM) 300 mg tablet, Take 300 mg by mouth daily, Disp: , Rfl:     amLODIPine (NORVASC) 5 mg tablet, Take 1 tablet (5 mg total) by mouth daily, Disp: 30 tablet, Rfl: 0    aspirin 81 mg chewable tablet, Chew 1 tablet (81 mg total) daily, Disp: 30 tablet, Rfl: 0    benzonatate (TESSALON PERLES) 100 mg capsule, Take 1 capsule (100 mg total) by mouth 3 (three) times a day, Disp: 90 capsule, Rfl: 0    cholecalciferol (VITAMIN D3) 400 units tablet, Take 1,000 Units by mouth daily , Disp: , Rfl:     LORazepam (ATIVAN) 1 mg tablet, Take 1 tablet 2 hours prior to MRI as directed, Disp: 2 tablet, Rfl: 0    metoprolol tartrate (LOPRESSOR) 25 mg tablet, Take 1 tablet by mouth 2 (two) times a day, Disp: , Rfl:     Omega-3 Fatty Acids (fish oil) 1,000 mg, Take 1,000 mg by mouth daily, Disp: , Rfl:    pantoprazole (PROTONIX) 40 mg tablet, Take 1 tablet (40 mg total) by mouth daily in the early morning, Disp: 30 tablet, Rfl: 0    simvastatin (ZOCOR) 80 mg tablet, TAKE ONE TABLET BY MOUTH EVERY DAY AT 5 PM FOR CHOLESTEROL, Disp: , Rfl:   Allergies   Allergen Reactions    Shellfish-Derived Products - Food Allergy        Review of Systems:  Review of Systems   Constitutional: Negative  HENT: Negative  Eyes: Negative  Respiratory: Negative  Cardiovascular: Negative  Gastrointestinal: Negative  Endocrine: Negative  Genitourinary: Negative  Musculoskeletal: Negative  Skin: Negative  Allergic/Immunologic: Negative  Neurological: Negative  Hematological: Negative  Psychiatric/Behavioral: Negative  Vitals:    04/29/21 0853   BP: 138/68   Pulse: 98   Resp: 18   Temp: 97 5 °F (36 4 °C)   Weight: 89 1 kg (196 lb 8 oz)   Height: 5' 9" (1 753 m)        Pain Score: 0-No pain      Imaging:Xr Chest Portable    Result Date: 4/5/2021  Narrative: CHEST INDICATION:   pnm  COMPARISON:  8/23/2020 EXAM PERFORMED/VIEWS:  XR CHEST PORTABLE FINDINGS: Cardiomediastinal silhouette appears unremarkable  There is stable interstitial prominence bilaterally  The right-sided IJ line has been removed  There is no pneumothorax  Osseous structures appear within normal limits for patient age  Impression: Stable interstitial prominence bilaterally again thought to be on the basis of pulmonary edema  No definite new focal airspace consolidation identified  Workstation performed: [id number]     Mri Abdomen W Wo Contrast    Result Date: 4/28/2021  Narrative: MRI OF THE ABDOMEN WITH AND WITHOUT CONTRAST INDICATION:  64-year-old male with history of hepatocellular carcinoma, diagnosed in August 2020 and treated with radioembolization with Y-90 microspheres in October 2020  Follow-up  COMPARISON: Pretreatment MRI from August 23, 2020  Outside posttreatment MRI from January 19, 2021   TECHNIQUE:  The following pulse sequences were obtained:  Coronal and axial T2 with TE of 90 and 180 respectively, axial T2 with fat saturation, axial FIESTA fat-sat, axial T1-weighted in-and-out-of phase, axial DWI/ADC, pre-contrast axial T1 with fat saturation, post-contrast dynamic axial T1 with fat saturation at 20, 70, and 180 seconds, followed by coronal and 7 minute delayed axial T1 with fat saturation  IV Contrast:  8 mL of Gadobutrol injection (SINGLE-DOSE) FINDINGS: LOWER CHEST:   Trace left pleural effusion  LIVER: Normal in size, shape and signal   7 1 x 7 8 cm well-circumscribed mass, located primarily in segment 4 with T2 hyperintensity, T1 hypointensity and irregular arterial phase and delayed peripheral enhancement as well as enhancement of several internal septations  Small lateral peripheral cystic or necrotic component  Delayed capsular enhancement  Appearance is little changed since the outside MRI from 1/19/2021  Portal veins patent with displacement of portions of the left and right portal veins by the adjacent mass  Hepatic veins patent  BILE DUCTS:  Mild dilatation of bile ducts in segment 2, adjacent to the mass, best appreciated on delayed enhanced images (series 14, images 47-55)  Minimal ductal dilatation in segment 8  Common bile duct normal in caliber  GALLBLADDER:  Contracted with several gallstones  PANCREAS:  Normal  No main pancreatic ductal dilation  ADRENAL GLANDS:  Normal  SPLEEN:  Normal  KIDNEYS/PROXIMAL URETERS:  No hydroureteronephrosis  No suspicious renal mass  Multiple bilateral renal cysts  BOWEL:   Unremarkable  No dilated loops of bowel  PERITONEAL CAVITY/RETROPERITONEUM:  No mass or ascites  LYMPH NODES:  No abdominal lymphadenopathy  VASCULAR STRUCTURES:  Unremarkable  No aortic aneurysm  ABDOMINAL WALL:  Unremarkable  OSSEOUS STRUCTURES:  Normal marrow signal and enhancement  No evidence of recent fracture, mass or marrow replacing process       Impression: 7 1 x 7 8 cm hepatic mass with peripheral and irregular areas of enhancement, not measurably changed in size since 1/19/2021 (LR-TR viable)  Workstation performed: [id number]     Us Right Upper Quadrant    Result Date: 4/6/2021  Narrative: RIGHT UPPER QUADRANT ULTRASOUND INDICATION:     elevated LFTs  COMPARISON:  Right upper quadrant ultrasound dated 8/2020  TECHNIQUE:   Real-time ultrasound of the right upper quadrant was performed with a curvilinear transducer with both volumetric sweeps and still imaging techniques  FINDINGS: PANCREAS:  Obscured by overlying bowel gas  AORTA AND IVC:  Obscured by overlying bowel gas  LIVER: Size:  Within normal range  The liver measures 11 1 cm in the midclavicular line  Contour:  Surface contour is smooth  Parenchyma:  Echogenicity and echotexture are within normal limits  Again seen is a 7 2 x 6 6 x 6 6 cm solid mass in the central liver  A 1 7 x 1 4 x 1 3 cm heterogeneous lesion is seen in the posterior right hepatic lobe  Limited imaging of the main portal vein shows it to be patent and hepatopetal   BILIARY: The gallbladder is underdistended  Multiple shadowing gallstones are identified in the gallbladder  There is no gallbladder wall thickening or pericholecystic fluid  No sonographic Whittington's sign is elicited  Again noted are echogenic foci with associated ringdown artifact in the gallbladder wall consistent with adenomyomatosis  No intrahepatic biliary dilatation  CBD measures 6 mm  No choledocholithiasis  KIDNEY: Right kidney measures 11 8 cm  There are several simple cysts in the kidney, the largest of which measures 2 4 x 2 5 x 3 4 cm  ASCITES:   None  Impression: Cholelithiasis  Adenomyomatosis  Redemonstrated 7 2 x 6 6 x 6 6 cm solid mass in the central liver  1 7 x 1 4 x 1 3 cm heterogeneous lesion in the posterior right hepatic lobe  This can be further evaluated on follow-up abdominal MRI  Pancreas obscured by overlying bowel gas   Workstation performed: [id number]

## 2021-04-29 NOTE — PROGRESS NOTES
Follow-up - Radiation Oncology   Bryan Fermin 1940 78 y o  male [id number]      History of Present Illness   Cancer Staging  Hepatocellular carcinoma ([facility] [address] )  Staging form: Liver (Excluding Intrahepatic Bile Ducts), AJCC 8th Edition  - Clinical: Stage IB (cT1b, cN0, cM0) - Signed by Bang Elizondo MD on 9/22/2020      Bryan Fermin  returns today for routine follow-up approximately 6 months status post radioembolization for his hepatocellular carcinoma  He was briefly admitted to the hospital with sepsis earlier this month but has recovered with improvement in his liver function tests  He currently feels quite well and is eating well  He denies any abdominal discomfort or nausea  Follow up post sirSpheres to right lobe and middle lobe on 10/14/20  Phone follow up on 11/19/20  Last seen  on 1/26/21    Admitted 4/5-4/8 with cough and fever  Sepsis  Transaminitis    4/12/21 AFP was 3 3 (8/26/20 was 2 4)  AST was 73 (145 on 4/8/21)    Alk Phos 432    4/22/21 MRI abdomen  7 1 x 7 8 cm hepatic mass with peripheral and irregular areas of enhancement, not measurably changed in size since 1/19/2021 (LR-TR viable)  5/6/21 Micky Kruger MD        Historical Information   Oncology History Overview Note   Follow up post sirSpheres to right lobe and middle lobe on 10/14/20  Phone follow up on 11/19/20  Last seen  on 1/26/21    Admitted 4/5-4/8 with cough and fever  Sepsis  Transaminitis    4/12/21 AFP was 3 3 (8/26/20 was 2 4)  AST was 73 (145 on 4/8/21)    Alk Phos 432    4/22/21 MRI abdomen  7 1 x 7 8 cm hepatic mass with peripheral and irregular areas of enhancement, not measurably changed in size since 1/19/2021 (LR-TR viable)       5/6/21 Micky Kruger MD     Hepatocellular carcinoma ([facility] [address] )   8/24/2020 Initial Diagnosis    Hepatocellular carcinoma ([facility] [address] )     8/25/2020 Biopsy    IR liver biopsy  - Well-differentiated heptocellular carcinoma     9/22/2020 -  Cancer Staged    Staging form: Liver (Excluding Intrahepatic Bile Ducts), AJCC 8th Edition  - Clinical: Stage IB (cT1b, cN0, cM0) - Signed by Iron Yarbrough MD on 9/22/2020       10/14/2020 - 10/14/2020 Radiation    Field Numbers Energy Treatment Site Starting  Ending  Elapsed  Fraction Total  Overlap Site Overlap         Date Date Days Dose Dose   Dose    SirSphere  Y90 Rt Lobe + Middle Robe   10/14/20 10/14/20    1 51 GBq  1 51 GBq                                                                                        Date / Time stamp:  10/14/2020  2:43 PM         Past Medical History:   Diagnosis Date    Coronary artery disease     Gout     Hypercholesterolemia     Hypertension     Liver cancer ([facility] [address] )      Past Surgical History:   Procedure Laterality Date    ABDOMINAL SURGERY      appy    APPENDECTOMY      CORONARY ARTERY BYPASS GRAFT      IR BIOPSY LIVER MASS  8/25/2020    IR Y-90 PRE-ANGIO/EMBO W/ LUNG SCAN  10/6/2020    IR Y-90 RADIOEMBOLIZATION  10/14/2020       Social History   Social History     Substance and Sexual Activity   Alcohol Use Not Currently    Frequency: Never     Social History     Substance and Sexual Activity   Drug Use Never     Social History     Tobacco Use   Smoking Status Never Smoker   Smokeless Tobacco Never Used         Meds/Allergies     Current Outpatient Medications:     allopurinol (ZYLOPRIM) 300 mg tablet, Take 300 mg by mouth daily, Disp: , Rfl:     amLODIPine (NORVASC) 5 mg tablet, Take 1 tablet (5 mg total) by mouth daily, Disp: 30 tablet, Rfl: 0    aspirin 81 mg chewable tablet, Chew 1 tablet (81 mg total) daily, Disp: 30 tablet, Rfl: 0    benzonatate (TESSALON PERLES) 100 mg capsule, Take 1 capsule (100 mg total) by mouth 3 (three) times a day, Disp: 90 capsule, Rfl: 0    cholecalciferol (VITAMIN D3) 400 units tablet, Take 1,000 Units by mouth daily , Disp: , Rfl:     LORazepam (ATIVAN) 1 mg tablet, Take 1 tablet 2 hours prior to MRI as directed, Disp: 2 tablet, Rfl: 0    metoprolol tartrate (LOPRESSOR) 25 mg tablet, Take 1 tablet by mouth 2 (two) times a day, Disp: , Rfl:     Omega-3 Fatty Acids (fish oil) 1,000 mg, Take 1,000 mg by mouth daily, Disp: , Rfl:     pantoprazole (PROTONIX) 40 mg tablet, Take 1 tablet (40 mg total) by mouth daily in the early morning, Disp: 30 tablet, Rfl: 0    simvastatin (ZOCOR) 80 mg tablet, TAKE ONE TABLET BY MOUTH EVERY DAY AT 5 PM FOR CHOLESTEROL, Disp: , Rfl:   Allergies   Allergen Reactions    Shellfish-Derived Products - Food Allergy          Review of Systems   Review of Systems   Constitutional: Negative  HENT: Negative  Eyes: Negative  Respiratory: Negative  Cardiovascular: Negative  Gastrointestinal: Negative  Endocrine: Negative  Genitourinary: Negative  Musculoskeletal: Negative  Skin: Negative  Allergic/Immunologic: Negative  Neurological: Negative  Hematological: Negative  Psychiatric/Behavioral: Negative  OBJECTIVE:   /68   Pulse 98   Temp 97 5 °F (36 4 °C)   Resp 18   Ht 5' 9" (1 753 m)   Wt 89 1 kg (196 lb 8 oz)   BMI 29 02 kg/m²   Pain Assessment:  0  Karnofsky: 90 - Able to carry on normal activity; minor signs or symptoms of disease     Physical Exam     The patient presents today no apparent distress  Sclera anicteric  Normal respiratory effort  Abdomen soft nontender nondistended  No swelling of lower extremities  Normal speech  Normal affect          RESULTS    Lab Results:   Recent Results (from the past 672 hour(s))   ECG 12 lead    Collection Time: 04/05/21  1:15 PM   Result Value Ref Range    Ventricular Rate 85 BPM    Atrial Rate 85 BPM    MA Interval 128 ms    QRSD Interval 80 ms    QT Interval 348 ms    QTC Interval 414 ms    P Axis 55 degrees    QRS Axis 73 degrees    T Wave Axis 61 degrees   COVID19, Influenza A/B, RSV PCR, [facility]    Collection Time: 04/05/21  1:25 PM    Specimen: Nasopharyngeal Swab   Result Value Ref Range    SARS-CoV-2 Negative Negative    INFLUENZA A PCR Negative Negative    INFLUENZA B PCR Negative Negative    RSV PCR Negative Negative   CBC and differential    Collection Time: 04/05/21  1:26 PM   Result Value Ref Range    WBC 11 80 (H) 4 80 - 10 80 Thousand/uL    RBC 5 01 4 30 - 5 90 Million/uL    Hemoglobin 15 4 14 0 - 18 0 g/dL    Hematocrit 46 1 42 0 - 47 0 %    MCV 92 81 - 99 fL    MCH 30 8 26 0 - 34 0 pg    MCHC 33 4 31 0 - 37 0 g/dL    RDW 15 5 (H) 11 5 - 14 5 %    MPV 9 1 8 6 - 11 7 fL    Platelets 379 686 - 090 Thousands/uL    Neutrophils Relative 90 (H) 42 - 75 %    Lymphocytes Relative 3 (L) 21 - 51 %    Monocytes Relative 7 2 - 12 %    Eosinophils Relative 0 0 - 5 %    Basophils Relative 0 0 - 2 %    Neutrophils Absolute 10 70 (H) 1 40 - 6 50 Thousands/µL    Lymphocytes Absolute 0 40 (L) 0 60 - 4 47 Thousands/µL    Monocytes Absolute 0 80 0 17 - 1 22 Thousand/µL    Eosinophils Absolute 0 00 0 00 - 0 61 Thousand/µL    Basophils Absolute 0 00 0 00 - 0 10 Thousands/µL   Comprehensive metabolic panel    Collection Time: 04/05/21  1:26 PM   Result Value Ref Range    Sodium 134 134 - 143 mmol/L    Potassium 3 9 3 5 - 5 5 mmol/L    Chloride 96 (L) 98 - 107 mmol/L    CO2 27 21 - 31 mmol/L    ANION GAP 11 4 - 13 mmol/L    BUN 21 7 - 25 mg/dL    Creatinine 1 26 0 70 - 1 30 mg/dL    Glucose 221 (H) 65 - 99 mg/dL    Calcium 9 5 8 6 - 10 5 mg/dL     (H) 13 - 39 U/L     (H) 7 - 52 U/L    Alkaline Phosphatase 365 (H) 55 - 165 U/L    Total Protein 7 0 6 4 - 8 9 g/dL    Albumin 3 5 3 5 - 5 7 g/dL    Total Bilirubin 5 30 (H) 0 20 - 1 00 mg/dL    eGFR 53 ml/min/1 73sq m   Lactic acid    Collection Time: 04/05/21  1:26 PM   Result Value Ref Range    LACTIC ACID 2 1 (HH) 0 5 - 2 0 mmol/L   Procalcitonin with AM Reflex    Collection Time: 04/05/21  1:26 PM   Result Value Ref Range    Procalcitonin 1 94 (H) <=0 25 ng/ml   Protime-INR    Collection Time: 04/05/21  1:26 PM   Result Value Ref Range    Protime 13 3 11 6 - 14 5 seconds    INR 1 02 0 84 - 1 19 APTT    Collection Time: 04/05/21  1:26 PM   Result Value Ref Range    PTT 28 23 - 37 seconds   Blood culture #2    Collection Time: 04/05/21  1:26 PM    Specimen: Arm, Right; Blood   Result Value Ref Range    Blood Culture No Growth After 5 Days      Troponin I    Collection Time: 04/05/21  1:26 PM   Result Value Ref Range    Troponin I <0 03 <=0 03 ng/mL   Blood culture #1    Collection Time: 04/05/21  1:30 PM    Specimen: Arm, Left; Blood   Result Value Ref Range    Blood Culture Escherichia coli (A)     Gram Stain Result Gram negative rods (A)        Susceptibility    Escherichia coli - TEJA     Ampicillin ($$) <=8 00 Susceptible ug/ml     Aztreonam ($$$)  <=4 Susceptible ug/ml     Cefazolin ($) <=2 00 Susceptible ug/ml     Ciprofloxacin ($)  <=1 00 Susceptible ug/ml     Gentamicin ($$) <=1 Susceptible ug/ml     Levofloxacin ($) <=0 25 Susceptible ug/ml     Tetracycline <=4 Susceptible ug/ml     Tobramycin ($) <=1 Susceptible ug/ml     Trimethoprim + Sulfamethoxazole ($$$) <=2/38 Susceptible ug/ml     ZID Performed Yes     Lactic acid 2 Hours    Collection Time: 04/05/21  5:40 PM   Result Value Ref Range    LACTIC ACID 1 7 0 5 - 2 0 mmol/L   Legionella antigen, urine    Collection Time: 04/05/21  8:25 PM    Specimen: Urine, Clean Catch   Result Value Ref Range    Legionella Urinary Antigen Negative Negative   Strep Pneumoniae, Urine    Collection Time: 04/05/21  8:25 PM    Specimen: Urine, Clean Catch   Result Value Ref Range    Strep pneumoniae antigen, urine Negative Negative   Procalcitonin Reflex    Collection Time: 04/06/21  5:11 AM   Result Value Ref Range    Procalcitonin 1 49 (H) <=0 25 ng/ml   CBC and differential    Collection Time: 04/06/21  5:11 AM   Result Value Ref Range    WBC 5 60 4 80 - 10 80 Thousand/uL    RBC 4 06 (L) 4 30 - 5 90 Million/uL    Hemoglobin 12 6 (L) 14 0 - 18 0 g/dL    Hematocrit 37 5 (L) 42 0 - 47 0 %    MCV 93 81 - 99 fL    MCH 31 0 26 0 - 34 0 pg    MCHC 33 6 31 0 - 37 0 g/dL RDW 15 3 (H) 11 5 - 14 5 %    MPV 9 7 8 6 - 11 7 fL    Platelets 639 (L) 503 - 390 Thousands/uL    Neutrophils Relative 83 (H) 42 - 75 %    Lymphocytes Relative 7 (L) 21 - 51 %    Monocytes Relative 10 2 - 12 %    Eosinophils Relative 0 0 - 5 %    Basophils Relative 0 0 - 2 %    Neutrophils Absolute 4 70 1 40 - 6 50 Thousands/µL    Lymphocytes Absolute 0 40 (L) 0 60 - 4 47 Thousands/µL    Monocytes Absolute 0 60 0 17 - 1 22 Thousand/µL    Eosinophils Absolute 0 00 0 00 - 0 61 Thousand/µL    Basophils Absolute 0 00 0 00 - 0 10 Thousands/µL   Comprehensive metabolic panel    Collection Time: 04/06/21  5:11 AM   Result Value Ref Range    Sodium 136 134 - 143 mmol/L    Potassium 3 8 3 5 - 5 5 mmol/L    Chloride 103 98 - 107 mmol/L    CO2 25 21 - 31 mmol/L    ANION GAP 8 4 - 13 mmol/L    BUN 16 7 - 25 mg/dL    Creatinine 0 96 0 70 - 1 30 mg/dL    Glucose 102 (H) 65 - 99 mg/dL    Calcium 8 0 (L) 8 6 - 10 5 mg/dL    Corrected Calcium 9 0 8 3 - 10 1 mg/dL     (H) 13 - 39 U/L     (H) 7 - 52 U/L    Alkaline Phosphatase 315 (H) 55 - 165 U/L    Total Protein 5 5 (L) 6 4 - 8 9 g/dL    Albumin 2 7 (L) 3 5 - 5 7 g/dL    Total Bilirubin 4 30 (H) 0 20 - 1 00 mg/dL    eGFR 73 ml/min/1 73sq m   Lipase    Collection Time: 04/06/21  5:11 AM   Result Value Ref Range    Lipase 233 73 - 393 u/L   Sputum culture and Gram stain    Collection Time: 04/06/21  2:39 PM    Specimen: Expectorated Sputum   Result Value Ref Range    Sputum Culture 3+ Growth of      Gram Stain Result 1+ Epithelial cells per low power field (A)     Gram Stain Result 1+ Gram positive rods (A)     Gram Stain Result 1+ Gram negative rods (A)     Gram Stain Result No polys seen (A)    MRSA culture    Collection Time: 04/06/21  2:39 PM    Specimen: Nose; Nares   Result Value Ref Range    MRSA Culture Only       No Methicillin Resistant Staphlyococcus aureus (MRSA) isolated   Comprehensive metabolic panel    Collection Time: 04/07/21  5:39 AM   Result Value Ref Range    Sodium 136 134 - 143 mmol/L    Potassium 3 7 3 5 - 5 5 mmol/L    Chloride 101 98 - 107 mmol/L    CO2 26 21 - 31 mmol/L    ANION GAP 9 4 - 13 mmol/L    BUN 16 7 - 25 mg/dL    Creatinine 1 00 0 70 - 1 30 mg/dL    Glucose 121 (H) 65 - 99 mg/dL    Calcium 8 5 (L) 8 6 - 10 5 mg/dL    Corrected Calcium 9 4 8 3 - 10 1 mg/dL     (H) 13 - 39 U/L     (H) 7 - 52 U/L    Alkaline Phosphatase 384 (H) 55 - 165 U/L    Total Protein 5 8 (L) 6 4 - 8 9 g/dL    Albumin 2 9 (L) 3 5 - 5 7 g/dL    Total Bilirubin 3 60 (H) 0 20 - 1 00 mg/dL    eGFR 70 ml/min/1 73sq m   CBC    Collection Time: 04/07/21  5:39 AM   Result Value Ref Range    WBC 6 90 4 80 - 10 80 Thousand/uL    RBC 4 27 (L) 4 30 - 5 90 Million/uL    Hemoglobin 13 1 (L) 14 0 - 18 0 g/dL    Hematocrit 39 7 (L) 42 0 - 47 0 %    MCV 93 81 - 99 fL    MCH 30 6 26 0 - 34 0 pg    MCHC 32 9 31 0 - 37 0 g/dL    RDW 15 2 (H) 11 5 - 14 5 %    Platelets 567 (L) 947 - 390 Thousands/uL    MPV 9 5 8 6 - 11 7 fL   Comprehensive metabolic panel    Collection Time: 04/08/21  5:20 AM   Result Value Ref Range    Sodium 137 134 - 143 mmol/L    Potassium 3 6 3 5 - 5 5 mmol/L    Chloride 101 98 - 107 mmol/L    CO2 28 21 - 31 mmol/L    ANION GAP 8 4 - 13 mmol/L    BUN 11 7 - 25 mg/dL    Creatinine 0 92 0 70 - 1 30 mg/dL    Glucose 117 (H) 65 - 99 mg/dL    Calcium 8 4 (L) 8 6 - 10 5 mg/dL    Corrected Calcium 9 2 8 3 - 10 1 mg/dL     (H) 13 - 39 U/L     (H) 7 - 52 U/L    Alkaline Phosphatase 402 (H) 55 - 165 U/L    Total Protein 6 1 (L) 6 4 - 8 9 g/dL    Albumin 3 0 (L) 3 5 - 5 7 g/dL    Total Bilirubin 2 00 (H) 0 20 - 1 00 mg/dL    eGFR 77 ml/min/1 73sq m   CBC    Collection Time: 04/08/21  5:20 AM   Result Value Ref Range    WBC 7 30 4 80 - 10 80 Thousand/uL    RBC 4 40 4 30 - 5 90 Million/uL    Hemoglobin 13 7 (L) 14 0 - 18 0 g/dL    Hematocrit 40 7 (L) 42 0 - 47 0 %    MCV 93 81 - 99 fL    MCH 31 2 26 0 - 34 0 pg    MCHC 33 7 31 0 - 37 0 g/dL    RDW 15 3 (H) 11 5 - 14 5 %    Platelets 022 (L) 030 - 390 Thousands/uL    MPV 9 7 8 6 - 11 7 fL   AFP tumor marker    Collection Time: 04/12/21  9:43 AM   Result Value Ref Range    AFP TUMOR MARKER 3 3 0 5 - 8 ng/mL   Comprehensive metabolic panel    Collection Time: 04/12/21  9:43 AM   Result Value Ref Range    Sodium 137 136 - 145 mmol/L    Potassium 4 0 3 5 - 5 3 mmol/L    Chloride 101 100 - 108 mmol/L    CO2 31 21 - 32 mmol/L    ANION GAP 5 4 - 13 mmol/L    BUN 13 5 - 25 mg/dL    Creatinine 1 03 0 60 - 1 30 mg/dL    Glucose, Fasting 125 (H) 65 - 99 mg/dL    Calcium 9 1 8 3 - 10 1 mg/dL    Corrected Calcium 10 0 8 3 - 10 1 mg/dL    AST 73 (H) 5 - 45 U/L     (H) 12 - 78 U/L    Alkaline Phosphatase 432 (H) 46 - 116 U/L    Total Protein 7 3 6 4 - 8 2 g/dL    Albumin 2 9 (L) 3 5 - 5 0 g/dL    Total Bilirubin 0 96 0 20 - 1 00 mg/dL    eGFR 67 ml/min/1 73sq m       Imaging Studies:Xr Chest Portable    Result Date: 4/5/2021  Narrative: CHEST INDICATION:   pnm  COMPARISON:  8/23/2020 EXAM PERFORMED/VIEWS:  XR CHEST PORTABLE FINDINGS: Cardiomediastinal silhouette appears unremarkable  There is stable interstitial prominence bilaterally  The right-sided IJ line has been removed  There is no pneumothorax  Osseous structures appear within normal limits for patient age  Impression: Stable interstitial prominence bilaterally again thought to be on the basis of pulmonary edema  No definite new focal airspace consolidation identified  Workstation performed: [id number]     Mri Abdomen W Wo Contrast    Result Date: 4/28/2021  Narrative: MRI OF THE ABDOMEN WITH AND WITHOUT CONTRAST INDICATION:  78-year-old male with history of hepatocellular carcinoma, diagnosed in August 2020 and treated with radioembolization with Y-90 microspheres in October 2020  Follow-up  COMPARISON: Pretreatment MRI from August 23, 2020  Outside posttreatment MRI from January 19, 2021   TECHNIQUE:  The following pulse sequences were obtained:  Coronal and axial T2 with TE of 90 and 180 respectively, axial T2 with fat saturation, axial FIESTA fat-sat, axial T1-weighted in-and-out-of phase, axial DWI/ADC, pre-contrast axial T1 with fat saturation, post-contrast dynamic axial T1 with fat saturation at 20, 70, and 180 seconds, followed by coronal and 7 minute delayed axial T1 with fat saturation  IV Contrast:  8 mL of Gadobutrol injection (SINGLE-DOSE) FINDINGS: LOWER CHEST:   Trace left pleural effusion  LIVER: Normal in size, shape and signal   7 1 x 7 8 cm well-circumscribed mass, located primarily in segment 4 with T2 hyperintensity, T1 hypointensity and irregular arterial phase and delayed peripheral enhancement as well as enhancement of several internal septations  Small lateral peripheral cystic or necrotic component  Delayed capsular enhancement  Appearance is little changed since the outside MRI from 1/19/2021  Portal veins patent with displacement of portions of the left and right portal veins by the adjacent mass  Hepatic veins patent  BILE DUCTS:  Mild dilatation of bile ducts in segment 2, adjacent to the mass, best appreciated on delayed enhanced images (series 14, images 47-55)  Minimal ductal dilatation in segment 8  Common bile duct normal in caliber  GALLBLADDER:  Contracted with several gallstones  PANCREAS:  Normal  No main pancreatic ductal dilation  ADRENAL GLANDS:  Normal  SPLEEN:  Normal  KIDNEYS/PROXIMAL URETERS:  No hydroureteronephrosis  No suspicious renal mass  Multiple bilateral renal cysts  BOWEL:   Unremarkable  No dilated loops of bowel  PERITONEAL CAVITY/RETROPERITONEUM:  No mass or ascites  LYMPH NODES:  No abdominal lymphadenopathy  VASCULAR STRUCTURES:  Unremarkable  No aortic aneurysm  ABDOMINAL WALL:  Unremarkable  OSSEOUS STRUCTURES:  Normal marrow signal and enhancement  No evidence of recent fracture, mass or marrow replacing process       Impression: 7 1 x 7 8 cm hepatic mass with peripheral and irregular areas of enhancement, not measurably changed in size since 1/19/2021 (LR-TR viable)  Workstation performed: [id number]     Us Right Upper Quadrant    Result Date: 4/6/2021  Narrative: RIGHT UPPER QUADRANT ULTRASOUND INDICATION:     elevated LFTs  COMPARISON:  Right upper quadrant ultrasound dated 8/2020  TECHNIQUE:   Real-time ultrasound of the right upper quadrant was performed with a curvilinear transducer with both volumetric sweeps and still imaging techniques  FINDINGS: PANCREAS:  Obscured by overlying bowel gas  AORTA AND IVC:  Obscured by overlying bowel gas  LIVER: Size:  Within normal range  The liver measures 11 1 cm in the midclavicular line  Contour:  Surface contour is smooth  Parenchyma:  Echogenicity and echotexture are within normal limits  Again seen is a 7 2 x 6 6 x 6 6 cm solid mass in the central liver  A 1 7 x 1 4 x 1 3 cm heterogeneous lesion is seen in the posterior right hepatic lobe  Limited imaging of the main portal vein shows it to be patent and hepatopetal   BILIARY: The gallbladder is underdistended  Multiple shadowing gallstones are identified in the gallbladder  There is no gallbladder wall thickening or pericholecystic fluid  No sonographic Whittington's sign is elicited  Again noted are echogenic foci with associated ringdown artifact in the gallbladder wall consistent with adenomyomatosis  No intrahepatic biliary dilatation  CBD measures 6 mm  No choledocholithiasis  KIDNEY: Right kidney measures 11 8 cm  There are several simple cysts in the kidney, the largest of which measures 2 4 x 2 5 x 3 4 cm  ASCITES:   None  Impression: Cholelithiasis  Adenomyomatosis  Redemonstrated 7 2 x 6 6 x 6 6 cm solid mass in the central liver  1 7 x 1 4 x 1 3 cm heterogeneous lesion in the posterior right hepatic lobe  This can be further evaluated on follow-up abdominal MRI  Pancreas obscured by overlying bowel gas   Workstation performed: [id number]           Assessment/Plan:  Orders Placed This Encounter   Procedures    MRI abdomen w wo contrast    Comprehensive metabolic panel    AFP tumor marker    CBC and differential    Protime-INR    Ambulatory referral to Interventional Radiology        Leon Myrick  returns today for routine scheduled follow-up  His repeat MRI reveals stable disease, with a persistent approximately 7 cm mass in the right lobe of the liver  While there has been improvement in some of the peripheral more solid components of the tumor compared to his initial staging studies, there is likely residual viable disease  We have discussed options with the patient including possible TACE versus systemic therapy with Medical Oncology  The patient himself, being 78years old, is leaning towards continued surveillance alone with holding further therapies until there is evidence of obvious progression  We will present his case at the next upper GI working group and also refer him back to Dr Antonio Sherman of interventional radiology for discussion regarding potential TACE  Catrachito Magaña MD  4/29/2021,10:09 AM    Portions of the record may have been created with voice recognition software   Occasional wrong word or "sound a like" substitutions may have occurred due to the inherent limitations of voice recognition software   Read the chart carefully and recognize, using context, where substitutions have occurred

## 2021-05-01 ENCOUNTER — APPOINTMENT (OUTPATIENT)
Dept: LAB | Facility: CLINIC | Age: 81
End: 2021-05-01
Payer: MEDICARE

## 2021-05-01 DIAGNOSIS — A41.9 SEPSIS, DUE TO UNSPECIFIED ORGANISM, UNSPECIFIED WHETHER ACUTE ORGAN DYSFUNCTION PRESENT (HCC): ICD-10-CM

## 2021-05-01 DIAGNOSIS — E11.9 TYPE 2 DIABETES MELLITUS WITHOUT COMPLICATION, WITHOUT LONG-TERM CURRENT USE OF INSULIN (HCC): ICD-10-CM

## 2021-05-01 DIAGNOSIS — N18.31 STAGE 3A CHRONIC KIDNEY DISEASE (HCC): ICD-10-CM

## 2021-05-01 DIAGNOSIS — R05.9 COUGH: ICD-10-CM

## 2021-05-01 DIAGNOSIS — D69.6 PLATELETS DECREASED (HCC): ICD-10-CM

## 2021-05-01 DIAGNOSIS — C22.0 HEPATOCELLULAR CARCINOMA (HCC): ICD-10-CM

## 2021-05-01 LAB
ALBUMIN SERPL BCP-MCNC: 3.3 G/DL (ref 3.5–5)
ALP SERPL-CCNC: 196 U/L (ref 46–116)
ALT SERPL W P-5'-P-CCNC: 51 U/L (ref 12–78)
ANION GAP SERPL CALCULATED.3IONS-SCNC: 0 MMOL/L (ref 4–13)
AST SERPL W P-5'-P-CCNC: 43 U/L (ref 5–45)
BILIRUB SERPL-MCNC: 0.89 MG/DL (ref 0.2–1)
BUN SERPL-MCNC: 18 MG/DL (ref 5–25)
CALCIUM ALBUM COR SERPL-MCNC: 10.1 MG/DL (ref 8.3–10.1)
CALCIUM SERPL-MCNC: 9.5 MG/DL (ref 8.3–10.1)
CHLORIDE SERPL-SCNC: 103 MMOL/L (ref 100–108)
CO2 SERPL-SCNC: 34 MMOL/L (ref 21–32)
CREAT SERPL-MCNC: 1.1 MG/DL (ref 0.6–1.3)
GFR SERPL CREATININE-BSD FRML MDRD: 62 ML/MIN/1.73SQ M
GLUCOSE P FAST SERPL-MCNC: 138 MG/DL (ref 65–99)
POTASSIUM SERPL-SCNC: 4.3 MMOL/L (ref 3.5–5.3)
PROT SERPL-MCNC: 7.6 G/DL (ref 6.4–8.2)
SODIUM SERPL-SCNC: 137 MMOL/L (ref 136–145)

## 2021-05-01 PROCEDURE — 80053 COMPREHEN METABOLIC PANEL: CPT

## 2021-05-01 PROCEDURE — 36415 COLL VENOUS BLD VENIPUNCTURE: CPT

## 2021-05-04 ENCOUNTER — OFFICE VISIT (OUTPATIENT)
Dept: FAMILY MEDICINE CLINIC | Facility: CLINIC | Age: 81
End: 2021-05-04
Payer: MEDICARE

## 2021-05-04 VITALS
HEART RATE: 91 BPM | WEIGHT: 195 LBS | OXYGEN SATURATION: 93 % | SYSTOLIC BLOOD PRESSURE: 124 MMHG | DIASTOLIC BLOOD PRESSURE: 64 MMHG | BODY MASS INDEX: 28.88 KG/M2 | HEIGHT: 69 IN | TEMPERATURE: 96.3 F

## 2021-05-04 DIAGNOSIS — I74.3 EMBOLISM AND THROMBOSIS OF ARTERIES OF THE LOWER EXTREMITIES (HCC): ICD-10-CM

## 2021-05-04 DIAGNOSIS — R05.8 ALLERGIC COUGH: ICD-10-CM

## 2021-05-04 DIAGNOSIS — I10 ESSENTIAL HYPERTENSION: ICD-10-CM

## 2021-05-04 DIAGNOSIS — Z01.00 DIABETIC EYE EXAM (HCC): Primary | ICD-10-CM

## 2021-05-04 DIAGNOSIS — C22.0 HEPATOCELLULAR CARCINOMA (HCC): ICD-10-CM

## 2021-05-04 DIAGNOSIS — Z00.00 MEDICARE ANNUAL WELLNESS VISIT, SUBSEQUENT: ICD-10-CM

## 2021-05-04 DIAGNOSIS — E11.9 DIABETIC EYE EXAM (HCC): Primary | ICD-10-CM

## 2021-05-04 DIAGNOSIS — N18.31 STAGE 3A CHRONIC KIDNEY DISEASE (HCC): ICD-10-CM

## 2021-05-04 DIAGNOSIS — E11.9 TYPE 2 DIABETES MELLITUS WITHOUT COMPLICATION, WITHOUT LONG-TERM CURRENT USE OF INSULIN (HCC): ICD-10-CM

## 2021-05-04 DIAGNOSIS — J45.20 MILD INTERMITTENT REACTIVE AIRWAY DISEASE WITHOUT COMPLICATION: ICD-10-CM

## 2021-05-04 LAB — SL AMB POCT HEMOGLOBIN AIC: 6.9 (ref ?–6.5)

## 2021-05-04 PROCEDURE — 99214 OFFICE O/P EST MOD 30 MIN: CPT | Performed by: FAMILY MEDICINE

## 2021-05-04 PROCEDURE — 83036 HEMOGLOBIN GLYCOSYLATED A1C: CPT | Performed by: FAMILY MEDICINE

## 2021-05-04 PROCEDURE — G0439 PPPS, SUBSEQ VISIT: HCPCS | Performed by: FAMILY MEDICINE

## 2021-05-04 PROCEDURE — 1123F ACP DISCUSS/DSCN MKR DOCD: CPT | Performed by: FAMILY MEDICINE

## 2021-05-04 RX ORDER — AMLODIPINE BESYLATE 5 MG/1
5 TABLET ORAL DAILY
Qty: 30 TABLET | Refills: 5 | Status: SHIPPED | OUTPATIENT
Start: 2021-05-04 | End: 2022-01-01

## 2021-05-04 NOTE — ASSESSMENT & PLAN NOTE
Cough has been persistent but he notes that he has had this over several years at different times of the year and took allergy medicine and cough medicine with codeine at bedtime to help this and at this point I would like him to resume antihistamine tablet Zyrtec once daily

## 2021-05-04 NOTE — PATIENT INSTRUCTIONS
Allergies   AMBULATORY CARE:   Allergies  are an immune system reaction to a substance called an allergen  Your immune system sees the allergen as harmful and attacks it  Common signs and symptoms include the following:   · Mild symptoms  include sneezing and a runny, itchy, or stuffy nose  You may also have swollen, watery, or itchy eyes, or skin itching  You may have swelling or pain where an insect bit or stung you  · Anaphylaxis symptoms  include trouble breathing or swallowing, a rash or hives, or severe swelling  You may also have a cough, wheezing, or feel lightheaded or dizzy  Anaphylaxis is a sudden, life-threatening reaction that needs immediate treatment  Call 911 for signs or symptoms of anaphylaxis,  such as trouble breathing, swelling in your mouth or throat, or wheezing  You may also have itching, a rash, hives, or feel like you are going to faint  Seek care immediately if:   · You have tingling in your hands or feet  · Your skin is red or flushed  Contact your healthcare provider if:   · You have questions or concerns about your condition or care  Steps to take for signs or symptoms of anaphylaxis:   · Immediately  give 1 shot of epinephrine only into the outer thigh muscle  · Leave the shot in place  as directed  Your healthcare provider may recommend you leave it in place for up to 10 seconds before you remove it  This helps make sure all of the epinephrine is delivered  · Call 911 and go to the emergency department,  even if the shot improved symptoms  Do not drive yourself  Bring the used epinephrine shot with you  Treatment for allergies  may include any of the following:  · Antihistamines  help decrease itching, sneezing, and swelling  You may take them as a pill or use drops in your nose or eyes  · Decongestants  help your nose feel less stuffy  · Steroids  decrease swelling and redness  · Topical treatments  help decrease itching or swelling   You also may be given nasal sprays or eyedrops  · Epinephrine  is medicine used to treat severe allergic reactions such as anaphylaxis  · Desensitization  gets your body used to allergens you cannot avoid  Your healthcare provider will give you a shot that contains a small amount of an allergen  He or she will treat any allergic reaction you have  Your provider will give you more of the allergen a little at a time until your body gets used to it  Your reaction to the allergen may be less serious after this treatment  Your healthcare provider will tell you how long to get the shots  Safety precautions to take if you are at risk for anaphylaxis:   · Keep 2 shots of epinephrine with you at all times  You may need a second shot, because epinephrine only works for about 20 minutes and symptoms may return  Your healthcare provider can show you and family members how to give the shot  Check the expiration date every month and replace it before it expires  · Create an action plan  Your healthcare provider can help you create a written plan that explains the allergy and an emergency plan to treat a reaction  The plan explains when to give a second epinephrine shot if symptoms return or do not improve after the first  Give copies of the action plan and emergency instructions to family members and work staff  Show them how to give a shot of epinephrine  · Be careful when you exercise  If you have had exercise-induced anaphylaxis, do not exercise right after you eat  Stop exercising right away if you start to develop any signs or symptoms of anaphylaxis  You may first feel tired, warm, or have itchy skin  Hives, swelling, and severe breathing problems may develop if you continue to exercise  · Carry medical alert identification  Wear medical alert jewelry or carry a card that explains the allergy  Ask your healthcare provider where to get these items  · Inform all healthcare providers of the allergy  This includes dentists, nurses, doctors, and surgeons  Manage allergies:   · Use nasal rinses as directed  Rinse with a saline solution daily  This will help clear allergens out of your nose  Use distilled water if possible  You can also boil tap water and let it cool before you use it  Do not use tap water that has not been boiled  · Do not smoke  Allergy symptoms may decrease if you are not around smoke  Nicotine and other chemicals in cigarettes and cigars can cause lung damage  Ask your healthcare provider for information if you currently smoke and need help to quit  E-cigarettes or smokeless tobacco still contain nicotine  Talk to your healthcare provider before you use these products  Prevent an allergic reaction:   · Do not go outside when pollen counts are high if you have seasonal allergies  Your symptoms may be better if you go outside only in the morning or evening  Use your air conditioner, and change air filters often  · Avoid dust, fur, and mold  Dust and vacuum your home often  You may want to wear a mask when you vacuum  Keep pets in certain rooms, and bathe them often  Use a dehumidifier (machine that decreases moisture) to help prevent mold  · Do not use products that contain latex if you have a latex allergy  Use nonlatex gloves if you work in healthcare or in food preparation  Always tell healthcare providers about a latex allergy  · Avoid areas that attract insects if you have an insect bite or sting allergy  Areas include trash cans, gardens, and picnics  Do not wear bright clothing or strong scents when you will be outside  · Prevent an allergic reaction caused by food  You may have a reaction if your food is not prepared safely  For example, you could be served food that touched your trigger food during preparation  This is called cross-contamination  Kitchen tools can also cause cross-contamination   You may also eat baked foods that contain a trigger food you do not know about  Ask if the food contains your trigger food before you handle or eat it  Follow up with your healthcare provider as directed:  Write down your questions so you remember to ask them during your visits  When you have an allergic reaction, write down everything you were exposed to in the 2 hours before the reaction  Take that information to your next visit  © Copyright [address] Information is for End User's use only and may not be sold, redistributed or otherwise used for commercial purposes  All illustrations and images included in CareNotes® are the copyrighted property of [employer]  or [address] [place], [place] [address]yuliya Lala   The above information is an  only  It is not intended as medical advice for individual conditions or treatments  Talk to your doctor, nurse or pharmacist before following any medical regimen to see if it is safe and effective for you

## 2021-05-04 NOTE — ASSESSMENT & PLAN NOTE
Diabetes stable A1c at 6 7 continue current diet regimen patient is not taking medication but will need to watch diet increase exercise over the summer weight is down 7 lb from 1 month ago    Repeat A1c in 3 months  Lab Results   Component Value Date    HGBA1C 6 7 10/28/2020

## 2021-05-04 NOTE — ASSESSMENT & PLAN NOTE
Patient is continuing with 81 mg aspirin and Zocor 80 mg tablets follow-up with vascular surgery yearly

## 2021-05-04 NOTE — ASSESSMENT & PLAN NOTE
Treated through Hematology-Oncology patient is medically stable currently laboratory work reviewed he is doing well without indigestion or bowel or bladder difficulty    Continue to monitor

## 2021-05-04 NOTE — PROGRESS NOTES
Assessment/Plan:       Problem List Items Addressed This Visit        Digestive    Hepatocellular carcinoma ([facility] [address] )      Treated through Hematology-Oncology patient is medically stable currently laboratory work reviewed he is doing well without indigestion or bowel or bladder difficulty  Continue to monitor         Relevant Medications    metoprolol tartrate (LOPRESSOR) 25 mg tablet       Endocrine    Type 2 diabetes mellitus without complication, without long-term current use of insulin (HCC)      Diabetes stable A1c at 6 7 continue current diet regimen patient is not taking medication but will need to watch diet increase exercise over the summer weight is down 7 lb from 1 month ago    Repeat A1c in 3 months  Lab Results   Component Value Date    HGBA1C 6 7 10/28/2020            Relevant Medications    metoprolol tartrate (LOPRESSOR) 25 mg tablet    Other Relevant Orders    POCT hemoglobin A1c (Completed)    Microalbumin / creatinine urine ratio ([facility])       Respiratory    Reactive airway disease without complication      Breathing is stable no worsening shortness of breath no new treatment at this time         Relevant Medications    metoprolol tartrate (LOPRESSOR) 25 mg tablet       Cardiovascular and Mediastinum    Essential hypertension      Hypertension stable overall with amlodipine 5 mg tablets and metoprolol 25 mg tablets         Relevant Medications    amLODIPine (NORVASC) 5 mg tablet    metoprolol tartrate (LOPRESSOR) 25 mg tablet    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HCC)      Patient is continuing with 81 mg aspirin and Zocor 80 mg tablets follow-up with vascular surgery yearly         Relevant Medications    metoprolol tartrate (LOPRESSOR) 25 mg tablet       Genitourinary    Stage 3a chronic kidney disease (HCC)     Lab Results   Component Value Date    EGFR 62 05/01/2021    EGFR 67 04/12/2021    EGFR 77 04/08/2021    CREATININE 1 10 05/01/2021    CREATININE 1 03 04/12/2021    CREATININE 0 92 04/08/2021    reviewed laboratory work continue with adequate hydration and monitor GFR BUN creatinine every 3-4 months         Relevant Medications    metoprolol tartrate (LOPRESSOR) 25 mg tablet       Other    Medicare annual wellness visit, subsequent    Allergic cough     Cough has been persistent but he notes that he has had this over several years at different times of the year and took allergy medicine and cough medicine with codeine at bedtime to help this and at this point I would like him to resume antihistamine tablet Zyrtec once daily         Relevant Medications    metoprolol tartrate (LOPRESSOR) 25 mg tablet      Other Visit Diagnoses     Diabetic eye exam ([facility] [address] )    -  Primary    Relevant Medications    metoprolol tartrate (LOPRESSOR) 25 mg tablet            Subjective:      Patient ID: America Kramer is a 78 y o  male  Patient is here for Medicare wellness visit review of laboratory work and discussion regarding hepatocellular cancer      The following portions of the patient's history were reviewed and updated as appropriate: allergies, current medications, past family history, past medical history, past social history, past surgical history and problem list     Review of Systems   Constitutional: Negative for chills, fatigue and fever  HENT: Negative for congestion, nosebleeds, rhinorrhea, sinus pressure and sore throat  Eyes: Negative for discharge and redness  Respiratory: Negative for cough and shortness of breath  Cardiovascular: Negative for chest pain, palpitations and leg swelling  Gastrointestinal: Negative for abdominal pain, blood in stool and nausea  Endocrine: Negative for cold intolerance, heat intolerance and polyuria  Genitourinary: Negative for dysuria and frequency  Musculoskeletal: Negative for arthralgias, back pain and myalgias  Skin: Negative for rash  Neurological: Negative for dizziness, weakness and headaches  Hematological: Negative for adenopathy  Psychiatric/Behavioral: Negative for behavioral problems and sleep disturbance  The patient is not nervous/anxious  Objective:      /64   Pulse 91   Temp (!) 96 3 °F (35 7 °C)   Ht 5' 9" (1 753 m)   Wt 88 5 kg (195 lb)   SpO2 93%   BMI 28 80 kg/m²        Physical Exam  Vitals signs and nursing note reviewed  Constitutional:       Appearance: Normal appearance  He is well-developed and normal weight  HENT:      Head: Normocephalic and atraumatic  Right Ear: Tympanic membrane and external ear normal       Left Ear: Tympanic membrane and external ear normal       Nose: Nose normal       Mouth/Throat:      Mouth: Mucous membranes are moist    Eyes:      General: No scleral icterus  Conjunctiva/sclera: Conjunctivae normal       Pupils: Pupils are equal, round, and reactive to light  Neck:      Musculoskeletal: Normal range of motion and neck supple  Thyroid: No thyromegaly  Vascular: No JVD  Cardiovascular:      Rate and Rhythm: Normal rate and regular rhythm  Heart sounds: Normal heart sounds  No murmur  Pulmonary:      Effort: Pulmonary effort is normal       Breath sounds: Normal breath sounds  No wheezing or rales  Chest:      Chest wall: No tenderness  Abdominal:      General: Bowel sounds are normal  There is no distension  Palpations: Abdomen is soft  There is no mass  Tenderness: There is no abdominal tenderness  There is no guarding or rebound  Musculoskeletal: Normal range of motion  General: No tenderness or deformity  Lymphadenopathy:      Cervical: No cervical adenopathy  Skin:     General: Skin is warm and dry  Findings: No erythema or rash  Neurological:      General: No focal deficit present  Mental Status: He is alert and oriented to person, place, and time  Cranial Nerves: No cranial nerve deficit  Deep Tendon Reflexes: Reflexes are normal and symmetric  Reflexes normal    Psychiatric:         Mood and Affect: Mood normal          Behavior: Behavior normal          Thought Content: Thought content normal          Judgment: Judgment normal           Data:    Laboratory Results: I have personally reviewed the pertinent laboratory results/reports   Radiology/Other Diagnostic Testing Results: I have personally reviewed pertinent reports         Lab Results   Component Value Date    WBC 7 30 04/08/2021    HGB 13 7 (L) 04/08/2021    HCT 40 7 (L) 04/08/2021    MCV 93 04/08/2021     (L) 04/08/2021     Lab Results   Component Value Date    K 4 3 05/01/2021     05/01/2021    CO2 34 (H) 05/01/2021    BUN 18 05/01/2021    CREATININE 1 10 05/01/2021    GLUF 138 (H) 05/01/2021    CALCIUM 9 5 05/01/2021    CORRECTEDCA 10 1 05/01/2021    AST 43 05/01/2021    ALT 51 05/01/2021    ALKPHOS 196 (H) 05/01/2021    EGFR 62 05/01/2021     No results found for: CHOLESTEROL  No results found for: HDL  No results found for: LDLCALC  No results found for: TRIG  No results found for: CHOLHDL  No results found for: [id number], TSH  Lab Results   Component Value Date    HGBA1C 6 9 (A) 05/04/2021     No results found for: PSA    Yamil Evangelista DO

## 2021-05-04 NOTE — PROGRESS NOTES
Assessment and Plan:     Problem List Items Addressed This Visit     None           Preventive health issues were discussed with patient, and age appropriate screening tests were ordered as noted in patient's After Visit Summary  Personalized health advice and appropriate referrals for health education or preventive services given if needed, as noted in patient's After Visit Summary       History of Present Illness:     Patient presents for Medicare Annual Wellness visit    Patient Care Team:  Gissel Davila DO as PCP - General (Family Medicine)  MD Christopher Caputo MD (Radiation Oncology)  Ida Jay MD (Oncology)     Problem List:     Patient Active Problem List   Diagnosis    Essential hypertension    Hyperlipidemia    Cardiomegaly    Anxiety    Abnormal electrocardiogram    Coronary artery disease without angina pectoris    PAD (peripheral artery disease) ([facility] [address] )    Viral URI with cough    Reactive airway disease without complication    Medicare annual wellness visit, subsequent    Hyperglycemia    Ankle edema, bilateral    Transaminitis    Respiratory insufficiency    EMILIANO (acute kidney injury) ([facility] [address] )    Elevated troponin    Septic shock ([facility] [address] )    Hepatocellular carcinoma ([facility] [address] )    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[facility] [address] )    Acute respiratory failure with hypoxia ([address]    Stage 3a chronic kidney disease ([address]    Sepsis ([address]    Pneumonia    Cough    Platelets decreased ([facility] [address] [facility]    Type 2 diabetes mellitus without complication, without long-term current use of insulin ([facility] [address] )      Past Medical and Surgical History:     Past Medical History:   Diagnosis Date    Coronary artery disease     Gout     Hypercholesterolemia     Hypertension     Liver cancer ([facility] [address] )      Past Surgical History:   Procedure Laterality Date    ABDOMINAL SURGERY      appy    APPENDECTOMY      CORONARY ARTERY BYPASS GRAFT      IR BIOPSY LIVER MASS  8/25/2020    IR Y-90 PRE-ANGIO/EMBO W/ LUNG SCAN  10/6/2020    IR Y-90 RADIOEMBOLIZATION  10/14/2020      Family History:     Family History   Problem Relation Age of Onset    No Known Problems Mother     No Known Problems Father       Social History:     E-Cigarette/Vaping    E-Cigarette Use Never User      E-Cigarette/Vaping Substances    Nicotine No     THC No     CBD No     Flavoring No     Other No     Unknown No      Social History     Socioeconomic History    Marital status: /Civil Union     Spouse name: Not on file    Number of children: Not on file    Years of education: Not on file    Highest education level: Not on file   Occupational History    Not on file   Social Needs    Financial resource strain: Not on file    Food insecurity     Worry: Not on file     Inability: Not on file   [employer] needs     Medical: Not on file     Non-medical: Not on file   Tobacco Use    Smoking status: Never Smoker    Smokeless tobacco: Never Used   Substance and Sexual Activity    Alcohol use: Not Currently     Frequency: Never    Drug use: Never    Sexual activity: Not on file   Lifestyle    Physical activity     Days per week: Not on file     Minutes per session: Not on file    Stress: Not on file   Relationships    Social connections     Talks on phone: Not on file     Gets together: Not on file     Attends [employer] service: Not on file     Active member of club or organization: Not on file     Attends meetings of clubs or organizations: Not on file     Relationship status: Not on file    Intimate partner violence     Fear of current or ex partner: Not on file     Emotionally abused: Not on file     Physically abused: Not on file     Forced sexual activity: Not on file   Other Topics Concern    Not on file   Social History Narrative    Not on file      Medications and Allergies:     Current Outpatient Medications   Medication Sig Dispense Refill    allopurinol (ZYLOPRIM) 300 mg tablet Take 300 mg by mouth daily      amLODIPine (NORVASC) 5 mg tablet Take 1 tablet (5 mg total) by mouth daily 30 tablet 0    aspirin 81 mg chewable tablet Chew 1 tablet (81 mg total) daily 30 tablet 0    benzonatate (TESSALON PERLES) 100 mg capsule Take 1 capsule (100 mg total) by mouth 3 (three) times a day 90 capsule 0    cholecalciferol (VITAMIN D3) 400 units tablet Take 1,000 Units by mouth daily       LORazepam (ATIVAN) 1 mg tablet Take 1 tablet 2 hours prior to MRI as directed 2 tablet 0    metoprolol tartrate (LOPRESSOR) 25 mg tablet Take 1 tablet by mouth 2 (two) times a day      Omega-3 Fatty Acids (fish oil) 1,000 mg Take 1,000 mg by mouth daily      pantoprazole (PROTONIX) 40 mg tablet Take 1 tablet (40 mg total) by mouth daily in the early morning 30 tablet 0    simvastatin (ZOCOR) 80 mg tablet TAKE ONE TABLET BY MOUTH EVERY DAY AT 5 PM FOR CHOLESTEROL       No current facility-administered medications for this visit        Allergies   Allergen Reactions    Shellfish-Derived Products - Food Allergy       Immunizations:     Immunization History   Administered Date(s) Administered    INFLUENZA 10/16/2002, 10/01/2003, 11/17/2004, 10/25/2005, 11/01/2006, 10/30/2007, 10/14/2008, 09/18/2009, 11/02/2010, 10/01/2011, 10/01/2012, 10/02/2013, 10/02/2014, 10/02/2018, 10/01/2019, 11/01/2019, 11/01/2020    Influenza Split High Dose Preservative Free IM 10/26/2019    Influenza, high dose seasonal 0 7 mL 10/23/2020    Influenza, injectable, quadrivalent, preservative free 0 5 mL 10/02/2018    Influenza, seasonal, injectable, preservative free 10/26/2015, 10/20/2016    Pneumococcal 10/16/2002    Pneumococcal Conjugate 13-Valent 11/24/2015    Pneumococcal Polysaccharide PPV23 05/31/2016    SARS-CoV-2 / COVID-19 mRNA IM (Moderna) 01/20/2021, 02/17/2021    Zoster 11/15/2013, 09/01/2014, 10/02/2018, 04/02/2019    Zoster Vaccine Recombinant 10/02/2018, 04/02/2019      Health Maintenance:         Topic Date Due  Hepatitis C Screening  Completed     There are no preventive care reminders to display for this patient  Medicare Health Risk Assessment:     There were no vitals taken for this visit  Health Risk Assessment:   Patient rates overall health as very good  Patient feels that their physical health rating is same  Patient is very satisfied with their life  Eyesight was rated as same  Hearing was rated as same  Patient feels that their emotional and mental health rating is same  Patients states they are never, rarely angry  Patient states they are often unusually tired/fatigued  Pain experienced in the last 7 days has been none  Patient states that he has experienced no weight loss or gain in last 6 months  Fall Risk Screening: In the past year, patient has experienced: no history of falling in past year      Home Safety:  Patient does not have trouble with stairs inside or outside of their home  Patient has working smoke alarms and has no working carbon monoxide detector  Home safety hazards include: none  Nutrition:   Current diet is Low Cholesterol and Limited junk food  Medications:   Patient is currently taking over-the-counter supplements  OTC medications include: see medication list  Patient is able to manage medications  Activities of Daily Living (ADLs)/Instrumental Activities of Daily Living (IADLs):   Walk and transfer into and out of bed and chair?: Yes  Dress and groom yourself?: Yes    Bathe or shower yourself?: Yes    Feed yourself? Yes  Do your laundry/housekeeping?: Yes  Manage your money, pay your bills and track your expenses?: Yes  Make your own meals?: Yes    Do your own shopping?: Yes    Previous Hospitalizations:   Any hospitalizations or ED visits within the last 12 months?: Yes    How many hospitalizations have you had in the last year?: 1-2    Advance Care Planning:   Living will: Yes    Durable POA for healthcare:  Yes    Advanced directive: Yes      PREVENTIVE SCREENINGS      Cardiovascular Screening:    General: Screening Not Indicated and History Lipid Disorder      Diabetes Screening:     General: Screening Not Indicated and History Diabetes      Prostate Cancer Screening:    General: Screening Not Indicated      Lung Cancer Screening:     General: Screening Not Indicated      Hepatitis C Screening:    General: Screening Current    Screening, Brief Intervention, and Referral to Treatment (SBIRT)    Screening  Typical number of drinks in a day: 0  Typical number of drinks in a week: 0  Interpretation: Low risk drinking behavior        Gissel Davila DO

## 2021-05-04 NOTE — ASSESSMENT & PLAN NOTE
Lab Results   Component Value Date    EGFR 62 05/01/2021    EGFR 67 04/12/2021    EGFR 77 04/08/2021    CREATININE 1 10 05/01/2021    CREATININE 1 03 04/12/2021    CREATININE 0 92 04/08/2021    reviewed laboratory work continue with adequate hydration and monitor GFR BUN creatinine every 3-4 months

## 2021-05-06 ENCOUNTER — TELEPHONE (OUTPATIENT)
Dept: HEMATOLOGY ONCOLOGY | Facility: CLINIC | Age: 81
End: 2021-05-06

## 2021-05-06 ENCOUNTER — DOCUMENTATION (OUTPATIENT)
Dept: HEMATOLOGY ONCOLOGY | Facility: CLINIC | Age: 81
End: 2021-05-06

## 2021-05-06 ENCOUNTER — TELEPHONE (OUTPATIENT)
Dept: ADMINISTRATIVE | Facility: OTHER | Age: 81
End: 2021-05-06

## 2021-05-06 ENCOUNTER — OFFICE VISIT (OUTPATIENT)
Dept: SURGICAL ONCOLOGY | Facility: CLINIC | Age: 81
End: 2021-05-06
Payer: MEDICARE

## 2021-05-06 VITALS
RESPIRATION RATE: 17 BRPM | HEIGHT: 69 IN | HEART RATE: 82 BPM | DIASTOLIC BLOOD PRESSURE: 62 MMHG | BODY MASS INDEX: 28.88 KG/M2 | SYSTOLIC BLOOD PRESSURE: 120 MMHG | TEMPERATURE: 98.2 F | WEIGHT: 195 LBS

## 2021-05-06 DIAGNOSIS — C22.0 HEPATOCELLULAR CARCINOMA (HCC): Primary | ICD-10-CM

## 2021-05-06 PROCEDURE — 99215 OFFICE O/P EST HI 40 MIN: CPT | Performed by: SURGERY

## 2021-05-06 NOTE — TELEPHONE ENCOUNTER
----- Message from Anupama Reyez MA sent at 5/4/2021  2:02 PM EDT -----  Regarding: diabetic eye  05/04/21 2:02 PM    Hello, our patient Terry Penn has had Diabetic Eye Exam completed/performed   Please assist in updating the patient chart by making an External outreach to [facility] facility located in [place] The date of service is 2020    Thank you,  RUTH ANN Jeffries PG

## 2021-05-06 NOTE — LETTER
May 6, 2021     Gissel Davila, [address] [place] [address]    Patient: Arleen Russell   YOB: 1940   Date of Visit: 5/6/2021       Dear Dr Noble Tillman: Thank you for referring Carina Earl to me for evaluation  Below are my notes for this consultation  If you have questions, please do not hesitate to call me  I look forward to following your patient along with you  Sincerely,        Arie Lloyd MD        CC: MD Dung Tirado MD Ltanya Maki, MD  5/6/2021 11:00 AM  Sign when Signing Visit               Surgical Oncology Follow Up       [address]  1940  [id number]  [facility]  [facility]  [facility] [place]  [address]  [facility] [address]    Diagnoses and all orders for this visit:    Hepatocellular carcinoma ([facility] )  -     MRI abdomen w wo contrast; Future  -     BUN; Future  -     AFP tumor marker; Future  -     Creatinine, serum; Future  -     Ambulatory referral to Hematology / Oncology; Future        No chief complaint on file  Return in about 4 months (around 9/6/2021) for Office Visit, Imaging - See orders, Labs - See Treatment Plan        Oncology History   Hepatocellular carcinoma ([facility] )   8/24/2020 Initial Diagnosis    Hepatocellular carcinoma ([facility] )     8/25/2020 Biopsy    IR liver biopsy  - Well-differentiated heptocellular carcinoma     9/22/2020 -  Cancer Staged    Staging form: Liver (Excluding Intrahepatic Bile Ducts), AJCC 8th Edition  - Clinical: Stage IB (cT1b, cN0, cM0) - Signed by Mary Davis MD on 9/22/2020       10/14/2020 - 10/14/2020 Radiation    Field Numbers Energy Treatment Site Starting  Ending  Elapsed  Fraction Total  Overlap Site Overlap         Date Date Days Dose Dose   Dose    SirSphere  Y90 Rt Lobe + Middle Robe   10/14/20 10/14/20 1  51 GBq  1 51 GBq                                                                                        Date / Time stamp:  10/14/2020  2:43 PM         Staging:  Well-differentiated HCC, August 2020  Treatment history:  Sir ranjit, October 2020  Current treatment:  TACE pending  Disease status: MARIA M    History of Present Illness:  Patient returns in follow-up of his [facility]  He is doing very well this time with no complaints  He denies any abdominal pain, nausea or vomiting  MRI from April 22, 2021 revealed a stable hepatic mass  There were areas of irregular enhancement  This did have viable tumor  I personally reviewed the films  He comes in now to discuss further therapy  His AFP level remains normal     Review of Systems  Complete ROS Surg Onc:   Complete ROS Surg Onc:   Constitutional: The patient denies new or recent history of general fatigue, no recent weight loss, no change in appetite  Eyes: No complaints of visual problems, no scleral icterus  ENT: no complaints of ear pain, no hoarseness, no difficulty swallowing,  no tinnitus and no new masses in head, oral cavity, or neck  Cardiovascular: No complaints of chest pain, no palpitations, no ankle edema  Respiratory: No complaints of shortness of breath, no cough  Gastrointestinal: No complaints of jaundice, no bloody stools, no pale stools  Genitourinary: No complaints of dysuria, no hematuria, no nocturia, no frequent urination, no urethral discharge  Musculoskeletal: No complaints of weakness, paralysis, joint stiffness or arthralgias  Integumentary: No complaints of rash, no new lesions  Neurological: No complaints of convulsions, no seizures, no dizziness  Hematologic/Lymphatic: No complaints of easy bruising  Endocrine:  No hot or cold intolerance  No polydipsia, polyphagia, or polyuria  Allergy/immunology:  No environmental allergies  No food allergies  Not immunocompromised  Skin:  No pallor or rash    No wound          Patient Active Problem List   Diagnosis    Essential hypertension    Hyperlipidemia    Cardiomegaly    Anxiety    Abnormal electrocardiogram    Coronary artery disease without angina pectoris    PAD (peripheral artery disease) (HCC)    Viral URI with cough    Reactive airway disease without complication    Medicare annual wellness visit, subsequent    Hyperglycemia    Ankle edema, bilateral    Transaminitis    Respiratory insufficiency    EMILIANO (acute kidney injury) ([place] [address] )    Elevated troponin    Septic shock (HCC)    Hepatocellular carcinoma (HCC)    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HCC)    Acute respiratory failure with hypoxia (HCC)    Stage 3a chronic kidney disease ([place] [address] )    Sepsis ([place] [address] )    Pneumonia    Cough    Platelets decreased ([place] [address] )    Type 2 diabetes mellitus without complication, without long-term current use of insulin (HCC)    Allergic cough     Past Medical History:   Diagnosis Date    Coronary artery disease     Gout     Hypercholesterolemia     Hypertension     Liver cancer ([place] [address] )      Past Surgical History:   Procedure Laterality Date    ABDOMINAL SURGERY      appy    APPENDECTOMY      CORONARY ARTERY BYPASS GRAFT      IR BIOPSY LIVER MASS  8/25/2020    IR Y-90 PRE-ANGIO/EMBO W/ LUNG SCAN  10/6/2020    IR Y-90 RADIOEMBOLIZATION  10/14/2020     Family History   Problem Relation Age of Onset    No Known Problems Mother     No Known Problems Father      Social History     Socioeconomic History    Marital status: /Civil Union     Spouse name: Not on file    Number of children: Not on file    Years of education: Not on file    Highest education level: Not on file   Occupational History    Not on file   Social Needs    Financial resource strain: Not on file    Food insecurity     Worry: Not on file     Inability: Not on file    Transportation needs     Medical: Not on file     Non-medical: Not on file   Tobacco Use    Smoking status: Never Smoker    Smokeless tobacco: Never Used   Substance and Sexual Activity    Alcohol use: Not Currently     Frequency: Never    Drug use: Never    Sexual activity: Not on file   Lifestyle    Physical activity     Days per week: Not on file     Minutes per session: Not on file    Stress: Not on file   Relationships    Social connections     Talks on phone: Not on file     Gets together: Not on file     Attends Rastafarian service: Not on file     Active member of club or organization: Not on file     Attends meetings of clubs or organizations: Not on file     Relationship status: Not on file    Intimate partner violence     Fear of current or ex partner: Not on file     Emotionally abused: Not on file     Physically abused: Not on file     Forced sexual activity: Not on file   Other Topics Concern    Not on file   Social History Narrative    Not on file       Current Outpatient Medications:     allopurinol (ZYLOPRIM) 300 mg tablet, Take 300 mg by mouth daily, Disp: , Rfl:     amLODIPine (NORVASC) 5 mg tablet, Take 1 tablet (5 mg total) by mouth daily, Disp: 30 tablet, Rfl: 5    aspirin 81 mg chewable tablet, Chew 1 tablet (81 mg total) daily, Disp: 30 tablet, Rfl: 0    benzonatate (TESSALON PERLES) 100 mg capsule, Take 1 capsule (100 mg total) by mouth 3 (three) times a day, Disp: 90 capsule, Rfl: 0    cholecalciferol (VITAMIN D3) 400 units tablet, Take 1,000 Units by mouth daily , Disp: , Rfl:     LORazepam (ATIVAN) 1 mg tablet, Take 1 tablet 2 hours prior to MRI as directed, Disp: 2 tablet, Rfl: 0    metoprolol tartrate (LOPRESSOR) 25 mg tablet, Take 1 tablet (25 mg total) by mouth 2 (two) times a day, Disp: 60 tablet, Rfl: 3    Omega-3 Fatty Acids (fish oil) 1,000 mg, Take 1,000 mg by mouth daily, Disp: , Rfl:     pantoprazole (PROTONIX) 40 mg tablet, Take 1 tablet (40 mg total) by mouth daily in the early morning, Disp: 30 tablet, Rfl: 0    simvastatin (ZOCOR) 80 mg tablet, TAKE ONE TABLET BY MOUTH EVERY DAY AT 5 PM FOR CHOLESTEROL, Disp: , Rfl:   Allergies   Allergen Reactions    Shellfish-Derived Products - Food Allergy      Vitals:    05/06/21 1039   BP: 120/62   Pulse: 82   Resp: 17   Temp: 98 2 °F (36 8 °C)       Physical Exam  Constitutional: General appearance: The Patient is well-developed and well-nourished who appears the stated age in no acute distress  Patient is pleasant and talkative  HEENT:  Normocephalic  Sclerae are anicteric  Mucous membranes are moist  Neck is supple without adenopathy  No JVD  Chest: The lungs are clear to auscultation  Cardiac: Heart is regular rate  Abdomen: Abdomen is soft, non-tender, non-distended and without masses  Extremities: There is no clubbing or cyanosis  There is no edema  Symmetric  Neuro: Grossly nonfocal  Gait is normal      Lymphatic: No evidence of cervical adenopathy bilaterally  No evidence of axillary adenopathy bilaterally  No evidence of inguinal adenopathy bilaterally  Skin: Warm, anicteric  Psych:  Patient is pleasant and talkative  Breasts:        Pathology:  [unfilled]    Labs:   Ref Range & Units 4/12/21 9:43 AM   AFP TUMOR MARKER 0 5 - 8 ng/mL 3 3          Imaging  Mri Abdomen W Wo Contrast    Result Date: 4/28/2021  Narrative: MRI OF THE ABDOMEN WITH AND WITHOUT CONTRAST INDICATION:  56-year-old male with history of hepatocellular carcinoma, diagnosed in August 2020 and treated with radioembolization with Y-90 microspheres in October 2020  Follow-up  COMPARISON: Pretreatment MRI from August 23, 2020  Outside posttreatment MRI from January 19, 2021   TECHNIQUE:  The following pulse sequences were obtained:  Coronal and axial T2 with TE of 90 and 180 respectively, axial T2 with fat saturation, axial FIESTA fat-sat, axial T1-weighted in-and-out-of phase, axial DWI/ADC, pre-contrast axial T1 with fat saturation, post-contrast dynamic axial T1 with fat saturation at 20, 70, and 180 seconds, followed by coronal and 7 minute delayed axial T1 with fat saturation  IV Contrast:  8 mL of Gadobutrol injection (SINGLE-DOSE) FINDINGS: LOWER CHEST:   Trace left pleural effusion  LIVER: Normal in size, shape and signal   7 1 x 7 8 cm well-circumscribed mass, located primarily in segment 4 with T2 hyperintensity, T1 hypointensity and irregular arterial phase and delayed peripheral enhancement as well as enhancement of several internal septations  Small lateral peripheral cystic or necrotic component  Delayed capsular enhancement  Appearance is little changed since the outside MRI from 1/19/2021  Portal veins patent with displacement of portions of the left and right portal veins by the adjacent mass  Hepatic veins patent  BILE DUCTS:  Mild dilatation of bile ducts in segment 2, adjacent to the mass, best appreciated on delayed enhanced images (series 14, images 47-55)  Minimal ductal dilatation in segment 8  Common bile duct normal in caliber  GALLBLADDER:  Contracted with several gallstones  PANCREAS:  Normal  No main pancreatic ductal dilation  ADRENAL GLANDS:  Normal  SPLEEN:  Normal  KIDNEYS/PROXIMAL URETERS:  No hydroureteronephrosis  No suspicious renal mass  Multiple bilateral renal cysts  BOWEL:   Unremarkable  No dilated loops of bowel  PERITONEAL CAVITY/RETROPERITONEUM:  No mass or ascites  LYMPH NODES:  No abdominal lymphadenopathy  VASCULAR STRUCTURES:  Unremarkable  No aortic aneurysm  ABDOMINAL WALL:  Unremarkable  OSSEOUS STRUCTURES:  Normal marrow signal and enhancement  No evidence of recent fracture, mass or marrow replacing process  Impression: 7 1 x 7 8 cm hepatic mass with peripheral and irregular areas of enhancement, not measurably changed in size since 1/19/2021 (LR-TR viable)  Workstation performed: [id number]     Us Right Upper Quadrant    Result Date: 4/6/2021  Narrative: RIGHT UPPER QUADRANT ULTRASOUND INDICATION:     elevated LFTs   COMPARISON:  Right upper quadrant ultrasound dated 8/2020  TECHNIQUE:   Real-time ultrasound of the right upper quadrant was performed with a curvilinear transducer with both volumetric sweeps and still imaging techniques  FINDINGS: PANCREAS:  Obscured by overlying bowel gas  AORTA AND IVC:  Obscured by overlying bowel gas  LIVER: Size:  Within normal range  The liver measures 11 1 cm in the midclavicular line  Contour:  Surface contour is smooth  Parenchyma:  Echogenicity and echotexture are within normal limits  Again seen is a 7 2 x 6 6 x 6 6 cm solid mass in the central liver  A 1 7 x 1 4 x 1 3 cm heterogeneous lesion is seen in the posterior right hepatic lobe  Limited imaging of the main portal vein shows it to be patent and hepatopetal   BILIARY: The gallbladder is underdistended  Multiple shadowing gallstones are identified in the gallbladder  There is no gallbladder wall thickening or pericholecystic fluid  No sonographic Whittington's sign is elicited  Again noted are echogenic foci with associated ringdown artifact in the gallbladder wall consistent with adenomyomatosis  No intrahepatic biliary dilatation  CBD measures 6 mm  No choledocholithiasis  KIDNEY: Right kidney measures 11 8 cm  There are several simple cysts in the kidney, the largest of which measures 2 4 x 2 5 x 3 4 cm  ASCITES:   None  Impression: Cholelithiasis  Adenomyomatosis  Redemonstrated 7 2 x 6 6 x 6 6 cm solid mass in the central liver  1 7 x 1 4 x 1 3 cm heterogeneous lesion in the posterior right hepatic lobe  This can be further evaluated on follow-up abdominal MRI  Pancreas obscured by overlying bowel gas  Workstation performed: [id number]     I reviewed the above laboratory and imaging data      Discussion/Summary:  49-year-old male with well-differentiated HCC   The lesion is very centrally located in his liver  [place] his age and the central location, I am not sure that he would tolerate hepatectomy well, consequently, we elected on Sir spheres embolization  He tolerated this well in October of 2020  He now has viable disease on the MRI done here  His case was discussed at multidisciplinary clinic this morning  It was recommended that he consider TACE  Sir spheres could be given again if there continues to be recurrence  He is set up to discuss this with IR later next week  It was also recommended that he follow-up with Medical Oncology  He is actually agreeable to this at this time  I will schedule the appointment with Dr Hugo Lou since he suggested immunotherapy in the adjuvant setting  I will see him again in 4 months with a repeat MRI and AFP level  He is agreeable to this    All his questions were answered

## 2021-05-06 NOTE — PROGRESS NOTES
RECTAL/GI MULTIDISCIPLINARY CASE REVIEW    DATE: 5/6/21      PRESENTING DOCTOR: Dr Eva Zuñiga      DIAGNOSIS: Hepatocellular Carcinoma      Dino Ortiz was presented at the Rectal/GI Multidisciplinary Conference today  PHYSICIAN RECOMMENDED PLAN:    -Consider TACE  -Can also consider Med/Onc consult for consideration of immunotherapy after TACE procedure    -Patient is scheduled to see IR 5/11/21  -Dr Gely Yen to discuss options with patient at office visit today, 5/6/21    Team agreed to plan      NCCN guidelines were readily available for review at this discussion

## 2021-05-06 NOTE — TELEPHONE ENCOUNTER
Intra-Department Referral    Patient Details:  Macrina Govea  1940  [id number]   Background Information:  [address] starts by opening a telephone encounter and gathering the following information   Who is calling to schedule and relationship? office   Diagnosis [facility] [address]    What department was patient seen in last? Surg Onc   Scheduling Information:    Preferred [facility]   Are there any days the patient cannot be seen? no   Miscellaneous:  Dr Nilo Vizcarra requested pt see Dr Brett Hermosillo    After completing the above information, please route to nurse navigation, clinical trials (for re-evaluation) and [place]

## 2021-05-06 NOTE — PROGRESS NOTES
Surgical Oncology Follow Up       Hugo Caballero [facility]  [facility] [place]  [address]  [facility] [address] [address]  1940  [phone]  Hugo Caballero [facility]  [facility]  [address]  [facility] [address]    Diagnoses and all orders for this visit:    Hepatocellular carcinoma ([facility] )  -     MRI abdomen w wo contrast; Future  -     BUN; Future  -     AFP tumor marker; Future  -     Creatinine, serum; Future  -     Ambulatory referral to Hematology / Oncology; Future        No chief complaint on file  Return in about 4 months (around 9/6/2021) for Office Visit, Imaging - See orders, Labs - See Treatment Plan  Oncology History   Hepatocellular carcinoma ([facility] )   8/24/2020 Initial Diagnosis    Hepatocellular carcinoma ([place] [address] )     8/25/2020 Biopsy    IR liver biopsy  - Well-differentiated heptocellular carcinoma     9/22/2020 -  Cancer Staged    Staging form: Liver (Excluding Intrahepatic Bile Ducts), AJCC 8th Edition  - Clinical: Stage IB (cT1b, cN0, cM0) - Signed by Mary Davis MD on 9/22/2020       10/14/2020 - 10/14/2020 Radiation    Field Numbers Energy Treatment Site Starting  Ending  Elapsed  Fraction Total  Overlap Site Overlap         Date Date Days Dose Dose   Dose    SirSphere  Y90 Rt Lobe + Middle Robe   10/14/20 10/14/20    1 51 GBq  1 51 GBq                                                                                        Date / Time stamp:  10/14/2020  2:43 PM         Staging:  Well-differentiated HCC, August 2020  Treatment history:  Sir spheres, October 2020  Current treatment:  TACE pending  Disease status: MARIA M    History of Present Illness:  Patient returns in follow-up of his [facility]  He is doing very well this time with no complaints  He denies any abdominal pain, nausea or vomiting  MRI from April 22, 2021 revealed a stable hepatic mass    There were areas of irregular enhancement  This did have viable tumor  I personally reviewed the films  He comes in now to discuss further therapy  His AFP level remains normal     Review of Systems  Complete ROS Surg Onc:   Complete ROS Surg Onc:   Constitutional: The patient denies new or recent history of general fatigue, no recent weight loss, no change in appetite  Eyes: No complaints of visual problems, no scleral icterus  ENT: no complaints of ear pain, no hoarseness, no difficulty swallowing,  no tinnitus and no new masses in head, oral cavity, or neck  Cardiovascular: No complaints of chest pain, no palpitations, no ankle edema  Respiratory: No complaints of shortness of breath, no cough  Gastrointestinal: No complaints of jaundice, no bloody stools, no pale stools  Genitourinary: No complaints of dysuria, no hematuria, no nocturia, no frequent urination, no urethral discharge  Musculoskeletal: No complaints of weakness, paralysis, joint stiffness or arthralgias  Integumentary: No complaints of rash, no new lesions  Neurological: No complaints of convulsions, no seizures, no dizziness  Hematologic/Lymphatic: No complaints of easy bruising  Endocrine:  No hot or cold intolerance  No polydipsia, polyphagia, or polyuria  Allergy/immunology:  No environmental allergies  No food allergies  Not immunocompromised  Skin:  No pallor or rash  No wound          Patient Active Problem List   Diagnosis    Essential hypertension    Hyperlipidemia    Cardiomegaly    Anxiety    Abnormal electrocardiogram    Coronary artery disease without angina pectoris    PAD (peripheral artery disease) (HCC)    Viral URI with cough    Reactive airway disease without complication    Medicare annual wellness visit, subsequent    Hyperglycemia    Ankle edema, bilateral    Transaminitis    Respiratory insufficiency    EM[facility]acute [facility]) [facility] )    Elevated troponin    Septic shock ([facility] )    Hepatocellular carcinoma ([facility] )    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HCC)    Acute respiratory failure with hypoxia (HCC)    Stage 3a chronic kidney disease (HCC)    Sepsis ([facility] )    Pneumonia    Cough    Platelets decreased (HCC)    Type 2 diabetes mellitus without complication, without long-term current use of insulin (HCC)    Allergic cough     Past Medical History:   Diagnosis Date    Coronary artery disease     Gout     Hypercholesterolemia     Hypertension     Liver cancer ([place] [address] )      Past Surgical History:   Procedure Laterality Date    ABDOMINAL SURGERY      appy    APPENDECTOMY      CORONARY ARTERY BYPASS GRAFT      IR BIOPSY LIVER MASS  8/25/2020    IR Y-90 PRE-ANGIO/EMBO W/ LUNG SCAN  10/6/2020    IR Y-90 RADIOEMBOLIZATION  10/14/2020     Family History   Problem Relation Age of Onset    No Known Problems Mother     No Known Problems Father      Social History     Socioeconomic History    Marital status: /Civil Union     Spouse name: Not on file    Number of children: Not on file    Years of education: Not on file    Highest education level: Not on file   Occupational History    Not on file   Social Needs    Financial resource strain: Not on file    Food insecurity     Worry: Not on file     Inability: Not on file   [employer] needs     Medical: Not on file     Non-medical: Not on file   Tobacco Use    Smoking status: Never Smoker    Smokeless tobacco: Never Used   Substance and Sexual Activity    Alcohol use: Not Currently     Frequency: Never    Drug use: Never    Sexual activity: Not on file   Lifestyle    Physical activity     Days per week: Not on file     Minutes per session: Not on file    Stress: Not on file   Relationships    Social connections     Talks on phone: Not on file     Gets together: Not on file     Attends Mu-ism service: Not on file     Active member of club or organization: Not on file     Attends meetings of clubs or organizations: Not on file     Relationship status: Not on file    Intimate partner violence     Fear of current or ex partner: Not on file     Emotionally abused: Not on file     Physically abused: Not on file     Forced sexual activity: Not on file   Other Topics Concern    Not on file   Social History Narrative    Not on file       Current Outpatient Medications:     allopurinol (ZYLOPRIM) 300 mg tablet, Take 300 mg by mouth daily, Disp: , Rfl:     amLODIPine (NORVASC) 5 mg tablet, Take 1 tablet (5 mg total) by mouth daily, Disp: 30 tablet, Rfl: 5    aspirin 81 mg chewable tablet, Chew 1 tablet (81 mg total) daily, Disp: 30 tablet, Rfl: 0    benzonatate (TESSALON PERLES) 100 mg capsule, Take 1 capsule (100 mg total) by mouth 3 (three) times a day, Disp: 90 capsule, Rfl: 0    cholecalciferol (VITAMIN D3) 400 units tablet, Take 1,000 Units by mouth daily , Disp: , Rfl:     LORazepam (ATIVAN) 1 mg tablet, Take 1 tablet 2 hours prior to MRI as directed, Disp: 2 tablet, Rfl: 0    metoprolol tartrate (LOPRESSOR) 25 mg tablet, Take 1 tablet (25 mg total) by mouth 2 (two) times a day, Disp: 60 tablet, Rfl: 3    Omega-3 Fatty Acids (fish oil) 1,000 mg, Take 1,000 mg by mouth daily, Disp: , Rfl:     pantoprazole (PROTONIX) 40 mg tablet, Take 1 tablet (40 mg total) by mouth daily in the early morning, Disp: 30 tablet, Rfl: 0    simvastatin (ZOCOR) 80 mg tablet, TAKE ONE TABLET BY MOUTH EVERY DAY AT 5 PM FOR CHOLESTEROL, Disp: , Rfl:   Allergies   Allergen Reactions    Shellfish-Derived Products - Food Allergy      Vitals:    05/06/21 1039   BP: 120/62   Pulse: 82   Resp: 17   Temp: 98 2 °F (36 8 °C)       Physical Exam  Constitutional: General appearance: The Patient is well-developed and well-nourished who appears the stated age in no acute distress  Patient is pleasant and talkative  HEENT:  Normocephalic  Sclerae are anicteric  Mucous membranes are moist  Neck is supple without adenopathy  No JVD  Chest: The lungs are clear to auscultation  Cardiac: Heart is regular rate  Abdomen: Abdomen is soft, non-tender, non-distended and without masses  Extremities: There is no clubbing or cyanosis  There is no edema  Symmetric  Neuro: Grossly nonfocal  Gait is normal      Lymphatic: No evidence of cervical adenopathy bilaterally  No evidence of axillary adenopathy bilaterally  No evidence of inguinal adenopathy bilaterally  Skin: Warm, anicteric  Psych:  Patient is pleasant and talkative  Breasts:        Pathology:  [unfilled]    Labs:   Ref Range & Units 4/12/21 9:43 AM   AFP TUMOR MARKER 0 5 - 8 ng/mL 3 3          Imaging  Mri Abdomen W Wo Contrast    Result Date: 4/28/2021  Narrative: MRI OF THE ABDOMEN WITH AND WITHOUT CONTRAST INDICATION:  59-year-old male with history of hepatocellular carcinoma, diagnosed in August 2020 and treated with radioembolization with Y-90 microspheres in October 2020  Follow-up  COMPARISON: Pretreatment MRI from August 23, 2020  Outside posttreatment MRI from January 19, 2021  TECHNIQUE:  The following pulse sequences were obtained:  Coronal and axial T2 with TE of 90 and 180 respectively, axial T2 with fat saturation, axial FIESTA fat-sat, axial T1-weighted in-and-out-of phase, axial DWI/ADC, pre-contrast axial T1 with fat saturation, post-contrast dynamic axial T1 with fat saturation at 20, 70, and 180 seconds, followed by coronal and 7 minute delayed axial T1 with fat saturation  IV Contrast:  8 mL of Gadobutrol injection (SINGLE-DOSE) FINDINGS: LOWER CHEST:   Trace left pleural effusion  LIVER: Normal in size, shape and signal   7 1 x 7 8 cm well-circumscribed mass, located primarily in segment 4 with T2 hyperintensity, T1 hypointensity and irregular arterial phase and delayed peripheral enhancement as well as enhancement of several internal septations  Small lateral peripheral cystic or necrotic component  Delayed capsular enhancement    Appearance is little changed since the outside MRI from 1/19/2021  Portal veins patent with displacement of portions of the left and right portal veins by the adjacent mass  Hepatic veins patent  BILE DUCTS:  Mild dilatation of bile ducts in segment 2, adjacent to the mass, best appreciated on delayed enhanced images (series 14, images 47-55)  Minimal ductal dilatation in segment 8  Common bile duct normal in caliber  GALLBLADDER:  Contracted with several gallstones  PANCREAS:  Normal  No main pancreatic ductal dilation  ADRENAL GLANDS:  Normal  SPLEEN:  Normal  KIDNEYS/PROXIMAL URETERS:  No hydroureteronephrosis  No suspicious renal mass  Multiple bilateral renal cysts  BOWEL:   Unremarkable  No dilated loops of bowel  PERITONEAL CAVITY/RETROPERITONEUM:  No mass or ascites  LYMPH NODES:  No abdominal lymphadenopathy  VASCULAR STRUCTURES:  Unremarkable  No aortic aneurysm  ABDOMINAL WALL:  Unremarkable  OSSEOUS STRUCTURES:  Normal marrow signal and enhancement  No evidence of recent fracture, mass or marrow replacing process  Impression: 7 1 x 7 8 cm hepatic mass with peripheral and irregular areas of enhancement, not measurably changed in size since 1/19/2021 (LR-TR viable)  Workstation performed: [id number]     Us Right Upper Quadrant    Result Date: 4/6/2021  Narrative: RIGHT UPPER QUADRANT ULTRASOUND INDICATION:     elevated LFTs  COMPARISON:  Right upper quadrant ultrasound dated 8/2020  TECHNIQUE:   Real-time ultrasound of the right upper quadrant was performed with a curvilinear transducer with both volumetric sweeps and still imaging techniques  FINDINGS: PANCREAS:  Obscured by overlying bowel gas  AORTA AND IVC:  Obscured by overlying bowel gas  LIVER: Size:  Within normal range  The liver measures 11 1 cm in the midclavicular line  Contour:  Surface contour is smooth  Parenchyma:  Echogenicity and echotexture are within normal limits  Again seen is a 7 2 x 6 6 x 6 6 cm solid mass in the central liver  A 1 7 x 1 4 x 1 3 cm heterogeneous lesion is seen in the posterior right hepatic lobe  Limited imaging of the main portal vein shows it to be patent and hepatopetal   BILIARY: The gallbladder is underdistended  Multiple shadowing gallstones are identified in the gallbladder  There is no gallbladder wall thickening or pericholecystic fluid  No sonographic Whittington's sign is elicited  Again noted are echogenic foci with associated ringdown artifact in the gallbladder wall consistent with adenomyomatosis  No intrahepatic biliary dilatation  CBD measures 6 mm  No choledocholithiasis  KIDNEY: Right kidney measures 11 8 cm  There are several simple cysts in the kidney, the largest of which measures 2 4 x 2 5 x 3 4 cm  ASCITES:   None  Impression: Cholelithiasis  Adenomyomatosis  Redemonstrated 7 2 x 6 6 x 6 6 cm solid mass in the central liver  1 7 x 1 4 x 1 3 cm heterogeneous lesion in the posterior right hepatic lobe  This can be further evaluated on follow-up abdominal MRI  Pancreas obscured by overlying bowel gas  Workstation performed: [id number]     I reviewed the above laboratory and imaging data  Discussion/Summary:  43-year-old male with well-differentiated HCC   The lesion is very centrally located in his liver  Caesar Azevedos his age and the central location, I am not sure that he would tolerate hepatectomy well, consequently, we elected on Sir spheres embolization  He tolerated this well in October of 2020  He now has viable disease on the MRI done here  His case was discussed at multidisciplinary clinic this morning  It was recommended that he consider TACE  Sir spheres could be given again if there continues to be recurrence  He is set up to discuss this with IR later next week  It was also recommended that he follow-up with Medical Oncology  He is actually agreeable to this at this time  I will schedule the appointment with Dr Susan Tesfaye since he suggested immunotherapy in the adjuvant setting    I will see him again in 4 months with a repeat MRI and AFP level  He is agreeable to this    All his questions were answered

## 2021-05-06 NOTE — TELEPHONE ENCOUNTER
Upon review of the In Basket request we have found that the patient has aged out of the measure and/or the document date is outside of the measure timeframe  Any additional questions or concerns should be emailed to the Practice Liaisons via [email or web address] email, please do not reply via In Basket      Thank you  Cleveland Ken MA

## 2021-05-10 ENCOUNTER — PATIENT OUTREACH (OUTPATIENT)
Dept: CASE MANAGEMENT | Facility: HOSPITAL | Age: 81
End: 2021-05-10

## 2021-05-11 ENCOUNTER — TELEMEDICINE (OUTPATIENT)
Dept: INTERVENTIONAL RADIOLOGY/VASCULAR | Facility: CLINIC | Age: 81
End: 2021-05-11
Payer: MEDICARE

## 2021-05-11 DIAGNOSIS — C22.0 HEPATOCELLULAR CARCINOMA (HCC): ICD-10-CM

## 2021-05-11 PROCEDURE — 99213 OFFICE O/P EST LOW 20 MIN: CPT | Performed by: RADIOLOGY

## 2021-05-11 NOTE — LETTER
May 11, 2021     Marquise Edward MD  [address] [address]    Patient: America Kramer   YOB: 1940   Date of Visit: 5/11/2021       Dear Dr Alonzo Fuel: Thank you for referring Nicole Arizmendi to me for evaluation  Below are my notes for this consultation  If you have questions, please do not hesitate to call me  I look forward to following your patient along with you  Sincerely,        Francisco Kiran MD        CC: MD Francisco Zazueta MD  5/11/2021  5:08 PM  Sign when Signing Visit    Virtual Brief Visit    Assessment/Plan:    Problem List Items Addressed This Visit        Digestive    Hepatocellular carcinoma [facility])     In summary, this is an 79 yo male with centrally located [address]  s/p radioembolization with residual viable tumor  I had an extensive discussion with the patient about the recent MRI findings and reviewed treatment options with emphasis on locoregional therapy  He is Child Yanez A with Tbili of 0 89  Good performance status (ECOG 0-1)  Tumor measure 7 1 cm  Based on [address] liver cancer [facility]) staging system, he is currently BCLC stage B (intermediate stage)  The risks, benefits and alternatives have been discussed with the patient, including chemoembolization (TACE), and repeat radio-embolization (TARE)  I think both TACE and repeat TARE are exellence choices  Given his incomplete response with TARE, TACE would be the preferred option next  TACE has been shown in randomized trials to offer survival benefit in intermediate stage HCC  This does not preclude him from having repeat TARE in the future if needed  I informed him that although both TACE and TARE offer great local disease control but unlikely to give him a complete cure  The goal of locoregional treatment is to try to downstage him to within [address] for transplant consideration or stabilize his disease to maintain his current quality of life    Obviously, his age and co-morbidities will be part of the consideration  I described to him that TACE is generally an outpatient single session procedure where he comes in and leaves by the end of the day  Occasionally, overnight admission for pain control is required  I will evaluate his radial artery anatomy again with US to see if he is a radial access candidate as this is generally more comfortable  I went through the pros and cons of TACE vs repeat TARE and I recommended TACE as his next treatment option and if there is residual disease after TACE, I informed him that TACE can be performed multiple times in the future if necessary or repeat TARE can be done as well depending on lung dose limit  We also talked about the gallbladder shares arterial blood supply with the tumor and small risk of gallbladder damage but this can be mitigated with proximial gelfoam embolization of the cystic artery prior to TACE, similar to his last procedure  After our discussion, he is leaning towards having TACE and seems agreable to it but would like to take the next couple days to think about this and get back to me  I will call him on Thursday to see what he decides  Reason for visit is   Chief Complaint   Patient presents with    Virtual Brief Visit        Encounter provider Betsy Martínez MD    Provider located at [address] [place] [place] [address]  [phone]    Recent Visits  Date Type Provider Dept   05/04/21 Office Visit Eliazar Ward DO [address] recent visits within past 7 days and meeting all other requirements     Today's Visits  Date Type Provider Dept   05/11/21 Telemedicine Betsy Martínez MD [address] today's visits and meeting all other requirements     Future Appointments  No visits were found meeting these conditions     Showing future appointments within next 150 days and meeting all other requirements        After connecting through telephone, the patient was identified by name and date of birth  Macrina Govea was informed that this is a telemedicine visit and that the visit is being conducted through telephone  My office door was closed  No one else was in the room  He acknowledged consent and understanding of privacy and security of the platform  The patient has agreed to participate and understands he can discontinue the visit at any time  Patient is aware this is a billable service  Subjective    Macrina Govea is a 78 y o  male with centrally located [address] , previously treated with radio-embolization in Oct 2020  Subsequent MRI followups showed partial response with residual viable tumor  His case was presented in tumor board and group decision was made to consider repeat locoregional therapy  He is doing well and does not have any disease specific symptoms at this point  He does complain of some mild fatigue  He denies confusion, jaundice pruritis and abdominal distention        HPI     Past Medical History:   Diagnosis Date    Coronary artery disease     Gout     Hypercholesterolemia     Hypertension     Liver cancer [facility])        Past Surgical History:   Procedure Laterality Date    ABDOMINAL SURGERY      appy    APPENDECTOMY      CORONARY ARTERY BYPASS GRAFT      IR BIOPSY LIVER MASS  8/25/2020    IR Y-90 PRE-ANGIO/EMBO W/ LUNG SCAN  10/6/2020    IR Y-90 RADIOEMBOLIZATION  10/14/2020       Current Outpatient Medications   Medication Sig Dispense Refill    allopurinol (ZYLOPRIM) 300 mg tablet Take 300 mg by mouth daily      amLODIPine (NORVASC) 5 mg tablet Take 1 tablet (5 mg total) by mouth daily 30 tablet 5    aspirin 81 mg chewable tablet Chew 1 tablet (81 mg total) daily 30 tablet 0    cholecalciferol (VITAMIN D3) 400 units tablet Take 1,000 Units by mouth daily       LORazepam (ATIVAN) 1 mg tablet Take 1 tablet 2 hours prior to MRI as directed 2 tablet 0    metoprolol tartrate (LOPRESSOR) 25 mg tablet Take 1 tablet (25 mg total) by mouth 2 (two) times a day 60 tablet 3    Omega-3 Fatty Acids (fish oil) 1,000 mg Take 1,000 mg by mouth daily      pantoprazole (PROTONIX) 40 mg tablet Take 1 tablet (40 mg total) by mouth daily in the early morning 30 tablet 0    simvastatin (ZOCOR) 80 mg tablet TAKE ONE TABLET BY MOUTH EVERY DAY AT 5 PM FOR CHOLESTEROL       No current facility-administered medications for this visit  Allergies   Allergen Reactions    Shellfish-Derived Products - Food Allergy        Review of Systems   Constitutional: Negative  HENT: Negative  Eyes: Negative  Respiratory: Negative  Cardiovascular: Negative  Gastrointestinal: Negative  Neurological: Negative  Hematological: Negative  Psychiatric/Behavioral: Negative  There were no vitals filed for this visit  I spent 15 minutes directly with the patient during this visit    VIRTUAL VISIT DISCLAIMER    Robbie Coy acknowledges that he has consented to an online visit or consultation  He understands that the online visit is based solely on information provided by him, and that, in the absence of a face-to-face physical evaluation by the physician, the diagnosis he receives is both limited and provisional in terms of accuracy and completeness  This is not intended to replace a full medical face-to-face evaluation by the physician  Robbie Coy understands and accepts these terms

## 2021-05-11 NOTE — LETTER
May 11, 2021     Christopher Diana MD  [address] [address]    Patient: Edgar Pink   YOB: 1940   Date of Visit: 5/11/2021       Dear Dr Kat Willoughby: Thank you for referring Germain Wilde to me for evaluation  Below are my notes for this consultation  If you have questions, please do not hesitate to call me  I look forward to following your patient along with you  Sincerely,        Girma Nelson MD        CC: No Recipients  Girma Nelson MD  5/11/2021  5:08 PM  Sign when Signing Visit    Virtual Brief Visit    Assessment/Plan:    Problem List Items Addressed This Visit        Digestive    Hepatocellular carcinoma [facility])     In summary, this is an 79 yo male with centrally located [address]  s/p radioembolization with residual viable tumor  I had an extensive discussion with the patient about the recent MRI findings and reviewed treatment options with emphasis on locoregional therapy  He is Child Yanez A with Tbili of 0 89  Good performance status (ECOG 0-1)  Tumor measure 7 1 cm  Based on [address] liver cancer [facility]) staging system, he is currently BCLC stage B (intermediate stage)  The risks, benefits and alternatives have been discussed with the patient, including chemoembolization (TACE), and repeat radio-embolization (TARE)  I think both TACE and repeat TARE are exellence choices  Given his incomplete response with TARE, TACE would be the preferred option next  TACE has been shown in randomized trials to offer survival benefit in intermediate stage HCC  This does not preclude him from having repeat TARE in the future if needed  I informed him that although both TACE and TARE offer great local disease control but unlikely to give him a complete cure  The goal of locoregional treatment is to try to downstage him to within [address] for transplant consideration or stabilize his disease to maintain his current quality of life    Obviously, his age and co-morbidities will be part of the consideration  I described to him that TACE is generally an outpatient single session procedure where he comes in and leaves by the end of the day  Occasionally, overnight admission for pain control is required  I will evaluate his radial artery anatomy again with US to see if he is a radial access candidate as this is generally more comfortable  I went through the pros and cons of TACE vs repeat TARE and I recommended TACE as his next treatment option and if there is residual disease after TACE, I informed him that TACE can be performed multiple times in the future if necessary or repeat TARE can be done as well depending on lung dose limit  We also talked about the gallbladder shares arterial blood supply with the tumor and small risk of gallbladder damage but this can be mitigated with proximial gelfoam embolization of the cystic artery prior to TACE, similar to his last procedure  After our discussion, he is leaning towards having TACE and seems agreable to it but would like to take the next couple days to think about this and get back to me  I will call him on Thursday to see what he decides  Reason for visit is   Chief Complaint   Patient presents with    Virtual Brief Visit        Encounter provider Daniel Bender MD    Provider located at [address] [place] [address]  [phone]    Recent Visits  Date Type Provider Dept   05/04/21 Office Visit Patricia Siu DO [facility] [address] recent visits within past 7 days and meeting all other requirements     Today's Visits  Date Type Provider Dept   05/11/21 Telemedicine Daniel Bender MD [facility] [address] today's visits and meeting all other requirements     Future Appointments  No visits were found meeting these conditions     Showing future appointments within next 150 days and meeting all other requirements        After connecting through telephone, the patient was identified by name and date of birth  Megan Rios was informed that this is a telemedicine visit and that the visit is being conducted through telephone  My office door was closed  No one else was in the room  He acknowledged consent and understanding of privacy and security of the platform  The patient has agreed to participate and understands he can discontinue the visit at any time  Patient is aware this is a billable service  Subjective    Megan Rios is a 78 y o  male with centrally located [address] , previously treated with radio-embolization in Oct 2020  Subsequent MRI followups showed partial response with residual viable tumor  His case was presented in tumor board and group decision was made to consider repeat locoregional therapy  He is doing well and does not have any disease specific symptoms at this point  He does complain of some mild fatigue  He denies confusion, jaundice pruritis and abdominal distention        HPI     Past Medical History:   Diagnosis Date    Coronary artery disease     Gout     Hypercholesterolemia     Hypertension     Liver cancer [facility])        Past Surgical History:   Procedure Laterality Date    ABDOMINAL SURGERY      appy    APPENDECTOMY      CORONARY ARTERY BYPASS GRAFT      IR BIOPSY LIVER MASS  8/25/2020    IR Y-90 PRE-ANGIO/EMBO W/ LUNG SCAN  10/6/2020    IR Y-90 RADIOEMBOLIZATION  10/14/2020       Current Outpatient Medications   Medication Sig Dispense Refill    allopurinol (ZYLOPRIM) 300 mg tablet Take 300 mg by mouth daily      amLODIPine (NORVASC) 5 mg tablet Take 1 tablet (5 mg total) by mouth daily 30 tablet 5    aspirin 81 mg chewable tablet Chew 1 tablet (81 mg total) daily 30 tablet 0    cholecalciferol (VITAMIN D3) 400 units tablet Take 1,000 Units by mouth daily       LORazepam (ATIVAN) 1 mg tablet Take 1 tablet 2 hours prior to MRI as directed 2 tablet 0  metoprolol tartrate (LOPRESSOR) 25 mg tablet Take 1 tablet (25 mg total) by mouth 2 (two) times a day 60 tablet 3    Omega-3 Fatty Acids (fish oil) 1,000 mg Take 1,000 mg by mouth daily      pantoprazole (PROTONIX) 40 mg tablet Take 1 tablet (40 mg total) by mouth daily in the early morning 30 tablet 0    simvastatin (ZOCOR) 80 mg tablet TAKE ONE TABLET BY MOUTH EVERY DAY AT 5 PM FOR CHOLESTEROL       No current facility-administered medications for this visit  Allergies   Allergen Reactions    Shellfish-Derived Products - Food Allergy        Review of Systems   Constitutional: Negative  HENT: Negative  Eyes: Negative  Respiratory: Negative  Cardiovascular: Negative  Gastrointestinal: Negative  Neurological: Negative  Hematological: Negative  Psychiatric/Behavioral: Negative  There were no vitals filed for this visit  I spent 15 minutes directly with the patient during this visit    VIRTUAL VISIT DISCLAIMER    Leon Myrick acknowledges that he has consented to an online visit or consultation  He understands that the online visit is based solely on information provided by him, and that, in the absence of a face-to-face physical evaluation by the physician, the diagnosis he receives is both limited and provisional in terms of accuracy and completeness  This is not intended to replace a full medical face-to-face evaluation by the physician  Leon Myrick understands and accepts these terms

## 2021-05-11 NOTE — ASSESSMENT & PLAN NOTE
In summary, this is an 79 yo male with centrally located [address]  s/p radioembolization with residual viable tumor  I had an extensive discussion with the patient about the recent MRI findings and reviewed treatment options with emphasis on locoregional therapy  He is Child Yanez A with Tbili of 0 89  Good performance status (ECOG 0-1)  Tumor measure 7 1 cm  Based on [address] liver cancer [facility]) staging system, he is currently BCLC stage B (intermediate stage)  The risks, benefits and alternatives have been discussed with the patient, including chemoembolization (TACE), and repeat radio-embolization (TARE)  I think both TACE and repeat TARE are exellence choices  Given his incomplete response with TARE, TACE would be the preferred option next  TACE has been shown in randomized trials to offer survival benefit in intermediate stage HCC  This does not preclude him from having repeat TARE in the future if needed  I informed him that although both TACE and TARE offer great local disease control but unlikely to give him a complete cure  The goal of locoregional treatment is to try to downstage him to within [address] for transplant consideration or stabilize his disease to maintain his current quality of life  Obviously, his age and co-morbidities will be part of the consideration  I described to him that TACE is generally an outpatient single session procedure where he comes in and leaves by the end of the day  Occasionally, overnight admission for pain control is required  I will evaluate his radial artery anatomy again with US to see if he is a radial access candidate as this is generally more comfortable       I went through the pros and cons of TACE vs repeat TARE and I recommended TACE as his next treatment option and if there is residual disease after TACE, I informed him that TACE can be performed multiple times in the future if necessary or repeat TARE can be done as well depending on lung dose limit  We also talked about the gallbladder shares arterial blood supply with the tumor and small risk of gallbladder damage but this can be mitigated with proximial gelfoam embolization of the cystic artery prior to TACE, similar to his last procedure  After our discussion, he is leaning towards having TACE and seems agreable to it but would like to take the next couple days to think about this and get back to me  I will call him on Thursday to see what he decides

## 2021-05-11 NOTE — PROGRESS NOTES
Virtual Brief Visit    Assessment/Plan:    Problem List Items Addressed This Visit        Digestive    Hepatocellular carcinoma ([address] )     In summary, this is an 77 yo male with centrally located [address]  s/p radioembolization with residual viable tumor  I had an extensive discussion with the patient about the recent MRI findings and reviewed treatment options with emphasis on locoregional therapy  He is Child Yanez A with Tbili of 0 89  Good performance status (ECOG 0-1)  Tumor measure 7 1 cm  Based on [address] liver cancer [facility]) staging system, he is currently BCLC stage B (intermediate stage)  The risks, benefits and alternatives have been discussed with the patient, including chemoembolization (TACE), and repeat radio-embolization (TARE)  I think both TACE and repeat TARE are exellence choices  Given his incomplete response with TARE, TACE would be the preferred option next  TACE has been shown in randomized trials to offer survival benefit in intermediate stage HCC  This does not preclude him from having repeat TARE in the future if needed  I informed him that although both TACE and TARE offer great local disease control but unlikely to give him a complete cure  The goal of locoregional treatment is to try to downstage him to within [address] for transplant consideration or stabilize his disease to maintain his current quality of life  Obviously, his age and co-morbidities will be part of the consideration  I described to him that TACE is generally an outpatient single session procedure where he comes in and leaves by the end of the day  Occasionally, overnight admission for pain control is required  I will evaluate his radial artery anatomy again with US to see if he is a radial access candidate as this is generally more comfortable       I went through the pros and cons of TACE vs repeat TARE and I recommended TACE as his next treatment option and if there is residual disease after TACE, I informed him that TACE can be performed multiple times in the future if necessary or repeat TARE can be done as well depending on lung dose limit  We also talked about the gallbladder shares arterial blood supply with the tumor and small risk of gallbladder damage but this can be mitigated with proximial gelfoam embolization of the cystic artery prior to TACE, similar to his last procedure  After our discussion, he is leaning towards having TACE and seems agreable to it but would like to take the next couple days to think about this and get back to me  I will call him on Thursday to see what he decides  Reason for visit is   Chief Complaint   Patient presents with    Virtual Brief Visit        Encounter provider Dung Angela MD    Provider located at [address] [place] [place] [address] [place] [address]  [phone]    Recent Visits  Date Type Provider Dept   05/04/21 Office Visit Pete York DO [facility] [address] recent visits within past 7 days and meeting all other requirements     Today's Visits  Date Type Provider Dept   05/11/21 Telemedicine Dung Angela MD [facility] [address] today's visits and meeting all other requirements     Future Appointments  No visits were found meeting these conditions  Showing future appointments within next 150 days and meeting all other requirements        After connecting through telephone, the patient was identified by name and date of birth  Arleen Russell was informed that this is a telemedicine visit and that the visit is being conducted through telephone  My office door was closed  No one else was in the room  He acknowledged consent and understanding of privacy and security of the platform  The patient has agreed to participate and understands he can discontinue the visit at any time  Patient is aware this is a billable service       Apryl Brower Mendel Avena is a 78 y o  male with centrally located [address] , previously treated with radio-embolization in Oct 2020  Subsequent MRI followups showed partial response with residual viable tumor  His case was presented in tumor board and group decision was made to consider repeat locoregional therapy  He is doing well and does not have any disease specific symptoms at this point  He does complain of some mild fatigue  He denies confusion, jaundice pruritis and abdominal distention  HPI     Past Medical History:   Diagnosis Date    Coronary artery disease     Gout     Hypercholesterolemia     Hypertension     [address] [facility] [address] )        Past Surgical History:   Procedure Laterality Date    ABDOMINAL SURGERY      appy    APPENDECTOMY      CORONARY ARTERY BYPASS GRAFT      IR BIOPSY LIVER MASS  8/25/2020    IR Y-90 PRE-ANGIO/EMBO W/ LUNG SCAN  10/6/2020    IR Y-90 RADIOEMBOLIZATION  10/14/2020       Current Outpatient Medications   Medication Sig Dispense Refill    allopurinol (ZYLOPRIM) 300 mg tablet Take 300 mg by mouth daily      amLODIPine (NORVASC) 5 mg tablet Take 1 tablet (5 mg total) by mouth daily 30 tablet 5    aspirin 81 mg chewable tablet Chew 1 tablet (81 mg total) daily 30 tablet 0    cholecalciferol (VITAMIN D3) 400 units tablet Take 1,000 Units by mouth daily       LORazepam (ATIVAN) 1 mg tablet Take 1 tablet 2 hours prior to MRI as directed 2 tablet 0    metoprolol tartrate (LOPRESSOR) 25 mg tablet Take 1 tablet (25 mg total) by mouth 2 (two) times a day 60 tablet 3    Omega-3 Fatty Acids (fish oil) 1,000 mg Take 1,000 mg by mouth daily      pantoprazole (PROTONIX) 40 mg tablet Take 1 tablet (40 mg total) by mouth daily in the early morning 30 tablet 0    simvastatin (ZOCOR) 80 mg tablet TAKE ONE TABLET BY MOUTH EVERY DAY AT 5 PM FOR CHOLESTEROL       No current facility-administered medications for this visit           Allergies   Allergen Reactions    Shellfish-Derived Products - Food Allergy        Review of Systems   Constitutional: Negative  HENT: Negative  Eyes: Negative  Respiratory: Negative  Cardiovascular: Negative  Gastrointestinal: Negative  Neurological: Negative  Hematological: Negative  Psychiatric/Behavioral: Negative  There were no vitals filed for this visit  I spent 15 minutes directly with the patient during this visit    VIRTUAL VISIT DISCLAIMER    Julioban Tico acknowledges that he has consented to an online visit or consultation  He understands that the online visit is based solely on information provided by him, and that, in the absence of a face-to-face physical evaluation by the physician, the diagnosis he receives is both limited and provisional in terms of accuracy and completeness  This is not intended to replace a full medical face-to-face evaluation by the physician  Shaylee Doshi understands and accepts these terms

## 2021-05-13 ENCOUNTER — DOCUMENTATION (OUTPATIENT)
Dept: INTERVENTIONAL RADIOLOGY/VASCULAR | Facility: CLINIC | Age: 81
End: 2021-05-13

## 2021-05-13 RX ORDER — ONDANSETRON 2 MG/ML
4 INJECTION INTRAMUSCULAR; INTRAVENOUS ONCE
Status: CANCELLED | OUTPATIENT
Start: 2021-05-13 | End: 2021-05-13

## 2021-05-13 RX ORDER — DEXAMETHASONE SODIUM PHOSPHATE 4 MG/ML
12 INJECTION, SOLUTION INTRA-ARTICULAR; INTRALESIONAL; INTRAMUSCULAR; INTRAVENOUS; SOFT TISSUE ONCE
Status: CANCELLED | OUTPATIENT
Start: 2021-05-13 | End: 2021-05-13

## 2021-05-13 RX ORDER — SODIUM CHLORIDE 9 MG/ML
75 INJECTION, SOLUTION INTRAVENOUS CONTINUOUS
Status: CANCELLED | OUTPATIENT
Start: 2021-05-13

## 2021-05-24 ENCOUNTER — PATIENT OUTREACH (OUTPATIENT)
Dept: CASE MANAGEMENT | Facility: HOSPITAL | Age: 81
End: 2021-05-24

## 2021-05-24 NOTE — PROGRESS NOTES
MSW s/w pt's wife Arturo martin by phone today to review his DT, completed at [facility], which he self scored as a 5 and indicated concerns including making treatment decisions and worry  Arturo martin was with him for the appointment and says that she remembers going over the DT together  I explained that pt's listed concerns are certainly normal and expected for anyone in his position, and she agreed  She says that he does have worry about what the future holds, but she does not think that it's excessive or causing him any distress at this point  Pt will see Dr Bev Henderson later this week to discuss treatment options  Arturo martin does not feel that they need any help or support at this point, however she did write down my direct contact information should their needs change in the future  She was appreciative of the call today, no other needs or concerns at this time

## 2021-05-27 ENCOUNTER — CONSULT (OUTPATIENT)
Dept: HEMATOLOGY ONCOLOGY | Facility: CLINIC | Age: 81
End: 2021-05-27
Payer: MEDICARE

## 2021-05-27 VITALS
WEIGHT: 193.5 LBS | OXYGEN SATURATION: 93 % | HEIGHT: 69 IN | TEMPERATURE: 98.1 F | DIASTOLIC BLOOD PRESSURE: 76 MMHG | RESPIRATION RATE: 16 BRPM | SYSTOLIC BLOOD PRESSURE: 143 MMHG | BODY MASS INDEX: 28.66 KG/M2 | HEART RATE: 90 BPM

## 2021-05-27 DIAGNOSIS — C22.0 HEPATOCELLULAR CARCINOMA (HCC): Primary | ICD-10-CM

## 2021-05-27 PROCEDURE — 99205 OFFICE O/P NEW HI 60 MIN: CPT | Performed by: INTERNAL MEDICINE

## 2021-05-27 NOTE — PROGRESS NOTES
Oncology Outpatient Consult Note  Lauren De Leon 78 y o  male MRN: @ Encounter: [id number]        Date:  5/27/2021        CC:   Hepatocellular carcinoma      HPI:  Lauren De Leon is seen for initial consultation 5/27/2021 regarding  History of parasellar carcinoma  The patient underwent Radioembolization in October of 2020 which he tolerated well  His most recent imaging showed areas of irregular enhancement which appeared to have viable tumor and this was  Reviewed at multidisciplinary tumor board and the patient is going back for TACE  Procedure to have this area addressed  He is here to discuss the possibility of systemic treatment  I explained to the patient that based on the size of his tumor he probably will have some residual disease so we could consider immunotherapy in combination with Avastin  As far symptoms are concerned he is at baseline  Denies any nausea denies any vomiting denies any diarrhea  The rest of his 14 point review of systems today was negative      Cancer Staging:  Cancer Staging  Hepatocellular carcinoma ([facility] [address] )  Staging form: Liver (Excluding Intrahepatic Bile Ducts), AJCC 8th Edition  - Clinical: Stage IB (cT1b, cN0, cM0) - Signed by Moncho Wilson MD on 9/22/2020      Molecular Testing:     Previous Hematologic/ Oncologic History:    Oncology History   Hepatocellular carcinoma ([facility] [address] )   8/24/2020 Initial Diagnosis    Hepatocellular carcinoma ([facility] [address] )     8/25/2020 Biopsy    IR liver biopsy  - Well-differentiated heptocellular carcinoma     9/22/2020 -  Cancer Staged    Staging form: Liver (Excluding Intrahepatic Bile Ducts), AJCC 8th Edition  - Clinical: Stage IB (cT1b, cN0, cM0) - Signed by Moncho Wilson MD on 9/22/2020       10/14/2020 - 10/14/2020 Radiation    Field Numbers Energy Treatment Site Starting  Ending  Elapsed  Fraction Total  Overlap Site Overlap         Date Date Days Dose Dose   Dose    SirSphere  Y90 Rt Lobe + Middle Robe   10/14/20 10/14/20    1 51 GBq  1 51 GBq                                                                                        Date / Time stamp:  10/14/2020  2:43 PM         Test Results:    Imaging: No results found  Labs:   Lab Results   Component Value Date    WBC 7 30 04/08/2021    HGB 13 7 (L) 04/08/2021    HCT 40 7 (L) 04/08/2021    MCV 93 04/08/2021     (L) 04/08/2021     Lab Results   Component Value Date    K 4 3 05/01/2021     05/01/2021    CO2 34 (H) 05/01/2021    BUN 18 05/01/2021    CREATININE 1 10 05/01/2021    GLUF 138 (H) 05/01/2021    CALCIUM 9 5 05/01/2021    CORRECTEDCA 10 1 05/01/2021    AST 43 05/01/2021    ALT 51 05/01/2021    ALKPHOS 196 (H) 05/01/2021    EGFR 62 05/01/2021       ROS: As stated in history of present illness otherwise her 14 point review of systems today was negative          Active Problems:   Patient Active Problem List   Diagnosis    Essential hypertension    Hyperlipidemia    Cardiomegaly    Anxiety    Abnormal electrocardiogram    Coronary artery disease without angina pectoris    PAD (peripheral artery disease) (HCC)    Viral URI with cough    Reactive airway disease without complication    Medicare annual wellness visit, subsequent    Hyperglycemia    Ankle edema, bilateral    Transaminitis    Respiratory insufficiency    EMILIANO (acute kidney injury) ([facility] [address] )    Elevated troponin    Septic shock (HCC)    Hepatocellular carcinoma (HCC)    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HCC)    Acute respiratory failure with hypoxia (HCC)    Stage 3a chronic kidney disease ([facility] [address] )    Sepsis ([facility] [address] )    Pneumonia    Cough    Platelets decreased ([facility] [address] )    Type 2 diabetes mellitus without complication, without long-term current use of insulin (HCC)    Allergic cough       Past Medical History:   Past Medical History:   Diagnosis Date    Coronary artery disease     Gout     Hypercholesterolemia     Hypertension     Liver cancer ([facility] [address] )        Surgical History: Past Surgical History:   Procedure Laterality Date    ABDOMINAL SURGERY      appy    APPENDECTOMY      CORONARY ARTERY BYPASS GRAFT      IR BIOPSY LIVER MASS  8/25/2020    IR Y-90 PRE-ANGIO/EMBO W/ LUNG SCAN  10/6/2020    IR Y-90 RADIOEMBOLIZATION  10/14/2020       Family History:    Family History   Problem Relation Age of Onset    No Known Problems Mother     No Known Problems Father        Cancer-related family history is not on file      Social History:   Social History     Socioeconomic History    Marital status: /Civil Union     Spouse name: Not on file    Number of children: Not on file    Years of education: Not on file    Highest education level: Not on file   Occupational History    Not on file   Social Needs    Financial resource strain: Not on file    Food insecurity     Worry: Not on file     Inability: Not on file   [employer] needs     Medical: Not on file     Non-medical: Not on file   Tobacco Use    Smoking status: Never Smoker    Smokeless tobacco: Never Used   Substance and Sexual Activity    Alcohol use: Not Currently     Frequency: Never    Drug use: Never    Sexual activity: Not on file   Lifestyle    Physical activity     Days per week: Not on file     Minutes per session: Not on file    Stress: Not on file   Relationships    Social connections     Talks on phone: Not on file     Gets together: Not on file     Attends Church service: Not on file     Active member of club or organization: Not on file     Attends meetings of clubs or organizations: Not on file     Relationship status: Not on file    Intimate partner violence     Fear of current or ex partner: Not on file     Emotionally abused: Not on file     Physically abused: Not on file     Forced sexual activity: Not on file   Other Topics Concern    Not on file   Social History Narrative    Not on file       Current Medications:   Current Outpatient Medications   Medication Sig Dispense Refill    allopurinol (ZYLOPRIM) 300 mg tablet Take 300 mg by mouth daily      amLODIPine (NORVASC) 5 mg tablet Take 1 tablet (5 mg total) by mouth daily 30 tablet 5    aspirin 81 mg chewable tablet Chew 1 tablet (81 mg total) daily 30 tablet 0    cholecalciferol (VITAMIN D3) 400 units tablet Take 1,000 Units by mouth daily       LORazepam (ATIVAN) 1 mg tablet Take 1 tablet 2 hours prior to MRI as directed 2 tablet 0    metoprolol tartrate (LOPRESSOR) 25 mg tablet Take 1 tablet (25 mg total) by mouth 2 (two) times a day 60 tablet 3    Omega-3 Fatty Acids (fish oil) 1,000 mg Take 1,000 mg by mouth daily      simvastatin (ZOCOR) 80 mg tablet TAKE ONE TABLET BY MOUTH EVERY DAY AT 5 PM FOR CHOLESTEROL      pantoprazole (PROTONIX) 40 mg tablet Take 1 tablet (40 mg total) by mouth daily in the early morning 30 tablet 0     No current facility-administered medications for this visit  Allergies: Allergies   Allergen Reactions    Shellfish-Derived Products - Food Allergy          Physical Exam:    Body surface area is 2 04 meters squared  Wt Readings from Last 3 Encounters:   05/27/21 87 8 kg (193 lb 8 oz)   05/06/21 88 5 kg (195 lb)   05/04/21 88 5 kg (195 lb)        Temp Readings from Last 3 Encounters:   05/27/21 98 1 °F (36 7 °C) (Temporal)   05/06/21 98 2 °F (36 8 °C) (Temporal)   05/04/21 (!) 96 3 °F (35 7 °C)        BP Readings from Last 3 Encounters:   05/27/21 143/76   05/06/21 120/62   05/04/21 124/64         Pulse Readings from Last 3 Encounters:   05/27/21 90   05/06/21 82   05/04/21 91       Physical Exam     Constitutional   General appearance: No acute distress, well appearing and well nourished  Eyes   Conjunctiva and lids: No swelling, erythema or discharge  Pupils and irises: Equal, round and reactive to light  Ears, Nose, Mouth, and Throat   External inspection of ears and nose: Normal     Nasal mucosa, septum, and turbinates: Normal without edema or erythema      Oropharynx: Normal with no erythema, edema, exudate or lesions  Pulmonary   Respiratory effort: No increased work of breathing or signs of respiratory distress  Auscultation of lungs: Clear to auscultation  Cardiovascular   Palpation of heart: Normal PMI, no thrills  Auscultation of heart: Normal rate and rhythm, normal S1 and S2, without murmurs  Examination of extremities for edema and/or varicosities: Normal     Carotid pulses: Normal     Abdomen   Abdomen: Non-tender, no masses  Liver and spleen: No hepatomegaly or splenomegaly  Lymphatic   Palpation of lymph nodes in neck: No lymphadenopathy  Musculoskeletal   Gait and station: Normal     Digits and nails: Normal without clubbing or cyanosis  Inspection/palpation of joints, bones, and muscles: Normal     Skin   Skin and subcutaneous tissue: Normal without rashes or lesions  Neurologic   Cranial nerves: Cranial nerves 2-12 intact  Sensation: No sensory loss  Psychiatric   Orientation to person, place, and time: Normal     Mood and affect: Normal       Assessment/ Plan:      The patient is a pleasant 78-year-old  Male with a past medical history of [address]  status post radioembolization who now has some residual tumor in the area that has been treated in the past   He is going for tace procedure in the coming weeks  We are consulted for question of systemic therapy as he does have significant disease and probably may have residual disease at least at the microscopic level after he has had his procedure  I explained the risks benefits and alternatives of immunotherapy and Avastin to the patient  We will provide him with information packet on both drugs as he wishes to think about this prior to consideration of treatment    I will see him back in a month I e  if you weeks after he has had his procedure to discuss with a wishes to go on systemic therapy or if he wishes to stay on observation and when he does have progression which I suspect he will eventually then consider systemic therapy  The patient and his wife were appreciative of our discussion  We will give him information on both Avastin and Keytruda  I will see him back in 4-6 weeks  We will then decide whether he wishes to go on systemic therapy or not  Goals and Barriers:  Current Goal:  Prolong Survival from   Liver Cancer  Barriers: None  Patient's Capacity to Self Care:  Patient  able to self care  Portions of the record may have been created with voice recognition software   Occasional wrong word or "sound a like" substitutions may have occurred due to the inherent limitations of voice recognition software   Read the chart carefully and recognize, using context, where substitutions have occurred

## 2021-06-04 ENCOUNTER — TELEPHONE (OUTPATIENT)
Dept: RADIOLOGY | Facility: HOSPITAL | Age: 81
End: 2021-06-04

## 2021-06-04 NOTE — PRE-PROCEDURE INSTRUCTIONS
Phone Consult completed:Pre procedure instructions for Chemoembolization given to wife and patient with verbal understanding  Allergies,meds,NPO, and ride  Approximate arrival time given,SDS phone call evening before procedure  COVID vaccine completed March 2021

## 2021-06-10 NOTE — DISCHARGE INSTRUCTIONS
Chemoembolization     Chemoembolization is a procedure used to shrink tumors and kill cancer cells  When it is used to treat a liver tumor, it is called hepatic artery chemoembolization  What to expect after your procedure:   · Healthcare providers may monitor you in the hospital and treat any side effects from the procedure  You may get postembolization syndrome, which includes symptoms such as a fever, nausea, vomiting, and abdominal pain  It may also cause fatigue, dizziness, or a fast heartbeat                           After Chemoembolization Cancer Therapy       Anh Oliva and Jose  Patients Contact Interventional  Radiology at [phone] PATIENTS: Contact Interventional Radiology at [phone])      HOMERO PATIENTS: Contact Interventional Radiology at [phone]) if any of the following occur:    · Your arm or leg feels warm, tender, and painful  It may look swollen and red  · You have chest pain when you take a deep breath or cough  · You suddenly feel lightheaded and short of breath  · You cough up blood  · You are too weak or dizzy to stand  · Blood soaks through your bandage  · You have a fever that suddenly gets higher  · You have nausea and vomiting that does not get better, even after you take your medicine  · You have abdominal pain that does not get better, even after you take pain medicine  · You continue to have diarrhea, even after you take medicine to decrease it  · You are unable to have a bowel movement  · You have questions or concerns about your condition or care  Medicines:   · Resume you home medications  · Resume your normal diet  Follow up with your healthcare provider as directed: You may need to return for more tests and to see if the treatment decreased the size of the tumor  Write down your questions so you remember to ask them during your visits  What to expect after your procedure:  You may have a fever for up to 1 week after your procedure  You may also feel tired and lose your appetite  These symptoms are normal and should improve on their own after 1 week  Activity: Have someone to drive you home and stay with you for 24 hours after your procedure  You may need help doing things in your home, or someone to drive you to errands  Another person should stay with you so he can call 911 if you have complications from your procedure  You cannot drive for 24 hours  Slowly  return to your normal activities after your procedure  You will not be able to do strenuous activity or lift anything heavy for several days  Moderate Sedation   WHAT YOU NEED TO KNOW:   Moderate sedation, or conscious sedation, is medicine used during procedures to help you feel relaxed and calm  You will be awake and able to follow directions without anxiety or pain  You will remember little to none of the procedure  You may feel tired, weak, or unsteady on your feet after you get sedation  You may also have trouble concentrating or short-term memory loss  These symptoms should go away in 24 hours or less  DISCHARGE INSTRUCTIONS:   Call 911 or have someone else call for any of the following:   · You have sudden trouble breathing  · You cannot be woken  Seek care immediately if:   · You have a severe headache or dizziness  · Your heart is beating faster than usual   Contact your healthcare provider if:   · You have a fever  · You have nausea or are vomiting for more than 8 hours after the procedure  · Your skin is itchy, swollen, or you have a rash  · You have questions or concerns about your condition or care  Self-care:   · Have someone stay with you for 24 hours  This person can drive you to errands and help you do things around the house  This person can also watch for problems  · Rest and do quiet activities for 24 hours  Do not exercise, ride a bike, or play sports  Stand up slowly to prevent dizziness and falls   Take short walks around the house with another person  Slowly return to your usual activities the next day  · Do not drive or use dangerous machines or tools for 24 hours  You may injure yourself or others  Examples include a lawnmower, saw, or drill  Do not return to work for 24 hours if you use dangerous machines or tools for work  · Do not make important decisions for 24 hours  For example, do not sign important papers or invest money  · Drink liquids as directed  Liquids help flush the sedation medicine out of your body  Ask how much liquid to drink each day and which liquids are best for you  · Eat small, frequent meals to prevent nausea and vomiting  Start with clear liquids such as juice or broth  If you do not vomit after clear liquids, you can eat your usual foods  · Do not drink alcohol or take medicines that make you drowsy  This includes medicines that help you sleep and anxiety medicines  Ask your healthcare provider if it is safe for you to take pain medicine  Follow up with your healthcare provider as directed: Write down your questions so you remember to ask them during your visits  © 2017 [employer] Information is for End User's use only and may not be sold, redistributed or otherwise used for commercial purposes  All illustrations and images included in CareNotes® are the copyrighted property of [employer]  or Basil [employer]arkin  The above information is an  only  It is not intended as medical advice for individual conditions or treatments  Talk to your doctor, nurse or pharmacist before following any medical regimen to see if it is safe and effective for you

## 2021-06-10 NOTE — SEDATION DOCUMENTATION
Chemoembolization of liver tumor performed by Dr Urbano Ward without complications  Left radial access obtained, TR band in place with 10ml air  Tolerated well by patient, VSS throughout  Report called to short stay  IR Procedure Bedrest Start Time is 1120  Annabelle [place]

## 2021-06-10 NOTE — BRIEF OP NOTE (RAD/CATH)
INTERVENTIONAL RADIOLOGY PROCEDURE NOTE    Date: 6/10/2021    Procedure: IR CHEMOEMBOLIZATION LIVER TUMOR    Preoperative diagnosis:   1  Hepatocellular carcinoma (HCC)         Postoperative diagnosis: Same  Surgeon: Lars Dinh MD     Assistant: None  No qualified resident was available  Blood loss: Minimal    Specimens: None     Findings: Successful cTACE of HCC    Complications: None immediate      Anesthesia: conscious sedation

## 2021-06-14 NOTE — PROGRESS NOTES
I spoke to patient over the phone  He is agreeable to proceed with liver chemoembolization  We will go ahead and schedule him for conventional chemoembolization with lipiodol and doxorubicin  (1) 41-60 years

## 2021-07-01 NOTE — PROGRESS NOTES
Hematology/Oncology Outpatient Follow- up Note  Rene Alonso 78 y o  male MRN: @ Encounter: [id number]        Date:  7/1/2021    Presenting Complaint/Diagnosis : Hepatocellular carcinoma    HPI:      Rene Alonso is seen for initial consultation 5/27/2021 regarding  History of parasellar carcinoma  The patient underwent Radioembolization in October of 2020 which he tolerated well  His most recent imaging showed areas of irregular enhancement which appeared to have viable tumor and this was  Reviewed at multidisciplinary tumor board and the patient is going back for TACE  Procedure to have this area addressed  Previous Hematologic/ Oncologic History:    Oncology History   Hepatocellular carcinoma ([facility] [address] )   8/24/2020 Initial Diagnosis    Hepatocellular carcinoma ([facility] [address] )     8/25/2020 Biopsy    IR liver biopsy  - Well-differentiated heptocellular carcinoma     9/22/2020 -  Cancer Staged    Staging form: Liver (Excluding Intrahepatic Bile Ducts), AJCC 8th Edition  - Clinical: Stage IB (cT1b, cN0, cM0) - Signed by Emory Prakash MD on 9/22/2020       10/14/2020 - 10/14/2020 Radiation    Field Numbers Energy Treatment Site Starting  Ending  Elapsed  Fraction Total  Overlap Site Overlap         Date Date Days Dose Dose   Dose    SirSphere  Y90 Rt Lobe + Middle Robe   10/14/20 10/14/20    1 51 GBq  1 51 GBq                                                                                        Date / Time stamp:  10/14/2020  2:43 PM         Current Hematologic/ Oncologic Treatment:      The patient just had  A chemoembolization    Interval History:      The patient returns for follow-up visit  He had his chemo embolization and did reasonably well from it  He states he would rather hold off to see what his MRI at the end of this month post treatment shows and if he has progression he will consider systemic therapy  At 78years of age I think this is reasonable    He does have a large tumor and I explained to him there is a possibility this will eventually grow and most of it would be treated but there probably will be some residual disease  The patient understands this but states he would rather wait to see what his MRI shows  Denies any nausea denies any vomiting denies any diarrhea  The rest of his 14 point review of systems today was negative  Cancer Staging:  Cancer Staging  Hepatocellular carcinoma ([facility] [address] )  Staging form: Liver (Excluding Intrahepatic Bile Ducts), AJCC 8th Edition  - Clinical: Stage IB (cT1b, cN0, cM0) - Signed by Tim Taveras MD on 9/22/2020      Test Results:    Imaging: IR CHEMOEMBOLIZATION LIVER TUMOR    Result Date: 6/10/2021  Narrative: SELECTIVE VISCERAL ARTERIOGRAPHY AND HEPATIC CHEMOEMBOLIZATION PROCEDURE SUMMARY: 1  Left radial artery access with ultrasound guidance 2  Celiac artery catheterization and angiography 3  [facility] beam CT arteriography of the proper hepatic artery  4   Selective catheterization and angiography of the common hepatic artery, proper hepatic artery and the middle hepatic artery  5   Conventional chemoembolization of a hepatic segment 4 hepatocellular carcinoma with lipiodol and doxorubicin  : Attending(s): Manasa FRANCIS  Assistant(s): None INDICATION: C22 0: Liver cell carcinoma COMPLICATIONS: No immediate complications CONTRAST: 942 mL of iohexol (OMNIPAQUE) FLUOROSCOPY TIME/DOSE: 39 4 minutes 1838 mGy ANESTHESIA/SEDATION Level of anesthesia: Moderate sedation Anesthesia administered by: Continuous monitoring by independent trained observer Duration of anesthesia/sedation: 120 minutes  Moderate conscious sedation was utilized under my direct supervision administered by trained independent provider  I was present at the initial dose of sedation medication  TECHNIQUE: Patient's left wrist was evaluated by ultrasound and measurement of the left radial artery diameter was made  Ultrasound images were saved  Vessel size measured at 2 5 mm    The vessel was found to be patent with pulsatile flow  Claudia Fus test was performed demonstrating a type B waveform with radial artery compression indicating the palmar arch was patent  The risks, benefits and alternatives of the procedure were fully discussed  All questions were answered  Informed consent for the procedure was obtained and time-out was performed prior to the procedure  The site was  prepared and draped using all elements of maximal sterile barrier technique including sterile gloves, sterile gown, cap, mask, large sterile sheet, sterile ultrasound probe cover, hand hygiene and cutaneous antisepsis with 2% chlorhexidine  Local anesthesia was administered  The left radial artery was sonographically evaluated and judged to be patent  A 21-gauge needle and 0 018 " wire were used to access the vessel  Real time ultrasound was used to visualize needle entry into the vessel and a permanent image access was stored  A 5 Fr slender sheath was placed over the wire  A 0 035'' LangoLabson guidewire was advanced through the sheath, into the brachial artery and then into the aorta under direct fluoroscopic visualization  A side flush was started  Subsequently, a 5-Spanish Dania catheter was advanced into the abdominal aorta under direct fluoroscopic visualization and the celiac artery was selectively catheterized  Arteriography from this catheter position showed variant hepatic arterial anatomy with right, middle and left hepatic arteries and the middle hepatic artery supplies the tumor at segment 4  A microcatheter was placed coaxially through the Dania catheter  and manipulated into the common hepatic artery, proper hepatic artery and the middle hepatic artery  Digital subtraction angiogram was performed at each location  Cone beam CT arteriography was performed with catheter at the proper hepatic artery   The microcatheter was advanced into the proximal middle hepatic artery and chemoembolization was performed using a admixture of 10 mL of Lipiodol and 10mg of Doxorubicin to appropriate endpoint  This was finished off with bland embolization with 100-300um sized embospheres to appropriate endpoint  Completion angiogram was performed  The catheter and micro-catheter were removed  A TR band was used for arterial closure  Hemostasis was achieved  The patient was transported to the recovery area in good condition  FINDINGS: 1  Celiac arteriography demonstrated variant anatomy with the left hepatic artery supplying segments 2 and 3 and middle hepatic artery supplying segments 4A and 4B  The centrally located tumor is supplied primarily by the middle hepatic artery at this time  2   [facility] beam CT angiography of the proper hepatic artery confirmed that the primary feeder is the middle hepatic artery  3   Completion angiogram demonstrated successful chemoembolization  Excellent lipiodol staining of the tumor at completion  Impression: 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  PLAN: Followup imaging in september and I will follow up with him via phone visit after his scan  Workstation performed: [id number]       Labs:   Lab Results   Component Value Date    WBC 9 65 06/10/2021    HGB 16 3 06/10/2021    HCT 50 8 (H) 06/10/2021    MCV 93 06/10/2021     06/10/2021     Lab Results   Component Value Date    K 4 0 06/10/2021     06/10/2021    CO2 28 06/10/2021    BUN 17 06/10/2021    CREATININE 1 03 06/10/2021    GLUF 131 (H) 06/10/2021    CALCIUM 9 1 06/10/2021    CORRECTEDCA 9 7 06/10/2021    AST 33 06/10/2021    ALT 36 06/10/2021    ALKPHOS 150 (H) 06/10/2021    EGFR 67 06/10/2021         ROS: As stated in the history of present illness otherwise his 14 point review of systems today was negative        Active Problems:   Patient Active Problem List   Diagnosis    Essential hypertension    Hyperlipidemia    Cardiomegaly    Anxiety    Abnormal electrocardiogram    Coronary artery disease without angina pectoris    PAD (peripheral artery disease) (HCC)    Viral URI with cough    Reactive airway disease without complication    Medicare annual wellness visit, subsequent    Hyperglycemia    Ankle edema, bilateral    Transaminitis    Respiratory insufficiency    EMILIANO [facility]acute kidney injury) ([facility] )    Elevated troponin    Septic shock (HCC)    Hepatocellular carcinoma (HCC)    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HCC)    Acute respiratory failure with hypoxia (HCC)    Stage 3a chronic kidney disease (HCC)    Sepsis (HCC)    Pneumonia    Cough    Platelets decreased (HCC)    Type 2 diabetes mellitus without complication, without long-term current use of insulin (HCC)    Allergic cough       Past Medical History:   Past Medical History:   Diagnosis Date    Coronary artery disease     Gout     Hypercholesterolemia     Hypertension     Liver cancer ([facility] )        Surgical History:   Past Surgical History:   Procedure Laterality Date    ABDOMINAL SURGERY      appy    APPENDECTOMY      CORONARY ARTERY BYPASS GRAFT      IR BIOPSY LIVER MASS  8/25/2020    IR CHEMOEMBOLIZATION LIVER TUMOR  6/10/2021    IR Y-90 PRE-ANGIO/EMBO W/ LUNG SCAN  10/6/2020    IR Y-90 RADIOEMBOLIZATION  10/14/2020       Family History:    Family History   Problem Relation Age of Onset    No Known Problems Mother     No Known Problems Father        Cancer-related family history is not on file      Social History:   Social History     Socioeconomic History    Marital status: /Civil Union     Spouse name: Not on file    Number of children: Not on file    Years of education: Not on file    Highest education level: Not on file   Occupational History    Not on file   Tobacco Use    Smoking status: Never Smoker    Smokeless tobacco: Never Used   Vaping Use    Vaping Use: Never used   Substance and Sexual Activity    Alcohol use: Not Currently    Drug use: Never    Sexual activity: Not on file   Other Topics Concern    Not on file   Social History Narrative    Not on file     Social Determinants of Health     Financial Resource Strain:     Difficulty of Paying Living Expenses:    Food Insecurity:     Worried About Running Out of Food in the Last Year:     [address] in the Last Year:    Transportation Needs:     Lack of Transportation (Medical):      Lack of Transportation (Non-Medical):    Physical Activity:     Days of Exercise per Week:     Minutes of Exercise per Session:    Stress:     Feeling of Stress :    Social Connections:     Frequency of Communication with Friends and Family:     Frequency of Social Gatherings with Friends and Family:     Attends [employer] Services:     Active Member of Clubs or Organizations:     Attends Club or Organization Meetings:     Marital Status:    Intimate Partner Violence:     Fear of Current or Ex-Partner:     Emotionally Abused:     Physically Abused:     Sexually Abused:        Current Medications:   Current Outpatient Medications   Medication Sig Dispense Refill    allopurinol (ZYLOPRIM) 300 mg tablet Take 300 mg by mouth daily      aspirin 81 mg chewable tablet Chew 1 tablet (81 mg total) daily 30 tablet 0    cholecalciferol (VITAMIN D3) 400 units tablet Take 1,000 Units by mouth daily       LORazepam (ATIVAN) 1 mg tablet Take 1 tablet 2 hours prior to MRI as directed 2 tablet 0    metoprolol tartrate (LOPRESSOR) 25 mg tablet Take 1 tablet (25 mg total) by mouth 2 (two) times a day 60 tablet 3    Omega-3 Fatty Acids (fish oil) 1,000 mg Take 1,000 mg by mouth daily      ondansetron (ZOFRAN) 4 mg tablet Take 1 tablet (4 mg total) by mouth every 8 (eight) hours as needed for nausea or vomiting 12 tablet 0    oxyCODONE (ROXICODONE) 5 mg immediate release tablet Take 1 tablet (5 mg total) by mouth every 4 (four) hours as needed for moderate pain or severe pain for up to 6 dosesMax Daily Amount: 30 mg 6 tablet 0    simvastatin (ZOCOR) 80 mg tablet TAKE ONE TABLET BY MOUTH EVERY DAY AT 5 PM FOR CHOLESTEROL      amLODIPine (NORVASC) 5 mg tablet Take 1 tablet (5 mg total) by mouth daily 30 tablet 5    pantoprazole (PROTONIX) 40 mg tablet Take 1 tablet (40 mg total) by mouth daily in the early morning 30 tablet 0     No current facility-administered medications for this visit  Allergies: Allergies   Allergen Reactions    Shellfish-Derived Products - Food Allergy        Physical Exam:    Body surface area is 2 02 meters squared  Wt Readings from Last 3 Encounters:   07/01/21 85 7 kg (189 lb)   06/10/21 87 5 kg (193 lb)   05/27/21 87 8 kg (193 lb 8 oz)        Temp Readings from Last 3 Encounters:   07/01/21 97 7 °F (36 5 °C) (Temporal)   06/10/21 97 6 °F (36 4 °C) (Oral)   05/27/21 98 1 °F (36 7 °C) (Temporal)        BP Readings from Last 3 Encounters:   07/01/21 128/82   06/10/21 137/66   05/27/21 143/76         Pulse Readings from Last 3 Encounters:   07/01/21 88   06/10/21 90   05/27/21 90        Physical Exam     Constitutional   General appearance: No acute distress, well appearing and well nourished  Eyes   Conjunctiva and lids: No swelling, erythema or discharge  Pupils and irises: Equal, round and reactive to light  Ears, Nose, Mouth, and Throat   External inspection of ears and nose: Normal     Nasal mucosa, septum, and turbinates: Normal without edema or erythema  Oropharynx: Normal with no erythema, edema, exudate or lesions  Pulmonary   Respiratory effort: No increased work of breathing or signs of respiratory distress  Auscultation of lungs: Clear to auscultation  Cardiovascular   Palpation of heart: Normal PMI, no thrills  Auscultation of heart: Normal rate and rhythm, normal S1 and S2, without murmurs  Examination of extremities for edema and/or varicosities: Normal     Carotid pulses: Normal     Abdomen   Abdomen: Non-tender, no masses      Liver and spleen: No hepatomegaly or splenomegaly  Lymphatic   Palpation of lymph nodes in neck: No lymphadenopathy  Musculoskeletal   Gait and station: Normal     Digits and nails: Normal without clubbing or cyanosis  Inspection/palpation of joints, bones, and muscles: Normal     Skin   Skin and subcutaneous tissue: Normal without rashes or lesions  Neurologic   Cranial nerves: Cranial nerves 2-12 intact  Sensation: No sensory loss  Psychiatric   Orientation to person, place, and time: Normal     Mood and affect: Normal         Assessment / Plan:      The patient is a pleasant 78-year-old  Male with a past medical history of [address]  status post radioembolization who now had some residual tumor in the area that has been treated in the past       He had a  chemoembolization procedure done  He states it went well  He is doing well and has no symptoms today  He wishes to hold off on systemic treatment until progression and has an MRI scheduled for the end of July which he wants to look at prior to making any decisions  [age] years of age I think this is reasonable  I will see him back in 6 weeks  We will go over his MRI at the time  If there is evidence of residual disease or progression he will consider treatment with Avastin and Keytruda  He has already been given the paperwork for these  I will see him back in 6 weeks with results of his MRI  Until then if he has any questions he will call our office  Goals and Barriers:  Current Goal:  Prolong Survival from   [address]  Barriers: None  Patient's Capacity to Self Care:  Patient able to self care  Portions of the record may have been created with voice recognition software   Occasional wrong word or "sound a like" substitutions may have occurred due to the inherent limitations of voice recognition software   Read the chart carefully and recognize, using context, where substitutions have occurred

## 2021-07-20 NOTE — TELEPHONE ENCOUNTER
Pt wife called wondering if her  who is going for an MRI end of July and will be getting blood work done and  if he would need to go for blood work at the end of September before seeing Dr Berenice Stroud

## 2021-07-26 NOTE — ASSESSMENT & PLAN NOTE
Maintain stable lipid profile continue with Zocor 80 mg tablets blood pressure control and heart healthy diet if symptoms change or worsen with leg cramping or pain follow-up with vascular surgery

## 2021-07-26 NOTE — PROGRESS NOTES
BMI Counseling: Body mass index is 28 83 kg/m²  The BMI is above normal  Nutrition recommendations include reducing portion sizes, decreasing overall calorie intake, 3-5 servings of fruits/vegetables daily, reducing fast food intake, consuming healthier snacks, decreasing soda and/or juice intake, moderation in carbohydrate intake, increasing intake of lean protein, reducing intake of saturated fat and trans fat and reducing intake of cholesterol  Exercise recommendations include exercising 3-5 times per week

## 2021-07-26 NOTE — ASSESSMENT & PLAN NOTE
Lab Results   Component Value Date    HGBA1C 6 9 (A) 05/04/2021    diabetes under stable control with A1c at 6 9 continue on current regimen of diet and exercise

## 2021-07-26 NOTE — PROGRESS NOTES
Assessment/Plan:       Problem List Items Addressed This Visit        Digestive    Hepatocellular carcinoma ([facility] [address] ) - Primary      Laboratory work followed continue with Hematology Oncology close follow-up no worsening nausea or weight loss patient has gained weight since 4 weeks ago up 4 lb         Relevant Orders    Ambulatory Referral to Ophthalmology    PSA, total and free    Comprehensive metabolic panel    CBC and differential    Lipid panel       Endocrine    Type 2 diabetes mellitus without complication, without long-term current use of insulin (HCC)       Lab Results   Component Value Date    HGBA1C 6 9 (A) 05/04/2021    diabetes under stable control with A1c at 6 9 continue on current regimen of diet and exercise         Relevant Orders    Ambulatory Referral to Ophthalmology    PSA, total and free    Comprehensive metabolic panel    CBC and differential    Lipid panel       Respiratory    Reactive airway disease without complication      Inhaler usage only if symptomatology worsens         Relevant Orders    Ambulatory Referral to Ophthalmology    PSA, total and free    Comprehensive metabolic panel    CBC and differential    Lipid panel       Cardiovascular and Mediastinum    Essential hypertension      Hypertension under stable control with metoprolol continue same dosage without change         Relevant Orders    Ambulatory Referral to Ophthalmology    PSA, total and free    Comprehensive metabolic panel    CBC and differential    Lipid panel    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HCC)      Maintain stable lipid profile continue with Zocor 80 mg tablets blood pressure control and heart healthy diet if symptoms change or worsen with leg cramping or pain follow-up with vascular surgery         Relevant Orders    Ambulatory Referral to Ophthalmology    PSA, total and free    Comprehensive metabolic panel    CBC and differential    Lipid panel       Genitourinary    Stage 3a chronic kidney disease [facility])     Lab Results   Component Value Date    EGFR 70 07/22/2021    EGFR 67 06/10/2021    EGFR 62 05/01/2021    CREATININE 0 99 07/22/2021    CREATININE 1 03 06/10/2021    CREATININE 1 10 05/01/2021    monitor closely BUN creatinine in good range at this time with GFR at 70 better than it has been over the last several months continue with good hydration and follow up at next visit in 3 months         Relevant Orders    Ambulatory Referral to Ophthalmology    PSA, total and free    Comprehensive metabolic panel    CBC and differential    Lipid panel       Other    Hyperlipidemia      Continue 80 mg Zocor         Relevant Orders    Ambulatory Referral to Ophthalmology    PSA, total and free    Comprehensive metabolic panel    CBC and differential    Lipid panel      Other Visit Diagnoses     Nocturia         Relevant Orders    PSA, total and free            Subjective:      Patient ID: Ronald Valencia is a 78 y o  male  Patient presents for 3 month follow-up and evaluation and discussion of about lab work and treatment for liver cancer      The following portions of the patient's history were reviewed and updated as appropriate: allergies, current medications, past family history, past medical history, past social history, past surgical history and problem list     Review of Systems   Constitutional: Negative for chills, fatigue and fever  HENT: Negative for congestion, nosebleeds, rhinorrhea, sinus pressure and sore throat  Eyes: Negative for discharge and redness  Respiratory: Positive for shortness of breath  Negative for cough  Cardiovascular: Positive for leg swelling  Negative for chest pain and palpitations  Gastrointestinal: Negative for abdominal pain, blood in stool and nausea  Endocrine: Negative for cold intolerance, heat intolerance and polyuria  Genitourinary: Negative for dysuria and frequency  Musculoskeletal: Negative for arthralgias, back pain and myalgias  Skin: Negative for rash  Neurological: Negative for dizziness, weakness and headaches  Hematological: Negative for adenopathy  Psychiatric/Behavioral: Negative for behavioral problems and sleep disturbance  The patient is not nervous/anxious  Objective:      /80 (BP Location: Left arm, Patient Position: Sitting)   Pulse 85   Temp (!) 96 2 °F (35 7 °C)   Resp 18   Ht 5' 9" (1 753 m)   Wt 88 5 kg (195 lb 3 2 oz)   SpO2 96%   BMI 28 83 kg/m²        Physical Exam  Vitals and nursing note reviewed  Constitutional:       Appearance: Normal appearance  He is well-developed  HENT:      Head: Normocephalic and atraumatic  Right Ear: Tympanic membrane and external ear normal       Left Ear: Tympanic membrane and external ear normal       Nose: Nose normal       Mouth/Throat:      Mouth: Mucous membranes are moist       Pharynx: Oropharynx is clear  Eyes:      General: No scleral icterus  Conjunctiva/sclera: Conjunctivae normal       Pupils: Pupils are equal, round, and reactive to light  Neck:      Thyroid: No thyromegaly  Vascular: No JVD  Cardiovascular:      Rate and Rhythm: Normal rate and regular rhythm  Pulses: Normal pulses  Heart sounds: Normal heart sounds  No murmur heard  Pulmonary:      Effort: Pulmonary effort is normal       Breath sounds: Normal breath sounds  No wheezing or rales  Chest:      Chest wall: No tenderness  Abdominal:      General: Bowel sounds are normal  There is no distension  Palpations: Abdomen is soft  There is no mass  Tenderness: There is no abdominal tenderness  There is no guarding or rebound  Musculoskeletal:         General: No tenderness or deformity  Normal range of motion  Cervical back: Normal range of motion and neck supple  Lymphadenopathy:      Cervical: No cervical adenopathy  Skin:     General: Skin is warm and dry  Capillary Refill: Capillary refill takes less than 2 seconds  Findings: No erythema or rash  Neurological:      General: No focal deficit present  Mental Status: He is alert and oriented to person, place, and time  Mental status is at baseline  Cranial Nerves: No cranial nerve deficit  Deep Tendon Reflexes: Reflexes are normal and symmetric  Reflexes normal    Psychiatric:         Mood and Affect: Mood normal          Behavior: Behavior normal          Thought Content: Thought content normal          Judgment: Judgment normal           Data:    Laboratory Results: I have personally reviewed the pertinent laboratory results/reports   Radiology/Other Diagnostic Testing Results: I have personally reviewed pertinent reports         Lab Results   Component Value Date    WBC 9 07 07/22/2021    HGB 15 6 07/22/2021    HCT 49 2 07/22/2021    MCV 94 07/22/2021     07/22/2021     Lab Results   Component Value Date    K 4 1 07/22/2021     07/22/2021    CO2 30 07/22/2021    BUN 14 07/22/2021    CREATININE 0 99 07/22/2021    GLUF 124 (H) 07/22/2021    CALCIUM 9 3 07/22/2021    CORRECTEDCA 10 0 07/22/2021    AST 29 07/22/2021    ALT 36 07/22/2021    ALKPHOS 167 (H) 07/22/2021    EGFR 70 07/22/2021     Lab Results   Component Value Date    CHOLESTEROL 181 07/22/2021     Lab Results   Component Value Date    HDL 48 07/22/2021     Lab Results   Component Value Date    LDLCALC 95 07/22/2021     Lab Results   Component Value Date    TRIG 189 (H) 07/22/2021     No results found for: CHOLHDL  No results found for: [id number], TSH  Lab Results   Component Value Date    HGBA1C 6 9 (A) 05/04/2021     No results found for: PSA    Land O'Panfilo, DO

## 2021-07-26 NOTE — ASSESSMENT & PLAN NOTE
Lab Results   Component Value Date    EGFR 70 07/22/2021    EGFR 67 06/10/2021    EGFR 62 05/01/2021    CREATININE 0 99 07/22/2021    CREATININE 1 03 06/10/2021    CREATININE 1 10 05/01/2021    monitor closely BUN creatinine in good range at this time with GFR at 70 better than it has been over the last several months continue with good hydration and follow up at next visit in 3 months

## 2021-07-26 NOTE — ASSESSMENT & PLAN NOTE
Laboratory work followed continue with Hematology Oncology close follow-up no worsening nausea or weight loss patient has gained weight since 4 weeks ago up 4 lb

## 2021-07-30 NOTE — TELEPHONE ENCOUNTER
Reschedule Appointment     Who is calling in  LEATHA PATEL  [facility]   Doctor Appointment Scheduled with Evie Chatman date and time 8- @ 10:40am   New date and time 9-16-21 @ 10:20am    Location [place]   Patient verbalized understanding

## 2021-09-13 NOTE — PROGRESS NOTES
Follow-up - Radiation Oncology   Ronald Valencia 1940 78 y o  male [id number]      History of Present Illness   Cancer Staging  Hepatocellular carcinoma ([facility] [address] )  Staging form: Liver (Excluding Intrahepatic Bile Ducts), AJCC 8th Edition  - Clinical: Stage IB (cT1b, cN0, cM0) - Signed by Jaden Boss MD on 9/22/2020      Ronald Valencia is a 78y o  year old male with a history of Ronald Valencia 1940 is a 78 y o  male         Follow up visit   Follow up post sirSpheres to right lobe and middle lobe on 10/14/20  Last seen on 4/29/21 5/6/21- Dr Shanique Lamas- Surg Onc   It was recommended that he consider TACE   Sir spheres could be given again if there continues to be recurrence  [facility] is set up to discuss this with IR later next week  Gerard Dean was also recommended that he follow-up with Medical Oncology  [facility] is actually agreeable to this at this time  I will schedule the appointment with Dr Shanique Decker he suggested immunotherapy in the adjuvant setting       5/11/21- Dr Kala Crisostomo- IR   I think both TACE and repeat TARE are exellence choices  Given his incomplete response with TARE, TACE would be the preferred option next      [facility] as he does have significant disease and probably may have residual disease at least at the microscopic level after he has had his procedure      6/10/21- IR CHEMOEMBOLIZATION      7/1/21- Dr Vinita Kawasaki  - hold off on systemic tx pending MRI results     9/7/21- MRI abdomen w wo con  IMPRESSION:  Treated HCC centered within segment MEGHAN and segment VIII, with evidence of viable residual tumor accounting for approximately 60% tumor volume evidenced by APHE and washout   LI-RADS treatment assessment category: LR-TR viable      9/16/21- Dr Oriana Mantilla  9/30/21- Dr Shanique Lamas            Interval History:   Feels fine overall has good performance status          Historical Information   Oncology History   Hepatocellular carcinoma ([facility] [address] )   8/24/2020 Initial Diagnosis    Hepatocellular carcinoma ([facility] [address] )     8/25/2020 Biopsy    IR liver biopsy  - Well-differentiated heptocellular carcinoma     9/22/2020 -  Cancer Staged    Staging form: Liver (Excluding Intrahepatic Bile Ducts), AJCC 8th Edition  - Clinical: Stage IB (cT1b, cN0, cM0) - Signed by Tim Tavreas MD on 9/22/2020       10/14/2020 - 10/14/2020 Radiation    Field Numbers Energy Treatment Site Starting  Ending  Elapsed  Fraction Total  Overlap Site Overlap         Date Date Days Dose Dose   Dose    SirSphere  Y90 Rt Lobe + Middle Robe   10/14/20 10/14/20    1 51 GBq  1 51 GBq                                                                                        Date / Time stamp:  10/14/2020  2:43 PM         Past Medical History:   Diagnosis Date    Coronary artery disease     Gout     Hypercholesterolemia     Hypertension     Liver cancer ([facility] [address] )      Past Surgical History:   Procedure Laterality Date    ABDOMINAL SURGERY      appy    APPENDECTOMY      CORONARY ARTERY BYPASS GRAFT      IR BIOPSY LIVER MASS  8/25/2020    IR CHEMOEMBOLIZATION LIVER TUMOR  6/10/2021    IR Y-90 PRE-ANGIO/EMBO W/ LUNG SCAN  10/6/2020    IR Y-90 RADIOEMBOLIZATION  10/14/2020       Social History   Social History     Substance and Sexual Activity   Alcohol Use Not Currently     Social History     Substance and Sexual Activity   Drug Use Never     Social History     Tobacco Use   Smoking Status Never Smoker   Smokeless Tobacco Never Used         Meds/Allergies     Current Outpatient Medications:     allopurinol (ZYLOPRIM) 300 mg tablet, Take 300 mg by mouth daily, Disp: , Rfl:     aspirin 81 mg chewable tablet, Chew 1 tablet (81 mg total) daily, Disp: 30 tablet, Rfl: 0    cholecalciferol (VITAMIN D3) 400 units tablet, Take 1,000 Units by mouth daily , Disp: , Rfl:     metoprolol tartrate (LOPRESSOR) 25 mg tablet, Take 1 tablet (25 mg total) by mouth 2 (two) times a day, Disp: 60 tablet, Rfl: 3    Omega-3 Fatty Acids (fish oil) 1,000 mg, Take 1,000 mg by mouth daily, Disp: , Rfl:     amLODIPine (NORVASC) 5 mg tablet, Take 1 tablet (5 mg total) by mouth daily, Disp: 30 tablet, Rfl: 5    LORazepam (ATIVAN) 1 mg tablet, Take 1 tablet 2 hours prior to MRI as directed (Patient not taking: Reported on 7/26/2021), Disp: 2 tablet, Rfl: 0    ondansetron (ZOFRAN) 4 mg tablet, Take 1 tablet (4 mg total) by mouth every 8 (eight) hours as needed for nausea or vomiting (Patient not taking: Reported on 7/26/2021), Disp: 12 tablet, Rfl: 0    oxyCODONE (ROXICODONE) 5 mg immediate release tablet, Take 1 tablet (5 mg total) by mouth every 4 (four) hours as needed for moderate pain or severe pain for up to 6 dosesMax Daily Amount: 30 mg (Patient not taking: Reported on 7/26/2021), Disp: 6 tablet, Rfl: 0    pantoprazole (PROTONIX) 40 mg tablet, Take 1 tablet (40 mg total) by mouth daily in the early morning, Disp: 30 tablet, Rfl: 0    simvastatin (ZOCOR) 80 mg tablet, TAKE ONE TABLET BY MOUTH EVERY DAY AT 5 PM FOR CHOLESTEROL (Patient not taking: Reported on 7/26/2021), Disp: , Rfl:   Allergies   Allergen Reactions    Shellfish-Derived Products - Food Allergy          Review of Systems   Constitutional: Negative for activity change, appetite change, fever and unexpected weight change  HENT: Negative for congestion, sneezing and sore throat  Eyes: Negative  Respiratory: Negative for cough and shortness of breath  Cardiovascular: Negative for palpitations and leg swelling  Gastrointestinal: Negative for abdominal distention, abdominal pain, nausea and vomiting  Endocrine: Negative  Genitourinary: Negative for difficulty urinating, flank pain and hematuria  Musculoskeletal: Negative for arthralgias, back pain and joint swelling  Skin: Negative  Allergic/Immunologic: Negative  Neurological: Negative for dizziness, weakness and numbness  Hematological: Negative for adenopathy  Psychiatric/Behavioral: Negative  OBJECTIVE:   /68 (BP Location: Left arm, Patient Position: Sitting)   Pulse 83   Temp 98 3 °F (36 8 °C) (Temporal)   Resp 18   Ht 5' 9" (1 753 m)   Wt 92 1 kg (203 lb)   SpO2 94%   BMI 29 98 kg/m²   Pain Assessment:  0  Karnofsky: 90 - Able to carry on normal activity; minor signs or symptoms of disease     Physical Exam  Constitutional:       General: He is not in acute distress  Appearance: Normal appearance  He is normal weight  HENT:      Nose: No congestion  Mouth/Throat:      Pharynx: Oropharynx is clear  Eyes:      General: No scleral icterus  Extraocular Movements: Extraocular movements intact  Conjunctiva/sclera: Conjunctivae normal       Pupils: Pupils are equal, round, and reactive to light  Cardiovascular:      Rate and Rhythm: Normal rate and regular rhythm  Heart sounds: Normal heart sounds  Pulmonary:      Effort: Pulmonary effort is normal       Breath sounds: Normal breath sounds  Abdominal:      Palpations: Abdomen is soft  There is no mass  Tenderness: There is no abdominal tenderness  Musculoskeletal:         General: No swelling or tenderness  Normal range of motion  Cervical back: Normal range of motion and neck supple  Lymphadenopathy:      Cervical: No cervical adenopathy  Skin:     General: Skin is dry  Coloration: Skin is not jaundiced  Findings: No lesion or rash  Neurological:      General: No focal deficit present  Mental Status: He is alert and oriented to person, place, and time  Mental status is at baseline     Psychiatric:         Mood and Affect: Mood normal          Behavior: Behavior normal               RESULTS    Lab Results:   Recent Results (from the past 672 hour(s))   Comprehensive metabolic panel    Collection Time: 09/02/21  8:26 AM   Result Value Ref Range    Sodium 138 136 - 145 mmol/L    Potassium 4 2 3 5 - 5 3 mmol/L    Chloride 105 100 - 108 mmol/L    CO2 29 21 - 32 mmol/L    ANION GAP 4 4 - 13 mmol/L    BUN 18 5 - 25 mg/dL    Creatinine 1 09 0 60 - 1 30 mg/dL    Glucose, Fasting 136 (H) 65 - 99 mg/dL    Calcium 8 7 8 3 - 10 1 mg/dL    Corrected Calcium 9 2 8 3 - 10 1 mg/dL    AST 38 5 - 45 U/L    ALT 39 12 - 78 U/L    Alkaline Phosphatase 171 (H) 46 - 116 U/L    Total Protein 7 6 6 4 - 8 2 g/dL    Albumin 3 4 (L) 3 5 - 5 0 g/dL    Total Bilirubin 0 64 0 20 - 1 00 mg/dL    eGFR 62 ml/min/1 73sq m   AFP tumor marker    Collection Time: 09/02/21  8:26 AM   Result Value Ref Range    AFP TUMOR MARKER 3 9 0 5 - 8 ng/mL   CBC and differential    Collection Time: 09/02/21  8:26 AM   Result Value Ref Range    WBC 8 71 4 31 - 10 16 Thousand/uL    RBC 5 09 3 88 - 5 62 Million/uL    Hemoglobin 16 1 12 0 - 17 0 g/dL    Hematocrit 49 2 36 5 - 49 3 %    MCV 97 82 - 98 fL    MCH 31 6 26 8 - 34 3 pg    MCHC 32 7 31 4 - 37 4 g/dL    RDW 16 3 (H) 11 6 - 15 1 %    MPV 11 6 8 9 - 12 7 fL    Platelets 328 158 - 092 Thousands/uL    nRBC 0 /100 WBCs    Neutrophils Relative 69 43 - 75 %    Immat GRANS % 1 0 - 2 %    Lymphocytes Relative 16 14 - 44 %    Monocytes Relative 8 4 - 12 %    Eosinophils Relative 5 0 - 6 %    Basophils Relative 1 0 - 1 %    Neutrophils Absolute 6 02 1 85 - 7 62 Thousands/µL    Immature Grans Absolute 0 07 0 00 - 0 20 Thousand/uL    Lymphocytes Absolute 1 43 0 60 - 4 47 Thousands/µL    Monocytes Absolute 0 71 0 17 - 1 22 Thousand/µL    Eosinophils Absolute 0 43 0 00 - 0 61 Thousand/µL    Basophils Absolute 0 05 0 00 - 0 10 Thousands/µL   Protime-INR    Collection Time: 09/02/21  8:26 AM   Result Value Ref Range    Protime 12 1 11 6 - 14 5 seconds    INR 0 93 0 84 - 1 19       Imaging Studies:MRI abdomen w wo contrast    Result Date: 9/12/2021  Narrative: MRI - ABDOMEN - WITH AND WITHOUT CONTRAST INDICATION:  History of well-differentiated HCC, status post radio embolization with Y 90 on 10/14/2020  Patient subsequently had TACE on 6/10/2021 for viable residual tumor  Follow-up   AFP =3 9 on 9/2/2021  Pathology from 8/25/2020: A  Liver, mass, needle core biopsy: - Well-differentiated heptocellular carcinoma  See Note  COMPARISON: MRI abdomen dated April 21, 2021  Attention was also given to prior MRI studies dated 1/19/2021 and 8/23/2020  TECHNIQUE:  The following pulse sequences were obtained prior to and following the administration of intravenous contrast:     Coronal and axial T2 with TE of 90 and 180 respectively, axial T2 with fat saturation, axial FIESTA fat-sat, axial T1-weighted in-and-out-of phase, axial DWI/ADC, precontrast axial T1 with fat saturation, post-contrast dynamic axial T1 with fat saturation at 20, 70, and 180 seconds, coronal T1  with fat saturation and 7 minute delayed axial T1 with fat saturation  Imaging performed on 1 5T MRI   IV Contrast:  8 mL of Gadobutrol injection (SINGLE-DOSE) FINDINGS: LOWER CHEST:   Median sternotomy  Trace left pleural effusion, similar to prior  LIVER: -Mild hepatic steatosis  No gross morphologic features of liver cirrhosis  -Again seen is patient's treated [address] , centered within segment MEGHAN and segment VIII, with the treatment zone measuring 6 6 x 5 8 cm (12/194), previously 7 3 x 6 9 cm  There is now approximately 40% tumor nonviability with area of necrosis  However, there is viable residual tumor accounting for approximately 60% tumor volume evidenced by relative arterial phase hyperenhancement (APHE) and washout (series 10,012 image 51, image 194 and 336)  LI-RADS treatment assessment category: LR-TR viable  -The region of residual viable tumor demonstrates signal drop on out of phase imaging compared to in phase imaging (series 1101 and 1102 image 50), in keeping with presence of intra-Voxel lipid  -There is anterior capsular retraction with delayed phase enhancement in segment V, likely a sequela of radiation fibrosis  -No new liver lesions seen  -There is persistent mass effect on the right and left portal veins    Otherwise, the hepatic veins and portal veins are patent  BILE DUCTS: No intrahepatic or extrahepatic bile duct dilation  GALLBLADDER:  Normal  PANCREAS: Normal  No main pancreatic ductal dilation  ADRENAL GLANDS:  Normal  SPLEEN:  Normal  KIDNEYS/PROXIMAL URETERS: No hydroureteronephrosis  No suspicious renal mass  Stable bilateral renal cysts  BOWEL: Scattered colonic diverticula without findings of acute diverticulitis  No dilated loops of bowel  PERITONEUM/RETROPERITONEUM: No ascites  LYMPH NODES: No abdominal lymphadenopathy  VASCULAR STRUCTURES:  No aneurysm  ABDOMINAL WALL:  Unremarkable  OSSEOUS STRUCTURES:  No suspicious osseous lesion  Degenerative changes of the spine  Impression: Treated HCC centered within segment MEGHAN and segment VIII, with evidence of viable residual tumor accounting for approximately 60% tumor volume evidenced by APHE and washout  LI-RADS treatment assessment category: LR-TR viable  The study was marked in EPIC for significant notification  Workstation performed: [id number]           Assessment/Plan:  No orders of the defined types were placed in this encounter  Royce Elaine is a 78y o  year old male with hepatocellular carcinoma status post SIRsphere in October of last year followed by TACE and TARE  Recent MRI of the abdomen shows evidence of viable residual tumor  Overall patient has excellent performance status  His treatment option is systemic treatment or repeat SIRsphere therapy if feasible  Will confer with my colleagues  He will see Dr Crum Age this Thursday  Padmini York MD  3/03/2508,8:69 PM    Portions of the record may have been created with voice recognition software   Occasional wrong word or "sound a like" substitutions may have occurred due to the inherent limitations of voice recognition software   Read the chart carefully and recognize, using context, where substitutions have occurred

## 2021-09-13 NOTE — PROGRESS NOTES
Cardiology Mansi Viveros 1940 is a 78 y o  male       Follow up visit   Follow up post sirSpheres to right lobe and middle lobe on 10/14/20  Last seen on 4/29/21 5/6/21- Dr Campbell Chow- Surg Onc   It was recommended that he consider TACE  Sir spheres could be given again if there continues to be recurrence  He is set up to discuss this with IR later next week  It was also recommended that he follow-up with Medical Oncology  He is actually agreeable to this at this time  I will schedule the appointment with Dr Henderson Her since he suggested immunotherapy in the adjuvant setting      5/11/21- Dr Koch Peer- IR   I think both TACE and repeat TARE are exellence choices  Given his incomplete response with TARE, TACE would be the preferred option next  5/27/21- Dr Henderson Her- Nusrat Deanrosamaria for systemic therapy as he does have significant disease and probably may have residual disease at least at the microscopic level after he has had his procedure  6/10/21- IR CHEMOEMBOLIZATION     7/1/21- Dr Samuel Londono  - hold off on systemic tx pending MRI results    9/7/21- MRI abdomen w wo con  IMPRESSION:  Treated HCC centered within segment MEGHAN and segment VIII, with evidence of viable residual tumor accounting for approximately 60% tumor volume evidenced by APHE and washout  LI-RADS treatment assessment category: LR-TR viable      9/16/21- Dr Henderson Her  9/30/21- Dr Campbell Chow        Oncology History   Hepatocellular carcinoma ([facility] [address] )   8/24/2020 Initial Diagnosis    Hepatocellular carcinoma ([facility] [address] )     8/25/2020 Biopsy    IR liver biopsy  - Well-differentiated heptocellular carcinoma     9/22/2020 -  Cancer Staged    Staging form: Liver (Excluding Intrahepatic Bile Ducts), AJCC 8th Edition  - Clinical: Stage IB (cT1b, cN0, cM0) - Signed by Tim Taveras MD on 9/22/2020       10/14/2020 - 10/14/2020 Radiation    Field Numbers Energy Treatment Site Starting  Ending  Elapsed  Fraction Total  Overlap Site Overlap         Date Date Days Dose Dose   Dose SirSphere  Y90 Rt Lobe + Middle Robe   10/14/20 10/14/20    1 51 GBq  1 51 GBq                                                                                        Date / Time stamp:  10/14/2020  2:43 PM         Clinical Trial: no      Health Maintenance   Topic Date Due    DM Eye Exam  Never done    DTaP,Tdap,and Td Vaccines (1 - Tdap) Never done    Influenza Vaccine (1) 09/01/2021    HEMOGLOBIN A1C  11/04/2021    Fall Risk  05/04/2022    Medicare Annual Wellness Visit (AWV)  05/04/2022    Diabetic Foot Exam  05/04/2022    URINE MICROALBUMIN  07/22/2022    BMI: Followup Plan  07/26/2022    BMI: Adult  07/26/2022    Depression Screening PHQ  09/13/2022    Hepatitis C Screening  Completed    Pneumococcal Vaccine: 65+ Years  Completed    COVID-19 Vaccine  Completed    HIB Vaccine  Aged Out    Hepatitis B Vaccine  Aged Out    IPV Vaccine  Aged Out    Hepatitis A Vaccine  Aged Out    Meningococcal ACWY Vaccine  Aged Out    HPV Vaccine  Aged Out       Patient Active Problem List   Diagnosis    Essential hypertension    Hyperlipidemia    Cardiomegaly    Anxiety    Abnormal electrocardiogram    Coronary artery disease without angina pectoris    PAD (peripheral artery disease) ([address] )    Viral URI with cough    Reactive airway disease without complication    Medicare annual wellness visit, subsequent    Hyperglycemia    Ankle edema, bilateral    Transaminitis    Respiratory insufficiency    EMILIANO (acute kidney injury) ([address] )    Elevated troponin    Septic shock ([address]    Hepatocellular carcinoma ([address]    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[address]    Acute respiratory failure with hypoxia ([address]    Stage 3a chronic kidney disease ([address]    Sepsis ([address]    Pneumonia    Cough    Platelets decreased ([address]    Type 2 diabetes mellitus without complication, without long-term current use of insulin (HCC)    Allergic cough     Past Medical History:   Diagnosis Date  Coronary artery disease     Gout     Hypercholesterolemia     Hypertension     Liver cancer [facility])      Past Surgical History:   Procedure Laterality Date    ABDOMINAL SURGERY      appy    APPENDECTOMY      CORONARY ARTERY BYPASS GRAFT      IR BIOPSY LIVER MASS  8/25/2020    IR CHEMOEMBOLIZATION LIVER TUMOR  6/10/2021    IR Y-90 PRE-ANGIO/EMBO W/ LUNG SCAN  10/6/2020    IR Y-90 RADIOEMBOLIZATION  10/14/2020     Family History   Problem Relation Age of Onset    No Known Problems Mother     No Known Problems Father      Social History     Socioeconomic History    Marital status: /Civil Union     Spouse name: Not on file    Number of children: Not on file    Years of education: Not on file    Highest education level: Not on file   Occupational History    Not on file   Tobacco Use    Smoking status: Never Smoker    Smokeless tobacco: Never Used   Vaping Use    Vaping Use: Never used   Substance and Sexual Activity    Alcohol use: Not Currently    Drug use: Never    Sexual activity: Not on file   Other Topics Concern    Not on file   Social History Narrative    Not on file     Social Determinants of Health     Financial Resource Strain:     Difficulty of Paying Living Expenses:    Food Insecurity:     Worried About Running Out of Food in the Last Year:     Ran Out of Food in the Last Year:    Transportation Needs:     Lack of Transportation (Medical):      Lack of Transportation (Non-Medical):    Physical Activity:     Days of Exercise per Week:     Minutes of Exercise per Session:    Stress:     Feeling of Stress :    Social Connections:     Frequency of Communication with Friends and Family:     Frequency of Social Gatherings with Friends and Family:     Attends Buddhist Services:     Active Member of Clubs or Organizations:     Attends Club or Organization Meetings:     Marital Status:    Intimate Partner Violence:     Fear of Current or Ex-Partner:     Emotionally Abused:     Physically Abused:     Sexually Abused:        Current Outpatient Medications:     allopurinol (ZYLOPRIM) 300 mg tablet, Take 300 mg by mouth daily, Disp: , Rfl:     aspirin 81 mg chewable tablet, Chew 1 tablet (81 mg total) daily, Disp: 30 tablet, Rfl: 0    cholecalciferol (VITAMIN D3) 400 units tablet, Take 1,000 Units by mouth daily , Disp: , Rfl:     metoprolol tartrate (LOPRESSOR) 25 mg tablet, Take 1 tablet (25 mg total) by mouth 2 (two) times a day, Disp: 60 tablet, Rfl: 3    Omega-3 Fatty Acids (fish oil) 1,000 mg, Take 1,000 mg by mouth daily, Disp: , Rfl:     amLODIPine (NORVASC) 5 mg tablet, Take 1 tablet (5 mg total) by mouth daily, Disp: 30 tablet, Rfl: 5    LORazepam (ATIVAN) 1 mg tablet, Take 1 tablet 2 hours prior to MRI as directed (Patient not taking: Reported on 7/26/2021), Disp: 2 tablet, Rfl: 0    ondansetron (ZOFRAN) 4 mg tablet, Take 1 tablet (4 mg total) by mouth every 8 (eight) hours as needed for nausea or vomiting (Patient not taking: Reported on 7/26/2021), Disp: 12 tablet, Rfl: 0    oxyCODONE (ROXICODONE) 5 mg immediate release tablet, Take 1 tablet (5 mg total) by mouth every 4 (four) hours as needed for moderate pain or severe pain for up to 6 dosesMax Daily Amount: 30 mg (Patient not taking: Reported on 7/26/2021), Disp: 6 tablet, Rfl: 0    pantoprazole (PROTONIX) 40 mg tablet, Take 1 tablet (40 mg total) by mouth daily in the early morning, Disp: 30 tablet, Rfl: 0    simvastatin (ZOCOR) 80 mg tablet, TAKE ONE TABLET BY MOUTH EVERY DAY AT 5 PM FOR CHOLESTEROL (Patient not taking: Reported on 7/26/2021), Disp: , Rfl:   Allergies   Allergen Reactions    Shellfish-Derived Products - Food Allergy        Review of Systems:  Review of Systems   Constitutional: Negative  HENT: Negative  Eyes: Negative  Respiratory: Negative  Cardiovascular: Negative  Gastrointestinal: Negative  Endocrine: Negative  Genitourinary: Negative      Musculoskeletal: Negative  Skin: Negative  Allergic/Immunologic: Negative  Neurological: Negative  Hematological: Negative  Psychiatric/Behavioral: Negative  Vitals:    09/13/21 1310   BP: 118/68   BP Location: Left arm   Patient Position: Sitting   Pulse: 83   Resp: 18   Temp: 98 3 °F (36 8 °C)   TempSrc: Temporal   SpO2: 94%   Weight: 92 1 kg (203 lb)   Height: 5' 9" (1 753 m)        Pain Score: 0-No pain            Imaging:MRI abdomen w wo contrast    Result Date: 9/12/2021  Narrative: MRI - ABDOMEN - WITH AND WITHOUT CONTRAST INDICATION:  History of well-differentiated HCC, status post radio embolization with Y 90 on 10/14/2020  Patient subsequently had TACE on 6/10/2021 for viable residual tumor  Follow-up  AFP =3 9 on 9/2/2021  Pathology from 8/25/2020: A  Liver, mass, needle core biopsy: - Well-differentiated heptocellular carcinoma  See Note  COMPARISON: MRI abdomen dated April 21, 2021  Attention was also given to prior MRI studies dated 1/19/2021 and 8/23/2020  TECHNIQUE:  The following pulse sequences were obtained prior to and following the administration of intravenous contrast:     Coronal and axial T2 with TE of 90 and 180 respectively, axial T2 with fat saturation, axial FIESTA fat-sat, axial T1-weighted in-and-out-of phase, axial DWI/ADC, precontrast axial T1 with fat saturation, post-contrast dynamic axial T1 with fat saturation at 20, 70, and 180 seconds, coronal T1  with fat saturation and 7 minute delayed axial T1 with fat saturation  Imaging performed on 1 5T MRI   IV Contrast:  8 mL of Gadobutrol injection (SINGLE-DOSE) FINDINGS: LOWER CHEST:   Median sternotomy  Trace left pleural effusion, similar to prior  LIVER: -Mild hepatic steatosis  No gross morphologic features of liver cirrhosis  -Again seen is patient's treated [address] , centered within segment MEGHAN and segment VIII, with the treatment zone measuring 6 6 x 5 8 cm (12/194), previously 7 3 x 6 9 cm    There is now approximately 40% tumor nonviability with area of necrosis  However, there is viable residual tumor accounting for approximately 60% tumor volume evidenced by relative arterial phase hyperenhancement (APHE) and washout (series 10,012 image 51, image 194 and 336)  LI-RADS treatment assessment category: LR-TR viable  -The region of residual viable tumor demonstrates signal drop on out of phase imaging compared to in phase imaging (series 1101 and 1102 image 50), in keeping with presence of intra-Voxel lipid  -There is anterior capsular retraction with delayed phase enhancement in segment V, likely a sequela of radiation fibrosis  -No new liver lesions seen  -There is persistent mass effect on the right and left portal veins  Otherwise, the hepatic veins and portal veins are patent  BILE DUCTS: No intrahepatic or extrahepatic bile duct dilation  GALLBLADDER:  Normal  PANCREAS: Normal  No main pancreatic ductal dilation  ADRENAL GLANDS:  Normal  SPLEEN:  Normal  KIDNEYS/PROXIMAL URETERS: No hydroureteronephrosis  No suspicious renal mass  Stable bilateral renal cysts  BOWEL: Scattered colonic diverticula without findings of acute diverticulitis  No dilated loops of bowel  PERITONEUM/RETROPERITONEUM: No ascites  LYMPH NODES: No abdominal lymphadenopathy  VASCULAR STRUCTURES:  No aneurysm  ABDOMINAL WALL:  Unremarkable  OSSEOUS STRUCTURES:  No suspicious osseous lesion  Degenerative changes of the spine  Impression: Treated HCC centered within segment MEGHAN and segment VIII, with evidence of viable residual tumor accounting for approximately 60% tumor volume evidenced by APHE and washout  LI-RADS treatment assessment category: LR-TR viable  The study was marked in EPIC for significant notification   Workstation performed: [id number]       Teaching

## 2021-09-16 NOTE — LETTER
September 16, 2021     Latesha Matta, [facility] [address]    Patient: Jennifer Montelongo   YOB: 1940   Date of Visit: 9/16/2021       Dear Dr Kaycee Rutledge: Thank you for referring Ashlyn Naylor to me for evaluation  Below are my notes for this consultation  If you have questions, please do not hesitate to call me  I look forward to following your patient along with you  Sincerely,        Adriane Diamond MD        CC: MD Radha Mccray MD Mansfield Carpen, MD Johnella Socks, MD Lorriane Mantle, MD  9/16/2021 11:02 AM  Signed      Hematology/Oncology Outpatient Follow- up Note  Jennifer Montelongo 78 y o  male MRN: @ Encounter: [id number]        Date:  9/16/2021    Presenting Complaint/Diagnosis : Hepatocellular carcinoma    HPI:    Hammad Huggins seen for initial consultation 5/27/2021 regarding  History of parasellar carcinoma   The patient underwent Radioembolization in October of 2020 which he tolerated well   His most recent imaging showed areas of irregular enhancement which appeared to have viable tumor and this was  Reviewed at multidisciplinary tumor board and the patient is going back for TACE  Procedure to have this area addressed       Previous Hematologic/ Oncologic History:    Oncology History   Hepatocellular carcinoma ([address] )   8/24/2020 Initial Diagnosis    Hepatocellular carcinoma ([facility] [address] )     8/25/2020 Biopsy    IR liver biopsy  - Well-differentiated heptocellular carcinoma     9/22/2020 -  Cancer Staged    Staging form: Liver (Excluding Intrahepatic Bile Ducts), AJCC 8th Edition  - Clinical: Stage IB (cT1b, cN0, cM0) - Signed by Mial Jennings MD on 9/22/2020       10/14/2020 - 10/14/2020 Radiation    Field Numbers Energy Treatment Site Starting  Ending  Elapsed  Fraction Total  Overlap Site Overlap         Date Date Days Dose Dose   Dose    SirSphere  Y90 Rt Lobe + Middle Robe   10/14/20 10/14/20    1 51 GBq  1 51 GBq Date / Time stamp:  10/14/2020  2:43 PM        Y90   chemoembolization    Current Hematologic/ Oncologic Treatment:     the patient is here to discuss results    Interval History:       The patient returns for follow-up visit  He states he is doing well  He had an MRI of his liver which revealed 60% viable tumor with 40% tumor treated  His tumor has shrunk  There were no new lesions or progression elsewhere  The patient himself is at baseline  Denies any nausea denies any vomiting denies any diarrhea  Is an otherwise vigorous 78year-old gentleman  The rest of his 14 point review of systems today was negative  Cancer Staging:  Cancer Staging  Hepatocellular carcinoma ([facility] [address] )  Staging form: Liver (Excluding Intrahepatic Bile Ducts), AJCC 8th Edition  - Clinical: Stage IB (cT1b, cN0, cM0) - Signed by Radha Silva MD on 9/22/2020      Test Results:    Imaging: MRI abdomen w wo contrast    Result Date: 9/12/2021  Narrative: MRI - ABDOMEN - WITH AND WITHOUT CONTRAST INDICATION:  History of well-differentiated HCC, status post radio embolization with Y 90 on 10/14/2020  Patient subsequently had TACE on 6/10/2021 for viable residual tumor  Follow-up  AFP =3 9 on 9/2/2021  Pathology from 8/25/2020: A  Liver, mass, needle core biopsy: - Well-differentiated heptocellular carcinoma  See Note  COMPARISON: MRI abdomen dated April 21, 2021  Attention was also given to prior MRI studies dated 1/19/2021 and 8/23/2020   TECHNIQUE:  The following pulse sequences were obtained prior to and following the administration of intravenous contrast:     Coronal and axial T2 with TE of 90 and 180 respectively, axial T2 with fat saturation, axial FIESTA fat-sat, axial T1-weighted in-and-out-of phase, axial DWI/ADC, precontrast axial T1 with fat saturation, post-contrast dynamic axial T1 with fat saturation at 20, 70, and 180 seconds, coronal T1  with fat saturation and 7 minute delayed axial T1 with fat saturation  Imaging performed on 1 5T MRI   IV Contrast:  8 mL of Gadobutrol injection (SINGLE-DOSE) FINDINGS: LOWER CHEST:   Median sternotomy  Trace left pleural effusion, similar to prior  LIVER: -Mild hepatic steatosis  No gross morphologic features of liver cirrhosis  -Again seen is patient's treated [address] , centered within segment MEGHAN and segment VIII, with the treatment zone measuring 6 6 x 5 8 cm (12/194), previously 7 3 x 6 9 cm  There is now approximately 40% tumor nonviability with area of necrosis  However, there is viable residual tumor accounting for approximately 60% tumor volume evidenced by relative arterial phase hyperenhancement (APHE) and washout (series 10,012 image 51, image 194 and 336)  LI-RADS treatment assessment category: LR-TR viable  -The region of residual viable tumor demonstrates signal drop on out of phase imaging compared to in phase imaging (series 1101 and 1102 image 50), in keeping with presence of intra-Voxel lipid  -There is anterior capsular retraction with delayed phase enhancement in segment V, likely a sequela of radiation fibrosis  -No new liver lesions seen  -There is persistent mass effect on the right and left portal veins  Otherwise, the hepatic veins and portal veins are patent  BILE DUCTS: No intrahepatic or extrahepatic bile duct dilation  GALLBLADDER:  Normal  PANCREAS: Normal  No main pancreatic ductal dilation  ADRENAL GLANDS:  Normal  SPLEEN:  Normal  KIDNEYS/PROXIMAL URETERS: No hydroureteronephrosis  No suspicious renal mass  Stable bilateral renal cysts  BOWEL: Scattered colonic diverticula without findings of acute diverticulitis  No dilated loops of bowel  PERITONEUM/RETROPERITONEUM: No ascites  LYMPH NODES: No abdominal lymphadenopathy  VASCULAR STRUCTURES:  No aneurysm  ABDOMINAL WALL:  Unremarkable  OSSEOUS STRUCTURES:  No suspicious osseous lesion  Degenerative changes of the spine       Impression: Treated HCC centered within segment MEGHAN and segment VIII, with evidence of viable residual tumor accounting for approximately 60% tumor volume evidenced by APHE and washout  LI-RADS treatment assessment category: LR-TR viable  The study was marked in EPIC for significant notification  Workstation performed: [id number]       Labs:   Lab Results   Component Value Date    WBC 8 71 09/02/2021    HGB 16 1 09/02/2021    HCT 49 2 09/02/2021    MCV 97 09/02/2021     09/02/2021     Lab Results   Component Value Date    K 4 2 09/02/2021     09/02/2021    CO2 29 09/02/2021    BUN 18 09/02/2021    CREATININE 1 09 09/02/2021    GLUF 136 (H) 09/02/2021    CALCIUM 8 7 09/02/2021    CORRECTEDCA 9 2 09/02/2021    AST 38 09/02/2021    ALT 39 09/02/2021    ALKPHOS 171 (H) 09/02/2021    EGFR 62 09/02/2021       ROS: As stated in the history of present illness otherwise his 14 point review of systems today was negative        Active Problems:   Patient Active Problem List   Diagnosis    Essential hypertension    Hyperlipidemia    Cardiomegaly    Anxiety    Abnormal electrocardiogram    Coronary artery disease without angina pectoris    PAD (peripheral artery disease) (HCC)    Viral URI with cough    Reactive airway disease without complication    Medicare annual wellness visit, subsequent    Hyperglycemia    Ankle edema, bilateral    Transaminitis    Respiratory insufficiency    EMILIANO (acute kidney injury) ([facility] [address] )    Elevated troponin    Septic shock (HCC)    Hepatocellular carcinoma (HCC)    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HCC)    Acute respiratory failure with hypoxia (HCC)    Stage 3a chronic kidney disease ([facility] )    Sepsis ([facility] )    Pneumonia    Cough    Platelets decreased ([facility] )    Type 2 diabetes mellitus without complication, without long-term current use of insulin (HCC)    Allergic cough       Past Medical History:   Past Medical History:   Diagnosis Date    Coronary artery disease     Gout     Hypercholesterolemia     Hypertension     Liver cancer [facility])        Surgical History:   Past Surgical History:   Procedure Laterality Date    ABDOMINAL SURGERY      appy    APPENDECTOMY      CORONARY ARTERY BYPASS GRAFT      IR BIOPSY LIVER MASS  8/25/2020    IR CHEMOEMBOLIZATION LIVER TUMOR  6/10/2021    IR Y-90 PRE-ANGIO/EMBO W/ LUNG SCAN  10/6/2020    IR Y-90 RADIOEMBOLIZATION  10/14/2020       Family History:    Family History   Problem Relation Age of Onset    No Known Problems Mother     No Known Problems Father        Cancer-related family history is not on file  Social History:   Social History     Socioeconomic History    Marital status: /Civil Union     Spouse name: Not on file    Number of children: Not on file    Years of education: Not on file    Highest education level: Not on file   Occupational History    Not on file   Tobacco Use    Smoking status: Never Smoker    Smokeless tobacco: Never Used   Vaping Use    Vaping Use: Never used   Substance and Sexual Activity    Alcohol use: Not Currently    Drug use: Never    Sexual activity: Not on file   Other Topics Concern    Not on file   Social History Narrative    Not on file     Social Determinants of Health     Financial Resource Strain:     Difficulty of Paying Living Expenses:    Food Insecurity:     Worried About Running Out of Food in the Last Year:     [address] in the Last Year:    Transportation Needs:     Lack of Transportation (Medical):      Lack of Transportation (Non-Medical):    Physical Activity:     Days of Exercise per Week:     Minutes of Exercise per Session:    Stress:     Feeling of Stress :    Social Connections:     Frequency of Communication with Friends and Family:     Frequency of Social Gatherings with Friends and Family:     Attends Buddhist Services:     Active Member of Clubs or Organizations:     Attends Club or Organization Meetings:     Marital Status:    Intimate Partner Violence:     Fear of Current or Ex-Partner:     Emotionally Abused:     Physically Abused:     Sexually Abused:        Current Medications:   Current Outpatient Medications   Medication Sig Dispense Refill    allopurinol (ZYLOPRIM) 300 mg tablet Take 300 mg by mouth daily      amLODIPine (NORVASC) 5 mg tablet Take 1 tablet (5 mg total) by mouth daily 30 tablet 5    aspirin 81 mg chewable tablet Chew 1 tablet (81 mg total) daily 30 tablet 0    cholecalciferol (VITAMIN D3) 400 units tablet Take 1,000 Units by mouth daily       metoprolol tartrate (LOPRESSOR) 25 mg tablet Take 1 tablet (25 mg total) by mouth 2 (two) times a day 60 tablet 3    Omega-3 Fatty Acids (fish oil) 1,000 mg Take 1,000 mg by mouth daily      LORazepam (ATIVAN) 1 mg tablet Take 1 tablet 2 hours prior to MRI as directed (Patient not taking: Reported on 7/26/2021) 2 tablet 0    ondansetron (ZOFRAN) 4 mg tablet Take 1 tablet (4 mg total) by mouth every 8 (eight) hours as needed for nausea or vomiting (Patient not taking: Reported on 7/26/2021) 12 tablet 0    oxyCODONE (ROXICODONE) 5 mg immediate release tablet Take 1 tablet (5 mg total) by mouth every 4 (four) hours as needed for moderate pain or severe pain for up to 6 dosesMax Daily Amount: 30 mg (Patient not taking: Reported on 7/26/2021) 6 tablet 0    pantoprazole (PROTONIX) 40 mg tablet Take 1 tablet (40 mg total) by mouth daily in the early morning (Patient not taking: Reported on 9/16/2021) 30 tablet 0    simvastatin (ZOCOR) 80 mg tablet TAKE ONE TABLET BY MOUTH EVERY DAY AT 5 PM FOR CHOLESTEROL (Patient not taking: Reported on 7/26/2021)       No current facility-administered medications for this visit  Allergies: Allergies   Allergen Reactions    Shellfish-Derived Products - Food Allergy        Physical Exam:    Body surface area is 2 07 meters squared      Wt Readings from Last 3 Encounters:   09/16/21 91 6 kg (202 lb)   09/13/21 92 1 kg (203 lb)   07/26/21 88 5 kg (195 lb 3 2 oz)        Temp Readings from Last 3 Encounters:   09/16/21 (!) 96 8 °F (36 °C) (Temporal)   09/13/21 98 3 °F (36 8 °C) (Temporal)   07/26/21 (!) 96 2 °F (35 7 °C)        BP Readings from Last 3 Encounters:   09/16/21 162/78   09/13/21 118/68   07/26/21 140/80         Pulse Readings from Last 3 Encounters:   09/16/21 73   09/13/21 83   07/26/21 85         Physical Exam     Constitutional   General appearance: No acute distress, well appearing and well nourished  Eyes   Conjunctiva and lids: No swelling, erythema or discharge  Pupils and irises: Equal, round and reactive to light  Ears, Nose, Mouth, and Throat   External inspection of ears and nose: Normal     Nasal mucosa, septum, and turbinates: Normal without edema or erythema  Oropharynx: Normal with no erythema, edema, exudate or lesions  Pulmonary   Respiratory effort: No increased work of breathing or signs of respiratory distress  Auscultation of lungs: Clear to auscultation  Cardiovascular   Palpation of heart: Normal PMI, no thrills  Auscultation of heart: Normal rate and rhythm, normal S1 and S2, without murmurs  Examination of extremities for edema and/or varicosities: Normal     Carotid pulses: Normal     Abdomen   Abdomen: Non-tender, no masses  Liver and spleen: No hepatomegaly or splenomegaly  Lymphatic   Palpation of lymph nodes in neck: No lymphadenopathy  Musculoskeletal   Gait and station: Normal     Digits and nails: Normal without clubbing or cyanosis  Inspection/palpation of joints, bones, and muscles: Normal     Skin   Skin and subcutaneous tissue: Normal without rashes or lesions  Neurologic   Cranial nerves: Cranial nerves 2-12 intact  Sensation: No sensory loss      Psychiatric   Orientation to person, place, and time: Normal     Mood and affect: Normal         Assessment / Plan:      The patient is a pleasant 78-year-old male with [address]  status post radioembolization in the past with then got a chemoembolization  His tumor is partially treated  He has no evidence of progression and the tumor has shrunk but he still has viable tumor so needs another localized procedure  He saw our colleagues in [id number] who according to him did not make any commitment to treatment  He is seeing our colleagues in surgery in a week  At this point I explained to him that he does need a locally directed therapy  This could be another embolization versus radioembolization  I explained this would be decided at multidisciplinary tumor Conference and after his surgeon has assessed him also  We discussed systemic therapy and again I explained the tumor had actually shrunk and there was no metastatic disease so we could consider just local treatment and observation versus putting him on systemic therapy  He has been in favor of holding off on any systemic therapy as he otherwise has a very good quality of life and would rather just have the local procedure done  If he does have progression despite that or if he has metastatic disease he will then consider systemic therapy  I think this is reasonable  He will see our colleagues in Surgical Oncology  I have set him up to see our colleagues in Interventional Radiology also and have also set him up to see radiation oncology I e  either Dr Francisca Meadows or Jono  For consideration of Y90 or at least a discussion  I will see him back in 6 weeks to make sure he is scheduled to see the right people and has a plan in place  Goals and Barriers:  Current Goal:  Prolong Survival from   [address]  Barriers: None  Patient's Capacity to Self Care:  Patient able to self care      Portions of the record may have been created with voice recognition software   Occasional wrong word or "sound a like" substitutions may have occurred due to the inherent limitations of voice recognition software   Read the chart carefully and recognize, using context, where substitutions have occurred

## 2021-09-16 NOTE — PROGRESS NOTES
Hematology/Oncology Outpatient Follow- up Note  Roberto Carlos Kaiser 78 y o  male MRN: @ Encounter: [id number]        Date:  9/16/2021    Presenting Complaint/Diagnosis : Hepatocellular carcinoma    HPI:    Fariba Mejia seen for initial consultation 5/27/2021 regarding  History of parasellar carcinoma   The patient underwent Radioembolization in October of 2020 which he tolerated well  His most recent imaging showed areas of irregular enhancement which appeared to have viable tumor and this was  Reviewed at multidisciplinary tumor board and the patient is going back for TACE  Procedure to have this area addressed       Previous Hematologic/ Oncologic History:    Oncology History   Hepatocellular carcinoma ([facility] [address] )   8/24/2020 Initial Diagnosis    Hepatocellular carcinoma ([facility] [address] )     8/25/2020 Biopsy    IR liver biopsy  - Well-differentiated heptocellular carcinoma     9/22/2020 -  Cancer Staged    Staging form: Liver (Excluding Intrahepatic Bile Ducts), AJCC 8th Edition  - Clinical: Stage IB (cT1b, cN0, cM0) - Signed by Ora Enamorado MD on 9/22/2020       10/14/2020 - 10/14/2020 Radiation    Field Numbers Energy Treatment Site Starting  Ending  Elapsed  Fraction Total  Overlap Site Overlap         Date Date Days Dose Dose   Dose    SirSphere  Y90 Rt Lobe + Middle Robe   10/14/20 10/14/20    1 51 GBq  1 51 GBq                                                                                        Date / Time stamp:  10/14/2020  2:43 PM        Y90   chemoembolization    Current Hematologic/ Oncologic Treatment:     the patient is here to discuss results    Interval History:       The patient returns for follow-up visit  He states he is doing well  He had an MRI of his liver which revealed 60% viable tumor with 40% tumor treated  His tumor has shrunk  There were no new lesions or progression elsewhere  The patient himself is at baseline  Denies any nausea denies any vomiting denies any diarrhea    Is an otherwise vigorous 78year-old gentleman  The rest of his 14 point review of systems today was negative  Cancer Staging:  Cancer Staging  Hepatocellular carcinoma ([facility] )  Staging form: Liver (Excluding Intrahepatic Bile Ducts), AJCC 8th Edition  - Clinical: Stage IB (cT1b, cN0, cM0) - Signed by Everette Vee MD on 9/22/2020      Test Results:    Imaging: MRI abdomen w wo contrast    Result Date: 9/12/2021  Narrative: MRI - ABDOMEN - WITH AND WITHOUT CONTRAST INDICATION:  History of well-differentiated HCC, status post radio embolization with Y 90 on 10/14/2020  Patient subsequently had TACE on 6/10/2021 for viable residual tumor  Follow-up  AFP =3 9 on 9/2/2021  Pathology from 8/25/2020: A  Liver, mass, needle core biopsy: - Well-differentiated heptocellular carcinoma  See Note  COMPARISON: MRI abdomen dated April 21, 2021  Attention was also given to prior MRI studies dated 1/19/2021 and 8/23/2020  TECHNIQUE:  The following pulse sequences were obtained prior to and following the administration of intravenous contrast:     Coronal and axial T2 with TE of 90 and 180 respectively, axial T2 with fat saturation, axial FIESTA fat-sat, axial T1-weighted in-and-out-of phase, axial DWI/ADC, precontrast axial T1 with fat saturation, post-contrast dynamic axial T1 with fat saturation at 20, 70, and 180 seconds, coronal T1  with fat saturation and 7 minute delayed axial T1 with fat saturation  Imaging performed on 1 5T MRI   IV Contrast:  8 mL of Gadobutrol injection (SINGLE-DOSE) FINDINGS: LOWER CHEST:   Median sternotomy  Trace left pleural effusion, similar to prior  LIVER: -Mild hepatic steatosis  No gross morphologic features of liver cirrhosis  -Again seen is patient's treated [facility] [address] , centered within segment MEGHAN and segment VIII, with the treatment zone measuring 6 6 x 5 8 cm (12/194), previously 7 3 x 6 9 cm  There is now approximately 40% tumor nonviability with area of necrosis    However, there is viable residual tumor accounting for approximately 60% tumor volume evidenced by relative arterial phase hyperenhancement (APHE) and washout (series 10,012 image 51, image 194 and 336)  LI-RADS treatment assessment category: LR-TR viable  -The region of residual viable tumor demonstrates signal drop on out of phase imaging compared to in phase imaging (series 1101 and 1102 image 50), in keeping with presence of intra-Voxel lipid  -There is anterior capsular retraction with delayed phase enhancement in segment V, likely a sequela of radiation fibrosis  -No new liver lesions seen  -There is persistent mass effect on the right and left portal veins  Otherwise, the hepatic veins and portal veins are patent  BILE DUCTS: No intrahepatic or extrahepatic bile duct dilation  GALLBLADDER:  Normal  PANCREAS: Normal  No main pancreatic ductal dilation  ADRENAL GLANDS:  Normal  SPLEEN:  Normal  KIDNEYS/PROXIMAL URETERS: No hydroureteronephrosis  No suspicious renal mass  Stable bilateral renal cysts  BOWEL: Scattered colonic diverticula without findings of acute diverticulitis  No dilated loops of bowel  PERITONEUM/RETROPERITONEUM: No ascites  LYMPH NODES: No abdominal lymphadenopathy  VASCULAR STRUCTURES:  No aneurysm  ABDOMINAL WALL:  Unremarkable  OSSEOUS STRUCTURES:  No suspicious osseous lesion  Degenerative changes of the spine  Impression: Treated HCC centered within segment MEGHAN and segment VIII, with evidence of viable residual tumor accounting for approximately 60% tumor volume evidenced by APHE and washout  LI-RADS treatment assessment category: LR-TR viable  The study was marked in EPIC for significant notification   Workstation performed: [id number]       Labs:   Lab Results   Component Value Date    WBC 8 71 09/02/2021    HGB 16 1 09/02/2021    HCT 49 2 09/02/2021    MCV 97 09/02/2021     09/02/2021     Lab Results   Component Value Date    K 4 2 09/02/2021     09/02/2021    CO2 29 09/02/2021    BUN 18 09/02/2021    CREATININE 1 09 09/02/2021    GLUF 136 (H) 09/02/2021    CALCIUM 8 7 09/02/2021    CORRECTEDCA 9 2 09/02/2021    AST 38 09/02/2021    ALT 39 09/02/2021    ALKPHOS 171 (H) 09/02/2021    EGFR 62 09/02/2021       ROS: As stated in the history of present illness otherwise his 14 point review of systems today was negative        Active Problems:   Patient Active Problem List   Diagnosis    Essential hypertension    Hyperlipidemia    Cardiomegaly    Anxiety    Abnormal electrocardiogram    Coronary artery disease without angina pectoris    PAD (peripheral artery disease) (HCC)    Viral URI with cough    Reactive airway disease without complication    Medicare annual wellness visit, subsequent    Hyperglycemia    Ankle edema, bilateral    Transaminitis    Respiratory insufficiency    EMILIANO [facility]acute kidney injury) ([facility] [address] [facility]    Elevated troponin    Septic shock (HCC)    Hepatocellular carcinoma (HCC)    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HCC)    Acute respiratory failure with hypoxia (HCC)    Stage 3a chronic kidney disease (HCC)    Sepsis ([facility] [address] )    Pneumonia    Cough    Platelets decreased (HCC)    Type 2 diabetes mellitus without complication, without long-term current use of insulin (HCC)    Allergic cough       Past Medical History:   Past Medical History:   Diagnosis Date    Coronary artery disease     Gout     Hypercholesterolemia     Hypertension     Liver cancer ([facility] [address] )        Surgical History:   Past Surgical History:   Procedure Laterality Date    ABDOMINAL SURGERY      appy    APPENDECTOMY      CORONARY ARTERY BYPASS GRAFT      IR BIOPSY LIVER MASS  8/25/2020    IR CHEMOEMBOLIZATION LIVER TUMOR  6/10/2021    IR Y-90 PRE-ANGIO/EMBO W/ LUNG SCAN  10/6/2020    IR Y-90 RADIOEMBOLIZATION  10/14/2020       Family History:    Family History   Problem Relation Age of Onset    No Known Problems Mother     No Known Problems Father        Cancer-related family history is not on file  Social History:   Social History     Socioeconomic History    Marital status: /Civil Union     Spouse name: Not on file    Number of children: Not on file    Years of education: Not on file    Highest education level: Not on file   Occupational History    Not on file   Tobacco Use    Smoking status: Never Smoker    Smokeless tobacco: Never Used   Vaping Use    Vaping Use: Never used   Substance and Sexual Activity    Alcohol use: Not Currently    Drug use: Never    Sexual activity: Not on file   Other Topics Concern    Not on file   Social History Narrative    Not on file     Social Determinants of Health     Financial Resource Strain:     Difficulty of Paying Living Expenses:    Food Insecurity:     Worried About Running Out of Food in the Last Year:     [address] in the Last Year:    Transportation Needs:     Lack of Transportation (Medical):      Lack of Transportation (Non-Medical):    Physical Activity:     Days of Exercise per Week:     Minutes of Exercise per Session:    Stress:     Feeling of Stress :    Social Connections:     Frequency of Communication with Friends and Family:     Frequency of Social Gatherings with Friends and Family:     Attends Gnosticist Services:     Active Member of Clubs or Organizations:     Attends Club or Organization Meetings:     Marital Status:    Intimate Partner Violence:     Fear of Current or Ex-Partner:     Emotionally Abused:     Physically Abused:     Sexually Abused:        Current Medications:   Current Outpatient Medications   Medication Sig Dispense Refill    allopurinol (ZYLOPRIM) 300 mg tablet Take 300 mg by mouth daily      amLODIPine (NORVASC) 5 mg tablet Take 1 tablet (5 mg total) by mouth daily 30 tablet 5    aspirin 81 mg chewable tablet Chew 1 tablet (81 mg total) daily 30 tablet 0    cholecalciferol (VITAMIN D3) 400 units tablet Take 1,000 Units by mouth daily       metoprolol tartrate (LOPRESSOR) 25 mg tablet Take 1 tablet (25 mg total) by mouth 2 (two) times a day 60 tablet 3    Omega-3 Fatty Acids (fish oil) 1,000 mg Take 1,000 mg by mouth daily      LORazepam (ATIVAN) 1 mg tablet Take 1 tablet 2 hours prior to MRI as directed (Patient not taking: Reported on 7/26/2021) 2 tablet 0    ondansetron (ZOFRAN) 4 mg tablet Take 1 tablet (4 mg total) by mouth every 8 (eight) hours as needed for nausea or vomiting (Patient not taking: Reported on 7/26/2021) 12 tablet 0    oxyCODONE (ROXICODONE) 5 mg immediate release tablet Take 1 tablet (5 mg total) by mouth every 4 (four) hours as needed for moderate pain or severe pain for up to 6 dosesMax Daily Amount: 30 mg (Patient not taking: Reported on 7/26/2021) 6 tablet 0    pantoprazole (PROTONIX) 40 mg tablet Take 1 tablet (40 mg total) by mouth daily in the early morning (Patient not taking: Reported on 9/16/2021) 30 tablet 0    simvastatin (ZOCOR) 80 mg tablet TAKE ONE TABLET BY MOUTH EVERY DAY AT 5 PM FOR CHOLESTEROL (Patient not taking: Reported on 7/26/2021)       No current facility-administered medications for this visit  Allergies: Allergies   Allergen Reactions    Shellfish-Derived Products - Food Allergy        Physical Exam:    Body surface area is 2 07 meters squared  Wt Readings from Last 3 Encounters:   09/16/21 91 6 kg (202 lb)   09/13/21 92 1 kg (203 lb)   07/26/21 88 5 kg (195 lb 3 2 oz)        Temp Readings from Last 3 Encounters:   09/16/21 (!) 96 8 °F (36 °C) (Temporal)   09/13/21 98 3 °F (36 8 °C) (Temporal)   07/26/21 (!) 96 2 °F (35 7 °C)        BP Readings from Last 3 Encounters:   09/16/21 162/78   09/13/21 118/68   07/26/21 140/80         Pulse Readings from Last 3 Encounters:   09/16/21 73   09/13/21 83   07/26/21 85         Physical Exam     Constitutional   General appearance: No acute distress, well appearing and well nourished  Eyes   Conjunctiva and lids: No swelling, erythema or discharge  Pupils and irises: Equal, round and reactive to light  Ears, Nose, Mouth, and Throat   External inspection of ears and nose: Normal     Nasal mucosa, septum, and turbinates: Normal without edema or erythema  Oropharynx: Normal with no erythema, edema, exudate or lesions  Pulmonary   Respiratory effort: No increased work of breathing or signs of respiratory distress  Auscultation of lungs: Clear to auscultation  Cardiovascular   Palpation of heart: Normal PMI, no thrills  Auscultation of heart: Normal rate and rhythm, normal S1 and S2, without murmurs  Examination of extremities for edema and/or varicosities: Normal     Carotid pulses: Normal     Abdomen   Abdomen: Non-tender, no masses  Liver and spleen: No hepatomegaly or splenomegaly  Lymphatic   Palpation of lymph nodes in neck: No lymphadenopathy  Musculoskeletal   Gait and station: Normal     Digits and nails: Normal without clubbing or cyanosis  Inspection/palpation of joints, bones, and muscles: Normal     Skin   Skin and subcutaneous tissue: Normal without rashes or lesions  Neurologic   Cranial nerves: Cranial nerves 2-12 intact  Sensation: No sensory loss  Psychiatric   Orientation to person, place, and time: Normal     Mood and affect: Normal         Assessment / Plan:      The patient is a pleasant 78-year-old male with [address]  status post radioembolization in the past with then got a chemoembolization  His tumor is partially treated  He has no evidence of progression and the tumor has shrunk but he still has viable tumor so needs another localized procedure  He saw our colleagues in [id number] who according to him did not make any commitment to treatment  He is seeing our colleagues in surgery in a week  At this point I explained to him that he does need a locally directed therapy  This could be another embolization versus radioembolization    I explained this would be decided at multidisciplinary tumor Conference and after his surgeon has assessed him also  We discussed systemic therapy and again I explained the tumor had actually shrunk and there was no metastatic disease so we could consider just local treatment and observation versus putting him on systemic therapy  He has been in favor of holding off on any systemic therapy as he otherwise has a very good quality of life and would rather just have the local procedure done  If he does have progression despite that or if he has metastatic disease he will then consider systemic therapy  I think this is reasonable  He will see our colleagues in Surgical Oncology  I have set him up to see our colleagues in Interventional Radiology also and have also set him up to see radiation oncology I e  either Dr Negin Rodríguez or Jono  For consideration of Y90 or at least a discussion  I will see him back in 6 weeks to make sure he is scheduled to see the right people and has a plan in place  Goals and Barriers:  Current Goal:  Prolong Survival from   [address]  Barriers: None  Patient's Capacity to Self Care:  Patient able to self care  Portions of the record may have been created with voice recognition software   Occasional wrong word or "sound a like" substitutions may have occurred due to the inherent limitations of voice recognition software   Read the chart carefully and recognize, using context, where substitutions have occurred

## 2021-09-30 NOTE — PROGRESS NOTES
Surgical Oncology Follow Up       [facility]  [facility] [place]  [address]  [facility] [place] [address] [facility]  1940  [phone]  [facility]  [facility]  [address]  [facility] [place] [address]    Diagnoses and all orders for this visit:    Hepatocellular carcinoma ([facility] [address] )        No chief complaint on file  No follow-ups on file  Oncology History   Hepatocellular carcinoma ([facility] [address] )   8/24/2020 Initial Diagnosis    Hepatocellular carcinoma ([facility] [address] )     8/25/2020 Biopsy    IR liver biopsy  - Well-differentiated heptocellular carcinoma     9/22/2020 -  Cancer Staged    Staging form: Liver (Excluding Intrahepatic Bile Ducts), AJCC 8th Edition  - Clinical: Stage IB (cT1b, cN0, cM0) - Signed by Erik Valenzuela MD on 9/22/2020       10/14/2020 - 10/14/2020 Radiation    Field Numbers Energy Treatment Site Starting  Ending  Elapsed  Fraction Total  Overlap Site Overlap         Date Date Days Dose Dose   Dose    SirSphere  Y90 Rt Lobe + Middle Robe   10/14/20 10/14/20    1 51 GBq  1 51 GBq                                                                                        Date / Time stamp:  10/14/2020  2:43 PM         Staging:  Well-differentiated [facility] [address] , August 2020  Treatment history:  Sir spheres, October 2020  TACE in June, 2021  Current treatment:  Repeat TACE pending  Disease status: MARIA M    History of Present Illness:  Patient returns in follow-up of his [address]  He is doing well at this time with no complaints  He denies any new abdominal pain, nausea or vomiting  His appetite is good  He underwent TACE in June of this year without difficulty  MRI from September 7, 2021 reveals a partially treated lesion in segment 4 and segment 8 of the liver  This is still LR viable  I personally reviewed the films    His AFP level is normal     Review of Systems  Complete ROS Surg Onc: Complete ROS Surg Onc:   Constitutional: The patient denies new or recent history of general fatigue, no recent weight loss, no change in appetite  Eyes: No complaints of visual problems, no scleral icterus  ENT: no complaints of ear pain, no hoarseness, no difficulty swallowing,  no tinnitus and no new masses in head, oral cavity, or neck  Cardiovascular: No complaints of chest pain, no palpitations, no ankle edema  Respiratory: No complaints of shortness of breath, no cough  Gastrointestinal: No complaints of jaundice, no bloody stools, no pale stools  Genitourinary: No complaints of dysuria, no hematuria, no nocturia, no frequent urination, no urethral discharge  Musculoskeletal: No complaints of weakness, paralysis, joint stiffness or arthralgias  Integumentary: No complaints of rash, no new lesions  Neurological: No complaints of convulsions, no seizures, no dizziness  Hematologic/Lymphatic: No complaints of easy bruising  Endocrine:  No hot or cold intolerance  No polydipsia, polyphagia, or polyuria  Allergy/immunology:  No environmental allergies  No food allergies  Not immunocompromised  Skin:  No pallor or rash  No wound          Patient Active Problem List   Diagnosis    Essential hypertension    Hyperlipidemia    Cardiomegaly    Anxiety    Abnormal electrocardiogram    Coronary artery disease without angina pectoris    PAD (peripheral artery disease) (HCC)    Viral URI with cough    Reactive airway disease without complication    Medicare annual wellness visit, subsequent    Hyperglycemia    Ankle edema, bilateral    Transaminitis    Respiratory insufficiency    EMILIANO (acute kidney injury) ([facility] [address] )    Elevated troponin    Septic shock (HCC)    Hepatocellular carcinoma (HCC)    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HCC)    Acute respiratory failure with hypoxia (HCC)    Stage 3a chronic kidney disease ([address] )    Sepsis ([address] )    Pneumonia  Cough    Platelets decreased ([facility] [address] )    Type 2 diabetes mellitus without complication, without long-term current use of insulin (HCC)    Allergic cough     Past Medical History:   Diagnosis Date    Coronary artery disease     Gout     Hypercholesterolemia     Hypertension     Liver cancer [facility])      Past Surgical History:   Procedure Laterality Date    ABDOMINAL SURGERY      appy    APPENDECTOMY      CORONARY ARTERY BYPASS GRAFT      IR BIOPSY LIVER MASS  8/25/2020    IR CHEMOEMBOLIZATION LIVER TUMOR  6/10/2021    IR Y-90 PRE-ANGIO/EMBO W/ LUNG SCAN  10/6/2020    IR Y-90 RADIOEMBOLIZATION  10/14/2020     Family History   Problem Relation Age of Onset    No Known Problems Mother     No Known Problems Father      Social History     Socioeconomic History    Marital status: /Civil Union     Spouse name: Not on file    Number of children: Not on file    Years of education: Not on file    Highest education level: Not on file   Occupational History    Not on file   Tobacco Use    Smoking status: Never Smoker    Smokeless tobacco: Never Used   Vaping Use    Vaping Use: Never used   Substance and Sexual Activity    Alcohol use: Not Currently    Drug use: Never    Sexual activity: Not on file   Other Topics Concern    Not on file   Social History Narrative    Not on file     Social Determinants of Health     Financial Resource Strain:     Difficulty of Paying Living Expenses:    Food Insecurity:     Worried About Running Out of Food in the Last Year:     Ran Out of Food in the Last Year:    Transportation Needs:     Lack of Transportation (Medical):      Lack of Transportation (Non-Medical):    Physical Activity:     Days of Exercise per Week:     Minutes of Exercise per Session:    Stress:     Feeling of Stress :    Social Connections:     Frequency of Communication with Friends and Family:     Frequency of Social Gatherings with Friends and Family:     Attends Mandaeism Services:     Active Member of Clubs or Organizations:     Attends Club or Organization Meetings:     Marital Status:    Intimate Partner Violence:     Fear of Current or Ex-Partner:     Emotionally Abused:     Physically Abused:     Sexually Abused:        Current Outpatient Medications:     allopurinol (ZYLOPRIM) 300 mg tablet, Take 300 mg by mouth daily, Disp: , Rfl:     aspirin 81 mg chewable tablet, Chew 1 tablet (81 mg total) daily, Disp: 30 tablet, Rfl: 0    cholecalciferol (VITAMIN D3) 400 units tablet, Take 1,000 Units by mouth daily , Disp: , Rfl:     metoprolol tartrate (LOPRESSOR) 25 mg tablet, Take 1 tablet (25 mg total) by mouth 2 (two) times a day, Disp: 60 tablet, Rfl: 3    Omega-3 Fatty Acids (fish oil) 1,000 mg, Take 1,000 mg by mouth daily, Disp: , Rfl:     amLODIPine (NORVASC) 5 mg tablet, Take 1 tablet (5 mg total) by mouth daily, Disp: 30 tablet, Rfl: 5    LORazepam (ATIVAN) 1 mg tablet, Take 1 tablet 2 hours prior to MRI as directed (Patient not taking: Reported on 7/26/2021), Disp: 2 tablet, Rfl: 0    ondansetron (ZOFRAN) 4 mg tablet, Take 1 tablet (4 mg total) by mouth every 8 (eight) hours as needed for nausea or vomiting (Patient not taking: Reported on 7/26/2021), Disp: 12 tablet, Rfl: 0    oxyCODONE (ROXICODONE) 5 mg immediate release tablet, Take 1 tablet (5 mg total) by mouth every 4 (four) hours as needed for moderate pain or severe pain for up to 6 dosesMax Daily Amount: 30 mg (Patient not taking: Reported on 7/26/2021), Disp: 6 tablet, Rfl: 0    pantoprazole (PROTONIX) 40 mg tablet, Take 1 tablet (40 mg total) by mouth daily in the early morning (Patient not taking: Reported on 9/16/2021), Disp: 30 tablet, Rfl: 0    simvastatin (ZOCOR) 80 mg tablet, TAKE ONE TABLET BY MOUTH EVERY DAY AT 5 PM FOR CHOLESTEROL (Patient not taking: Reported on 7/26/2021), Disp: , Rfl:   Allergies   Allergen Reactions    Shellfish-Derived Products - Food Allergy      Vitals: 09/30/21 1051   Resp: 17       Physical Exam  Constitutional: General appearance: The Patient is well-developed and well-nourished who appears the stated age in no acute distress  Patient is pleasant and talkative  HEENT:  Normocephalic  Sclerae are anicteric  Mucous membranes are moist  Neck is supple without adenopathy  No JVD  Chest: The lungs are clear to auscultation  Cardiac: Heart is regular rate  Abdomen: Abdomen is soft, non-tender, non-distended and without masses  Extremities: There is no clubbing or cyanosis  There is no edema  Symmetric  Neuro: Grossly nonfocal  Gait is normal      Lymphatic: No evidence of cervical adenopathy bilaterally  No evidence of axillary adenopathy bilaterally  Skin: Warm, anicteric  Psych:  Patient is pleasant and talkative  Breasts:        Pathology:  [unfilled]    Labs:   Ref Range & Units 9/2/21  8:26 AM   AFP TUMOR MARKER 0 5 - 8 ng/mL 3 9         Imaging  MRI abdomen w wo contrast    Result Date: 9/12/2021  Narrative: MRI - ABDOMEN - WITH AND WITHOUT CONTRAST INDICATION:  History of well-differentiated HCC, status post radio embolization with Y 90 on 10/14/2020  Patient subsequently had TACE on 6/10/2021 for viable residual tumor  Follow-up  AFP =3 9 on 9/2/2021  Pathology from 8/25/2020: A  Liver, mass, needle core biopsy: - Well-differentiated heptocellular carcinoma  See Note  COMPARISON: MRI abdomen dated April 21, 2021  Attention was also given to prior MRI studies dated 1/19/2021 and 8/23/2020   TECHNIQUE:  The following pulse sequences were obtained prior to and following the administration of intravenous contrast:     Coronal and axial T2 with TE of 90 and 180 respectively, axial T2 with fat saturation, axial FIESTA fat-sat, axial T1-weighted in-and-out-of phase, axial DWI/ADC, precontrast axial T1 with fat saturation, post-contrast dynamic axial T1 with fat saturation at 20, 70, and 180 seconds, coronal T1  with fat saturation and 7 minute delayed axial T1 with fat saturation  Imaging performed on 1 5T MRI   IV Contrast:  8 mL of Gadobutrol injection (SINGLE-DOSE) FINDINGS: LOWER CHEST:   Median sternotomy  Trace left pleural effusion, similar to prior  LIVER: -Mild hepatic steatosis  No gross morphologic features of liver cirrhosis  -Again seen is patient's treated [address] , centered within segment MEGHAN and segment VIII, with the treatment zone measuring 6 6 x 5 8 cm (12/194), previously 7 3 x 6 9 cm  There is now approximately 40% tumor nonviability with area of necrosis  However, there is viable residual tumor accounting for approximately 60% tumor volume evidenced by relative arterial phase hyperenhancement (APHE) and washout (series 10,012 image 51, image 194 and 336)  LI-RADS treatment assessment category: LR-TR viable  -The region of residual viable tumor demonstrates signal drop on out of phase imaging compared to in phase imaging (series 1101 and 1102 image 50), in keeping with presence of intra-Voxel lipid  -There is anterior capsular retraction with delayed phase enhancement in segment V, likely a sequela of radiation fibrosis  -No new liver lesions seen  -There is persistent mass effect on the right and left portal veins  Otherwise, the hepatic veins and portal veins are patent  BILE DUCTS: No intrahepatic or extrahepatic bile duct dilation  GALLBLADDER:  Normal  PANCREAS: Normal  No main pancreatic ductal dilation  ADRENAL GLANDS:  Normal  SPLEEN:  Normal  KIDNEYS/PROXIMAL URETERS: No hydroureteronephrosis  No suspicious renal mass  Stable bilateral renal cysts  BOWEL: Scattered colonic diverticula without findings of acute diverticulitis  No dilated loops of bowel  PERITONEUM/RETROPERITONEUM: No ascites  LYMPH NODES: No abdominal lymphadenopathy  VASCULAR STRUCTURES:  No aneurysm  ABDOMINAL WALL:  Unremarkable  OSSEOUS STRUCTURES:  No suspicious osseous lesion  Degenerative changes of the spine       Impression: Treated HCC centered within segment MEGHAN and segment VIII, with evidence of viable residual tumor accounting for approximately 60% tumor volume evidenced by APHE and washout  LI-RADS treatment assessment category: LR-TR viable  The study was marked in EPIC for significant notification  Workstation performed: [id number]     I reviewed the above laboratory and imaging data  Discussion/Summary:  78year-old male with well-differentiated HCC   The lesion is very centrally located in his liver  Brionna Houghk his age and the central location, I am not sure that he would tolerate hepatectomy well, consequently, we elected on Sir spheres embolization   He tolerated this well in October of 2020  He underwent chemoembolization in June 2021 for viable tumor  He tolerated that well  He now has continued viable disease on the MRI done here  His case was discussed at multidisciplinary clinic this morning  It was recommended that he consider TACE  Sir spheres could be given again if there continues to be recurrence  He is set up to discuss this with IR later next week  We will likely hold off on chemotherapy until liver directed therapy is no longer an option  He will discuss immunotherapy with Medical Oncology  He is agreeable to this at this time    I will see him again in 4 months with a repeat MRI and AFP level   He is agreeable to this   All his questions were answered

## 2021-09-30 NOTE — LETTER
September 30, 2021     [facility], [address] [place] [address]    Patient: Bernice Champagne   YOB: 1940   Date of Visit: 9/30/2021       Dear Dr Lavinia Brooks: Thank you for referring Anjali Rodriguez to me for evaluation  Below are my notes for this consultation  If you have questions, please do not hesitate to call me  I look forward to following your patient along with you  Sincerely,        Ignacio Hackett MD        CC: MD Angel Salazar MD Tawny Rutter, MD Ray Moro, MD  9/30/2021 11:10 AM  Incomplete               Surgical Oncology Follow Up       [facility] SURGICAL ONCOLOGY [facility] [address] [id number] [facility]  1940  [id number]  [facility] [address]  [facility]  CANCER [facility] SURGICAL ONCOLOGY [facility]  [address]  [place] [id number]    Diagnoses and all orders for this visit:    Hepatocellular carcinoma ([facility] )        No chief complaint on file  No follow-ups on file        Oncology History   Hepatocellular carcinoma ([facility] [address] )   8/24/2020 Initial Diagnosis    Hepatocellular carcinoma ([facility] [address] )     8/25/2020 Biopsy    IR liver biopsy  - Well-differentiated heptocellular carcinoma     9/22/2020 -  Cancer Staged    Staging form: Liver (Excluding Intrahepatic Bile Ducts), AJCC 8th Edition  - Clinical: Stage IB (cT1b, cN0, cM0) - Signed by Stone Cuellar MD on 9/22/2020       10/14/2020 - 10/14/2020 Radiation    Field Numbers Energy Treatment Site Starting  Ending  Elapsed  Fraction Total  Overlap Site Overlap         Date Date Days Dose Dose   Dose    SirSphere  Y90 Rt Lobe + Middle Robe   10/14/20 10/14/20    1 51 GBq  1 51 GBq                                                                                        Date / Time stamp:  10/14/2020  2:43 PM         Staging:  Well-differentiated [facility] [address] , August 2020  Treatment history:  Sir spheres, October 2020  TACE in June, 2021  Current treatment:  Repeat TACE pending  Disease status: MARIA M    History of Present Illness:  Patient returns in follow-up of his [facility] [address]  He is doing well at this time with no complaints  He denies any new abdominal pain, nausea or vomiting  His appetite is good  He underwent TACE in June of this year without difficulty  MRI from September 7, 2021 reveals a partially treated lesion in segment 4 and segment 8 of the liver  This is still LR viable  I personally reviewed the films  His AFP level is normal     Review of Systems  Complete ROS Surg Onc:   Complete ROS Surg Onc:   Constitutional: The patient denies new or recent history of general fatigue, no recent weight loss, no change in appetite  Eyes: No complaints of visual problems, no scleral icterus  ENT: no complaints of ear pain, no hoarseness, no difficulty swallowing,  no tinnitus and no new masses in head, oral cavity, or neck  Cardiovascular: No complaints of chest pain, no palpitations, no ankle edema  Respiratory: No complaints of shortness of breath, no cough  Gastrointestinal: No complaints of jaundice, no bloody stools, no pale stools  Genitourinary: No complaints of dysuria, no hematuria, no nocturia, no frequent urination, no urethral discharge  Musculoskeletal: No complaints of weakness, paralysis, joint stiffness or arthralgias  Integumentary: No complaints of rash, no new lesions  Neurological: No complaints of convulsions, no seizures, no dizziness  Hematologic/Lymphatic: No complaints of easy bruising  Endocrine:  No hot or cold intolerance  No polydipsia, polyphagia, or polyuria  Allergy/immunology:  No environmental allergies  No food allergies  Not immunocompromised  Skin:  No pallor or rash  No wound          Patient Active Problem List   Diagnosis    Essential hypertension    Hyperlipidemia    Cardiomegaly    Anxiety    Abnormal electrocardiogram    Coronary artery disease without angina pectoris    PAD (peripheral artery disease) (HCC)    Viral URI with cough    Reactive airway disease without complication    Medicare annual wellness visit, subsequent    Hyperglycemia    Ankle edema, bilateral    Transaminitis    Respiratory insufficiency    EMILIANO (acute kidney injury) ([facility] )    Elevated troponin    Septic shock (HCC)    Hepatocellular carcinoma (HCC)    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HCC)    Acute respiratory failure with hypoxia (HCC)    Stage 3a chronic kidney disease (HCC)    Sepsis (HCC)    Pneumonia    Cough    Platelets decreased (HCC)    Type 2 diabetes mellitus without complication, without long-term current use of insulin (HCC)    Allergic cough     Past Medical History:   Diagnosis Date    Coronary artery disease     Gout     Hypercholesterolemia     Hypertension     Liver cancer [facility])      Past Surgical History:   Procedure Laterality Date    ABDOMINAL SURGERY      appy    APPENDECTOMY      CORONARY ARTERY BYPASS GRAFT      IR BIOPSY LIVER MASS  8/25/2020    IR CHEMOEMBOLIZATION LIVER TUMOR  6/10/2021    IR Y-90 PRE-ANGIO/EMBO W/ LUNG SCAN  10/6/2020    IR Y-90 RADIOEMBOLIZATION  10/14/2020     Family History   Problem Relation Age of Onset    No Known Problems Mother     No Known Problems Father      Social History     Socioeconomic History    Marital status: /Civil Union     Spouse name: Not on file    Number of children: Not on file    Years of education: Not on file    Highest education level: Not on file   Occupational History    Not on file   Tobacco Use    Smoking status: Never Smoker    Smokeless tobacco: Never Used   Vaping Use    Vaping Use: Never used   Substance and Sexual Activity    Alcohol use: Not Currently    Drug use: Never    Sexual activity: Not on file   Other Topics Concern    Not on file   Social History Narrative    Not on file     Social Determinants of Health     Financial Resource Strain:     Difficulty of Paying Living Expenses:    Food Insecurity:     Worried About Running Out of Food in the Last Year:     [address] in the Last Year:    Transportation Needs:     Lack of Transportation (Medical):      Lack of Transportation (Non-Medical):    Physical Activity:     Days of Exercise per Week:     Minutes of Exercise per Session:    Stress:     Feeling of Stress :    Social Connections:     Frequency of Communication with Friends and Family:     Frequency of Social Gatherings with Friends and Family:     Attends Confucianist Services:     Active Member of Clubs or Organizations:     Attends Club or Organization Meetings:     Marital Status:    Intimate Partner Violence:     Fear of Current or Ex-Partner:     Emotionally Abused:     Physically Abused:     Sexually Abused:        Current Outpatient Medications:     allopurinol (ZYLOPRIM) 300 mg tablet, Take 300 mg by mouth daily, Disp: , Rfl:     aspirin 81 mg chewable tablet, Chew 1 tablet (81 mg total) daily, Disp: 30 tablet, Rfl: 0    cholecalciferol (VITAMIN D3) 400 units tablet, Take 1,000 Units by mouth daily , Disp: , Rfl:     metoprolol tartrate (LOPRESSOR) 25 mg tablet, Take 1 tablet (25 mg total) by mouth 2 (two) times a day, Disp: 60 tablet, Rfl: 3    Omega-3 Fatty Acids (fish oil) 1,000 mg, Take 1,000 mg by mouth daily, Disp: , Rfl:     amLODIPine (NORVASC) 5 mg tablet, Take 1 tablet (5 mg total) by mouth daily, Disp: 30 tablet, Rfl: 5    LORazepam (ATIVAN) 1 mg tablet, Take 1 tablet 2 hours prior to MRI as directed (Patient not taking: Reported on 7/26/2021), Disp: 2 tablet, Rfl: 0    ondansetron (ZOFRAN) 4 mg tablet, Take 1 tablet (4 mg total) by mouth every 8 (eight) hours as needed for nausea or vomiting (Patient not taking: Reported on 7/26/2021), Disp: 12 tablet, Rfl: 0    oxyCODONE (ROXICODONE) 5 mg immediate release tablet, Take 1 tablet (5 mg total) by mouth every 4 (four) hours as needed for moderate pain or severe pain for up to 6 dosesMax Daily Amount: 30 mg (Patient not taking: Reported on 7/26/2021), Disp: 6 tablet, Rfl: 0    pantoprazole (PROTONIX) 40 mg tablet, Take 1 tablet (40 mg total) by mouth daily in the early morning (Patient not taking: Reported on 9/16/2021), Disp: 30 tablet, Rfl: 0    simvastatin (ZOCOR) 80 mg tablet, TAKE ONE TABLET BY MOUTH EVERY DAY AT 5 PM FOR CHOLESTEROL (Patient not taking: Reported on 7/26/2021), Disp: , Rfl:   Allergies   Allergen Reactions    Shellfish-Derived Products - Food Allergy      Vitals:    09/30/21 1051   Resp: 17       Physical Exam  Constitutional: General appearance: The Patient is well-developed and well-nourished who appears the stated age in no acute distress  Patient is pleasant and talkative  HEENT:  Normocephalic  Sclerae are anicteric  Mucous membranes are moist  Neck is supple without adenopathy  No JVD  Chest: The lungs are clear to auscultation  Cardiac: Heart is regular rate  Abdomen: Abdomen is soft, non-tender, non-distended and without masses  Extremities: There is no clubbing or cyanosis  There is no edema  Symmetric  Neuro: Grossly nonfocal  Gait is normal      Lymphatic: No evidence of cervical adenopathy bilaterally  No evidence of axillary adenopathy bilaterally  Skin: Warm, anicteric  Psych:  Patient is pleasant and talkative  Breasts:        Pathology:  [unfilled]    Labs:   Ref Range & Units 9/2/21  8:26 AM   AFP TUMOR MARKER 0 5 - 8 ng/mL 3 9         Imaging  MRI abdomen w wo contrast    Result Date: 9/12/2021  Narrative: MRI - ABDOMEN - WITH AND WITHOUT CONTRAST INDICATION:  History of well-differentiated HCC, status post radio embolization with Y 90 on 10/14/2020  Patient subsequently had TACE on 6/10/2021 for viable residual tumor  Follow-up  AFP =3 9 on 9/2/2021  Pathology from 8/25/2020: A  Liver, mass, needle core biopsy: - Well-differentiated heptocellular carcinoma  See Note  COMPARISON: MRI abdomen dated April 21, 2021  Attention was also given to prior MRI studies dated 1/19/2021 and 8/23/2020  TECHNIQUE:  The following pulse sequences were obtained prior to and following the administration of intravenous contrast:     Coronal and axial T2 with TE of 90 and 180 respectively, axial T2 with fat saturation, axial FIESTA fat-sat, axial T1-weighted in-and-out-of phase, axial DWI/ADC, precontrast axial T1 with fat saturation, post-contrast dynamic axial T1 with fat saturation at 20, 70, and 180 seconds, coronal T1  with fat saturation and 7 minute delayed axial T1 with fat saturation  Imaging performed on 1 5T MRI   IV Contrast:  8 mL of Gadobutrol injection (SINGLE-DOSE) FINDINGS: LOWER CHEST:   Median sternotomy  Trace left pleural effusion, similar to prior  LIVER: -Mild hepatic steatosis  No gross morphologic features of liver cirrhosis  -Again seen is patient's treated [address] , centered within segment MEGHAN and segment VIII, with the treatment zone measuring 6 6 x 5 8 cm (12/194), previously 7 3 x 6 9 cm  There is now approximately 40% tumor nonviability with area of necrosis  However, there is viable residual tumor accounting for approximately 60% tumor volume evidenced by relative arterial phase hyperenhancement (APHE) and washout (series 10,012 image 51, image 194 and 336)  LI-RADS treatment assessment category: LR-TR viable  -The region of residual viable tumor demonstrates signal drop on out of phase imaging compared to in phase imaging (series 1101 and 1102 image 50), in keeping with presence of intra-Voxel lipid  -There is anterior capsular retraction with delayed phase enhancement in segment V, likely a sequela of radiation fibrosis  -No new liver lesions seen  -There is persistent mass effect on the right and left portal veins  Otherwise, the hepatic veins and portal veins are patent  BILE DUCTS: No intrahepatic or extrahepatic bile duct dilation   GALLBLADDER:  Normal  PANCREAS: Normal  No main pancreatic ductal dilation  ADRENAL GLANDS:  Normal  SPLEEN:  Normal  KIDNEYS/PROXIMAL URETERS: No hydroureteronephrosis  No suspicious renal mass  Stable bilateral renal cysts  BOWEL: Scattered colonic diverticula without findings of acute diverticulitis  No dilated loops of bowel  PERITONEUM/RETROPERITONEUM: No ascites  LYMPH NODES: No abdominal lymphadenopathy  VASCULAR STRUCTURES:  No aneurysm  ABDOMINAL WALL:  Unremarkable  OSSEOUS STRUCTURES:  No suspicious osseous lesion  Degenerative changes of the spine  Impression: Treated HCC centered within segment MEGHAN and segment VIII, with evidence of viable residual tumor accounting for approximately 60% tumor volume evidenced by APHE and washout  LI-RADS treatment assessment category: LR-TR viable  The study was marked in EPIC for significant notification  Workstation performed: [id number]     I reviewed the above laboratory and imaging data  Discussion/Summary:  78year-old male with well-differentiated HCC   The lesion is very centrally located in his liver  Starlette Constant his age and the central location, I am not sure that he would tolerate hepatectomy well, consequently, we elected on Sir spheres embolization   He tolerated this well in October of 2020  He underwent chemoembolization in June 2021 for viable tumor  He tolerated that well  He now has continued viable disease on the MRI done here  His case was discussed at multidisciplinary clinic this morning  It was recommended that he consider TACE  Sir spheres could be given again if there continues to be recurrence  He is set up to discuss this with IR later next week  We will likely hold off on chemotherapy until liver directed therapy is no longer an option  He will discuss immunotherapy with Medical Oncology  He is agreeable to this at this time    I will see him again in 4 months with a repeat MRI and AFP level   He is agreeable to this   All his questions were answered

## 2021-09-30 NOTE — PROGRESS NOTES
RECTAL/GI MULTIDISCIPLINARY CASE REVIEW    DATE: 9/30/21      PRESENTING DOCTOR: Dr Yuly Youngblood      DIAGNOSIS: Hepatocellular Carcinoma      Mira Beck was presented at the Rectal/GI Multidisciplinary Conference today  PHYSICIAN RECOMMENDED PLAN:    -Chemo embolization    -Scheduled with IR on 10/5/21    Team agreed to plan  The final treatment plan will be left to the discretion of the patient and the treating physician  DISCLAIMERS:  TO THE TREATING PHYSICIAN:  This conference is a meeting of clinicians from various specialty areas who evaluate and discuss patients for whom a multidisciplinary treatment approach is being considered  Please note that the above opinion was a consensus of the conference attendees and is intended only to assist in quality care of the discussed patient  The responsibility for follow up on the input given during the conference, along with any final decisions regarding plan of care, is that of the patient and the patient's provider  Accordingly, appointments have only been recommended based on this information and have NOT been scheduled unless otherwise noted  TO THE PATIENT:  This summary is a brief record of major aspects of your cancer treatment  You may choose to share a copy with any of your doctors or nurses  However, this is not a detailed or comprehensive record of your care        NCCN guidelines were readily available for review at this discussion

## 2021-10-28 PROBLEM — J20.8 ACUTE BACTERIAL BRONCHITIS: Status: ACTIVE | Noted: 2021-01-01

## 2021-10-28 PROBLEM — B96.89 ACUTE BACTERIAL BRONCHITIS: Status: ACTIVE | Noted: 2021-01-01

## 2021-11-01 PROBLEM — U07.1 COVID-19 VIRUS INFECTION: Status: ACTIVE | Noted: 2021-01-01

## 2021-11-04 PROBLEM — I26.99 PULMONARY EMBOLI (HCC): Status: ACTIVE | Noted: 2021-01-01

## 2021-11-04 PROBLEM — I50.30 DIASTOLIC HEART FAILURE (HCC): Status: ACTIVE | Noted: 2021-01-01

## 2021-12-21 PROBLEM — R06.09 POST-COVID CHRONIC DYSPNEA: Status: ACTIVE | Noted: 2021-01-01

## 2021-12-21 PROBLEM — U09.9 POST-COVID CHRONIC DYSPNEA: Status: ACTIVE | Noted: 2021-01-01

## 2021-12-28 NOTE — TELEPHONE ENCOUNTER
If patient is feeling better overall continue with hydration good nutrition and hold off on refilling the antibiotic any further    Contact the office next week with any updates

## 2021-12-28 NOTE — TELEPHONE ENCOUNTER
Wife called and stated that the pt is feeling better and is asking if he should fill the antibiotic?

## 2022-01-01 ENCOUNTER — APPOINTMENT (OUTPATIENT)
Dept: PULMONOLOGY | Facility: HOSPITAL | Age: 82
End: 2022-01-01
Payer: MEDICARE

## 2022-01-01 ENCOUNTER — CLINICAL SUPPORT (OUTPATIENT)
Dept: PULMONOLOGY | Facility: HOSPITAL | Age: 82
End: 2022-01-01
Payer: MEDICARE

## 2022-01-01 ENCOUNTER — OFFICE VISIT (OUTPATIENT)
Dept: FAMILY MEDICINE CLINIC | Facility: CLINIC | Age: 82
End: 2022-01-01
Payer: MEDICARE

## 2022-01-01 ENCOUNTER — TELEPHONE (OUTPATIENT)
Dept: FAMILY MEDICINE CLINIC | Facility: CLINIC | Age: 82
End: 2022-01-01

## 2022-01-01 ENCOUNTER — APPOINTMENT (EMERGENCY)
Dept: CT IMAGING | Facility: HOSPITAL | Age: 82
DRG: 871 | End: 2022-01-01
Payer: MEDICARE

## 2022-01-01 ENCOUNTER — HOME CARE VISIT (OUTPATIENT)
Dept: HOME HEALTH SERVICES | Facility: HOME HEALTHCARE | Age: 82
End: 2022-01-01

## 2022-01-01 ENCOUNTER — TELEPHONE (OUTPATIENT)
Dept: HEMATOLOGY ONCOLOGY | Facility: CLINIC | Age: 82
End: 2022-01-01

## 2022-01-01 ENCOUNTER — TELEPHONE (OUTPATIENT)
Dept: CARDIOLOGY CLINIC | Facility: CLINIC | Age: 82
End: 2022-01-01

## 2022-01-01 ENCOUNTER — OFFICE VISIT (OUTPATIENT)
Dept: HEMATOLOGY ONCOLOGY | Facility: CLINIC | Age: 82
End: 2022-01-01
Payer: MEDICARE

## 2022-01-01 ENCOUNTER — HOSPITAL ENCOUNTER (OUTPATIENT)
Dept: CT IMAGING | Facility: HOSPITAL | Age: 82
Discharge: HOME | End: 2022-04-11
Payer: MEDICARE

## 2022-01-01 ENCOUNTER — TELEPHONE (OUTPATIENT)
Dept: HEMATOLOGY ONCOLOGY | Facility: HOSPITAL | Age: 82
End: 2022-01-01

## 2022-01-01 ENCOUNTER — HOSPITAL ENCOUNTER (EMERGENCY)
Facility: HOSPITAL | Age: 82
Discharge: HOME | End: 2022-05-11
Attending: EMERGENCY MEDICINE
Payer: MEDICARE

## 2022-01-01 ENCOUNTER — TELEPHONE (OUTPATIENT)
Dept: UROLOGY | Facility: MEDICAL CENTER | Age: 82
End: 2022-01-01

## 2022-01-01 ENCOUNTER — HOSPITAL ENCOUNTER (OUTPATIENT)
Dept: NON INVASIVE DIAGNOSTICS | Facility: HOSPITAL | Age: 82
Discharge: HOME | End: 2022-03-01
Payer: MEDICARE

## 2022-01-01 ENCOUNTER — APPOINTMENT (OUTPATIENT)
Dept: LAB | Facility: HOSPITAL | Age: 82
End: 2022-01-01
Payer: MEDICARE

## 2022-01-01 ENCOUNTER — HOSPITAL ENCOUNTER (OUTPATIENT)
Dept: RADIOLOGY | Facility: HOSPITAL | Age: 82
Discharge: HOME | End: 2022-06-02
Attending: INTERNAL MEDICINE
Payer: MEDICARE

## 2022-01-01 ENCOUNTER — APPOINTMENT (OUTPATIENT)
Dept: LAB | Facility: HOSPITAL | Age: 82
End: 2022-01-01
Attending: INTERNAL MEDICINE
Payer: MEDICARE

## 2022-01-01 ENCOUNTER — OFFICE VISIT (OUTPATIENT)
Dept: UROLOGY | Facility: CLINIC | Age: 82
End: 2022-01-01
Payer: MEDICARE

## 2022-01-01 ENCOUNTER — TELEPHONE (OUTPATIENT)
Dept: INTERVENTIONAL RADIOLOGY/VASCULAR | Facility: HOSPITAL | Age: 82
End: 2022-01-01

## 2022-01-01 ENCOUNTER — TELEPHONE (OUTPATIENT)
Dept: VASCULAR SURGERY | Facility: CLINIC | Age: 82
End: 2022-01-01

## 2022-01-01 ENCOUNTER — HOSPITAL ENCOUNTER (OUTPATIENT)
Dept: INFUSION CENTER | Facility: HOSPITAL | Age: 82
End: 2022-01-01
Attending: INTERNAL MEDICINE

## 2022-01-01 ENCOUNTER — APPOINTMENT (OUTPATIENT)
Dept: LAB | Facility: CLINIC | Age: 82
End: 2022-01-01
Payer: MEDICARE

## 2022-01-01 ENCOUNTER — CONSULT (OUTPATIENT)
Dept: VASCULAR SURGERY | Facility: CLINIC | Age: 82
End: 2022-01-01
Payer: MEDICARE

## 2022-01-01 ENCOUNTER — OFFICE VISIT (OUTPATIENT)
Dept: CARDIOLOGY CLINIC | Facility: CLINIC | Age: 82
End: 2022-01-01
Payer: MEDICARE

## 2022-01-01 ENCOUNTER — TELEPHONE (OUTPATIENT)
Dept: NON INVASIVE DIAGNOSTICS | Facility: HOSPITAL | Age: 82
End: 2022-01-01

## 2022-01-01 ENCOUNTER — CONSULT (OUTPATIENT)
Dept: PULMONOLOGY | Facility: CLINIC | Age: 82
End: 2022-01-01
Payer: MEDICARE

## 2022-01-01 ENCOUNTER — HOSPITAL ENCOUNTER (OUTPATIENT)
Dept: INFUSION CENTER | Facility: HOSPITAL | Age: 82
Discharge: HOME | End: 2022-02-16
Attending: INTERNAL MEDICINE
Payer: MEDICARE

## 2022-01-01 ENCOUNTER — TELEPHONE (OUTPATIENT)
Dept: ADMINISTRATIVE | Facility: OTHER | Age: 82
End: 2022-01-01

## 2022-01-01 ENCOUNTER — HOSPITAL ENCOUNTER (OUTPATIENT)
Dept: INFUSION CENTER | Facility: HOSPITAL | Age: 82
Discharge: HOME | End: 2022-04-01
Attending: INTERNAL MEDICINE

## 2022-01-01 ENCOUNTER — DOCUMENTATION (OUTPATIENT)
Dept: HEMATOLOGY ONCOLOGY | Facility: CLINIC | Age: 82
End: 2022-01-01

## 2022-01-01 ENCOUNTER — LAB REQUISITION (OUTPATIENT)
Dept: LAB | Facility: HOSPITAL | Age: 82
End: 2022-01-01

## 2022-01-01 ENCOUNTER — HOSPITAL ENCOUNTER (OUTPATIENT)
Dept: INFUSION CENTER | Facility: HOSPITAL | Age: 82
Discharge: HOME | End: 2022-05-13
Attending: INTERNAL MEDICINE
Payer: MEDICARE

## 2022-01-01 ENCOUNTER — LAB REQUISITION (OUTPATIENT)
Dept: LAB | Facility: HOSPITAL | Age: 82
DRG: 871 | End: 2022-01-01
Payer: MEDICARE

## 2022-01-01 ENCOUNTER — HOSPITAL ENCOUNTER (OUTPATIENT)
Dept: INFUSION CENTER | Facility: HOSPITAL | Age: 82
Discharge: HOME | End: 2022-04-22
Attending: INTERNAL MEDICINE
Payer: MEDICARE

## 2022-01-01 ENCOUNTER — HOSPITAL ENCOUNTER (INPATIENT)
Facility: HOSPITAL | Age: 82
LOS: 5 days | Discharge: NURSING, REHAB OR LONG-TERM CARE | DRG: 871 | End: 2022-05-26
Attending: EMERGENCY MEDICINE | Admitting: INTERNAL MEDICINE
Payer: MEDICARE

## 2022-01-01 ENCOUNTER — HOSPITAL ENCOUNTER (OUTPATIENT)
Dept: NON INVASIVE DIAGNOSTICS | Facility: HOSPITAL | Age: 82
Discharge: HOME | End: 2022-01-19
Attending: SURGERY
Payer: MEDICARE

## 2022-01-01 ENCOUNTER — OFFICE VISIT (OUTPATIENT)
Dept: VASCULAR SURGERY | Facility: CLINIC | Age: 82
End: 2022-01-01
Payer: MEDICARE

## 2022-01-01 ENCOUNTER — HOSPITAL ENCOUNTER (OUTPATIENT)
Dept: MRI IMAGING | Facility: HOSPITAL | Age: 82
Discharge: HOME | End: 2022-01-18
Attending: SURGERY
Payer: MEDICARE

## 2022-01-01 ENCOUNTER — TELEPHONE (OUTPATIENT)
Dept: GYNECOLOGIC ONCOLOGY | Facility: CLINIC | Age: 82
End: 2022-01-01

## 2022-01-01 ENCOUNTER — HOSPITAL ENCOUNTER (OUTPATIENT)
Dept: INFUSION CENTER | Facility: HOSPITAL | Age: 82
Discharge: HOME | End: 2022-03-11
Attending: INTERNAL MEDICINE
Payer: MEDICARE

## 2022-01-01 ENCOUNTER — HOSPITAL ENCOUNTER (OUTPATIENT)
Dept: CT IMAGING | Facility: HOSPITAL | Age: 82
Discharge: HOME | End: 2022-01-14
Attending: SURGERY
Payer: MEDICARE

## 2022-01-01 ENCOUNTER — OFFICE VISIT (OUTPATIENT)
Dept: PULMONOLOGY | Facility: CLINIC | Age: 82
End: 2022-01-01
Payer: MEDICARE

## 2022-01-01 ENCOUNTER — HOSPITAL ENCOUNTER (OUTPATIENT)
Dept: CT IMAGING | Facility: HOSPITAL | Age: 82
Discharge: HOME | End: 2022-03-22
Payer: MEDICARE

## 2022-01-01 ENCOUNTER — HOSPITAL ENCOUNTER (OUTPATIENT)
Dept: INTERVENTIONAL RADIOLOGY/VASCULAR | Facility: HOSPITAL | Age: 82
Discharge: HOME | End: 2022-03-31
Admitting: RADIOLOGY
Payer: MEDICARE

## 2022-01-01 ENCOUNTER — HOSPITAL ENCOUNTER (INPATIENT)
Facility: HOSPITAL | Age: 82
LOS: 3 days | DRG: 871 | End: 2022-06-05
Attending: EMERGENCY MEDICINE | Admitting: STUDENT IN AN ORGANIZED HEALTH CARE EDUCATION/TRAINING PROGRAM
Payer: MEDICARE

## 2022-01-01 ENCOUNTER — HOSPITAL ENCOUNTER (OUTPATIENT)
Dept: RADIOLOGY | Facility: HOSPITAL | Age: 82
Discharge: HOME | End: 2022-05-12
Attending: INTERNAL MEDICINE
Payer: MEDICARE

## 2022-01-01 ENCOUNTER — OFFICE VISIT (OUTPATIENT)
Dept: SURGICAL ONCOLOGY | Facility: CLINIC | Age: 82
End: 2022-01-01
Payer: MEDICARE

## 2022-01-01 VITALS
TEMPERATURE: 96.9 F | BODY MASS INDEX: 24.72 KG/M2 | OXYGEN SATURATION: 90 % | SYSTOLIC BLOOD PRESSURE: 106 MMHG | WEIGHT: 166.89 LBS | RESPIRATION RATE: 18 BRPM | HEIGHT: 69 IN | DIASTOLIC BLOOD PRESSURE: 61 MMHG

## 2022-01-01 VITALS
TEMPERATURE: 96.2 F | RESPIRATION RATE: 18 BRPM | DIASTOLIC BLOOD PRESSURE: 58 MMHG | WEIGHT: 182.1 LBS | BODY MASS INDEX: 26.97 KG/M2 | SYSTOLIC BLOOD PRESSURE: 116 MMHG | HEIGHT: 69 IN | OXYGEN SATURATION: 90 % | HEART RATE: 87 BPM

## 2022-01-01 VITALS
SYSTOLIC BLOOD PRESSURE: 128 MMHG | OXYGEN SATURATION: 100 % | WEIGHT: 179 LBS | HEART RATE: 72 BPM | DIASTOLIC BLOOD PRESSURE: 78 MMHG | BODY MASS INDEX: 26.51 KG/M2 | HEIGHT: 69 IN

## 2022-01-01 VITALS
HEART RATE: 90 BPM | TEMPERATURE: 97.6 F | OXYGEN SATURATION: 85 % | SYSTOLIC BLOOD PRESSURE: 138 MMHG | WEIGHT: 188.5 LBS | HEIGHT: 69 IN | RESPIRATION RATE: 18 BRPM | DIASTOLIC BLOOD PRESSURE: 72 MMHG | BODY MASS INDEX: 27.92 KG/M2

## 2022-01-01 VITALS
SYSTOLIC BLOOD PRESSURE: 114 MMHG | HEIGHT: 69 IN | BODY MASS INDEX: 27.02 KG/M2 | DIASTOLIC BLOOD PRESSURE: 64 MMHG | TEMPERATURE: 98.3 F | WEIGHT: 182.4 LBS | HEART RATE: 88 BPM | OXYGEN SATURATION: 90 %

## 2022-01-01 VITALS
RESPIRATION RATE: 22 BRPM | HEART RATE: 99 BPM | WEIGHT: 174 LBS | TEMPERATURE: 97.8 F | HEIGHT: 69 IN | OXYGEN SATURATION: 88 % | SYSTOLIC BLOOD PRESSURE: 98 MMHG | DIASTOLIC BLOOD PRESSURE: 64 MMHG | BODY MASS INDEX: 25.77 KG/M2

## 2022-01-01 VITALS
WEIGHT: 167.55 LBS | OXYGEN SATURATION: 83 % | SYSTOLIC BLOOD PRESSURE: 109 MMHG | RESPIRATION RATE: 32 BRPM | TEMPERATURE: 96.3 F | HEART RATE: 148 BPM | BODY MASS INDEX: 24.82 KG/M2 | DIASTOLIC BLOOD PRESSURE: 65 MMHG | HEIGHT: 69 IN

## 2022-01-01 VITALS
SYSTOLIC BLOOD PRESSURE: 104 MMHG | DIASTOLIC BLOOD PRESSURE: 62 MMHG | HEART RATE: 73 BPM | WEIGHT: 171 LBS | OXYGEN SATURATION: 89 % | HEIGHT: 69 IN | TEMPERATURE: 98.2 F | BODY MASS INDEX: 25.33 KG/M2

## 2022-01-01 VITALS
DIASTOLIC BLOOD PRESSURE: 62 MMHG | TEMPERATURE: 98.8 F | SYSTOLIC BLOOD PRESSURE: 130 MMHG | HEART RATE: 81 BPM | WEIGHT: 186.6 LBS | HEIGHT: 69 IN | OXYGEN SATURATION: 92 % | RESPIRATION RATE: 22 BRPM | BODY MASS INDEX: 27.64 KG/M2

## 2022-01-01 VITALS
SYSTOLIC BLOOD PRESSURE: 126 MMHG | OXYGEN SATURATION: 84 % | RESPIRATION RATE: 22 BRPM | WEIGHT: 166.4 LBS | TEMPERATURE: 97.8 F | BODY MASS INDEX: 23.82 KG/M2 | HEART RATE: 72 BPM | DIASTOLIC BLOOD PRESSURE: 54 MMHG | HEIGHT: 70 IN

## 2022-01-01 VITALS
TEMPERATURE: 97.4 F | RESPIRATION RATE: 49 BRPM | OXYGEN SATURATION: 97 % | BODY MASS INDEX: 24.52 KG/M2 | HEIGHT: 69 IN | DIASTOLIC BLOOD PRESSURE: 70 MMHG | HEART RATE: 99 BPM | SYSTOLIC BLOOD PRESSURE: 130 MMHG | WEIGHT: 165.57 LBS

## 2022-01-01 VITALS
WEIGHT: 187.8 LBS | DIASTOLIC BLOOD PRESSURE: 54 MMHG | HEART RATE: 77 BPM | TEMPERATURE: 97.7 F | OXYGEN SATURATION: 94 % | BODY MASS INDEX: 27.81 KG/M2 | SYSTOLIC BLOOD PRESSURE: 102 MMHG | HEIGHT: 69 IN

## 2022-01-01 VITALS
WEIGHT: 187 LBS | DIASTOLIC BLOOD PRESSURE: 70 MMHG | BODY MASS INDEX: 27.7 KG/M2 | HEIGHT: 69 IN | SYSTOLIC BLOOD PRESSURE: 130 MMHG | HEART RATE: 70 BPM

## 2022-01-01 VITALS
RESPIRATION RATE: 18 BRPM | SYSTOLIC BLOOD PRESSURE: 120 MMHG | WEIGHT: 187 LBS | HEART RATE: 77 BPM | TEMPERATURE: 98.4 F | HEIGHT: 69 IN | BODY MASS INDEX: 27.7 KG/M2 | DIASTOLIC BLOOD PRESSURE: 62 MMHG | OXYGEN SATURATION: 98 %

## 2022-01-01 VITALS
OXYGEN SATURATION: 93 % | WEIGHT: 172 LBS | SYSTOLIC BLOOD PRESSURE: 129 MMHG | RESPIRATION RATE: 20 BRPM | DIASTOLIC BLOOD PRESSURE: 71 MMHG | HEART RATE: 88 BPM | BODY MASS INDEX: 25.48 KG/M2 | HEIGHT: 69 IN

## 2022-01-01 VITALS
RESPIRATION RATE: 18 BRPM | OXYGEN SATURATION: 91 % | HEIGHT: 69 IN | BODY MASS INDEX: 26.33 KG/M2 | SYSTOLIC BLOOD PRESSURE: 128 MMHG | DIASTOLIC BLOOD PRESSURE: 68 MMHG | TEMPERATURE: 98.4 F | HEART RATE: 81 BPM | WEIGHT: 177.8 LBS

## 2022-01-01 VITALS
WEIGHT: 182.32 LBS | BODY MASS INDEX: 27 KG/M2 | OXYGEN SATURATION: 96 % | RESPIRATION RATE: 18 BRPM | SYSTOLIC BLOOD PRESSURE: 123 MMHG | HEIGHT: 69 IN | HEART RATE: 88 BPM | DIASTOLIC BLOOD PRESSURE: 55 MMHG | TEMPERATURE: 98.7 F

## 2022-01-01 VITALS
DIASTOLIC BLOOD PRESSURE: 58 MMHG | SYSTOLIC BLOOD PRESSURE: 90 MMHG | TEMPERATURE: 97.7 F | HEIGHT: 69 IN | HEART RATE: 78 BPM | BODY MASS INDEX: 27.81 KG/M2 | WEIGHT: 187.8 LBS

## 2022-01-01 VITALS
HEART RATE: 82 BPM | HEIGHT: 69 IN | RESPIRATION RATE: 18 BRPM | WEIGHT: 187.61 LBS | TEMPERATURE: 97.4 F | OXYGEN SATURATION: 90 % | DIASTOLIC BLOOD PRESSURE: 68 MMHG | SYSTOLIC BLOOD PRESSURE: 115 MMHG | BODY MASS INDEX: 27.79 KG/M2

## 2022-01-01 VITALS
SYSTOLIC BLOOD PRESSURE: 108 MMHG | HEIGHT: 69 IN | RESPIRATION RATE: 18 BRPM | WEIGHT: 175.71 LBS | OXYGEN SATURATION: 97 % | TEMPERATURE: 96.9 F | BODY MASS INDEX: 26.02 KG/M2 | HEART RATE: 94 BPM | DIASTOLIC BLOOD PRESSURE: 72 MMHG

## 2022-01-01 VITALS
RESPIRATION RATE: 18 BRPM | HEART RATE: 88 BPM | HEIGHT: 69 IN | WEIGHT: 185 LBS | DIASTOLIC BLOOD PRESSURE: 70 MMHG | TEMPERATURE: 97.5 F | OXYGEN SATURATION: 87 % | SYSTOLIC BLOOD PRESSURE: 120 MMHG | BODY MASS INDEX: 27.4 KG/M2

## 2022-01-01 DIAGNOSIS — J12.82 PNEUMONIA DUE TO COVID-19 VIRUS: ICD-10-CM

## 2022-01-01 DIAGNOSIS — I74.3 EMBOLISM AND THROMBOSIS OF ARTERIES OF THE LOWER EXTREMITIES (HCC): ICD-10-CM

## 2022-01-01 DIAGNOSIS — R11.0 NAUSEA: Primary | ICD-10-CM

## 2022-01-01 DIAGNOSIS — C22.0 HEPATOCELLULAR CARCINOMA (HCC): ICD-10-CM

## 2022-01-01 DIAGNOSIS — C22.0 HEPATOCELLULAR CARCINOMA (HCC): Primary | ICD-10-CM

## 2022-01-01 DIAGNOSIS — I65.21 ASYMPTOMATIC CAROTID ARTERY STENOSIS, RIGHT: ICD-10-CM

## 2022-01-01 DIAGNOSIS — U09.9 POST-COVID-19 CONDITION: ICD-10-CM

## 2022-01-01 DIAGNOSIS — H54.62 VISION LOSS OF LEFT EYE: ICD-10-CM

## 2022-01-01 DIAGNOSIS — U07.1 PNEUMONIA DUE TO COVID-19 VIRUS: ICD-10-CM

## 2022-01-01 DIAGNOSIS — J12.82 PNEUMONIA DUE TO COVID-19 VIRUS: Primary | ICD-10-CM

## 2022-01-01 DIAGNOSIS — I50.32 CHRONIC DIASTOLIC HEART FAILURE (HCC): ICD-10-CM

## 2022-01-01 DIAGNOSIS — U07.1 COVID-19 VIRUS INFECTION: Primary | ICD-10-CM

## 2022-01-01 DIAGNOSIS — J69.0 PNEUMONITIS DUE TO INHALATION OF FOOD AND VOMIT (HCC): ICD-10-CM

## 2022-01-01 DIAGNOSIS — I82.433 ACUTE DEEP VEIN THROMBOSIS (DVT) OF POPLITEAL VEIN OF BOTH LOWER EXTREMITIES (HCC): ICD-10-CM

## 2022-01-01 DIAGNOSIS — I10 ESSENTIAL HYPERTENSION: ICD-10-CM

## 2022-01-01 DIAGNOSIS — N40.0 BENIGN PROSTATIC HYPERPLASIA WITHOUT LOWER URINARY TRACT SYMPTOMS: ICD-10-CM

## 2022-01-01 DIAGNOSIS — U07.1 COVID-19: ICD-10-CM

## 2022-01-01 DIAGNOSIS — R09.02 HYPOXIA: ICD-10-CM

## 2022-01-01 DIAGNOSIS — I26.99 PULMONARY EMBOLISM, UNSPECIFIED CHRONICITY, UNSPECIFIED PULMONARY EMBOLISM TYPE, UNSPECIFIED WHETHER ACUTE COR PULMONALE PRESENT (HCC): ICD-10-CM

## 2022-01-01 DIAGNOSIS — U07.1 COVID-19 VIRUS INFECTION: ICD-10-CM

## 2022-01-01 DIAGNOSIS — J96.01 ACUTE RESPIRATORY FAILURE WITH HYPOXIA (HCC): ICD-10-CM

## 2022-01-01 DIAGNOSIS — E03.9 HYPOTHYROIDISM, UNSPECIFIED TYPE: Primary | ICD-10-CM

## 2022-01-01 DIAGNOSIS — N39.41 URGENCY INCONTINENCE: ICD-10-CM

## 2022-01-01 DIAGNOSIS — D69.6 PLATELETS DECREASED (HCC): ICD-10-CM

## 2022-01-01 DIAGNOSIS — R35.0 URINARY FREQUENCY: ICD-10-CM

## 2022-01-01 DIAGNOSIS — I65.22 LEFT CAROTID ARTERY OCCLUSION: ICD-10-CM

## 2022-01-01 DIAGNOSIS — N18.31 STAGE 3A CHRONIC KIDNEY DISEASE (HCC): ICD-10-CM

## 2022-01-01 DIAGNOSIS — R30.0 DYSURIA: ICD-10-CM

## 2022-01-01 DIAGNOSIS — A41.9 SEPTIC SHOCK (HCC): Primary | ICD-10-CM

## 2022-01-01 DIAGNOSIS — I26.99 PULMONARY EMBOLI (HCC): ICD-10-CM

## 2022-01-01 DIAGNOSIS — Z00.00 MEDICARE ANNUAL WELLNESS VISIT, SUBSEQUENT: ICD-10-CM

## 2022-01-01 DIAGNOSIS — J18.9 PNEUMONIA: Primary | ICD-10-CM

## 2022-01-01 DIAGNOSIS — R39.16 BENIGN PROSTATIC HYPERPLASIA (BPH) WITH STRAINING ON URINATION: ICD-10-CM

## 2022-01-01 DIAGNOSIS — U07.1 PNEUMONIA DUE TO COVID-19 VIRUS: Primary | ICD-10-CM

## 2022-01-01 DIAGNOSIS — U09.9 POST-COVID CHRONIC DYSPNEA: ICD-10-CM

## 2022-01-01 DIAGNOSIS — I73.9 PAD (PERIPHERAL ARTERY DISEASE) (HCC): ICD-10-CM

## 2022-01-01 DIAGNOSIS — R06.00 DYSPNEA ON EXERTION: ICD-10-CM

## 2022-01-01 DIAGNOSIS — I25.10 CORONARY ARTERY DISEASE INVOLVING NATIVE HEART WITHOUT ANGINA PECTORIS, UNSPECIFIED VESSEL OR LESION TYPE: ICD-10-CM

## 2022-01-01 DIAGNOSIS — Z11.52 ENCOUNTER FOR SCREENING FOR COVID-19: ICD-10-CM

## 2022-01-01 DIAGNOSIS — Z23 ENCOUNTER FOR IMMUNIZATION: ICD-10-CM

## 2022-01-01 DIAGNOSIS — R05.3 CHRONIC COUGH: ICD-10-CM

## 2022-01-01 DIAGNOSIS — J30.89 NON-SEASONAL ALLERGIC RHINITIS, UNSPECIFIED TRIGGER: ICD-10-CM

## 2022-01-01 DIAGNOSIS — G93.41 METABOLIC ENCEPHALOPATHY: ICD-10-CM

## 2022-01-01 DIAGNOSIS — R06.09 POST-COVID CHRONIC DYSPNEA: ICD-10-CM

## 2022-01-01 DIAGNOSIS — E46 PROTEIN-CALORIE MALNUTRITION (HCC): ICD-10-CM

## 2022-01-01 DIAGNOSIS — N39.41 URGENCY INCONTINENCE: Primary | ICD-10-CM

## 2022-01-01 DIAGNOSIS — R39.89 ABNORMAL PROSTATE EXAM: ICD-10-CM

## 2022-01-01 DIAGNOSIS — E11.9 TYPE 2 DIABETES MELLITUS WITHOUT COMPLICATION, WITHOUT LONG-TERM CURRENT USE OF INSULIN (HCC): ICD-10-CM

## 2022-01-01 DIAGNOSIS — R05.9 COUGH: Primary | ICD-10-CM

## 2022-01-01 DIAGNOSIS — R00.0 TACHYCARDIA, UNSPECIFIED: ICD-10-CM

## 2022-01-01 DIAGNOSIS — J18.9 PNEUMONIA: ICD-10-CM

## 2022-01-01 DIAGNOSIS — E11.9 TYPE 2 DIABETES MELLITUS WITHOUT COMPLICATION, WITHOUT LONG-TERM CURRENT USE OF INSULIN (HCC): Primary | ICD-10-CM

## 2022-01-01 DIAGNOSIS — R79.89 ABNORMAL LFTS: Primary | ICD-10-CM

## 2022-01-01 DIAGNOSIS — J96.11 CHRONIC HYPOXEMIC RESPIRATORY FAILURE (HCC): ICD-10-CM

## 2022-01-01 DIAGNOSIS — I10 ESSENTIAL HYPERTENSION: Primary | ICD-10-CM

## 2022-01-01 DIAGNOSIS — R31.21 ASYMPTOMATIC MICROSCOPIC HEMATURIA: Primary | ICD-10-CM

## 2022-01-01 DIAGNOSIS — M25.472 ANKLE EDEMA, BILATERAL: ICD-10-CM

## 2022-01-01 DIAGNOSIS — R04.0 EPISTAXIS: Primary | ICD-10-CM

## 2022-01-01 DIAGNOSIS — A41.9 SEPTIC SHOCK (HCC): ICD-10-CM

## 2022-01-01 DIAGNOSIS — R65.21 SEPTIC SHOCK (HCC): Primary | ICD-10-CM

## 2022-01-01 DIAGNOSIS — I70.219 ATHEROSCLEROSIS OF ARTERY OF EXTREMITY WITH INTERMITTENT CLAUDICATION (HCC): Primary | ICD-10-CM

## 2022-01-01 DIAGNOSIS — I25.10 CORONARY ARTERY DISEASE WITHOUT ANGINA PECTORIS: ICD-10-CM

## 2022-01-01 DIAGNOSIS — N40.1 BENIGN PROSTATIC HYPERPLASIA (BPH) WITH STRAINING ON URINATION: ICD-10-CM

## 2022-01-01 DIAGNOSIS — E78.2 MIXED HYPERLIPIDEMIA: ICD-10-CM

## 2022-01-01 DIAGNOSIS — R31.21 ASYMPTOMATIC MICROSCOPIC HEMATURIA: ICD-10-CM

## 2022-01-01 DIAGNOSIS — I70.219 ATHEROSCLEROSIS OF ARTERY OF EXTREMITY WITH INTERMITTENT CLAUDICATION (HCC): ICD-10-CM

## 2022-01-01 DIAGNOSIS — J30.89 NON-SEASONAL ALLERGIC RHINITIS, UNSPECIFIED TRIGGER: Primary | ICD-10-CM

## 2022-01-01 DIAGNOSIS — G93.40 ENCEPHALOPATHY: ICD-10-CM

## 2022-01-01 DIAGNOSIS — R65.21 SEPTIC SHOCK (HCC): ICD-10-CM

## 2022-01-01 DIAGNOSIS — R06.89 CHRONIC RESPIRATORY INSUFFICIENCY: ICD-10-CM

## 2022-01-01 DIAGNOSIS — M25.471 ANKLE EDEMA, BILATERAL: ICD-10-CM

## 2022-01-01 DIAGNOSIS — I26.99 OTHER ACUTE PULMONARY EMBOLISM WITHOUT ACUTE COR PULMONALE (HCC): ICD-10-CM

## 2022-01-01 DIAGNOSIS — J45.20 MILD INTERMITTENT REACTIVE AIRWAY DISEASE WITHOUT COMPLICATION: ICD-10-CM

## 2022-01-01 LAB
2HR DELTA HS TROPONIN: -3 NG/L
2HR DELTA HS TROPONIN: 0 NG/L
4HR DELTA HS TROPONIN: -2 NG/L
AFP-TM SERPL-MCNC: 159.5 NG/ML (ref 0.5–8)
AFP-TM SERPL-MCNC: 77.2 NG/ML (ref 0.5–8)
ALBUMIN SERPL BCP-MCNC: 1.8 G/DL (ref 3.5–5)
ALBUMIN SERPL BCP-MCNC: 2.6 G/DL (ref 3.5–5)
ALBUMIN SERPL BCP-MCNC: 2.6 G/DL (ref 3.5–5)
ALBUMIN SERPL BCP-MCNC: 2.7 G/DL (ref 3.5–5)
ALBUMIN SERPL BCP-MCNC: 2.8 G/DL (ref 3.5–5)
ALBUMIN SERPL BCP-MCNC: 2.8 G/DL (ref 3.5–5)
ALBUMIN SERPL BCP-MCNC: 3 G/DL (ref 3.5–5)
ALBUMIN SERPL BCP-MCNC: 3 G/DL (ref 3.5–5)
ALBUMIN SERPL BCP-MCNC: 3.2 G/DL (ref 3.5–5)
ALBUMIN SERPL BCP-MCNC: 3.3 G/DL (ref 3.5–5)
ALBUMIN SERPL BCP-MCNC: 3.3 G/DL (ref 3.5–5)
ALP SERPL-CCNC: 146 U/L (ref 46–116)
ALP SERPL-CCNC: 147 U/L (ref 46–116)
ALP SERPL-CCNC: 168 U/L (ref 46–116)
ALP SERPL-CCNC: 190 U/L (ref 46–116)
ALP SERPL-CCNC: 203 U/L (ref 34–104)
ALP SERPL-CCNC: 213 U/L (ref 46–116)
ALP SERPL-CCNC: 235 U/L (ref 46–116)
ALP SERPL-CCNC: 313 U/L (ref 34–104)
ALP SERPL-CCNC: 322 U/L (ref 34–104)
ALP SERPL-CCNC: 343 U/L (ref 34–104)
ALP SERPL-CCNC: 521 U/L (ref 34–104)
ALT SERPL W P-5'-P-CCNC: 102 U/L (ref 7–52)
ALT SERPL W P-5'-P-CCNC: 31 U/L (ref 12–78)
ALT SERPL W P-5'-P-CCNC: 33 U/L (ref 12–78)
ALT SERPL W P-5'-P-CCNC: 46 U/L (ref 12–78)
ALT SERPL W P-5'-P-CCNC: 55 U/L (ref 7–52)
ALT SERPL W P-5'-P-CCNC: 59 U/L (ref 12–78)
ALT SERPL W P-5'-P-CCNC: 59 U/L (ref 12–78)
ALT SERPL W P-5'-P-CCNC: 74 U/L (ref 12–78)
ALT SERPL W P-5'-P-CCNC: 81 U/L (ref 7–52)
ALT SERPL W P-5'-P-CCNC: 87 U/L (ref 7–52)
ALT SERPL W P-5'-P-CCNC: 88 U/L (ref 7–52)
AMMONIA PLAS-SCNC: 134 UMOL/L (ref 18–72)
ANION GAP SERPL CALCULATED.3IONS-SCNC: 11 MMOL/L (ref 4–13)
ANION GAP SERPL CALCULATED.3IONS-SCNC: 11 MMOL/L (ref 4–13)
ANION GAP SERPL CALCULATED.3IONS-SCNC: 17 MMOL/L (ref 4–13)
ANION GAP SERPL CALCULATED.3IONS-SCNC: 2 MMOL/L (ref 4–13)
ANION GAP SERPL CALCULATED.3IONS-SCNC: 2 MMOL/L (ref 4–13)
ANION GAP SERPL CALCULATED.3IONS-SCNC: 4 MMOL/L (ref 4–13)
ANION GAP SERPL CALCULATED.3IONS-SCNC: 5 MMOL/L (ref 4–13)
ANION GAP SERPL CALCULATED.3IONS-SCNC: 5 MMOL/L (ref 4–13)
ANION GAP SERPL CALCULATED.3IONS-SCNC: 7 MMOL/L (ref 4–13)
ANION GAP SERPL CALCULATED.3IONS-SCNC: 8 MMOL/L (ref 4–13)
ANISOCYTOSIS BLD QL SMEAR: PRESENT
ANISOCYTOSIS BLD QL SMEAR: PRESENT
AORTIC ROOT: 3.1 CM
AORTIC VALVE MEAN VELOCITY: 13 M/S
APICAL FOUR CHAMBER EJECTION FRACTION: 53 %
APTT PPP: 27 SECONDS (ref 23–37)
AST SERPL W P-5'-P-CCNC: 106 U/L (ref 5–45)
AST SERPL W P-5'-P-CCNC: 142 U/L (ref 13–39)
AST SERPL W P-5'-P-CCNC: 142 U/L (ref 13–39)
AST SERPL W P-5'-P-CCNC: 159 U/L (ref 13–39)
AST SERPL W P-5'-P-CCNC: 235 U/L (ref 13–39)
AST SERPL W P-5'-P-CCNC: 37 U/L (ref 5–45)
AST SERPL W P-5'-P-CCNC: 39 U/L (ref 5–45)
AST SERPL W P-5'-P-CCNC: 51 U/L (ref 5–45)
AST SERPL W P-5'-P-CCNC: 70 U/L (ref 5–45)
AST SERPL W P-5'-P-CCNC: 73 U/L (ref 5–45)
AST SERPL W P-5'-P-CCNC: 92 U/L (ref 13–39)
ATRIAL RATE: 108 BPM
ATRIAL RATE: 108 BPM
ATRIAL RATE: 111 BPM
AV MEAN GRADIENT: 8 MMHG
AV PEAK GRADIENT: 15 MMHG
BACTERIA BLD CULT: NORMAL
BACTERIA BLD CULT: NORMAL
BACTERIA UR QL AUTO: ABNORMAL /HPF
BACTERIA UR QL AUTO: NORMAL /HPF
BASE EXCESS BLDA CALC-SCNC: 4 MMOL/L
BASOPHILS # BLD AUTO: 0.05 THOUSANDS/ΜL (ref 0–0.1)
BASOPHILS # BLD AUTO: 0.05 THOUSANDS/ΜL (ref 0–0.1)
BASOPHILS # BLD AUTO: 0.06 THOUSANDS/ΜL (ref 0–0.1)
BASOPHILS # BLD AUTO: 0.07 THOUSANDS/ΜL (ref 0–0.1)
BASOPHILS # BLD AUTO: 0.07 THOUSANDS/ΜL (ref 0–0.1)
BASOPHILS # BLD AUTO: 0.08 THOUSANDS/ΜL (ref 0–0.1)
BASOPHILS # BLD AUTO: 0.08 THOUSANDS/ΜL (ref 0–0.1)
BASOPHILS # BLD AUTO: 0.09 THOUSANDS/ΜL (ref 0–0.1)
BASOPHILS # BLD AUTO: 0.09 THOUSANDS/ΜL (ref 0–0.1)
BASOPHILS # BLD MANUAL: 0 THOUSAND/UL (ref 0–0.1)
BASOPHILS # BLD MANUAL: 0.13 THOUSAND/UL (ref 0–0.1)
BASOPHILS NFR BLD AUTO: 1 % (ref 0–1)
BASOPHILS NFR MAR MANUAL: 0 % (ref 0–1)
BASOPHILS NFR MAR MANUAL: 1 % (ref 0–1)
BILIRUB DIRECT SERPL-MCNC: 0.15 MG/DL (ref 0–0.2)
BILIRUB DIRECT SERPL-MCNC: 0.24 MG/DL (ref 0–0.2)
BILIRUB SERPL-MCNC: 0.61 MG/DL (ref 0.2–1)
BILIRUB SERPL-MCNC: 0.66 MG/DL (ref 0.2–1)
BILIRUB SERPL-MCNC: 0.67 MG/DL (ref 0.2–1)
BILIRUB SERPL-MCNC: 0.7 MG/DL (ref 0.2–1)
BILIRUB SERPL-MCNC: 0.93 MG/DL (ref 0.2–1)
BILIRUB SERPL-MCNC: 0.93 MG/DL (ref 0.2–1)
BILIRUB SERPL-MCNC: 1.01 MG/DL (ref 0.2–1)
BILIRUB SERPL-MCNC: 1.04 MG/DL (ref 0.2–1)
BILIRUB SERPL-MCNC: 1.31 MG/DL (ref 0.2–1)
BILIRUB SERPL-MCNC: 1.44 MG/DL (ref 0.2–1)
BILIRUB SERPL-MCNC: 1.58 MG/DL (ref 0.2–1)
BILIRUB UR QL STRIP: NEGATIVE
BNP SERPL-MCNC: 342 PG/ML (ref 0–100)
BUN SERPL-MCNC: 14 MG/DL (ref 5–25)
BUN SERPL-MCNC: 16 MG/DL (ref 5–25)
BUN SERPL-MCNC: 17 MG/DL (ref 5–25)
BUN SERPL-MCNC: 18 MG/DL (ref 5–25)
BUN SERPL-MCNC: 21 MG/DL (ref 5–25)
BUN SERPL-MCNC: 21 MG/DL (ref 5–25)
BUN SERPL-MCNC: 22 MG/DL (ref 5–25)
BUN SERPL-MCNC: 26 MG/DL (ref 5–25)
CALCIUM ALBUM COR SERPL-MCNC: 10 MG/DL (ref 8.3–10.1)
CALCIUM ALBUM COR SERPL-MCNC: 10.1 MG/DL (ref 8.3–10.1)
CALCIUM ALBUM COR SERPL-MCNC: 10.3 MG/DL (ref 8.3–10.1)
CALCIUM ALBUM COR SERPL-MCNC: 10.3 MG/DL (ref 8.3–10.1)
CALCIUM ALBUM COR SERPL-MCNC: 10.4 MG/DL (ref 8.3–10.1)
CALCIUM ALBUM COR SERPL-MCNC: 10.5 MG/DL (ref 8.3–10.1)
CALCIUM ALBUM COR SERPL-MCNC: 10.5 MG/DL (ref 8.3–10.1)
CALCIUM ALBUM COR SERPL-MCNC: 7.5 MG/DL (ref 8.3–10.1)
CALCIUM ALBUM COR SERPL-MCNC: 9.5 MG/DL (ref 8.3–10.1)
CALCIUM ALBUM COR SERPL-MCNC: 9.8 MG/DL (ref 8.3–10.1)
CALCIUM SERPL-MCNC: 5.7 MG/DL (ref 8.4–10.2)
CALCIUM SERPL-MCNC: 8.4 MG/DL (ref 8.4–10.2)
CALCIUM SERPL-MCNC: 8.9 MG/DL (ref 8.4–10.2)
CALCIUM SERPL-MCNC: 9 MG/DL (ref 8.4–10.2)
CALCIUM SERPL-MCNC: 9.1 MG/DL (ref 8.4–10.2)
CALCIUM SERPL-MCNC: 9.2 MG/DL (ref 8.3–10.1)
CALCIUM SERPL-MCNC: 9.2 MG/DL (ref 8.4–10.2)
CALCIUM SERPL-MCNC: 9.3 MG/DL (ref 8.3–10.1)
CALCIUM SERPL-MCNC: 9.4 MG/DL (ref 8.4–10.2)
CALCIUM SERPL-MCNC: 9.5 MG/DL (ref 8.3–10.1)
CALCIUM SERPL-MCNC: 9.6 MG/DL (ref 8.4–10.2)
CARDIAC TROPONIN I PNL SERPL HS: 13 NG/L
CARDIAC TROPONIN I PNL SERPL HS: 16 NG/L
CARDIAC TROPONIN I PNL SERPL HS: 21 NG/L
CARDIAC TROPONIN I PNL SERPL HS: 23 NG/L
CARDIAC TROPONIN I PNL SERPL HS: 23 NG/L
CARDIAC TROPONIN I PNL SERPL HS: 33 NG/L (ref 8–18)
CHLORIDE SERPL-SCNC: 101 MMOL/L (ref 100–108)
CHLORIDE SERPL-SCNC: 101 MMOL/L (ref 100–108)
CHLORIDE SERPL-SCNC: 101 MMOL/L (ref 96–108)
CHLORIDE SERPL-SCNC: 101 MMOL/L (ref 96–108)
CHLORIDE SERPL-SCNC: 102 MMOL/L (ref 96–108)
CHLORIDE SERPL-SCNC: 103 MMOL/L (ref 100–108)
CHLORIDE SERPL-SCNC: 104 MMOL/L (ref 100–108)
CHLORIDE SERPL-SCNC: 105 MMOL/L (ref 100–108)
CHLORIDE SERPL-SCNC: 106 MMOL/L (ref 96–108)
CHLORIDE SERPL-SCNC: 114 MMOL/L (ref 96–108)
CHLORIDE SERPL-SCNC: 96 MMOL/L (ref 96–108)
CHLORIDE SERPL-SCNC: 98 MMOL/L (ref 96–108)
CHLORIDE SERPL-SCNC: 99 MMOL/L (ref 96–108)
CHOLEST SERPL-MCNC: 129 MG/DL
CLARITY UR: CLEAR
CO2 SERPL-SCNC: 20 MMOL/L (ref 21–32)
CO2 SERPL-SCNC: 22 MMOL/L (ref 21–32)
CO2 SERPL-SCNC: 25 MMOL/L (ref 21–32)
CO2 SERPL-SCNC: 28 MMOL/L (ref 21–32)
CO2 SERPL-SCNC: 29 MMOL/L (ref 21–32)
CO2 SERPL-SCNC: 31 MMOL/L (ref 21–32)
CO2 SERPL-SCNC: 33 MMOL/L (ref 21–32)
CO2 SERPL-SCNC: 33 MMOL/L (ref 21–32)
COLOR UR: YELLOW
CREAT SERPL-MCNC: 0.65 MG/DL (ref 0.6–1.3)
CREAT SERPL-MCNC: 0.7 MG/DL (ref 0.6–1.3)
CREAT SERPL-MCNC: 0.74 MG/DL (ref 0.6–1.3)
CREAT SERPL-MCNC: 0.76 MG/DL (ref 0.6–1.3)
CREAT SERPL-MCNC: 0.8 MG/DL (ref 0.6–1.3)
CREAT SERPL-MCNC: 1 MG/DL (ref 0.6–1.3)
CREAT SERPL-MCNC: 1.05 MG/DL (ref 0.6–1.3)
CREAT SERPL-MCNC: 1.06 MG/DL (ref 0.6–1.3)
CREAT SERPL-MCNC: 1.08 MG/DL (ref 0.6–1.3)
CREAT SERPL-MCNC: 1.09 MG/DL (ref 0.6–1.3)
CREAT SERPL-MCNC: 1.14 MG/DL (ref 0.6–1.3)
CREAT SERPL-MCNC: 1.18 MG/DL (ref 0.6–1.3)
CREAT SERPL-MCNC: 1.22 MG/DL (ref 0.6–1.3)
CREAT SERPL-MCNC: 1.44 MG/DL (ref 0.6–1.3)
DOP CALC AO PEAK VEL: 1.94 M/S
DOP CALC AO VTI: 43.67 CM
DOP CALC RVOT PEAK VEL: 0.62 M/S
DOP CALC RVOT VTI: 12.49 CM
E WAVE DECELERATION TIME: 191 MS
E/A RATIO: 0.54
EOSINOPHIL # BLD AUTO: 0.02 THOUSAND/ΜL (ref 0–0.61)
EOSINOPHIL # BLD AUTO: 0.03 THOUSAND/ΜL (ref 0–0.61)
EOSINOPHIL # BLD AUTO: 0.04 THOUSAND/ΜL (ref 0–0.61)
EOSINOPHIL # BLD AUTO: 0.05 THOUSAND/ΜL (ref 0–0.61)
EOSINOPHIL # BLD AUTO: 0.16 THOUSAND/ΜL (ref 0–0.61)
EOSINOPHIL # BLD AUTO: 0.17 THOUSAND/ΜL (ref 0–0.61)
EOSINOPHIL # BLD AUTO: 0.21 THOUSAND/ΜL (ref 0–0.61)
EOSINOPHIL # BLD AUTO: 0.38 THOUSAND/ΜL (ref 0–0.61)
EOSINOPHIL # BLD AUTO: 0.39 THOUSAND/ΜL (ref 0–0.61)
EOSINOPHIL # BLD MANUAL: 0 THOUSAND/UL (ref 0–0.4)
EOSINOPHIL # BLD MANUAL: 0.39 THOUSAND/UL (ref 0–0.4)
EOSINOPHIL NFR BLD AUTO: 0 % (ref 0–6)
EOSINOPHIL NFR BLD AUTO: 2 % (ref 0–6)
EOSINOPHIL NFR BLD AUTO: 3 % (ref 0–6)
EOSINOPHIL NFR BLD AUTO: 4 % (ref 0–6)
EOSINOPHIL NFR BLD MANUAL: 0 % (ref 0–6)
EOSINOPHIL NFR BLD MANUAL: 3 % (ref 0–6)
ERYTHROCYTE [DISTWIDTH] IN BLOOD BY AUTOMATED COUNT: 14.3 % (ref 11.6–15.1)
ERYTHROCYTE [DISTWIDTH] IN BLOOD BY AUTOMATED COUNT: 14.5 % (ref 11.6–15.1)
ERYTHROCYTE [DISTWIDTH] IN BLOOD BY AUTOMATED COUNT: 14.8 % (ref 11.6–15.1)
ERYTHROCYTE [DISTWIDTH] IN BLOOD BY AUTOMATED COUNT: 14.8 % (ref 11.6–15.1)
ERYTHROCYTE [DISTWIDTH] IN BLOOD BY AUTOMATED COUNT: 14.9 % (ref 11.6–15.1)
ERYTHROCYTE [DISTWIDTH] IN BLOOD BY AUTOMATED COUNT: 16.6 % (ref 11.6–15.1)
ERYTHROCYTE [DISTWIDTH] IN BLOOD BY AUTOMATED COUNT: 17.2 % (ref 11.6–15.1)
ERYTHROCYTE [DISTWIDTH] IN BLOOD BY AUTOMATED COUNT: 17.6 % (ref 11.6–15.1)
ERYTHROCYTE [DISTWIDTH] IN BLOOD BY AUTOMATED COUNT: 17.7 % (ref 11.6–15.1)
ERYTHROCYTE [DISTWIDTH] IN BLOOD BY AUTOMATED COUNT: 17.8 % (ref 11.6–15.1)
ERYTHROCYTE [DISTWIDTH] IN BLOOD BY AUTOMATED COUNT: 17.9 % (ref 11.6–15.1)
ERYTHROCYTE [DISTWIDTH] IN BLOOD BY AUTOMATED COUNT: 17.9 % (ref 11.6–15.1)
ERYTHROCYTE [DISTWIDTH] IN BLOOD BY AUTOMATED COUNT: 18.6 % (ref 11.6–15.1)
ERYTHROCYTE [DISTWIDTH] IN BLOOD BY AUTOMATED COUNT: 18.8 % (ref 11.6–15.1)
EST. AVERAGE GLUCOSE BLD GHB EST-MCNC: 131 MG/DL
FINE GRAN CASTS URNS QL MICRO: ABNORMAL /LPF
FLUAV RNA RESP QL NAA+PROBE: NEGATIVE
FLUBV RNA RESP QL NAA+PROBE: NEGATIVE
FRACTIONAL SHORTENING: 44 (ref 28–44)
GFR SERPL CREATININE-BSD FRML MDRD: 44 ML/MIN/1.73SQ M
GFR SERPL CREATININE-BSD FRML MDRD: 54 ML/MIN/1.73SQ M
GFR SERPL CREATININE-BSD FRML MDRD: 56 ML/MIN/1.73SQ M
GFR SERPL CREATININE-BSD FRML MDRD: 59 ML/MIN/1.73SQ M
GFR SERPL CREATININE-BSD FRML MDRD: 62 ML/MIN/1.73SQ M
GFR SERPL CREATININE-BSD FRML MDRD: 63 ML/MIN/1.73SQ M
GFR SERPL CREATININE-BSD FRML MDRD: 64 ML/MIN/1.73SQ M
GFR SERPL CREATININE-BSD FRML MDRD: 65 ML/MIN/1.73SQ M
GFR SERPL CREATININE-BSD FRML MDRD: 69 ML/MIN/1.73SQ M
GFR SERPL CREATININE-BSD FRML MDRD: 82 ML/MIN/1.73SQ M
GFR SERPL CREATININE-BSD FRML MDRD: 84 ML/MIN/1.73SQ M
GFR SERPL CREATININE-BSD FRML MDRD: 85 ML/MIN/1.73SQ M
GFR SERPL CREATININE-BSD FRML MDRD: 87 ML/MIN/1.73SQ M
GFR SERPL CREATININE-BSD FRML MDRD: 89 ML/MIN/1.73SQ M
GLUCOSE P FAST SERPL-MCNC: 150 MG/DL (ref 65–99)
GLUCOSE P FAST SERPL-MCNC: 189 MG/DL (ref 65–99)
GLUCOSE SERPL-MCNC: 102 MG/DL (ref 65–140)
GLUCOSE SERPL-MCNC: 111 MG/DL (ref 65–140)
GLUCOSE SERPL-MCNC: 112 MG/DL (ref 65–140)
GLUCOSE SERPL-MCNC: 130 MG/DL (ref 65–140)
GLUCOSE SERPL-MCNC: 139 MG/DL (ref 65–140)
GLUCOSE SERPL-MCNC: 141 MG/DL (ref 65–140)
GLUCOSE SERPL-MCNC: 146 MG/DL (ref 65–140)
GLUCOSE SERPL-MCNC: 147 MG/DL (ref 65–140)
GLUCOSE SERPL-MCNC: 151 MG/DL (ref 65–140)
GLUCOSE SERPL-MCNC: 152 MG/DL (ref 65–140)
GLUCOSE SERPL-MCNC: 155 MG/DL (ref 65–140)
GLUCOSE SERPL-MCNC: 161 MG/DL (ref 65–140)
GLUCOSE SERPL-MCNC: 167 MG/DL (ref 65–140)
GLUCOSE SERPL-MCNC: 167 MG/DL (ref 65–140)
GLUCOSE SERPL-MCNC: 168 MG/DL (ref 65–140)
GLUCOSE SERPL-MCNC: 175 MG/DL (ref 65–140)
GLUCOSE SERPL-MCNC: 184 MG/DL (ref 65–140)
GLUCOSE SERPL-MCNC: 191 MG/DL (ref 65–140)
GLUCOSE SERPL-MCNC: 212 MG/DL (ref 65–140)
GLUCOSE SERPL-MCNC: 215 MG/DL (ref 65–140)
GLUCOSE SERPL-MCNC: 217 MG/DL (ref 65–140)
GLUCOSE SERPL-MCNC: 235 MG/DL (ref 65–140)
GLUCOSE SERPL-MCNC: 261 MG/DL (ref 65–140)
GLUCOSE SERPL-MCNC: 83 MG/DL (ref 65–140)
GLUCOSE SERPL-MCNC: 90 MG/DL (ref 65–140)
GLUCOSE SERPL-MCNC: 91 MG/DL (ref 65–140)
GLUCOSE SERPL-MCNC: 91 MG/DL (ref 65–140)
GLUCOSE SERPL-MCNC: 92 MG/DL (ref 65–140)
GLUCOSE SERPL-MCNC: 94 MG/DL (ref 65–140)
GLUCOSE SERPL-MCNC: 97 MG/DL (ref 65–140)
GLUCOSE SERPL-MCNC: 98 MG/DL (ref 65–140)
GLUCOSE UR STRIP-MCNC: NEGATIVE MG/DL
HBA1C MFR BLD: 6.2 %
HCO3 BLDA-SCNC: 27.7 MMOL/L (ref 22–28)
HCT VFR BLD AUTO: 40.1 % (ref 36.5–49.3)
HCT VFR BLD AUTO: 43.6 % (ref 36.5–49.3)
HCT VFR BLD AUTO: 44.4 % (ref 36.5–49.3)
HCT VFR BLD AUTO: 44.9 % (ref 36.5–49.3)
HCT VFR BLD AUTO: 45.1 % (ref 36.5–49.3)
HCT VFR BLD AUTO: 47 % (ref 36.5–49.3)
HCT VFR BLD AUTO: 47.1 % (ref 36.5–49.3)
HCT VFR BLD AUTO: 47.2 % (ref 36.5–49.3)
HCT VFR BLD AUTO: 49.2 % (ref 36.5–49.3)
HCT VFR BLD AUTO: 50.3 % (ref 36.5–49.3)
HCT VFR BLD AUTO: 50.5 % (ref 36.5–49.3)
HCT VFR BLD AUTO: 52 % (ref 36.5–49.3)
HCT VFR BLD AUTO: 52.9 % (ref 36.5–49.3)
HCT VFR BLD AUTO: 53.4 % (ref 36.5–49.3)
HDLC SERPL-MCNC: 65 MG/DL
HGB BLD-MCNC: 13.5 G/DL (ref 12–17)
HGB BLD-MCNC: 14.2 G/DL (ref 12–17)
HGB BLD-MCNC: 14.2 G/DL (ref 12–17)
HGB BLD-MCNC: 14.4 G/DL (ref 12–17)
HGB BLD-MCNC: 14.4 G/DL (ref 12–17)
HGB BLD-MCNC: 14.7 G/DL (ref 12–17)
HGB BLD-MCNC: 15 G/DL (ref 12–17)
HGB BLD-MCNC: 15.4 G/DL (ref 12–17)
HGB BLD-MCNC: 15.4 G/DL (ref 12–17)
HGB BLD-MCNC: 15.9 G/DL (ref 12–17)
HGB BLD-MCNC: 16.1 G/DL (ref 12–17)
HGB BLD-MCNC: 16.5 G/DL (ref 12–17)
HGB BLD-MCNC: 16.7 G/DL (ref 12–17)
HGB BLD-MCNC: 16.9 G/DL (ref 12–17)
HGB UR QL STRIP.AUTO: ABNORMAL
HGB UR QL STRIP.AUTO: ABNORMAL
HGB UR QL STRIP.AUTO: NEGATIVE
HGB UR QL STRIP.AUTO: NORMAL
HYALINE CASTS #/AREA URNS LPF: ABNORMAL /LPF
HYALINE CASTS #/AREA URNS LPF: ABNORMAL /LPF
HYALINE CASTS #/AREA URNS LPF: NORMAL /LPF
IMM GRANULOCYTES # BLD AUTO: 0.06 THOUSAND/UL (ref 0–0.2)
IMM GRANULOCYTES # BLD AUTO: 0.1 THOUSAND/UL (ref 0–0.2)
IMM GRANULOCYTES # BLD AUTO: 0.1 THOUSAND/UL (ref 0–0.2)
IMM GRANULOCYTES # BLD AUTO: 0.16 THOUSAND/UL (ref 0–0.2)
IMM GRANULOCYTES # BLD AUTO: 0.19 THOUSAND/UL (ref 0–0.2)
IMM GRANULOCYTES # BLD AUTO: 0.23 THOUSAND/UL (ref 0–0.2)
IMM GRANULOCYTES # BLD AUTO: 0.3 THOUSAND/UL (ref 0–0.2)
IMM GRANULOCYTES NFR BLD AUTO: 1 % (ref 0–2)
IMM GRANULOCYTES NFR BLD AUTO: 2 % (ref 0–2)
INR PPP: 0.95 (ref 0.84–1.19)
INTERVENTRICULAR SEPTUM IN DIASTOLE (PARASTERNAL SHORT AXIS VIEW): 0.9 CM
INTERVENTRICULAR SEPTUM: 0.9 CM (ref 0.54–1.01)
KETONES UR STRIP-MCNC: NEGATIVE MG/DL
L PNEUMO1 AG UR QL IA.RAPID: NEGATIVE
L PNEUMO1 AG UR QL IA.RAPID: NEGATIVE
LAAS-AP4: 14.4 CM2
LACTATE SERPL-SCNC: 1.9 MMOL/L (ref 0.5–2)
LACTATE SERPL-SCNC: 2.6 MMOL/L (ref 0.5–2)
LACTATE SERPL-SCNC: 3.3 MMOL/L (ref 0.5–2)
LACTATE SERPL-SCNC: 3.4 MMOL/L (ref 0.5–2)
LACTATE SERPL-SCNC: 5.2 MMOL/L (ref 0.5–2)
LACTATE SERPL-SCNC: 6 MMOL/L (ref 0.5–2)
LACTATE SERPL-SCNC: 8.6 MMOL/L (ref 0.5–2)
LDLC SERPL CALC-MCNC: 44 MG/DL (ref 0–100)
LEFT ATRIUM SIZE: 4.4 CM
LEFT ATRIUM VOLUME INDEX (MOD BIPLANE): 21.9
LEFT INTERNAL DIMENSION IN SYSTOLE: 2.8 CM (ref 3.06–4.64)
LEFT VENTRICULAR INTERNAL DIMENSION IN DIASTOLE: 5 CM (ref 5.04–7.51)
LEFT VENTRICULAR POSTERIOR WALL IN END DIASTOLE: 0.9 CM (ref 0.52–0.99)
LEFT VENTRICULAR STROKE VOLUME: 85 ML
LEUKOCYTE ESTERASE UR QL STRIP: NEGATIVE
LVSV (TEICH): 85 ML
LYMPHOCYTES # BLD AUTO: 0.38 THOUSANDS/ΜL (ref 0.6–4.47)
LYMPHOCYTES # BLD AUTO: 0.48 THOUSANDS/ΜL (ref 0.6–4.47)
LYMPHOCYTES # BLD AUTO: 0.77 THOUSAND/UL (ref 0.6–4.47)
LYMPHOCYTES # BLD AUTO: 0.78 THOUSAND/UL (ref 0.6–4.47)
LYMPHOCYTES # BLD AUTO: 0.78 THOUSANDS/ΜL (ref 0.6–4.47)
LYMPHOCYTES # BLD AUTO: 0.89 THOUSANDS/ΜL (ref 0.6–4.47)
LYMPHOCYTES # BLD AUTO: 1.05 THOUSANDS/ΜL (ref 0.6–4.47)
LYMPHOCYTES # BLD AUTO: 1.4 THOUSANDS/ΜL (ref 0.6–4.47)
LYMPHOCYTES # BLD AUTO: 1.46 THOUSANDS/ΜL (ref 0.6–4.47)
LYMPHOCYTES # BLD AUTO: 1.59 THOUSANDS/ΜL (ref 0.6–4.47)
LYMPHOCYTES # BLD AUTO: 1.86 THOUSANDS/ΜL (ref 0.6–4.47)
LYMPHOCYTES # BLD AUTO: 6 % (ref 14–44)
LYMPHOCYTES # BLD AUTO: 7 % (ref 14–44)
LYMPHOCYTES NFR BLD AUTO: 10 % (ref 14–44)
LYMPHOCYTES NFR BLD AUTO: 13 % (ref 14–44)
LYMPHOCYTES NFR BLD AUTO: 14 % (ref 14–44)
LYMPHOCYTES NFR BLD AUTO: 20 % (ref 14–44)
LYMPHOCYTES NFR BLD AUTO: 20 % (ref 14–44)
LYMPHOCYTES NFR BLD AUTO: 4 % (ref 14–44)
LYMPHOCYTES NFR BLD AUTO: 5 % (ref 14–44)
LYMPHOCYTES NFR BLD AUTO: 6 % (ref 14–44)
LYMPHOCYTES NFR BLD AUTO: 7 % (ref 14–44)
MACROCYTES BLD QL AUTO: PRESENT
MAGNESIUM SERPL-MCNC: 1.4 MG/DL (ref 1.9–2.7)
MAGNESIUM SERPL-MCNC: 2.2 MG/DL (ref 1.9–2.7)
MAGNESIUM SERPL-MCNC: 2.5 MG/DL (ref 1.9–2.7)
MCH RBC QN AUTO: 29.3 PG (ref 26.8–34.3)
MCH RBC QN AUTO: 29.3 PG (ref 26.8–34.3)
MCH RBC QN AUTO: 29.5 PG (ref 26.8–34.3)
MCH RBC QN AUTO: 29.6 PG (ref 26.8–34.3)
MCH RBC QN AUTO: 29.9 PG (ref 26.8–34.3)
MCH RBC QN AUTO: 30 PG (ref 26.8–34.3)
MCH RBC QN AUTO: 30.1 PG (ref 26.8–34.3)
MCH RBC QN AUTO: 30.3 PG (ref 26.8–34.3)
MCH RBC QN AUTO: 30.7 PG (ref 26.8–34.3)
MCH RBC QN AUTO: 30.8 PG (ref 26.8–34.3)
MCH RBC QN AUTO: 30.9 PG (ref 26.8–34.3)
MCH RBC QN AUTO: 30.9 PG (ref 26.8–34.3)
MCHC RBC AUTO-ENTMCNC: 30.6 G/DL (ref 31.4–37.4)
MCHC RBC AUTO-ENTMCNC: 31.3 G/DL (ref 31.4–37.4)
MCHC RBC AUTO-ENTMCNC: 31.5 G/DL (ref 31.4–37.4)
MCHC RBC AUTO-ENTMCNC: 31.6 G/DL (ref 31.4–37.4)
MCHC RBC AUTO-ENTMCNC: 31.7 G/DL (ref 31.4–37.4)
MCHC RBC AUTO-ENTMCNC: 31.8 G/DL (ref 31.4–37.4)
MCHC RBC AUTO-ENTMCNC: 31.9 G/DL (ref 31.4–37.4)
MCHC RBC AUTO-ENTMCNC: 32.1 G/DL (ref 31.4–37.4)
MCHC RBC AUTO-ENTMCNC: 32.6 G/DL (ref 31.4–37.4)
MCHC RBC AUTO-ENTMCNC: 32.7 G/DL (ref 31.4–37.4)
MCHC RBC AUTO-ENTMCNC: 33.1 G/DL (ref 31.4–37.4)
MCHC RBC AUTO-ENTMCNC: 33.7 G/DL (ref 31.4–37.4)
MCV RBC AUTO: 91 FL (ref 82–98)
MCV RBC AUTO: 92 FL (ref 82–98)
MCV RBC AUTO: 92 FL (ref 82–98)
MCV RBC AUTO: 93 FL (ref 82–98)
MCV RBC AUTO: 94 FL (ref 82–98)
MCV RBC AUTO: 95 FL (ref 82–98)
MCV RBC AUTO: 95 FL (ref 82–98)
MCV RBC AUTO: 96 FL (ref 82–98)
MCV RBC AUTO: 97 FL (ref 82–98)
MCV RBC AUTO: 98 FL (ref 82–98)
MONOCYTES # BLD AUTO: 0.82 THOUSAND/ΜL (ref 0.17–1.22)
MONOCYTES # BLD AUTO: 0.93 THOUSAND/ΜL (ref 0.17–1.22)
MONOCYTES # BLD AUTO: 0.97 THOUSAND/ΜL (ref 0.17–1.22)
MONOCYTES # BLD AUTO: 0.99 THOUSAND/UL (ref 0–1.22)
MONOCYTES # BLD AUTO: 1.02 THOUSAND/ΜL (ref 0.17–1.22)
MONOCYTES # BLD AUTO: 1.08 THOUSAND/ΜL (ref 0.17–1.22)
MONOCYTES # BLD AUTO: 1.09 THOUSAND/ΜL (ref 0.17–1.22)
MONOCYTES # BLD AUTO: 1.13 THOUSAND/ΜL (ref 0.17–1.22)
MONOCYTES # BLD AUTO: 1.3 THOUSAND/UL (ref 0–1.22)
MONOCYTES # BLD AUTO: 1.32 THOUSAND/ΜL (ref 0.17–1.22)
MONOCYTES # BLD AUTO: 1.38 THOUSAND/ΜL (ref 0.17–1.22)
MONOCYTES NFR BLD AUTO: 10 % (ref 4–12)
MONOCYTES NFR BLD AUTO: 10 % (ref 4–12)
MONOCYTES NFR BLD AUTO: 11 % (ref 4–12)
MONOCYTES NFR BLD AUTO: 12 % (ref 4–12)
MONOCYTES NFR BLD AUTO: 12 % (ref 4–12)
MONOCYTES NFR BLD AUTO: 9 % (ref 4–12)
MONOCYTES NFR BLD AUTO: 9 % (ref 4–12)
MONOCYTES NFR BLD: 10 % (ref 4–12)
MONOCYTES NFR BLD: 9 % (ref 4–12)
MRSA NOSE QL CULT: ABNORMAL
MRSA NOSE QL CULT: NORMAL
MRSA NOSE QL CULT: NORMAL
MUCOUS THREADS UR QL AUTO: ABNORMAL
MV E'TISSUE VEL-SEP: 5 CM/S
MV PEAK A VEL: 1.21 M/S
MV PEAK E VEL: 65 CM/S
MV STENOSIS PRESSURE HALF TIME: 55 MS
MV VALVE AREA P 1/2 METHOD: 4
NEUTROPHILS # BLD AUTO: 10.01 THOUSANDS/ΜL (ref 1.85–7.62)
NEUTROPHILS # BLD AUTO: 5.05 THOUSANDS/ΜL (ref 1.85–7.62)
NEUTROPHILS # BLD AUTO: 6.06 THOUSANDS/ΜL (ref 1.85–7.62)
NEUTROPHILS # BLD AUTO: 7.49 THOUSANDS/ΜL (ref 1.85–7.62)
NEUTROPHILS # BLD AUTO: 7.52 THOUSANDS/ΜL (ref 1.85–7.62)
NEUTROPHILS # BLD AUTO: 8.09 THOUSANDS/ΜL (ref 1.85–7.62)
NEUTROPHILS # BLD AUTO: 8.1 THOUSANDS/ΜL (ref 1.85–7.62)
NEUTROPHILS # BLD AUTO: 8.13 THOUSANDS/ΜL (ref 1.85–7.62)
NEUTROPHILS # BLD AUTO: 9.92 THOUSANDS/ΜL (ref 1.85–7.62)
NEUTROPHILS # BLD MANUAL: 10.38 THOUSAND/UL (ref 1.85–7.62)
NEUTROPHILS # BLD MANUAL: 9.27 THOUSAND/UL (ref 1.85–7.62)
NEUTS BAND NFR BLD MANUAL: 2 % (ref 0–8)
NEUTS BAND NFR BLD MANUAL: 5 % (ref 0–8)
NEUTS SEG NFR BLD AUTO: 64 % (ref 43–75)
NEUTS SEG NFR BLD AUTO: 65 % (ref 43–75)
NEUTS SEG NFR BLD AUTO: 72 % (ref 43–75)
NEUTS SEG NFR BLD AUTO: 73 % (ref 43–75)
NEUTS SEG NFR BLD AUTO: 76 % (ref 43–75)
NEUTS SEG NFR BLD AUTO: 78 % (ref 43–75)
NEUTS SEG NFR BLD AUTO: 79 % (ref 43–75)
NEUTS SEG NFR BLD AUTO: 79 % (ref 43–75)
NEUTS SEG NFR BLD AUTO: 80 % (ref 43–75)
NEUTS SEG NFR BLD AUTO: 81 % (ref 43–75)
NEUTS SEG NFR BLD AUTO: 81 % (ref 43–75)
NITRITE UR QL STRIP: NEGATIVE
NON-SQ EPI CELLS URNS QL MICRO: ABNORMAL /HPF
NON-SQ EPI CELLS URNS QL MICRO: NORMAL /HPF
NRBC BLD AUTO-RTO: 0 /100 WBCS
O2 CT BLDA-SCNC: 20.3 ML/DL (ref 16–23)
OXYHGB MFR BLDA: 94.3 % (ref 94–97)
P AXIS: 35 DEGREES
P AXIS: 41 DEGREES
P AXIS: 44 DEGREES
PCO2 BLDA: 38.5 MM HG (ref 36–44)
PH BLDA: 7.47 [PH] (ref 7.35–7.45)
PH UR STRIP.AUTO: 5.5 [PH]
PH UR STRIP.AUTO: 6 [PH]
PHOSPHATE SERPL-MCNC: 1.6 MG/DL (ref 2.3–4.1)
PHOSPHATE SERPL-MCNC: 2.5 MG/DL (ref 2.3–4.1)
PHOSPHATE SERPL-MCNC: 2.7 MG/DL (ref 2.3–4.1)
PLATELET # BLD AUTO: 125 THOUSANDS/UL (ref 149–390)
PLATELET # BLD AUTO: 128 THOUSANDS/UL (ref 149–390)
PLATELET # BLD AUTO: 131 THOUSANDS/UL (ref 149–390)
PLATELET # BLD AUTO: 134 THOUSANDS/UL (ref 149–390)
PLATELET # BLD AUTO: 135 THOUSANDS/UL (ref 149–390)
PLATELET # BLD AUTO: 157 THOUSANDS/UL (ref 149–390)
PLATELET # BLD AUTO: 189 THOUSANDS/UL (ref 149–390)
PLATELET # BLD AUTO: 191 THOUSANDS/UL (ref 149–390)
PLATELET # BLD AUTO: 204 THOUSANDS/UL (ref 149–390)
PLATELET # BLD AUTO: 216 THOUSANDS/UL (ref 149–390)
PLATELET # BLD AUTO: 218 THOUSANDS/UL (ref 149–390)
PLATELET # BLD AUTO: 218 THOUSANDS/UL (ref 149–390)
PLATELET BLD QL SMEAR: ABNORMAL
PLATELET BLD QL SMEAR: ABNORMAL
PMV BLD AUTO: 10.7 FL (ref 8.9–12.7)
PMV BLD AUTO: 10.7 FL (ref 8.9–12.7)
PMV BLD AUTO: 10.8 FL (ref 8.9–12.7)
PMV BLD AUTO: 11 FL (ref 8.9–12.7)
PMV BLD AUTO: 11.1 FL (ref 8.9–12.7)
PMV BLD AUTO: 11.2 FL (ref 8.9–12.7)
PMV BLD AUTO: 11.2 FL (ref 8.9–12.7)
PMV BLD AUTO: 11.3 FL (ref 8.9–12.7)
PMV BLD AUTO: 11.4 FL (ref 8.9–12.7)
PMV BLD AUTO: 11.4 FL (ref 8.9–12.7)
PMV BLD AUTO: 11.6 FL (ref 8.9–12.7)
PMV BLD AUTO: 11.7 FL (ref 8.9–12.7)
PMV BLD AUTO: 11.7 FL (ref 8.9–12.7)
PMV BLD AUTO: 12.4 FL (ref 8.9–12.7)
PO2 BLDA: 74.4 MM HG (ref 75–129)
POLYCHROMASIA BLD QL SMEAR: PRESENT
POTASSIUM SERPL-SCNC: 2.8 MMOL/L (ref 3.5–5.3)
POTASSIUM SERPL-SCNC: 3.9 MMOL/L (ref 3.5–5.3)
POTASSIUM SERPL-SCNC: 3.9 MMOL/L (ref 3.5–5.3)
POTASSIUM SERPL-SCNC: 4 MMOL/L (ref 3.5–5.3)
POTASSIUM SERPL-SCNC: 4.1 MMOL/L (ref 3.5–5.3)
POTASSIUM SERPL-SCNC: 4.2 MMOL/L (ref 3.5–5.3)
POTASSIUM SERPL-SCNC: 4.2 MMOL/L (ref 3.5–5.3)
POTASSIUM SERPL-SCNC: 4.3 MMOL/L (ref 3.5–5.3)
POTASSIUM SERPL-SCNC: 4.4 MMOL/L (ref 3.5–5.3)
POTASSIUM SERPL-SCNC: 4.5 MMOL/L (ref 3.5–5.3)
POTASSIUM SERPL-SCNC: 4.6 MMOL/L (ref 3.5–5.3)
POTASSIUM SERPL-SCNC: 4.8 MMOL/L (ref 3.5–5.3)
PR INTERVAL: 116 MS
PR INTERVAL: 118 MS
PR INTERVAL: 128 MS
PROCALCITONIN SERPL-MCNC: 124.41 NG/ML
PROCALCITONIN SERPL-MCNC: 161.23 NG/ML
PROCALCITONIN SERPL-MCNC: 174.63 NG/ML
PROCALCITONIN SERPL-MCNC: 236.32 NG/ML
PROCALCITONIN SERPL-MCNC: 293.05 NG/ML
PROT SERPL-MCNC: 3.7 G/DL (ref 6.4–8.4)
PROT SERPL-MCNC: 5.3 G/DL (ref 6.4–8.4)
PROT SERPL-MCNC: 5.6 G/DL (ref 6.4–8.4)
PROT SERPL-MCNC: 5.9 G/DL (ref 6.4–8.4)
PROT SERPL-MCNC: 6.2 G/DL (ref 6.4–8.4)
PROT SERPL-MCNC: 7 G/DL (ref 6.4–8.2)
PROT SERPL-MCNC: 7.2 G/DL (ref 6.4–8.2)
PROT SERPL-MCNC: 7.6 G/DL (ref 6.4–8.2)
PROT SERPL-MCNC: 7.6 G/DL (ref 6.4–8.2)
PROT SERPL-MCNC: 7.8 G/DL (ref 6.4–8.2)
PROT SERPL-MCNC: 8 G/DL (ref 6.4–8.2)
PROT UR STRIP-MCNC: ABNORMAL MG/DL
PROT UR STRIP-MCNC: ABNORMAL MG/DL
PROT UR STRIP-MCNC: NEGATIVE MG/DL
PROT UR STRIP-MCNC: NEGATIVE MG/DL
PROTHROMBIN TIME: 12.6 SECONDS (ref 11.6–14.5)
PV MEAN GRADIENT: 2 MMHG
PV MEAN VELOCITY: 70 CM/S
PV PEAK GRADIENT: 5 MMHG
PV VTI: 17 CM
QRS AXIS: 66 DEGREES
QRS AXIS: 80 DEGREES
QRS AXIS: 89 DEGREES
QRSD INTERVAL: 74 MS
QRSD INTERVAL: 76 MS
QRSD INTERVAL: 76 MS
QT INTERVAL: 314 MS
QT INTERVAL: 334 MS
QT INTERVAL: 362 MS
QTC INTERVAL: 427 MS
QTC INTERVAL: 447 MS
QTC INTERVAL: 487 MS
RBC # BLD AUTO: 4.4 MILLION/UL (ref 3.88–5.62)
RBC # BLD AUTO: 4.69 MILLION/UL (ref 3.88–5.62)
RBC # BLD AUTO: 4.71 MILLION/UL (ref 3.88–5.62)
RBC # BLD AUTO: 4.77 MILLION/UL (ref 3.88–5.62)
RBC # BLD AUTO: 4.88 MILLION/UL (ref 3.88–5.62)
RBC # BLD AUTO: 4.91 MILLION/UL (ref 3.88–5.62)
RBC # BLD AUTO: 4.95 MILLION/UL (ref 3.88–5.62)
RBC # BLD AUTO: 5.08 MILLION/UL (ref 3.88–5.62)
RBC # BLD AUTO: 5.14 MILLION/UL (ref 3.88–5.62)
RBC # BLD AUTO: 5.15 MILLION/UL (ref 3.88–5.62)
RBC # BLD AUTO: 5.34 MILLION/UL (ref 3.88–5.62)
RBC # BLD AUTO: 5.49 MILLION/UL (ref 3.88–5.62)
RBC # BLD AUTO: 5.64 MILLION/UL (ref 3.88–5.62)
RBC # BLD AUTO: 5.65 MILLION/UL (ref 3.88–5.62)
RBC #/AREA URNS AUTO: ABNORMAL /HPF
RBC #/AREA URNS AUTO: NORMAL /HPF
RBC MORPH BLD: PRESENT
RBC MORPH BLD: PRESENT
RIGHT VENTRICLE ID DIMENSION: 3.4 CM
RSV RNA RESP QL NAA+PROBE: NEGATIVE
S PNEUM AG UR QL: NEGATIVE
S PNEUM AG UR QL: NEGATIVE
SARS-COV-2 RNA RESP QL NAA+PROBE: NEGATIVE
SL AMB  POCT GLUCOSE, UA: NORMAL
SL AMB LEUKOCYTE ESTERASE,UA: NORMAL
SL AMB POCT BILIRUBIN,UA: NORMAL
SL AMB POCT BLOOD,UA: NORMAL
SL AMB POCT CLARITY,UA: NORMAL
SL AMB POCT COLOR,UA: YELLOW
SL AMB POCT HEMOGLOBIN AIC: 7 (ref ?–6.5)
SL AMB POCT KETONES,UA: NORMAL
SL AMB POCT NITRITE,UA: NORMAL
SL AMB POCT PH,UA: 6.5
SL AMB POCT SPECIFIC GRAVITY,UA: 1.01
SL AMB POCT URINE PROTEIN: NORMAL
SL AMB POCT UROBILINOGEN: 0.2
SL CV LV EF: 60
SL CV PED ECHO LEFT VENTRICLE DIASTOLIC VOLUME (MOD BIPLANE) 2D: 116 ML
SL CV PED ECHO LEFT VENTRICLE SYSTOLIC VOLUME (MOD BIPLANE) 2D: 30 ML
SL CV RVOT MAX GRADIENT: 2 MMHG
SL CV RVOT MEAN GRADIENT: 1 MMHG
SL CV RVOT VMEAN: 0.4 M/S
SODIUM SERPL-SCNC: 135 MMOL/L (ref 135–147)
SODIUM SERPL-SCNC: 136 MMOL/L (ref 135–147)
SODIUM SERPL-SCNC: 136 MMOL/L (ref 136–145)
SODIUM SERPL-SCNC: 137 MMOL/L (ref 136–145)
SODIUM SERPL-SCNC: 137 MMOL/L (ref 136–145)
SODIUM SERPL-SCNC: 138 MMOL/L (ref 135–147)
SODIUM SERPL-SCNC: 138 MMOL/L (ref 136–145)
SODIUM SERPL-SCNC: 139 MMOL/L (ref 135–147)
SODIUM SERPL-SCNC: 139 MMOL/L (ref 136–145)
SODIUM SERPL-SCNC: 142 MMOL/L (ref 135–147)
SODIUM SERPL-SCNC: 142 MMOL/L (ref 135–147)
SP GR UR STRIP.AUTO: 1.02 (ref 1–1.03)
SP GR UR STRIP.AUTO: 1.02 (ref 1–1.03)
SP GR UR STRIP.AUTO: >=1.03 (ref 1–1.03)
SP GR UR STRIP.AUTO: >=1.03 (ref 1–1.03)
T WAVE AXIS: -18 DEGREES
T WAVE AXIS: -22 DEGREES
T WAVE AXIS: 33 DEGREES
T3FREE SERPL-MCNC: 2.37 PG/ML (ref 2.3–4.2)
T3FREE SERPL-MCNC: 2.46 PG/ML (ref 2.3–4.2)
T3FREE SERPL-MCNC: 2.7 PG/ML (ref 2.3–4.2)
T3FREE SERPL-MCNC: 2.82 PG/ML (ref 2.3–4.2)
T4 FREE SERPL-MCNC: 0.57 NG/DL (ref 0.76–1.46)
T4 FREE SERPL-MCNC: 1.39 NG/DL (ref 0.76–1.46)
TRIGL SERPL-MCNC: 100 MG/DL
TSH SERPL DL<=0.05 MIU/L-ACNC: 2.5 UIU/ML (ref 0.45–4.5)
TSH SERPL DL<=0.05 MIU/L-ACNC: 3.3 UIU/ML (ref 0.36–3.74)
TSH SERPL DL<=0.05 MIU/L-ACNC: 31 UIU/ML (ref 0.36–3.74)
TSH SERPL DL<=0.05 MIU/L-ACNC: 5.07 UIU/ML (ref 0.36–3.74)
UROBILINOGEN UR QL STRIP.AUTO: 0.2 E.U./DL
UROBILINOGEN UR QL STRIP.AUTO: 0.2 E.U./DL
UROBILINOGEN UR QL STRIP.AUTO: 1 E.U./DL
UROBILINOGEN UR STRIP-ACNC: 4 MG/DL
VANCOMYCIN TROUGH SERPL-MCNC: 12.6 UG/ML (ref 10–20)
VANCOMYCIN TROUGH SERPL-MCNC: 23.7 UG/ML (ref 10–20)
VANCOMYCIN TROUGH SERPL-MCNC: 9.1 UG/ML (ref 10–20)
VENTRICULAR RATE: 108 BPM
VENTRICULAR RATE: 109 BPM
VENTRICULAR RATE: 111 BPM
WBC # BLD AUTO: 10.3 THOUSAND/UL (ref 4.31–10.16)
WBC # BLD AUTO: 10.51 THOUSAND/UL (ref 4.31–10.16)
WBC # BLD AUTO: 10.92 THOUSAND/UL (ref 4.31–10.16)
WBC # BLD AUTO: 11.04 THOUSAND/UL (ref 4.31–10.16)
WBC # BLD AUTO: 11.1 THOUSAND/UL (ref 4.31–10.16)
WBC # BLD AUTO: 12.48 THOUSAND/UL (ref 4.31–10.16)
WBC # BLD AUTO: 12.62 THOUSAND/UL (ref 4.31–10.16)
WBC # BLD AUTO: 12.98 THOUSAND/UL (ref 4.31–10.16)
WBC # BLD AUTO: 7.9 THOUSAND/UL (ref 4.31–10.16)
WBC # BLD AUTO: 8.78 THOUSAND/UL (ref 4.31–10.16)
WBC # BLD AUTO: 9.04 THOUSAND/UL (ref 4.31–10.16)
WBC # BLD AUTO: 9.22 THOUSAND/UL (ref 4.31–10.16)
WBC # BLD AUTO: 9.25 THOUSAND/UL (ref 4.31–10.16)
WBC # BLD AUTO: 9.97 THOUSAND/UL (ref 4.31–10.16)
WBC #/AREA URNS AUTO: ABNORMAL /HPF
WBC #/AREA URNS AUTO: NORMAL /HPF
Z-SCORE OF INTERVENTRICULAR SEPTUM IN END DIASTOLE: 1.06
Z-SCORE OF LEFT VENTRICULAR DIMENSION IN END DIASTOLE: -2.07
Z-SCORE OF LEFT VENTRICULAR DIMENSION IN END SYSTOLE: -2.34
Z-SCORE OF LEFT VENTRICULAR POSTERIOR WALL IN END DIASTOLE: 1.17

## 2022-01-01 PROCEDURE — 99214 OFFICE O/P EST MOD 30 MIN: CPT | Performed by: FAMILY MEDICINE

## 2022-01-01 PROCEDURE — 96417 CHEMO IV INFUS EACH ADDL SEQ: CPT

## 2022-01-01 PROCEDURE — 80053 COMPREHEN METABOLIC PANEL: CPT

## 2022-01-01 PROCEDURE — 94640 AIRWAY INHALATION TREATMENT: CPT

## 2022-01-01 PROCEDURE — 96413 CHEMO IV INFUSION 1 HR: CPT

## 2022-01-01 PROCEDURE — 71275 CT ANGIOGRAPHY CHEST: CPT

## 2022-01-01 PROCEDURE — 96375 TX/PRO/DX INJ NEW DRUG ADDON: CPT

## 2022-01-01 PROCEDURE — 70470 CT HEAD/BRAIN W/O & W/DYE: CPT

## 2022-01-01 PROCEDURE — 99214 OFFICE O/P EST MOD 30 MIN: CPT | Performed by: INTERNAL MEDICINE

## 2022-01-01 PROCEDURE — 84145 PROCALCITONIN (PCT): CPT | Performed by: INTERNAL MEDICINE

## 2022-01-01 PROCEDURE — 93000 ELECTROCARDIOGRAM COMPLETE: CPT | Performed by: FAMILY MEDICINE

## 2022-01-01 PROCEDURE — 82805 BLOOD GASES W/O2 SATURATION: CPT | Performed by: EMERGENCY MEDICINE

## 2022-01-01 PROCEDURE — 85027 COMPLETE CBC AUTOMATED: CPT | Performed by: INTERNAL MEDICINE

## 2022-01-01 PROCEDURE — 87449 NOS EACH ORGANISM AG IA: CPT | Performed by: PHYSICIAN ASSISTANT

## 2022-01-01 PROCEDURE — 81001 URINALYSIS AUTO W/SCOPE: CPT | Performed by: NURSE PRACTITIONER

## 2022-01-01 PROCEDURE — 99239 HOSP IP/OBS DSCHRG MGMT >30: CPT | Performed by: INTERNAL MEDICINE

## 2022-01-01 PROCEDURE — 93010 ELECTROCARDIOGRAM REPORT: CPT | Performed by: INTERNAL MEDICINE

## 2022-01-01 PROCEDURE — 85025 COMPLETE CBC W/AUTO DIFF WBC: CPT | Performed by: INTERNAL MEDICINE

## 2022-01-01 PROCEDURE — 77001 FLUOROGUIDE FOR VEIN DEVICE: CPT

## 2022-01-01 PROCEDURE — 96361 HYDRATE IV INFUSION ADD-ON: CPT

## 2022-01-01 PROCEDURE — 97110 THERAPEUTIC EXERCISES: CPT

## 2022-01-01 PROCEDURE — 80202 ASSAY OF VANCOMYCIN: CPT | Performed by: NURSE PRACTITIONER

## 2022-01-01 PROCEDURE — 85025 COMPLETE CBC W/AUTO DIFF WBC: CPT

## 2022-01-01 PROCEDURE — 94626 PHY/QHP OP PULM RHB W/MNTR: CPT

## 2022-01-01 PROCEDURE — 97530 THERAPEUTIC ACTIVITIES: CPT

## 2022-01-01 PROCEDURE — 99152 MOD SED SAME PHYS/QHP 5/>YRS: CPT | Performed by: RADIOLOGY

## 2022-01-01 PROCEDURE — 36415 COLL VENOUS BLD VENIPUNCTURE: CPT

## 2022-01-01 PROCEDURE — 93306 TTE W/DOPPLER COMPLETE: CPT | Performed by: INTERNAL MEDICINE

## 2022-01-01 PROCEDURE — 84443 ASSAY THYROID STIM HORMONE: CPT

## 2022-01-01 PROCEDURE — 80202 ASSAY OF VANCOMYCIN: CPT | Performed by: PHYSICIAN ASSISTANT

## 2022-01-01 PROCEDURE — 82248 BILIRUBIN DIRECT: CPT | Performed by: INTERNAL MEDICINE

## 2022-01-01 PROCEDURE — 94760 N-INVAS EAR/PLS OXIMETRY 1: CPT

## 2022-01-01 PROCEDURE — 94625 PHY/QHP OP PULM RHB W/O MNTR: CPT

## 2022-01-01 PROCEDURE — 81003 URINALYSIS AUTO W/O SCOPE: CPT | Performed by: PHYSICIAN ASSISTANT

## 2022-01-01 PROCEDURE — 80053 COMPREHEN METABOLIC PANEL: CPT | Performed by: NURSE PRACTITIONER

## 2022-01-01 PROCEDURE — 99285 EMERGENCY DEPT VISIT HI MDM: CPT

## 2022-01-01 PROCEDURE — 87081 CULTURE SCREEN ONLY: CPT | Performed by: INTERNAL MEDICINE

## 2022-01-01 PROCEDURE — 97163 PT EVAL HIGH COMPLEX 45 MIN: CPT

## 2022-01-01 PROCEDURE — 0241U HB NFCT DS VIR RESP RNA 4 TRGT: CPT | Performed by: EMERGENCY MEDICINE

## 2022-01-01 PROCEDURE — 74177 CT ABD & PELVIS W/CONTRAST: CPT

## 2022-01-01 PROCEDURE — 80048 BASIC METABOLIC PNL TOTAL CA: CPT | Performed by: PHYSICIAN ASSISTANT

## 2022-01-01 PROCEDURE — 82948 REAGENT STRIP/BLOOD GLUCOSE: CPT

## 2022-01-01 PROCEDURE — 84145 PROCALCITONIN (PCT): CPT | Performed by: PHYSICIAN ASSISTANT

## 2022-01-01 PROCEDURE — 36561 INSERT TUNNELED CV CATH: CPT

## 2022-01-01 PROCEDURE — 80048 BASIC METABOLIC PNL TOTAL CA: CPT | Performed by: INTERNAL MEDICINE

## 2022-01-01 PROCEDURE — 87040 BLOOD CULTURE FOR BACTERIA: CPT | Performed by: EMERGENCY MEDICINE

## 2022-01-01 PROCEDURE — G1004 CDSM NDSC: HCPCS

## 2022-01-01 PROCEDURE — 83605 ASSAY OF LACTIC ACID: CPT | Performed by: NURSE PRACTITIONER

## 2022-01-01 PROCEDURE — 30901 CONTROL OF NOSEBLEED: CPT

## 2022-01-01 PROCEDURE — 80053 COMPREHEN METABOLIC PANEL: CPT | Performed by: INTERNAL MEDICINE

## 2022-01-01 PROCEDURE — 82105 ALPHA-FETOPROTEIN SERUM: CPT

## 2022-01-01 PROCEDURE — 36415 COLL VENOUS BLD VENIPUNCTURE: CPT | Performed by: INTERNAL MEDICINE

## 2022-01-01 PROCEDURE — C9113 INJ PANTOPRAZOLE SODIUM, VIA: HCPCS | Performed by: NURSE PRACTITIONER

## 2022-01-01 PROCEDURE — 92611 MOTION FLUOROSCOPY/SWALLOW: CPT

## 2022-01-01 PROCEDURE — 74183 MRI ABD W/O CNTR FLWD CNTR: CPT

## 2022-01-01 PROCEDURE — 99233 SBSQ HOSP IP/OBS HIGH 50: CPT | Performed by: INTERNAL MEDICINE

## 2022-01-01 PROCEDURE — 99285 EMERGENCY DEPT VISIT HI MDM: CPT | Performed by: PHYSICIAN ASSISTANT

## 2022-01-01 PROCEDURE — 85730 THROMBOPLASTIN TIME PARTIAL: CPT | Performed by: PHYSICIAN ASSISTANT

## 2022-01-01 PROCEDURE — 81003 URINALYSIS AUTO W/O SCOPE: CPT | Performed by: NURSE PRACTITIONER

## 2022-01-01 PROCEDURE — 84439 ASSAY OF FREE THYROXINE: CPT

## 2022-01-01 PROCEDURE — 87081 CULTURE SCREEN ONLY: CPT | Performed by: PHYSICIAN ASSISTANT

## 2022-01-01 PROCEDURE — 84481 FREE ASSAY (FT-3): CPT

## 2022-01-01 PROCEDURE — 36561 INSERT TUNNELED CV CATH: CPT | Performed by: RADIOLOGY

## 2022-01-01 PROCEDURE — 81001 URINALYSIS AUTO W/SCOPE: CPT

## 2022-01-01 PROCEDURE — 94664 DEMO&/EVAL PT USE INHALER: CPT

## 2022-01-01 PROCEDURE — 83735 ASSAY OF MAGNESIUM: CPT | Performed by: NURSE PRACTITIONER

## 2022-01-01 PROCEDURE — 96360 HYDRATION IV INFUSION INIT: CPT

## 2022-01-01 PROCEDURE — 84145 PROCALCITONIN (PCT): CPT | Performed by: NURSE PRACTITIONER

## 2022-01-01 PROCEDURE — 99223 1ST HOSP IP/OBS HIGH 75: CPT | Performed by: NURSE PRACTITIONER

## 2022-01-01 PROCEDURE — 70450 CT HEAD/BRAIN W/O DYE: CPT

## 2022-01-01 PROCEDURE — 99284 EMERGENCY DEPT VISIT MOD MDM: CPT

## 2022-01-01 PROCEDURE — U0003 INFECTIOUS AGENT DETECTION BY NUCLEIC ACID (DNA OR RNA); SEVERE ACUTE RESPIRATORY SYNDROME CORONAVIRUS 2 (SARS-COV-2) (CORONAVIRUS DISEASE [COVID-19]), AMPLIFIED PROBE TECHNIQUE, MAKING USE OF HIGH THROUGHPUT TECHNOLOGIES AS DESCRIBED BY CMS-2020-01-R: HCPCS | Performed by: INTERNAL MEDICINE

## 2022-01-01 PROCEDURE — C1788 PORT, INDWELLING, IMP: HCPCS

## 2022-01-01 PROCEDURE — 82565 ASSAY OF CREATININE: CPT

## 2022-01-01 PROCEDURE — 93925 LOWER EXTREMITY STUDY: CPT

## 2022-01-01 PROCEDURE — 99215 OFFICE O/P EST HI 40 MIN: CPT | Performed by: SURGERY

## 2022-01-01 PROCEDURE — 85025 COMPLETE CBC W/AUTO DIFF WBC: CPT | Performed by: PHYSICIAN ASSISTANT

## 2022-01-01 PROCEDURE — 93923 UPR/LXTR ART STDY 3+ LVLS: CPT

## 2022-01-01 PROCEDURE — 84484 ASSAY OF TROPONIN QUANT: CPT | Performed by: PHYSICIAN ASSISTANT

## 2022-01-01 PROCEDURE — 71046 X-RAY EXAM CHEST 2 VIEWS: CPT

## 2022-01-01 PROCEDURE — 87040 BLOOD CULTURE FOR BACTERIA: CPT | Performed by: PHYSICIAN ASSISTANT

## 2022-01-01 PROCEDURE — 80076 HEPATIC FUNCTION PANEL: CPT | Performed by: INTERNAL MEDICINE

## 2022-01-01 PROCEDURE — 76937 US GUIDE VASCULAR ACCESS: CPT

## 2022-01-01 PROCEDURE — 84100 ASSAY OF PHOSPHORUS: CPT | Performed by: INTERNAL MEDICINE

## 2022-01-01 PROCEDURE — 36600 WITHDRAWAL OF ARTERIAL BLOOD: CPT

## 2022-01-01 PROCEDURE — 83036 HEMOGLOBIN GLYCOSYLATED A1C: CPT | Performed by: FAMILY MEDICINE

## 2022-01-01 PROCEDURE — 80053 COMPREHEN METABOLIC PANEL: CPT | Performed by: PHYSICIAN ASSISTANT

## 2022-01-01 PROCEDURE — 93925 LOWER EXTREMITY STUDY: CPT | Performed by: SURGERY

## 2022-01-01 PROCEDURE — 99205 OFFICE O/P NEW HI 60 MIN: CPT | Performed by: PHYSICIAN ASSISTANT

## 2022-01-01 PROCEDURE — 80061 LIPID PANEL: CPT

## 2022-01-01 PROCEDURE — 77001 FLUOROGUIDE FOR VEIN DEVICE: CPT | Performed by: RADIOLOGY

## 2022-01-01 PROCEDURE — 85007 BL SMEAR W/DIFF WBC COUNT: CPT | Performed by: PHYSICIAN ASSISTANT

## 2022-01-01 PROCEDURE — 83880 ASSAY OF NATRIURETIC PEPTIDE: CPT | Performed by: INTERNAL MEDICINE

## 2022-01-01 PROCEDURE — 99232 SBSQ HOSP IP/OBS MODERATE 35: CPT | Performed by: INTERNAL MEDICINE

## 2022-01-01 PROCEDURE — 99223 1ST HOSP IP/OBS HIGH 75: CPT | Performed by: HOSPITALIST

## 2022-01-01 PROCEDURE — 0241U HB NFCT DS VIR RESP RNA 4 TRGT: CPT | Performed by: PHYSICIAN ASSISTANT

## 2022-01-01 PROCEDURE — 84100 ASSAY OF PHOSPHORUS: CPT | Performed by: NURSE PRACTITIONER

## 2022-01-01 PROCEDURE — 85025 COMPLETE CBC W/AUTO DIFF WBC: CPT | Performed by: NURSE PRACTITIONER

## 2022-01-01 PROCEDURE — 84520 ASSAY OF UREA NITROGEN: CPT

## 2022-01-01 PROCEDURE — 74230 X-RAY XM SWLNG FUNCJ C+: CPT

## 2022-01-01 PROCEDURE — 84132 ASSAY OF SERUM POTASSIUM: CPT | Performed by: INTERNAL MEDICINE

## 2022-01-01 PROCEDURE — G0427 INPT/ED TELECONSULT70: HCPCS | Performed by: INTERNAL MEDICINE

## 2022-01-01 PROCEDURE — 85027 COMPLETE CBC AUTOMATED: CPT | Performed by: PHYSICIAN ASSISTANT

## 2022-01-01 PROCEDURE — 99153 MOD SED SAME PHYS/QHP EA: CPT

## 2022-01-01 PROCEDURE — 99205 OFFICE O/P NEW HI 60 MIN: CPT | Performed by: INTERNAL MEDICINE

## 2022-01-01 PROCEDURE — G0439 PPPS, SUBSEQ VISIT: HCPCS | Performed by: FAMILY MEDICINE

## 2022-01-01 PROCEDURE — 99152 MOD SED SAME PHYS/QHP 5/>YRS: CPT

## 2022-01-01 PROCEDURE — 97535 SELF CARE MNGMENT TRAINING: CPT

## 2022-01-01 PROCEDURE — 81001 URINALYSIS AUTO W/SCOPE: CPT | Performed by: PHYSICIAN ASSISTANT

## 2022-01-01 PROCEDURE — 99232 SBSQ HOSP IP/OBS MODERATE 35: CPT | Performed by: HOSPITALIST

## 2022-01-01 PROCEDURE — 93922 UPR/L XTREMITY ART 2 LEVELS: CPT | Performed by: SURGERY

## 2022-01-01 PROCEDURE — 96365 THER/PROPH/DIAG IV INF INIT: CPT

## 2022-01-01 PROCEDURE — 36415 COLL VENOUS BLD VENIPUNCTURE: CPT | Performed by: PHYSICIAN ASSISTANT

## 2022-01-01 PROCEDURE — 83735 ASSAY OF MAGNESIUM: CPT | Performed by: INTERNAL MEDICINE

## 2022-01-01 PROCEDURE — 81002 URINALYSIS NONAUTO W/O SCOPE: CPT | Performed by: PHYSICIAN ASSISTANT

## 2022-01-01 PROCEDURE — 96367 TX/PROPH/DG ADDL SEQ IV INF: CPT

## 2022-01-01 PROCEDURE — 83605 ASSAY OF LACTIC ACID: CPT | Performed by: PHYSICIAN ASSISTANT

## 2022-01-01 PROCEDURE — A9585 GADOBUTROL INJECTION: HCPCS | Performed by: SURGERY

## 2022-01-01 PROCEDURE — 83036 HEMOGLOBIN GLYCOSYLATED A1C: CPT

## 2022-01-01 PROCEDURE — 76937 US GUIDE VASCULAR ACCESS: CPT | Performed by: RADIOLOGY

## 2022-01-01 PROCEDURE — 97116 GAIT TRAINING THERAPY: CPT

## 2022-01-01 PROCEDURE — 93005 ELECTROCARDIOGRAM TRACING: CPT

## 2022-01-01 PROCEDURE — 93306 TTE W/DOPPLER COMPLETE: CPT

## 2022-01-01 PROCEDURE — 36415 COLL VENOUS BLD VENIPUNCTURE: CPT | Performed by: EMERGENCY MEDICINE

## 2022-01-01 PROCEDURE — 99239 HOSP IP/OBS DSCHRG MGMT >30: CPT | Performed by: HOSPITALIST

## 2022-01-01 PROCEDURE — 82140 ASSAY OF AMMONIA: CPT | Performed by: INTERNAL MEDICINE

## 2022-01-01 PROCEDURE — 97167 OT EVAL HIGH COMPLEX 60 MIN: CPT

## 2022-01-01 PROCEDURE — 99285 EMERGENCY DEPT VISIT HI MDM: CPT | Performed by: EMERGENCY MEDICINE

## 2022-01-01 PROCEDURE — 99283 EMERGENCY DEPT VISIT LOW MDM: CPT

## 2022-01-01 PROCEDURE — 92610 EVALUATE SWALLOWING FUNCTION: CPT

## 2022-01-01 PROCEDURE — U0005 INFEC AGEN DETEC AMPLI PROBE: HCPCS | Performed by: INTERNAL MEDICINE

## 2022-01-01 PROCEDURE — 99223 1ST HOSP IP/OBS HIGH 75: CPT | Performed by: INTERNAL MEDICINE

## 2022-01-01 PROCEDURE — 84484 ASSAY OF TROPONIN QUANT: CPT | Performed by: INTERNAL MEDICINE

## 2022-01-01 PROCEDURE — 0241U HB NFCT DS VIR RESP RNA 4 TRGT: CPT | Performed by: INTERNAL MEDICINE

## 2022-01-01 PROCEDURE — 74178 CT ABD&PLV WO CNTR FLWD CNTR: CPT

## 2022-01-01 PROCEDURE — 83605 ASSAY OF LACTIC ACID: CPT | Performed by: EMERGENCY MEDICINE

## 2022-01-01 PROCEDURE — 85007 BL SMEAR W/DIFF WBC COUNT: CPT | Performed by: INTERNAL MEDICINE

## 2022-01-01 PROCEDURE — 84484 ASSAY OF TROPONIN QUANT: CPT | Performed by: EMERGENCY MEDICINE

## 2022-01-01 PROCEDURE — 85610 PROTHROMBIN TIME: CPT | Performed by: PHYSICIAN ASSISTANT

## 2022-01-01 PROCEDURE — 99214 OFFICE O/P EST MOD 30 MIN: CPT | Performed by: SURGERY

## 2022-01-01 RX ORDER — LEVOFLOXACIN 750 MG/1
750 TABLET ORAL EVERY 24 HOURS
Qty: 1 TABLET | Refills: 0 | Status: SHIPPED | OUTPATIENT
Start: 2022-01-01 | End: 2022-01-01

## 2022-01-01 RX ORDER — FLUTICASONE FUROATE AND VILANTEROL 100; 25 UG/1; UG/1
1 POWDER RESPIRATORY (INHALATION) DAILY
Status: DISCONTINUED | OUTPATIENT
Start: 2022-01-01 | End: 2022-01-01 | Stop reason: HOSPADM

## 2022-01-01 RX ORDER — ASPIRIN 81 MG/1
81 TABLET, CHEWABLE ORAL DAILY
Status: DISCONTINUED | OUTPATIENT
Start: 2022-01-01 | End: 2022-01-01

## 2022-01-01 RX ORDER — VANCOMYCIN HYDROCHLORIDE 1 G/200ML
12.5 INJECTION, SOLUTION INTRAVENOUS EVERY 12 HOURS
Status: DISCONTINUED | OUTPATIENT
Start: 2022-01-01 | End: 2022-01-01

## 2022-01-01 RX ORDER — INSULIN LISPRO 100 [IU]/ML
1-5 INJECTION, SOLUTION INTRAVENOUS; SUBCUTANEOUS
Status: DISCONTINUED | OUTPATIENT
Start: 2022-01-01 | End: 2022-01-01

## 2022-01-01 RX ORDER — LEVOTHYROXINE SODIUM 0.07 MG/1
75 TABLET ORAL
Status: DISCONTINUED | OUTPATIENT
Start: 2022-01-01 | End: 2022-01-01

## 2022-01-01 RX ORDER — SODIUM CHLORIDE 9 MG/ML
20 INJECTION, SOLUTION INTRAVENOUS ONCE
Status: COMPLETED | OUTPATIENT
Start: 2022-01-01 | End: 2022-01-01

## 2022-01-01 RX ORDER — SODIUM CHLORIDE, SODIUM GLUCONATE, SODIUM ACETATE, POTASSIUM CHLORIDE, MAGNESIUM CHLORIDE, SODIUM PHOSPHATE, DIBASIC, AND POTASSIUM PHOSPHATE .53; .5; .37; .037; .03; .012; .00082 G/100ML; G/100ML; G/100ML; G/100ML; G/100ML; G/100ML; G/100ML
125 INJECTION, SOLUTION INTRAVENOUS CONTINUOUS
Status: DISCONTINUED | OUTPATIENT
Start: 2022-01-01 | End: 2022-01-01

## 2022-01-01 RX ORDER — ASPIRIN 81 MG/1
81 TABLET ORAL DAILY
Status: DISCONTINUED | OUTPATIENT
Start: 2022-01-01 | End: 2022-01-01 | Stop reason: HOSPADM

## 2022-01-01 RX ORDER — SODIUM CHLORIDE 30 MG/ML INHALATION SOLUTION 30 MG/ML
4 SOLUTION INHALANT
Status: DISCONTINUED | OUTPATIENT
Start: 2022-01-01 | End: 2022-01-01

## 2022-01-01 RX ORDER — CEFAZOLIN SODIUM 2 G/50ML
SOLUTION INTRAVENOUS
Status: COMPLETED
Start: 2022-01-01 | End: 2022-01-01

## 2022-01-01 RX ORDER — TAMSULOSIN HYDROCHLORIDE 0.4 MG/1
0.4 CAPSULE ORAL
Status: DISCONTINUED | OUTPATIENT
Start: 2022-01-01 | End: 2022-01-01

## 2022-01-01 RX ORDER — FLUTICASONE FUROATE AND VILANTEROL TRIFENATATE 100; 25 UG/1; UG/1
1 POWDER RESPIRATORY (INHALATION) DAILY
Qty: 60 BLISTER | Refills: 0 | Status: SHIPPED | OUTPATIENT
Start: 2022-01-01 | End: 2022-01-01

## 2022-01-01 RX ORDER — SODIUM CHLORIDE 9 MG/ML
20 INJECTION, SOLUTION INTRAVENOUS ONCE
Status: CANCELLED | OUTPATIENT
Start: 2022-01-01

## 2022-01-01 RX ORDER — LIDOCAINE HYDROCHLORIDE AND EPINEPHRINE 10; 10 MG/ML; UG/ML
INJECTION, SOLUTION INFILTRATION; PERINEURAL CODE/TRAUMA/SEDATION MEDICATION
Status: COMPLETED | OUTPATIENT
Start: 2022-01-01 | End: 2022-01-01

## 2022-01-01 RX ORDER — ALLOPURINOL 100 MG/1
300 TABLET ORAL DAILY
Status: DISCONTINUED | OUTPATIENT
Start: 2022-01-01 | End: 2022-01-01

## 2022-01-01 RX ORDER — MELATONIN
1000 DAILY
Status: DISCONTINUED | OUTPATIENT
Start: 2022-01-01 | End: 2022-01-01

## 2022-01-01 RX ORDER — LIDOCAINE WITH 8.4% SOD BICARB 0.9%(10ML)
SYRINGE (ML) INJECTION CODE/TRAUMA/SEDATION MEDICATION
Status: COMPLETED | OUTPATIENT
Start: 2022-01-01 | End: 2022-01-01

## 2022-01-01 RX ORDER — AZITHROMYCIN 250 MG/1
500 TABLET, FILM COATED ORAL EVERY 24 HOURS
Status: DISCONTINUED | OUTPATIENT
Start: 2022-01-01 | End: 2022-01-01

## 2022-01-01 RX ORDER — LORAZEPAM 2 MG/ML
2 INJECTION INTRAMUSCULAR
Status: DISCONTINUED | OUTPATIENT
Start: 2022-01-01 | End: 2022-01-01 | Stop reason: HOSPADM

## 2022-01-01 RX ORDER — CEFEPIME HYDROCHLORIDE 1 G/50ML
1000 INJECTION, SOLUTION INTRAVENOUS EVERY 12 HOURS
Status: DISCONTINUED | OUTPATIENT
Start: 2022-01-01 | End: 2022-01-01

## 2022-01-01 RX ORDER — CEFTRIAXONE 1 G/50ML
1000 INJECTION, SOLUTION INTRAVENOUS ONCE
Status: COMPLETED | OUTPATIENT
Start: 2022-01-01 | End: 2022-01-01

## 2022-01-01 RX ORDER — CEFEPIME HYDROCHLORIDE 1 G/1
1000 INJECTION, POWDER, FOR SOLUTION INTRAMUSCULAR; INTRAVENOUS EVERY 12 HOURS
Status: DISCONTINUED | OUTPATIENT
Start: 2022-01-01 | End: 2022-01-01 | Stop reason: RX

## 2022-01-01 RX ORDER — CEFEPIME HYDROCHLORIDE 2 G/50ML
2000 INJECTION, SOLUTION INTRAVENOUS EVERY 8 HOURS
Status: DISCONTINUED | OUTPATIENT
Start: 2022-01-01 | End: 2022-01-01

## 2022-01-01 RX ORDER — PANTOPRAZOLE SODIUM 40 MG/1
40 INJECTION, POWDER, FOR SOLUTION INTRAVENOUS
Status: DISCONTINUED | OUTPATIENT
Start: 2022-01-01 | End: 2022-01-01

## 2022-01-01 RX ORDER — NYSTATIN 100000 [USP'U]/G
POWDER TOPICAL 2 TIMES DAILY
Status: DISCONTINUED | OUTPATIENT
Start: 2022-01-01 | End: 2022-01-01 | Stop reason: HOSPADM

## 2022-01-01 RX ORDER — ACETAMINOPHEN 325 MG/1
650 TABLET ORAL EVERY 6 HOURS PRN
Status: DISCONTINUED | OUTPATIENT
Start: 2022-01-01 | End: 2022-01-01

## 2022-01-01 RX ORDER — CEFEPIME HYDROCHLORIDE 2 G/50ML
2000 INJECTION, SOLUTION INTRAVENOUS ONCE
Status: COMPLETED | OUTPATIENT
Start: 2022-01-01 | End: 2022-01-01

## 2022-01-01 RX ORDER — VANCOMYCIN HYDROCHLORIDE 1 G/200ML
15 INJECTION, SOLUTION INTRAVENOUS EVERY 24 HOURS
Status: DISCONTINUED | OUTPATIENT
Start: 2022-01-01 | End: 2022-01-01

## 2022-01-01 RX ORDER — INSULIN LISPRO 100 [IU]/ML
1-6 INJECTION, SOLUTION INTRAVENOUS; SUBCUTANEOUS
Status: DISCONTINUED | OUTPATIENT
Start: 2022-01-01 | End: 2022-01-01 | Stop reason: HOSPADM

## 2022-01-01 RX ORDER — POTASSIUM CHLORIDE 14.9 MG/ML
20 INJECTION INTRAVENOUS
Status: COMPLETED | OUTPATIENT
Start: 2022-01-01 | End: 2022-01-01

## 2022-01-01 RX ORDER — FLUTICASONE FUROATE AND VILANTEROL 100; 25 UG/1; UG/1
1 POWDER RESPIRATORY (INHALATION) DAILY
Status: DISCONTINUED | OUTPATIENT
Start: 2022-01-01 | End: 2022-01-01

## 2022-01-01 RX ORDER — HEPARIN SODIUM 5000 [USP'U]/ML
5000 INJECTION, SOLUTION INTRAVENOUS; SUBCUTANEOUS EVERY 8 HOURS SCHEDULED
Status: DISCONTINUED | OUTPATIENT
Start: 2022-01-01 | End: 2022-01-01 | Stop reason: HOSPADM

## 2022-01-01 RX ORDER — ATORVASTATIN CALCIUM 40 MG/1
40 TABLET, FILM COATED ORAL DAILY
Status: DISCONTINUED | OUTPATIENT
Start: 2022-01-01 | End: 2022-01-01

## 2022-01-01 RX ORDER — GUAIFENESIN/DEXTROMETHORPHAN 100-10MG/5
10 SYRUP ORAL EVERY 8 HOURS PRN
Status: DISCONTINUED | OUTPATIENT
Start: 2022-01-01 | End: 2022-01-01

## 2022-01-01 RX ORDER — LEVOFLOXACIN 750 MG/1
750 TABLET ORAL EVERY 24 HOURS
Status: DISCONTINUED | OUTPATIENT
Start: 2022-01-01 | End: 2022-01-01 | Stop reason: HOSPADM

## 2022-01-01 RX ORDER — ONDANSETRON 2 MG/ML
4 INJECTION INTRAMUSCULAR; INTRAVENOUS EVERY 6 HOURS PRN
Status: DISCONTINUED | OUTPATIENT
Start: 2022-01-01 | End: 2022-01-01 | Stop reason: HOSPADM

## 2022-01-01 RX ORDER — PANTOPRAZOLE SODIUM 40 MG/1
40 TABLET, DELAYED RELEASE ORAL
Status: DISCONTINUED | OUTPATIENT
Start: 2022-01-01 | End: 2022-01-01

## 2022-01-01 RX ORDER — ONDANSETRON 2 MG/ML
4 INJECTION INTRAMUSCULAR; INTRAVENOUS EVERY 6 HOURS PRN
Status: DISCONTINUED | OUTPATIENT
Start: 2022-01-01 | End: 2022-01-01

## 2022-01-01 RX ORDER — HYDROCODONE POLISTIREX AND CHLORPHENIRAMINE POLISTIREX 10; 8 MG/5ML; MG/5ML
5 SUSPENSION, EXTENDED RELEASE ORAL EVERY 12 HOURS PRN
Status: DISCONTINUED | OUTPATIENT
Start: 2022-01-01 | End: 2022-01-01

## 2022-01-01 RX ORDER — LOPERAMIDE HYDROCHLORIDE 2 MG/1
2 CAPSULE ORAL 4 TIMES DAILY PRN
Status: DISCONTINUED | OUTPATIENT
Start: 2022-01-01 | End: 2022-01-01 | Stop reason: HOSPADM

## 2022-01-01 RX ORDER — SCOLOPAMINE TRANSDERMAL SYSTEM 1 MG/1
1 PATCH, EXTENDED RELEASE TRANSDERMAL
Status: DISCONTINUED | OUTPATIENT
Start: 2022-01-01 | End: 2022-01-01 | Stop reason: HOSPADM

## 2022-01-01 RX ORDER — DEXTROMETHORPHAN HYDROBROMIDE AND PROMETHAZINE HYDROCHLORIDE 15; 6.25 MG/5ML; MG/5ML
10 SOLUTION ORAL 3 TIMES DAILY PRN
Qty: 240 ML | Refills: 1 | Status: SHIPPED | OUTPATIENT
Start: 2022-01-01 | End: 2022-01-01

## 2022-01-01 RX ORDER — CHLORAL HYDRATE 500 MG
1000 CAPSULE ORAL DAILY
Status: DISCONTINUED | OUTPATIENT
Start: 2022-01-01 | End: 2022-01-01

## 2022-01-01 RX ORDER — ALLOPURINOL 100 MG/1
300 TABLET ORAL DAILY
Status: DISCONTINUED | OUTPATIENT
Start: 2022-01-01 | End: 2022-01-01 | Stop reason: HOSPADM

## 2022-01-01 RX ORDER — TAMSULOSIN HYDROCHLORIDE 0.4 MG/1
0.4 CAPSULE ORAL
Status: DISCONTINUED | OUTPATIENT
Start: 2022-01-01 | End: 2022-01-01 | Stop reason: HOSPADM

## 2022-01-01 RX ORDER — LORAZEPAM 2 MG/ML
2 CONCENTRATE ORAL EVERY 4 HOURS PRN
Status: DISCONTINUED | OUTPATIENT
Start: 2022-01-01 | End: 2022-01-01

## 2022-01-01 RX ORDER — ALBUTEROL SULFATE 2.5 MG/3ML
5 SOLUTION RESPIRATORY (INHALATION) ONCE
Status: COMPLETED | OUTPATIENT
Start: 2022-01-01 | End: 2022-01-01

## 2022-01-01 RX ORDER — INSULIN LISPRO 100 [IU]/ML
1-5 INJECTION, SOLUTION INTRAVENOUS; SUBCUTANEOUS
Status: DISCONTINUED | OUTPATIENT
Start: 2022-01-01 | End: 2022-01-01 | Stop reason: HOSPADM

## 2022-01-01 RX ORDER — ASPIRIN 81 MG/1
81 TABLET, CHEWABLE ORAL DAILY
Qty: 30 TABLET | Refills: 0
Start: 2022-01-01 | End: 2022-01-01

## 2022-01-01 RX ORDER — MIDAZOLAM HYDROCHLORIDE 2 MG/2ML
INJECTION, SOLUTION INTRAMUSCULAR; INTRAVENOUS CODE/TRAUMA/SEDATION MEDICATION
Status: COMPLETED | OUTPATIENT
Start: 2022-01-01 | End: 2022-01-01

## 2022-01-01 RX ORDER — ONDANSETRON 4 MG/1
4 TABLET, FILM COATED ORAL EVERY 8 HOURS PRN
Qty: 20 TABLET | Refills: 1 | Status: SHIPPED | OUTPATIENT
Start: 2022-01-01 | End: 2022-01-01

## 2022-01-01 RX ORDER — FENTANYL CITRATE 50 UG/ML
INJECTION, SOLUTION INTRAMUSCULAR; INTRAVENOUS CODE/TRAUMA/SEDATION MEDICATION
Status: COMPLETED | OUTPATIENT
Start: 2022-01-01 | End: 2022-01-01

## 2022-01-01 RX ORDER — POTASSIUM CHLORIDE 29.8 MG/ML
40 INJECTION INTRAVENOUS ONCE
Status: DISCONTINUED | OUTPATIENT
Start: 2022-01-01 | End: 2022-01-01

## 2022-01-01 RX ORDER — CEFEPIME HYDROCHLORIDE 2 G/1
2000 INJECTION, POWDER, FOR SOLUTION INTRAVENOUS EVERY 12 HOURS
Status: DISCONTINUED | OUTPATIENT
Start: 2022-01-01 | End: 2022-01-01

## 2022-01-01 RX ORDER — FLUTICASONE PROPIONATE 50 MCG
2 SPRAY, SUSPENSION (ML) NASAL DAILY
Qty: 16 G | Refills: 5 | Status: SHIPPED | OUTPATIENT
Start: 2022-01-01 | End: 2022-01-01

## 2022-01-01 RX ORDER — CEFEPIME HYDROCHLORIDE 1 G/50ML
1000 INJECTION, SOLUTION INTRAVENOUS ONCE
Status: COMPLETED | OUTPATIENT
Start: 2022-01-01 | End: 2022-01-01

## 2022-01-01 RX ORDER — LEVOTHYROXINE SODIUM 0.07 MG/1
75 TABLET ORAL
Status: DISCONTINUED | OUTPATIENT
Start: 2022-01-01 | End: 2022-01-01 | Stop reason: HOSPADM

## 2022-01-01 RX ORDER — LEVOTHYROXINE SODIUM 0.07 MG/1
75 TABLET ORAL
Qty: 30 TABLET | Refills: 5 | Status: SHIPPED | OUTPATIENT
Start: 2022-01-01 | End: 2022-01-01

## 2022-01-01 RX ORDER — FLUTICASONE PROPIONATE 50 MCG
2 SPRAY, SUSPENSION (ML) NASAL DAILY
Status: DISCONTINUED | OUTPATIENT
Start: 2022-01-01 | End: 2022-01-01

## 2022-01-01 RX ORDER — INSULIN LISPRO 100 [IU]/ML
1-6 INJECTION, SOLUTION INTRAVENOUS; SUBCUTANEOUS
Status: DISCONTINUED | OUTPATIENT
Start: 2022-01-01 | End: 2022-01-01

## 2022-01-01 RX ORDER — BUDESONIDE 0.5 MG/2ML
0.5 INHALANT ORAL
Status: DISCONTINUED | OUTPATIENT
Start: 2022-01-01 | End: 2022-01-01 | Stop reason: HOSPADM

## 2022-01-01 RX ORDER — TAMSULOSIN HYDROCHLORIDE 0.4 MG/1
0.4 CAPSULE ORAL
Qty: 30 CAPSULE | Refills: 3 | Status: SHIPPED | OUTPATIENT
Start: 2022-01-01 | End: 2022-01-01

## 2022-01-01 RX ORDER — MAGNESIUM SULFATE HEPTAHYDRATE 40 MG/ML
2 INJECTION, SOLUTION INTRAVENOUS ONCE
Status: COMPLETED | OUTPATIENT
Start: 2022-01-01 | End: 2022-01-01

## 2022-01-01 RX ORDER — CEFEPIME HYDROCHLORIDE 2 G/50ML
2000 INJECTION, SOLUTION INTRAVENOUS EVERY 12 HOURS
Status: DISCONTINUED | OUTPATIENT
Start: 2022-01-01 | End: 2022-01-01

## 2022-01-01 RX ORDER — INSULIN LISPRO 100 [IU]/ML
1-6 INJECTION, SOLUTION INTRAVENOUS; SUBCUTANEOUS EVERY 6 HOURS SCHEDULED
Status: DISCONTINUED | OUTPATIENT
Start: 2022-01-01 | End: 2022-01-01

## 2022-01-01 RX ORDER — LEVALBUTEROL 1.25 MG/.5ML
1.25 SOLUTION, CONCENTRATE RESPIRATORY (INHALATION)
Status: DISCONTINUED | OUTPATIENT
Start: 2022-01-01 | End: 2022-01-01

## 2022-01-01 RX ORDER — LEVALBUTEROL 1.25 MG/.5ML
1.25 SOLUTION, CONCENTRATE RESPIRATORY (INHALATION)
Status: DISCONTINUED | OUTPATIENT
Start: 2022-01-01 | End: 2022-01-01 | Stop reason: HOSPADM

## 2022-01-01 RX ORDER — FLUTICASONE PROPIONATE 50 MCG
2 SPRAY, SUSPENSION (ML) NASAL DAILY
Status: DISCONTINUED | OUTPATIENT
Start: 2022-01-01 | End: 2022-01-01 | Stop reason: HOSPADM

## 2022-01-01 RX ORDER — SODIUM CHLORIDE FOR INHALATION 0.9 %
3 VIAL, NEBULIZER (ML) INHALATION
Status: DISCONTINUED | OUTPATIENT
Start: 2022-01-01 | End: 2022-01-01

## 2022-01-01 RX ORDER — SODIUM CHLORIDE 9 MG/ML
75 INJECTION, SOLUTION INTRAVENOUS CONTINUOUS
Status: DISCONTINUED | OUTPATIENT
Start: 2022-01-01 | End: 2022-01-01 | Stop reason: HOSPADM

## 2022-01-01 RX ORDER — POTASSIUM CHLORIDE 20 MEQ/1
40 TABLET, EXTENDED RELEASE ORAL ONCE
Status: COMPLETED | OUTPATIENT
Start: 2022-01-01 | End: 2022-01-01

## 2022-01-01 RX ORDER — PANTOPRAZOLE SODIUM 40 MG/1
40 TABLET, DELAYED RELEASE ORAL
Status: DISCONTINUED | OUTPATIENT
Start: 2022-01-01 | End: 2022-01-01 | Stop reason: HOSPADM

## 2022-01-01 RX ORDER — OXYMETAZOLINE HYDROCHLORIDE 0.05 G/100ML
2 SPRAY NASAL ONCE
Status: COMPLETED | OUTPATIENT
Start: 2022-01-01 | End: 2022-01-01

## 2022-01-01 RX ORDER — CEFAZOLIN SODIUM 2 G/50ML
2000 SOLUTION INTRAVENOUS ONCE
Status: COMPLETED | OUTPATIENT
Start: 2022-01-01 | End: 2022-01-01

## 2022-01-01 RX ORDER — SODIUM CHLORIDE, SODIUM GLUCONATE, SODIUM ACETATE, POTASSIUM CHLORIDE, MAGNESIUM CHLORIDE, SODIUM PHOSPHATE, DIBASIC, AND POTASSIUM PHOSPHATE .53; .5; .37; .037; .03; .012; .00082 G/100ML; G/100ML; G/100ML; G/100ML; G/100ML; G/100ML; G/100ML
150 INJECTION, SOLUTION INTRAVENOUS CONTINUOUS
Status: DISCONTINUED | OUTPATIENT
Start: 2022-01-01 | End: 2022-01-01

## 2022-01-01 RX ORDER — METOPROLOL TARTRATE 5 MG/5ML
5 INJECTION INTRAVENOUS EVERY 6 HOURS PRN
Status: DISCONTINUED | OUTPATIENT
Start: 2022-01-01 | End: 2022-01-01

## 2022-01-01 RX ORDER — CEFTRIAXONE 1 G/50ML
1000 INJECTION, SOLUTION INTRAVENOUS EVERY 24 HOURS
Status: DISCONTINUED | OUTPATIENT
Start: 2022-01-01 | End: 2022-01-01

## 2022-01-01 RX ORDER — MORPHINE SULFATE 100 MG/5ML
5 SOLUTION, CONCENTRATE ORAL EVERY 4 HOURS PRN
Status: DISCONTINUED | OUTPATIENT
Start: 2022-01-01 | End: 2022-01-01

## 2022-01-01 RX ADMIN — CEFTRIAXONE 1000 MG: 1 INJECTION, SOLUTION INTRAVENOUS at 16:28

## 2022-01-01 RX ADMIN — FLUTICASONE PROPIONATE 2 SPRAY: 50 SPRAY, METERED NASAL at 08:00

## 2022-01-01 RX ADMIN — LEVALBUTEROL HYDROCHLORIDE 1.25 MG: 1.25 SOLUTION, CONCENTRATE RESPIRATORY (INHALATION) at 20:17

## 2022-01-01 RX ADMIN — NYSTATIN 1 APPLICATION: 100000 POWDER TOPICAL at 08:35

## 2022-01-01 RX ADMIN — PANTOPRAZOLE SODIUM 40 MG: 40 TABLET, DELAYED RELEASE ORAL at 05:40

## 2022-01-01 RX ADMIN — OXYMETAZOLINE HYDROCHLORIDE 2 SPRAY: 0.05 SPRAY NASAL at 11:35

## 2022-01-01 RX ADMIN — INSULIN LISPRO 1 UNITS: 100 INJECTION, SOLUTION INTRAVENOUS; SUBCUTANEOUS at 23:24

## 2022-01-01 RX ADMIN — FLUTICASONE FUROATE AND VILANTEROL TRIFENATATE 1 PUFF: 100; 25 POWDER RESPIRATORY (INHALATION) at 08:12

## 2022-01-01 RX ADMIN — BEVACIZUMAB 1300 MG: 400 INJECTION, SOLUTION INTRAVENOUS at 13:58

## 2022-01-01 RX ADMIN — ISODIUM CHLORIDE 3 ML: 0.03 SOLUTION RESPIRATORY (INHALATION) at 07:17

## 2022-01-01 RX ADMIN — AZITHROMYCIN MONOHYDRATE 500 MG: 250 TABLET ORAL at 23:27

## 2022-01-01 RX ADMIN — POTASSIUM CHLORIDE 40 MEQ: 1500 TABLET, EXTENDED RELEASE ORAL at 08:38

## 2022-01-01 RX ADMIN — LEVALBUTEROL HYDROCHLORIDE 1.25 MG: 1.25 SOLUTION, CONCENTRATE RESPIRATORY (INHALATION) at 07:25

## 2022-01-01 RX ADMIN — INSULIN LISPRO 1 UNITS: 100 INJECTION, SOLUTION INTRAVENOUS; SUBCUTANEOUS at 01:50

## 2022-01-01 RX ADMIN — IPRATROPIUM BROMIDE 0.5 MG: 0.5 SOLUTION RESPIRATORY (INHALATION) at 13:19

## 2022-01-01 RX ADMIN — MORPHINE SULFATE 2 MG: 2 INJECTION, SOLUTION INTRAMUSCULAR; INTRAVENOUS at 01:28

## 2022-01-01 RX ADMIN — INSULIN LISPRO 1 UNITS: 100 INJECTION, SOLUTION INTRAVENOUS; SUBCUTANEOUS at 23:11

## 2022-01-01 RX ADMIN — SODIUM CHLORIDE 500 ML: 0.9 INJECTION, SOLUTION INTRAVENOUS at 22:46

## 2022-01-01 RX ADMIN — SODIUM CHLORIDE 20 ML/HR: 0.9 INJECTION, SOLUTION INTRAVENOUS at 11:13

## 2022-01-01 RX ADMIN — ASPIRIN 81 MG: 81 TABLET, COATED ORAL at 08:12

## 2022-01-01 RX ADMIN — HEPARIN SODIUM 5000 UNITS: 5000 INJECTION INTRAVENOUS; SUBCUTANEOUS at 21:14

## 2022-01-01 RX ADMIN — GADOBUTROL 8 ML: 604.72 INJECTION INTRAVENOUS at 17:42

## 2022-01-01 RX ADMIN — PANTOPRAZOLE SODIUM 40 MG: 40 INJECTION, POWDER, FOR SOLUTION INTRAVENOUS at 21:28

## 2022-01-01 RX ADMIN — VANCOMYCIN HYDROCHLORIDE 1500 MG: 1 INJECTION, POWDER, LYOPHILIZED, FOR SOLUTION INTRAVENOUS at 18:33

## 2022-01-01 RX ADMIN — IPRATROPIUM BROMIDE 0.5 MG: 0.5 SOLUTION RESPIRATORY (INHALATION) at 16:05

## 2022-01-01 RX ADMIN — SODIUM CHLORIDE 20 ML/HR: 0.9 INJECTION, SOLUTION INTRAVENOUS at 11:59

## 2022-01-01 RX ADMIN — ALLOPURINOL 300 MG: 100 TABLET ORAL at 08:02

## 2022-01-01 RX ADMIN — ATEZOLIZUMAB 1200 MG: 1200 INJECTION, SOLUTION INTRAVENOUS at 11:14

## 2022-01-01 RX ADMIN — CEFEPIME HYDROCHLORIDE 1000 MG: 1 INJECTION, SOLUTION INTRAVENOUS at 20:14

## 2022-01-01 RX ADMIN — Medication 12.5 MG: at 21:40

## 2022-01-01 RX ADMIN — FLUTICASONE PROPIONATE 2 SPRAY: 50 SPRAY, METERED NASAL at 08:21

## 2022-01-01 RX ADMIN — TAMSULOSIN HYDROCHLORIDE 0.4 MG: 0.4 CAPSULE ORAL at 16:00

## 2022-01-01 RX ADMIN — IPRATROPIUM BROMIDE 0.5 MG: 0.5 SOLUTION RESPIRATORY (INHALATION) at 13:00

## 2022-01-01 RX ADMIN — ASPIRIN 81 MG: 81 TABLET, COATED ORAL at 08:21

## 2022-01-01 RX ADMIN — SODIUM CHLORIDE 75 ML/HR: 0.9 INJECTION, SOLUTION INTRAVENOUS at 09:09

## 2022-01-01 RX ADMIN — HEPARIN SODIUM 5000 UNITS: 5000 INJECTION INTRAVENOUS; SUBCUTANEOUS at 05:18

## 2022-01-01 RX ADMIN — HEPARIN SODIUM 5000 UNITS: 5000 INJECTION INTRAVENOUS; SUBCUTANEOUS at 21:10

## 2022-01-01 RX ADMIN — IPRATROPIUM BROMIDE 0.5 MG: 0.5 SOLUTION RESPIRATORY (INHALATION) at 07:40

## 2022-01-01 RX ADMIN — IPRATROPIUM BROMIDE 0.5 MG: 0.5 SOLUTION RESPIRATORY (INHALATION) at 20:36

## 2022-01-01 RX ADMIN — TAMSULOSIN HYDROCHLORIDE 0.4 MG: 0.4 CAPSULE ORAL at 16:13

## 2022-01-01 RX ADMIN — INSULIN LISPRO 3 UNITS: 100 INJECTION, SOLUTION INTRAVENOUS; SUBCUTANEOUS at 18:04

## 2022-01-01 RX ADMIN — SODIUM CHLORIDE, SODIUM GLUCONATE, SODIUM ACETATE, POTASSIUM CHLORIDE, MAGNESIUM CHLORIDE, SODIUM PHOSPHATE, DIBASIC, AND POTASSIUM PHOSPHATE 150 ML/HR: .53; .5; .37; .037; .03; .012; .00082 INJECTION, SOLUTION INTRAVENOUS at 06:24

## 2022-01-01 RX ADMIN — VANCOMYCIN HYDROCHLORIDE 1500 MG: 1 INJECTION, POWDER, LYOPHILIZED, FOR SOLUTION INTRAVENOUS at 22:16

## 2022-01-01 RX ADMIN — ATEZOLIZUMAB 1200 MG: 1200 INJECTION, SOLUTION INTRAVENOUS at 12:50

## 2022-01-01 RX ADMIN — BEVACIZUMAB 1300 MG: 400 INJECTION, SOLUTION INTRAVENOUS at 13:18

## 2022-01-01 RX ADMIN — MORPHINE SULFATE 2 MG: 2 INJECTION, SOLUTION INTRAMUSCULAR; INTRAVENOUS at 05:27

## 2022-01-01 RX ADMIN — HEPARIN SODIUM 5000 UNITS: 5000 INJECTION INTRAVENOUS; SUBCUTANEOUS at 06:58

## 2022-01-01 RX ADMIN — MORPHINE SULFATE 2 MG: 2 INJECTION, SOLUTION INTRAMUSCULAR; INTRAVENOUS at 16:16

## 2022-01-01 RX ADMIN — INSULIN LISPRO 1 UNITS: 100 INJECTION, SOLUTION INTRAVENOUS; SUBCUTANEOUS at 23:45

## 2022-01-01 RX ADMIN — LEVALBUTEROL HYDROCHLORIDE 1.25 MG: 1.25 SOLUTION, CONCENTRATE RESPIRATORY (INHALATION) at 07:32

## 2022-01-01 RX ADMIN — MAGNESIUM SULFATE HEPTAHYDRATE 2 G: 40 INJECTION, SOLUTION INTRAVENOUS at 07:03

## 2022-01-01 RX ADMIN — IOHEXOL 75 ML: 350 INJECTION, SOLUTION INTRAVENOUS at 16:45

## 2022-01-01 RX ADMIN — LEVOFLOXACIN 750 MG: 750 TABLET, FILM COATED ORAL at 07:24

## 2022-01-01 RX ADMIN — ATEZOLIZUMAB 1200 MG: 1200 INJECTION, SOLUTION INTRAVENOUS at 12:15

## 2022-01-01 RX ADMIN — MIDAZOLAM HYDROCHLORIDE 1 MG: 1 INJECTION, SOLUTION INTRAMUSCULAR; INTRAVENOUS at 10:12

## 2022-01-01 RX ADMIN — CEFEPIME HYDROCHLORIDE 1000 MG: 1 INJECTION, SOLUTION INTRAVENOUS at 18:40

## 2022-01-01 RX ADMIN — LEVOTHYROXINE SODIUM 75 MCG: 75 TABLET ORAL at 06:17

## 2022-01-01 RX ADMIN — HEPARIN SODIUM 5000 UNITS: 5000 INJECTION INTRAVENOUS; SUBCUTANEOUS at 13:21

## 2022-01-01 RX ADMIN — NYSTATIN 1 APPLICATION: 100000 POWDER TOPICAL at 18:27

## 2022-01-01 RX ADMIN — CEFAZOLIN SODIUM 2000 MG: 2 SOLUTION INTRAVENOUS at 09:58

## 2022-01-01 RX ADMIN — Medication 10 ML: at 10:33

## 2022-01-01 RX ADMIN — IPRATROPIUM BROMIDE 0.5 MG: 0.5 SOLUTION RESPIRATORY (INHALATION) at 20:17

## 2022-01-01 RX ADMIN — LEVOTHYROXINE SODIUM 75 MCG: 75 TABLET ORAL at 08:34

## 2022-01-01 RX ADMIN — BUDESONIDE 0.5 MG: 0.5 INHALANT ORAL at 07:14

## 2022-01-01 RX ADMIN — SODIUM CHLORIDE, SODIUM GLUCONATE, SODIUM ACETATE, POTASSIUM CHLORIDE, MAGNESIUM CHLORIDE, SODIUM PHOSPHATE, DIBASIC, AND POTASSIUM PHOSPHATE 125 ML/HR: .53; .5; .37; .037; .03; .012; .00082 INJECTION, SOLUTION INTRAVENOUS at 21:22

## 2022-01-01 RX ADMIN — BUDESONIDE 0.5 MG: 0.5 INHALANT ORAL at 07:25

## 2022-01-01 RX ADMIN — SCOPALAMINE 1 PATCH: 1 PATCH, EXTENDED RELEASE TRANSDERMAL at 20:47

## 2022-01-01 RX ADMIN — IPRATROPIUM BROMIDE 0.5 MG: 0.5 SOLUTION RESPIRATORY (INHALATION) at 07:29

## 2022-01-01 RX ADMIN — SODIUM CHLORIDE SOLN NEBU 3% 4 ML: 3 NEBU SOLN at 07:40

## 2022-01-01 RX ADMIN — VANCOMYCIN HYDROCHLORIDE 1500 MG: 1 INJECTION, POWDER, LYOPHILIZED, FOR SOLUTION INTRAVENOUS at 05:40

## 2022-01-01 RX ADMIN — METOPROLOL TARTRATE 25 MG: 25 TABLET, FILM COATED ORAL at 08:21

## 2022-01-01 RX ADMIN — LORAZEPAM 2 MG: 2 INJECTION INTRAMUSCULAR; INTRAVENOUS at 02:32

## 2022-01-01 RX ADMIN — MIDAZOLAM HYDROCHLORIDE 1 MG: 1 INJECTION, SOLUTION INTRAMUSCULAR; INTRAVENOUS at 10:28

## 2022-01-01 RX ADMIN — ALLOPURINOL 300 MG: 100 TABLET ORAL at 08:21

## 2022-01-01 RX ADMIN — ASPIRIN 81 MG: 81 TABLET, COATED ORAL at 08:00

## 2022-01-01 RX ADMIN — LEVOTHYROXINE SODIUM 75 MCG: 75 TABLET ORAL at 06:58

## 2022-01-01 RX ADMIN — ALLOPURINOL 300 MG: 100 TABLET ORAL at 08:00

## 2022-01-01 RX ADMIN — LEVALBUTEROL HYDROCHLORIDE 1.25 MG: 1.25 SOLUTION, CONCENTRATE RESPIRATORY (INHALATION) at 13:14

## 2022-01-01 RX ADMIN — IPRATROPIUM BROMIDE 0.5 MG: 0.5 SOLUTION RESPIRATORY (INHALATION) at 14:39

## 2022-01-01 RX ADMIN — SODIUM CHLORIDE, SODIUM GLUCONATE, SODIUM ACETATE, POTASSIUM CHLORIDE, MAGNESIUM CHLORIDE, SODIUM PHOSPHATE, DIBASIC, AND POTASSIUM PHOSPHATE 125 ML/HR: .53; .5; .37; .037; .03; .012; .00082 INJECTION, SOLUTION INTRAVENOUS at 07:28

## 2022-01-01 RX ADMIN — BUDESONIDE 0.5 MG: 0.5 INHALANT ORAL at 07:30

## 2022-01-01 RX ADMIN — IPRATROPIUM BROMIDE 0.5 MG: 0.5 SOLUTION RESPIRATORY (INHALATION) at 20:14

## 2022-01-01 RX ADMIN — POTASSIUM CHLORIDE 20 MEQ: 14.9 INJECTION, SOLUTION INTRAVENOUS at 09:29

## 2022-01-01 RX ADMIN — HEPARIN SODIUM 5000 UNITS: 5000 INJECTION INTRAVENOUS; SUBCUTANEOUS at 15:07

## 2022-01-01 RX ADMIN — MORPHINE SULFATE 2 MG: 2 INJECTION, SOLUTION INTRAMUSCULAR; INTRAVENOUS at 18:15

## 2022-01-01 RX ADMIN — NYSTATIN 1 APPLICATION: 100000 POWDER TOPICAL at 08:22

## 2022-01-01 RX ADMIN — INSULIN LISPRO 1 UNITS: 100 INJECTION, SOLUTION INTRAVENOUS; SUBCUTANEOUS at 18:32

## 2022-01-01 RX ADMIN — INSULIN LISPRO 1 UNITS: 100 INJECTION, SOLUTION INTRAVENOUS; SUBCUTANEOUS at 07:24

## 2022-01-01 RX ADMIN — PANTOPRAZOLE SODIUM 40 MG: 40 TABLET, DELAYED RELEASE ORAL at 08:35

## 2022-01-01 RX ADMIN — MORPHINE SULFATE 2 MG: 2 INJECTION, SOLUTION INTRAMUSCULAR; INTRAVENOUS at 20:17

## 2022-01-01 RX ADMIN — IPRATROPIUM BROMIDE 0.5 MG: 0.5 SOLUTION RESPIRATORY (INHALATION) at 07:25

## 2022-01-01 RX ADMIN — LORAZEPAM 2 MG: 2 LIQUID ORAL at 00:35

## 2022-01-01 RX ADMIN — BUDESONIDE 0.5 MG: 0.5 INHALANT ORAL at 20:36

## 2022-01-01 RX ADMIN — SODIUM CHLORIDE 500 ML: 0.9 INJECTION, SOLUTION INTRAVENOUS at 16:04

## 2022-01-01 RX ADMIN — MORPHINE SULFATE 5 MG: 20 SOLUTION ORAL at 15:56

## 2022-01-01 RX ADMIN — TAMSULOSIN HYDROCHLORIDE 0.4 MG: 0.4 CAPSULE ORAL at 18:33

## 2022-01-01 RX ADMIN — LEVOTHYROXINE SODIUM 75 MCG: 75 TABLET ORAL at 05:40

## 2022-01-01 RX ADMIN — LEVALBUTEROL HYDROCHLORIDE 1.25 MG: 1.25 SOLUTION, CONCENTRATE RESPIRATORY (INHALATION) at 14:39

## 2022-01-01 RX ADMIN — BUDESONIDE 0.5 MG: 0.5 INHALANT ORAL at 20:18

## 2022-01-01 RX ADMIN — HEPARIN SODIUM 5000 UNITS: 5000 INJECTION INTRAVENOUS; SUBCUTANEOUS at 05:40

## 2022-01-01 RX ADMIN — Medication 12.5 MG: at 21:13

## 2022-01-01 RX ADMIN — LORAZEPAM 2 MG: 2 INJECTION INTRAMUSCULAR; INTRAVENOUS at 11:58

## 2022-01-01 RX ADMIN — LEVALBUTEROL HYDROCHLORIDE 1.25 MG: 1.25 SOLUTION, CONCENTRATE RESPIRATORY (INHALATION) at 13:19

## 2022-01-01 RX ADMIN — METOPROLOL TARTRATE 5 MG: 5 INJECTION, SOLUTION INTRAVENOUS at 07:45

## 2022-01-01 RX ADMIN — IOHEXOL 100 ML: 350 INJECTION, SOLUTION INTRAVENOUS at 09:24

## 2022-01-01 RX ADMIN — SODIUM CHLORIDE, SODIUM GLUCONATE, SODIUM ACETATE, POTASSIUM CHLORIDE, MAGNESIUM CHLORIDE, SODIUM PHOSPHATE, DIBASIC, AND POTASSIUM PHOSPHATE 150 ML/HR: .53; .5; .37; .037; .03; .012; .00082 INJECTION, SOLUTION INTRAVENOUS at 22:47

## 2022-01-01 RX ADMIN — SODIUM CHLORIDE 1000 ML: 0.9 INJECTION, SOLUTION INTRAVENOUS at 17:33

## 2022-01-01 RX ADMIN — VANCOMYCIN HYDROCHLORIDE 750 MG: 1 INJECTION, POWDER, LYOPHILIZED, FOR SOLUTION INTRAVENOUS at 01:44

## 2022-01-01 RX ADMIN — BUDESONIDE 0.5 MG: 0.5 INHALANT ORAL at 07:40

## 2022-01-01 RX ADMIN — LEVALBUTEROL HYDROCHLORIDE 1.25 MG: 1.25 SOLUTION, CONCENTRATE RESPIRATORY (INHALATION) at 13:00

## 2022-01-01 RX ADMIN — LEVOTHYROXINE SODIUM 75 MCG: 75 TABLET ORAL at 05:18

## 2022-01-01 RX ADMIN — Medication 12.5 MG: at 12:08

## 2022-01-01 RX ADMIN — BEVACIZUMAB 1135 MG: 400 INJECTION, SOLUTION INTRAVENOUS at 11:54

## 2022-01-01 RX ADMIN — HEPARIN SODIUM 5000 UNITS: 5000 INJECTION INTRAVENOUS; SUBCUTANEOUS at 18:06

## 2022-01-01 RX ADMIN — FENTANYL CITRATE 50 MCG: 50 INJECTION, SOLUTION INTRAMUSCULAR; INTRAVENOUS at 10:28

## 2022-01-01 RX ADMIN — INSULIN LISPRO 1 UNITS: 100 INJECTION, SOLUTION INTRAVENOUS; SUBCUTANEOUS at 12:21

## 2022-01-01 RX ADMIN — POTASSIUM CHLORIDE 20 MEQ: 14.9 INJECTION, SOLUTION INTRAVENOUS at 07:39

## 2022-01-01 RX ADMIN — LIDOCAINE HYDROCHLORIDE,EPINEPHRINE BITARTRATE 20 ML: 10; .01 INJECTION, SOLUTION INFILTRATION; PERINEURAL at 10:39

## 2022-01-01 RX ADMIN — CEFEPIME HYDROCHLORIDE 1000 MG: 1 INJECTION, SOLUTION INTRAVENOUS at 18:27

## 2022-01-01 RX ADMIN — AZITHROMYCIN MONOHYDRATE 500 MG: 500 INJECTION, POWDER, LYOPHILIZED, FOR SOLUTION INTRAVENOUS at 18:24

## 2022-01-01 RX ADMIN — FLUTICASONE FUROATE AND VILANTEROL TRIFENATATE 1 PUFF: 100; 25 POWDER RESPIRATORY (INHALATION) at 08:01

## 2022-01-01 RX ADMIN — CEFEPIME HYDROCHLORIDE 1000 MG: 1 INJECTION, SOLUTION INTRAVENOUS at 07:23

## 2022-01-01 RX ADMIN — LEVALBUTEROL HYDROCHLORIDE 1.25 MG: 1.25 SOLUTION, CONCENTRATE RESPIRATORY (INHALATION) at 20:14

## 2022-01-01 RX ADMIN — LEVALBUTEROL HYDROCHLORIDE 1.25 MG: 1.25 SOLUTION, CONCENTRATE RESPIRATORY (INHALATION) at 07:17

## 2022-01-01 RX ADMIN — SODIUM CHLORIDE 500 ML: 0.9 INJECTION, SOLUTION INTRAVENOUS at 17:49

## 2022-01-01 RX ADMIN — SODIUM CHLORIDE 20 ML/HR: 0.9 INJECTION, SOLUTION INTRAVENOUS at 12:19

## 2022-01-01 RX ADMIN — MORPHINE SULFATE 5 MG: 20 SOLUTION ORAL at 00:35

## 2022-01-01 RX ADMIN — LEVALBUTEROL HYDROCHLORIDE 1.25 MG: 1.25 SOLUTION, CONCENTRATE RESPIRATORY (INHALATION) at 19:52

## 2022-01-01 RX ADMIN — CEFEPIME HYDROCHLORIDE 1000 MG: 1 INJECTION, SOLUTION INTRAVENOUS at 06:17

## 2022-01-01 RX ADMIN — FLUTICASONE FUROATE AND VILANTEROL TRIFENATATE 1 PUFF: 100; 25 POWDER RESPIRATORY (INHALATION) at 08:21

## 2022-01-01 RX ADMIN — HEPARIN SODIUM 5000 UNITS: 5000 INJECTION INTRAVENOUS; SUBCUTANEOUS at 06:17

## 2022-01-01 RX ADMIN — NYSTATIN 1 APPLICATION: 100000 POWDER TOPICAL at 08:01

## 2022-01-01 RX ADMIN — NYSTATIN: 100000 POWDER TOPICAL at 08:00

## 2022-01-01 RX ADMIN — LEVALBUTEROL HYDROCHLORIDE 1.25 MG: 1.25 SOLUTION, CONCENTRATE RESPIRATORY (INHALATION) at 07:29

## 2022-01-01 RX ADMIN — MORPHINE SULFATE 2 MG: 2 INJECTION, SOLUTION INTRAMUSCULAR; INTRAVENOUS at 09:31

## 2022-01-01 RX ADMIN — CEFEPIME HYDROCHLORIDE 2000 MG: 2 INJECTION, SOLUTION INTRAVENOUS at 20:16

## 2022-01-01 RX ADMIN — BUDESONIDE 0.5 MG: 0.5 INHALANT ORAL at 20:14

## 2022-01-01 RX ADMIN — BEVACIZUMAB 1300 MG: 400 INJECTION, SOLUTION INTRAVENOUS at 12:40

## 2022-01-01 RX ADMIN — SODIUM CHLORIDE 1000 ML: 0.9 INJECTION, SOLUTION INTRAVENOUS at 17:16

## 2022-01-01 RX ADMIN — FLUTICASONE FUROATE AND VILANTEROL TRIFENATATE 1 PUFF: 100; 25 POWDER RESPIRATORY (INHALATION) at 08:00

## 2022-01-01 RX ADMIN — PANTOPRAZOLE SODIUM 40 MG: 40 TABLET, DELAYED RELEASE ORAL at 06:17

## 2022-01-01 RX ADMIN — HEPARIN SODIUM 5000 UNITS: 5000 INJECTION INTRAVENOUS; SUBCUTANEOUS at 23:24

## 2022-01-01 RX ADMIN — PANTOPRAZOLE SODIUM 40 MG: 40 TABLET, DELAYED RELEASE ORAL at 06:58

## 2022-01-01 RX ADMIN — NYSTATIN: 100000 POWDER TOPICAL at 21:39

## 2022-01-01 RX ADMIN — FENTANYL CITRATE 50 MCG: 50 INJECTION, SOLUTION INTRAMUSCULAR; INTRAVENOUS at 10:12

## 2022-01-01 RX ADMIN — ATEZOLIZUMAB 1200 MG: 1200 INJECTION, SOLUTION INTRAVENOUS at 12:00

## 2022-01-01 RX ADMIN — ASPIRIN 81 MG: 81 TABLET, COATED ORAL at 08:02

## 2022-01-01 RX ADMIN — Medication 12.5 MG: at 08:02

## 2022-01-01 RX ADMIN — INSULIN LISPRO 2 UNITS: 100 INJECTION, SOLUTION INTRAVENOUS; SUBCUTANEOUS at 11:45

## 2022-01-01 RX ADMIN — FLUTICASONE PROPIONATE 2 SPRAY: 50 SPRAY, METERED NASAL at 08:12

## 2022-01-01 RX ADMIN — LORAZEPAM 2 MG: 2 INJECTION INTRAMUSCULAR; INTRAVENOUS at 16:20

## 2022-01-01 RX ADMIN — IPRATROPIUM BROMIDE 0.5 MG: 0.5 SOLUTION RESPIRATORY (INHALATION) at 13:14

## 2022-01-01 RX ADMIN — HEPARIN SODIUM 5000 UNITS: 5000 INJECTION INTRAVENOUS; SUBCUTANEOUS at 05:28

## 2022-01-01 RX ADMIN — PANTOPRAZOLE SODIUM 40 MG: 40 TABLET, DELAYED RELEASE ORAL at 05:18

## 2022-01-01 RX ADMIN — VANCOMYCIN HYDROCHLORIDE 750 MG: 1 INJECTION, POWDER, LYOPHILIZED, FOR SOLUTION INTRAVENOUS at 11:47

## 2022-01-01 RX ADMIN — TAMSULOSIN HYDROCHLORIDE 0.4 MG: 0.4 CAPSULE ORAL at 18:03

## 2022-01-01 RX ADMIN — BUDESONIDE 0.5 MG: 0.5 INHALANT ORAL at 19:52

## 2022-01-01 RX ADMIN — SODIUM CHLORIDE 1000 ML: 0.9 INJECTION, SOLUTION INTRAVENOUS at 21:45

## 2022-01-01 RX ADMIN — CEFEPIME HYDROCHLORIDE 1000 MG: 1 INJECTION, SOLUTION INTRAVENOUS at 06:06

## 2022-01-01 RX ADMIN — HEPARIN SODIUM 5000 UNITS: 5000 INJECTION INTRAVENOUS; SUBCUTANEOUS at 14:41

## 2022-01-01 RX ADMIN — HEPARIN SODIUM 5000 UNITS: 5000 INJECTION INTRAVENOUS; SUBCUTANEOUS at 21:40

## 2022-01-01 RX ADMIN — CEFEPIME HYDROCHLORIDE 1000 MG: 1 INJECTION, SOLUTION INTRAVENOUS at 06:19

## 2022-01-01 RX ADMIN — LEVALBUTEROL HYDROCHLORIDE 1.25 MG: 1.25 SOLUTION, CONCENTRATE RESPIRATORY (INHALATION) at 20:36

## 2022-01-01 RX ADMIN — HEPARIN SODIUM 5000 UNITS: 5000 INJECTION INTRAVENOUS; SUBCUTANEOUS at 21:28

## 2022-01-01 RX ADMIN — FLUTICASONE PROPIONATE 2 SPRAY: 50 SPRAY, METERED NASAL at 08:01

## 2022-01-01 RX ADMIN — NYSTATIN 1 APPLICATION: 100000 POWDER TOPICAL at 08:13

## 2022-01-01 RX ADMIN — LEVALBUTEROL HYDROCHLORIDE 1.25 MG: 1.25 SOLUTION, CONCENTRATE RESPIRATORY (INHALATION) at 07:14

## 2022-01-01 RX ADMIN — SODIUM CHLORIDE 20 ML/HR: 0.9 INJECTION, SOLUTION INTRAVENOUS at 10:58

## 2022-01-01 RX ADMIN — MORPHINE SULFATE 2 MG: 2 INJECTION, SOLUTION INTRAMUSCULAR; INTRAVENOUS at 02:44

## 2022-01-01 RX ADMIN — BUDESONIDE 0.5 MG: 0.5 INHALANT ORAL at 07:32

## 2022-01-01 RX ADMIN — HEPARIN SODIUM 5000 UNITS: 5000 INJECTION INTRAVENOUS; SUBCUTANEOUS at 13:10

## 2022-01-01 RX ADMIN — ALLOPURINOL 300 MG: 100 TABLET ORAL at 08:13

## 2022-01-01 RX ADMIN — IPRATROPIUM BROMIDE 0.5 MG: 0.5 SOLUTION RESPIRATORY (INHALATION) at 07:14

## 2022-01-01 RX ADMIN — IOHEXOL 100 ML: 350 INJECTION, SOLUTION INTRAVENOUS at 14:18

## 2022-01-01 RX ADMIN — NYSTATIN 1 APPLICATION: 100000 POWDER TOPICAL at 18:45

## 2022-01-01 RX ADMIN — NYSTATIN: 100000 POWDER TOPICAL at 23:39

## 2022-01-01 RX ADMIN — LEVALBUTEROL HYDROCHLORIDE 1.25 MG: 1.25 SOLUTION, CONCENTRATE RESPIRATORY (INHALATION) at 07:40

## 2022-01-01 RX ADMIN — ASPIRIN 81 MG: 81 TABLET, COATED ORAL at 08:34

## 2022-01-01 RX ADMIN — FLUTICASONE PROPIONATE 2 SPRAY: 50 SPRAY, METERED NASAL at 08:35

## 2022-01-01 RX ADMIN — LEVOFLOXACIN 750 MG: 750 TABLET, FILM COATED ORAL at 08:10

## 2022-01-01 RX ADMIN — NYSTATIN: 100000 POWDER TOPICAL at 18:09

## 2022-01-01 RX ADMIN — IPRATROPIUM BROMIDE 0.5 MG: 0.5 SOLUTION RESPIRATORY (INHALATION) at 19:52

## 2022-01-01 RX ADMIN — VANCOMYCIN HYDROCHLORIDE 1500 MG: 1 INJECTION, POWDER, LYOPHILIZED, FOR SOLUTION INTRAVENOUS at 18:17

## 2022-01-01 RX ADMIN — VANCOMYCIN HYDROCHLORIDE 750 MG: 1 INJECTION, POWDER, LYOPHILIZED, FOR SOLUTION INTRAVENOUS at 12:11

## 2022-01-01 RX ADMIN — ALLOPURINOL 300 MG: 100 TABLET ORAL at 08:34

## 2022-01-01 RX ADMIN — ALBUTEROL SULFATE 5 MG: 2.5 SOLUTION RESPIRATORY (INHALATION) at 16:05

## 2022-01-01 RX ADMIN — IPRATROPIUM BROMIDE 0.5 MG: 0.5 SOLUTION RESPIRATORY (INHALATION) at 07:32

## 2022-01-01 RX ADMIN — LORAZEPAM 2 MG: 2 INJECTION INTRAMUSCULAR; INTRAVENOUS at 05:27

## 2022-01-01 RX ADMIN — AZITHROMYCIN MONOHYDRATE 500 MG: 500 INJECTION, POWDER, LYOPHILIZED, FOR SOLUTION INTRAVENOUS at 18:32

## 2022-01-01 RX ADMIN — NYSTATIN 1 APPLICATION: 100000 POWDER TOPICAL at 18:32

## 2022-01-01 RX ADMIN — FLUTICASONE FUROATE AND VILANTEROL TRIFENATATE 1 PUFF: 100; 25 POWDER RESPIRATORY (INHALATION) at 08:35

## 2022-01-01 RX ADMIN — INSULIN LISPRO 3 UNITS: 100 INJECTION, SOLUTION INTRAVENOUS; SUBCUTANEOUS at 12:09

## 2022-01-01 RX ADMIN — VANCOMYCIN HYDROCHLORIDE 1500 MG: 1 INJECTION, POWDER, LYOPHILIZED, FOR SOLUTION INTRAVENOUS at 21:50

## 2022-01-03 NOTE — PROGRESS NOTES
Pulmonary Rehabilitation Plan of Care   Initial Care Plan      Today's date: 2022   # of Exercise Sessions Completed:   Patient name: Bryan Fermin      : 1940  Age: 78 y o  MRN: [id number]  Referring Physician: Salvatore Sanchez DO  Pulmonologist: ALEXA  Provider: [facility]  Clinician: Lisa Angulo MS    Dx: COVID/Pneumonia due to [place]  Date of onset: 2021      SUMMARY OF PROGRESS:  Today is Alessandro's initial evaluation to begin Pulmonary Rehab for the diagnosis of COVID/Pneumonia due to COVID  Patient does not have a PFT on file  Since their diagnosis, the patient has been experiencing increased dyspnea, decreased physical activity, increased fatigue and weakness  They report dyspnea, weakness and fatigue when completing ADLs   The patient currently does follow a formal exercise program at home  When addressed, the patient denies having depression/anxiety  Patient reports excellent social/emotional support  Information to begin using [employer] was provided as well as contact information for counseling through [facility]  The patient is a former smoker (quit 52 years ago)  Patient admits to 100% medication compliance  The patient forgot his questionnaires at home and will bring them to his first exercise session  Will be attached to evaluation as soon as possible  At rest, the patient rated dyspnea 2/10 with SpO2 94% on supplemental O2 2L/min via nasal canula  They completed an initial 6MWT, walking 900 ft on supplemental O2 2L/min via nasal canula  The patients rating of perceived dyspnea during the 6MWT was 5/10 with SpO2 85-92%  Patient took 1 rest breaks of 30 seconds  Resting HR 85 and /60 with appropriate hemodynamic response to exercise reaching 106 and 120/60  Patient will exercise on supplemental O2 2L/min via nasal canula and will titrate O2 to maintain SpO2 >90%       Education on smoking cessation, oxygen use, breathing techniques, pulmonary anatomy, exercise for the pulmonary patient, healthy eating, stress, and relaxation will be provided  Patient goals include: increase strength, reduce dyspnea, improve functional capcity  Troy Valencia will attend 24-36 exercise sessions, 2x/wk for 12-18 weeks beginning 1/7/2022  Will progress patient as tolerated over the next 30 days  Medication compliance: Yes   Comments: Pt reports to be compliant with medications  Fall Risk: Low   Comments: Ambulates with a steady gait with no assist device    Smoking: Former user    RPD at Rest: 2/10  RPD with Exercise:  5/10    Assessment of progression of lung disease and functional status:  CAT: NA/40  Shortness of breath questionnaire: NA/120  Patient forgot questionnaires and will bring them to his first exercise session  Will be attached to evaluation session as soon as possible  EXERCISE ASSESSMENT and PLAN    Current Exercise Program in Rehab:       Frequency: 2-3 days/week        Minutes: 30-35         METS: 2-3              SpO2: >90%              RPD: 0-4                      HR: 20-30 bpm above rest   RPE: 4-5         Modalities: Treadmill, UBE, NuStep and Recumbent bike      Exercise Progression 30 Day Goals :    Frequency: 2-3 days/week        Minutes: 40-45         METS: 3-4              SpO2: >90%               RPD: 0-4                      HR: 20-30 bpm above rest   RPE: 4-5        Modalities: Treadmill, UBE, NuStep and Recumbent bike     Strength training:   Will be added following 2-3 weeks of monitored exercise sessions   Modalities: Leg Press, Chest Press, Arm Curl, Front Raises and Shoulder Shrugs    Oxygen Needs: supplemental O2 via nasal cannula @ 2L/min at rest and supplemental O2 via nasal cannula @2L/min with exercise  Oxygen Goal: Maintain SpO2>90% during exercise    Home Exercise: none  Education: pursed lipped breathing, diaphragmatic breathing, relaxation breathing, home exercise and benefits of exercise for pulmonary disease    Goals: reduced score on  USCD Shortness of Breath Questionnaire, Improved 6MW distance by 10%, reduced dyspnea during exercise (0-3/10), improved exercise tolerance (max METs tolerated in pulmonary rehab) and SpO2 >90% during exercise  Progressing:  Reviewed Pt goals and determined plan of care    Plan: Titrate supplemental oxygen as needed to maintain SpO2>90% with exercise, learn to conserve energy with ADLs , practice breathing techniques 3x/day and reduce time sitting at home    Readiness to change: Contemplation:  (Acknowledging that there is a problem but not yet ready or sure of wanting to make a change)      NUTRITION ASSESSMENT AND PLAN    Weight control:    Starting weight: 190   Current weight:       Diabetes: Patient reported fasting     Goals:reduced BMI to < 25, choose lean meat (93-95%), eliminate processed meats, reduce portion sizes of meat to 3oz or less, increase intake of fish, shellfish, cook without added fat or use vegetable oil/spray and increase intake of meatless meals  Education: heart healthy eating  low sodium diet  hydration  education class: healthy choices for managing pulmonary illness  Progressing:Reviewed Pt goals and determined plan of care  Plan: Education class: Reading Food Labels, Education Class: Heart Healthy Eating, remove salt shaker from table, eat more home cooked meals and eat out less often, will try new grains like brown rice, quinoa, farro and will replace sugar sweetened cereals with whole grain or oatmeal  Readiness to change: Contemplation:  (Acknowledging that there is a problem but not yet ready or sure of wanting to make a change)      PSYCHOSOCIAL ASSESSMENT AND PLAN    Emotional:  Depression assessment:  PHQ-9 = NA - Patient forgot questionnaires and will bring them to his first exercise session  Will be attached to evaluation session as soon as possible              Anxiety measure:  NICOLLE-7 = NA Patient forgot questionnaires and will bring them to his first exercise session  Will be attached to evaluation session as soon as possible    Self-reported stress level: 4/10   Social support: Very Good    Goals:  Reduce perceived stress to 1-3/10, improved [facility] QOL < 27 and PHQ-9 - reduced severity by one level  Education: signs/sxs of depression, benefits of a positive support system, stress management techniques and class:  Stress and Pulmonary Disease    Progressing:Reviewed Pt goals and determined plan of care  Plan: Class: Stress and Your Health and Class: Relaxation  Readiness to change: Contemplation:  (Acknowledging that there is a problem but not yet ready or sure of wanting to make a change)      OTHER CORE COMPONENTS     Tobacco:   Social History     Tobacco Use   Smoking Status Former Smoker    Quit date: 1970    Years since quittin 0   Smokeless Tobacco Never Used       Tobacco Use Intervention: Referral to tobacco expert:   Pt quit 52 years ago   and has abstained    Blood pressure:    Restin/60   Exercise: 120/60   Recovery: 110/58    Goals: consistent BP < 130/80, reduced dietary sodium <2300mg and moderate intensity exercise >150 mins/wk  Education:  pathophysiology of pulmonary disease, preventing infections, dangers of smoking and tobacco triggers  Progressing:Reviewed Pt goals and determined plan of care  Plan: visit www smokefree gov, avoid places with second hand smoke, Avoid Processed foods, engage in regular exercise, eliminate salt shaker at the table, use salt substitutes, check labels for sodium content and monitor home BP  Readiness to change: Contemplation:  (Acknowledging that there is a problem but not yet ready or sure of wanting to make a change)       PULMONARY REHAB ASSESSMENT    Today's date: 2022  Patient name: Lauren De Leon     : 1940       MRN: [id number]  PCP: Gissel Davila DO  Referring Physician: Ivana Doss DO  Pulmonologist: ALEXA    Dx: COVID/Pneumonia due to COVID  Date of onset: 11/4/2021  Cultural needs: None    Weight:    Wt Readings from Last 1 Encounters:   12/22/21 86 4 kg (190 lb 6 4 oz)      Height:   Ht Readings from Last 1 Encounters:   12/22/21 5' 9" (1 753 m)     Medical History:   Past Medical History:   Diagnosis Date    Coronary artery disease     Gout     Hypercholesterolemia     Hypertension     Liver cancer ([address] )          Physical Limitations: PHILLIPS, decreased strength, fatigue    Oxygen needs: 2L/min with ADLs and exercise    History of Toxic Exposure:  Chemicals  Dust  Second hand smoke    Risk Factors   Cholesterol: Yes  Smoking: Former user  HTN: Yes  DM: Type 2   Obesity: Yes   Inactivity: Yes  Stress:  perceived  stress: 4/10   Stressors: Health, family, finances   Goals for Stress Management: Time manage, hobby    Family History:  Family History   Problem Relation Age of Onset    No Known Problems Mother     No Known Problems Father        Allergies: Patient has no known allergies  ETOH:   Social History     Substance and Sexual Activity   Alcohol Use Not Currently         Current Medications:   Current Outpatient Medications   Medication Sig Dispense Refill    allopurinol (ZYLOPRIM) 300 mg tablet Take 300 mg by mouth daily      amLODIPine (NORVASC) 5 mg tablet Take 1 tablet (5 mg total) by mouth daily 30 tablet 5    apixaban (Eliquis) 5 mg Take 2 tablets (10 mg total) by mouth 2 (two) times a day for 7 days, THEN 1 tablet (5 mg total) 2 (two) times a day for 23 days   74 tablet 0    apixaban (Eliquis) 5 mg Take 1 tablet (5 mg total) by mouth 2 (two) times a day 180 tablet 0    aspirin 81 mg chewable tablet Chew 1 tablet (81 mg total) daily 30 tablet 0    atorvastatin (LIPITOR) 40 mg tablet Take 1 tablet (40 mg total) by mouth daily 30 tablet 5    cholecalciferol (VITAMIN D3) 400 units tablet Take 1,000 Units by mouth daily       lisinopril (ZESTRIL) 2 5 mg tablet Take 1 tablet (2 5 mg total) by mouth daily 30 tablet 5    LORazepam (ATIVAN) 1 mg tablet Take 1 tablet 2 hours prior to MRI as directed (Patient not taking: Reported on 7/26/2021) 2 tablet 0    metoprolol tartrate (LOPRESSOR) 25 mg tablet Take 1 tablet (25 mg total) by mouth 2 (two) times a day 60 tablet 3    Omega-3 Fatty Acids (fish oil) 1,000 mg Take 1,000 mg by mouth daily      pantoprazole (PROTONIX) 40 mg tablet Take 1 tablet (40 mg total) by mouth daily in the early morning (Patient not taking: Reported on 9/16/2021) 30 tablet 0    Promethazine-DM (PHENERGAN-DM) 6 25-15 mg/5 mL oral syrup Take 10 mL by mouth 3 (three) times a day as needed for cough 240 mL 1     No current facility-administered medications for this visit  Functional Status Prior to Diagnosis for Treatment   Occupation: retired  Recreation: Spend time outdoors, enjoy a hobby  ADLs: No limitations  [place]: No limitations  Exercise: None  Other: None    Current Functional Status  Occupation: retired  Recreation: Spend time outdoors, enjoy a hobby  ADLs:Capable of performing light to moderate ADLs  [place]: Capable of performing light to moderate ADLs  Exercise: None  Other: None    Patient Specific Goals:  increase strength, reduce dyspnea, improve functional capcity    Short Term Program Goals: dietary modifications increased strength improved energy/stamina with ADLs exercise 120-150 mins/wk    Long Term Goals: increased maximal walking duration  increased intial training workload  Improved [facility] Activity Status score  Improved functional capacity  Improved Quality of Life - [facility] score reduced    Oxygen Goals: Maintain SpO2>90% titrating supplemental oxygen as needed     Ability to reach goals/rehabilitation potential:  Very Good     Projected return to function: 12 weeks  Objective tests: 6 MWT      Nutritional   Reviewed details of Rate your Plate  Discussed key elements of heart healthy eating   Reviewed patient goals for dietary modifications and their clinical implications  Reviewed most recent lipid profile  Goals for dietary modification: choose lean cuts of meat  poultry without the skin  low fat ground meat and poultry  reduce portions of meat to 3 oz  reduced fat cheese  increase whole grains  eliminate butter  improved snack choices  more nuts/seeds  heathier choices while dining out      Emotional/Social  Patient reports he/she is coping well with good social support and denies depression or anxiety    SOCIAL SUPPORT NETWORK    Marital status:       Domestic Violence Screening: No    Comments: Patient requires skilled MI to achieve goals and maximum functional capacity

## 2022-01-04 PROBLEM — H54.62 VISION LOSS OF LEFT EYE: Status: ACTIVE | Noted: 2022-01-01

## 2022-01-04 PROBLEM — I65.21 ASYMPTOMATIC CAROTID ARTERY STENOSIS, RIGHT: Status: ACTIVE | Noted: 2022-01-01

## 2022-01-04 PROBLEM — I82.433 ACUTE DEEP VEIN THROMBOSIS (DVT) OF POPLITEAL VEIN OF BOTH LOWER EXTREMITIES (HCC): Status: ACTIVE | Noted: 2020-09-01

## 2022-01-04 PROBLEM — I65.22 LEFT CAROTID ARTERY OCCLUSION: Status: ACTIVE | Noted: 2022-01-01

## 2022-01-04 PROBLEM — I70.219 ATHEROSCLEROSIS OF ARTERY OF EXTREMITY WITH INTERMITTENT CLAUDICATION (HCC): Status: ACTIVE | Noted: 2020-01-13

## 2022-01-04 NOTE — PATIENT INSTRUCTIONS
1) carotid disease  -because your left side if completely occluded, there is no surgery to be done on this side; this likely blocked up at the time you had your vision loss  -we are going to continue to monitor the right side with ultrasound on a 6 month basis    2) DVT/PE  -I will leave duration of anticoagulation to the discretion of your PCP but a minimum of 6 months  -wearing compression and elevating your legs can help with any swelling  -please use a moisturizing cream on your legs twice daily    3) PAD  -you have blockages in the left leg that is causing the pain in the calf when you walk  -we will get an updated ultrasound to evaluate this and I will see you back in 6 weeks to discuss options    4) Medications  -please continue your aspirin and statin medications lifelong  -continue your eliquis for your blood clots    Peripheral Artery Disease   WHAT YOU NEED TO KNOW:   What is peripheral artery disease (PAD)? PAD is narrow, weak, or blocked arteries  It may affect any arteries outside of your heart and brain  PAD is usually the result of a buildup of fat and cholesterol, also called plaque, along your artery walls  Inflammation, a blood clot, or abnormal cell growth could also block your arteries  PAD prevents normal blood flow to your legs and arms  You are at risk of an amputation if poor blood flow keeps wounds from healing or causes gangrene (tissue death)  Without treatment, PAD can also cause a heart attack or stroke  What increases my risk for PAD? · Smoking cigarettes    · Diabetes    · High blood pressure    · High cholesterol    · Age older than 40 years    · Heart disease or a family history of heart disease    What are the signs and symptoms of PAD? Mild PAD usually does not cause symptoms   As the disease worsens over time, you may have the following:  · Pain or cramps in your leg or hip while you walk    · A numb, weak, or heavy feeling in your legs    · Dry, scaly, red, or pale skin on your legs    · Thick or brittle nails, or hair loss on your arms and legs    · Foot sores that will not heal    · Burning or aching in your feet and toes while resting (this may be worse when you lie down)    How is PAD diagnosed? · Angiography  is a test that shows pictures of the arteries in your arms and legs  You will be given contrast liquid to help the arteries show up better on the pictures  The pictures will be taken with an MRI or CT scan  Tell the healthcare provider if you have ever had an allergic reaction to contrast liquid  Do not enter the MRI room with anything metal  Metal can cause serious injury  Tell a healthcare provider if you have any metal in or on your body  · Doppler ankle brachial index (FLIP)  is a test that compares blood pressure in your ankles to blood pressure in your arms  This tells your healthcare provider how well blood is flowing through the arteries in your legs  How is PAD treated? Treatment can help reduce your risk of a heart attack, stroke, or amputation  You may need more than one of the following:  · Medicines  may be given:    ? Antiplatelets , such as aspirin, help prevent blood clots  Take your antiplatelet medicine exactly as directed  These medicines make it more likely for you to bleed or bruise  If you are told to take aspirin, do not take acetaminophen or ibuprofen instead  ? Statin medicine  helps lower your cholesterol and prevents PAD from getting worse  · A supervised exercise program  helps you stay active in normal daily activities  Healthcare providers will help you safely walk or do strength training exercises 3 times a week for 30 to 60 minutes  You will do this for several months, then transition to walking on your own  · Angioplasty  is a procedure to open your artery so blood can flow through normally  A thin tube called a catheter is used to insert a small balloon into your artery   The balloon is inflated to open your blocked artery, and then removed  A tube called a stent may be placed in your artery to hold it open  · Bypass surgery  is used to make a new connection to your artery with a vein from another part of your body, or an artificial graft  The vein or graft is attached to your artery above and below your blockage  This allows blood to flow around the blocked portion of your artery  How can I manage PAD? · Do not smoke  Nicotine and other chemicals in cigarettes and cigars can worsen PAD  They can also increase your risk for a heart attack or stroke  Ask your healthcare provider for information if you currently smoke and need help to quit  E-cigarettes or smokeless tobacco still contain nicotine  Talk to your healthcare provider before you use these products  · Manage other health conditions  Take your medicines as directed  Follow your healthcare provider's instructions if you have high blood pressure or high cholesterol  Perform foot care and check your blood sugar levels as directed if you have diabetes  · Eat heart-healthy foods  Eat whole grains, fruits, and vegetables every day  Limit salt and high-fat foods  Ask your healthcare provider for more information on a heart healthy diet  Ask what a healthy weight is for you  Your healthcare provider can help you create a healthy weight-loss plan, if needed  Call your local emergency number [phone] in the [facility],[address]) if:   · You have any of the following signs of a heart attack:      ? Squeezing, pressure, or pain in your chest    ? You may  also have any of the following:     § Discomfort or pain in your back, neck, jaw, stomach, or arm    § Shortness of breath    § Nausea or vomiting    § Lightheadedness or a sudden cold sweat    · You have any of the following signs of a stroke:      ? Numbness or drooping on one side of your face     ? Weakness in an arm or leg    ? Confusion or difficulty speaking    ?  Dizziness, a severe headache, or vision loss    When should I seek immediate care? · You have sores or wounds that will not heal     · You notice black or discolored skin on your arm or leg  · Your skin is cool to the touch  When should I call my doctor? · You have leg pain when you walk 1/8 mile (200 meters) or less, even with treatment  · Your legs are red, dry, or pale, even with treatment  · You have questions or concerns about your condition or care  CARE AGREEMENT:   You have the right to help plan your care  Learn about your health condition and how it may be treated  Discuss treatment options with your healthcare providers to decide what care you want to receive  You always have the right to refuse treatment  The above information is an  only  It is not intended as medical advice for individual conditions or treatments  Talk to your doctor, nurse or pharmacist before following any medical regimen to see if it is safe and effective for you  © Copyright [employer] 2021 Information is for End User's use only and may not be sold, redistributed or otherwise used for commercial purposes  All illustrations and images included in CareNotes® are the copyrighted property of [employer]  or [address] [place] [address]    Carotid Artery Disease   AMBULATORY CARE:   Carotid artery disease (CAD)  means the major blood vessels in your neck are narrowed or becoming blocked  These 2 major blood vessels are called the carotid arteries  They supply your brain with blood  The narrow or blocked blood vessels increase your risk for a stroke  CAD is also called carotid artery stenosis  Have someone call your local emergency number (911 in the [address] [place] [address],[address]) if:   · You have any of the following signs of a stroke:      ? Numbness or drooping on one side of your face     ? Weakness in an arm or leg    ? Confusion or difficulty speaking    ? Dizziness, a severe headache, or vision loss    · You have any of the following signs of a heart attack:      ?  Squeezing, pressure, or pain in your chest    ? You may  also have any of the following:     § Discomfort or pain in your back, neck, jaw, stomach, or arm    § Shortness of breath    § Nausea or vomiting    § Lightheadedness or a sudden cold sweat    Call your doctor if:   · You have questions or concerns about your condition or care  Common signs and symptoms:  CAD develops slowly  You may have no signs or symptoms until you have a mini-stroke, or transient ischemic attack (TIA)  A TIA is a temporary lack of blood flow to your brain  A TIA goes away quickly and does not cause permanent damage  A TIA may be a warning sign that you are about to have a stroke  If you have any symptoms of a TIA or stroke, seek care immediately  Warning signs of a stroke: The words BE FAST can help you remember and recognize warning signs of a stroke:  · B = Balance:  Sudden loss of balance    · E = Eyes:  Loss of vision in one or both eyes    · F = Face:  Face droops on one side    · A = Arms:  Arm drops when both arms are raised    · S = Speech:  Speech is slurred or sounds different    · T = Time:  Time to get help immediately       Treatment  depends on how narrow your arteries have become  Treatment also depends on your symptoms and your general health  The goal of treatment is to lower your risk for a stroke  You may need any of the following:  · Medicines:      ? Aspirin,  or other blood thinner, may be recommended  These will help prevent blood clots from forming in your carotid arteries  If your healthcare provider wants you to take aspirin, do not take acetaminophen or ibuprofen instead  ? Cholesterol medicine  lowers your cholesterol level  ? Blood pressure medicine  lowers or helps control your blood pressure  · Procedures  can help open blocked arteries:     ? Carotid endarterectomy (CEA)  is used to cut and remove plaque buildup from your arteries      ? Carotid angioplasty and stenting (TI)  is used to push the plaque against the artery wall with a balloon device  Then, a stent is placed to keep the artery open  A stent is a small metal mesh tube  Prevent a stroke:   · Do not smoke, and avoid secondhand smoke  Nicotine and other chemicals in cigarettes and cigars increase your risk for a stroke  Ask your healthcare provider for information if you currently smoke and need help to quit  E-cigarettes or smokeless tobacco still contain nicotine  Talk to your healthcare provider before you use these products  · Eat a variety of healthy foods  Healthy foods include fruit, vegetables, whole-grain breads, low-fat dairy products, chicken, and fish  Choose fish that are high in omega-3 fatty acids, such as salmon and fresh tuna  Ask your healthcare provider for more information on a heart healthy diet and the DASH eating plan  · Limit sodium (salt)  Sodium may increase your blood pressure  Add less table salt to your foods  Read food labels and choose foods that are low in sodium  Your healthcare provider may suggest you follow a low sodium diet  · Reach or maintain a healthy weight  Extra weight makes your heart work harder  Ask your healthcare provider what a healthy weight is for you  He or she can help you create a safe weight loss plan  Even a weight loss of 10% of your extra body weight can help your heart function better  · Exercise regularly  Exercise helps improve heart function and can help you manage your weight  Exercise can also help lower your cholesterol and blood sugar levels  Try to get at least 30 minutes of exercise 5 times each week  Try to be physically active every day  This may include walking, riding a bicycle, or swimming  Your healthcare provider can help you create an exercise plan that works best for you  · Limit alcohol  Alcohol can increase your blood pressure and triglyceride levels   A drink of alcohol is 12 ounces of beer, 5 ounces of wine, or 1½ ounces of nichole  Follow up with your doctor as directed:  Write down your questions so you remember to ask them during your visits  © Copyright [employer] 2021 Information is for End User's use only and may not be sold, redistributed or otherwise used for commercial purposes  All illustrations and images included in CareNotes® are the copyrighted property of [employer]  or [employer]gr[employer]  The above information is an  only  It is not intended as medical advice for individual conditions or treatments  Talk to your doctor, nurse or pharmacist before following any medical regimen to see if it is safe and effective for you

## 2022-01-04 NOTE — PROGRESS NOTES
Assessment/Plan:    Pt is an 79 yo M w/ HCC, DM, HTN, dCHF, PAD w/ LLE claudication, CKD, anxiety, HLD, recent COVID PNA (11/21), DVT/PE (11/21), monocular L eye vision loss, L ICA occlusion, R ICA stenosis    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)  -     Ambulatory referral to Vascular Surgery  -     VAS lower limb arterial duplex, complete bilateral; Future  -reviewed LEADs from '18 which show R: 0 98/121/86 and L: 0 44/-/- w/ L CFA/profunda/SFA moderate stenoses  -LLE calf claudication at 1 block; also has scabbed areas of L lateral calf from walking into something a few weeks ago; if this does not progress in healing, intervention would become more urgent  -will get updated LEADs for further evaluation; may require CTA for CFA but holding off for now    Pulmonary embolism, unspecified chronicity, unspecified pulmonary embolism type, unspecified whether acute cor pulmonale present ([facility] [address] )  Acute deep vein thrombosis (DVT) of popliteal vein of both lower extremities (HCC)  Ankle edema, bilateral  -acute BLE DVT and PE; reviewed LEV  -provoked from hypercoagulability of COVID  -continue anticoagulation w/ ELiquis; will leave duration to discretion of PCP  -advised compression and leg elevation for swelling/symptoms; also advised daily moisurization for very dry, flaky skin of legs and feet    COVID-19  -     Ambulatory referral to Vascular Surgery  -recent COVID infection with persistent respiratory failure requiring O2  -about to start pulmonary rehab (first appt later today) which I have strongly encouraged  -currently these symptoms seem to be limiting his activity more than his PAD    Asymptomatic carotid artery stenosis, right  Left carotid artery occlusion  Vision loss of left eye  -     VAS carotid complete study;  Future  -     CT head wo contrast; Future  -complete monocular L eye vision loss at the time of his COVID infection; also w/ remote prior episode resulting in single quadrant vision loss of same eye  -reviewed carotid DU which shows L ICA occlusion and R ICA 50-69% stenosis  -discussed carotid disease and indications for treatment; because he has complete occlusion on the L side, there is no surgery recommended for this; I have ordered a head CT to evaluate for stroke at the time of the occlusion and likely the same time as his vision loss, most likely thrombotic 2/2 COVID; if this is positive for stroke, will also refer him to neurology; will do surveillance for R ICA stenosis w/ carotid DU on a 6 month basis  -carotid DU in 6 months    Medications  -continue ASA  -statin restarted recently (was stopped after dx of [address] ); agree with continuing statin if safe from liver perspective  -continue Eliquis for acute DVT/PE        Subjective:      Patient ID: America Kramer is a 78 y o  male  Pt is new to our office  He was referred here by Dr Jose De Jesus Childs  Pt has a history of Liver cancer  Pt had COVID in November 2021  He had a LEV done 11/5/2021  Pt was diagnosed with a DVT of b/l LE's  He also had a PE  Pt had a CV done 12/29/2021  He c/o vision loss in his left eye that started at the end of October, 2021 around the time that the COVID infection started  Pt denies any headaches, dizziness, trouble swallowing, slurred speech, TIA or Stroke symptoms  Pt c/o cramping pain in his b/l calves after walking about 1 block  When he stops to rest the pain subsides  He denies any rest pain  Pt is taking Eliquis,  ASA 81 mg and Atorvastatin  Pt is a former smoker  HPI:    Patient referred for evaluation of carotid disease, PAD, DVT/PE  At the beginning of Nov, he awoke with blindness in the left eye  This has not returned  He went to the ophthomologist and got a carotid DU which showed carotid occlusion  This was also at the time he had COVID and also was diagnosed with DVT/PE        He reports one prior episode of vision change about 8 years ago when he lost a quarter of his visual field which never came back  Denies other stroke symptoms  Denies facial droop, speech changes, unilateral weakness/numbness  He has L calf claudication  He can walk about 1 block before having to rest   Symptoms are relieved with rest   This has been going on for >3 years  Continues to require O2 since having COVID  He is about to start pulmonary rehab  Takes Eliquis for DVT/PE  Denies prior hx of blood clots  ALso taking aspirin and statin (newly restarted)    Quit smoking in '77    Hx of CABG in '96      The following portions of the patient's history were reviewed and updated as appropriate: allergies, current medications, past family history, past medical history, past social history, past surgical history and problem list     Review of Systems   Constitutional: Negative  Eyes: Positive for visual disturbance  Respiratory: Positive for cough and shortness of breath  Pt is on 2 L of O2  Cardiovascular: Positive for leg swelling  Gastrointestinal: Negative  Endocrine: Negative  Genitourinary: Negative  Musculoskeletal: Positive for gait problem (L calf claudication)  Skin: Positive for wound  Allergic/Immunologic: Negative  Hematological: Negative  Psychiatric/Behavioral: Negative  Objective:      BP 90/58 (BP Location: Left arm, Patient Position: Sitting, Cuff Size: Standard)   Pulse 78   Temp 97 7 °F (36 5 °C) (Tympanic)   Ht 5' 9" (1 753 m)   Wt 85 2 kg (187 lb 12 8 oz)   BMI 27 73 kg/m²          Physical Exam  Vitals and nursing note reviewed  Constitutional:       Appearance: He is well-developed  HENT:      Head: Normocephalic and atraumatic  Eyes:      Conjunctiva/sclera: Conjunctivae normal    Cardiovascular:      Rate and Rhythm: Normal rate and regular rhythm  Pulses:           Radial pulses are 2+ on the right side and 2+ on the left side  Popliteal pulses are 1+ on the right side and 0 on the left side  Dorsalis pedis pulses are 0 on the right side and 0 on the left side  Posterior tibial pulses are 0 on the right side and 0 on the left side  Heart sounds: Normal heart sounds  No murmur heard  Pulmonary:      Effort: Pulmonary effort is normal  No respiratory distress  Breath sounds: Normal breath sounds  No wheezing or rales  Abdominal:      General: There is no distension  Palpations: Abdomen is soft  Tenderness: There is no abdominal tenderness  There is no rebound  Musculoskeletal:         General: Normal range of motion  Cervical back: Normal range of motion and neck supple  Right lower le+ Edema present  Left lower le+ Edema present  Skin:     General: Skin is warm and dry  Comments: Very dry flaky skin of the B legs and feet; there is a varicosity of the L medial calf    There are several scabbed areas of the L lateral proximal calf which are dry and a small one on the anterior R calf   Neurological:      Mental Status: He is alert and oriented to person, place, and time  Psychiatric:         Behavior: Behavior normal            I have reviewed and made appropriate changes to the review of systems input by the medical assistant      Vitals:    22 1155   BP: 90/58   BP Location: Left arm   Patient Position: Sitting   Cuff Size: Standard   Pulse: 78   Temp: 97 7 °F (36 5 °C)   TempSrc: Tympanic   Weight: 85 2 kg (187 lb 12 8 oz)   Height: 5' 9" (1 753 m)       Patient Active Problem List   Diagnosis    Essential hypertension    Hyperlipidemia    Cardiomegaly    Anxiety    Abnormal electrocardiogram    Coronary artery disease without angina pectoris    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)    Viral URI with cough    Reactive airway disease without complication    Medicare annual wellness visit, subsequent    Hyperglycemia    Ankle edema, bilateral    Transaminitis    Respiratory insufficiency    EMILIANO [facility]acute kidney injury) ([facility] [address] )    Elevated troponin    Septic shock (HCC)    Hepatocellular carcinoma (HCC)    Acute deep vein thrombosis (DVT) of popliteal vein of both lower extremities (HCC)    Acute respiratory failure with hypoxia (HCC)    Stage 3a chronic kidney disease (HCC)    Sepsis (HCC)    Pneumonia    Cough    Platelets decreased (HCC)    Type 2 diabetes mellitus without complication, without long-term current use of insulin (HCC)    Allergic cough    Acute bacterial bronchitis    COVID-19 virus infection    Diastolic heart failure (HCC)    Pulmonary emboli (HCC)    Post-COVID chronic dyspnea    Left carotid artery occlusion    Asymptomatic carotid artery stenosis, right       Past Surgical History:   Procedure Laterality Date    ABDOMINAL SURGERY      appy    APPENDECTOMY      CORONARY ARTERY BYPASS GRAFT      IR BIOPSY LIVER MASS  2020    IR CHEMOEMBOLIZATION LIVER TUMOR  6/10/2021    IR Y-90 PRE-ANGIO/EMBO W/ LUNG SCAN  10/6/2020    IR Y-90 RADIOEMBOLIZATION  10/14/2020       Family History   Problem Relation Age of Onset    No Known Problems Mother     No Known Problems Father        Social History     Socioeconomic History    Marital status: /Civil Union     Spouse name: Not on file    Number of children: Not on file    Years of education: Not on file    Highest education level: Not on file   Occupational History    Not on file   Tobacco Use    Smoking status: Former Smoker     Quit date: 1970     Years since quittin 0    Smokeless tobacco: Never Used   Vaping Use    Vaping Use: Never used   Substance and Sexual Activity    Alcohol use: Not Currently    Drug use: Never    Sexual activity: Not on file   Other Topics Concern    Not on file   Social History Narrative    Not on file     Social Determinants of Health     Financial Resource Strain: Not on file   Food Insecurity: No Food Insecurity    Worried About Running Out of Food in the Last Year: Never true    [address] in the Last Year: Never true   Transportation Needs: No Transportation Needs    Lack of Transportation (Medical): No    Lack of Transportation (Non-Medical): No   Physical Activity: Not on file   Stress: Not on file   Social Connections: Not on file   Intimate Partner Violence: Not on file   Housing Stability: Low Risk     Unable to Pay for Housing in the Last Year: No    Number of Places Lived in the Last Year: 1    Unstable Housing in the Last Year: No       No Known Allergies      Current Outpatient Medications:     allopurinol (ZYLOPRIM) 300 mg tablet, Take 300 mg by mouth daily, Disp: , Rfl:     amLODIPine (NORVASC) 5 mg tablet, Take 1 tablet (5 mg total) by mouth daily, Disp: 30 tablet, Rfl: 5    apixaban (Eliquis) 5 mg, Take 1 tablet (5 mg total) by mouth 2 (two) times a day, Disp: 180 tablet, Rfl: 0    aspirin 81 mg chewable tablet, Chew 1 tablet (81 mg total) daily, Disp: 30 tablet, Rfl: 0    atorvastatin (LIPITOR) 40 mg tablet, Take 1 tablet (40 mg total) by mouth daily, Disp: 30 tablet, Rfl: 5    cholecalciferol (VITAMIN D3) 400 units tablet, Take 1,000 Units by mouth daily , Disp: , Rfl:     lisinopril (ZESTRIL) 2 5 mg tablet, Take 1 tablet (2 5 mg total) by mouth daily, Disp: 30 tablet, Rfl: 5    metoprolol tartrate (LOPRESSOR) 25 mg tablet, Take 1 tablet (25 mg total) by mouth 2 (two) times a day, Disp: 60 tablet, Rfl: 3    Omega-3 Fatty Acids (fish oil) 1,000 mg, Take 1,000 mg by mouth daily, Disp: , Rfl:     Promethazine-DM (PHENERGAN-DM) 6 25-15 mg/5 mL oral syrup, Take 10 mL by mouth 3 (three) times a day as needed for cough, Disp: 240 mL, Rfl: 1    apixaban (Eliquis) 5 mg, Take 2 tablets (10 mg total) by mouth 2 (two) times a day for 7 days, THEN 1 tablet (5 mg total) 2 (two) times a day for 23 days  , Disp: 74 tablet, Rfl: 0    LORazepam (ATIVAN) 1 mg tablet, Take 1 tablet 2 hours prior to MRI as directed (Patient not taking: Reported on 7/26/2021), Disp: 2 tablet, Rfl: 0    pantoprazole (PROTONIX) 40 mg tablet, Take 1 tablet (40 mg total) by mouth daily in the early morning (Patient not taking: Reported on 9/16/2021), Disp: 30 tablet, Rfl: 0

## 2022-01-18 NOTE — TELEPHONE ENCOUNTER
Yes lab work orders are in    Patient should have this done 1 week prior to the upcoming appointment

## 2022-01-20 NOTE — TELEPHONE ENCOUNTER
Per Nia letter of Dr Cecy Phillips Doctor from 1/19/22:  ----- Message from Honorio Gonzalezgiovanni sent at 1/20/2022  9:56 AM EST -----  Nursing call patient and offer him a sooner appt  Looks like his follow up is in July? His numbers are worse and from the comments, looks like he is having symptoms  If he does not want to come in , repeat LEADs in 6 mo please Thanks    Spoke with patient's wife  Patient has an appointment scheduled 2/15/22, with Dr Cecy Phillpis Doctor  Transferred the call to the Call Center to review details of the scheduled appointment with patient's wife

## 2022-01-25 NOTE — TELEPHONE ENCOUNTER
Spoke with patient's wife to inform patient about location/address change  Patient's wife said they received a letter regarding the change of location

## 2022-01-28 NOTE — TELEPHONE ENCOUNTER
Attempted to call patient to go results of recent laboratory studies  Unfortunately I was not able to reach the patient but did leave a message on his phone with my name and office number to contact for better time to speak

## 2022-01-28 NOTE — TELEPHONE ENCOUNTER
Called and spoke with patient's wife per his request and informed them that liver function testing from 01/26/2022 appeared relatively unchanged except for a mildly elevated bilirubin at 1 44  After discussion with the patient's wife he denies any significant issues however there were new lesions seen in patient's liver on recent MRI  I will recheck LFTs in 1 week with diff increasing will likely stop statin therapy at that time however if stable will continue  Will continue monitor patient at this time  Patient's wife thanked me for calling would let me know if there were any significant issues

## 2022-01-31 NOTE — PROGRESS NOTES
Pulmonary Rehabilitation Plan of Care   30 Day Reassessment      Today's date: 2022   # of Exercise Sessions Completed:   Patient name: Tam Manley      : 1940  Age: 78 y o  MRN: [id number]  Referring Physician: Alicia Bai DO  Pulmonologist: ALEXA  Provider: Vero abdi  Clinician: Althea Ogden, MPT, CCRP    Dx: COVID/Pneumonia due to [place]  Date of onset: 2021      SUMMARY OF PROGRESS:  Today was Alessandro's 11 th exercise session at  Pulmonary Rehab for the diagnosis of COVID/Pneumonia due to COVID  Patient does not have a PFT on file  Since their diagnosis, the patient has been experiencing increased dyspnea, decreased physical activity, increased fatigue and weakness  They report dyspnea, weakness and fatigue when completing ADLs   The patient currently does follow a formal exercise program at home, walking  Patient's PHQ9 score is a six  His NICOLLE 7 is five  When addressed, the patient denies having depression/anxiety  Patient reports excellent social/emotional support  Information to begin using [address] was provided as well as contact information for counseling through "[employer]  The patient is a former smoker (quit 52 years ago)  Patient admits to 100% medication compliance  At rest, the patient rated dyspnea 0-2/10 with SpO2 94% on room air  He is able to work consistency at a 2 87 MET Level  The patients rating of perceived dyspnea during exercise was 0-4/10 with SpO2 90-92%  Resting HR 73 and /58  with appropriate hemodynamic response to exercise reaching 86 and 140/58  Patient will exercise on supplemental O2 2L/min via nasal canula and will titrate O2 to maintain SpO2 >90%  Education on smoking cessation, oxygen use, breathing techniques, pulmonary anatomy, exercise for the pulmonary patient, healthy eating, stress, and relaxation will be provided    Patient goals include: increase strength, reduce dyspnea, improve functional juana Zamudio has been very compliant attending his [facility] sessions  He gives great effort during each session  LE PREs have been added to aerobic workout w/out issue  Initially he required min asst of one to transfer on/off equipment  He is now able to transfer independently  He is now able to ambulate on the TM at 1 5 MPH for 10 minutes  Will continue to progress patient's program as he is able to tolerate  Medication compliance: Yes   Comments: Pt reports to be compliant with medications  Fall Risk: Low   Comments: Ambulates with a steady gait with no assist device    Smoking: Former user; continues to abstain  RPD at Rest: 0-2/10  RPD with Exercise:  0-5/10    Assessment of progression of lung disease and functional status:  CAT: 28/40  Shortness of breath questionnaire: 41/120        EXERCISE ASSESSMENT and PLAN    Current Exercise Program in Rehab:       Frequency: 2-3 days/week        Minutes: 40-45        METS: 2 87 to 3              SpO2: >90%              RPD: 0-5                      HR: 20-30 bpm above rest   RPE: 4-6         Modalities: Treadmill, UBE, NuStep and Recumbent bike      Exercise Progression 30 Day Goals :    Frequency: 2-3 days/week        Minutes: 45-50        METS: 3-4              SpO2: >90%               RPD: 0-3                      HR: 20-30 bpm above rest   RPE: 4-6        Modalities: Treadmill, UBE, NuStep and Recumbent bike     Strength training:  patient has progressed to 70# leg press w/out issue      Modalities: Leg Press, Chest Press, Arm Curl, Front Raises and Shoulder Shrugs    Oxygen Needs: supplemental O2 via nasal cannula @ 2L/min at rest and supplemental O2 via nasal cannula @2L/min with exercise  Oxygen Goal: Maintain SpO2>90% during exercise    Home Exercise: Walking program  Education: pursed lipped breathing, diaphragmatic breathing, relaxation breathing, home exercise and benefits of exercise for pulmonary disease    Goals: reduced score on  USCD Shortness of Breath Questionnaire, Improved 6MW distance by 10%, reduced dyspnea during exercise (0-3/10), improved exercise tolerance (max METs tolerated in pulmonary rehab) and SpO2 >90% during exercise  Progressing:  Will continue to educate and progress as tolerated      Plan: Titrate supplemental oxygen as needed to maintain SpO2>90% with exercise, learn to conserve energy with ADLs , practice breathing techniques 3x/day and reduce time sitting at home    Readiness to change: Action:  (Changing behavior)      NUTRITION ASSESSMENT AND PLAN    Weight control:    Starting weight: 190   Current weight:   187    Diabetes: Patient reported fasting     Goals:reduced BMI to < 25, choose lean meat (93-95%), eliminate processed meats, reduce portion sizes of meat to 3oz or less, increase intake of fish, shellfish, cook without added fat or use vegetable oil/spray and increase intake of meatless meals  Education: heart healthy eating  low sodium diet  hydration  education class: healthy choices for managing pulmonary illness  Progressing:Reviewed Pt goals and determined plan of care  Plan: Education class: Reading Food Labels, Education Class: Heart Healthy Eating, remove salt shaker from table, eat more home cooked meals and eat out less often, will try new grains like brown rice, Aishwarya Balder and will replace sugar sweetened cereals with whole grain or oatmeal  Readiness to change: Action:  (Changing behavior)      PSYCHOSOCIAL ASSESSMENT AND PLAN    Emotional:  Depression assessment:  PHQ-9 = 6             Anxiety measure:  NICOLLE-7 =5  Self-reported stress level: 4/10   Social support: Very Good    Goals:  Reduce perceived stress to 1-3/10, improved [facility] QOL < 27 and PHQ-9 - reduced severity by one level  Education: signs/sxs of depression, benefits of a positive support system, stress management techniques and class:  Stress and Pulmonary Disease    Progressing:Reviewed Pt goals and determined plan of care  Plan: Class: Stress and Your Health and Class: Relaxation  Readiness to change: Preparation:  (Getting ready to change)       OTHER CORE COMPONENTS     Tobacco:   Social History     Tobacco Use   Smoking Status Former Smoker    Quit date: 1970    Years since quittin 1   Smokeless Tobacco Never Used       Tobacco Use Intervention: Referral to tobacco expert:   Pt quit 52 years ago   and has abstained    Blood pressure:    Restin/58   Exercise: 140/58   Recovery: 128/66    Goals: consistent BP < 130/80, reduced dietary sodium <2300mg and moderate intensity exercise >150 mins/wk  Education:  pathophysiology of pulmonary disease, preventing infections, dangers of smoking and tobacco triggers  Progressing:Reviewed Pt goals and determined plan of care  Plan: visit www smokefree gov, avoid places with second hand smoke, Avoid Processed foods, engage in regular exercise, eliminate salt shaker at the table, use salt substitutes, check labels for sodium content and monitor home BP  Readiness to change: Action:  (Changing behavior)

## 2022-02-01 NOTE — TELEPHONE ENCOUNTER
Called spoke with patient's wife about his recent laboratory studies results including liver function tests which appear relatively stable and cholesterol panel which appears improved on current dose of atorvastatin  The patient's wife notes he is tolerating the medication well and denies any significant symptoms therefore will continue to monitor and continue him on current regimen at this time and will need to check liver function tests periodically

## 2022-02-01 NOTE — TELEPHONE ENCOUNTER
Attempted to call patient go over recent laboratory studies  Unfortunately was not able to reach the patient but did leave a message with my name and office number to call back for better time to speak

## 2022-02-03 NOTE — LETTER
February 3, 2022     [facility], [address] [address]    Patient: Art Graver   YOB: 1940   Date of Visit: 2/3/2022       Dear Dr Kofi Chandler: Thank you for referring Bre Carson to me for evaluation  Below are my notes for this consultation  If you have questions, please do not hesitate to call me  I look forward to following your patient along with you  Sincerely,        Jasmeet Ramirez MD        CC: MD Mariella Kelly MD Tyron Staggers, MD  2/3/2022  1:58 PM  Sign when Signing Visit               Surgical Oncology Follow Up       27 [facility] 219 [facility] [address]    Art Dukeser  1940  [id number]  [facility] [address] [facility] [address] [place] [address]  [phone]    Diagnoses and all orders for this visit:    Hepatocellular carcinoma [facility])        Chief Complaint   Patient presents with    Follow-up       No follow-ups on file  Oncology History Overview Note   Patient presents for radiation consult [facility] , he is referred by Dr Addy Ritchie to discuss SIRS Spheres  He is s/p SIRS sphere treatment to right lobe and middle lobe on 10/14/20  Last follow-up with [facility] Lopez, Dr Opal Mcclelland on 9/13/21  TARE  Recent MRI of the abdomen shows evidence of viable residual tumor  Reviewed treatment option is systemic treatment or repeat SIRsphere therapy if feasible  6/10/21 IR chemoembolization   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      9/2/21 Bloodwork:  AFP tumor marker: 3 9  T-Bili: 0 64  Alk Phos: 171  AST: 38  ALT: 39      9/7/21 MRI abdomen w wo contrast  Treated HCC centered within segment MEGHAN and segment VIII, with evidence of viable residual tumor accounting for approximately 60% tumor volume evidenced by APHE and washout  LI-RADS treatment assessment category: LR-TR viable  9/16/21 Med Onc, Dr Debi Hernandez  Pt does need a locally directed therapy  This could be another embolization versus radioembolization  Explained this would be decided at multidisciplinary tumor Conference and after his appt with Dr Lisbet Hunt  The tumor had actually shrunk and there was no metastatic disease so we could consider just local treatment and observation versus putting him on systemic therapy  He has been in favor of holding off on any systemic therapy as he otherwise has a very good quality of life and would rather just have the local procedure done  If he does have progression despite that or if he has metastatic disease he will then consider systemic therapy  9/30/21 Surg Onc, Dr Lisbet Hunt  Pt now has continued viable disease on the MRI  His case was discussed at multidisciplinary clinic this morning  It was recommended that he consider TACE  Sir spheres could be given again if there continues to be recurrence  He is set up to discuss this with IR later next week  We will likely hold off on chemotherapy until liver directed therapy is no longer an option  He will discuss immunotherapy with Medical Oncology  Plan to return in 4 months with a repeat MRI and AFP level  9/30/21 RECTAL/GI MULTIDISCIPLINARY CASE REVIEW  PHYSICIAN RECOMMENDED PLAN:   -Chemo embolization   -Scheduled with IR on 10/5/21      10/5/21 IR, Dr Joseph Don patient for cTACE  - MRI abdomen in 3 months after the procedure to evaluate treatment response           11/2/21 Dr Debi Hernandez follow-up  1/24/22 MRI abdomen   2/3/22 Dr Lisbet Hunt follow-up       Hepatocellular carcinoma [facility])   8/24/2020 Initial Diagnosis    Hepatocellular carcinoma ([facility] )     8/25/2020 Biopsy    IR liver biopsy  - Well-differentiated heptocellular carcinoma     9/22/2020 -  Cancer Staged    Staging form: Liver (Excluding Intrahepatic Bile Ducts), AJCC 8th Edition  - Clinical: Stage IB (cT1b, cN0, cM0) - Signed by Husam Champion MD on 9/22/2020       10/14/2020 - 10/14/2020 Radiation    Field Numbers Energy Treatment Site Starting  Ending  Elapsed  Fraction Total  Overlap Site Overlap         Date Date Days Dose Dose   Dose    Lizyphere  Y90 Rt Lobe + Middle Robe   10/14/20 10/14/20    1 51 GBq  1 51 GBq                                                                                        Date / Time stamp:  10/14/2020  2:43 PM         Staging:  Well-differentiated [address] , August 2020  Treatment history:  Sir spheres, October 2020  TACE in June, 2021  Current treatment:  Chemotherapy pending  Disease status: MARIA M    History of Present Illness:  Patient returns in follow-up of his [address]  His repeat TACE was not performed secondary to him developing COVID  He is still wearing oxygen and is still somewhat fatigued  He is going to pulmonary rehab and this is helping him  His MRI from January 18, 2022 showed the development of numerous metastatic lesions  His previous tumor still has an area of viability  I personally reviewed the films  He comes in now to discuss further therapy  Review of Systems  Complete ROS Surg Onc:   Complete ROS Surg Onc:   Constitutional: The patient denies new or recent history of general fatigue, no recent weight loss, no change in appetite  Eyes: No complaints of visual problems, no scleral icterus  ENT: no complaints of ear pain, no hoarseness, no difficulty swallowing,  no tinnitus and no new masses in head, oral cavity, or neck  Cardiovascular: No complaints of chest pain, no palpitations, no ankle edema  Respiratory: No complaints of shortness of breath, no cough  Gastrointestinal: No complaints of jaundice, no bloody stools, no pale stools  Genitourinary: No complaints of dysuria, no hematuria, no nocturia, no frequent urination, no urethral discharge     Musculoskeletal: No complaints of weakness, paralysis, joint stiffness or arthralgias  Integumentary: No complaints of rash, no new lesions  Neurological: No complaints of convulsions, no seizures, no dizziness  Hematologic/Lymphatic: No complaints of easy bruising  Endocrine:  No hot or cold intolerance  No polydipsia, polyphagia, or polyuria  Allergy/immunology:  No environmental allergies  No food allergies  Not immunocompromised  Skin:  No pallor or rash  No wound          Patient Active Problem List   Diagnosis    Essential hypertension    Hyperlipidemia    Cardiomegaly    Anxiety    Abnormal electrocardiogram    Coronary artery disease without angina pectoris    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)    Viral URI with cough    Reactive airway disease without complication    Medicare annual wellness visit, subsequent    Hyperglycemia    Ankle edema, bilateral    Transaminitis    Respiratory insufficiency    EMILIANO (acute kidney injury) ([facility] )    Elevated troponin    Septic shock (HCC)    Hepatocellular carcinoma (HCC)    Acute deep vein thrombosis (DVT) of popliteal vein of both lower extremities (HCC)    Acute respiratory failure with hypoxia (HCC)    Stage 3a chronic kidney disease (HCC)    Sepsis (HCC)    Pneumonia    Cough    Platelets decreased ([facility] )    Type 2 diabetes mellitus without complication, without long-term current use of insulin (HCC)    Allergic cough    Acute bacterial bronchitis    COVID-19 virus infection    Diastolic heart failure (HCC)    Pulmonary emboli (HCC)    Post-COVID chronic dyspnea    Left carotid artery occlusion    Asymptomatic carotid artery stenosis, right    Vision loss of left eye     Past Medical History:   Diagnosis Date    Coronary artery disease     Gout     Hypercholesterolemia     Hypertension     [facility]      Past Surgical History:   Procedure Laterality Date    ABDOMINAL SURGERY      appy    APPENDECTOMY  CORONARY ARTERY BYPASS GRAFT      IR BIOPSY LIVER MASS  2020    IR CHEMOEMBOLIZATION LIVER TUMOR  6/10/2021    IR Y-90 PRE-ANGIO/EMBO W/ LUNG SCAN  10/6/2020    IR Y-90 RADIOEMBOLIZATION  10/14/2020     Family History   Problem Relation Age of Onset    No Known Problems Mother     No Known Problems Father      Social History     Socioeconomic History    Marital status: /Civil Union     Spouse name: Not on file    Number of children: Not on file    Years of education: Not on file    Highest education level: Not on file   Occupational History    Not on file   Tobacco Use    Smoking status: Former Smoker     Quit date: 1970     Years since quittin 1    Smokeless tobacco: Never Used   Vaping Use    Vaping Use: Never used   Substance and Sexual Activity    Alcohol use: Not Currently    Drug use: Never    Sexual activity: Not on file   Other Topics Concern    Not on file   Social History Narrative    Not on file     Social Determinants of Health     Financial Resource Strain: Not on file   Food Insecurity: No Food Insecurity    Worried About Running Out of Food in the Last Year: Never true    [address] in the Last Year: Never true   Transportation Needs: No Transportation Needs    Lack of Transportation (Medical): No    Lack of Transportation (Non-Medical):  No   Physical Activity: Not on file   Stress: Not on file   Social Connections: Not on file   Intimate Partner Violence: Not on file   Housing Stability: Low Risk     Unable to Pay for Housing in the Last Year: No    Number of Places Lived in the Last Year: 1    Unstable Housing in the Last Year: No       Current Outpatient Medications:     allopurinol (ZYLOPRIM) 300 mg tablet, Take 300 mg by mouth daily, Disp: , Rfl:     apixaban (Eliquis) 5 mg, Take 1 tablet (5 mg total) by mouth 2 (two) times a day, Disp: 180 tablet, Rfl: 0    aspirin 81 mg chewable tablet, Chew 1 tablet (81 mg total) daily, Disp: 30 tablet, Rfl: 0    atorvastatin (LIPITOR) 40 mg tablet, Take 1 tablet (40 mg total) by mouth daily, Disp: 30 tablet, Rfl: 5    cholecalciferol (VITAMIN D3) 400 units tablet, Take 1,000 Units by mouth daily , Disp: , Rfl:     lisinopril (ZESTRIL) 2 5 mg tablet, Take 1 tablet (2 5 mg total) by mouth daily, Disp: 30 tablet, Rfl: 5    metoprolol tartrate (LOPRESSOR) 25 mg tablet, Take 1 tablet (25 mg total) by mouth 2 (two) times a day, Disp: 60 tablet, Rfl: 3    Omega-3 Fatty Acids (fish oil) 1,000 mg, Take 1,000 mg by mouth daily, Disp: , Rfl:     Promethazine-DM (PHENERGAN-DM) 6 25-15 mg/5 mL oral syrup, Take 10 mL by mouth 3 (three) times a day as needed for cough, Disp: 240 mL, Rfl: 1    amLODIPine (NORVASC) 5 mg tablet, Take 1 tablet (5 mg total) by mouth daily, Disp: 30 tablet, Rfl: 5    pantoprazole (PROTONIX) 40 mg tablet, Take 1 tablet (40 mg total) by mouth daily in the early morning (Patient not taking: Reported on 9/16/2021), Disp: 30 tablet, Rfl: 0  No Known Allergies  Vitals:    02/03/22 1341   BP: 120/62   Pulse: 77   Resp: 18   Temp: 98 4 °F (36 9 °C)   SpO2: 98%       Physical Exam  Constitutional: General appearance: The Patient is well-developed and well-nourished who appears the stated age in no acute distress  Patient is pleasant and talkative  His breathing is slightly labored  HEENT:  Normocephalic  Sclerae are anicteric  Mucous membranes are moist  Neck is supple without adenopathy  No JVD  Chest: The lungs are clear to auscultation  Cardiac: Heart is regular rate  Abdomen: Abdomen is soft, non-tender, non-distended and without masses  Extremities: There is no clubbing or cyanosis  There is no edema  Symmetric  Neuro: Grossly nonfocal  Gait is normal      Lymphatic: No evidence of cervical adenopathy bilaterally  No evidence of axillary adenopathy bilaterally  No evidence of inguinal adenopathy bilaterally  Skin: Warm, anicteric      Psych:  Patient is pleasant and talkative  Breasts:        Pathology:  [unfilled]    Labs:      Imaging  CT head wo contrast    Result Date: 1/14/2022  Narrative: CT BRAIN - WITHOUT CONTRAST INDICATION:   I65 21: Occlusion and stenosis of right carotid artery I65 22: Occlusion and stenosis of left carotid artery H54 62: Unqualified visual loss, left eye, normal vision right eye  COMPARISON:  None  TECHNIQUE:  CT examination of the brain was performed  In addition to axial images, sagittal and coronal 2D reformatted images were created and submitted for interpretation  Radiation dose length product (DLP) for this visit:  863 16 mGy-cm   This examination, like all CT scans performed in the [facility], was performed utilizing techniques to minimize radiation dose exposure, including the use of iterative  reconstruction and automated exposure control  IMAGE QUALITY:  Diagnostic  FINDINGS: PARENCHYMA:  No intracranial mass, mass effect or midline shift  No CT signs of acute infarction  No acute parenchymal hemorrhage  VENTRICLES AND EXTRA-AXIAL SPACES:  Normal for the patient's age  VISUALIZED ORBITS AND PARANASAL SINUSES:  Unremarkable  CALVARIUM AND EXTRACRANIAL SOFT TISSUES:  Normal      Impression: No acute intracranial abnormality  Workstation performed: [id number]     MRI abdomen w wo contrast    Addendum Date: 1/21/2022 Addendum:   ADDENDUM: The "notification" section of the report should read as follows: I personally discussed this study with Dr Mariama Fermin on 1/19/2022 at 3:03 PM      Result Date: 1/21/2022  Narrative: MRI - ABDOMEN - WITH AND WITHOUT CONTRAST INDICATION:  History of well-differentiated HCC, status post radio embolization with Y 90 on 10/14/2020  Patient subsequently had TACE on 6/10/2021 for viable residual tumor  Evidence of persistent viable tumor on prior MRI  Follow-up  COMPARISON: MRI abdomen dated 9/7/2021   TECHNIQUE:  The following pulse sequences were obtained prior to and following the administration of intravenous contrast:     Coronal and axial T2 with TE of 90 and 180 respectively, axial T2 with fat saturation, axial FIESTA fat-sat, axial T1-weighted in-and-out-of phase, axial DWI/ADC, precontrast axial T1 with fat saturation, post-contrast dynamic axial T1 with fat saturation at 20, 70, and 180 seconds, coronal T1  with fat saturation and 7 minute delayed axial T1 with fat saturation  Imaging performed on 1 5T MRI   IV Contrast:  8 mL of Gadobutrol injection (SINGLE-DOSE) FINDINGS: LOWER CHEST:   Median sternotomy  Bibasilar parenchymal opacities, likely related to patient's Covid 19 pneumonia  Trace left pleural effusion  LIVER: -Mild hepatic steatosis  No gross morphologic features of liver cirrhosis  Focal liver lesions as follows: -Interval increase in size of treated lesion centered within segment MEGHAN and segment VIII, with treatment zone currently measuring 7 3 x 6 4 cm (11/177), previously 6 6 x 5 8 cm  Viable area of the tumor measures 5 3 cm in maximal length (11/174), previously 4 9 cm  Evaluation of the postcontrast images is suboptimal due to timing of imaging and motion artifact  However, within the limitations, there is enhancement/hyperenhancement of the viable area with washout (series 11 images 36-48, and image 176)  LI-RADS treatment assessment category: LR-TR viable  -Interval development of numerous (greater than 30) focal observations in the right and left hepatic lobes, suspicious for multifocal hepatocellular carcinoma/malignancy   All lesions demonstrate restricted diffusion, hypointense T1 weighted signal and mildly hyperintense T2-weighted signal   Representative examples as follows: *  1 6 cm segment MEGHAN observation with peripheral arterial phase hyperenhancement and washout (series 11 image 50 and 183), LR-M  *  1 2 cm segment VII observation with peripheral arterial phase hyperenhancement and washout (series 11 image 54 and 186),LR-M  *  New 1 3 cm segment VIII observation with washout, but no arterial phase hyperenhancement (series 11 image 299), LR-4 (washout,+DWI+new lesion)  *  New 1 5 cm segment VIII observation with washout, but no arterial phase hyperenhancement (series 11 image 299), LR-4 (washout,+DWI+new lesion)  -Several additional lesions seen on DWI images, are less conspicuous on postcontrast images  -Stable anterior capsular retraction with delayed phase enhancement in segment V, likely a sequela of radiation fibrosis  -There is persistent mass effect on the right and left portal veins  Otherwise, the hepatic veins and portal veins are patent  BILE DUCTS: No intrahepatic or extrahepatic bile duct dilation  GALLBLADDER:  Normal  PANCREAS: Normal  No main pancreatic ductal dilation  ADRENAL GLANDS:  Normal  SPLEEN:  Normal  KIDNEYS/PROXIMAL URETERS: No hydroureteronephrosis  No suspicious renal mass  Stable bilateral renal cysts  BOWEL: Scattered colonic diverticula without findings of acute diverticulitis  No dilated loops of bowel  PERITONEUM/RETROPERITONEUM: No ascites  LYMPH NODES: No abdominal lymphadenopathy  VASCULAR STRUCTURES:  No aneurysm  ABDOMINAL WALL:  Unremarkable  OSSEOUS STRUCTURES:  No suspicious osseous lesion  Degenerative changes of the spine  Impression: 1  Interval increase in size of treated [address]  centered within segment MEGHAN and segment VIII, with increased size of viable tumor  LI-RADS treatment assessment category: LR-TR viable  2   Interval development of numerous (greater than 30) focal observations in the right and left hepatic lobes, suspicious for multifocal hepatocellular carcinoma/malignancy ( LR- M and LR-4 observations)    I personally discussed this study with Karely Gandhi on 1/19/2022 at 3:03 PM  Workstation performed: [id number]     VAS lower limb arterial duplex, complete bilateral    Result Date: 1/19/2022  Narrative:  [facility] REPORT CLINICAL: Indications: Yearly surveillance for progression of disease  Patient reports having pain in his legs after walking a few blocks  Operative History: 1996-01-01 CABG 8211-30-90 Left GSV harvest Risk Factors The patient has history of HTN, Diabetes (IDDM), Hyperlipidemia, CKD, PAD and CAD  Clinical Right Pressure:  106/ mm Hg, Left Pressure:  102/ mm Hg  FINDINGS:  Right                  Impression  PSV (cm/s)  EDV  Common Femoral Artery                     174   14  Prox Profunda          50-75%             308    0  Prox SFA                                  131   12  Mid SFA                                    41    7  Dist SFA                                   35    1  Proximal Pop                               32    8  Distal Pop                                 27    5  Tibioperoneal                              30    0  Dist Post Tibial                           32    6  Dist  Ant  Tibial      Occluded             0    0  Dist Peroneal                              15    0   Left                   Impression      PSV (cm/s)  EDV  Common Femoral Artery  >75%                   312   27  Prox Profunda                                  73    0  Prox SFA                                       54    0  Mid SFA                                        28    0  Dist SFA               >75%                   134    6  Proximal Pop           50-75%                 108   15  Distal Pop                                     18    3  Tibioperoneal          Not visualized                   Dist Post Tibial                               27    7  Dist  Ant  Tibial                              10    3  Dist Peroneal                                  17    6     CONCLUSION: Impression: RIGHT LOWER LIMB: This resting evaluation shows a 50-75% stenosis in the profunda femoris artery and an occlusion vs high grade stenosis in the anterior tibial artery  There is diffuse atherosclerotic disease in the remaining femoro-popliteal and tibio-peroneal segments   Ankle/Brachial index: 0 76, which is in the moderate disease category  (Prior 0 98) PVR/ PPG tracings are mildly dampened  Metatarsal pressure of 66 mmHg Great toe pressure of 31 mmHg, within the healing range for a non-diabetic patient  LEFT LOWER LIMB: This resting evaluation shows a >75% stenosis in the common femoral and distal superficial femoral artery  There is a 50-75% stenosis noted in the proximal popliteal artery with diffuse atherosclerotic disease in the remaining femoro-popliteal and tibio-peroneal segments  Ankle/Brachial index: 0 68, which is in the moderate disease category  (Prior 0 44) PVR/ PPG tracings are mildly dampened  Metatarsal pressure of 59 mmHg Great toe pressure of 30 mmHg, within the healing range for a non-diabetic patient  Compared to previous study on 8/9/2018, the right FLIP has decreased to moderate disease from normal and there is a new stenosis in the right DFA and occlusion in the right BEATRIZ  The left FLIP has improved  There is increased stenosis of the left CFA and new stenosis of the left distal SFA and proximal popliteal artery  Unable to reproduce stenosis in the left DFA, proximal SFA and distal Popliteal artery  SIGNATURE: Electronically Signed by: Luis Jaffe MD on 2022-01-19 05:44:17 PM    I reviewed the above laboratory and imaging data  Discussion/Summary: 23-year-old male with well-differentiated HCC   The lesion is very centrally located in his liver  Starlet Harness his age and the central location, I am not sure that he would tolerate hepatectomy well, consequently, we elected on Sir spheres embolization   He tolerated this well in October of 2020  He underwent chemoembolization in June 2021 for viable tumor  He tolerated that well  He had continued viable disease and repeat embolization was recommended, but this was not performed secondary to [place]  He now has innumerable liver lesions  His case was discussed at upper GI working group and chemotherapy/immunotherapy was recommended  If there is disease stability or improvement, consideration could be given to Sir spheres in the next 3-4 months  I will set him up with Medical Oncology  I will tentatively see him again in 4 months  We will arrange imaging at that time  Assuming there is stability and he is not progressing through chemotherapy we will consider Sir spheres  He is agreeable to this   All his questions were answered

## 2022-02-03 NOTE — PROGRESS NOTES
Surgical Oncology Follow Up       [facility] [address] [facility] [address]  [address] [facility]  [address]  [place] David Qiu Obdulio Chi  1940  [phone]  [facility] [address]  [address] [facility]  [address]  [place] [address]  [phone]    Diagnoses and all orders for this visit:    Hepatocellular carcinoma [facility])        Chief Complaint   Patient presents with    Follow-up       No follow-ups on file  Oncology History Overview Note   Patient presents for radiation consult for [facility] [address] , he is referred by Dr Odilia Vo to discuss SIRS Spheres  He is s/p SIRS sphere treatment to right lobe and middle lobe on 10/14/20  Last follow-up with Dr Federica Sykes on 9/13/21  TARE  Recent MRI of the abdomen shows evidence of viable residual tumor  Reviewed treatment option is systemic treatment or repeat SIRsphere therapy if feasible  6/10/21 IR chemoembolization   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  9/2/21 Bloodwork:  AFP tumor marker: 3 9  T-Bili: 0 64  Alk Phos: 171  AST: 38  ALT: 39      9/7/21 MRI abdomen w wo contrast  Treated HCC centered within segment MEGHAN and segment VIII, with evidence of viable residual tumor accounting for approximately 60% tumor volume evidenced by APHE and washout  LI-RADS treatment assessment category: LR-TR viable  9/16/21 Med Onc, Dr Odilia Vo  Pt does need a locally directed therapy  This could be another embolization versus radioembolization  Explained this would be decided at multidisciplinary tumor Conference and after his appt with Dr Modesta Green  The tumor had actually shrunk and there was no metastatic disease so we could consider just local treatment and observation versus putting him on systemic therapy    He has been in favor of holding off on any systemic therapy as he otherwise has a very good quality of life and would rather just have the local procedure done  If he does have progression despite that or if he has metastatic disease he will then consider systemic therapy  9/30/21 Surg Onc, Dr Diego Guerra  Pt now has continued viable disease on the MRI  His case was discussed at multidisciplinary clinic this morning  It was recommended that he consider TACE  Sir spheres could be given again if there continues to be recurrence  He is set up to discuss this with IR later next week  We will likely hold off on chemotherapy until liver directed therapy is no longer an option  He will discuss immunotherapy with Medical Oncology  Plan to return in 4 months with a repeat MRI and AFP level  9/30/21 RECTAL/GI MULTIDISCIPLINARY CASE REVIEW  PHYSICIAN RECOMMENDED PLAN:   -Chemo embolization   -Scheduled with IR on 10/5/21      10/5/21 IR, Dr Loyda Lorenzo patient for cTACE  - MRI abdomen in 3 months after the procedure to evaluate treatment response           11/2/21 Dr Lisa Noriega follow-up  1/24/22 MRI abdomen   2/3/22 Dr Diego Guerra follow-up       Hepatocellular carcinoma ([facility] [address] )   8/24/2020 Initial Diagnosis    Hepatocellular carcinoma ([address] )     8/25/2020 Biopsy    IR liver biopsy  - Well-differentiated heptocellular carcinoma     9/22/2020 -  Cancer Staged    Staging form: Liver (Excluding Intrahepatic Bile Ducts), AJCC 8th Edition  - Clinical: Stage IB (cT1b, cN0, cM0) - Signed by Mary Rankin MD on 9/22/2020       10/14/2020 - 10/14/2020 Radiation    Field Numbers Energy Treatment Site Starting  Ending  Elapsed  Fraction Total  Overlap Site Overlap         Date Date Days Dose Dose   Dose    SirSphere  Y90 Rt Lobe + Middle Robe   10/14/20 10/14/20    1 51 GBq  1 51 GBq                                                                                        Date / Time stamp:  10/14/2020  2:43 PM         Staging:  Well-differentiated [address] , August 2020  Treatment history:  Sir ranjit, October 2020  TACE in June, 2021  Current treatment:  Chemotherapy pending  Disease status: MARIA M    History of Present Illness:  Patient returns in follow-up of his [address]  His repeat TACE was not performed secondary to him developing COVID  He is still wearing oxygen and is still somewhat fatigued  He is going to pulmonary rehab and this is helping him  His MRI from January 18, 2022 showed the development of numerous metastatic lesions  His previous tumor still has an area of viability  I personally reviewed the films  He comes in now to discuss further therapy  Review of Systems  Complete ROS Surg Onc:   Complete ROS Surg Onc:   Constitutional: The patient denies new or recent history of general fatigue, no recent weight loss, no change in appetite  Eyes: No complaints of visual problems, no scleral icterus  ENT: no complaints of ear pain, no hoarseness, no difficulty swallowing,  no tinnitus and no new masses in head, oral cavity, or neck  Cardiovascular: No complaints of chest pain, no palpitations, no ankle edema  Respiratory: No complaints of shortness of breath, no cough  Gastrointestinal: No complaints of jaundice, no bloody stools, no pale stools  Genitourinary: No complaints of dysuria, no hematuria, no nocturia, no frequent urination, no urethral discharge  Musculoskeletal: No complaints of weakness, paralysis, joint stiffness or arthralgias  Integumentary: No complaints of rash, no new lesions  Neurological: No complaints of convulsions, no seizures, no dizziness  Hematologic/Lymphatic: No complaints of easy bruising  Endocrine:  No hot or cold intolerance  No polydipsia, polyphagia, or polyuria  Allergy/immunology:  No environmental allergies  No food allergies  Not immunocompromised  Skin:  No pallor or rash  No wound          Patient Active Problem List   Diagnosis    Essential hypertension    Hyperlipidemia    Cardiomegaly    Anxiety    Abnormal electrocardiogram    Coronary artery disease without angina pectoris    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)    Viral URI with cough    Reactive airway disease without complication    Medicare annual wellness visit, subsequent    Hyperglycemia    Ankle edema, bilateral    Transaminitis    Respiratory insufficiency    EMILIANO (acute kidney injury) ([facility] [address] )    Elevated troponin    Septic shock (HCC)    Hepatocellular carcinoma (HCC)    Acute deep vein thrombosis (DVT) of popliteal vein of both lower extremities (HCC)    Acute respiratory failure with hypoxia (HCC)    Stage 3a chronic kidney disease (HCC)    Sepsis (HCC)    Pneumonia    Cough    Platelets decreased (HCC)    Type 2 diabetes mellitus without complication, without long-term current use of insulin (HCC)    Allergic cough    Acute bacterial bronchitis    COVID-19 virus infection    Diastolic heart failure (HCC)    Pulmonary emboli (HCC)    Post-COVID chronic dyspnea    Left carotid artery occlusion    Asymptomatic carotid artery stenosis, right    Vision loss of left eye     Past Medical History:   Diagnosis Date    Coronary artery disease     Gout     Hypercholesterolemia     Hypertension     Liver cancer ([facility] [address] )      Past Surgical History:   Procedure Laterality Date    ABDOMINAL SURGERY      appy    APPENDECTOMY      CORONARY ARTERY BYPASS GRAFT      IR BIOPSY LIVER MASS  8/25/2020    IR CHEMOEMBOLIZATION LIVER TUMOR  6/10/2021    IR Y-90 PRE-ANGIO/EMBO W/ LUNG SCAN  10/6/2020    IR Y-90 RADIOEMBOLIZATION  10/14/2020     Family History   Problem Relation Age of Onset    No Known Problems Mother     No Known Problems Father      Social History     Socioeconomic History    Marital status: /Civil Union     Spouse name: Not on file    Number of children: Not on file    Years of education: Not on file    Highest education level: Not on file   Occupational History    Not on file   Tobacco Use    Smoking status: Former Smoker     Quit date: 1970     Years since quittin 1    Smokeless tobacco: Never Used   Vaping Use    Vaping Use: Never used   Substance and Sexual Activity    Alcohol use: Not Currently    Drug use: Never    Sexual activity: Not on file   Other Topics Concern    Not on file   Social History Narrative    Not on file     Social Determinants of Health     Financial Resource Strain: Not on file   Food Insecurity: No Food Insecurity    Worried About Running Out of Food in the Last Year: Never true    Familia of Food in the Last Year: Never true   Transportation Needs: No Transportation Needs    Lack of Transportation (Medical): No    Lack of Transportation (Non-Medical):  No   Physical Activity: Not on file   Stress: Not on file   Social Connections: Not on file   Intimate Partner Violence: Not on file   Housing Stability: Low Risk     Unable to Pay for Housing in the Last Year: No    Number of Places Lived in the Last Year: 1    Unstable Housing in the Last Year: No       Current Outpatient Medications:     allopurinol (ZYLOPRIM) 300 mg tablet, Take 300 mg by mouth daily, Disp: , Rfl:     apixaban (Eliquis) 5 mg, Take 1 tablet (5 mg total) by mouth 2 (two) times a day, Disp: 180 tablet, Rfl: 0    aspirin 81 mg chewable tablet, Chew 1 tablet (81 mg total) daily, Disp: 30 tablet, Rfl: 0    atorvastatin (LIPITOR) 40 mg tablet, Take 1 tablet (40 mg total) by mouth daily, Disp: 30 tablet, Rfl: 5    cholecalciferol (VITAMIN D3) 400 units tablet, Take 1,000 Units by mouth daily , Disp: , Rfl:     lisinopril (ZESTRIL) 2 5 mg tablet, Take 1 tablet (2 5 mg total) by mouth daily, Disp: 30 tablet, Rfl: 5    metoprolol tartrate (LOPRESSOR) 25 mg tablet, Take 1 tablet (25 mg total) by mouth 2 (two) times a day, Disp: 60 tablet, Rfl: 3    Omega-3 Fatty Acids (fish oil) 1,000 mg, Take 1,000 mg by mouth daily, Disp: , Rfl:     Promethazine-DM (PHENERGAN-DM) 6 25-15 mg/5 mL oral syrup, Take 10 mL by mouth 3 (three) times a day as needed for cough, Disp: 240 mL, Rfl: 1    amLODIPine (NORVASC) 5 mg tablet, Take 1 tablet (5 mg total) by mouth daily, Disp: 30 tablet, Rfl: 5    pantoprazole (PROTONIX) 40 mg tablet, Take 1 tablet (40 mg total) by mouth daily in the early morning (Patient not taking: Reported on 9/16/2021), Disp: 30 tablet, Rfl: 0  No Known Allergies  Vitals:    02/03/22 1341   BP: 120/62   Pulse: 77   Resp: 18   Temp: 98 4 °F (36 9 °C)   SpO2: 98%       Physical Exam  Constitutional: General appearance: The Patient is well-developed and well-nourished who appears the stated age in no acute distress  Patient is pleasant and talkative  His breathing is slightly labored  HEENT:  Normocephalic  Sclerae are anicteric  Mucous membranes are moist  Neck is supple without adenopathy  No JVD  Chest: The lungs are clear to auscultation  Cardiac: Heart is regular rate  Abdomen: Abdomen is soft, non-tender, non-distended and without masses  Extremities: There is no clubbing or cyanosis  There is no edema  Symmetric  Neuro: Grossly nonfocal  Gait is normal      Lymphatic: No evidence of cervical adenopathy bilaterally  No evidence of axillary adenopathy bilaterally  No evidence of inguinal adenopathy bilaterally  Skin: Warm, anicteric  Psych:  Patient is pleasant and talkative  Breasts:        Pathology:  [unfilled]    Labs:      Imaging  CT head wo contrast    Result Date: 1/14/2022  Narrative: CT BRAIN - WITHOUT CONTRAST INDICATION:   I65 21: Occlusion and stenosis of right carotid artery I65 22: Occlusion and stenosis of left carotid artery H54 62: Unqualified visual loss, left eye, normal vision right eye  COMPARISON:  None  TECHNIQUE:  CT examination of the brain was performed  In addition to axial images, sagittal and coronal 2D reformatted images were created and submitted for interpretation   Radiation dose length product (DLP) for this visit:  863 16 mGy-cm   This examination, like all CT scans performed in the [facility], was performed utilizing techniques to minimize radiation dose exposure, including the use of iterative  reconstruction and automated exposure control  IMAGE QUALITY:  Diagnostic  FINDINGS: PARENCHYMA:  No intracranial mass, mass effect or midline shift  No CT signs of acute infarction  No acute parenchymal hemorrhage  VENTRICLES AND EXTRA-AXIAL SPACES:  Normal for the patient's age  VISUALIZED ORBITS AND PARANASAL SINUSES:  Unremarkable  CALVARIUM AND EXTRACRANIAL SOFT TISSUES:  Normal      Impression: No acute intracranial abnormality  Workstation performed: [id number]     MRI abdomen w wo contrast    Addendum Date: 1/21/2022 Addendum:   ADDENDUM: The "notification" section of the report should read as follows: I personally discussed this study with Dr Susanna Lee on 1/19/2022 at 3:03 PM      Result Date: 1/21/2022  Narrative: MRI - ABDOMEN - WITH AND WITHOUT CONTRAST INDICATION:  History of well-differentiated HCC, status post radio embolization with Y 90 on 10/14/2020  Patient subsequently had TACE on 6/10/2021 for viable residual tumor  Evidence of persistent viable tumor on prior MRI  Follow-up  COMPARISON: MRI abdomen dated 9/7/2021  TECHNIQUE:  The following pulse sequences were obtained prior to and following the administration of intravenous contrast:     Coronal and axial T2 with TE of 90 and 180 respectively, axial T2 with fat saturation, axial FIESTA fat-sat, axial T1-weighted in-and-out-of phase, axial DWI/ADC, precontrast axial T1 with fat saturation, post-contrast dynamic axial T1 with fat saturation at 20, 70, and 180 seconds, coronal T1  with fat saturation and 7 minute delayed axial T1 with fat saturation  Imaging performed on 1 5T MRI   IV Contrast:  8 mL of Gadobutrol injection (SINGLE-DOSE) FINDINGS: LOWER CHEST:   Median sternotomy    Bibasilar parenchymal opacities, likely related to patient's Covid 19 pneumonia  Trace left pleural effusion  LIVER: -Mild hepatic steatosis  No gross morphologic features of liver cirrhosis  Focal liver lesions as follows: -Interval increase in size of treated lesion centered within segment MEGHAN and segment VIII, with treatment zone currently measuring 7 3 x 6 4 cm (11/177), previously 6 6 x 5 8 cm  Viable area of the tumor measures 5 3 cm in maximal length (11/174), previously 4 9 cm  Evaluation of the postcontrast images is suboptimal due to timing of imaging and motion artifact  However, within the limitations, there is enhancement/hyperenhancement of the viable area with washout (series 11 images 36-48, and image 176)  LI-RADS treatment assessment category: LR-TR viable  -Interval development of numerous (greater than 30) focal observations in the right and left hepatic lobes, suspicious for multifocal hepatocellular carcinoma/malignancy  All lesions demonstrate restricted diffusion, hypointense T1 weighted signal and mildly hyperintense T2-weighted signal   Representative examples as follows: *  1 6 cm segment MEGHAN observation with peripheral arterial phase hyperenhancement and washout (series 11 image 50 and 183), LR-M  *  1 2 cm segment VII observation with peripheral arterial phase hyperenhancement and washout (series 11 image 54 and 186),LR-M  *  New 1 3 cm segment VIII observation with washout, but no arterial phase hyperenhancement (series 11 image 299), LR-4 (washout,+DWI+new lesion)  *  New 1 5 cm segment VIII observation with washout, but no arterial phase hyperenhancement (series 11 image 299), LR-4 (washout,+DWI+new lesion)  -Several additional lesions seen on DWI images, are less conspicuous on postcontrast images  -Stable anterior capsular retraction with delayed phase enhancement in segment V, likely a sequela of radiation fibrosis  -There is persistent mass effect on the right and left portal veins    Otherwise, the hepatic veins and portal veins are patent  BILE DUCTS: No intrahepatic or extrahepatic bile duct dilation  GALLBLADDER:  Normal  PANCREAS: Normal  No main pancreatic ductal dilation  ADRENAL GLANDS:  Normal  SPLEEN:  Normal  KIDNEYS/PROXIMAL URETERS: No hydroureteronephrosis  No suspicious renal mass  Stable bilateral renal cysts  BOWEL: Scattered colonic diverticula without findings of acute diverticulitis  No dilated loops of bowel  PERITONEUM/RETROPERITONEUM: No ascites  LYMPH NODES: No abdominal lymphadenopathy  VASCULAR STRUCTURES:  No aneurysm  ABDOMINAL WALL:  Unremarkable  OSSEOUS STRUCTURES:  No suspicious osseous lesion  Degenerative changes of the spine  Impression: 1  Interval increase in size of treated [address]  centered within segment MEGHAN and segment VIII, with increased size of viable tumor  LI-RADS treatment assessment category: LR-TR viable  2   Interval development of numerous (greater than 30) focal observations in the right and left hepatic lobes, suspicious for multifocal hepatocellular carcinoma/malignancy ( LR- M and LR-4 observations)  I personally discussed this study with Mervin Bennett on 1/19/2022 at 3:03 PM  Workstation performed: [id number]     VAS lower limb arterial duplex, complete bilateral    Result Date: 1/19/2022  Narrative:  [facility] REPORT CLINICAL: Indications: Yearly surveillance for progression of disease  Patient reports having pain in his legs after walking a few blocks  Operative History: 1996-01-01 CABG [id number] Left GSV harvest Risk Factors The patient has history of HTN, Diabetes (IDDM), Hyperlipidemia, CKD, PAD and CAD  Clinical Right Pressure:  106/ mm Hg, Left Pressure:  102/ mm Hg    FINDINGS:  Right                  Impression  PSV (cm/s)  EDV  Common Femoral Artery                     174   14  Prox Profunda          50-75%             308    0  Prox SFA                                  131   12  Mid SFA                                    41 7  Dist SFA                                   35    1  Proximal Pop                               32    8  Distal Pop                                 27    5  Tibioperoneal                              30    0  Dist Post Tibial                           32    6  Dist  Ant  Tibial      Occluded             0    0  Dist Peroneal                              15    0   Left                   Impression      PSV (cm/s)  EDV  Common Femoral Artery  >75%                   312   27  Prox Profunda                                  73    0  Prox SFA                                       54    0  Mid SFA                                        28    0  Dist SFA               >75%                   134    6  Proximal Pop           50-75%                 108   15  Distal Pop                                     18    3  Tibioperoneal          Not visualized                   Dist Post Tibial                               27    7  Dist  Ant  Tibial                              10    3  Dist Peroneal                                  17    6     CONCLUSION: Impression: RIGHT LOWER LIMB: This resting evaluation shows a 50-75% stenosis in the profunda femoris artery and an occlusion vs high grade stenosis in the anterior tibial artery  There is diffuse atherosclerotic disease in the remaining femoro-popliteal and tibio-peroneal segments  Ankle/Brachial index: 0 76, which is in the moderate disease category  (Prior 0 98) PVR/ PPG tracings are mildly dampened  Metatarsal pressure of 66 mmHg Great toe pressure of 31 mmHg, within the healing range for a non-diabetic patient  LEFT LOWER LIMB: This resting evaluation shows a >75% stenosis in the common femoral and distal superficial femoral artery  There is a 50-75% stenosis noted in the proximal popliteal artery with diffuse atherosclerotic disease in the remaining femoro-popliteal and tibio-peroneal segments  Ankle/Brachial index: 0 68, which is in the moderate disease category   (Prior 0 44) PVR/ PPG tracings are mildly dampened  Metatarsal pressure of 59 mmHg Great toe pressure of 30 mmHg, within the healing range for a non-diabetic patient  Compared to previous study on 8/9/2018, the right FLIP has decreased to moderate disease from normal and there is a new stenosis in the right DFA and occlusion in the right BEATRIZ  The left FLIP has improved  There is increased stenosis of the left CFA and new stenosis of the left distal SFA and proximal popliteal artery  Unable to reproduce stenosis in the left DFA, proximal SFA and distal Popliteal artery  SIGNATURE: Electronically Signed by: Jomar Prince MD on 2022-01-19 05:44:17 PM    I reviewed the above laboratory and imaging data  Discussion/Summary: 75-year-old male with well-differentiated HCC   The lesion is very centrally located in his liver  Acquanetta Cake his age and the central location, I am not sure that he would tolerate hepatectomy well, consequently, we elected on Sir spheres embolization   He tolerated this well in October of 2020  He underwent chemoembolization in June 2021 for viable tumor  He tolerated that well  He had continued viable disease and repeat embolization was recommended, but this was not performed secondary to [place]  He now has innumerable liver lesions  His case was discussed at upper GI working group and chemotherapy/immunotherapy was recommended  If there is disease stability or improvement, consideration could be given to Sir spheres in the next 3-4 months  I will set him up with Medical Oncology  I will tentatively see him again in 4 months  We will arrange imaging at that time  Assuming there is stability and he is not progressing through chemotherapy we will consider Sir spheres  He is agreeable to this   All his questions were answered

## 2022-02-03 NOTE — PROGRESS NOTES
RECTAL/GI MULTIDISCIPLINARY CASE REVIEW    DATE: 2/3/2022      PRESENTING DOCTOR: Dr Nohemy Li      DIAGNOSIS: Sofya Velez was presented at the Rectal/GI Multidisciplinary Conference today  PHYSICIAN RECOMMENDED PLAN:    -Consider systemic treatment to control disease   -Consider Y90 treating one lobe at a time; monitor LFTs to maintain stability  -Patient is scheduled to see Dr Nohemy Li on 2/3 to discuss recommendations and evaluate the pt's performance status     Team agreed to plan  The final treatment plan will be left to the discretion of the patient and the treating physician  DISCLAIMERS:  TO THE TREATING PHYSICIAN:  This conference is a meeting of clinicians from various specialty areas who evaluate and discuss patients for whom a multidisciplinary treatment approach is being considered  Please note that the above opinion was a consensus of the conference attendees and is intended only to assist in quality care of the discussed patient  The responsibility for follow up on the input given during the conference, along with any final decisions regarding plan of care, is that of the patient and the patient's provider  Accordingly, appointments have only been recommended based on this information and have NOT been scheduled unless otherwise noted  TO THE PATIENT:  This summary is a brief record of major aspects of your cancer treatment  You may choose to share a copy with any of your doctors or nurses  However, this is not a detailed or comprehensive record of your care        NCCN guidelines were readily available for review at this discussion

## 2022-02-04 NOTE — TELEPHONE ENCOUNTER
Spoke with patient wife Liz Dejesus  regarding a new referral being placed by Dr Danielle Ascencio to see med onc, patient currently see Dr Dom Edgar stated that they wanted to be seen at the [facility] office       I offered Dr Claudeen Cobble next available 3/17but she decided  to take next available  2/9 with Dr Justine Michel at [facility]

## 2022-02-08 PROBLEM — R09.02 HYPOXIA: Status: ACTIVE | Noted: 2022-01-01

## 2022-02-08 NOTE — PROGRESS NOTES
Telephone call to Kate Garzon to introduce myself and explain my role in [facility] left voicemail regarding same  I stated I would reach out again after his appointment with Dr Debi Hernandez tomorrow 2/9/2022   I did leave a call back number

## 2022-02-08 NOTE — PROGRESS NOTES
Assessment/Plan:       Problem List Items Addressed This Visit        Digestive    Hepatocellular carcinoma ([facility] ) - Primary     Hepatocellular carcinoma worked up through Hematology-Oncology and oncologic surgery  He is following up in light of progression of disease for additional treatment prior to resection  Discussed Chemotherapy-pt has appointment tomorrow  Endocrine    Type 2 diabetes mellitus without complication, without long-term current use of insulin (HCC)       Lab Results   Component Value Date    HGBA1C 6 2 (H) 02/01/2022   Diabetes stable control A1c at 6 2 continue to monitor blood sugar and diet            Respiratory    Hypoxia     Patient is stable at rest but O2 sats will drop into the 80s with exertion continue with oxygen at home            Cardiovascular and Mediastinum    Acute deep vein thrombosis (DVT) of popliteal vein of both lower extremities (HCC)     No worsening leg swelling or pain continue with Eliquis 5 mg twice daily            Genitourinary    Stage 3a chronic kidney disease ([facility] )     Lab Results   Component Value Date    EGFR 65 02/01/2022    EGFR 64 01/26/2022    EGFR 65 12/21/2021    CREATININE 1 05 02/01/2022    CREATININE 1 06 01/26/2022    CREATININE 1 05 12/21/2021   Patient is stable overall with GFR at 65 and creatinine 1 05 he is doing well and will continue on his current medication regimen and remain active physically and keep hydrated repeat laboratory work in 3 months            Other    Platelets decreased ([facility] )     Follow-up with laboratory work monitor closely patient is followed by Hematology-Oncology                 Subjective:      Patient ID: Esther Prakash is a 78 y o  male      Patient presents today for follow-up evaluation and discussion regarding his ongoing treatment for hepatocellular carcinoma      The following portions of the patient's history were reviewed and updated as appropriate: allergies, current medications, past family history, past medical history, past social history, past surgical history and problem list     Review of Systems   Constitutional: Negative for chills, fatigue and fever  HENT: Negative for congestion, nosebleeds, rhinorrhea, sinus pressure and sore throat  Eyes: Negative for discharge and redness  Respiratory: Negative for cough and shortness of breath  Cardiovascular: Negative for chest pain, palpitations and leg swelling  Gastrointestinal: Negative for abdominal pain, blood in stool and nausea  Endocrine: Negative for cold intolerance, heat intolerance and polyuria  Genitourinary: Negative for dysuria and frequency  Musculoskeletal: Negative for arthralgias, back pain and myalgias  Skin: Negative for rash  Neurological: Negative for dizziness, weakness and headaches  Hematological: Negative for adenopathy  Psychiatric/Behavioral: Negative for behavioral problems and sleep disturbance  The patient is not nervous/anxious  Objective:      /62 (BP Location: Right arm, Patient Position: Sitting)   Pulse 81   Temp 98 8 °F (37 1 °C)   Resp 22   Ht 5' 9" (1 753 m)   Wt 84 6 kg (186 lb 9 6 oz)   SpO2 92%   BMI 27 56 kg/m²        Physical Exam  Vitals and nursing note reviewed  Constitutional:       Appearance: Normal appearance  He is well-developed and normal weight  HENT:      Head: Normocephalic and atraumatic  Right Ear: Tympanic membrane, ear canal and external ear normal       Left Ear: Tympanic membrane, ear canal and external ear normal       Nose: Nose normal       Mouth/Throat:      Mouth: Mucous membranes are moist       Pharynx: Oropharynx is clear  Eyes:      General: No scleral icterus  Extraocular Movements: Extraocular movements intact  Conjunctiva/sclera: Conjunctivae normal       Pupils: Pupils are equal, round, and reactive to light  Neck:      Thyroid: No thyromegaly  Vascular: No JVD     Cardiovascular:      Rate and Rhythm: Normal rate and regular rhythm  Pulses: Normal pulses  Heart sounds: Normal heart sounds  No murmur heard  Pulmonary:      Effort: Pulmonary effort is normal       Breath sounds: Normal breath sounds  No wheezing or rales  Chest:      Chest wall: No tenderness  Abdominal:      General: Bowel sounds are normal  There is no distension  Palpations: Abdomen is soft  There is no mass  Tenderness: There is no abdominal tenderness  There is no guarding or rebound  Musculoskeletal:         General: No tenderness or deformity  Normal range of motion  Cervical back: Normal range of motion and neck supple  Lymphadenopathy:      Cervical: No cervical adenopathy  Skin:     General: Skin is warm and dry  Capillary Refill: Capillary refill takes less than 2 seconds  Findings: No erythema or rash  Neurological:      General: No focal deficit present  Mental Status: He is alert and oriented to person, place, and time  Mental status is at baseline  Cranial Nerves: No cranial nerve deficit  Deep Tendon Reflexes: Reflexes are normal and symmetric  Reflexes normal    Psychiatric:         Mood and Affect: Mood normal          Behavior: Behavior normal          Thought Content: Thought content normal          Judgment: Judgment normal           Data:    Laboratory Results: I have personally reviewed the pertinent laboratory results/reports   Radiology/Other Diagnostic Testing Results: I have personally reviewed pertinent reports         Lab Results   Component Value Date    WBC 9 25 02/01/2022    HGB 15 9 02/01/2022    HCT 50 3 (H) 02/01/2022    MCV 98 02/01/2022     02/01/2022     Lab Results   Component Value Date    K 4 3 02/01/2022     02/01/2022    CO2 31 02/01/2022    BUN 16 02/01/2022    CREATININE 1 05 02/01/2022    GLUF 150 (H) 02/01/2022    CALCIUM 9 5 02/01/2022    CORRECTEDCA 10 1 02/01/2022    AST 39 02/01/2022    ALT 33 02/01/2022    ALKPHOS 147 (H) 02/01/2022    EGFR 65 02/01/2022     Lab Results   Component Value Date    CHOLESTEROL 129 02/01/2022    CHOLESTEROL 225 (H) 10/18/2021    CHOLESTEROL 181 07/22/2021     Lab Results   Component Value Date    HDL 65 02/01/2022    HDL 58 10/18/2021    HDL 48 07/22/2021     Lab Results   Component Value Date    LDLCALC 44 02/01/2022    LDLCALC 133 (H) 10/18/2021    LDLCALC 95 07/22/2021     Lab Results   Component Value Date    TRIG 100 02/01/2022    TRIG 170 (H) 10/18/2021    TRIG 189 (H) 07/22/2021     No results found for: CHOLHDL  No results found for: [id number], TSH  Lab Results   Component Value Date    HGBA1C 6 2 (H) 02/01/2022     Lab Results   Component Value Date    PSA 3 6 10/18/2021       Saira Pardo DO

## 2022-02-08 NOTE — ASSESSMENT & PLAN NOTE
Hepatocellular carcinoma worked up through Hematology-Oncology and oncologic surgery  He is following up in light of progression of disease for additional treatment prior to resection  Discussed Chemotherapy-pt has appointment tomorrow

## 2022-02-08 NOTE — ASSESSMENT & PLAN NOTE
Lab Results   Component Value Date    HGBA1C 6 2 (H) 02/01/2022   Diabetes stable control A1c at 6 2 continue to monitor blood sugar and diet

## 2022-02-08 NOTE — ASSESSMENT & PLAN NOTE
Lab Results   Component Value Date    EGFR 65 02/01/2022    EGFR 64 01/26/2022    EGFR 65 12/21/2021    CREATININE 1 05 02/01/2022    CREATININE 1 06 01/26/2022    CREATININE 1 05 12/21/2021   Patient is stable overall with GFR at 65 and creatinine 1 05 he is doing well and will continue on his current medication regimen and remain active physically and keep hydrated repeat laboratory work in 3 months

## 2022-02-08 NOTE — ASSESSMENT & PLAN NOTE
Patient is stable at rest but O2 sats will drop into the 80s with exertion continue with oxygen at home

## 2022-02-09 NOTE — TELEPHONE ENCOUNTER
----- Message from Pardeep Bain sent at 2/8/2022  1:23 PM EST -----  Regarding: DM  02/08/22 1:24 PM    Hello, our patient Art Hutson has had Diabetic Eye Exam completed/performed  Please assist in updating the patient chart by pulling a previous Electronic Medical Record (EMR) document   The previous EMR is  [facility] The date of service is jan 22    Thank you,  Pardeep [facility]

## 2022-02-09 NOTE — PROGRESS NOTES
Hematology/Oncology Outpatient Follow- up Note  Lionel Vasquez 78 y o  male MRN: @ Encounter: [id number]        Date:  2/9/2022    Presenting Complaint/Diagnosis :   Multifocal HCC    HPI:    Santa Common seen for initial consultation 5/27/2021 regarding  History of parasellar carcinoma   The patient underwent Radioembolization in October of 2020 which he tolerated well  His most recent imaging showed areas of irregular enhancement which appeared to have viable tumor and this was  Reviewed at multidisciplinary tumor board and the patient is going back for TACE  Procedure to have this area addressed       Previous Hematologic/ Oncologic History:    Oncology History Overview Note   Patient presents for radiation consult for [facility] [address] , he is referred by Dr J Luis Duron to discuss SIRS Spheres  He is s/p SIRS sphere treatment to right lobe and middle lobe on 10/14/20  Last follow-up with Manjinder Thrasher, Dr Marcelina Jorge on 9/13/21  TAREARNEST  Recent MRI of the abdomen shows evidence of viable residual tumor  Reviewed treatment option is systemic treatment or repeat SIRsphere therapy if feasible  6/10/21 IR chemoembolization   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  9/2/21 Bloodwork:  AFP tumor marker: 3 9  T-Bili: 0 64  Alk Phos: 171  AST: 38  ALT: 39      9/7/21 MRI abdomen w wo contrast  Treated HCC centered within segment MEGHAN and segment VIII, with evidence of viable residual tumor accounting for approximately 60% tumor volume evidenced by APHE and washout  LI-RADS treatment assessment category: LR-TR viable  9/16/21 Med Onc, Dr J Luis Duron  Pt does need a locally directed therapy  This could be another embolization versus radioembolization  Explained this would be decided at multidisciplinary tumor Conference and after his appt with Dr Cristopher Beck    The tumor had actually shrunk and there was no metastatic disease so we could consider just local treatment and observation versus putting him on systemic therapy  He has been in favor of holding off on any systemic therapy as he otherwise has a very good quality of life and would rather just have the local procedure done  If he does have progression despite that or if he has metastatic disease he will then consider systemic therapy  9/30/21 Surg Onc, Dr Pascale Angela  Pt now has continued viable disease on the MRI  His case was discussed at multidisciplinary clinic this morning  It was recommended that he consider TACE  Sir spheres could be given again if there continues to be recurrence  He is set up to discuss this with IR later next week  We will likely hold off on chemotherapy until liver directed therapy is no longer an option  He will discuss immunotherapy with Medical Oncology  Plan to return in 4 months with a repeat MRI and AFP level  9/30/21 RECTAL/GI MULTIDISCIPLINARY CASE REVIEW  PHYSICIAN RECOMMENDED PLAN:   -Chemo embolization   -Scheduled with IR on 10/5/21      10/5/21 IR, Dr Bev Jacobson patient for cTACE  - MRI abdomen in 3 months after the procedure to evaluate treatment response           11/2/21 Dr Wayne Walters follow-up  1/24/22 MRI abdomen   2/3/22 Dr Pascale Angela follow-up       Hepatocellular carcinoma ([facility] [address] )   8/24/2020 Initial Diagnosis    Hepatocellular carcinoma ([facility] [address] )     8/25/2020 Biopsy    IR liver biopsy  - Well-differentiated heptocellular carcinoma     9/22/2020 -  Cancer Staged    Staging form: Liver (Excluding Intrahepatic Bile Ducts), AJCC 8th Edition  - Clinical: Stage IB (cT1b, cN0, cM0) - Signed by Kem Borgse MD on 9/22/2020       10/14/2020 - 10/14/2020 Radiation    Field Numbers Energy Treatment Site Starting  Ending  Elapsed  Fraction Total  Overlap Site Overlap         Date Date Days Dose Dose   Dose    SirSphere  Y90 Rt Lobe + Middle Robe   10/14/20 10/14/20    1 51 GBq  1 51 GBq                                                                                        Date / Time stamp:  10/14/2020 2:43 PM       Y90    Chemoembolization    Current Hematologic/ Oncologic Treatment:      The patient is here to discuss options    Interval History:    The patient returns for follow-up visit  He states he is doing reasonably well  His most recent imaging had shown multifocal disease and progression  His case was discussed at tumor board and it was decided to consider systemic therapy which would be Atezolizumab along with Avastin  This would be every 3 weeks  The patient is agreeable to proceeding  The hope is if he has a response may be a locally directed procedure could be considered but at this point he has multifocal disease so the mainstay of his treatment would be systemic  The patient denies any nausea vomiting  Denies any diarrhea  The rest of his 14 point review of systems today was negative  ECOG performance status is 0-1  Cancer Staging:  Cancer Staging  Hepatocellular carcinoma ([facility] [address] )  Staging form: Liver (Excluding Intrahepatic Bile Ducts), AJCC 8th Edition  - Clinical: Stage IB (cT1b, cN0, cM0) - Signed by Candy Schreiber MD on 9/22/2020    Test Results:    Imaging: CT head wo contrast    Result Date: 1/14/2022  Narrative: CT BRAIN - WITHOUT CONTRAST INDICATION:   I65 21: Occlusion and stenosis of right carotid artery I65 22: Occlusion and stenosis of left carotid artery H54 62: Unqualified visual loss, left eye, normal vision right eye  COMPARISON:  None  TECHNIQUE:  CT examination of the brain was performed  In addition to axial images, sagittal and coronal 2D reformatted images were created and submitted for interpretation  Radiation dose length product (DLP) for this visit:  863 16 mGy-cm   This examination, like all CT scans performed in the [facility], was performed utilizing techniques to minimize radiation dose exposure, including the use of iterative  reconstruction and automated exposure control  IMAGE QUALITY:  Diagnostic   FINDINGS: PARENCHYMA:  No intracranial mass, mass effect or midline shift  No CT signs of acute infarction  No acute parenchymal hemorrhage  VENTRICLES AND EXTRA-AXIAL SPACES:  Normal for the patient's age  VISUALIZED ORBITS AND PARANASAL SINUSES:  Unremarkable  CALVARIUM AND EXTRACRANIAL SOFT TISSUES:  Normal      Impression: No acute intracranial abnormality  Workstation performed: [id number]     MRI abdomen w wo contrast    Addendum Date: 1/21/2022 Addendum:   ADDENDUM: The "notification" section of the report should read as follows: I personally discussed this study with Dr Ann Taylor on 1/19/2022 at 3:03 PM      Result Date: 1/21/2022  Narrative: MRI - ABDOMEN - WITH AND WITHOUT CONTRAST INDICATION:  History of well-differentiated HCC, status post radio embolization with Y 90 on 10/14/2020  Patient subsequently had TACE on 6/10/2021 for viable residual tumor  Evidence of persistent viable tumor on prior MRI  Follow-up  COMPARISON: MRI abdomen dated 9/7/2021  TECHNIQUE:  The following pulse sequences were obtained prior to and following the administration of intravenous contrast:     Coronal and axial T2 with TE of 90 and 180 respectively, axial T2 with fat saturation, axial FIESTA fat-sat, axial T1-weighted in-and-out-of phase, axial DWI/ADC, precontrast axial T1 with fat saturation, post-contrast dynamic axial T1 with fat saturation at 20, 70, and 180 seconds, coronal T1  with fat saturation and 7 minute delayed axial T1 with fat saturation  Imaging performed on 1 5T MRI   IV Contrast:  8 mL of Gadobutrol injection (SINGLE-DOSE) FINDINGS: LOWER CHEST:   Median sternotomy  Bibasilar parenchymal opacities, likely related to patient's Covid 19 pneumonia  Trace left pleural effusion  LIVER: -Mild hepatic steatosis  No gross morphologic features of liver cirrhosis    Focal liver lesions as follows: -Interval increase in size of treated lesion centered within segment MEGHAN and segment VIII, with treatment zone currently measuring 7 3 x 6 4 cm (11/177), previously 6 6 x 5 8 cm  Viable area of the tumor measures 5 3 cm in maximal length (11/174), previously 4 9 cm  Evaluation of the postcontrast images is suboptimal due to timing of imaging and motion artifact  However, within the limitations, there is enhancement/hyperenhancement of the viable area with washout (series 11 images 36-48, and image 176)  LI-RADS treatment assessment category: LR-TR viable  -Interval development of numerous (greater than 30) focal observations in the right and left hepatic lobes, suspicious for multifocal hepatocellular carcinoma/malignancy  All lesions demonstrate restricted diffusion, hypointense T1 weighted signal and mildly hyperintense T2-weighted signal   Representative examples as follows: *  1 6 cm segment MEGHAN observation with peripheral arterial phase hyperenhancement and washout (series 11 image 50 and 183), LR-M  *  1 2 cm segment VII observation with peripheral arterial phase hyperenhancement and washout (series 11 image 54 and 186),LR-M  *  New 1 3 cm segment VIII observation with washout, but no arterial phase hyperenhancement (series 11 image 299), LR-4 (washout,+DWI+new lesion)  *  New 1 5 cm segment VIII observation with washout, but no arterial phase hyperenhancement (series 11 image 299), LR-4 (washout,+DWI+new lesion)  -Several additional lesions seen on DWI images, are less conspicuous on postcontrast images  -Stable anterior capsular retraction with delayed phase enhancement in segment V, likely a sequela of radiation fibrosis  -There is persistent mass effect on the right and left portal veins  Otherwise, the hepatic veins and portal veins are patent  BILE DUCTS: No intrahepatic or extrahepatic bile duct dilation  GALLBLADDER:  Normal  PANCREAS: Normal  No main pancreatic ductal dilation  ADRENAL GLANDS:  Normal  SPLEEN:  Normal  KIDNEYS/PROXIMAL URETERS: No hydroureteronephrosis  No suspicious renal mass  Stable bilateral renal cysts  BOWEL: Scattered colonic diverticula without findings of acute diverticulitis  No dilated loops of bowel  PERITONEUM/RETROPERITONEUM: No ascites  LYMPH NODES: No abdominal lymphadenopathy  VASCULAR STRUCTURES:  No aneurysm  ABDOMINAL WALL:  Unremarkable  OSSEOUS STRUCTURES:  No suspicious osseous lesion  Degenerative changes of the spine  Impression: 1  Interval increase in size of treated [address]  centered within segment MEGHAN and segment VIII, with increased size of viable tumor  LI-RADS treatment assessment category: LR-TR viable  2   Interval development of numerous (greater than 30) focal observations in the right and left hepatic lobes, suspicious for multifocal hepatocellular carcinoma/malignancy ( LR- M and LR-4 observations)  I personally discussed this study with Mervin Bennett on 1/19/2022 at 3:03 PM  Workstation performed: [id number]     VAS lower limb arterial duplex, complete bilateral    Result Date: 1/19/2022  Narrative:  [facility] REPORT CLINICAL: Indications: Yearly surveillance for progression of disease  Patient reports having pain in his legs after walking a few blocks  Operative History: 1996-01-01 CABG [id number] Left GSV harvest Risk Factors The patient has history of HTN, Diabetes (IDDM), Hyperlipidemia, CKD, PAD and CAD  Clinical Right Pressure:  106/ mm Hg, Left Pressure:  102/ mm Hg  FINDINGS:  Right                  Impression  PSV (cm/s)  EDV  Common Femoral Artery                     174   14  Prox Profunda          50-75%             308    0  Prox SFA                                  131   12  Mid SFA                                    41    7  Dist SFA                                   35    1  Proximal Pop                               32    8  Distal Pop                                 27    5  Tibioperoneal                              30    0  Dist Post Tibial                           32    6  Dist  Ant   Tibial      Occluded             0    0  Dist Peroneal 15    0   Left                   Impression      PSV (cm/s)  EDV  Common Femoral Artery  >75%                   312   27  Prox Profunda                                  73    0  Prox SFA                                       54    0  Mid SFA                                        28    0  Dist SFA               >75%                   134    6  Proximal Pop           50-75%                 108   15  Distal Pop                                     18    3  Tibioperoneal          Not visualized                   Dist Post Tibial                               27    7  Dist  Ant  Tibial                              10    3  Dist Peroneal                                  17    6     CONCLUSION: Impression: RIGHT LOWER LIMB: This resting evaluation shows a 50-75% stenosis in the profunda femoris artery and an occlusion vs high grade stenosis in the anterior tibial artery  There is diffuse atherosclerotic disease in the remaining femoro-popliteal and tibio-peroneal segments  Ankle/Brachial index: 0 76, which is in the moderate disease category  (Prior 0 98) PVR/ PPG tracings are mildly dampened  Metatarsal pressure of 66 mmHg Great toe pressure of 31 mmHg, within the healing range for a non-diabetic patient  LEFT LOWER LIMB: This resting evaluation shows a >75% stenosis in the common femoral and distal superficial femoral artery  There is a 50-75% stenosis noted in the proximal popliteal artery with diffuse atherosclerotic disease in the remaining femoro-popliteal and tibio-peroneal segments  Ankle/Brachial index: 0 68, which is in the moderate disease category  (Prior 0 44) PVR/ PPG tracings are mildly dampened  Metatarsal pressure of 59 mmHg Great toe pressure of 30 mmHg, within the healing range for a non-diabetic patient  Compared to previous study on 8/9/2018, the right FLIP has decreased to moderate disease from normal and there is a new stenosis in the right DFA and occlusion in the right BEATRIZ  The left FLIP has improved  There is increased stenosis of the left CFA and new stenosis of the left distal SFA and proximal popliteal artery  Unable to reproduce stenosis in the left DFA, proximal SFA and distal Popliteal artery  SIGNATURE: Electronically Signed by: Leandra Barnes MD on 2022-01-19 05:44:17 PM      Labs:   Lab Results   Component Value Date    WBC 9 25 02/01/2022    HGB 15 9 02/01/2022    HCT 50 3 (H) 02/01/2022    MCV 98 02/01/2022     02/01/2022     Lab Results   Component Value Date    K 4 3 02/01/2022     02/01/2022    CO2 31 02/01/2022    BUN 16 02/01/2022    CREATININE 1 05 02/01/2022    GLUF 150 (H) 02/01/2022    CALCIUM 9 5 02/01/2022    CORRECTEDCA 10 1 02/01/2022    AST 39 02/01/2022    ALT 33 02/01/2022    ALKPHOS 147 (H) 02/01/2022    EGFR 65 02/01/2022       Lab Results   Component Value Date    PSA 3 6 10/18/2021       Lab Results   Component Value Date    FERRITIN 575 (H) 11/04/2021       ROS: As stated in the history of present illness otherwise his 14 point review of systems today was negative        Active Problems:   Patient Active Problem List   Diagnosis    Essential hypertension    Hyperlipidemia    Cardiomegaly    Anxiety    Abnormal electrocardiogram    Coronary artery disease without angina pectoris    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)    Viral URI with cough    Reactive airway disease without complication    Medicare annual wellness visit, subsequent    Hyperglycemia    Ankle edema, bilateral    Transaminitis    Respiratory insufficiency    EMILIANO (acute kidney injury) ([facility] [address] )    Elevated troponin    Septic shock (HCC)    Hepatocellular carcinoma (HCC)    Acute deep vein thrombosis (DVT) of popliteal vein of both lower extremities (HCC)    Acute respiratory failure with hypoxia (HCC)    Stage 3a chronic kidney disease (HCC)    Sepsis (HCC)    Pneumonia    Cough    Platelets decreased (HCC)    Type 2 diabetes mellitus without complication, without long-term current use of insulin (HCC)    Allergic cough    Acute bacterial bronchitis    COVID-19 virus infection    Diastolic heart failure (HCC)    Pulmonary emboli (HCC)    Post-COVID chronic dyspnea    Left carotid artery occlusion    Asymptomatic carotid artery stenosis, right    Vision loss of left eye    Hypoxia       Past Medical History:   Past Medical History:   Diagnosis Date    Coronary artery disease     Gout     Hypercholesterolemia     Hypertension     Liver cancer [facility])        Surgical History:   Past Surgical History:   Procedure Laterality Date    ABDOMINAL SURGERY      appy    APPENDECTOMY      CORONARY ARTERY BYPASS GRAFT      IR BIOPSY LIVER MASS  2020    IR CHEMOEMBOLIZATION LIVER TUMOR  6/10/2021    IR Y-90 PRE-ANGIO/EMBO W/ LUNG SCAN  10/6/2020    IR Y-90 RADIOEMBOLIZATION  10/14/2020       Family History:    Family History   Problem Relation Age of Onset    No Known Problems Mother     No Known Problems Father        Cancer-related family history is not on file      Social History:   Social History     Socioeconomic History    Marital status: /Civil Union     Spouse name: Not on file    Number of children: Not on file    Years of education: Not on file    Highest education level: Not on file   Occupational History    Not on file   Tobacco Use    Smoking status: Former Smoker     Quit date: 1970     Years since quittin 1    Smokeless tobacco: Never Used   Vaping Use    Vaping Use: Never used   Substance and Sexual Activity    Alcohol use: Not Currently    Drug use: Never    Sexual activity: Not on file   Other Topics Concern    Not on file   Social History Narrative    Not on file     Social Determinants of Health     Financial Resource Strain: Not on file   Food Insecurity: No Food Insecurity    Worried About [employer] in the Last Year: Never true    [facility] in the Last Year: Never true   Transportation Needs: No Transportation Needs    Lack of Transportation (Medical): No    Lack of Transportation (Non-Medical): No   Physical Activity: Not on file   Stress: Not on file   Social Connections: Not on file   Intimate Partner Violence: Not on file   Housing Stability: Low Risk     Unable to Pay for Housing in the Last Year: No    Number of Places Lived in the Last Year: 1    Unstable Housing in the Last Year: No       Current Medications:   Current Outpatient Medications   Medication Sig Dispense Refill    allopurinol (ZYLOPRIM) 300 mg tablet Take 300 mg by mouth daily      apixaban (Eliquis) 5 mg Take 1 tablet (5 mg total) by mouth 2 (two) times a day 180 tablet 0    aspirin 81 mg chewable tablet Chew 1 tablet (81 mg total) daily 30 tablet 0    atorvastatin (LIPITOR) 40 mg tablet Take 1 tablet (40 mg total) by mouth daily 30 tablet 5    cholecalciferol (VITAMIN D3) 400 units tablet Take 1,000 Units by mouth daily       lisinopril (ZESTRIL) 2 5 mg tablet Take 1 tablet (2 5 mg total) by mouth daily 30 tablet 5    metoprolol tartrate (LOPRESSOR) 25 mg tablet Take 1 tablet (25 mg total) by mouth 2 (two) times a day 60 tablet 3    Omega-3 Fatty Acids (fish oil) 1,000 mg Take 1,000 mg by mouth daily      Promethazine-DM (PHENERGAN-DM) 6 25-15 mg/5 mL oral syrup Take 10 mL by mouth 3 (three) times a day as needed for cough 240 mL 1    amLODIPine (NORVASC) 5 mg tablet Take 1 tablet (5 mg total) by mouth daily 30 tablet 5    pantoprazole (PROTONIX) 40 mg tablet Take 1 tablet (40 mg total) by mouth daily in the early morning (Patient not taking: Reported on 9/16/2021) 30 tablet 0     No current facility-administered medications for this visit  Allergies: No Known Allergies    Physical Exam:    Body surface area is 2 01 meters squared      Wt Readings from Last 3 Encounters:   02/09/22 85 5 kg (188 lb 8 oz)   02/08/22 84 6 kg (186 lb 9 6 oz)   02/03/22 84 8 kg (187 lb) Temp Readings from Last 3 Encounters:   02/09/22 97 6 °F (36 4 °C)   02/08/22 98 8 °F (37 1 °C)   02/03/22 98 4 °F (36 9 °C) (Temporal)        BP Readings from Last 3 Encounters:   02/09/22 138/72   02/08/22 130/62   02/03/22 120/62         Pulse Readings from Last 3 Encounters:   02/09/22 90   02/08/22 81   02/03/22 77         Physical Exam     Constitutional   General appearance: No acute distress, well appearing and well nourished  Eyes   Conjunctiva and lids: No swelling, erythema or discharge  Pupils and irises: Equal, round and reactive to light  Ears, Nose, Mouth, and Throat   External inspection of ears and nose: Normal     Nasal mucosa, septum, and turbinates: Normal without edema or erythema  Oropharynx: Normal with no erythema, edema, exudate or lesions  Pulmonary   Respiratory effort: No increased work of breathing or signs of respiratory distress  Auscultation of lungs: Clear to auscultation  Cardiovascular   Palpation of heart: Normal PMI, no thrills  Auscultation of heart: Normal rate and rhythm, normal S1 and S2, without murmurs  Examination of extremities for edema and/or varicosities: Normal     Carotid pulses: Normal     Abdomen   Abdomen: Non-tender, no masses  Liver and spleen: No hepatomegaly or splenomegaly  Lymphatic   Palpation of lymph nodes in neck: No lymphadenopathy  Musculoskeletal   Gait and station: Normal     Digits and nails: Normal without clubbing or cyanosis  Inspection/palpation of joints, bones, and muscles: Normal     Skin   Skin and subcutaneous tissue: Normal without rashes or lesions  Neurologic   Cranial nerves: Cranial nerves 2-12 intact  Sensation: No sensory loss  Psychiatric   Orientation to person, place, and time: Normal     Mood and affect: Normal         Assessment / Plan:       The patient is a pleasant 78-year-old male with [address]  status post radioembolization in the past with then got a chemoembolization    His tumor is partially treated  He has no evidence of progression and the tumor has shrunk but he still has viable tumor so needs another localized procedure  He saw our colleagues in [address] who according to him did not make any commitment to treatment  He is seeing our colleagues in surgery in a week  At this point I explained to him that he does need a locally directed therapy  This could be another embolization versus radioembolization  I explained this would be decided at multidisciplinary tumor Conference and after his surgeon has assessed him also  We discussed systemic therapy and again I explained the tumor had actually shrunk and there was no metastatic disease so we could consider just local treatment and observation versus putting him on systemic therapy  He has been in favor of holding off on any systemic therapy and has had local procedures done  His most recent imaging shows interval increase in size of treated [address]  centered with segment 4A and segment 8 with increased size of viable tumor  There was interval development of numerous focal observation the right and left hepatic lobe suspicious for multifocal HCC  At this point I explained my preference would be to start him on systemic treatment  I explained this to the patient  He agreed  My recommendation would be to Tecentric and Avastin  This would be every 3 weeks  I went over the risks benefits and alternatives of both drugs  I explained the possible side effects to include but not limited to pneumonitis, colitis, autoimmune phenomena, hepatitis, transaminitis, adrenal insufficiency and even death  I explained the risk of protein wasting in the urine, small risk of blood clots, arterial thrombosis, bleeding and bowel perforation  The patient is agreeable to proceeding  His wife is already on Avastin so he knows about the drug  He will sign a consent form today  He will get started as soon as possible      Goals and Barriers:  Current Goal:  Prolong Survival from multifocal HCC  Barriers: None  Patient's Capacity to Self Care:  Patient  able to self care  Portions of the record may have been created with voice recognition software  Occasional wrong word or "sound a like" substitutions may have occurred due to the inherent limitations of voice recognition software  Read the chart carefully and recognize, using context, where substitutions have occurred

## 2022-02-10 NOTE — TELEPHONE ENCOUNTER
Upon review of the In Basket request and the patient's chart, initial outreach has been made via fax, please see Contacts section for details       Thank you  Adela Lei

## 2022-02-11 NOTE — TELEPHONE ENCOUNTER
Upon review of the In Basket request we have found/obtained the documentation  After careful review of the document we are unable to complete this request for Diabetic Eye Exam because the documentation does not have the result(s) needed to close the requested care gap(s)  As per office, last seen on 1/19/22 Yonas Tariq and was a regular Eye Exam  Also in media tab, Roann on 12/9/21 was also a non Diabetic Eye Exam     Any additional questions or concerns should be emailed to the Practice Liaisons via [email or web address] email, please do not reply via In Basket      Thank you  Reji Saul

## 2022-02-15 NOTE — PATIENT INSTRUCTIONS
1) carotid disease  -because your left side if completely occluded, there is no surgery to be done on this side; this likely blocked up at the time you had your vision loss  -we are going to continue to monitor the right side with ultrasound on a 6 month basis    2) DVT/PE  -I will leave duration of anticoagulation to the discretion of your PCP but a minimum of 6 months  -wearing compression and elevating your legs can help with any swelling  -please use a moisturizing cream on your legs twice daily    3) PAD  -you have blockages in the left leg that is causing the pain in the calf when you walk  -your ultrasound showed blockages as we suspected, but since your wound healed and you are about to undergo cancer treatments, we are not going to do any intervention at this time  -if you develop any new wounds, please come in for an appointment immediately  -your next ultrasound will be in 1 year     4) Medications  -please continue your aspirin and statin medications lifelong  -continue your eliquis for your blood clots until your PCP tells you to stop    Peripheral Artery Disease   WHAT YOU NEED TO KNOW:   What is peripheral artery disease (PAD)? PAD is narrow, weak, or blocked arteries  It may affect any arteries outside of your heart and brain  PAD is usually the result of a buildup of fat and cholesterol, also called plaque, along your artery walls  Inflammation, a blood clot, or abnormal cell growth could also block your arteries  PAD prevents normal blood flow to your legs and arms  You are at risk of an amputation if poor blood flow keeps wounds from healing or causes gangrene (tissue death)  Without treatment, PAD can also cause a heart attack or stroke  What increases my risk for PAD? · Smoking cigarettes    · Diabetes    · High blood pressure    · High cholesterol    · Age older than 40 years    · Heart disease or a family history of heart disease    What are the signs and symptoms of PAD?   Mild PAD usually does not cause symptoms  As the disease worsens over time, you may have the following:  · Pain or cramps in your leg or hip while you walk    · A numb, weak, or heavy feeling in your legs    · Dry, scaly, red, or pale skin on your legs    · Thick or brittle nails, or hair loss on your arms and legs    · Foot sores that will not heal    · Burning or aching in your feet and toes while resting (this may be worse when you lie down)    How is PAD diagnosed? · Angiography  is a test that shows pictures of the arteries in your arms and legs  You will be given contrast liquid to help the arteries show up better on the pictures  The pictures will be taken with an MRI or CT scan  Tell the healthcare provider if you have ever had an allergic reaction to contrast liquid  Do not enter the MRI room with anything metal  Metal can cause serious injury  Tell a healthcare provider if you have any metal in or on your body  · Doppler ankle brachial index (FLIP)  is a test that compares blood pressure in your ankles to blood pressure in your arms  This tells your healthcare provider how well blood is flowing through the arteries in your legs  How is PAD treated? Treatment can help reduce your risk of a heart attack, stroke, or amputation  You may need more than one of the following:  · Medicines  may be given:    ? Antiplatelets , such as aspirin, help prevent blood clots  Take your antiplatelet medicine exactly as directed  These medicines make it more likely for you to bleed or bruise  If you are told to take aspirin, do not take acetaminophen or ibuprofen instead  ? Statin medicine  helps lower your cholesterol and prevents PAD from getting worse  · A supervised exercise program  helps you stay active in normal daily activities  Healthcare providers will help you safely walk or do strength training exercises 3 times a week for 30 to 60 minutes   You will do this for several months, then transition to walking on your own     · Angioplasty  is a procedure to open your artery so blood can flow through normally  A thin tube called a catheter is used to insert a small balloon into your artery  The balloon is inflated to open your blocked artery, and then removed  A tube called a stent may be placed in your artery to hold it open  · Bypass surgery  is used to make a new connection to your artery with a vein from another part of your body, or an artificial graft  The vein or graft is attached to your artery above and below your blockage  This allows blood to flow around the blocked portion of your artery  How can I manage PAD? · Do not smoke  Nicotine and other chemicals in cigarettes and cigars can worsen PAD  They can also increase your risk for a heart attack or stroke  Ask your healthcare provider for information if you currently smoke and need help to quit  E-cigarettes or smokeless tobacco still contain nicotine  Talk to your healthcare provider before you use these products  · Manage other health conditions  Take your medicines as directed  Follow your healthcare provider's instructions if you have high blood pressure or high cholesterol  Perform foot care and check your blood sugar levels as directed if you have diabetes  · Eat heart-healthy foods  Eat whole grains, fruits, and vegetables every day  Limit salt and high-fat foods  Ask your healthcare provider for more information on a heart healthy diet  Ask what a healthy weight is for you  Your healthcare provider can help you create a healthy weight-loss plan, if needed  Call your local emergency number [phone] in the [address]) if:   · You have any of the following signs of a heart attack:      ?  Squeezing, pressure, or pain in your chest    ? You may  also have any of the following:     § Discomfort or pain in your back, neck, jaw, stomach, or arm    § Shortness of breath    § Nausea or vomiting    § Lightheadedness or a sudden cold sweat    · You have any of the following signs of a stroke:      ? Numbness or drooping on one side of your face     ? Weakness in an arm or leg    ? Confusion or difficulty speaking    ? Dizziness, a severe headache, or vision loss    When should I seek immediate care? · You have sores or wounds that will not heal     · You notice black or discolored skin on your arm or leg  · Your skin is cool to the touch  When should I call my doctor? · You have leg pain when you walk 1/8 mile (200 meters) or less, even with treatment  · Your legs are red, dry, or pale, even with treatment  · You have questions or concerns about your condition or care  CARE AGREEMENT:   You have the right to help plan your care  Learn about your health condition and how it may be treated  Discuss treatment options with your healthcare providers to decide what care you want to receive  You always have the right to refuse treatment  The above information is an  only  It is not intended as medical advice for individual conditions or treatments  Talk to your doctor, nurse or pharmacist before following any medical regimen to see if it is safe and effective for you  © Copyright [employer] 2021 Information is for End User's use only and may not be sold, redistributed or otherwise used for commercial purposes  All illustrations and images included in CareNotes® are the copyrighted property of [employer]  or [facility] Flor Lala     Carotid Artery Disease   AMBULATORY CARE:   Carotid artery disease (CAD)  means the major blood vessels in your neck are narrowed or becoming blocked  These 2 major blood vessels are called the carotid arteries  They supply your brain with blood  The narrow or blocked blood vessels increase your risk for a stroke  CAD is also called carotid artery stenosis         Have someone call your local emergency number (911 in the [address] [place] [address],[address]) if:   · You have any of the following signs of a stroke:      ? Numbness or drooping on one side of your face     ? Weakness in an arm or leg    ? Confusion or difficulty speaking    ? Dizziness, a severe headache, or vision loss    · You have any of the following signs of a heart attack:      ? Squeezing, pressure, or pain in your chest    ? You may  also have any of the following:     § Discomfort or pain in your back, neck, jaw, stomach, or arm    § Shortness of breath    § Nausea or vomiting    § Lightheadedness or a sudden cold sweat    Call your doctor if:   · You have questions or concerns about your condition or care  Common signs and symptoms:  CAD develops slowly  You may have no signs or symptoms until you have a mini-stroke, or transient ischemic attack (TIA)  A TIA is a temporary lack of blood flow to your brain  A TIA goes away quickly and does not cause permanent damage  A TIA may be a warning sign that you are about to have a stroke  If you have any symptoms of a TIA or stroke, seek care immediately  Warning signs of a stroke: The words BE FAST can help you remember and recognize warning signs of a stroke:  · B = Balance:  Sudden loss of balance    · E = Eyes:  Loss of vision in one or both eyes    · F = Face:  Face droops on one side    · A = Arms:  Arm drops when both arms are raised    · S = Speech:  Speech is slurred or sounds different    · T = Time:  Time to get help immediately       Treatment  depends on how narrow your arteries have become  Treatment also depends on your symptoms and your general health  The goal of treatment is to lower your risk for a stroke  You may need any of the following:  · Medicines:      ? Aspirin,  or other blood thinner, may be recommended  These will help prevent blood clots from forming in your carotid arteries  If your healthcare provider wants you to take aspirin, do not take acetaminophen or ibuprofen instead  ? Cholesterol medicine  lowers your cholesterol level  ?  Blood pressure medicine  lowers or helps control your blood pressure  · Procedures  can help open blocked arteries:     ? Carotid endarterectomy (CEA)  is used to cut and remove plaque buildup from your arteries  ? Carotid angioplasty and stenting (TI)  is used to push the plaque against the artery wall with a balloon device  Then, a stent is placed to keep the artery open  A stent is a small metal mesh tube  Prevent a stroke:   · Do not smoke, and avoid secondhand smoke  Nicotine and other chemicals in cigarettes and cigars increase your risk for a stroke  Ask your healthcare provider for information if you currently smoke and need help to quit  E-cigarettes or smokeless tobacco still contain nicotine  Talk to your healthcare provider before you use these products  · Eat a variety of healthy foods  Healthy foods include fruit, vegetables, whole-grain breads, low-fat dairy products, chicken, and fish  Choose fish that are high in omega-3 fatty acids, such as salmon and fresh tuna  Ask your healthcare provider for more information on a heart healthy diet and the DASH eating plan  · Limit sodium (salt)  Sodium may increase your blood pressure  Add less table salt to your foods  Read food labels and choose foods that are low in sodium  Your healthcare provider may suggest you follow a low sodium diet  · Reach or maintain a healthy weight  Extra weight makes your heart work harder  Ask your healthcare provider what a healthy weight is for you  He or she can help you create a safe weight loss plan  Even a weight loss of 10% of your extra body weight can help your heart function better  · Exercise regularly  Exercise helps improve heart function and can help you manage your weight  Exercise can also help lower your cholesterol and blood sugar levels  Try to get at least 30 minutes of exercise 5 times each week  Try to be physically active every day  This may include walking, riding a bicycle, or swimming   Your healthcare provider can help you create an exercise plan that works best for you  · Limit alcohol  Alcohol can increase your blood pressure and triglyceride levels  A drink of alcohol is 12 ounces of beer, 5 ounces of wine, or 1½ ounces of liquor  Follow up with your doctor as directed:  Write down your questions so you remember to ask them during your visits  © Copyright [employer] 2021 Information is for End User's use only and may not be sold, redistributed or otherwise used for commercial purposes  All illustrations and images included in CareNotes® are the copyrighted property of [employer]  or [facility] Flor Lala   The above information is an  only  It is not intended as medical advice for individual conditions or treatments  Talk to your doctor, nurse or pharmacist before following any medical regimen to see if it is safe and effective for you

## 2022-02-15 NOTE — PROGRESS NOTES
Assessment/Plan:    Pt is an 77 yo M w/ HCC, DM, HTN, dCHF, PAD w/ LLE claudication, CKD, anxiety, HLD, recent COVID PNA (11/21), DVT/PE (11/21), monocular L eye vision loss, L ICA occlusion, R ICA stenosis    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)  -     VAS lower limb arterial duplex, complete bilateral; Future  -reviewed LEADs from '18 which show R: 0 98/121/86 and L: 0 44/-/- w/ L CFA/profunda/SFA moderate stenoses  -LLE calf claudication at 1 block; had wound L lateral calf from table, now healed   -reviewed LEADs which show R: 0 76/66/31 and L: 0 68/59/30 w/ R prof mod stenosis; tibial disease and L CFA and distal SFA severe stenosis and mod pop stenosis and tibial disease  -as his wound is healed, his claudication is manageable, and he is about to restart chemo, will not perform any intervention at this time; patient advised to come in immediately if he develops any new nonhealing wounds  -will get LEADs in 1 year for surveillance    Asymptomatic carotid artery stenosis, right  Vision loss of left eye  Left carotid artery occlusion  -complete monocular L eye vision loss at the time of his COVID infection; also w/ remote prior episode resulting in single quadrant vision loss of same eye  -reviewed carotid DU which shows L ICA occlusion and R ICA 50-69% stenosis  -reviewed CT head which was neg for CVA  -discussed carotid disease and indications for treatment; because he has complete occlusion on the L side, there is no surgery recommended for this  -will do surveillance for R ICA stenosis w/ carotid DU on a 6 month basis (ordered last visit)    Acute deep vein thrombosis (DVT) of popliteal vein of both lower extremities (HCC)  Pulmonary embolism, unspecified chronicity, unspecified pulmonary embolism type, unspecified whether acute cor pulmonale present ([facility] [address] )  -acute BLE DVT and PE; reviewed LEV  -provoked from hypercoagulability of COVID and cancer  -continue anticoagulation w/ Eliquis; will leave duration to discretion of PCP  -advised compression and leg elevation for swelling/symptoms; also advised daily moisurization for very dry, flaky skin of legs and feet; edema improved from last visit    COVID-19  -recent COVID infection with persistent respiratory failure requiring O2  -now doing pulmonary rehab  -currently these symptoms seem to be limiting his activity more than his PAD    Stage 3a chronic kidney disease ([facility] [address] )    Mixed hyperlipidemia  Medications  -continue ASA  -statin restarted recently (was stopped after dx of [facility] [address] ); agree with continuing statin if safe from liver perspective  -continue Eliquis for acute DVT/PE        Subjective:      Patient ID: Lexie Morgan is a 78 y o  male  Pt is here today to review results of AMADA done 1/19/2022, CT head w/o contrast and CV done 12/29/2021  Pt has left eye vision loss since November 2021  He denies any headaches, dizziness, slurred speech, trouble swallowing, TIA or Stroke symptoms  He also c/o pain in his left calf after walking 2 blocks  When he stops to rest the pain subsides  He denies any rest pain or open wounds  Pt is taking ASA 81 mg, Eliquis and Atorvastatin Pt is a former smoker  HPI:    Patient referred for evaluation of carotid disease, PAD, DVT/PE  At the beginning of Nov, he awoke with blindness in the left eye  This has not returned  He went to the ophthomologist and got a carotid DU which showed carotid occlusion  This was also at the time he had COVID and also was diagnosed with DVT/PE  He reports one prior episode of vision change about 8 years ago when he lost a quarter of his visual field which never came back  Denies other stroke symptoms  Denies facial droop, speech changes, unilateral weakness/numbness  He has L calf claudication  He can walk about 1 block before having to rest   Symptoms are relieved with rest   This has been going on for >3 years   He keeps banging his calf on their coffee table and had a L calf wound at last visit which is now healed and now a new shallower wound on the R calf  Continues to require O2 since having COVID  He is doing pulmonary rehab  He can get around [employer] but tired at the end of this  Takes Eliquis for DVT/PE  Denies prior hx of blood clots  ALso taking aspirin and statin (newly restarted)    Quit smoking in '77    Hx of CABG in '96    Had MRI which showed recurrent cancer in the liver and plans to start chemo on wed  The following portions of the patient's history were reviewed and updated as appropriate: allergies, current medications, past family history, past medical history, past social history, past surgical history and problem list     Review of Systems   Constitutional: Negative  HENT: Negative  Eyes: Positive for visual disturbance  Respiratory: Positive for cough and shortness of breath  Cardiovascular: Negative  Gastrointestinal: Negative  Endocrine: Negative  Genitourinary: Positive for frequency  Musculoskeletal: Negative  Skin: Negative  Allergic/Immunologic: Negative  Neurological: Negative  Hematological: Bruises/bleeds easily  Psychiatric/Behavioral: Negative  Objective:      /54 (BP Location: Right arm, Patient Position: Sitting, Cuff Size: Standard)   Pulse 77   Temp 97 7 °F (36 5 °C) (Skin)   Ht 5' 9" (1 753 m)   Wt 85 2 kg (187 lb 12 8 oz)   SpO2 94%   BMI 27 73 kg/m²          Physical Exam  Vitals and nursing note reviewed  Constitutional:       Appearance: He is well-developed  HENT:      Head: Normocephalic and atraumatic  Eyes:      Conjunctiva/sclera: Conjunctivae normal    Cardiovascular:      Rate and Rhythm: Normal rate and regular rhythm  Pulses:           Radial pulses are 2+ on the right side and 2+ on the left side  Popliteal pulses are 1+ on the right side and 0 on the left side          Dorsalis pedis pulses are 0 on the right side and 0 on the left side  Posterior tibial pulses are 0 on the right side and 0 on the left side  Heart sounds: Normal heart sounds  No murmur heard  Comments: No carotid bruits B  Pulmonary:      Effort: Pulmonary effort is normal  No respiratory distress  Breath sounds: Normal breath sounds  No wheezing or rales  Abdominal:      General: There is no distension  Palpations: Abdomen is soft  Tenderness: There is no abdominal tenderness  There is no rebound  Musculoskeletal:         General: Normal range of motion  Cervical back: Normal range of motion and neck supple  Right lower le+ Edema present  Left lower le+ Edema present  Skin:     General: Skin is warm and dry  Comments: Very dry flaky skin of the B legs and feet; there is a varicosity of the L medial calf    There are several scabbed areas of the L lateral proximal calf which are dry and a small one on the anterior R calf   Neurological:      Mental Status: He is alert and oriented to person, place, and time  Psychiatric:         Behavior: Behavior normal            I have reviewed and made appropriate changes to the review of systems input by the medical assistant      Vitals:    02/15/22 1007 02/15/22 1011   BP: 112/62 102/54   BP Location: Left arm Right arm   Patient Position: Sitting Sitting   Cuff Size: Standard Standard   Pulse: 77    Temp: 97 7 °F (36 5 °C)    TempSrc: Skin    SpO2: 94%    Weight: 85 2 kg (187 lb 12 8 oz)    Height: 5' 9" (1 753 m)        Patient Active Problem List   Diagnosis    Essential hypertension    Hyperlipidemia    Cardiomegaly    Anxiety    Abnormal electrocardiogram    Coronary artery disease without angina pectoris    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)    Viral URI with cough    Reactive airway disease without complication    Medicare annual wellness visit, subsequent    Hyperglycemia    Ankle edema, bilateral    Transaminitis    Respiratory insufficiency    EMILIANO (acute kidney injury) ([facility] [address] )    Elevated troponin    Septic shock (HCC)    Hepatocellular carcinoma (HCC)    Acute deep vein thrombosis (DVT) of popliteal vein of both lower extremities (HCC)    Acute respiratory failure with hypoxia (HCC)    Stage 3a chronic kidney disease (HCC)    Sepsis (HCC)    Pneumonia    Cough    Platelets decreased (HCC)    Type 2 diabetes mellitus without complication, without long-term current use of insulin (HCC)    Allergic cough    Acute bacterial bronchitis    COVID-19 virus infection    Diastolic heart failure (HCC)    Pulmonary emboli (HCC)    Post-COVID chronic dyspnea    Left carotid artery occlusion    Asymptomatic carotid artery stenosis, right    Vision loss of left eye    Hypoxia       Past Surgical History:   Procedure Laterality Date    ABDOMINAL SURGERY      appy    APPENDECTOMY      CORONARY ARTERY BYPASS GRAFT      IR BIOPSY LIVER MASS  2020    IR CHEMOEMBOLIZATION LIVER TUMOR  6/10/2021    IR Y-90 PRE-ANGIO/EMBO W/ LUNG SCAN  10/6/2020    IR Y-90 RADIOEMBOLIZATION  10/14/2020       Family History   Problem Relation Age of Onset    No Known Problems Mother     No Known Problems Father        Social History     Socioeconomic History    Marital status: /Civil Union     Spouse name: Not on file    Number of children: Not on file    Years of education: Not on file    Highest education level: Not on file   Occupational History    Not on file   Tobacco Use    Smoking status: Former Smoker     Quit date: 1970     Years since quittin 1    Smokeless tobacco: Never Used   Vaping Use    Vaping Use: Never used   Substance and Sexual Activity    Alcohol use: Not Currently    Drug use: Never    Sexual activity: Not on file   Other Topics Concern    Not on file   Social History Narrative    Not on file     Social Determinants of Health     Financial Resource Strain: Not on file   Food Insecurity: No Food Insecurity    Worried About Running Out of Food in the Last Year: Never true    Familia of Food in the Last Year: Never true   Transportation Needs: No Transportation Needs    Lack of Transportation (Medical): No    Lack of Transportation (Non-Medical):  No   Physical Activity: Not on file   Stress: Not on file   Social Connections: Not on file   Intimate Partner Violence: Not on file   Housing Stability: Low Risk     Unable to Pay for Housing in the Last Year: No    Number of Places Lived in the Last Year: 1    Unstable Housing in the Last Year: No       No Known Allergies      Current Outpatient Medications:     allopurinol (ZYLOPRIM) 300 mg tablet, Take 300 mg by mouth daily, Disp: , Rfl:     amLODIPine (NORVASC) 5 mg tablet, Take 1 tablet (5 mg total) by mouth daily, Disp: 30 tablet, Rfl: 5    apixaban (Eliquis) 5 mg, Take 1 tablet (5 mg total) by mouth 2 (two) times a day, Disp: 180 tablet, Rfl: 0    aspirin 81 mg chewable tablet, Chew 1 tablet (81 mg total) daily, Disp: 30 tablet, Rfl: 0    atorvastatin (LIPITOR) 40 mg tablet, Take 1 tablet (40 mg total) by mouth daily, Disp: 30 tablet, Rfl: 5    cholecalciferol (VITAMIN D3) 400 units tablet, Take 1,000 Units by mouth daily , Disp: , Rfl:     lisinopril (ZESTRIL) 2 5 mg tablet, Take 1 tablet (2 5 mg total) by mouth daily, Disp: 30 tablet, Rfl: 5    metoprolol tartrate (LOPRESSOR) 25 mg tablet, Take 1 tablet (25 mg total) by mouth 2 (two) times a day, Disp: 60 tablet, Rfl: 3    Omega-3 Fatty Acids (fish oil) 1,000 mg, Take 1,000 mg by mouth daily, Disp: , Rfl:     ondansetron (ZOFRAN) 4 mg tablet, Take 1 tablet (4 mg total) by mouth every 8 (eight) hours as needed for nausea or vomiting, Disp: 20 tablet, Rfl: 1    Promethazine-DM (PHENERGAN-DM) 6 25-15 mg/5 mL oral syrup, Take 10 mL by mouth 3 (three) times a day as needed for cough, Disp: 240 mL, Rfl: 1    pantoprazole (PROTONIX) 40 mg tablet, Take 1 tablet (40 mg total) by mouth daily in the early morning (Patient not taking: Reported on 9/16/2021), Disp: 30 tablet, Rfl: 0

## 2022-02-15 NOTE — TELEPHONE ENCOUNTER
I spoke to patients wife  She was aware of message below      ----- Message from Alla Vogt DO sent at 2/15/2022  1:03 PM EST -----  Regarding: FW: Elevated thyroid  Notify patient he had laboratory work done prior to his next treatment from the specialist   His thyroid gland is under active he needs thyroid hormone supplementation which I sent in to the pharmacy he should start taking 1 tablet daily and repeat a blood test in 3 weeks nonfasting to monitor his thyroid I placed the orders for both  ----- Message -----  From: Julius Martinez RN  Sent: 2/15/2022  12:54 PM EST  To: Alla Vogt DO  Subject: Elevated thyroid                                 Dr Moisés Salas,    We ran a TSH on Yanna Estevez in anticipation of him starting chemotherapy treatment  He appears to have preexisting thyroid disorder prior to starting  Is this something you wish to treat and follow or would you prefer us to refer him to endocrinology  Please advise       Thanks,  Vernell Henriquez

## 2022-02-16 NOTE — PROGRESS NOTES
Upon review of labs, patient's TSH is 31  Ba Eng RN is already aware of this result and has notified PCP for management  Patient tolerated tecentriq and avastin infusion without issue   Discharged in stable condition with AVS

## 2022-02-16 NOTE — PLAN OF CARE
Problem: Potential for Falls  Goal: Patient will remain free of falls  Description: INTERVENTIONS:  - Educate patient/family on patient safety including physical limitations  - Instruct patient to call for assistance with activity   - Consult OT/PT to assist with strengthening/mobility   - Keep Call bell within reach  - Keep bed low and locked with side rails adjusted as appropriate  - Keep care items and personal belongings within reach  - Initiate and maintain comfort rounds  - Make Fall Risk Sign visible to staff  - Offer Toileting every 1 Hours, in advance of need  - Apply yellow socks and bracelet for high fall risk patients  - Consider moving patient to room near nurses station  Outcome: Progressing
Private car

## 2022-02-28 NOTE — PROGRESS NOTES
Pulmonary Rehabilitation Plan of Care   60 Day Reassessment      Today's date: 2022   # of Exercise Sessions Completed:   Patient name: Terry Penn      : 1940  Age: 78 y o  MRN: [id number]  Referring Physician: Rocael Jaffe DO  Pulmonologist: ALEXA  Provider: Vero abdi  Clinician: Sandra Perdue Sep, MPT, CCRP    Dx: COVID/Pneumonia due to Benjamin Minus  Date of onset: 2021      SUMMARY OF PROGRESS:  Today was Alessandro's 20  exercise session at  Pulmonary Rehab for the diagnosis of COVID/Pneumonia due to COVID  Patient does not have a PFT on file  Since their diagnosis, the patient has been experiencing increased dyspnea, decreased physical activity, increased fatigue and weakness  They report dyspnea, weakness and fatigue when completing ADLs   The patient currently does follow a formal exercise program at home, walking 20 minutes daily  Patient's PHQ9 score is a six  His NICOLLE 7 is five  When addressed, the patient denies having depression/anxiety  Patient reports excellent social/emotional support  Information to begin using [employer] was provided as well as contact information for counseling through [employer]  The patient is a former smoker (quit 52 years ago)  He continues to abstain  Patient admits to 100% medication compliance  At rest, the patient rated dyspnea 0/10 with SpO2 94-98% on room air  He is able to work consistency at a 2 87 MET Level  The patients rating of perceived dyspnea during exercise was 0-3/10 with SpO2 90-95%  Resting HR 69 and /60  with appropriate hemodynamic response to exercise reaching 93 and 136/62  Patient will exercise on supplemental O2 2L/min via nasal canula and will titrate O2 to maintain SpO2 >90%  Education on smoking cessation, oxygen use, breathing techniques, pulmonary anatomy, exercise for the pulmonary patient, healthy eating, stress, and relaxation will be provided    Patient continues to work on goals including: increase strength, reduce dyspnea, improve functional capcity  Gonzalez Rodriguez has been very compliant attending his AK sessions  Since his program has been progressed he generally requires 3 liters 02 to maintain his 02 Sat > 90% during his sessions  He gives great effort during each session  Patient is able to perform leg press squats and toe raises at 70# w/out issue  Patient performs all tranfers independently  He is able to ambulate from the parking lot to the department w/out AD or rest period  He is also able to ambulate on the TM at 1 5 MPH for 10 minutes  He rates his program a 4 on a 1-10 RPE Scale  He denies any PHILLIPS  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  Will continue to progress patient's program as he is able to tolerate  Medication compliance: Yes   Comments: Pt reports to be compliant with medications  Fall Risk: Low   Comments: Ambulates with a steady gait with no assist device    Smoking: Former user; continues to abstain  RPD at Rest: 0-2/10  RPD with Exercise:  0-5310    Assessment of progression of lung disease and functional status:  CAT: 28/40  Shortness of breath questionnaire: 41/120        EXERCISE ASSESSMENT and PLAN    Current Exercise Program in Rehab:       Frequency: 2-3 days/week        Minutes: 45-50       METS: 2 87 to 3              SpO2: >90%              RPD: 0-3                      HR: 20-30 bpm above rest   RPE: 4-6         Modalities: Treadmill, Airdyne bike, UBE, NuStep and Recumbent bike      Exercise Progression 30 Day Goals :    Frequency: 2-3 days/week        Minutes: 45-50        METS: 3-4              SpO2: >90%               RPD: 0                     HR: 20-30 bpm above rest   RPE: 4-6        Modalities: Treadmill, Airdyne bike, UBE, NuStep and Recumbent bike     Strength training:  patient has progressed to 70# leg press w/out issue      Modalities: Leg Press, Chest Press, Arm Curl, Front Raises and Shoulder Shrugs    Oxygen Needs: supplemental O2 via nasal cannula @ since increased program, generally requires 3 liters 02 during UT in order to maintain his 02 Sat > 90%  L/min at rest, supplemental O2 via nasal cannula @2-3L/min with exercise, O2 while sleeping  and 2  Oxygen Goal: Maintain SpO2>90% during exercise    Home Exercise: Walking program  Education: pursed lipped breathing, diaphragmatic breathing, relaxation breathing, home exercise and benefits of exercise for pulmonary disease    Goals: reduced score on  USCD Shortness of Breath Questionnaire, Improved 6MW distance by 10%, reduced dyspnea during exercise (0-3/10), improved exercise tolerance (max METs tolerated in pulmonary rehab) and SpO2 >90% during exercise  Progressing:  Will continue to educate and progress as tolerated , Goals met: maintaining 02 Sat > 90%and exercising daily       Plan: Titrate supplemental oxygen as needed to maintain SpO2>90% with exercise, learn to conserve energy with ADLs , practice breathing techniques 3x/day and reduce time sitting at home    Readiness to change: Action:  (Changing behavior)      NUTRITION ASSESSMENT AND PLAN    Weight control: Patient maintaining a healthy weight  Starting weight: 190   Current weight:   187    Diabetes: Patient reported fasting     Goals:reduced BMI to < 25, choose lean meat (93-95%), eliminate processed meats, reduce portion sizes of meat to 3oz or less, increase intake of fish, shellfish, cook without added fat or use vegetable oil/spray and increase intake of meatless meals  Education: heart healthy eating  low sodium diet  hydration  education class: healthy choices for managing pulmonary illness  Progressing:Reviewed Pt goals and determined plan of care, Goals met: maintaining a heslthy weight and admits to improved diet at home  Increased water intake also      Plan: Education class: Reading Food Labels, Education Class: Heart Healthy Eating, remove salt shaker from table, eat more home cooked meals and eat out less often, will try new grains like brown rice, quinoa, farro and will replace sugar sweetened cereals with whole grain or oatmeal  Readiness to change: Action:  (Changing behavior)      PSYCHOSOCIAL ASSESSMENT AND PLAN    Emotional:  Depression assessment:  PHQ-9 = 6             Anxiety measure:  NICOLLE-7 =5  Self-reported stress level: 0-3/10  Social support: Very Good    Goals:  Reduce perceived stress to 1-3/10, improved [facility] QOL < 27 and PHQ-9 - reduced severity by one level  Education: signs/sxs of depression, benefits of a positive support system, stress management techniques and class:  Stress and Pulmonary Disease    Progressing:Reviewed Pt goals and determined plan of care, Goals met: Attained a stress level of 0-3/10during this past 30 day reporting period     Plan: Class: Stress and Your Health and Class: Relaxation  Readiness to change: Action:  (Changing behavior)      OTHER CORE COMPONENTS     Tobacco:   Social History     Tobacco Use   Smoking Status Former Smoker    Quit date: 1970    Years since quittin 1   Smokeless Tobacco Never Used       Tobacco Use Intervention: Referral to tobacco expert:   Pt quit 52 years ago   and has abstained    Blood pressure:    Restin/60   Exercise: 136/62   Recovery: 120/58    Goals: consistent BP < 130/80, reduced dietary sodium <2300mg and moderate intensity exercise >150 mins/wk  Education:  pathophysiology of pulmonary disease, preventing infections, dangers of smoking and tobacco triggers  Progressing:Reviewed Pt goals and determined plan of care, Goals met: consistent resting BP < 130/80  Anjel Hammonds   Plan: visit www smokefree gov, avoid places with second hand smoke, Avoid Processed foods, engage in regular exercise, eliminate salt shaker at the table, use salt substitutes, check labels for sodium content and monitor home BP  Readiness to change: Action:  (Changing behavior)

## 2022-03-11 NOTE — TELEPHONE ENCOUNTER
Left a voice message to call me back, I need to ask him MRI questions and what location he would like to go to   Gave [facility] # to call

## 2022-03-11 NOTE — PROGRESS NOTES
Patient noted to have poor venous access  Patient required 3 new IV sites during the coarse of treatment  No infiltrates noted while receiving chemo therapy  Message sent to , RN to discuss and obtain port placement orders if Dr Hoda Byrd approves  Patient tolerated IV Tecentriq and Avastin without reaction or issues  Patient's wife will call office back to set up brain MRI  She stated that she received a call to schedule  AVS given to patient  Patient ambulated off unit without incident  All personal belongings taken with patient

## 2022-03-11 NOTE — PROGRESS NOTES
Mary Leonard RN placed and order for port placement   will reach out to patient to set up port placement

## 2022-03-15 NOTE — H&P (VIEW-ONLY)
Hematology/Oncology Outpatient Follow- up Note  Amelia Pallas 78 y o  male MRN: @ Encounter: [id number]        Date:  3/15/2022    Presenting Complaint/Diagnosis :  Multifocal [address]     HPI:    Deejay Walsh seen for initial consultation 5/27/2021 regarding  History of parasellar carcinoma   The patient underwent Radioembolization in October of 2020 which he tolerated well  His most recent imaging showed areas of irregular enhancement which appeared to have viable tumor and this was  Reviewed at multidisciplinary tumor board and the patient is going back for TACE  Procedure to have this area addressed        Previous Hematologic/ Oncologic History:    Oncology History Overview Note   Patient presents for radiation consult for [address] , he is referred by Dr Jc Naylor to discuss SIRS Spheres  He is s/p SIRS sphere treatment to right lobe and middle lobe on 10/14/20  Last follow-up with Matheus Figueroa, Dr Tulio Nash on 9/13/21  ADDISON  Recent MRI of the abdomen shows evidence of viable residual tumor  Reviewed treatment option is systemic treatment or repeat SIRsphere therapy if feasible  6/10/21 IR chemoembolization   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  9/2/21 Bloodwork:  AFP tumor marker: 3 9  T-Bili: 0 64  Alk Phos: 171  AST: 38  ALT: 39      9/7/21 MRI abdomen w wo contrast  Treated HCC centered within segment MEGHAN and segment VIII, with evidence of viable residual tumor accounting for approximately 60% tumor volume evidenced by APHE and washout  LI-RADS treatment assessment category: LR-TR viable  9/16/21 Med Onc, Dr Jc Naylor  Pt does need a locally directed therapy  This could be another embolization versus radioembolization  Explained this would be decided at multidisciplinary tumor Conference and after his appt with Dr Mayda Ryder    The tumor had actually shrunk and there was no metastatic disease so we could consider just local treatment and observation versus putting him on systemic therapy  He has been in favor of holding off on any systemic therapy as he otherwise has a very good quality of life and would rather just have the local procedure done  If he does have progression despite that or if he has metastatic disease he will then consider systemic therapy  9/30/21 Surg Onc, Dr Arely Singletary  Pt now has continued viable disease on the MRI  His case was discussed at multidisciplinary clinic this morning  It was recommended that he consider TACE  Sir spheres could be given again if there continues to be recurrence  He is set up to discuss this with IR later next week  We will likely hold off on chemotherapy until liver directed therapy is no longer an option  He will discuss immunotherapy with Medical Oncology  Plan to return in 4 months with a repeat MRI and AFP level  9/30/21 RECTAL/GI MULTIDISCIPLINARY CASE REVIEW  PHYSICIAN RECOMMENDED PLAN:   -Chemo embolization   -Scheduled with IR on 10/5/21      10/5/21 IR, Dr Danika Chang patient for cTACE  - MRI abdomen in 3 months after the procedure to evaluate treatment response           11/2/21 Dr Adolph Torres follow-up  1/24/22 MRI abdomen   2/3/22 Dr Arely Singletary follow-up       Hepatocellular [address] ([facility] [address] )   8/24/2020 Initial Diagnosis    Hepatocellular carcinoma ([facility] [address] )     8/25/2020 Biopsy    IR liver biopsy  - Well-differentiated heptocellular carcinoma     9/22/2020 -  Cancer Staged    Staging form: Liver (Excluding Intrahepatic Bile Ducts), AJCC 8th Edition  - Clinical: Stage IB (cT1b, cN0, cM0) - Signed by Larkin Dakins, MD on 9/22/2020       10/14/2020 - 10/14/2020 Radiation    Field Numbers Energy Treatment Site Starting  Ending  Elapsed  Fraction Total  Overlap Site Overlap         Date Date Days Dose Dose   Dose    SirSphere  Y90 Rt Lobe + Middle Robe   10/14/20 10/14/20    1 51 GBq  1 51 GBq                                                                                        Date / Time stamp:  10/14/2020 2:43 PM     2/16/2022 -  Chemotherapy    atezolizumab (TECENTRIQ) IVPB, 1,200 mg, Intravenous, Once, 2 of 6 cycles  Administration: 1,200 mg (2/16/2022), 1,200 mg (3/11/2022)  bevacizumab (AVASTIN) IVPB, 1,282 5 mg, Intravenous, Once, 2 of 6 cycles  Administration: 1,300 mg (2/16/2022), 1,300 mg (3/11/2022)       Y90     Chemoembolization    Current Hematologic/ Oncologic Treatment:      Tacentriq and Avastin    Interval History:      The patient returns for follow-up visit  He states he is doing well on his new regimen with no issues  Cycle 3  Is coming up  The patient states he did well with his immunotherapy with Avastin  Really had no complaints  Denies any nausea denies any vomiting denies any diarrhea  The rest of his 14 point review of systems today was negative  Cancer Staging:  Cancer Staging  Hepatocellular carcinoma ([facility] [address] )  Staging form: Liver (Excluding Intrahepatic Bile Ducts), AJCC 8th Edition  - Clinical: Stage IB (cT1b, cN0, cM0) - Signed by Walter Marcelino MD on 9/22/2020    Test Results:    Imaging: Echo complete w/ contrast if indicated    Result Date: 3/1/2022  Narrative: Milmay Renard  Left Ventricle: Left ventricular cavity size is normal  The left ventricular ejection fraction is 60%  Systolic function is normal  Wall thickness is normal  Wall motion is normal  Diastolic function is mildly abnormal, consistent with grade I (abnormal) relaxation    Right Ventricle: Right ventricular cavity size is mildly dilated    Tricuspid Valve: There is mild regurgitation  The right ventricular systolic pressure is normal    Pulmonic Valve: There is mild regurgitation         Labs:   Lab Results   Component Value Date    WBC 7 90 03/09/2022    HGB 15 4 03/09/2022    HCT 47 1 03/09/2022    MCV 93 03/09/2022     03/09/2022     Lab Results   Component Value Date    K 4 1 03/09/2022     03/09/2022    CO2 31 03/09/2022    BUN 16 03/09/2022    CREATININE 1 22 03/09/2022    GLUF 189 (H) 03/09/2022 CALCIUM 9 3 03/09/2022    CORRECTEDCA 10 3 (H) 03/09/2022    AST 70 (H) 03/09/2022    ALT 59 03/09/2022    ALKPHOS 213 (H) 03/09/2022    EGFR 54 03/09/2022       Lab Results   Component Value Date    PSA 3 6 10/18/2021       Lab Results   Component Value Date    FERRITIN 575 (H) 11/04/2021       ROS: As stated in the history of present illness otherwise his 14 point review of systems today was negative        Active Problems:   Patient Active Problem List   Diagnosis    Essential hypertension    Hyperlipidemia    Cardiomegaly    Anxiety    Abnormal electrocardiogram    Coronary artery disease without angina pectoris    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)    Viral URI with cough    Reactive airway disease without complication    Medicare annual wellness visit, subsequent    Hyperglycemia    Ankle edema, bilateral    Transaminitis    Respiratory insufficiency    EMILIANO (acute kidney injury) ([address] )    Elevated troponin    Septic shock (HCC)    Hepatocellular carcinoma (HCC)    Acute deep vein thrombosis (DVT) of popliteal vein of both lower extremities (HCC)    Acute respiratory failure with hypoxia (HCC)    Stage 3a chronic kidney disease (HCC)    Sepsis (HCC)    Pneumonia    Cough    Platelets decreased ([address] )    Type 2 diabetes mellitus without complication, without long-term current use of insulin (HCC)    Allergic cough    Acute bacterial bronchitis    COVID-19 virus infection    Diastolic heart failure (HCC)    Pulmonary emboli (HCC)    Post-COVID chronic dyspnea    Left carotid artery occlusion    Asymptomatic carotid artery stenosis, right    Vision loss of left eye    Hypoxia       Past Medical History:   Past Medical History:   Diagnosis Date    Coronary artery disease     Gout     Hypercholesterolemia     Hypertension     Liver cancer [facility])        Surgical History:   Past Surgical History:   Procedure Laterality Date    ABDOMINAL SURGERY      appy    APPENDECTOMY      CORONARY ARTERY BYPASS GRAFT      IR BIOPSY LIVER MASS  2020    IR CHEMOEMBOLIZATION LIVER TUMOR  6/10/2021    IR Y-90 PRE-ANGIO/EMBO W/ LUNG SCAN  10/6/2020    IR Y-90 RADIOEMBOLIZATION  10/14/2020       Family History:    Family History   Problem Relation Age of Onset    No Known Problems Mother     No Known Problems Father        Cancer-related family history is not on file  Social History:   Social History     Socioeconomic History    Marital status: /Civil Union     Spouse name: Not on file    Number of children: Not on file    Years of education: Not on file    Highest education level: Not on file   Occupational History    Not on file   Tobacco Use    Smoking status: Former Smoker     Quit date: 1970     Years since quittin 2    Smokeless tobacco: Never Used   Vaping Use    Vaping Use: Never used   Substance and Sexual Activity    Alcohol use: Not Currently    Drug use: Never    Sexual activity: Not on file   Other Topics Concern    Not on file   Social History Narrative    Not on file     Social Determinants of Health     Financial Resource Strain: Not on file   Food Insecurity: No Food Insecurity    Worried About [address] in the Last Year: Never true    [facility] in the Last Year: Never true   Transportation Needs: No Transportation Needs    Lack of Transportation (Medical): No    Lack of Transportation (Non-Medical):  No   Physical Activity: Not on file   Stress: Not on file   Social Connections: Not on file   Intimate Partner Violence: Not on file   Housing Stability: Low Risk     Unable to Pay for Housing in the Last Year: No    Number of Places Lived in the Last Year: 1    Unstable Housing in the Last Year: No       Current Medications:   Current Outpatient Medications   Medication Sig Dispense Refill    allopurinol (ZYLOPRIM) 300 mg tablet Take 300 mg by mouth daily      amLODIPine (NORVASC) 5 mg tablet Take 1 tablet (5 mg total) by mouth daily 30 tablet 5    apixaban (Eliquis) 5 mg Take 1 tablet (5 mg total) by mouth 2 (two) times a day 180 tablet 0    aspirin 81 mg chewable tablet Chew 1 tablet (81 mg total) daily 30 tablet 0    atorvastatin (LIPITOR) 40 mg tablet Take 1 tablet (40 mg total) by mouth daily 30 tablet 5    cholecalciferol (VITAMIN D3) 400 units tablet Take 1,000 Units by mouth daily       levothyroxine (Euthyrox) 75 mcg tablet Take 1 tablet (75 mcg total) by mouth daily in the early morning 30 tablet 5    lisinopril (ZESTRIL) 2 5 mg tablet Take 1 tablet (2 5 mg total) by mouth daily 30 tablet 5    metoprolol tartrate (LOPRESSOR) 25 mg tablet Take 1 tablet (25 mg total) by mouth 2 (two) times a day 60 tablet 3    Omega-3 Fatty Acids (fish oil) 1,000 mg Take 1,000 mg by mouth daily      ondansetron (ZOFRAN) 4 mg tablet Take 1 tablet (4 mg total) by mouth every 8 (eight) hours as needed for nausea or vomiting 20 tablet 1    pantoprazole (PROTONIX) 40 mg tablet Take 1 tablet (40 mg total) by mouth daily in the early morning (Patient not taking: Reported on 9/16/2021) 30 tablet 0    Promethazine-DM (PHENERGAN-DM) 6 25-15 mg/5 mL oral syrup Take 10 mL by mouth 3 (three) times a day as needed for cough 240 mL 1     No current facility-administered medications for this visit  Allergies: No Known Allergies    Physical Exam:    There is no height or weight on file to calculate BSA      Wt Readings from Last 3 Encounters:   03/11/22 82 6 kg (182 lb 1 6 oz)   03/01/22 84 8 kg (187 lb)   02/16/22 85 1 kg (187 lb 9 8 oz)        Temp Readings from Last 3 Encounters:   03/11/22 (!) 96 2 °F (35 7 °C) (Tympanic)   02/16/22 (!) 97 4 °F (36 3 °C) (Temporal)   02/15/22 97 7 °F (36 5 °C) (Skin)        BP Readings from Last 3 Encounters:   03/11/22 116/58   03/01/22 130/70   02/16/22 115/68         Pulse Readings from Last 3 Encounters:   03/11/22 87   03/01/22 70   02/16/22 82         Physical Exam     Constitutional   General appearance: No acute distress, well appearing and well nourished  Eyes   Conjunctiva and lids: No swelling, erythema or discharge  Pupils and irises: Equal, round and reactive to light  Ears, Nose, Mouth, and Throat   External inspection of ears and nose: Normal     Nasal mucosa, septum, and turbinates: Normal without edema or erythema  Oropharynx: Normal with no erythema, edema, exudate or lesions  Pulmonary   Respiratory effort: No increased work of breathing or signs of respiratory distress  Auscultation of lungs: Clear to auscultation  Cardiovascular   Palpation of heart: Normal PMI, no thrills  Auscultation of heart: Normal rate and rhythm, normal S1 and S2, without murmurs  Examination of extremities for edema and/or varicosities: Normal     Carotid pulses: Normal     Abdomen   Abdomen: Non-tender, no masses  Liver and spleen: No hepatomegaly or splenomegaly  Lymphatic   Palpation of lymph nodes in neck: No lymphadenopathy  Musculoskeletal   Gait and station: Normal     Digits and nails: Normal without clubbing or cyanosis  Inspection/palpation of joints, bones, and muscles: Normal     Skin   Skin and subcutaneous tissue: Normal without rashes or lesions  Neurologic   Cranial nerves: Cranial nerves 2-12 intact  Sensation: No sensory loss      Psychiatric   Orientation to person, place, and time: Normal     Mood and affect: Normal         Assessment / Plan:      The patient is a pleasant 52-year-old male with [address]  status post radioembolization in the past with then got a chemoembolization   His tumor is partially treated  Candis Diggs has no evidence of progression and the tumor has shrunk but he still has viable tumor so needs another localized procedure   [facility] [id number] [facility] not make any commitment to treatment  Candis Diggs is seeing our colleagues in surgery in a week   At this point I explained to him that he does need a locally directed therapy   This could be another embolization versus radioembolization   I explained this would be decided at multidisciplinary tumor Conference and after his surgeon has assessed him also  Anay Speaks discussed systemic therapy and again I explained the tumor had actually shrunk and there was no metastatic disease so we could consider just local treatment and observation versus putting him on systemic therapy        He has been in favor of holding off on any systemic therapy and has had local procedures done  His most recent imaging shows interval increase in size of treated [address]  centered with segment 4A and segment 8 with increased size of viable tumor  There was interval development of numerous focal observation the right and left hepatic lobe suspicious for multifocal HCC  At this point I explained my preference would be to start him on systemic treatment  I explained this to the patient  He agreed  I explained we should consider Tecentric and Avastin  This would be every 3 weeks  The patient agreed  He started this on February 16th  Cycle 3  Is on April 1st   MRI of the brain still has not been done despite it being ordered  I explained he needs to have this done for the Avastin  He understands but he would rather have a CT of the brain so I will arrange it  He understands the risks of not having it done including intracranial bleed  I will see him back in 3-6 weeks  He will stay on his current regimen  The plan is to image him after at least 6 cycles  Goals and Barriers:  Current Goal:  Prolong Survival from multifocal HCC    Barriers: None  Patient's Capacity to Self Care:  Patient able to self care  Portions of the record may have been created with voice recognition software  Occasional wrong word or "sound a like" substitutions may have occurred due to the inherent limitations of voice recognition software    Read the chart carefully and recognize, using context, where substitutions have occurred

## 2022-03-15 NOTE — PROGRESS NOTES
Hematology/Oncology Outpatient Follow- up Note  Terry Penn 78 y o  male MRN: @ Encounter: [id number]        Date:  3/15/2022    Presenting Complaint/Diagnosis :  Multifocal [address]     HPI:    Adrian Tolentino seen for initial consultation 5/27/2021 regarding  History of parasellar carcinoma   The patient underwent Radioembolization in October of 2020 which he tolerated well  His most recent imaging showed areas of irregular enhancement which appeared to have viable tumor and this was  Reviewed at multidisciplinary tumor board and the patient is going back for TACE  Procedure to have this area addressed        Previous Hematologic/ Oncologic History:    Oncology History Overview Note   Patient presents for radiation consult for [address] , he is referred by   [facility] to discuss SIRS Spheres  He is s/p SIRS sphere treatment to right lobe and middle lobe on 10/14/20  Last follow-up with Johana Ward, Dr Zari Mendieta on 9/13/21  ADDISON  Recent MRI of the abdomen shows evidence of viable residual tumor  Reviewed treatment option is systemic treatment or repeat SIRsphere therapy if feasible  6/10/21 IR chemoembolization   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  9/2/21 Bloodwork:  AFP tumor marker: 3 9  T-Bili: 0 64  Alk Phos: 171  AST: 38  ALT: 39      9/7/21 MRI abdomen w wo contrast  Treated HCC centered within segment MEGHAN and segment VIII, with evidence of viable residual tumor accounting for approximately 60% tumor volume evidenced by APHE and washout  LI-RADS treatment assessment category: LR-TR viable  9/16/21 Med Onc, Dr STROUD [facility]  Pt does need a locally directed therapy  This could be another embolization versus radioembolization  Explained this would be decided at multidisciplinary tumor Conference and after his appt with Dr Maxwell Cullen    The tumor had actually shrunk and there was no metastatic disease so we could consider just local treatment and observation versus putting him on systemic therapy  He has been in favor of holding off on any systemic therapy as he otherwise has a very good quality of life and would rather just have the local procedure done  If he does have progression despite that or if he has metastatic disease he will then consider systemic therapy  9/30/21 Surg Onc, Dr Lopez Held  Pt now has continued viable disease on the MRI  His case was discussed at multidisciplinary clinic this morning  It was recommended that he consider TACE  Sir spheres could be given again if there continues to be recurrence  He is set up to discuss this with IR later next week  We will likely hold off on chemotherapy until liver directed therapy is no longer an option  He will discuss immunotherapy with Medical Oncology  Plan to return in 4 months with a repeat MRI and AFP level  9/30/21 RECTAL/GI MULTIDISCIPLINARY CASE REVIEW  PHYSICIAN RECOMMENDED PLAN:   -Chemo embolization   -Scheduled with IR on 10/5/21      10/5/21 IR, Dr Noris Morse patient for cTACE  - MRI abdomen in 3 months after the procedure to evaluate treatment response           11/2/21 Dr Adolfo Manning follow-up  1/24/22 MRI abdomen   2/3/22 Dr John Honeycutt follow-up       Hepatocellular carcinoma ([address] )   8/24/2020 Initial Diagnosis    Hepatocellular carcinoma ([facility] [address] )     8/25/2020 Biopsy    IR liver biopsy  - Well-differentiated heptocellular carcinoma     9/22/2020 -  Cancer Staged    Staging form: Liver (Excluding Intrahepatic Bile Ducts), AJCC 8th Edition  - Clinical: Stage IB (cT1b, cN0, cM0) - Signed by Deya Milton MD on 9/22/2020       10/14/2020 - 10/14/2020 Radiation    Field Numbers Energy Treatment Site Starting  Ending  Elapsed  Fraction Total  Overlap Site Overlap         Date Date Days Dose Dose   Dose    SirSphere  Y90 Rt Lobe + Middle Robe   10/14/20 10/14/20    1 51 GBq  1 51 GBq                                                                                        Date / Time stamp:  10/14/2020 2:43 PM     2/16/2022 -  Chemotherapy    atezolizumab (TECENTRIQ) IVPB, 1,200 mg, Intravenous, Once, 2 of 6 cycles  Administration: 1,200 mg (2/16/2022), 1,200 mg (3/11/2022)  bevacizumab (AVASTIN) IVPB, 1,282 5 mg, Intravenous, Once, 2 of 6 cycles  Administration: 1,300 mg (2/16/2022), 1,300 mg (3/11/2022)       Y90     Chemoembolization    Current Hematologic/ Oncologic Treatment:      Tacentriq and Avastin    Interval History:      The patient returns for follow-up visit  He states he is doing well on his new regimen with no issues  Cycle 3  Is coming up  The patient states he did well with his immunotherapy with Avastin  Really had no complaints  Denies any nausea denies any vomiting denies any diarrhea  The rest of his 14 point review of systems today was negative  Cancer Staging:  Cancer Staging  Hepatocellular carcinoma ([address] )  Staging form: Liver (Excluding Intrahepatic Bile Ducts), AJCC 8th Edition  - Clinical: Stage IB (cT1b, cN0, cM0) - Signed by Brenda Garner MD on 9/22/2020    Test Results:    Imaging: Echo complete w/ contrast if indicated    Result Date: 3/1/2022  Narrative: Dago Pagan  Left Ventricle: Left ventricular cavity size is normal  The left ventricular ejection fraction is 60%  Systolic function is normal  Wall thickness is normal  Wall motion is normal  Diastolic function is mildly abnormal, consistent with grade I (abnormal) relaxation    Right Ventricle: Right ventricular cavity size is mildly dilated    Tricuspid Valve: There is mild regurgitation  The right ventricular systolic pressure is normal    Pulmonic Valve: There is mild regurgitation         Labs:   Lab Results   Component Value Date    WBC 7 90 03/09/2022    HGB 15 4 03/09/2022    HCT 47 1 03/09/2022    MCV 93 03/09/2022     03/09/2022     Lab Results   Component Value Date    K 4 1 03/09/2022     03/09/2022    CO2 31 03/09/2022    BUN 16 03/09/2022    CREATININE 1 22 03/09/2022    GLUF 189 (H) 03/09/2022 CALCIUM 9 3 03/09/2022    CORRECTEDCA 10 3 (H) 03/09/2022    AST 70 (H) 03/09/2022    ALT 59 03/09/2022    ALKPHOS 213 (H) 03/09/2022    EGFR 54 03/09/2022       Lab Results   Component Value Date    PSA 3 6 10/18/2021       Lab Results   Component Value Date    FERRITIN 575 (H) 11/04/2021       ROS: As stated in the history of present illness otherwise his 14 point review of systems today was negative        Active Problems:   Patient Active Problem List   Diagnosis    Essential hypertension    Hyperlipidemia    Cardiomegaly    Anxiety    Abnormal electrocardiogram    Coronary artery disease without angina pectoris    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)    Viral URI with cough    Reactive airway disease without complication    Medicare annual wellness visit, subsequent    Hyperglycemia    Ankle edema, bilateral    Transaminitis    Respiratory insufficiency    EMILIANO (acute kidney injury) ([address] )    Elevated troponin    Septic shock (HCC)    Hepatocellular carcinoma (HCC)    Acute deep vein thrombosis (DVT) of popliteal vein of both lower extremities (HCC)    Acute respiratory failure with hypoxia (HCC)    Stage 3a chronic kidney disease (HCC)    Sepsis (HCC)    Pneumonia    Cough    Platelets decreased ([address] )    Type 2 diabetes mellitus without complication, without long-term current use of insulin (HCC)    Allergic cough    Acute bacterial bronchitis    COVID-19 virus infection    Diastolic heart failure (HCC)    Pulmonary emboli (HCC)    Post-COVID chronic dyspnea    Left carotid artery occlusion    Asymptomatic carotid artery stenosis, right    Vision loss of left eye    Hypoxia       Past Medical History:   Past Medical History:   Diagnosis Date    Coronary artery disease     Gout     Hypercholesterolemia     Hypertension     Liver cancer [facility])        Surgical History:   Past Surgical History:   Procedure Laterality Date    ABDOMINAL SURGERY      appy    APPENDECTOMY      CORONARY ARTERY BYPASS GRAFT      IR BIOPSY LIVER MASS  2020    IR CHEMOEMBOLIZATION LIVER TUMOR  6/10/2021    IR Y-90 PRE-ANGIO/EMBO W/ LUNG SCAN  10/6/2020    IR Y-90 RADIOEMBOLIZATION  10/14/2020       Family History:    Family History   Problem Relation Age of Onset    No Known Problems Mother     No Known Problems Father        Cancer-related family history is not on file  Social History:   Social History     Socioeconomic History    Marital status: /Civil Union     Spouse name: Not on file    Number of children: Not on file    Years of education: Not on file    Highest education level: Not on file   Occupational History    Not on file   Tobacco Use    Smoking status: Former Smoker     Quit date: 1970     Years since quittin 2    Smokeless tobacco: Never Used   Vaping Use    Vaping Use: Never used   Substance and Sexual Activity    Alcohol use: Not Currently    Drug use: Never    Sexual activity: Not on file   Other Topics Concern    Not on file   Social History Narrative    Not on file     Social Determinants of Health     Financial Resource Strain: Not on file   Food Insecurity: No Food Insecurity    Worried About [address] [email or web address] in the Last Year: Never true    [facility] in the Last Year: Never true   Transportation Needs: No Transportation Needs    Lack of Transportation (Medical): No    Lack of Transportation (Non-Medical):  No   Physical Activity: Not on file   Stress: Not on file   Social Connections: Not on file   Intimate Partner Violence: Not on file   Housing Stability: Low Risk     Unable to Pay for Housing in the Last Year: No    Number of Places Lived in the Last Year: 1    Unstable Housing in the Last Year: No       Current Medications:   Current Outpatient Medications   Medication Sig Dispense Refill    allopurinol (ZYLOPRIM) 300 mg tablet Take 300 mg by mouth daily      amLODIPine (NORVASC) 5 mg tablet Take 1 tablet (5 mg total) by mouth daily 30 tablet 5    apixaban (Eliquis) 5 mg Take 1 tablet (5 mg total) by mouth 2 (two) times a day 180 tablet 0    aspirin 81 mg chewable tablet Chew 1 tablet (81 mg total) daily 30 tablet 0    atorvastatin (LIPITOR) 40 mg tablet Take 1 tablet (40 mg total) by mouth daily 30 tablet 5    cholecalciferol (VITAMIN D3) 400 units tablet Take 1,000 Units by mouth daily       levothyroxine (Euthyrox) 75 mcg tablet Take 1 tablet (75 mcg total) by mouth daily in the early morning 30 tablet 5    lisinopril (ZESTRIL) 2 5 mg tablet Take 1 tablet (2 5 mg total) by mouth daily 30 tablet 5    metoprolol tartrate (LOPRESSOR) 25 mg tablet Take 1 tablet (25 mg total) by mouth 2 (two) times a day 60 tablet 3    Omega-3 Fatty Acids (fish oil) 1,000 mg Take 1,000 mg by mouth daily      ondansetron (ZOFRAN) 4 mg tablet Take 1 tablet (4 mg total) by mouth every 8 (eight) hours as needed for nausea or vomiting 20 tablet 1    pantoprazole (PROTONIX) 40 mg tablet Take 1 tablet (40 mg total) by mouth daily in the early morning (Patient not taking: Reported on 9/16/2021) 30 tablet 0    Promethazine-DM (PHENERGAN-DM) 6 25-15 mg/5 mL oral syrup Take 10 mL by mouth 3 (three) times a day as needed for cough 240 mL 1     No current facility-administered medications for this visit  Allergies: No Known Allergies    Physical Exam:    There is no height or weight on file to calculate BSA      Wt Readings from Last 3 Encounters:   03/11/22 82 6 kg (182 lb 1 6 oz)   03/01/22 84 8 kg (187 lb)   02/16/22 85 1 kg (187 lb 9 8 oz)        Temp Readings from Last 3 Encounters:   03/11/22 (!) 96 2 °F (35 7 °C) (Tympanic)   02/16/22 (!) 97 4 °F (36 3 °C) (Temporal)   02/15/22 97 7 °F (36 5 °C) (Skin)        BP Readings from Last 3 Encounters:   03/11/22 116/58   03/01/22 130/70   02/16/22 115/68         Pulse Readings from Last 3 Encounters:   03/11/22 87   03/01/22 70   02/16/22 82         Physical Exam     Constitutional   General appearance: No acute distress, well appearing and well nourished  Eyes   Conjunctiva and lids: No swelling, erythema or discharge  Pupils and irises: Equal, round and reactive to light  Ears, Nose, Mouth, and Throat   External inspection of ears and nose: Normal     Nasal mucosa, septum, and turbinates: Normal without edema or erythema  Oropharynx: Normal with no erythema, edema, exudate or lesions  Pulmonary   Respiratory effort: No increased work of breathing or signs of respiratory distress  Auscultation of lungs: Clear to auscultation  Cardiovascular   Palpation of heart: Normal PMI, no thrills  Auscultation of heart: Normal rate and rhythm, normal S1 and S2, without murmurs  Examination of extremities for edema and/or varicosities: Normal     Carotid pulses: Normal     Abdomen   Abdomen: Non-tender, no masses  Liver and spleen: No hepatomegaly or splenomegaly  Lymphatic   Palpation of lymph nodes in neck: No lymphadenopathy  Musculoskeletal   Gait and station: Normal     Digits and nails: Normal without clubbing or cyanosis  Inspection/palpation of joints, bones, and muscles: Normal     Skin   Skin and subcutaneous tissue: Normal without rashes or lesions  Neurologic   Cranial nerves: Cranial nerves 2-12 intact  Sensation: No sensory loss      Psychiatric   Orientation to person, place, and time: Normal     Mood and affect: Normal         Assessment / Plan:      The patient is a pleasant 75-year-old male with [address]  status post radioembolization in the past with then got a chemoembolization   His tumor is partially treated  Jeni Mckoy has no evidence of progression and the tumor has shrunk but he still has viable tumor so needs another localized procedure   He saw our colleagues in [id number] who according to him did not make any commitment to treatment  Jeni Mckoy is seeing our colleagues in surgery in a week   At this point I explained to him that he does need a locally directed therapy   This could be another embolization versus radioembolization   I explained this would be decided at multidisciplinary tumor Conference and after his surgeon has assessed him also  Curt Evans discussed systemic therapy and again I explained the tumor had actually shrunk and there was no metastatic disease so we could consider just local treatment and observation versus putting him on systemic therapy        He has been in favor of holding off on any systemic therapy and has had local procedures done  His most recent imaging shows interval increase in size of treated [address]  centered with segment 4A and segment 8 with increased size of viable tumor  There was interval development of numerous focal observation the right and left hepatic lobe suspicious for multifocal HCC  At this point I explained my preference would be to start him on systemic treatment  I explained this to the patient  He agreed  I explained we should consider Tecentric and Avastin  This would be every 3 weeks  The patient agreed  He started this on February 16th  Cycle 3  Is on April 1st   MRI of the brain still has not been done despite it being ordered  I explained he needs to have this done for the Avastin  He understands but he would rather have a CT of the brain so I will arrange it  He understands the risks of not having it done including intracranial bleed  I will see him back in 3-6 weeks  He will stay on his current regimen  The plan is to image him after at least 6 cycles  Goals and Barriers:  Current Goal:  Prolong Survival from multifocal HCC    Barriers: None  Patient's Capacity to Self Care:  Patient able to self care  Portions of the record may have been created with voice recognition software  Occasional wrong word or "sound a like" substitutions may have occurred due to the inherent limitations of voice recognition software    Read the chart carefully and recognize, using context, where substitutions have occurred

## 2022-03-16 NOTE — PROGRESS NOTES
Cardiology Follow Up    Megan Pass  [id number]  1940  [facility] [place]  [address] [address] [phone]      1  Essential hypertension     2  Coronary artery disease involving native heart without angina pectoris, unspecified vessel or lesion type     3  Chronic diastolic heart failure (HCC)     4  Pulmonary embolism, unspecified chronicity, unspecified pulmonary embolism type, unspecified whether acute cor pulmonale present ([facility] )     5  Left carotid artery occlusion     6  Asymptomatic carotid artery stenosis, right     7  Stage 3a chronic kidney disease ([facility] )     8  Mixed hyperlipidemia         Discussion/Summary:  1  Multi-vessel coronary artery disease history of CABG x3  2  Hypertension  3  Hyperlipidemia  4  Heart failure with preserved ejection fraction  5  Peripheral vascular disease  6  Bilateral carotid stenosis  7  Chronic kidney disease      -patient is chronically on 2-3 L nasal cannula at home  He is not currently wearing in the office today and O2 sat 90-92 however was 88 on presentation but patient relatively asymptomatic will reinitiate once he gets home  -they have not been monitoring home blood pressure readings but will begin to do so as blood pressure appears relatively stable in the office at this time will continue amlodipine 5 mg daily, lisinopril 2 5 mg daily, metoprolol tartrate 25 mg twice daily  -patient currently on aspirin 81 mg daily for peripheral vascular disease and coronary disease along with Eliquis 5 mg twice daily for DVT/PE will need to be monitored very closely on these medications to avoid bleeding issues  -patient will continue to be monitored and counseled on dietary lifestyle modifications  -I will see patient in 3 months or sooner if necessary and will follow up CMP just prior to next office visit    -patient counseled if he were to have any warning or alarm type symptoms he is to seek emergency medical care immediately  History of Present Illness:  - patient is an 78-year-old male with hepatocellular carcinoma, CKD, chronic diastolic congestive heart failure, coronary artery disease with history CABG x3 in 1996 who was previously followed by Dr Romeo Farooq along with hypertension, hyperlipidemia, carotid stenosis, diabetes mellitus, COVID infection at the end of October in 2021 and was hospitalized in November of 2021 found have bilateral lower extremity calf pain with CTA showing concern for right upper and lower lobe pulmonary emboli along with hypoxia requiring advanced high-flow nasal cannula support transferred to [facility] for higher level of care treated with steroids, IV antibiotics for superimposed bacterial pneumonia and treated for COVID-19 along with undergoing initially IV anticoagulation then transition to Eliquis in the outpatient setting and was started on lisinopril for hypertension during the hospitalizations but due to low blood pressure was discharged without home medications and is chronically on 2-3 L nasal cannula oxygen who also has left eye vision loss and is following down at [facility] who presents to the office today for scheduled follow-up  -he was seen at his oncology office yesterday and is currently undergoing chemotherapy for recently discovered liver lesions on MRI  - currently in the office today patient denies any chest pain, palpitations, lightheadedness or dizziness, loss of consciousness or shortness of breath  He and his wife both note that he has had some fatigue particularly after undergoing the chemo but even over the last 2-3 days this has been lessening and the patient appears to have more energy        Patient Active Problem List   Diagnosis    Essential hypertension    Hyperlipidemia    Cardiomegaly    Anxiety    Abnormal electrocardiogram    Coronary artery disease without angina pectoris    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 )    Elevated troponin    Septic shock (HCC)    Hepatocellular carcinoma (HCC)    Acute deep vein thrombosis (DVT) of popliteal vein of both lower extremities (HCC)    Acute respiratory failure with hypoxia (HCC)    Stage 3a chronic kidney disease (HCC)    Sepsis (HCC)    Pneumonia    Cough    Platelets decreased (HCC)    Type 2 diabetes mellitus without complication, without long-term current use of insulin (HCC)    Allergic cough    Acute bacterial bronchitis    COVID-19 virus infection    Diastolic heart failure (HCC)    Pulmonary emboli (HCC)    Post-COVID chronic dyspnea    Left carotid artery occlusion    Asymptomatic carotid artery stenosis, right    Vision loss of left eye    Hypoxia     Past Medical History:   Diagnosis Date    Coronary artery disease     Gout     Hypercholesterolemia     Hypertension     Liver cancer ([facility] )      Social History     Socioeconomic History    Marital status: /Civil Union     Spouse name: Not on file    Number of children: Not on file    Years of education: Not on file    Highest education level: Not on file   Occupational History    Not on file   Tobacco Use    Smoking status: Former Smoker     Quit date:      Years since quittin 2    Smokeless tobacco: Never Used   Vaping Use    Vaping Use: Never used   Substance and Sexual Activity    Alcohol use: Not Currently    Drug use: Never    Sexual activity: Not on file   Other Topics Concern    Not on file   Social History Narrative    Not on file     Social Determinants of Health     Financial Resource Strain: Not on file   Food Insecurity: No Food Insecurity    Worried About Running Out of Food in the Last Year: Never true    Ran Out of Food in the Last Year: Never true   Transportation Needs: No Transportation Needs    Lack of Transportation (Medical): No    Lack of Transportation (Non-Medical):  No   Physical Activity: Not on file   Stress: Not on file   Social Connections: Not on file   Intimate Partner Violence: Not on file   Housing Stability: Low Risk     Unable to Pay for Housing in the Last Year: No    Number of Places Lived in the Last Year: 1    Unstable Housing in the Last Year: No      Family History   Problem Relation Age of Onset    No Known Problems Mother     No Known Problems Father      Past Surgical History:   Procedure Laterality Date    ABDOMINAL SURGERY      appy    APPENDECTOMY      CORONARY ARTERY BYPASS GRAFT      IR BIOPSY LIVER MASS  8/25/2020    IR CHEMOEMBOLIZATION LIVER TUMOR  6/10/2021    IR Y-90 PRE-ANGIO/EMBO W/ LUNG SCAN  10/6/2020    IR Y-90 RADIOEMBOLIZATION  10/14/2020       Current Outpatient Medications:     allopurinol (ZYLOPRIM) 300 mg tablet, Take 300 mg by mouth daily, Disp: , Rfl:     amLODIPine (NORVASC) 5 mg tablet, Take 1 tablet (5 mg total) by mouth daily, Disp: 30 tablet, Rfl: 5    apixaban (Eliquis) 5 mg, Take 1 tablet (5 mg total) by mouth 2 (two) times a day, Disp: 180 tablet, Rfl: 0    aspirin 81 mg chewable tablet, Chew 1 tablet (81 mg total) daily, Disp: 30 tablet, Rfl: 0    atorvastatin (LIPITOR) 40 mg tablet, Take 1 tablet (40 mg total) by mouth daily, Disp: 30 tablet, Rfl: 5    cholecalciferol (VITAMIN D3) 400 units tablet, Take 1,000 Units by mouth daily , Disp: , Rfl:     levothyroxine (Euthyrox) 75 mcg tablet, Take 1 tablet (75 mcg total) by mouth daily in the early morning, Disp: 30 tablet, Rfl: 5    lisinopril (ZESTRIL) 2 5 mg tablet, Take 1 tablet (2 5 mg total) by mouth daily, Disp: 30 tablet, Rfl: 5    metoprolol tartrate (LOPRESSOR) 25 mg tablet, Take 1 tablet (25 mg total) by mouth 2 (two) times a day, Disp: 60 tablet, Rfl: 3    Omega-3 Fatty Acids (fish oil) 1,000 mg, Take 1,000 mg by mouth daily, Disp: , Rfl:     ondansetron (ZOFRAN) 4 mg tablet, Take 1 tablet (4 mg total) by mouth every 8 (eight) hours as needed for nausea or vomiting, Disp: 20 tablet, Rfl: 1    Promethazine-DM (PHENERGAN-DM) 6 25-15 mg/5 mL oral syrup, Take 10 mL by mouth 3 (three) times a day as needed for cough, Disp: 240 mL, Rfl: 1    pantoprazole (PROTONIX) 40 mg tablet, Take 1 tablet (40 mg total) by mouth daily in the early morning (Patient not taking: Reported on 9/16/2021), Disp: 30 tablet, Rfl: 0  No Known Allergies      Labs:  Appointment on 03/09/2022   Component Date Value    AFP TUMOR MARKER 03/09/2022 77 2*    WBC 03/09/2022 7 90     RBC 03/09/2022 5 08     Hemoglobin 03/09/2022 15 4     Hematocrit 03/09/2022 47 1     MCV 03/09/2022 93     MCH 03/09/2022 30 3     MCHC 03/09/2022 32 7     RDW 03/09/2022 14 3     MPV 03/09/2022 11 7     Platelets 10/02/7128 157     nRBC 03/09/2022 0     Neutrophils Relative 03/09/2022 64     Immat GRANS % 03/09/2022 1     Lymphocytes Relative 03/09/2022 20     Monocytes Relative 03/09/2022 12     Eosinophils Relative 03/09/2022 2     Basophils Relative 03/09/2022 1     Neutrophils Absolute 03/09/2022 5 05     Immature Grans Absolute 03/09/2022 0 06     Lymphocytes Absolute 03/09/2022 1 59     Monocytes Absolute 03/09/2022 0 97     Eosinophils Absolute 03/09/2022 0 16     Basophils Absolute 03/09/2022 0 07     Sodium 03/09/2022 136     Potassium 03/09/2022 4 1     Chloride 03/09/2022 101     CO2 03/09/2022 31     ANION GAP 03/09/2022 4     BUN 03/09/2022 16     Creatinine 03/09/2022 1 22     Glucose, Fasting 03/09/2022 189*    Calcium 03/09/2022 9 3     Corrected Calcium 03/09/2022 10 3*    AST 03/09/2022 70*    ALT 03/09/2022 59     Alkaline Phosphatase 03/09/2022 213*    Total Protein 03/09/2022 7 6     Albumin 03/09/2022 2 8*    Total Bilirubin 03/09/2022 1 01*    eGFR 03/09/2022 54     TSH 3RD GENERATON 03/09/2022 5 070*    T3, Free 03/09/2022 2 37     Color, UA 03/09/2022 Yellow     Clarity, UA 03/09/2022 Clear     Specific Gravity, UA 03/09/2022 1 023     pH, UA 03/09/2022 6 0     Leukocytes, UA 03/09/2022 Negative     Nitrite, UA 03/09/2022 Negative     Protein, UA 03/09/2022 Trace*    Glucose, UA 03/09/2022 Negative     Ketones, UA 03/09/2022 Negative     Urobilinogen, UA 03/09/2022 4 0*    Bilirubin, UA 03/09/2022 Negative     Blood, UA 03/09/2022 Negative     RBC, UA 03/09/2022 None Seen     WBC, UA 03/09/2022 1-2     Epithelial Cells 03/09/2022 Occasional     Bacteria, UA 03/09/2022 None Seen     Hyaline Casts, UA 03/09/2022 0-3*    Free T4 03/09/2022 1 39    Hospital Outpatient Visit on 03/01/2022   Component Date Value    LA size 03/01/2022 4 4     Aortic valve mean veloci* 03/01/2022 13 00     PV Mean Noel 03/01/2022 70     LVPWd 03/01/2022 0 90     Left Atrium Area-systoli* 03/01/2022 14 4     MV E' Tissue Velocity Se* 03/01/2022 5     IVSd 03/01/2022 5 53     LV DIASTOLIC VOLUME (MOD* 55/61/0775 116     LEFT VENTRICLE SYSTOLIC * 72/57/6707 30     Left ventricular stroke * 03/01/2022 85 00     RVOT VMEAN 03/01/2022 0 4     RVOT PMEAN 03/01/2022 1     RVOT PMAX 03/01/2022 2     A4C EF 03/01/2022 53     LVIDd 03/01/2022 5 00     IVS 03/01/2022 0 9     LVIDS 03/01/2022 2 80     FS 03/01/2022 44     Ao root 03/01/2022 3 10     RVID d 03/01/2022 3 4     AV mean gradient 03/01/2022 8     MV valve area p 1/2 meth* 03/01/2022 4 00     PV peak gradient antegra* 03/01/2022 5     E wave deceleration time 03/01/2022 191     Ao peak noel retrograde 03/01/2022 1 94     Ao VTI 03/01/2022 43 67     RVOT peak noel 03/01/2022 0 62     RVOT peak VTI 03/01/2022 12 49     AV peak gradient 03/01/2022 15     PV mean gradient antegra* 03/01/2022 2     MV Peak E Noel 03/01/2022 65     MV Peak A Noel 03/01/2022 1 21     MV stenosis pressure 1/2* 03/01/2022 55     LVSV, 2D 03/01/2022 85     Pulmonic Valve Systolic * 05/55/4144 17     ZLVPWD 03/01/2022 1 17     ZLVIDS 03/01/2022 -2 34     ZLVIDD 03/01/2022 -2 07     ZIVSD 03/01/2022 1 06     LV EF 03/01/2022 60     LA Volume Index (BP) 03/01/2022 21 9     E/A ratio 03/01/2022 0 54    Appointment on 02/14/2022   Component Date Value    WBC 02/14/2022 10 30*    RBC 02/14/2022 5 34     Hemoglobin 02/14/2022 16 5     Hematocrit 02/14/2022 52 0*    MCV 02/14/2022 97     MCH 02/14/2022 30 9     MCHC 02/14/2022 31 7     RDW 02/14/2022 14 8     MPV 02/14/2022 11 7     Platelets 17/39/9270 216     nRBC 02/14/2022 0     Neutrophils Relative 02/14/2022 73     Immat GRANS % 02/14/2022 1     Lymphocytes Relative 02/14/2022 14     Monocytes Relative 02/14/2022 9     Eosinophils Relative 02/14/2022 2     Basophils Relative 02/14/2022 1     Neutrophils Absolute 02/14/2022 7 52     Immature Grans Absolute 02/14/2022 0 10     Lymphocytes Absolute 02/14/2022 1 46     Monocytes Absolute 02/14/2022 0 93     Eosinophils Absolute 02/14/2022 0 21     Basophils Absolute 02/14/2022 0 08     Sodium 02/14/2022 139     Potassium 02/14/2022 4 2     Chloride 02/14/2022 105     CO2 02/14/2022 29     ANION GAP 02/14/2022 5     BUN 02/14/2022 18     Creatinine 02/14/2022 1 08     Glucose 02/14/2022 141*    Calcium 02/14/2022 9 2     Corrected Calcium 02/14/2022 9 8     AST 02/14/2022 51*    ALT 02/14/2022 46     Alkaline Phosphatase 02/14/2022 168*    Total Protein 02/14/2022 8 0     Albumin 02/14/2022 3 2*    Total Bilirubin 02/14/2022 0 61     eGFR 02/14/2022 63     TSH 3RD GENERATON 02/14/2022 31 000*    T3, Free 02/14/2022 2 70     Color, UA 02/14/2022 Yellow     Clarity, UA 02/14/2022 Clear     Specific Gravity, UA 02/14/2022 >=1 030     pH, UA 02/14/2022 5 5     Leukocytes, UA 02/14/2022 Negative     Nitrite, UA 02/14/2022 Negative     Protein, UA 02/14/2022 Negative     Glucose, UA 02/14/2022 Negative     Ketones, UA 02/14/2022 Negative     Urobilinogen, UA 02/14/2022 0 2     Bilirubin, UA 02/14/2022 Negative     Blood, UA 02/14/2022 Small     RBC, UA 02/14/2022 2-4     WBC, UA 02/14/2022 None Seen     Epithelial Cells 02/14/2022 None Seen     Bacteria, UA 02/14/2022 None Seen     Hyaline Casts, UA 02/14/2022 None Seen     Free T4 02/14/2022 0 57*   Appointment on 02/01/2022   Component Date Value    Sodium 02/01/2022 137     Potassium 02/01/2022 4 3     Chloride 02/01/2022 101     CO2 02/01/2022 31     ANION GAP 02/01/2022 5     BUN 02/01/2022 16     Creatinine 02/01/2022 1 05     Glucose, Fasting 02/01/2022 150*    Calcium 02/01/2022 9 5     Corrected Calcium 02/01/2022 10 1     AST 02/01/2022 39     ALT 02/01/2022 33     Alkaline Phosphatase 02/01/2022 147*    Total Protein 02/01/2022 7 8     Albumin 02/01/2022 3 3*    Total Bilirubin 02/01/2022 0 66     eGFR 02/01/2022 65     Hemoglobin A1C 02/01/2022 6 2*    EAG 02/01/2022 131     Cholesterol 02/01/2022 129     Triglycerides 02/01/2022 100     HDL, Direct 02/01/2022 65     LDL Calculated 02/01/2022 44     WBC 02/01/2022 9 25     RBC 02/01/2022 5 14     Hemoglobin 02/01/2022 15 9     Hematocrit 02/01/2022 50 3*    MCV 02/01/2022 98     MCH 02/01/2022 30 9     MCHC 02/01/2022 31 6     RDW 02/01/2022 14 8     MPV 02/01/2022 11 2     Platelets 65/74/5528 218     nRBC 02/01/2022 0     Neutrophils Relative 02/01/2022 65     Immat GRANS % 02/01/2022 1     Lymphocytes Relative 02/01/2022 20     Monocytes Relative 02/01/2022 9     Eosinophils Relative 02/01/2022 4     Basophils Relative 02/01/2022 1     Neutrophils Absolute 02/01/2022 6 06     Immature Grans Absolute 02/01/2022 0 06     Lymphocytes Absolute 02/01/2022 1 86     Monocytes Absolute 02/01/2022 0 82     Eosinophils Absolute 02/01/2022 0 39     Basophils Absolute 02/01/2022 0 06    Telephone on 01/28/2022   Component Date Value    Bilirubin, Direct 02/01/2022 0 24*   Appointment on 01/26/2022   Component Date Value    BUN 01/26/2022 14     AFP TUMOR MARKER 01/26/2022 159 5*    Creatinine 01/26/2022 1 06     eGFR 01/26/2022 64         Imaging: Echo complete w/ contrast if indicated    Result Date: 3/1/2022  Narrative: [facility]  Left Ventricle: Left ventricular cavity size is normal  The left ventricular ejection fraction is 60%  Systolic function is normal  Wall thickness is normal  Wall motion is normal  Diastolic function is mildly abnormal, consistent with grade I (abnormal) relaxation    Right Ventricle: Right ventricular cavity size is mildly dilated    Tricuspid Valve: There is mild regurgitation  The right ventricular systolic pressure is normal    Pulmonic Valve: There is mild regurgitation  Review of Systems:  Review of Systems   Constitutional: Positive for fatigue  Negative for chills, diaphoresis, fever and unexpected weight change  HENT: Negative for trouble swallowing and voice change  Eyes: Negative for pain and redness  Respiratory: Negative for shortness of breath and wheezing  Cardiovascular: Negative for chest pain, palpitations and leg swelling  Gastrointestinal: Negative for abdominal pain, constipation, diarrhea, nausea and vomiting  Genitourinary: Negative for dysuria  Musculoskeletal: Negative for neck pain and neck stiffness  Neurological: Negative for dizziness, syncope and headaches  Psychiatric/Behavioral: Negative for agitation and confusion  All other systems reviewed and are negative          Vitals:    03/16/22 1018   BP: 114/64   BP Location: Left arm   Patient Position: Sitting   Cuff Size: Standard   Pulse: 88   Temp: 98 3 °F (36 8 °C)   TempSrc: Skin   SpO2: 90%   Weight: 82 7 kg (182 lb 6 4 oz)   Height: 5' 9" (1 753 m)     Vitals:    03/16/22 1018   Weight: 82 7 kg (182 lb 6 4 oz)     Height: 5' 9" (175 3 cm)     Physical Exam:  Physical Exam  Vitals reviewed  Constitutional:       General: He is not in acute distress  Appearance: Normal appearance  He is not diaphoretic  HENT:      Head: Normocephalic and atraumatic  Eyes:      General:         Right eye: No discharge  Left eye: No discharge  Neck:      Comments: Trachea midline, no JVD present  Cardiovascular:      Rate and Rhythm: Normal rate and regular rhythm  Heart sounds: No friction rub  Pulmonary:      Effort: Pulmonary effort is normal  No respiratory distress  Breath sounds: No wheezing  Comments: Decreased breath sounds bilaterally  Abdominal:      General: Bowel sounds are normal       Palpations: Abdomen is soft  Tenderness: There is no abdominal tenderness  Musculoskeletal:      Right lower leg: No edema  Left lower leg: No edema  Skin:     General: Skin is warm and dry  Neurological:      Mental Status: He is alert        Comments: Awake, alert, able to answer questions appropriately, able move extremities bilaterally   Psychiatric:         Mood and Affect: Mood normal          Behavior: Behavior normal

## 2022-03-22 NOTE — PROGRESS NOTES
Spoke with pts wife Radha Hinds  Reviewed pre procedure instructions verified NPO status, ride to and from, ride to and from and arrival time of 8:30  All questions answered

## 2022-03-28 NOTE — PROGRESS NOTES
Pulmonary Rehabilitation Plan of Care   90 Day Reassessment      Today's date: 3/28/2022   # of Exercise Sessions Completed:   Patient name: Caitlin Phelps      : 1940  Age: 78 y o  MRN: [id number]  Referring Physician: Darci Chan DO  Pulmonologist: ALEXA  Provider: Crystal Gandhi  Clinician: Al Wolf MS    Dx: COVID/Pneumonia due to [place]  Date of onset: 2021      SUMMARY OF PROGRESS:  Today was Alessandro's 30th exercise session at  Pulmonary Rehab for the diagnosis of COVID/Pneumonia due to COVID  Patient does not have a PFT on file  Since their diagnosis, the patient has been experiencing increased dyspnea, decreased physical activity, increased fatigue and weakness  They report dyspnea, weakness and fatigue when completing ADLs   The patient currently does follow a formal exercise program at home, walking 20 minutes daily  Patient's PHQ9 score is a six  His NICOLLE 7 is five  When addressed, the patient denies having depression/anxiety  Patient reports excellent social/emotional support  Information to begin using [employer] was provided as well as contact information for counseling through [facility]  The patient is a former smoker (quit 52 years ago)  He continues to abstain  Patient admits to 100% medication compliance  At rest, the patient rated dyspnea 0/10 with SpO2 92% on room air  He is able to work consistency at a 2 87 MET Level  The patients rating of perceived dyspnea during exercise was 0-3/10 with SpO2 90-96%  Resting HR 80 and /54  with appropriate hemodynamic response to exercise reaching 93 and 118/58  Patient will exercise on supplemental O2 2-3L/min via nasal canula and will titrate O2 to maintain SpO2 >90%  Education on smoking cessation, oxygen use, breathing techniques, pulmonary anatomy, exercise for the pulmonary patient, healthy eating, stress, and relaxation will be provided    Patient continues to work on goals including: increase strength, reduce dyspnea, improve functional capcity  Thanh Licona has been very compliant attending his MN sessions  Since his program has been progressed he generally requires 3 liters 02 to maintain his 02 Sat > 90% during his sessions  He gives great effort during each session  Patient is able to perform leg press squats and toe raises at 70# w/out issue  Patient performs all tranfers independently  He is able to ambulate from the parking lot to the department w/out AD or rest period  He is also able to ambulate on the TM at 1 5 MPH for 10 minutes  He rates his program a 4 on a 1-10 RPE Scale  He denies any PHILLIPS  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  Will continue to progress patient's program as he is able to tolerate  He will receive follow up testing and questionnaires for discharge in the next 30 day reporting window  Medication compliance: Yes   Comments: Pt reports to be compliant with medications  Fall Risk: Low   Comments: Ambulates with a steady gait with no assist device    Smoking: Former user, quit 52 years ago; continues to abstain      RPD at Rest: 0-2/10  RPD with Exercise:  0-5310    Assessment of progression of lung disease and functional status:  CAT: 28/40  Shortness of breath questionnaire: 41/120        EXERCISE ASSESSMENT and PLAN    Current Exercise Program in Rehab:       Frequency: 2-3 days/week        Minutes: 45-50       METS: 2 87 to 3              SpO2: >90%              RPD: 0-3                      HR: 20-30 bpm above rest   RPE: 4-6         Modalities: Treadmill, Airdyne bike, UBE, NuStep and Recumbent bike      Exercise Progression 30 Day Goals :    Frequency: 2-3 days/week        Minutes: 45-50        METS: 3-4              SpO2: >90%               RPD: 0                     HR: 20-30 bpm above rest   RPE: 4-6        Modalities: Treadmill, Airdyne bike, UBE, NuStep and Recumbent bike     Strength training:  patient has progressed to 70# leg press w/out issue  Modalities: Leg Press, Chest Press, Arm Curl, Front Raises and Shoulder Shrugs    Oxygen Needs: supplemental O2 via nasal cannula @ since increased program, generally requires 3 liters 02 during MO in order to maintain his 02 Sat > 90%  L/min at rest, supplemental O2 via nasal cannula @2-3L/min with exercise, O2 while sleeping  and 2  Oxygen Goal: Maintain SpO2>90% during exercise    Home Exercise: Walking program  Education: pursed lipped breathing, diaphragmatic breathing, relaxation breathing, home exercise and benefits of exercise for pulmonary disease    Goals: reduced score on  USCD Shortness of Breath Questionnaire, Improved 6MW distance by 10%, reduced dyspnea during exercise (0-3/10), improved exercise tolerance (max METs tolerated in pulmonary rehab) and SpO2 >90% during exercise  Progressing:  Will continue to educate and progress as tolerated , Goals met: maintaining 02 Sat > 90%and exercising daily       Plan: Titrate supplemental oxygen as needed to maintain SpO2>90% with exercise, learn to conserve energy with ADLs , practice breathing techniques 3x/day and reduce time sitting at home    Readiness to change: Action:  (Changing behavior)      NUTRITION ASSESSMENT AND PLAN    Weight control: Patient maintaining a healthy weight  Starting weight: 190   Current weight:   182    Diabetes: Patient reported fasting     Goals:reduced BMI to < 25, choose lean meat (93-95%), eliminate processed meats, reduce portion sizes of meat to 3oz or less, increase intake of fish, shellfish, cook without added fat or use vegetable oil/spray and increase intake of meatless meals  Education: heart healthy eating  low sodium diet  hydration  education class: healthy choices for managing pulmonary illness  Progressing:Reviewed Pt goals and determined plan of care, Goals met: maintaining a heslthy weight and admits to improved diet at home   Increased water intake also     Plan: Education class: Reading Food Labels, Education Class: Heart Healthy Eating, remove salt shaker from table, eat more home cooked meals and eat out less often, will try new grains like brown rice, quinoa, farro and will replace sugar sweetened cereals with whole grain or oatmeal  Readiness to change: Action:  (Changing behavior)      PSYCHOSOCIAL ASSESSMENT AND PLAN    Emotional:  Depression assessment:  PHQ-9 = 6             Anxiety measure:  NICOLLE-7 =5  Self-reported stress level: 0-3/10  Social support: Very Good    Goals:  Reduce perceived stress to 1-3/10, improved [facility] QOL < 27 and PHQ-9 - reduced severity by one level  Education: signs/sxs of depression, benefits of a positive support system, stress management techniques and class:  Stress and Pulmonary Disease    Progressing:Will continue to educate and progress as tolerated , Goals met: Attained a stress level of 0-3/10during this past 30 day reporting period     Plan: Class: Stress and Your Health and Class: Relaxation  Readiness to change: Action:  (Changing behavior)      OTHER CORE COMPONENTS     Tobacco:    Social History     Tobacco Use   Smoking Status Former Smoker    Quit date: 1970    Years since quittin 2   Smokeless Tobacco Never Used       Tobacco Use Intervention: Referral to tobacco expert:   Pt quit 52 years ago   and has abstained    Blood pressure:    Restin/54   Exercise: 118/58   Recovery: 120/58    Goals: consistent BP < 130/80, reduced dietary sodium <2300mg and moderate intensity exercise >150 mins/wk  Education:  pathophysiology of pulmonary disease, preventing infections, dangers of smoking and tobacco triggers  Progressing:Will continue to educate and progress as tolerated , Goals met: consistent resting BP < 130/80  Raghav Rivas   Plan: visit www smokefree gov, avoid places with second hand smoke, Avoid Processed foods, engage in regular exercise, eliminate salt shaker at the table, use salt substitutes, check labels for sodium content and monitor home BP  Readiness to change: Action:  (Changing behavior)

## 2022-03-30 NOTE — TELEPHONE ENCOUNTER
Called and spoke to pt regarding complaints of possible UTI like symptoms past 2 weeks  Pt with c/o of increased urgency to void throughout the day but not going much  Pt denies any fever and may need a short course of antibiotics or we can order a U/A? Marichuy Stapleton Pt currently be treated with tecentriq and avastin, and me related to his avastin  Pt eating and drinking fine   Please advise thank you

## 2022-03-31 NOTE — TELEPHONE ENCOUNTER
----- Message from Shaniqua Wahl sent at 3/31/2022  2:31 PM EDT -----  Patient needs referral to Urology ASAP    Treatment will be on hold till he is evaluated

## 2022-03-31 NOTE — TELEPHONE ENCOUNTER
I was able to speak with patient's wife about recent laboratory studies which showed relatively stable liver function will continue monitor patient clinically at this time

## 2022-03-31 NOTE — INTERVAL H&P NOTE
Update: (This section must be completed if the H&P was completed greater than 24 hrs to procedure or admission)    H&P reviewed  After examining the patient, I find no changed to the H&P since it had been written  81M multiple prior local regional therapies for hepatocellular carcinoma, plans for continued chemotherapy  Difficult IV access  Durable venous access placement is indicated this will be performed as port placement  Risks benefits alternatives discussed including risk of infection  Patient is NPO we will sedate him  Mp2 ASA3    Patient re-evaluated   Accept as history and physical     Dallas Vargas MD/March 31, 2022/9:26 AM

## 2022-03-31 NOTE — TELEPHONE ENCOUNTER
Call placed to pt to discuss +3 blood noted in UA  Pt denied experiencing and blood upon urination  Pt's spouse also stated she has seen his urine in the toilet bowel recently and has not noted and blood or abnormal color/odor  Pt stated his only issue is sometimes he feels like he has to really go and only a little bit comes out  Pt denied burning upon urination, incontinence, fever, chills, lower back pain or fluid retention  He stated otherwise he is feeling well  Pt stated he does not drink much water throughout the day  Encouraged patient to increase water intake  Reviewed above information with Jana Led, will await further instructions from her

## 2022-03-31 NOTE — TELEPHONE ENCOUNTER
Attempted to call patient will recent laboratory study results  Unfortunately I was not able to reach the patient but did leave a message on his phone with my name and office number to call back for better time to speak

## 2022-03-31 NOTE — TELEPHONE ENCOUNTER
Call placed to pt spouse  Notified her that Nader IRELAND discussed Alessandro's case with Dr Umair Palumbo who is covering for Dr Hoda Byrd  They agreed that its safest for us to cancel Alessandro's treatment until evaluation by urology, to address his symptoms and UA results  She was advised to take the patient to the ED for evaluation if they were to notice visible blood in his urine  She verbalized understanding and was agreeable to the plan  Can someone please schedule the patient with urology ASAP, for evaluate to determine if he can proceed with treatment

## 2022-03-31 NOTE — TELEPHONE ENCOUNTER
Called patient and spoke to patient's wife regarding Urology appt  Patient's wife confirmed Urology appt, date, time and location

## 2022-03-31 NOTE — BRIEF OP NOTE (RAD/CATH)
INTERVENTIONAL RADIOLOGY PROCEDURE NOTE    Date: 3/31/2022    Procedure: IR PORT PLACEMENT    Preoperative diagnosis:   1  Hepatocellular carcinoma ([address] )    2  Urgency incontinence    3  Dysuria         Postoperative diagnosis: Same  Surgeon: Marietta Cash MD     Assistant: None  No qualified resident was available  Blood loss: 0  Antibiotics:  Ancef  Specimens: 0     Findings:     Right chest port placement    Complications: None immediate      Anesthesia: conscious sedation

## 2022-03-31 NOTE — DISCHARGE INSTRUCTIONS
[address]  Interventional Radiology  [phone]         Implanted Venous Access Port     WHAT YOU NEED TO KNOW:   An implanted venous access port is a device used to give treatments and take blood  It may also be called a central venous access device (CVAD)  The port is a small container that is placed under your skin, usually in your upper chest  The port is attached to a catheter that enters a large vein  DISCHARGE INSTRUCTIONS:   Resume your normal diet  Small sips of flat soda will help with mild nausea  Prevent an infection:   · Wash your hands often  Use soap and water  Clean your hands before and after you care for your port  Remind everyone who cares for your port to wash their hands  · Check your skin for infection every day  Look for redness, swelling, or fluid oozing from the port site  Care for your port:   1  You may shower beginning 48 hours after procedure  2  Change dressing if it becomes wet  3  Remove dressing after 24 hours  Leave glue in place  4  It is normal for some bruising to occur  5  Use Tylenol for pain  6  Limit use of arm on the side that your port was placed  Lift nothing heavier than 5 pounds for 1 week, and then gradually increase activity as tolerated  7  DO NOT apply ointment, lotion or cream to port site until incision is healed  Allow glue to fall off  DO NOT attempt to peel glue from skin even it it begins to flake  8, After the port incision is healed you may swim, bathe  Notify the Interventional Radiologist if you have any of the followin  Fever above 101 F    2  Increased redness or swelling after 1st day  3  Increased pain after 1st day  4  Any sign of infection (drainage from port site, skin separation, hot to touch)  5  Persistent nausea or vomiting      Contact Interventional Radiology at [phone] Elisa PATIENTS: Contact Interventional Radiology at [phone]) Gael Varela PATIENTS: Contact Interventional Radiology at [phone])

## 2022-04-04 NOTE — PROGRESS NOTES
4/5/2022      Chief Complaint   Patient presents with    Microhematuria     asymptomatic         Assessment and Plan    78 y o  male -- New patient    1  Microscopic hematuria  - UA today showing blood, negative for nitrites and leukocytes  - patient had urine microscopic (03/31/2022) showing 10-20 red blood cells in the absence of infection  - patient is a former smoker  Discussed recommendations for CT renal protocol, BMP, return for cystoscopy  Patient is agreeable  - call with any questions or concerns in the meantime  - All questions answered; patient understands and agrees with plan    2  BPH with lower urinary tract symptoms  3  Abnormal rectal exam  4  On Eliquis for recent DVT  5  Hepatocellular carcinoma  - patient has straining with urination  - discussed addition of Flomax to help symptoms  Medication and side effects reviewed  Prescription sent to pharmacy  - last PSA performed in 2021 was 3  Denies having elevated PSA in the past or need for prostate biopsy  - will update PSA  - HERNANDO today showing firm prostate and asymmetry right greater than left  - patient uninterested an MRI of the prostate   Did discuss prostate biopsy for further evaluation  Would appreciate MD input on risks versus benefit of prostate biopsy due to medical comorbidities and current anticoagulation with Eliquis  Will call patient with recommendations and further therapy  History of Present Illness  Ayaka Howell is a 78 y o  male new patient here for evaluation of microscopic hematuria  Patient had urine microscopic performed 03/31/2022 showing 10-12 red blood cells in the absence of infection  Denies urinary frequency, urgency, dysuria, gross hematuria, flank pain, suprapubic pressure, fever, chills, nausea, vomiting  States he does have to strain to urinate  Denies history of urinary tract infections or nephrolithiasis    Patient is a former smoker, however denies any exposures to chemicals on a daily basis  Denies family history of  malignancies  Denies ever having at elevated PSA or need for prostate biopsy in the past     Medical comorbidities include hepatocellular carcinoma currently receiving chemotherapy every 3 weeks for new lesions, type 2 diabetes, recent DVT on Eliquis, respiratory insufficiency, essential hypertension, cardiomegaly, diastolic heart failure, recent pulmonary embolism, left carotid artery occlusion, asymptomatic carotid artery stenosis, stage 3 chronic kidney disease, hyperlipidemia  Review of Systems   Constitutional: Negative for activity change, appetite change, chills and fever  HENT: Negative for congestion and trouble swallowing  Respiratory: Negative for cough and shortness of breath  Cardiovascular: Negative for chest pain, palpitations and leg swelling  Gastrointestinal: Negative for abdominal pain, constipation, diarrhea, nausea and vomiting  Genitourinary: Negative for difficulty urinating, dysuria, flank pain, frequency, hematuria and urgency  Musculoskeletal: Negative for back pain and gait problem  Skin: Negative for wound  Allergic/Immunologic: Negative for immunocompromised state  Neurological: Negative for dizziness and syncope  Hematological: Does not bruise/bleed easily  Psychiatric/Behavioral: Negative for confusion  All other systems reviewed and are negative  Vitals  Vitals:    04/05/22 0921   BP: 128/78   Pulse: 72   SpO2: 100%   Weight: 81 2 kg (179 lb)   Height: 5' 9" (1 753 m)       Physical Exam  Constitutional:       General: He is not in acute distress  Appearance: Normal appearance  He is not ill-appearing, toxic-appearing or diaphoretic  HENT:      Head: Normocephalic  Nose: No congestion  Eyes:      General: No scleral icterus  Right eye: No discharge  Left eye: No discharge  Conjunctiva/sclera: Conjunctivae normal       Pupils: Pupils are equal, round, and reactive to light  Pulmonary:      Effort: Pulmonary effort is normal    Musculoskeletal:      Cervical back: Normal range of motion  Skin:     General: Skin is warm and dry  Coloration: Skin is not jaundiced or pale  Findings: No bruising, erythema, lesion or rash  Neurological:      General: No focal deficit present  Mental Status: He is alert and oriented to person, place, and time  Mental status is at baseline  Gait: Gait normal    Psychiatric:         Mood and Affect: Mood normal          Behavior: Behavior normal          Thought Content: Thought content normal          Judgment: Judgment normal            Past History  Past Medical History:   Diagnosis Date    Coronary artery disease     Gout     Hypercholesterolemia     Hypertension     Liver cancer ([facility] [address] )     Microhematuria      Social History     Socioeconomic History    Marital status: /Civil Union     Spouse name: None    Number of children: None    Years of education: None    Highest education level: None   Occupational History    None   Tobacco Use    Smoking status: Former Smoker     Quit date:      Years since quittin 2    Smokeless tobacco: Never Used   Vaping Use    Vaping Use: Never used   Substance and Sexual Activity    Alcohol use: Not Currently    Drug use: Never    Sexual activity: None   Other Topics Concern    None   Social History Narrative    None     Social Determinants of Health     Financial Resource Strain: Not on file   Food Insecurity: No Food Insecurity    Worried About Running Out of Food in the Last Year: Never true    Familia of Food in the Last Year: Never true   Transportation Needs: No Transportation Needs    Lack of Transportation (Medical): No    Lack of Transportation (Non-Medical):  No   Physical Activity: Not on file   Stress: Not on file   Social Connections: Not on file   Intimate Partner Violence: Not on file   Housing Stability: Low Risk     Unable to Pay for Housing in the Last Year: No    Number of Places Lived in the Last Year: 1    Unstable Housing in the Last Year: No     Social History     Tobacco Use   Smoking Status Former Smoker    Quit date: 1970    Years since quittin 2   Smokeless Tobacco Never Used     Family History   Problem Relation Age of Onset    No Known Problems Mother     No Known Problems Father        The following portions of the patient's history were reviewed and updated as appropriate: allergies, current medications, past medical history, past social history, past surgical history and problem list     Results  Recent Results (from the past 1 hour(s))   POCT urine dip    Collection Time: 22  9:40 AM   Result Value Ref Range    LEUKOCYTE ESTERASE,UA -     NITRITE,UA -     SL AMB POCT UROBILINOGEN 0 2     POCT URINE PROTEIN trace      PH,UA 6 5     BLOOD,UA +++     SPECIFIC GRAVITY,UA 1 015     KETONES,UA -     BILIRUBIN,UA -     GLUCOSE, UA -      COLOR,UA yellow     CLARITY,UA cloudy    ]  Lab Results   Component Value Date    PSA 3 6 10/18/2021     Lab Results   Component Value Date    CALCIUM 9 5 2022    K 4 1 2022    CO2 33 (H) 2022     2022    BUN 14 2022    CREATININE 1 14 2022     Lab Results   Component Value Date    WBC 11 10 (H) 2022    HGB 16 7 2022    HCT 52 9 (H) 2022    MCV 94 2022     2022       Michelet France PA-C

## 2022-04-18 NOTE — TELEPHONE ENCOUNTER
No urgent urologic findings  He should follow-up with oncologist in regard to other non urologic findings on CT scan  CT does show 6 x 4 mm calculus at the left UPJ without hydronephrosis  This is likely cause of micro hematuria  If any gross hematuria or flank pain can treat this with stone removal/ureteroscopy  If he is asymptomatic can observe given his medical comorbidities

## 2022-04-18 NOTE — TELEPHONE ENCOUNTER
[facility] Radiology called with significant findings on ct renal   Results are in epic for review

## 2022-04-18 NOTE — TELEPHONE ENCOUNTER
Patient's wife called and she was read the previous message  She understand and was calling the patient's oncologist      No further action needed

## 2022-04-18 NOTE — TELEPHONE ENCOUNTER
JOAQUINI - Called back pt spouse who had questions about findings on most recent CT results  Advised this will be completed as an in office discussion during next appointment in May  Pt to continue on current treatment plan and would notify if any changes based on his discretion  Pt spouse verbalized good understanding of this

## 2022-04-18 NOTE — TELEPHONE ENCOUNTER
Called and let message for patient  When patient calls back please review      "No urgent urologic findings  He should follow-up with oncologist in regard to other non urologic findings on CT scan  CT does show 6 x 4 mm calculus at the left UPJ without hydronephrosis  This is likely cause of micro hematuria  If any gross hematuria or flank pain can treat this with stone removal/ureteroscopy    If he is asymptomatic can observe given his medical comorbidities " Per Dayne Peacock

## 2022-04-18 NOTE — TELEPHONE ENCOUNTER
CALL RETURN FORM   Reason for patient call? Patient's wife Lawson Rosa, calling in, requesting results from patients CT Scan  Patient's primary oncologist? Debby Acosta    Name of person the patient was calling for? Debby Acosta    Any additional information to add, if applicable? Best call back number [phone]   Informed patient that the message will be forwarded to the team and someone will get back to them as soon as possible    Did you relay this information to the patient?  Yes

## 2022-04-19 NOTE — TELEPHONE ENCOUNTER
Please see my documentation from today  Per Dr Aragon [place] patient will continue with treatment on 4/22 and f/u as scheduled

## 2022-04-19 NOTE — PROGRESS NOTES
Pulmonary Consultation   Saray Light 78 y o  male MRN: [id number]    Encounter: [id number]      Reason for consultation:   Cough    Requesting physician:  Kayode Mays DO       Impressions:   · Cough  · Allergic rhinitis  · History of COVID pneumonia  · Acute pulmonary embolism  Recommendations:  · Swallowing evaluation  · Saline nasal/sinus rinse to each nostril 2 hours before bedtime  · Fluticasone nasal spray 2 sprays to each nostril once a day  · Follow-up 2 months  Discussion:  The patient's cough is most likely due to recurrent intermittent aspiration  I have ordered a speech evaluation with video barium study  Allergic rhinitis and postnasal dripping are contributing factors  I have started him on saline nasal/sinus rinse 2 hours before bedtime  I have provided him with a bottle sample and showed him how to use it appropriately  I have started him on fluticasone nasal spray 2 sprays to each nostril once a day  The most recent CT of his kidneys showed right lower lobe airspace disease  This could be from aspiration  I have told the patient and his wife to keep a diary of the aspiration episodes  He has recovered from the acute pulmonary embolism  He has about 6 months 1 the oral anticoagulants  I spoke with Dr Champion the vascular surgeon and he does not need to be on anticoagulation  I will speak with Dr Emile Jimenez during his next visit and if no indication from cardiology point of view to keep him on anticoagulation then we will discontinue it  I will see him in 4 weeks  History of Present Illness   HPI:  Saray Light is a 78 y o  male who is here for evaluation of cough  The patient had COVID in the end of October early November  He was admitted to the hospital   He had COVID pneumonia and was discharged on home oxygen  Since then he has cough  The cough is worse after meals  Sometimes even when he snacks or drinks water he starts coughing    He has postnasal dripping  Sometimes he struggles to cleared the mucus from his throat  When he he had COVID and while in the hospital he was found to have acute pulmonary embolism  He was placed on Eliquis  He had multiple bruises when he bumps into the wall  The patient still does not have his taste back  The patient has liver cancer and he gets chemotherapy  Review of systems:  12 point review of systems was completed and was otherwise negative except as listed in HPI  Historical Information   Past Medical History:   Diagnosis Date    Coronary artery disease     Gout     Hypercholesterolemia     Hypertension     Liver cancer ([address] )     Microhematuria      Past Surgical History:   Procedure Laterality Date    ABDOMINAL SURGERY      appy    APPENDECTOMY      CORONARY ARTERY BYPASS GRAFT      IR BIOPSY LIVER MASS  8/25/2020    IR CHEMOEMBOLIZATION LIVER TUMOR  6/10/2021    IR PORT PLACEMENT  3/31/2022    IR Y-90 PRE-ANGIO/EMBO W/ LUNG SCAN  10/6/2020    IR Y-90 RADIOEMBOLIZATION  10/14/2020     Family History   Problem Relation Age of Onset    No Known Problems Mother     No Known Problems Father        Family History:  No family history of chronic lung disease  Social History:  The patient lives with his wife  He has about 20 pack year smoking history  He does not drink alcohol  He worked in security systems  Meds/Allergies   No current facility-administered medications for this visit  (Not in a hospital admission)    No Known Allergies    Vitals: Blood pressure 128/68, pulse 81, temperature 98 4 °F (36 9 °C), temperature source Tympanic, resp  rate 18, height 5' 9" (1 753 m), weight 80 6 kg (177 lb 12 8 oz), SpO2 91 %  ,      Physical exam:        Head/eyes:    Normocephalic, without obvious abnormality, atraumatic,         PERRL, extraocular muscles intact, no scleral icterus    Nose:   Nares normal, septum midline, mucosa normal, no drainage    or sinus tenderness   Throat: Moist mucous membranes, no thrush   Neck:   Supple, trachea midline, no adenopathy; no carotid    bruit or JVD   Lungs:     Few basal posterior crackles  No wheezing  Heart:    Regular rate and rhythm, S1 and S2 normal, no murmur, rub   or gallop   Abdomen:     Soft, non-tender, bowel sounds active all four quadrants,     no masses, no organomegaly   Extremities:   Extremities normal, atraumatic, no cyanosis or edema   Skin:   Warm, dry, turgor normal, no rashes or lesions   Neurologic:   CNII-XII intact, normal strength, non-focal             Imaging and other studies: I have personally reviewed pertinent films in PACS the patient's imaging studies were reviewed on the [facility] system  His CT scan of the chest which was done on November 4th is reviewed  It showed bilateral airspace disease consistent with COVID 19 pneumonia  Also showed a filling defect on the right side consistent with acute pulmonary embolism  His most recent CT of the kidneys was done on April 11th is reviewed  The lower chest showed bilateral ground-glass opacities with reticulation  He had right lower lobe opacity      Lab Results   Component Value Date    WBC 11 10 (H) 03/30/2022    HGB 16 7 03/30/2022    HCT 52 9 (H) 03/30/2022    MCV 94 03/30/2022     03/30/2022     Lab Results   Component Value Date    SODIUM 138 03/30/2022    K 4 1 03/30/2022     03/30/2022    CO2 33 (H) 03/30/2022    BUN 14 03/30/2022    CREATININE 1 14 03/30/2022    GLUC 215 (H) 03/30/2022    CALCIUM 9 5 03/30/2022             Jose Miguel Chapman MD

## 2022-04-19 NOTE — TELEPHONE ENCOUNTER
Per Dr Haddad Needs: This patient should discuss this scan with the ordering provider as I did not order this   In terms of follow-up since the scan does show that he may have progression he should see us sooner than May if possible   If not then he can see us in May   Explained to the patient that when they have a scan the ordering physician/provider is responsible for explaining the results to them  Can you move up patient's appointment?

## 2022-04-19 NOTE — TELEPHONE ENCOUNTER
Please referring to Yumiko's note:    "Per Dr Debby Acosta okay for patient to resume both Tecentriq and Avastin on 4/22/22, if no visibile blood noted in pt's urine  Called and spoke with Spouse she stated pt never noticed blood in his urine  She stated he still has no visible blood in urine  Reviewed with her we will continue with his treatment on 4/22 as scheduled  She verbalized understanding and was agreeable to the plan  Pt will have labs done prior to treatment  "    Confirmed with Quoc Solis and Dr Debby Acosta, we can keep pt's 5/3 appt

## 2022-04-19 NOTE — PROGRESS NOTES
Per Dr Majorie Goltz okay for patient to resume both Tecentriq and Avastin on 4/22/22, if no visibile blood noted in pt's urine  Called and spoke with Spouse she stated pt never noticed blood in his urine  She stated he still has no visible blood in urine  Reviewed with her we will continue with his treatment on 4/22 as scheduled  She verbalized understanding and was agreeable to the plan  Pt will have labs done prior to treatment

## 2022-04-22 NOTE — PROGRESS NOTES
Per Dr Guillermina Stinson, no additional UA required while patient is receiving Avastin  Pt is to monitor for any signs of Hematuria  Dru Carver RN notified

## 2022-04-22 NOTE — PROGRESS NOTES
Pulmonary Rehabilitation Plan of Care   Discharge      Today's date: 2022   # of Exercise Sessions Completed:   Patient name: Twin Gates      : 1940  Age: 78 y o  MRN: [id number]  Referring Physician: Leobardo Almonte DO  Pulmonologist: ALEXA  Provider: Juliana Jim  Clinician: Cortney Guillen MS    Dx: COVID/Pneumonia due to [place]  Date of onset: 2021      SUMMARY OF PROGRESS:  Today was Alessandro's 36th exercise session at  Pulmonary Rehab for the diagnosis of COVID/Pneumonia due to COVID  Patient does not have a PFT on file  Since their diagnosis, the patient has been experiencing increased dyspnea, decreased physical activity, increased fatigue and weakness  They report dyspnea, weakness and fatigue when completing ADLs   The patient currently does follow a formal exercise program at home, walking 20 minutes daily  Patient's PHQ9 score is a six  His NICOLLE 7 is five  When addressed, the patient denies having depression/anxiety  Patient reports excellent social/emotional support  Information to begin using [employer] was provided as well as contact information for counseling through [facility]  The patient is a former smoker (quit 52 years ago)  He continues to abstain  Patient admits to 100% medication compliance  At rest, the patient rated dyspnea 0/10 with SpO2 92% on room air  He is able to work consistency at a 3 22 MET Level  The patients rating of perceived dyspnea during exercise was 0-3/10 with SpO2 90-96%  Resting HR 80 and /54  with appropriate hemodynamic response to exercise reaching 93 and 118/58  Patient will exercise on supplemental O2 2-3L/min via nasal canula and will titrate O2 to maintain SpO2 >90%  Education on smoking cessation, oxygen use, breathing techniques, pulmonary anatomy, exercise for the pulmonary patient, healthy eating, stress, and relaxation will be provided    Patient continues to work on goals including: increase strength, reduce dyspnea, improve functional capcity  Kristina Delgadillo was very compliant in attending his scheduled pulmonary rehab sessions  He gave great effort each session  He required 3L of supplemental O2 and still occasionally dropped below 88%  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  He was educated on the importance of exercise and staying healthy post discharge  He is receiving chemotherapy and just had a port placed  Medication compliance: Yes   Comments: Pt reports to be compliant with medications  Fall Risk: Low   Comments: Ambulates with a steady gait with no assist device    Smoking: Former user, quit 52 years ago; continues to abstain  RPD at Rest: 0-2/10  RPD with Exercise:  0-5310    Assessment of progression of lung disease and functional status:  CAT: 10/40  Shortness of breath questionnaire: 16/120        EXERCISE ASSESSMENT and PLAN    Current Exercise Program in Rehab:       Frequency: 2-3 days/week        Minutes: 45-50       METS: 2 87 to 3              SpO2: >90%              RPD: 0-3                      HR: 20-30 bpm above rest   RPE: 4-6         Modalities: Treadmill, Airdyne bike, UBE, NuStep and Recumbent bike         Strength training:  patient has progressed to 70# leg press w/out issue  Modalities: Leg Press, Chest Press, Arm Curl, Front Raises and Shoulder Shrugs    Oxygen Needs: supplemental O2 via nasal cannula @ since increased program, generally requires 3 liters 02 during NV in order to maintain his 02 Sat > 90%   L/min at rest, supplemental O2 via nasal cannula @2-3L/min with exercise, O2 while sleeping  and 2  Oxygen Goal: Maintain SpO2>90% during exercise    Home Exercise: Walking program  Education: pursed lipped breathing, diaphragmatic breathing, relaxation breathing, home exercise and benefits of exercise for pulmonary disease    Goals: reduced score on  US Shortness of Breath Questionnaire, Improved 6MW distance by 10%, reduced dyspnea during exercise (0-3/10), improved exercise tolerance (max METs tolerated in pulmonary rehab) and SpO2 >90% during exercise  Progressing:  Goals met: maintaining 02 Sat > 90%and exercising daily   , Patient will be encouraged to focus on lifestyle modifications following discharge  Plan: Titrate supplemental oxygen as needed to maintain SpO2>90% with exercise, learn to conserve energy with ADLs , practice breathing techniques 3x/day and reduce time sitting at home    Readiness to change: Action:  (Changing behavior)      NUTRITION ASSESSMENT AND PLAN    Weight control: Patient maintaining a healthy weight  Starting weight: 190   Current weight:   182    Diabetes: Patient reported fasting     Goals:reduced BMI to < 25, choose lean meat (93-95%), eliminate processed meats, reduce portion sizes of meat to 3oz or less, increase intake of fish, shellfish, cook without added fat or use vegetable oil/spray and increase intake of meatless meals  Education: heart healthy eating  low sodium diet  hydration  education class: healthy choices for managing pulmonary illness  Progressing:Goals met: maintaining a heslthy weight and admits to improved diet at home  Increased water intake also   , Patient will be encouraged to focus on lifestyle modifications following discharge    Plan: Education class: Reading Food Labels, Education Class: Heart Healthy Eating, remove salt shaker from table, eat more home cooked meals and eat out less often, will try new grains like brown rice, quinoa, farro and will replace sugar sweetened cereals with whole grain or oatmeal  Readiness to change: Action:  (Changing behavior)      PSYCHOSOCIAL ASSESSMENT AND PLAN    Emotional:  Depression assessment:  PHQ-9 = 3             Anxiety measure:  NICOLLE-7 = 1  Self-reported stress level: 0-3/10  Social support: Very Good    Goals:  Reduce perceived stress to 1-3/10, improved [facility] QOL < 27 and PHQ-9 - reduced severity by one level  Education: signs/sxs of depression, benefits of a positive support system, stress management techniques and class:  Stress and Pulmonary Disease    Progressing:Goals met: Attained a stress level of 0-3/10during this past 30 day reporting period   , Patient will be encouraged to focus on lifestyle modifications following discharge  Plan: Class: Stress and Your Health and Class: Relaxation  Readiness to change: Action:  (Changing behavior)      OTHER CORE COMPONENTS     Tobacco:    Social History     Tobacco Use   Smoking Status Former Smoker    Packs/day: 0 50    Years: 16 00    Pack years: 8 00    Types: Cigarettes    Start date: 12    Quit date: 5    Years since quittin 3   Smokeless Tobacco Never Used       Tobacco Use Intervention: Referral to tobacco expert:   Pt quit 52 years ago   and has abstained    Blood pressure:    Restin/54   Exercise: 98/52   Recovery: 102/52    Goals: consistent BP < 130/80, reduced dietary sodium <2300mg and moderate intensity exercise >150 mins/wk  Education:  pathophysiology of pulmonary disease, preventing infections, dangers of smoking and tobacco triggers  Progressing:Goals met: consistent resting BP < 130/80   , Patient will be encouraged to focus on lifestyle modifications following discharge    Plan: visit www smokefree gov, avoid places with second hand smoke, Avoid Processed foods, engage in regular exercise, eliminate salt shaker at the table, use salt substitutes, check labels for sodium content and monitor home BP  Readiness to change: Action:  (Changing behavior)

## 2022-04-22 NOTE — PROGRESS NOTES
Patient's port site noted to have resolving ecchymosis to right upper chest   No redness or drainage from port site noted  Patient denies pain at site  Port accessed without issues  Good blood return noted  Flushes freely  Patient tolerated IV Tecentriq and Avastin without reaction or issues  AVS given to patient  Patient ambulated off unit without incident  All personal belongings taken with patient

## 2022-05-03 NOTE — PROGRESS NOTES
Hematology/Oncology Outpatient Follow- up Note  Michelle Castillo 78 y o  male MRN: @ Encounter: [id number]        Date:  5/3/2022    Presenting Complaint/Diagnosis : Multifocal HCC    HPI:    Maribeth Srinivasan seen for initial consultation 5/27/2021 regarding  History of parasellar carcinoma   The patient underwent Radioembolization in October of 2020 which he tolerated well  His most recent imaging showed areas of irregular enhancement which appeared to have viable tumor and this was  Reviewed at multidisciplinary tumor board and the patient is going back for TACE  Procedure to have this area addressed       Previous Hematologic/ Oncologic History:    Oncology History Overview Note   Patient presents for radiation consult for [facility] [address] , he is referred by Dr Abena Kenny to discuss SIRS Spheres  He is s/p SIRS sphere treatment to right lobe and middle lobe on 10/14/20  Last follow-up with [facility], Dr Teodora Rodríguez on 9/13/21  ADDISON  Recent MRI of the abdomen shows evidence of viable residual tumor  Reviewed treatment option is systemic treatment or repeat SIRsphere therapy if feasible  6/10/21 IR chemoembolization   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  9/2/21 Bloodwork:  AFP tumor marker: 3 9  T-Bili: 0 64  Alk Phos: 171  AST: 38  ALT: 39      9/7/21 MRI abdomen w wo contrast  Treated HCC centered within segment MEGHAN and segment VIII, with evidence of viable residual tumor accounting for approximately 60% tumor volume evidenced by APHE and washout  LI-RADS treatment assessment category: LR-TR viable  9/16/21 Med Onc, Dr Abena Kenny  Pt does need a locally directed therapy  This could be another embolization versus radioembolization  Explained this would be decided at multidisciplinary tumor Conference and after his appt with Dr Logan Myers    The tumor had actually shrunk and there was no metastatic disease so we could consider just local treatment and observation versus putting him on systemic therapy  He has been in favor of holding off on any systemic therapy as he otherwise has a very good quality of life and would rather just have the local procedure done  If he does have progression despite that or if he has metastatic disease he will then consider systemic therapy  9/30/21 Surg Onc, Dr Alberto Hoyt  Pt now has continued viable disease on the MRI  His case was discussed at multidisciplinary clinic this morning  It was recommended that he consider TACE  Sir spheres could be given again if there continues to be recurrence  He is set up to discuss this with IR later next week  We will likely hold off on chemotherapy until liver directed therapy is no longer an option  He will discuss immunotherapy with Medical Oncology  Plan to return in 4 months with a repeat MRI and AFP level  9/30/21 RECTAL/GI MULTIDISCIPLINARY CASE REVIEW  PHYSICIAN RECOMMENDED PLAN:   -Chemo embolization   -Scheduled with IR on 10/5/21      10/5/21 IR, Dr Zuleika Girard patient for cTACE  - MRI abdomen in 3 months after the procedure to evaluate treatment response           11/2/21 Dr Mag Harris follow-up  1/24/22 MRI abdomen   2/3/22 Dr Alberto Hoyt follow-up       Hepatocellular carcinoma ([facility] [address] )   8/24/2020 Initial Diagnosis    Hepatocellular carcinoma ([facility] [address] )     8/25/2020 Biopsy    IR liver biopsy  - Well-differentiated heptocellular carcinoma     9/22/2020 -  Cancer Staged    Staging form: Liver (Excluding Intrahepatic Bile Ducts), AJCC 8th Edition  - Clinical: Stage IB (cT1b, cN0, cM0) - Signed by Jovita Arce MD on 9/22/2020       10/14/2020 - 10/14/2020 Radiation    Field Numbers Energy Treatment Site Starting  Ending  Elapsed  Fraction Total  Overlap Site Overlap         Date Date Days Dose Dose   Dose    SirSphere  Y90 Rt Lobe + Middle Robe   10/14/20 10/14/20    1 51 GBq  1 51 GBq                                                                                        Date / Time stamp:  10/14/2020 2:43 PM     2/16/2022 -  Chemotherapy    atezolizumab (TECENTRIQ) IVPB, 1,200 mg, Intravenous, Once, 3 of 6 cycles  Administration: 1,200 mg (2/16/2022), 1,200 mg (3/11/2022), 1,200 mg (4/22/2022)  bevacizumab (AVASTIN) IVPB, 1,282 5 mg, Intravenous, Once, 3 of 6 cycles  Administration: 1,300 mg (2/16/2022), 1,300 mg (3/11/2022), 1,300 mg (4/22/2022)       Y90     Chemoembolization    Current Hematologic/ Oncologic Treatment:      Tacentriq and Avastin    Interval History:      Patient returns for follow-up visit  He is currently on Tecentriq and Avastin  Cycle 4  Is coming up on the 13th of May  Patient had a CT scan with renal protocol which while not optimal indicated he may have progression of disease in the liver  He has only had 3 doses of immunotherapy in combination with Avastin so far  I think we will continue the current regimen as he has not had enough treatment but I will refer him to Radiation Oncology for consideration of re-treatment with Y 90 if possible  If he continues to have progression alternate systemic therapy will be considered  The patient himself is fatigue  States his appetite is not very good  ECOG performance status is still 0  Denies any nausea or vomiting  Denies any diarrhea  The rest of his 14 point review of systems today was negative  Cancer Staging:  Cancer Staging  Hepatocellular carcinoma ([address] )  Staging form: Liver (Excluding Intrahepatic Bile Ducts), AJCC 8th Edition  - Clinical: Stage IB (cT1b, cN0, cM0) - Signed by Denny Cosby MD on 9/22/2020    Test Results:    Imaging: CT renal protocol    Result Date: 4/17/2022  Narrative: CT ABDOMEN AND PELVIS WITH AND WITHOUT IV CONTRAST INDICATION:  R31 21: Asymptomatic microscopic hematuria  Patient has history of [address]  status post radio embolization and TACE   COMPARISON:  CT abdomen pelvis 8/23/2020, CT chest 11/4/2021, MRI abdomen 1/18/2022 TECHNIQUE: Initial CT of the abdomen and pelvis was performed without intravenous contrast   Subsequent dynamic CT evaluation of the abdomen and pelvis was performed after the administration of intravenous contrast in both nephrographic and delayed phases after the administration of intravenous contrast  Axial, sagittal, and coronal 2D reformatted images were created from the source data and submitted for interpretation  Radiation dose length product (DLP) for this visit:  2534 28 mGy-cm  This examination, like all CT scans performed in the [facility], was performed utilizing techniques to minimize radiation dose exposure, including the use of iterative  reconstruction and automated exposure control  IV Contrast:  100 mL of iohexol (OMNIPAQUE) Enteric Contrast:  Enteric contrast was not administered  FINDINGS: ABDOMEN RIGHT KIDNEY AND URETER: No discernible solid mass  Numerous small cysts, the largest noted exophytically from the mid upper pole anteriorly measuring 3 8 x 2 7 cm  No detectable urothelial mass  No hydronephrosis or hydroureter  No urinary tract calculi  No perinephric collection  LEFT KIDNEY AND URETER: 6 x 4 mm calculus at the UPJ  No hydronephrosis  Small lower pole parapelvic cyst evident  2-3 mm lower pole calyceal calculus  No solid mass  Numerous small cysts, the largest noted exophytically from the lower pole measuring 3 4 x 2 8 cm  No detectable urothelial mass  No hydronephrosis or hydroureter  No perinephric collection  URINARY BLADDER: No definitive bladder wall mass  Suspected hypertrophy median lobe prostate invaginating into the base of the bladder  No calculi  Small right posterolateral bladder diverticulum  LOWER CHEST:  Bilateral groundglass and reticular opacities within both lungs as well as bronchiectasis right lower lobe and inferior lingula, may reflect sequela of fibrosis from prior Covid 19 pneumonia  New focal airspace opacity in the posterolateral right upper lobe measuring 2 5 x 1 8 cm    Although this could represent an inflammatory pseudonodule, possibility of neoplasm/metastasis cannot be excluded  1 2 cm right paraesophageal lymph node (3/18), grossly stable as well as additional subcentimeter lymph nodes  Pleural thickening and calcification along the posterior left thorax, unchanged  Status post median sternotomy and CABG with atherosclerotic changes thoracic aorta and native coronary arteries  LIVER/BILIARY TREE:  Mildly prominent left and caudate lobes with lobular surface contour  -Previously treated lesion centered within segment MEGHAN and segment VIII, with treatment zone currently measuring about 6 5 x 5 9 cm (3/40), previously 7 3 x 6 4 cm (11/177)  Assessment of residual tumor is limited by absence of arterial phase imaging  Foci of increased attenuation within the lesion in keeping with posttreatment changes again noted  Difficult to exclude some faint enhancement along the lateral margin of the mass (series 3/42)  Residual viable tumor cannot be completely excluded  There is a small amount of air density seen along the right lower margin of the treated nodule, presumably reflecting posttreatment changes as opposed to superinfection  Small amount of air was present in this area on CT from November 2021  Correlate with any clinical findings for infection  -Numerous extensive lesions seen throughout the liver elsewhere are either new or larger compared to previous study compatible with progressive metastases  Index nodule in the anterior left lobe measures 2 4 x 2 3 cm (3/42), previously estimated at 1 5 x 1 6 cm  Mild intrahepatic duct dilatation central left lobe, similar to recent MRI  The portal vein remains patent albeit left portal vein mildly displaced by the largest mass  GALLBLADDER:  Not visualized  No history of cholecystectomy provided  If the patient has not had gallbladder surgery, it is possible the gallbladder is collapsed along the inferior margin of the above dominant liver mass    Air density described within the mass could potentially be within the collapsed gallbladder? SPLEEN:  Normal size  6 mm hypodensity series 3/51, not clearly visualized on previous studies, suspicious for possible metastasis  PANCREAS:  Mild fatty replacement of the pancreas without acute findings  ADRENAL GLANDS:  Unremarkable  STOMACH AND BOWEL:  Evaluation of the stomach is limited by underdistention  No focal pathology seen within limitations  Small bowel is normal caliber  Sigmoid diverticulosis without evidence of diverticulitis  ABDOMINOPELVIC CAVITY:  No ascites  No free intraperitoneal air  1 cm short axis right iliac chain lymph node juxtaposed between the external and iliac chain (3/158), new from previous study and suspicious for metastasis  VESSELS: Ectatic atherosclerotic abdominal aorta (2 5 cm) without aneurysm  PELVIS REPRODUCTIVE ORGANS:  The prostate is enlarged with coarse calcification and hypertrophy of the median lobe invaginating into the base of the bladder  ABDOMINAL WALL/INGUINAL REGIONS:  Unremarkable  OSSEOUS STRUCTURES:  No acute fracture  Multilevel degenerative changes of the spine  Posterior disc osteophyte complexes such as at T11-T12 and T12-L1 causing moderate and mild central canal stenosis at these levels respectively  Similar findings in the lumbar spine with mild to moderate central canal narrowing at L2-3 and L5-S1  Rudimentary disc noted at S1-2  Mottled attenuation of bony structures particularly in the pelvis, sacrum and vertebra  This is nonspecific and could be related to demineralization, metastatic disease cannot be excluded  Correlation with bone scan can be obtained  Impression: 1  The most relevant finding on this study is significant progression of hepatic metastatic disease    Additional findings of suspicion for metastases are new splenic nodule, new 1 cm right pelvic sidewall lymph node, mottled bony attenuation (indeterminate for metastases) and a new right upper lobe area of nodular consolidation (also indeterminate)  2  Dominant treated liver mass cannot be reliably evaluated for residual tumor in the absence of arterial phase imaging (LI-RADS-NC- cannot be characterized due to image degradation or omission)  Some faint enhancement along its lateral margin cannot be  excluded  Small amount of air density along the inferolateral margin of the mass as described above  See comment  3  In regards to the clinical history of hematuria, 6 x 4 mm calculus at the left UPJ  No hydronephrosis  2-3 mm left lower pole calyceal calculus  Numerous small bilateral renal cysts without solid renal mass or discernible urothelial lesion  4  Bilateral groundglass and reticular opacities within both lungs as well as bronchiectasis right lower lobe and inferior lingula, may reflect sequela of fibrosis from prior Covid 19 pneumonia  5  Prostate is enlarged with hypertrophy of the median lobe invaginating into the base of the bladder  The study was marked in EPIC for significant notification  Workstation performed: [id number]       Labs:   Lab Results   Component Value Date    WBC 10 51 (H) 04/20/2022    HGB 16 1 04/20/2022    HCT 50 5 (H) 04/20/2022    MCV 92 04/20/2022     04/20/2022     Lab Results   Component Value Date    K 4 2 04/20/2022     04/20/2022    CO2 31 04/20/2022    BUN 17 04/20/2022    CREATININE 1 18 04/20/2022    GLUF 189 (H) 03/09/2022    CALCIUM 9 5 04/20/2022    CORRECTEDCA 10 5 (H) 04/20/2022     (H) 04/20/2022    ALT 74 04/20/2022    ALKPHOS 235 (H) 04/20/2022    EGFR 56 04/20/2022       Lab Results   Component Value Date    PSA 3 6 10/18/2021       Lab Results   Component Value Date    FERRITIN 575 (H) 11/04/2021     ROS: As stated in the history of present illness otherwise his 14 point review of systems today was negative        Active Problems:   Patient Active Problem List   Diagnosis    Essential hypertension    Hyperlipidemia    Cardiomegaly    Anxiety    Abnormal electrocardiogram    Coronary artery disease without angina pectoris    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)    Viral URI with cough    Reactive airway disease without complication    Medicare annual wellness visit, subsequent    Hyperglycemia    Ankle edema, bilateral    Transaminitis    Respiratory insufficiency    EMILIANO (acute kidney injury) ([facility] [address] )    Elevated troponin    Septic shock (HCC)    Hepatocellular carcinoma (HCC)    Acute deep vein thrombosis (DVT) of popliteal vein of both lower extremities (HCC)    Acute respiratory failure with hypoxia (HCC)    Stage 3a chronic kidney disease (HCC)    Sepsis (HCC)    Pneumonia    Cough    Platelets decreased (HCC)    Type 2 diabetes mellitus without complication, without long-term current use of insulin (HCC)    Allergic cough    Acute bacterial bronchitis    COVID-19 virus infection    Diastolic heart failure (HCC)    Pulmonary emboli (HCC)    Post-COVID chronic dyspnea    Left carotid artery occlusion    Asymptomatic carotid artery stenosis, right    Vision loss of left eye    Hypoxia       Past Medical History:   Past Medical History:   Diagnosis Date    Coronary artery disease     Gout     Hypercholesterolemia     Hypertension     [facility]     Microhematuria        Surgical History:   Past Surgical History:   Procedure Laterality Date    ABDOMINAL SURGERY      appy    APPENDECTOMY      CORONARY ARTERY BYPASS GRAFT      IR BIOPSY LIVER MASS  8/25/2020    IR CHEMOEMBOLIZATION LIVER TUMOR  6/10/2021    IR PORT PLACEMENT  3/31/2022    IR Y-90 PRE-ANGIO/EMBO W/ LUNG SCAN  10/6/2020    IR Y-90 RADIOEMBOLIZATION  10/14/2020       Family History:    Family History   Problem Relation Age of Onset    No Known Problems Mother     No Known Problems Father        Cancer-related family history is not on file      Social History:   Social History Socioeconomic History    Marital status: /Civil Union     Spouse name: Not on file    Number of children: Not on file    Years of education: Not on file    Highest education level: Not on file   Occupational History    Not on file   Tobacco Use    Smoking status: Former Smoker     Packs/day: 0 50     Years: 16 00     Pack years: 8 00     Types: Cigarettes     Start date: 12     Quit date:      Years since quittin 3    Smokeless tobacco: Never Used   Vaping Use    Vaping Use: Never used   Substance and Sexual Activity    Alcohol use: Not Currently    Drug use: Never    Sexual activity: Not on file   Other Topics Concern    Not on file   Social History Narrative    Not on file     Social Determinants of Health     Financial Resource Strain: Not on file   Food Insecurity: No Food Insecurity    Worried About [address] in the Last Year: Never true    [address] in the Last Year: Never true   Transportation Needs: No Transportation Needs    Lack of Transportation (Medical): No    Lack of Transportation (Non-Medical):  No   Physical Activity: Not on file   Stress: Not on file   Social Connections: Not on file   Intimate Partner Violence: Not on file   Housing Stability: Low Risk     Unable to Pay for Housing in the Last Year: No    Number of Places Lived in the Last Year: 1    Unstable Housing in the Last Year: No       Current Medications:   Current Outpatient Medications   Medication Sig Dispense Refill    allopurinol (ZYLOPRIM) 300 mg tablet Take 300 mg by mouth daily      amLODIPine (NORVASC) 5 mg tablet Take 1 tablet (5 mg total) by mouth daily 30 tablet 5    apixaban (Eliquis) 5 mg Take 1 tablet (5 mg total) by mouth 2 (two) times a day 180 tablet 0    aspirin 81 mg chewable tablet Chew 1 tablet (81 mg total) daily 30 tablet 0    atorvastatin (LIPITOR) 40 mg tablet Take 1 tablet (40 mg total) by mouth daily 30 tablet 5    cholecalciferol (VITAMIN D3) 400 units tablet Take 1,000 Units by mouth daily       fluticasone (FLONASE) 50 mcg/act nasal spray 2 sprays into each nostril daily 16 g 5    levothyroxine (Euthyrox) 75 mcg tablet Take 1 tablet (75 mcg total) by mouth daily in the early morning 30 tablet 5    lisinopril (ZESTRIL) 2 5 mg tablet Take 1 tablet (2 5 mg total) by mouth daily 30 tablet 5    metoprolol tartrate (LOPRESSOR) 25 mg tablet Take 1 tablet (25 mg total) by mouth 2 (two) times a day 60 tablet 3    Omega-3 Fatty Acids (fish oil) 1,000 mg Take 1,000 mg by mouth daily      ondansetron (ZOFRAN) 4 mg tablet Take 1 tablet (4 mg total) by mouth every 8 (eight) hours as needed for nausea or vomiting 20 tablet 1    tamsulosin (FLOMAX) 0 4 mg Take 1 capsule (0 4 mg total) by mouth daily with dinner 30 capsule 3    pantoprazole (PROTONIX) 40 mg tablet Take 1 tablet (40 mg total) by mouth daily in the early morning (Patient not taking: Reported on 9/16/2021) 30 tablet 0    Promethazine-DM (PHENERGAN-DM) 6 25-15 mg/5 mL oral syrup Take 10 mL by mouth 3 (three) times a day as needed for cough (Patient not taking: Reported on 4/19/2022 ) 240 mL 1     No current facility-administered medications for this visit  Allergies: No Known Allergies    Physical Exam:    Body surface area is 1 95 meters squared  Wt Readings from Last 3 Encounters:   05/03/22 78 9 kg (174 lb)   04/22/22 79 7 kg (175 lb 11 3 oz)   04/19/22 80 6 kg (177 lb 12 8 oz)        Temp Readings from Last 3 Encounters:   05/03/22 97 8 °F (36 6 °C) (Temporal)   04/22/22 (!) 96 9 °F (36 1 °C) (Tympanic)   04/19/22 98 4 °F (36 9 °C) (Tympanic)        BP Readings from Last 3 Encounters:   05/03/22 98/64   04/22/22 108/72   04/19/22 128/68         Pulse Readings from Last 3 Encounters:   05/03/22 99   04/22/22 94   04/19/22 81         Physical Exam     Constitutional   General appearance: No acute distress, well appearing and well nourished      Eyes   Conjunctiva and lids: No swelling, erythema or discharge  Pupils and irises: Equal, round and reactive to light  Ears, Nose, Mouth, and Throat   External inspection of ears and nose: Normal     Nasal mucosa, septum, and turbinates: Normal without edema or erythema  Oropharynx: Normal with no erythema, edema, exudate or lesions  Pulmonary   Respiratory effort: No increased work of breathing or signs of respiratory distress  Auscultation of lungs: Clear to auscultation  Cardiovascular   Palpation of heart: Normal PMI, no thrills  Auscultation of heart: Normal rate and rhythm, normal S1 and S2, without murmurs  Examination of extremities for edema and/or varicosities: Normal     Carotid pulses: Normal     Abdomen   Abdomen: Non-tender, no masses  Liver and spleen: No hepatomegaly or splenomegaly  Lymphatic   Palpation of lymph nodes in neck: No lymphadenopathy  Musculoskeletal   Gait and station: Normal     Digits and nails: Normal without clubbing or cyanosis  Inspection/palpation of joints, bones, and muscles: Normal     Skin   Skin and subcutaneous tissue: Normal without rashes or lesions  Neurologic   Cranial nerves: Cranial nerves 2-12 intact  Sensation: No sensory loss  Psychiatric   Orientation to person, place, and time: Normal     Mood and affect: Normal         Assessment / Plan:      The patient is a pleasant 75-year-old male with [address]  status post radioembolization in the past with then got a chemoembolization  He did have some progression at the time so we decided to put him on Tecentric and Avastin every 3 weeks  The patient agreed  He started this on February 16th  He has had 3 cycles so far  Cycle 4  Is coming up  We will continue his current regimen although imaging does indicate he may be having progression  I will repeat his imaging in 9 weeks i e  short interval scan if he truly has progression we will consider alternate systemic therapy    I will refer him back to Radiation Oncology to see if he is a candidate for a locally directed radioembolization  If not possible systemic therapy will be changed if he continues to have progression  The patient and his wife were in agreement with the plan  He will try to keep up with his nutrition by having frequent timed small meals  Hopefully he will be a candidate for locally directed therapy  Goals and Barriers:  Current Goal:  Prolong Survival from [address]  Barriers: None  Patient's Capacity to Self Care:  Patient able to self care  Portions of the record may have been created with voice recognition software  Occasional wrong word or "sound a like" substitutions may have occurred due to the inherent limitations of voice recognition software  Read the chart carefully and recognize, using context, where substitutions have occurred

## 2022-05-10 PROBLEM — R35.0 URINARY FREQUENCY: Status: ACTIVE | Noted: 2022-01-01

## 2022-05-10 NOTE — ASSESSMENT & PLAN NOTE
Patient has urinary frequency at least every hour which is disrupting his day since he started Flomax without the Flomax he has a slow urinary stream but if this is tolerable this may be necessary in light of his current dehydrated state and generalized weakness so for temporary  At time would like him to stop Flomax and contact me by Friday morning

## 2022-05-10 NOTE — ASSESSMENT & PLAN NOTE
Lab Results   Component Value Date    HGBA1C 7 0 (A) 05/10/2022   Stable A1c at 7 continuing to monitor laboratory work and patient will need an increase in his diet

## 2022-05-10 NOTE — ASSESSMENT & PLAN NOTE
Remains short of breath hypoxemic continues on oxygen at 3 L will consider adding an inhaled corticosteroid to see if he gets some improvement

## 2022-05-10 NOTE — PROGRESS NOTES
BMI Counseling: Body mass index is 25 25 kg/m²  The BMI is above normal  Nutrition recommendations include reducing portion sizes, decreasing overall calorie intake, 3-5 servings of fruits/vegetables daily, reducing fast food intake, consuming healthier snacks, decreasing soda and/or juice intake, moderation in carbohydrate intake, increasing intake of lean protein, reducing intake of saturated fat and trans fat and reducing intake of cholesterol  Exercise recommendations include exercising 3-5 times per week

## 2022-05-10 NOTE — ASSESSMENT & PLAN NOTE
Remains on chronic O2 therapy patient has an irregular heartbeat will check an EKG at this time ask him to follow-up with Pulmonary Medicine later this month as scheduled and Cardiology  For now stay on same medication regimen    Add in corticosteroid long-acting beta agonist inhaler to see if this makes some improvement for him

## 2022-05-10 NOTE — PROGRESS NOTES
Assessment and Plan:     Problem List Items Addressed This Visit     None           Preventive health issues were discussed with patient, and age appropriate screening tests were ordered as noted in patient's After Visit Summary  Personalized health advice and appropriate referrals for health education or preventive services given if needed, as noted in patient's After Visit Summary       History of Present Illness:     Patient presents for Medicare Annual Wellness visit    Patient Care Team:  Gissel Davila DO as PCP - General (Family Medicine)  MD Grady Mireles MD (Oncology)     Problem List:     Patient Active Problem List   Diagnosis    Essential hypertension    Hyperlipidemia    Cardiomegaly    Anxiety    Abnormal electrocardiogram    Coronary artery disease without angina pectoris    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)    Viral URI with cough    Reactive airway disease without complication    Medicare annual wellness visit, subsequent    Hyperglycemia    Ankle edema, bilateral    Transaminitis    Respiratory insufficiency    EMILIANO [facility]acute kidney injury) [facility] [address] [facility]    Elevated troponin    Septic shock (HCC)    Hepatocellular carcinoma ([facility] [address] [facility]    Acute deep vein thrombosis (DVT) of popliteal vein of both lower extremities (HCC)    Acute respiratory failure with hypoxia ([facility] [address] )    Stage 3a chronic kidney disease ([facility] [address] )    Sepsis ([address]    Pneumonia    Cough    Platelets decreased ([facility] [address] [facility]    Type 2 diabetes mellitus without complication, without long-term current use of insulin (HCC)    Allergic cough    Acute bacterial bronchitis    COVID-19 virus infection    Diastolic heart failure (HCC)    Pulmonary emboli (HCC)    Post-COVID chronic dyspnea    Left carotid artery occlusion    Asymptomatic carotid artery stenosis, right    Vision loss of left eye    Hypoxia      Past Medical and Surgical History:     Past Medical History: Diagnosis Date    Coronary artery disease     Gout     Hypercholesterolemia     Hypertension     Liver cancer ([facility] [address] [facility]     Microhematuria      Past Surgical History:   Procedure Laterality Date    ABDOMINAL SURGERY      appy    APPENDECTOMY      CORONARY ARTERY BYPASS GRAFT      IR BIOPSY LIVER MASS  2020    IR CHEMOEMBOLIZATION LIVER TUMOR  6/10/2021    IR PORT PLACEMENT  3/31/2022    IR Y-90 PRE-ANGIO/EMBO W/ LUNG SCAN  10/6/2020    IR Y-90 RADIOEMBOLIZATION  10/14/2020      Family History:     Family History   Problem Relation Age of Onset    No Known Problems Mother     No Known Problems Father       Social History:     Social History     Socioeconomic History    Marital status: /Civil Union     Spouse name: Not on file    Number of children: Not on file    Years of education: Not on file    Highest education level: Not on file   Occupational History    Not on file   Tobacco Use    Smoking status: Former Smoker     Packs/day: 0 50     Years: 16 00     Pack years: 8 00     Types: Cigarettes     Start date:      Quit date:      Years since quittin 3    Smokeless tobacco: Never Used   Vaping Use    Vaping Use: Never used   Substance and Sexual Activity    Alcohol use: Not Currently    Drug use: Never    Sexual activity: Not on file   Other Topics Concern    Not on file   Social History Narrative    Not on file     Social Determinants of Health     Financial Resource Strain: Not on file   Food Insecurity: No Food Insecurity    Worried About [address] in the Last Year: Never true    [address] in the Last Year: Never true   Transportation Needs: No Transportation Needs    Lack of Transportation (Medical): No    Lack of Transportation (Non-Medical):  No   Physical Activity: Not on file   Stress: Not on file   Social Connections: Not on file   Intimate Partner Violence: Not on file   Housing Stability: Low Risk     Unable to Pay for Housing in the Last Year: No    Number of Places Lived in the Last Year: 1    Unstable Housing in the Last Year: No      Medications and Allergies:     Current Outpatient Medications   Medication Sig Dispense Refill    allopurinol (ZYLOPRIM) 300 mg tablet Take 300 mg by mouth daily      amLODIPine (NORVASC) 5 mg tablet Take 1 tablet (5 mg total) by mouth daily 30 tablet 5    apixaban (Eliquis) 5 mg Take 1 tablet (5 mg total) by mouth 2 (two) times a day 180 tablet 0    aspirin 81 mg chewable tablet Chew 1 tablet (81 mg total) daily 30 tablet 0    atorvastatin (LIPITOR) 40 mg tablet Take 1 tablet (40 mg total) by mouth daily 30 tablet 5    cholecalciferol (VITAMIN D3) 400 units tablet Take 1,000 Units by mouth daily       fluticasone (FLONASE) 50 mcg/act nasal spray 2 sprays into each nostril daily 16 g 5    levothyroxine (Euthyrox) 75 mcg tablet Take 1 tablet (75 mcg total) by mouth daily in the early morning 30 tablet 5    lisinopril (ZESTRIL) 2 5 mg tablet Take 1 tablet (2 5 mg total) by mouth daily 30 tablet 5    metoprolol tartrate (LOPRESSOR) 25 mg tablet Take 1 tablet (25 mg total) by mouth 2 (two) times a day 60 tablet 3    Omega-3 Fatty Acids (fish oil) 1,000 mg Take 1,000 mg by mouth daily      ondansetron (ZOFRAN) 4 mg tablet Take 1 tablet (4 mg total) by mouth every 8 (eight) hours as needed for nausea or vomiting 20 tablet 1    pantoprazole (PROTONIX) 40 mg tablet Take 1 tablet (40 mg total) by mouth daily in the early morning (Patient not taking: Reported on 9/16/2021) 30 tablet 0    Promethazine-DM (PHENERGAN-DM) 6 25-15 mg/5 mL oral syrup Take 10 mL by mouth 3 (three) times a day as needed for cough (Patient not taking: Reported on 4/19/2022 ) 240 mL 1    tamsulosin (FLOMAX) 0 4 mg Take 1 capsule (0 4 mg total) by mouth daily with dinner 30 capsule 3     No current facility-administered medications for this visit       No Known Allergies   Immunizations:     Immunization History   Administered Date(s) Administered    COVID-19 MODERNA VACC 0 5 ML IM 01/20/2021, 02/17/2021    INFLUENZA 10/16/2002, 10/01/2003, 11/17/2004, 10/25/2005, 11/01/2006, 10/30/2007, 10/14/2008, 09/18/2009, 11/02/2010, 10/01/2011, 10/01/2012, 10/02/2013, 10/02/2014, 10/02/2018, 10/01/2019, 11/01/2019, 11/01/2020, 11/01/2020, 11/07/2021    Influenza Split High Dose Preservative Free IM 10/26/2019    Influenza, high dose seasonal 0 7 mL 10/23/2020, 10/21/2021    Influenza, injectable, quadrivalent, preservative free 0 5 mL 10/02/2018    Influenza, seasonal, injectable, preservative free 10/26/2015, 10/20/2016    Pneumococcal 10/16/2002    Pneumococcal Conjugate 13-Valent 11/24/2015    Pneumococcal Polysaccharide PPV23 05/31/2016    Zoster 11/15/2013, 09/01/2014, 10/02/2018, 04/02/2019    Zoster Vaccine Recombinant 10/02/2018, 04/02/2019      Health Maintenance:         Topic Date Due    Hepatitis C Screening  Completed         Topic Date Due    DTaP,Tdap,and Td Vaccines (1 - Tdap) Never done    COVID-19 Vaccine (3 - Booster for Moderna series) 07/17/2021      Medicare Health Risk Assessment:     There were no vitals taken for this visit  Health Risk Assessment:   Patient rates overall health as poor  Patient feels that their physical health rating is slightly worse  Patient is dissatisfied with their life  Eyesight was rated as much worse  Hearing was rated as same  Patient feels that their emotional and mental health rating is same  Patients states they are never, rarely angry  Patient states they are always unusually tired/fatigued  Pain experienced in the last 7 days has been none  Patient states that he has experienced no weight loss or gain in last 6 months  Depression Screening:   PHQ-2 Score: 1      Fall Risk Screening: In the past year, patient has experienced: no history of falling in past year      Home Safety:  Patient does not have trouble with stairs inside or outside of their home   Patient has working smoke alarms and has working carbon monoxide detector  Home safety hazards include: none  Nutrition:   Current diet is Regular  Medications:   Patient is not currently taking any over-the-counter supplements  Patient is able to manage medications  Activities of Daily Living (ADLs)/Instrumental Activities of Daily Living (IADLs):   Walk and transfer into and out of bed and chair?: Yes  Dress and groom yourself?: Yes    Bathe or shower yourself?: Yes    Feed yourself? Yes  Do your laundry/housekeeping?: Yes  Manage your money, pay your bills and track your expenses?: Yes  Make your own meals?: Yes    Do your own shopping?: Yes    Previous Hospitalizations:   Any hospitalizations or ED visits within the last 12 months?: Yes    How many hospitalizations have you had in the last year?: 1-2    Advance Care Planning:   Living will: Yes    Durable POA for healthcare: Yes    Advanced directive: Yes      PREVENTIVE SCREENINGS      Cardiovascular Screening:    General: Screening Not Indicated and History Lipid Disorder      Diabetes Screening:     General: Screening Not Indicated and History Diabetes      Prostate Cancer Screening:    General: Screening Not Indicated      Abdominal Aortic Aneurysm (AAA) Screening:    Risk factors include: tobacco use        Lung Cancer Screening:     General: Screening Not Indicated      Hepatitis C Screening:    General: Screening Current    Screening, Brief Intervention, and Referral to Treatment (SBIRT)    Screening  Typical number of drinks in a day: 0  Typical number of drinks in a week: 0  Interpretation: Low risk drinking behavior      Single Item Drug Screening:  How often have you used an illegal drug (including marijuana) or a prescription medication for non-medical reasons in the past year? never    Single Item Drug Screen Score: 0  Interpretation: Negative screen for possible drug use disorder      Gissel Davila, DO

## 2022-05-10 NOTE — ASSESSMENT & PLAN NOTE
Continuing with chemotherapy patient has a loss of appetite with weight loss and shortness of breath he will follow-up with Hematology-Oncology

## 2022-05-10 NOTE — PROGRESS NOTES
Assessment/Plan:       Problem List Items Addressed This Visit        Digestive    Hepatocellular carcinoma ([facility] [address] )     Continuing with chemotherapy patient has a loss of appetite with weight loss and shortness of breath he will follow-up with Hematology-Oncology            Endocrine    Type 2 diabetes mellitus without complication, without long-term current use of insulin (HCC) - Primary       Lab Results   Component Value Date    HGBA1C 7 0 (A) 05/10/2022   Stable A1c at 7 continuing to monitor laboratory work and patient will need an increase in his diet         Relevant Orders    POCT hemoglobin A1c (Completed)       Respiratory    Reactive airway disease without complication     Remains short of breath hypoxemic continues on oxygen at 3 L will consider adding an inhaled corticosteroid to see if he gets some improvement         [facility]    Hypoxia     Post COVID pneumonia with respiratory distress and shortness of breath on oxygen therapy continuously he will follow-up with Pulmonary Medicine later this month         Relevant Orders    POCT ECG (Completed)       Cardiovascular and Mediastinum    Essential hypertension     Hypertension under stable control on lisinopril 2 5 mg with amlodipine 5 mg along with metoprolol 25 mg tablets twice daily follow-up with Cardiology         Relevant Orders    POCT ECG (Completed)       Other    Medicare annual wellness visit, subsequent    Post-COVID chronic dyspnea     Remains on chronic O2 therapy patient has an irregular heartbeat will check an EKG at this time ask him to follow-up with Pulmonary Medicine later this month as scheduled and Cardiology  For now stay on same medication regimen    Add in corticosteroid long-acting beta agonist inhaler to see if this makes some improvement for him         Relevant Medications    fluticasone-vilanterol (Breo Ellipta) 100-25 mcg/inh inhaler    Other Relevant Orders    POCT ECG (Completed) Urinary frequency     Patient has urinary frequency at least every hour which is disrupting his day since he started Flomax without the Flomax he has a slow urinary stream but if this is tolerable this may be necessary in light of his current dehydrated state and generalized weakness so for temporary  At time would like him to stop Flomax and contact me by Friday morning  Subjective:      Patient ID: Prabhakar Amezcua is a 78 y o  male  Patient presents for extreme fatigue somnolence and Medicare wellness visit      The following portions of the patient's history were reviewed and updated as appropriate: allergies, current medications, past family history, past medical history, past social history, past surgical history and problem list     Review of Systems   Constitutional: Positive for fatigue  Negative for chills and fever  HENT: Negative for congestion, nosebleeds, rhinorrhea, sinus pressure and sore throat  Eyes: Negative for discharge and redness  Respiratory: Negative for cough and shortness of breath  Cardiovascular: Negative for chest pain, palpitations and leg swelling  Gastrointestinal: Negative for abdominal pain, blood in stool and nausea  Endocrine: Negative for cold intolerance, heat intolerance and polyuria  Genitourinary: Negative for dysuria and frequency  Musculoskeletal: Negative for arthralgias, back pain and myalgias  Skin: Negative for rash  Neurological: Negative for dizziness, weakness and headaches  Hematological: Negative for adenopathy  Psychiatric/Behavioral: Negative for behavioral problems and sleep disturbance  The patient is not nervous/anxious  Objective:      /62   Pulse 73   Temp 98 2 °F (36 8 °C)   Ht 5' 9" (1 753 m)   Wt 77 6 kg (171 lb)   SpO2 (!) 89%   BMI 25 25 kg/m²        Physical Exam  Vitals and nursing note reviewed  Constitutional:       Appearance: He is well-developed     HENT:      Head: Normocephalic and atraumatic  Right Ear: External ear normal       Left Ear: External ear normal       Nose: Nose normal       Mouth/Throat:      Mouth: Mucous membranes are moist    Eyes:      General: No scleral icterus  Extraocular Movements: Extraocular movements intact  Conjunctiva/sclera: Conjunctivae normal       Pupils: Pupils are equal, round, and reactive to light  Neck:      Thyroid: No thyromegaly  Vascular: No JVD  Cardiovascular:      Rate and Rhythm: Normal rate and regular rhythm  Heart sounds: Normal heart sounds  No murmur heard  Pulmonary:      Effort: Pulmonary effort is normal       Breath sounds: Decreased breath sounds, wheezing and rhonchi present  No rales  Chest:      Chest wall: No tenderness  Abdominal:      General: Bowel sounds are normal  There is no distension  Palpations: Abdomen is soft  There is no mass  Tenderness: There is no abdominal tenderness  There is no guarding or rebound  Musculoskeletal:         General: No tenderness or deformity  Normal range of motion  Cervical back: Normal range of motion and neck supple  Lymphadenopathy:      Cervical: No cervical adenopathy  Skin:     General: Skin is warm and dry  Findings: No erythema or rash  Neurological:      Mental Status: He is alert and oriented to person, place, and time  Cranial Nerves: No cranial nerve deficit  Deep Tendon Reflexes: Reflexes are normal and symmetric  Reflexes normal    Psychiatric:         Behavior: Behavior normal          Thought Content: Thought content normal          Judgment: Judgment normal           Data:    Laboratory Results: I have personally reviewed the pertinent laboratory results/reports   Radiology/Other Diagnostic Testing Results: I have personally reviewed pertinent reports         Lab Results   Component Value Date    WBC 10 51 (H) 04/20/2022    HGB 16 1 04/20/2022    HCT 50 5 (H) 04/20/2022    MCV 92 04/20/2022  04/20/2022     Lab Results   Component Value Date    K 4 2 04/20/2022     04/20/2022    CO2 31 04/20/2022    BUN 17 04/20/2022    CREATININE 1 18 04/20/2022    GLUF 189 (H) 03/09/2022    CALCIUM 9 5 04/20/2022    CORRECTEDCA 10 5 (H) 04/20/2022     (H) 04/20/2022    ALT 74 04/20/2022    ALKPHOS 235 (H) 04/20/2022    EGFR 56 04/20/2022     Lab Results   Component Value Date    CHOLESTEROL 129 02/01/2022    CHOLESTEROL 225 (H) 10/18/2021    CHOLESTEROL 181 07/22/2021     Lab Results   Component Value Date    HDL 65 02/01/2022    HDL 58 10/18/2021    HDL 48 07/22/2021     Lab Results   Component Value Date    LDLCALC 44 02/01/2022    LDLCALC 133 (H) 10/18/2021    LDLCALC 95 07/22/2021     Lab Results   Component Value Date    TRIG 100 02/01/2022    TRIG 170 (H) 10/18/2021    TRIG 189 (H) 07/22/2021     No results found for: [place], [place]  Lab Results   Component Value Date    TLU0LGHWTROW 2 500 04/20/2022     Lab Results   Component Value Date    HGBA1C 7 0 (A) 05/10/2022     Lab Results   Component Value Date    PSA 3 6 10/18/2021       Vianca Bright DO

## 2022-05-10 NOTE — ASSESSMENT & PLAN NOTE
Hypertension under stable control on lisinopril 2 5 mg with amlodipine 5 mg along with metoprolol 25 mg tablets twice daily follow-up with Cardiology

## 2022-05-10 NOTE — ASSESSMENT & PLAN NOTE
Post COVID pneumonia with respiratory distress and shortness of breath on oxygen therapy continuously he will follow-up with Pulmonary Medicine later this month

## 2022-05-12 NOTE — PROCEDURES
Speech Pathology Videofluoroscopic Swallow Study (VFSS/VBS/MBSS)      Patient Name: Jose Mcginnis  IEOAS'B Date: 5/12/2022     Problem List  Active Problems:    * No active hospital problems  *    Past Medical History  Past Medical History:   Diagnosis Date    Coronary artery disease     Gout     Hypercholesterolemia     Hypertension     Liver cancer ([address] )     Microhematuria      Past Surgical History  Past Surgical History:   Procedure Laterality Date    ABDOMINAL SURGERY      appy    APPENDECTOMY      CORONARY ARTERY BYPASS GRAFT      IR BIOPSY LIVER MASS  8/25/2020    IR CHEMOEMBOLIZATION LIVER TUMOR  6/10/2021    IR PORT PLACEMENT  3/31/2022    IR Y-90 PRE-ANGIO/EMBO W/ LUNG SCAN  10/6/2020    IR Y-90 RADIOEMBOLIZATION  10/14/2020       General Information:  Pt is a 78 y o  male with a PMH remarkable for liver CA with current mets  Current concerns for dysphagia include weight loss of ~30 lbs across the last "few months" and an ongoing cough  Patient adding "I sleep a lot anymore" and "I just don't have an appetite "  A VFSS was recommended to assess oropharyngeal stage swallowing skills at this time  Pt was viewed in lateral position and was given trials thin liquid, puree, soft moist food (turkey sandwich) and a 13mm pill with thin liquid  Oral stage:  Pt presented with mild oral stage dysphagia  Mastication was timely and grossly effective with materials administered today  Bolus formation and transfer were functional but required additional time by patient with increased density (soft solid vs puree)  Oral control appeared adequate with no gross premature spillage over the base of tongue  Pharyngeal stage:  Pt presented with mild pharyngeal dysphagia  Swallowing initiation was mildly delayed  Velar elevation noted  Laryngeal rise and anterior hyoid excursion appeared adequate  Airway entrance closure appeared adequate    Tongue base retraction appeared to be of adequate strength  Pharyngeal constriction suspected or appeared adequate  PES opening was adequate  Strategies and Efficacy:  Patient benefited from thin liquid wash for trace residue, cleared on subsequent swallows  Aspiration Response and Efficacy:  Management of food/liquid/barium tablet follows: All food, liquid and the barium tablet passed through the pharynx safely and efficiently (ie no significant laryngeal penetration, aspiration or significant pharyngeal residue noted today)  Esophageal stage:  Patient's proximal segment of the esophagus demonstrated s/s of reflux, pt's wife reports phlegm and chronic cough  Assessment Summary:  Pt presents with mild oropharyngeal dysphagia characterized by mildly delayed swallow initiation resulting in pooling to the vallecular space in mild-moderate amounts  Patient demonstrating adequate pharyngeal phase of the swallow with a mild weakness observed in pharyngeal squeeze resulting in trace-mild residue in the vallecular space and pyriform space  Vallecular residue became "threatening" during subsequent thin via cup with 13 mm pill trial where pt was observed having vallecular residue spill over the epiglottis and penetrate the laryngeal space  This penetrated material was immediately redirected during swallow initiation  No other penetration or aspiration was observed  As trials progressed pt demonstrated mild difficulty forming cohesive bolus for sandwich trial, pt attributed this to it's taste  Proximal esophageal space demonstrated mild UES dysfunction (opening and closing) as well as s/s of reflux  Results reviewed with pt and his wife, they verbalized their understanding  Note: Images are available for review in PACS as desired      Recommendations:   Recommended Diet:  regular diet and thin liquids   Recommended Form of Medications: whole with liquid   Aspiration precautions and compensatory swallowing strategies: upright posture, slow rate of feeding, small bites/sips, effortful swallow, quiet environment (tv off, limit talking, door closed, etc ), alternating bites and sips and GERD precautions to be considered  Consider referral to:  GI for GERD management  SLP Dysphagia therapy recommended:  No    Results Reviewed with: patient and family   Pt/Family Education: initiated  Pt and caregivers would benefit from/require continued education

## 2022-05-12 NOTE — TELEPHONE ENCOUNTER
Spoke with spouse, she stated the patients nose stopped bleeding  No additional events since ED admission  She stated it did stop on and off but couldn't stop it completely so the went to ED  She stated they did refer him to ENT outpatient  No additional complaints from the patient at this time  Reviewed with Dr Arthur Orellana, [place] to continue with treatment, including Avastin

## 2022-05-13 NOTE — PROGRESS NOTES
Pt tolerated todays tecentriq and avastin well  Pt has had significant weight loss and had to have avastin dose adjusted by dr Carlos Sethi today  supplemtent suggestions discussed with wife  Port flushed and deaccessed per routine   Discharged ambulatory with avs

## 2022-05-13 NOTE — ED PROVIDER NOTES
Dr Florence Wallace History  Chief Complaint   Patient presents with    Nose Bleed     Started last night  Right side  On Eliquis     Pt is an 77 yo male with a pmhx of anticoaguation with Eliquis, and undergoing chemo for heptacellular cancer, arriving to ED for right sided epistaxis that started last night around 11 PM  Pt denies trauma, reported started spontaneously  Pt had nasal camp applied by pt  Pt initially arrived with active bleeding but was stopped by the time of provider evaluation with pressure  No further bleeding occurred when the clamp was removed  Pt denies dizziness or syncope  Pt normally wears supplemental oxygen as well and reports dries his nasal passages out  Pt reports he was able to get nose to stop bleeding but then would occur again  Pt maintaining own airway and is not actively bleeding  Pt recently started on flonase in the past two weeks  Prior to Admission Medications   Prescriptions Last Dose Informant Patient Reported? Taking?    Omega-3 Fatty Acids (fish oil) 1,000 mg  Spouse/Significant Other Yes No   Sig: Take 1,000 mg by mouth daily   Promethazine-DM (PHENERGAN-DM) 6 25-15 mg/5 mL oral syrup  Spouse/Significant Other No No   Sig: Take 10 mL by mouth 3 (three) times a day as needed for cough   Patient not taking: Reported on 4/19/2022    allopurinol (ZYLOPRIM) 300 mg tablet  Spouse/Significant Other Yes No   Sig: Take 300 mg by mouth daily   amLODIPine (NORVASC) 5 mg tablet  Spouse/Significant Other No No   Sig: Take 1 tablet (5 mg total) by mouth daily   apixaban (Eliquis) 5 mg   No No   Sig: Take 1 tablet (5 mg total) by mouth 2 (two) times a day   aspirin 81 mg chewable tablet  Spouse/Significant Other No No   Sig: Chew 1 tablet (81 mg total) daily   atorvastatin (LIPITOR) 40 mg tablet  Spouse/Significant Other No No   Sig: Take 1 tablet (40 mg total) by mouth daily   cholecalciferol (VITAMIN D3) 400 units tablet  Spouse/Significant Other Yes No   Sig: Take 1,000 Units by mouth daily fluticasone (FLONASE) 50 mcg/act nasal spray  Spouse/Significant Other No No   Si sprays into each nostril daily   fluticasone-vilanterol (Breo Ellipta) 100-25 mcg/inh inhaler   No No   Sig: Inhale 1 puff daily Rinse mouth after use  levothyroxine (Euthyrox) 75 mcg tablet  Spouse/Significant Other No No   Sig: Take 1 tablet (75 mcg total) by mouth daily in the early morning   lisinopril (ZESTRIL) 2 5 mg tablet  Spouse/Significant Other No No   Sig: Take 1 tablet (2 5 mg total) by mouth daily   metoprolol tartrate (LOPRESSOR) 25 mg tablet  Spouse/Significant Other No No   Sig: Take 1 tablet (25 mg total) by mouth 2 (two) times a day   ondansetron (ZOFRAN) 4 mg tablet  Spouse/Significant Other No No   Sig: Take 1 tablet (4 mg total) by mouth every 8 (eight) hours as needed for nausea or vomiting   pantoprazole (PROTONIX) 40 mg tablet   No No   Sig: Take 1 tablet (40 mg total) by mouth daily in the early morning   Patient not taking: Reported on 2021   tamsulosin (FLOMAX) 0 4 mg  Spouse/Significant Other No No   Sig: Take 1 capsule (0 4 mg total) by mouth daily with dinner      Facility-Administered Medications: None       Past Medical History:   Diagnosis Date    Coronary artery disease     Gout     Hypercholesterolemia     Hypertension     Liver cancer ([facility] [address] )     Microhematuria        Past Surgical History:   Procedure Laterality Date    ABDOMINAL SURGERY      appy    APPENDECTOMY      CORONARY ARTERY BYPASS GRAFT      IR BIOPSY LIVER MASS  2020    IR CHEMOEMBOLIZATION LIVER TUMOR  6/10/2021    IR PORT PLACEMENT  3/31/2022    IR Y-90 PRE-ANGIO/EMBO W/ LUNG SCAN  10/6/2020    IR Y-90 RADIOEMBOLIZATION  10/14/2020       Family History   Problem Relation Age of Onset    No Known Problems Mother     No Known Problems Father      I have reviewed and agree with the history as documented      E-Cigarette/Vaping    E-Cigarette Use Never User      E-Cigarette/Vaping Substances    Nicotine No  THC No     CBD No     Flavoring No     Other No     Unknown No      Social History     Tobacco Use    Smoking status: Former Smoker     Packs/day: 0 50     Years: 16 00     Pack years: 8 00     Types: Cigarettes     Start date: 10     Quit date:      Years since quittin 3    Smokeless tobacco: Never Used   Vaping Use    Vaping Use: Never used   Substance Use Topics    Alcohol use: Not Currently    Drug use: Never       Review of Systems   Constitutional: Negative  HENT: Positive for nosebleeds  Eyes: Negative  Respiratory: Negative  Negative for shortness of breath  Cardiovascular: Negative  Endocrine: Negative  Genitourinary: Negative  Allergic/Immunologic: Negative  Neurological: Negative  Negative for dizziness  Hematological: Negative  Psychiatric/Behavioral: Negative  All other systems reviewed and are negative  Physical Exam  Physical Exam  Vitals and nursing note reviewed  Constitutional:       General: He is not in acute distress  Appearance: Normal appearance  He is normal weight  HENT:      Head: Normocephalic  Right Ear: External ear normal       Left Ear: External ear normal       Nose: No nasal deformity or signs of injury  Right Nostril: Epistaxis present  Left Nostril: Epistaxis present  Comments: No active epistaxis  Dried blood present  Mouth/Throat:      Mouth: Mucous membranes are moist    Eyes:      General:         Right eye: No discharge  Left eye: No discharge  Extraocular Movements: Extraocular movements intact  Pupils: Pupils are equal, round, and reactive to light  Cardiovascular:      Rate and Rhythm: Normal rate and regular rhythm  Pulses: Normal pulses  Heart sounds: Normal heart sounds  No murmur heard  Comments: Tachycardia noted on arrival had subsided once evaluated  Pulmonary:      Effort: Pulmonary effort is normal  No respiratory distress  Breath sounds: Normal breath sounds  Musculoskeletal:         General: Normal range of motion  Cervical back: Normal range of motion  Skin:     General: Skin is warm and dry  Capillary Refill: Capillary refill takes less than 2 seconds  Coloration: Skin is not jaundiced  Neurological:      General: No focal deficit present  Mental Status: He is alert and oriented to person, place, and time           Vital Signs  ED Triage Vitals [05/11/22 1055]   Temp Pulse Respirations Blood Pressure SpO2   -- (!) 124 20 129/71 93 %      Temp src Heart Rate Source Patient Position - Orthostatic VS BP Location FiO2 (%)   -- Monitor Sitting Right arm --      Pain Score       No Pain           Vitals:    05/11/22 1055 05/11/22 1211   BP: 129/71    Pulse: (!) 124 88   Patient Position - Orthostatic VS: Sitting          Visual Acuity      ED Medications  Medications   oxymetazoline (AFRIN) 0 05 % nasal spray 2 spray (2 sprays Each Nare Given 5/11/22 1135)       Diagnostic Studies  Results Reviewed     Procedure Component Value Units Date/Time    CBC and differential [[id number]]  (Abnormal) Collected: 05/11/22 1210    Lab Status: Final result Specimen: Blood from Arm, Right Updated: 05/11/22 1213     WBC 12 48 Thousand/uL      RBC 5 64 Million/uL      Hemoglobin 16 9 g/dL      Hematocrit 53 4 %      MCV 95 fL      MCH 30 0 pg      MCHC 31 6 g/dL      RDW 16 6 %      MPV 10 8 fL      Platelets 312 Thousands/uL      nRBC 0 /100 WBCs      Neutrophils Relative 80 %      Immat GRANS % 2 %      Lymphocytes Relative 6 %      Monocytes Relative 11 %      Eosinophils Relative 0 %      Basophils Relative 1 %      Neutrophils Absolute 10 01 Thousands/µL      Immature Grans Absolute 0 23 Thousand/uL      Lymphocytes Absolute 0 78 Thousands/µL      Monocytes Absolute 1 32 Thousand/µL      Eosinophils Absolute 0 05 Thousand/µL      Basophils Absolute 0 09 Thousands/µL                  No orders to display Procedures  Procedures         ED Course                                             MDM  Number of Diagnoses or Management Options  Epistaxis  Diagnosis management comments: DDx: epistaxis related to dried nasal passage membranes however will check pt's CBC as he has had intermittent bleeding since 11PM last night, and he is actively receiving chemo to assess for platelet levels  Epistaxis stopped after nursing applied nasal clamp  Nose was cleared of dried blood without resuming bleeding  Afrin utilized today  Pt monitored and no further resumption of bleeding occurred throughout evaluation  Pt had minor amount of blood noted in posterior pharynx during exam, which was cleared with gargling ice water, and no further bleeding occurred with re-evaluation  No anemia or thrombocytopenia noted  Pt started flonase in the past two weeks  Discussed that nose bleeds is a common side effect however, due to eliquis use this may be worsened  Discussed with wife and pt to stop use of flonase  Pt uses supplemental oxygen without humidfication at home  Wife and pt endorse that his causes dried nasal passages  Receptive to recommendation to use saline nasal spray and neosporin to moisturize nasal passages  Reviewed strict return precautions with pt and wife including return of nose bleed  Reviewed to call ENT today for further evaluation  Pt able to ambulate without nose bleed occurring again  Pt's vital signs taken prior to discharge by this provider, and HR improved to 88 BPM         Amount and/or Complexity of Data Reviewed  Clinical lab tests: ordered and reviewed    Risk of Complications, Morbidity, and/or Mortality  General comments: Pt arrived with epistaxis which was controlled with pressure  Afrin applied  Pt monitored with no further bleeding  ENT follow up provided  Recommendation to stop using Flonase  Recommended neosporin to moisturize nasal passage during oxygen use     Reviewed reasons to return to ed  Patient verbalized understanding of diagnosis and agreement with discharge plan of care as well as understanding of reasons to return to ed  Disposition  Final diagnoses:   Epistaxis     Time reflects when diagnosis was documented in both MDM as applicable and the Disposition within this note     Time User Action Codes Description Comment    5/11/2022 12:21 PM Richy Teran Add [R04 0] Epistaxis       ED Disposition     ED Disposition   Discharge    Condition   Stable    Date/Time   Wed May 11, 2022 12:22 PM    Comment   Saray Light discharge to home/self care                 Follow-up Information     Follow up With Specialties Details Why Contact Info Additional [address] Otolaryngology Schedule an appointment as soon as possible for a visit today For further evaluation of symptoms [address]  [address] [facility], [address], [address] Schedule an appointment as soon as possible for a visit  For further evaluation of symptoms [address] [facility] [address]  [address] [facility] Emergency Department Emergency Medicine Go to  If symptoms worsen [address]  [place] [id number]  [facility] Emergency Department, [address] [place], [facility] [address], [address]          Discharge Medication List as of 5/11/2022 12:23 PM      CONTINUE these medications which have NOT CHANGED    Details   allopurinol (ZYLOPRIM) 300 mg tablet Take 300 mg by mouth daily, Historical Med      amLODIPine (NORVASC) 5 mg tablet Take 1 tablet (5 mg total) by mouth daily, Starting Tue 5/4/2021, Until Tue 5/3/2022, Normal      apixaban (Eliquis) 5 mg Take 1 tablet (5 mg total) by mouth 2 (two) times a day, Starting Fri 5/6/2022, Until Thu 8/4/2022, Normal aspirin 81 mg chewable tablet Chew 1 tablet (81 mg total) daily, Starting Wed 3/16/2022, No Print      atorvastatin (LIPITOR) 40 mg tablet Take 1 tablet (40 mg total) by mouth daily, Starting Wed 12/29/2021, Normal      cholecalciferol (VITAMIN D3) 400 units tablet Take 1,000 Units by mouth daily , Historical Med      fluticasone (FLONASE) 50 mcg/act nasal spray 2 sprays into each nostril daily, Starting Tue 4/19/2022, Normal      fluticasone-vilanterol (Breo Ellipta) 100-25 mcg/inh inhaler Inhale 1 puff daily Rinse mouth after use , Starting Tue 5/10/2022, Until Thu 6/9/2022, Normal      levothyroxine (Euthyrox) 75 mcg tablet Take 1 tablet (75 mcg total) by mouth daily in the early morning, Starting Tue 2/15/2022, Normal      lisinopril (ZESTRIL) 2 5 mg tablet Take 1 tablet (2 5 mg total) by mouth daily, Starting Wed 12/22/2021, Until Tue 5/10/2022, Normal      metoprolol tartrate (LOPRESSOR) 25 mg tablet Take 1 tablet (25 mg total) by mouth 2 (two) times a day, Starting Tue 5/4/2021, Normal      Omega-3 Fatty Acids (fish oil) 1,000 mg Take 1,000 mg by mouth daily, Historical Med      ondansetron (ZOFRAN) 4 mg tablet Take 1 tablet (4 mg total) by mouth every 8 (eight) hours as needed for nausea or vomiting, Starting Wed 2/9/2022, Normal      pantoprazole (PROTONIX) 40 mg tablet Take 1 tablet (40 mg total) by mouth daily in the early morning, Starting Fri 4/9/2021, Until Sun 5/9/2021, Normal      Promethazine-DM (PHENERGAN-DM) 6 25-15 mg/5 mL oral syrup Take 10 mL by mouth 3 (three) times a day as needed for cough, Starting Thu 2/3/2022, Normal      tamsulosin (FLOMAX) 0 4 mg Take 1 capsule (0 4 mg total) by mouth daily with dinner, Starting Tue 4/5/2022, Normal             No discharge procedures on file      PDMP Review       Value Time User    PDMP Reviewed  Yes 11/10/2021  4:37 PM Jackie Jordan MD          ED Provider  Electronically Signed by           BEKA Reid  05/13/22 8918

## 2022-05-13 NOTE — TELEPHONE ENCOUNTER
Patients wife called in to give an update after stopping medication  Patient disagrees with being off medication  He stated since being off medication he is urinating too frequently  He stated he is going to go back on medication  Please advise

## 2022-05-17 NOTE — PROGRESS NOTES
Office Progress Note - Pulmonary    Jovanny Malhotra 78 y o  male MRN: [id number]    Encounter: [id number]      Assessment:   Dyspnea on exertion   Chronic cough due to intermittent aspiration   Chronic hypoxemic respiratory failure   History of pulmonary embolism   History of COVID pneumonia  Plan:     Aspiration precautions   Oxygen supplement with activities   Consult with Cardiology and family doctor regarding discontinuing Eliquis   Increase activities as tolerated   Follow-up in 6 months  Discussion:   The patient's dyspnea on exertion is multifactorial   In part it is due to severe deconditioning  His cough is due to intermittent aspiration as demonstrated by the video barium swallow  I have encouraged him to be careful with the aspiration during meals  Because of his most recent significant nosebleed I think he should get off the Eliquis  The wife will call his cardiologist in family doctor and discussed with them  If there is no other indication that he should get off Eliquis  He will use the oxygen with activities  I will see him in 6 months in a follow-up visit  Subjective: The patient is here for follow-up visit  His cough is better  Recently had significant nose bleed  He had a swallowing evaluation  He still has dyspnea on exertion  He is weak and sedentary  He is using oxygen with activities  He is taking Eliquis 5 mg p o  b i d     Review of systems:  A 12 point system review is done and aside from what is stated above the rest of the review of systems is negative  Family history and social history are reviewed  Medications list is reviewed  Vitals: Blood pressure 126/54, pulse 72, temperature 97 8 °F (36 6 °C), resp  rate 22, height 5' 9 52" (1 766 m), weight 75 5 kg (166 lb 6 4 oz), SpO2 (!) 84 %  ,     Physical Exam  Gen: Awake, alert, oriented x 3, no acute distress  HEENT: Mucous membranes moist, no oral lesions, no thrush  NECK: No accessory muscle use, JVP not elevated  Cardiac: Regular, single S1, single S2, no murmurs, no rubs, no gallops  Lungs:  Decreased breath sounds  Few right basal crackles  Abdomen: normoactive bowel sounds, soft nontender, nondistended, no rebound or rigidity, no guarding  Extremities: no cyanosis, no clubbing, no edema  Neuro:  Grossly nonfocal   Skin:  No rash  Video barium swallow report is reviewed  The patient has minimal penetration of the laryngeal space on swallowing      Lab Results   Component Value Date    WBC 14 20 (H) 05/12/2022    HGB 16 9 05/12/2022    HCT 53 7 (H) 05/12/2022    MCV 96 05/12/2022     05/12/2022     Lab Results   Component Value Date    SODIUM 138 05/12/2022    K 4 6 05/12/2022    CL 97 05/12/2022    CO2 25 05/12/2022    BUN 20 05/12/2022    CREATININE 1 16 05/12/2022    GLUC 212 (H) 04/20/2022    CALCIUM 9 5 05/12/2022

## 2022-05-18 NOTE — TELEPHONE ENCOUNTER
Patient's wife called  He saw his pulmonologist yesterday, who recommended that he speak with his cardiologist and PCP about discontinuing Eliquis  Please advise

## 2022-05-18 NOTE — TELEPHONE ENCOUNTER
Patients wife called in and said Yonatan Ferguson went to the ENT and they did not find anything visibly wrong with his nose to cause the nose bleeds  Patients pulmonologist recommended he stopped taking the Eliquis  The doctor asked Yonatan Ferguson to reach out to the PCP for his input

## 2022-05-21 PROBLEM — A41.9 SEPTIC SHOCK (HCC): Status: RESOLVED | Noted: 2020-08-23 | Resolved: 2022-01-01

## 2022-05-21 PROBLEM — E46 PROTEIN-CALORIE MALNUTRITION (HCC): Status: ACTIVE | Noted: 2022-01-01

## 2022-05-21 PROBLEM — H90.3 SENSORINEURAL HEARING LOSS, BILATERAL: Status: ACTIVE | Noted: 2022-01-01

## 2022-05-21 PROBLEM — M72.2 PLANTAR FIBROMATOSIS: Status: ACTIVE | Noted: 2022-01-01

## 2022-05-21 PROBLEM — R65.21 SEPTIC SHOCK (HCC): Status: RESOLVED | Noted: 2020-08-23 | Resolved: 2022-01-01

## 2022-05-21 PROBLEM — R65.20 SEVERE SEPSIS (HCC): Status: ACTIVE | Noted: 2021-04-05

## 2022-05-21 PROBLEM — I50.32 CHRONIC DIASTOLIC HEART FAILURE (HCC): Status: ACTIVE | Noted: 2021-01-01

## 2022-05-21 PROBLEM — E55.9 VITAMIN D DEFICIENCY: Status: ACTIVE | Noted: 2022-01-01

## 2022-05-21 PROBLEM — E03.8 SUBCLINICAL HYPOTHYROIDISM: Status: ACTIVE | Noted: 2022-01-01

## 2022-05-21 PROBLEM — R73.01 IMPAIRED FASTING GLUCOSE: Status: ACTIVE | Noted: 2022-01-01

## 2022-05-21 PROBLEM — M10.9 GOUT: Status: ACTIVE | Noted: 2022-01-01

## 2022-05-21 PROBLEM — L21.9 SEBORRHEIC DERMATITIS: Status: ACTIVE | Noted: 2022-01-01

## 2022-05-21 PROBLEM — N40.0 BPH (BENIGN PROSTATIC HYPERPLASIA): Status: ACTIVE | Noted: 2022-01-01

## 2022-05-21 PROBLEM — U07.1 COVID-19 VIRUS INFECTION: Status: RESOLVED | Noted: 2021-01-01 | Resolved: 2022-01-01

## 2022-05-21 PROBLEM — N18.31 ACUTE RENAL FAILURE SUPERIMPOSED ON STAGE 3A CHRONIC KIDNEY DISEASE (HCC): Status: ACTIVE | Noted: 2020-08-23

## 2022-05-21 PROBLEM — J69.0 ASPIRATION PNEUMONIA (HCC): Status: ACTIVE | Noted: 2021-04-05

## 2022-05-21 PROBLEM — Z95.1 HISTORY OF CORONARY ARTERY BYPASS SURGERY: Status: ACTIVE | Noted: 2022-01-01

## 2022-05-21 PROBLEM — Z86.718 HISTORY OF BLOOD CLOTS: Status: ACTIVE | Noted: 2022-01-01

## 2022-05-21 NOTE — SEPSIS NOTE
Sepsis Note   Eliana Turcios 78 y o  male MRN: [id number]  Unit/Bed#: ED 23 Encounter: [id number]       qSOFA     Row Name 05/21/22 1945 05/21/22 1845 05/21/22 1815 05/21/22 1800 05/21/22 1745    Altered mental status GCS < 15 -- -- -- -- --    Respiratory Rate > / =22 0 1 1 1 1    Systolic BP < / =083 0 0 0 0 0    Q Sofa Score 0 1 1 1 1    Row Name 05/21/22 1730 05/21/22 1715 05/21/22 1700 05/21/22 1630 05/21/22 1615    Altered mental status GCS < 15 -- -- -- -- --    Respiratory Rate > / =22 1 1 1 1 1    Systolic BP < / =551 0 0 0 0 1    Q Sofa Score 1 1 1 1 2    Row Name 05/21/22 1600 05/21/22 1544             Altered mental status GCS < 15 -- --       Respiratory Rate > / =03 1 1       Systolic BP < / =930 1 0       Q Sofa Score 2 1

## 2022-05-21 NOTE — ED PROVIDER NOTES
History  Chief Complaint   Patient presents with    Shortness of Breath     78-year-old male history of hepatocellular carcinoma status post radioembolization, prior pulmonary embolism recent discontinuation of anticoagulation, CAD, hypertension presents via EMS complaining of shortness of breath  History is somewhat limited secondary to patient's underlying cognitive dysfunction  EMS reports wife provides majority of the history stating that he was recently taken off 1 of his medications which they believe was Eliquis for unknown reason  Patient conversationally dyspneic  Tachypneic with accessory muscle use  He denies any recent fevers, chills, chest pain, vomiting, abdominal pain, urinary burning or frequency or any other complaints or concerns at this time  Recent notes from Dr Tino Shoemaker of Hematology Oncology reviewed    "Assessment / Plan:       The patient is a pleasant 78-year-old male with [address]  status post radioembolization in the past with then got a chemoembolization  He did have some progression at the time so we decided to put him on Tecentric and Avastin every 3 weeks    The patient agreed  [facility] started this on February 16th  He has had 3 cycles so far  Cycle 4  Is coming up  We will continue his current regimen although imaging does indicate he may be having progression  I will repeat his imaging in 9 weeks i e  short interval scan if he truly has progression we will consider alternate systemic therapy  I will refer him back to Radiation Oncology to see if he is a candidate for a locally directed radioembolization  If not possible systemic therapy will be changed if he continues to have progression  The patient and his wife were in agreement with the plan  He will try to keep up with his nutrition by having frequent timed small meals    Hopefully he will be a candidate for locally directed therapy "          Prior to Admission Medications   Prescriptions Last Dose Informant Patient Reported? Taking? Omega-3 Fatty Acids (fish oil) 1,000 mg  Spouse/Significant Other Yes No   Sig: Take 1,000 mg by mouth daily   Promethazine-DM (PHENERGAN-DM) 6 25-15 mg/5 mL oral syrup  Spouse/Significant Other No No   Sig: Take 10 mL by mouth 3 (three) times a day as needed for cough   allopurinol (ZYLOPRIM) 300 mg tablet  Spouse/Significant Other Yes No   Sig: Take 300 mg by mouth daily   amLODIPine (NORVASC) 5 mg tablet  Spouse/Significant Other No No   Sig: Take 1 tablet (5 mg total) by mouth daily   apixaban (Eliquis) 5 mg   No No   Sig: Take 1 tablet (5 mg total) by mouth 2 (two) times a day   aspirin 81 mg chewable tablet  Spouse/Significant Other No No   Sig: Chew 1 tablet (81 mg total) daily   atorvastatin (LIPITOR) 40 mg tablet  Spouse/Significant Other No No   Sig: Take 1 tablet (40 mg total) by mouth daily   cholecalciferol (VITAMIN D3) 400 units tablet  Spouse/Significant Other Yes No   Sig: Take 1,000 Units by mouth daily    fluticasone (FLONASE) 50 mcg/act nasal spray  Spouse/Significant Other No No   Si sprays into each nostril daily   fluticasone-vilanterol (Breo Ellipta) 100-25 mcg/inh inhaler   No No   Sig: Inhale 1 puff daily Rinse mouth after use     levothyroxine (Euthyrox) 75 mcg tablet  Spouse/Significant Other No No   Sig: Take 1 tablet (75 mcg total) by mouth daily in the early morning   lisinopril (ZESTRIL) 2 5 mg tablet  Spouse/Significant Other No No   Sig: Take 1 tablet (2 5 mg total) by mouth daily   metoprolol tartrate (LOPRESSOR) 25 mg tablet  Spouse/Significant Other No No   Sig: Take 1 tablet (25 mg total) by mouth 2 (two) times a day   ondansetron (ZOFRAN) 4 mg tablet  Spouse/Significant Other No No   Sig: Take 1 tablet (4 mg total) by mouth every 8 (eight) hours as needed for nausea or vomiting   pantoprazole (PROTONIX) 40 mg tablet   No No   Sig: Take 1 tablet (40 mg total) by mouth daily in the early morning   Patient not taking: Reported on 2021   tamsulosin (FLOMAX) 0 4 mg  Spouse/Significant Other No No   Sig: Take 1 capsule (0 4 mg total) by mouth daily with dinner      Facility-Administered Medications: None       Past Medical History:   Diagnosis Date    Cancer ([place] [address] )     Coronary artery disease     Gout     Hypercholesterolemia     Hypertension     Liver cancer ([facility] )     Microhematuria        Past Surgical History:   Procedure Laterality Date    ABDOMINAL SURGERY      appy    APPENDECTOMY      CORONARY ARTERY BYPASS GRAFT      IR BIOPSY LIVER MASS  2020    IR CHEMOEMBOLIZATION LIVER TUMOR  6/10/2021    IR PORT PLACEMENT  3/31/2022    IR Y-90 PRE-ANGIO/EMBO W/ LUNG SCAN  10/6/2020    IR Y-90 RADIOEMBOLIZATION  10/14/2020       Family History   Problem Relation Age of Onset    No Known Problems Mother     No Known Problems Father      I have reviewed and agree with the history as documented  E-Cigarette/Vaping    E-Cigarette Use Never User      E-Cigarette/Vaping Substances    Nicotine No     THC No     CBD No     Flavoring No     Other No     Unknown No      Social History     Tobacco Use    Smoking status: Former Smoker     Packs/day: 0 50     Years: 16 00     Pack years: 8 00     Types: Cigarettes     Start date:      Quit date:      Years since quittin 1    Smokeless tobacco: Never Used   Vaping Use    Vaping Use: Never used   Substance Use Topics    Alcohol use: Not Currently    Drug use: Never       Review of Systems   Constitutional: Positive for fatigue  Negative for chills and fever  HENT: Negative for congestion and sore throat  Eyes: Negative for pain  Respiratory: Positive for shortness of breath  Negative for cough, chest tightness and wheezing  Cardiovascular: Negative for chest pain, palpitations and leg swelling  Gastrointestinal: Negative for abdominal pain, constipation, diarrhea, nausea and vomiting  Endocrine: Negative for polyuria  Genitourinary: Negative for dysuria  Musculoskeletal: Negative for arthralgias, back pain, myalgias and neck pain  Skin: Negative for rash  Neurological: Negative for dizziness, syncope, light-headedness and headaches  All other systems reviewed and are negative  Physical Exam  Physical Exam  Vitals reviewed  Constitutional:       General: He is in acute distress  Appearance: Normal appearance  He is well-developed  He is ill-appearing  He is not toxic-appearing  HENT:      Head: Normocephalic and atraumatic  Mouth/Throat:      Mouth: Mucous membranes are moist    Eyes:      Conjunctiva/sclera: Conjunctivae normal    Cardiovascular:      Rate and Rhythm: Normal rate and regular rhythm  Heart sounds: Normal heart sounds  Pulmonary:      Effort: Tachypnea and accessory muscle usage present  Breath sounds: Normal breath sounds  Abdominal:      General: Bowel sounds are normal       Palpations: Abdomen is soft  Tenderness: There is no abdominal tenderness  Musculoskeletal:         General: Normal range of motion  Cervical back: Normal range of motion  Skin:     General: Skin is warm and dry  Capillary Refill: Capillary refill takes less than 2 seconds  Neurological:      General: No focal deficit present  Mental Status: He is alert  Cranial Nerves: No cranial nerve deficit     Psychiatric:         Mood and Affect: Mood normal          Behavior: Behavior normal       Comments: Pleasantly confused         Vital Signs  ED Triage Vitals   Temperature Pulse Respirations Blood Pressure SpO2   05/21/22 1548 05/21/22 1544 05/21/22 1544 05/21/22 1544 05/21/22 1544   98 °F (36 7 °C) (!) 110 (!) 24 (!) 180/72 (!) 88 %      Temp Source Heart Rate Source Patient Position - Orthostatic VS BP Location FiO2 (%)   05/21/22 1548 05/21/22 1544 05/21/22 1544 05/21/22 1544 --   Oral Monitor Sitting Left arm       Pain Score       05/21/22 1544       No Pain           Vitals:    05/21/22 1730 05/21/22 1745 05/21/22 1800 05/21/22 1815   BP: 118/63 108/57 111/54 130/62   Pulse: (!) 110 (!) 116 (!) 117 (!) 117   Patient Position - Orthostatic VS:             Visual Acuity      ED Medications  Medications   azithromycin (ZITHROMAX) 500 mg in sodium chloride 0 9 % 250 mL IVPB (500 mg Intravenous New Bag 5/21/22 1824)   cefepime (MAXIPIME) IVPB (premix in dextrose) 1,000 mg 50 mL (1,000 mg Intravenous New Bag 5/21/22 1840)   vancomycin (VANCOCIN) 1500 mg in sodium chloride 0 9% 250 mL IVPB (has no administration in time range)   albuterol inhalation solution 5 mg (5 mg Nebulization Given 5/21/22 1605)   ipratropium (ATROVENT) 0 02 % inhalation solution 0 5 mg (0 5 mg Nebulization Given 5/21/22 1605)   sodium chloride 0 9 % bolus 500 mL (0 mL Intravenous Stopped 5/21/22 1704)   cefTRIAXone (ROCEPHIN) IVPB (premix in dextrose) 1,000 mg 50 mL (0 mg Intravenous Stopped 5/21/22 1658)   iohexol (OMNIPAQUE) 350 MG/ML injection (SINGLE-DOSE) 75 mL (75 mL Intravenous Given 5/21/22 1645)   sodium chloride 0 9 % bolus 1,000 mL (0 mL Intravenous Stopped 5/21/22 1746)     Followed by   sodium chloride 0 9 % bolus 1,000 mL (0 mL Intravenous Stopped 5/21/22 1803)     Followed by   sodium chloride 0 9 % bolus 500 mL (0 mL Intravenous Stopped 5/21/22 1819)       Diagnostic Studies  Results Reviewed     Procedure Component Value Units Date/Time    Lactic acid 2 Hours [[id number]]  (Abnormal) Collected: 05/21/22 1737    Lab Status: Final result Specimen: Blood from Arm, Left Updated: 05/21/22 1814     LACTIC ACID 5 2 mmol/L     Narrative:      Result may be elevated if tourniquet was used during collection      HS Troponin I 2hr [[id number]]  (Normal) Collected: 05/21/22 1737    Lab Status: Final result Specimen: Blood from Arm, Left Updated: 05/21/22 1809     hs TnI 2hr 13 ng/L      Delta 2hr hsTnI -3 ng/L     Urine Microscopic [[id number]]  (Abnormal) Collected: 05/21/22 1720    Lab Status: Final result Specimen: Urine, Other Updated: 05/21/22 1805     RBC, UA 4-10 /hpf      WBC, UA 0-1 /hpf      Epithelial Cells Occasional /hpf      Bacteria, UA None Seen /hpf      Hyaline Casts, UA 1-2 /lpf      Fine granular casts 0-1 /lpf     UA w Reflex to Microscopic w Reflex to Culture [[id number]]  (Abnormal) Collected: 05/21/22 1720    Lab Status: Final result Specimen: Urine, Other Updated: 05/21/22 1730     Color, UA Yellow     Clarity, UA Clear     Specific [place], UA 1 020     pH, UA 6 0     Leukocytes, UA Negative     Nitrite, UA Negative     Protein, UA 1+ mg/dl      Glucose, UA Negative mg/dl      Ketones, UA Negative mg/dl      Urobilinogen, UA 1 0 E U /dl      Bilirubin, UA Negative     Blood, UA 3+    Procalcitonin [[id number]]  (Abnormal) Collected: 05/21/22 1546    Lab Status: Final result Specimen: Blood from Arm, Right Updated: 05/21/22 1705     Procalcitonin 293 05 ng/ml     Comprehensive metabolic panel [[id number]]  (Abnormal) Collected: 05/21/22 1546    Lab Status: Final result Specimen: Blood from Arm, Right Updated: 05/21/22 1647     Sodium 135 mmol/L      Potassium 4 8 mmol/L      Chloride 96 mmol/L      CO2 22 mmol/L      ANION GAP 17 mmol/L      BUN 26 mg/dL      Creatinine 1 44 mg/dL      Glucose 184 mg/dL      Calcium 9 6 mg/dL      Corrected Calcium 10 4 mg/dL       U/L      ALT 88 U/L      Alkaline Phosphatase 343 U/L      Total Protein 6 2 g/dL      Albumin 3 0 g/dL      Total Bilirubin 1 31 mg/dL      eGFR 44 ml/min/1 73sq m     Narrative:      [place] guidelines for Chronic Kidney Disease (CKD):     Stage 1 with normal or high GFR (GFR > 90 mL/min/1 73 square meters)    Stage 2 Mild CKD (GFR = 60-89 mL/min/1 73 square meters)    Stage 3A Moderate CKD (GFR = 45-59 mL/min/1 73 square meters)    Stage 3B Moderate CKD (GFR = 30-44 mL/min/1 73 square meters)    Stage 4 Severe CKD (GFR = 15-29 mL/min/1 73 square meters)    Stage 5 End Stage CKD (GFR <15 mL/min/1 73 square meters)  Note: GFR calculation is accurate only with a steady state creatinine    COVID/FLU/RSV - 2 hour TAT [[id number]]  (Normal) Collected: 05/21/22 1600    Lab Status: Final result Specimen: Nasopharyngeal Swab Updated: 05/21/22 1647     SARS-CoV-2 Negative     INFLUENZA A PCR Negative     INFLUENZA B PCR Negative     RSV PCR Negative    Narrative:      FOR PEDIATRIC PATIENTS - copy/paste COVID Guidelines URL to browser: [email or web address]  [employer]    SARS-CoV-2 assay is a Nucleic Acid Amplification assay intended for the  qualitative detection of nucleic acid from SARS-CoV-2 in nasopharyngeal  swabs  Results are for the presumptive identification of SARS-CoV-2 RNA  Positive results are indicative of infection with SARS-CoV-2, the virus  causing COVID-19, but do not rule out bacterial infection or co-infection  with other viruses  Laboratories within the [place] and its  territories are required to report all positive results to the appropriate  public health authorities  Negative results do not preclude SARS-CoV-2  infection and should not be used as the sole basis for treatment or other  patient management decisions  Negative results must be combined with  clinical observations, patient history, and epidemiological information  This test has not been FDA cleared or approved  This test has been authorized by FDA under an Emergency Use Authorization  (EUA)  This test is only authorized for the duration of time the  declaration that circumstances exist justifying the authorization of the  emergency use of an in vitro diagnostic tests for detection of SARS-CoV-2  virus and/or diagnosis of COVID-19 infection under section 564(b)(1) of  the Act, 21 U  S C  038FGK-6(A)(2), unless the authorization is terminated  or revoked sooner  The test has been validated but independent review by FDA  and CLIA is pending  Test performed using [employer]:  This RT-PCR assay targets N2,  a region unique to SARS-CoV-2  A conserved region in the E-gene was chosen  for pan-Sarbecovirus detection which includes SARS-CoV-2  HS Troponin I 4hr [[id number]]     Lab Status: No result Specimen: Blood     HS Troponin 0hr (reflex protocol) [[id number]]  (Normal) Collected: 05/21/22 1546    Lab Status: Final result Specimen: Blood from Arm, Right Updated: 05/21/22 1634     hs TnI 0hr 16 ng/L     Lactic acid [[id number]]  (Abnormal) Collected: 05/21/22 1546    Lab Status: Final result Specimen: Blood from Arm, Right Updated: 05/21/22 1632     LACTIC ACID 8 6 mmol/L     Narrative:      Result may be elevated if tourniquet was used during collection  Protime-INR [[id number]]  (Normal) Collected: 05/21/22 1546    Lab Status: Final result Specimen: Blood from Arm, Right Updated: 05/21/22 1623     Protime 12 6 seconds      INR 0 95    APTT [[id number]]  (Normal) Collected: 05/21/22 1546    Lab Status: Final result Specimen: Blood from Arm, Right Updated: 05/21/22 1623     PTT 27 seconds     CBC and differential [[id number]]  (Abnormal) Collected: 05/21/22 1546    Lab Status: Final result Specimen: Blood from Arm, Right Updated: 05/21/22 1609     WBC 12 62 Thousand/uL      RBC 5 15 Million/uL      Hemoglobin 15 4 g/dL      Hematocrit 49 2 %      MCV 96 fL      MCH 29 9 pg      MCHC 31 3 g/dL      RDW 17 9 %      MPV 11 3 fL      Platelets 275 Thousands/uL      nRBC 0 /100 WBCs      Neutrophils Relative 79 %      Immat GRANS % 2 %      Lymphocytes Relative 7 %      Monocytes Relative 11 %      Eosinophils Relative 0 %      Basophils Relative 1 %      Neutrophils Absolute 9 92 Thousands/µL      Immature Grans Absolute 0 30 Thousand/uL      Lymphocytes Absolute 0 89 Thousands/µL      Monocytes Absolute 1 38 Thousand/µL      Eosinophils Absolute 0 04 Thousand/µL      Basophils Absolute 0 09 Thousands/µL     Blood culture #2 [[id number]] Collected: 05/21/22 1546    Lab Status:  In process Specimen: Blood from Arm, Right Updated: 05/21/22 1605    Blood culture #1 [[id number]] Collected: 05/21/22 1552    Lab Status: In process Specimen: Blood from Arm, Left Updated: 05/21/22 1602                 CT pe study w abdomen pelvis w contrast   Final Result by Marcello Moran MD (05/21 1750)      No pulmonary embolus  Patchy consolidative airspace opacities throughout the posterior lateral right upper lobe and in the lateral right midlung are suspicious for acute pneumonia  Background groundglass and reticular airspace opacities in the periphery of the mid and lower    right lung and in the left costophrenic angle, similar from November 4, 2021 and suspicious for the sequela of viral pneumonia due to COVID-19 infection  Advanced hepatic metastatic disease reidentified, similar from previous examination with innumerable hepatic metastatic lesions and approximate 50% replacement of hepatic parenchyma with metastatic tumor  Probable metastatic lesions in the spleen and    miles metastatic disease along the right external iliac chain  Workstation performed: [id number]                    Procedures  ECG 12 Lead Documentation Only    Date/Time: 5/21/2022 4:46 PM  Performed by: Miriam Simmons PA-C  Authorized by: Miriam Simmons PA-C     ECG reviewed by me, the ED Provider: yes    Patient location:  ED  Previous ECG:     Previous ECG:  Compared to current    Similarity:  No change    Comparison to cardiac monitor: Yes    Interpretation:     Interpretation: normal    Rate:     ECG rate:  111    ECG rate assessment: normal    Rhythm:     Rhythm: sinus tachycardia    Ectopy:     Ectopy: none    QRS:     QRS axis:  Normal  Conduction:     Conduction: normal    ST segments:     ST segments:  Non-specific  T waves:     T waves: inverted      Inverted:  III  Q waves:     Q waves:  III  Comments:      No obvious ST elevations    Possible S wave in lead 1, inverted T-waves in lead 3 with possible Q-wave in lead 3             ED Course  ED Course as of 05/21/22 1844   Sat May 21, 2022   1633 LACTIC ACID(!!): 8 6  Result noted  Will obtain CT abdomen to assess for intraabdominal pathology and order 30 cc/kg bolus                               SBIRT 22yo+    Flowsheet Row Most Recent Value   SBIRT (23 yo +)    In order to provide better care to our patients, we are screening all of our patients for alcohol and drug use  Would it be okay to ask you these screening questions? Yes Filed at: 05/21/2022 1613   Initial Alcohol Screen: US AUDIT-C     1  How often do you have a drink containing alcohol? 0 Filed at: 05/21/2022 1613   2  How many drinks containing alcohol do you have on a typical day you are drinking? 0 Filed at: 05/21/2022 1613   3a  Male UNDER 65: How often do you have five or more drinks on one occasion? 0 Filed at: 05/21/2022 1613   3b  FEMALE Any Age, or MALE 65+: How often do you have 4 or more drinks on one occassion? 0 Filed at: 05/21/2022 1613   Audit-C Score 0 Filed at: 05/21/2022 1613   GILBERTO: How many times in the past year have you    Used an illegal drug or used a prescription medication for non-medical reasons? Never Filed at: 05/21/2022 1613                    MDM  Number of Diagnoses or Management Options  Acute respiratory failure with hypoxia (HCC)  Pneumonia  Septic shock ([facility] [address] )  Diagnosis management comments: Patient presented with complaints of shortness of breath and generalized weakness  Patient tachycardic and hypoxic upon arrival   Known malignancy  Wells score of 7  Given D-dimers poor validity in high-risk patients, CTA PE study was obtained  Lactic acidosis of 8 6 noted  Patient was not profoundly hypoxic to explain this lactic acidosis given that he was on at home over to so will obtain CT abdomen as well to rule out intra-abdominal pathology  Soft and nontender on exam however exam is somewhat limited due to patient's underlying cognitive dysfunction  Unclear baseline      CTA PE study with abdomen pelvis demonstrates no pulmonary embolism however findings consistent with pneumonia  Patient initially covered with ceftriaxone only given that primary differential diagnosis was pulmonary embolism as patient was recently taken off his Eliquis with concern for nose bleeds  Upon determining patient's source of sepsis screen was likely pneumonia, coverage broadened to vancomycin, cefepime and azithromycin per Critical Care Dr Pedro Turcios recommendations  Discussed case with CC that states patient is likely appropriate for med surge level of care  Patient was fluid responsive and received 30 cc/kilos bolus  Patient wife agreeable to plan  Portions of the record may have been created with voice recognition software  Occasional wrong word or "sound a like" substitutions may have occurred due to the inherent limitations of voice recognition software  Read the chart carefully and recognize, using context, where substitutions have occurred  Disposition  Final diagnoses:   Pneumonia   Septic shock ([address] )   Acute respiratory failure with hypoxia ([address] )     Time reflects when diagnosis was documented in both MDM as applicable and the Disposition within this note     Time User Action Codes Description Comment    5/21/2022  6:39 PM Mariam Plump Add [J18 9] Pneumonia     5/21/2022  6:40 PM Mariam Plump Add [A41 9,  R65 21] Septic shock ([address] )     5/21/2022  6:40 PM Mariam Plump Add [J96 01] Acute respiratory failure with hypoxia [facility])       ED Disposition     ED Disposition   Admit    Condition   Stable    Date/Time   Sat May 21, 2022  6:39 PM    Comment   Case was discussed with Paula and the patient's admission status was agreed to be Admission Status: inpatient status to the service of Dr Chacon Friendly  Follow-up Information    None         Patient's Medications   Discharge Prescriptions    No medications on file       No discharge procedures on file      PDMP Review       Value Time User    PDMP Reviewed  Yes 11/10/2021  4:37 PM Steve Voss MD          ED Provider  Electronically Signed by           John Walker PA-C  05/21/22 4704

## 2022-05-21 NOTE — QUICK NOTE
Progress Note - Triage Asssessment   Brianna Bras 78 y o  male MRN: [id number]    Time Called ( Time): 9060  Date Called: 05/21/22  Room#: ED 23  Person requesting evaluation: Sierra Ann PA-C    Situation:    Eli Cummins is an 79 yo male w/PMHx of CAD s/p CABG x3 (1996), HTN, HLD, chronic diastolic HF, bilateral carotid stenosis, PE/DVT-Eliquis recently stopped 2/2 epistaxis, chronic respiratory insufficiency since COVID in 2021-2 to 3L NC at baseline, PVD, CKD3a, DM2, concern for chronic aspiration, hepatocellular carcinoma s/p chemoembolization in 06/2021 and radioembolization in 10/2020-currently receiving chemo/immunotherapy, BPH who presented today for weakness, SOB  Critical care was asked to evaluate patient given concern for sepsis and respiratory compromise  On exam, patient is alert, mentation at baseline per wife  Mucous membranes dry  HR 110s, sinus tachycardia  BP stable at 130/62  SpO2 92% on chronic 3L NC  Bilateral lung sounds with rhonchi and mild expiratory wheezing appreciated  Abdomen non-distended, soft, nontender  Bilateral LE without edema  Extremities warm and perfused  Imaging reveals concern for acute RUL/RML pneumonia - likely aspiration-related given history  Suspect this is source of severe sepsis      Interventions:   -Maintain NPO status given concern for aspiration - will need formal swallow evaluation  -Would pan culture patient including blood, urine, sputum, MRSA, urine strep/legionella  -Agree with initiating broad-spectrum antibiotics including Azithromycin, Cefepime, Vancomycin  -Agree with IVF resuscitation - would start maintenance IVF with Isolyte @ 100 ml/hr for now  -Trend lactic acid and procalcitonin  -Previous TTE in 03/2022 with EF 60% and G1DD - would order repeat TTE given aggressive fluid resuscitation and concerns for overload  -Would order scheduled Xopenex/Atrovent/Pulmicort nebs as patient on Breo Ellipta as outpatient  -Consider Rodgers cath for accurate I/O  -Consider Pulmonology consult           Triage Assessment:     Patient can be admitted to [place] [address] as SD2      Recommendations discussed with Álvaro Ackerman PA-C

## 2022-05-22 NOTE — ASSESSMENT & PLAN NOTE
Lab Results   Component Value Date    EGFR 62 05/22/2022    EGFR 44 05/21/2022    EGFR 57 05/12/2022    CREATININE 1 09 05/22/2022    CREATININE 1 44 (H) 05/21/2022    CREATININE 1 16 05/12/2022   · Creatinine 1 44 on admission  · Improved to 1 09 this AM status post IV fluid resuscitation  · Baseline creatinine appears to be around 0 9 - 1 1  · Suspect etiology is prerenal given decreased PO intake, hypovolemia, sepsis, CT contrast with concurrent chemo, ACEI use  · S/P aggressive IVF resuscitation - will continue with maintenance IVF  · Avoid further hypotension and nephrotoxic agents  · Trend renal indices  · Optimize electrolytes  · Strict I/O  · Daily weights PT Acute Treatment  Plan of Care Note    Pt seen on 3S nursing unit.    ASSESSMENT:   Emphasis of session included gait training with use of 4ww, stair ambulation training and balance exercises. Pt ambulated from room to therapy satellite with close supervision and occasional contact guard assist. She ambulated with wide base of support, stiff trunk. No overt loss of balance during gait, but she does turn and weight shift somewhat impulsively, leading to potential loss of balance. With ambulation in her room, pt demonstrated poor safety awareness for obstacles and for tendency to abandon assistive device. She would feliciano the 4ww into obstacles instead of trying to go around.     With transition from gait to sitting in armchair, pt needed prompting to lock 4ww, but then prior to sitting, she unlocked the 4ww, pulled it back, and sat back without locking device or removing hands from device.      Pt was also instructed in standing dynamic balance exercises to promote stability in single leg stance and controlled weight shifting in sagittal plane, with one posterior loss of balance.    Patient's overall level of function is reduced for gait stability and balance reactions, leading to increased risk of falls. Pt tolerated the session without any adverse responses.    Continued skilled therapy is indicated to address above deficits. Patient is making progress toward goals.    Progress: Progressing toward goals  PT Identified Barriers to Discharge: 6 steps into daughter's home     PLAN:   Continue skilled PT, including the following Treatment/Interventions: Functional transfer training;Patient/Family training;Equipment eval/education;Gait training;Stairs retraining;Safety Education;Neuromuscular re-education (06/29/17 2833)   PT Frequency: 6 days/week (06/29/17 0006)    Treatment Plan for Next Session: continue to progress safety/stability with 4ww including navigation of small spaces/obstacles, progress stairs with one  selena, address balance          DIAGNOSIS:  1. Altered mental status, unspecified altered mental status type    2. Symptomatic anemia    3. Blood in stool        Co-morbidities:   Patient Active Problem List   Diagnosis   • Malignant neoplasm of bronchus and lung, unspecified site   • Abnormal CT scan, chest   • Enlargement of lymph nodes   • Chronic airway obstruction, not elsewhere classified   • Restrictive lung disease   • Aspiration pneumonia (CMS/HCC)   • Dysphagia   • Acute streptococcal pharyngitis       SUBJECTIVE: Subjective: Pt found supine in bed, agreeable to therapy session, states \"I feel much better now..\" (06/29/17 1546)    OBJECTIVE:  Precautions:  Precautions  Other Precautions: falls, safety (06/29/17 1546)  RN reported Mcginnis Fall Scale Score: 100 (>45 is considered at risk of fall)  Vitals:  Vital Signs: Pt ambulated unit without SS of fatigue, dizziness or lightheadedness. (06/29/17 1546)  Activity Tolerance:  no limits in patient's ability to participate in session  Alarm in place and activated at end of session.       Functional Mobility (as of date/time noted)  Bed Mobility:   Boosting: Modified Independent (06/28/17 1000)  Supine to Sit: Modified Independent (06/29/17 1546)  Sit to Supine: Modified Independent (06/28/17 1000)  Bed Mobility Comments: modified independent with bed mobility with no safety concerns, extra time to complete (06/28/17 1000)    Transfer:   Sit to Stand: Modified Independent (06/29/17 1546)  Stand to Sit: Modified Independent (06/29/17 1546)  Stand Pivot Transfers: Supervision (Supv) (06/29/17 1546)  Toilet Transfers: Supervision (Supv) (06/28/17 1000)  Assistive Device/: 2-wheeled walker;1 Person;Gait Belt (06/28/17 1000)  Transfer Comments 1: supervision required due to impulsiveness and loss of balance with sudden movements (06/29/17 1546)    Ambulation:  Gait Assistance: Supervision (Supv);Touching/Steadying Assistance (06/29/17 1546)  Assistive  Device/: 4-wheeled walker (06/29/17 1546)  Ambulation Distance (Feet): 100 Feet (x2) (06/29/17 1546)  Gait Comments 1: Pt ambulated ~100 feet x 2 using 4ww, no reports of dizziness (06/29/17 1546)  Gait Comments 2: Pt needed assist to manage doorway with assistive device due to decreased safety awareness and poor problems solving obstacles navigation. (06/29/17 1546)  Gait Comments 3: Pt also performed retro-ambulation with room navigation, no loss of balance but poor problem solving for use of 4ww (pushes into obstacles or abandons device) (06/29/17 1546)    Stair Negotiation:  Number of Stairs: 2 (x2) (06/29/17 1546)  Stair Management Assistance: Touching/Steadying Assistance (06/29/17 1546)  Stair Management Technique: One rail R;Two rails;Alternating pattern;With gait belt (06/29/17 1546)  Stairs Mobility Comments: Pt needed cues throughout first trial to perform as instructed, able to repeat second trial without cues. (06/29/17 1546)    See PT flowsheet for full details regarding the PT therapy provided.    GOALS:  Short Term Goals to Be Reviewed On: 07/02/17 (06/28/17 1000)  Short Term Goals = Discharge Goals: Yes (06/28/17 1000)  Goal Agreement: Patient agrees with goals and treatment plan (06/28/17 1000)  Transfer Discharge Goal: modified independent with transfers using a 4ww (06/28/17 1000)  Ambulation Discharge Goal: ambulate 125 with modified independence using a 4ww (06/28/17 1000)  Stairs Discharge Goal: negotiate 6 steps with supervision using one railing and the least restrictive assistive device (06/28/17 1000)    EDUCATION:   On this date, the patient was educated on safety with use of 4ww, sequencing stair ambulation with one rail.    The response to education was: Verbalizes understanding, Demonstrates understanding and Needs reinforcement.    RECOMMENDATIONS FOR DISCHARGE:  Recommendation for Discharge: PT: Home    PT/OT Mobility Equipment for Discharge: no needs, 4ww at home  (06/29/17 2354)     Fozia Montoya, PT  Pager [phone]

## 2022-05-22 NOTE — PROGRESS NOTES
Vancomycin Assessment    Twin Gates is a 78 y o  male who is currently receiving vancomycin 1000 mg iv q 24 hrs for Pneumonia     Relevant clinical data and objective history reviewed:  Creatinine   Date Value Ref Range Status   05/21/2022 1 44 (H) 0 60 - 1 30 mg/dL Final     Comment:     Standardized to IDMS reference method   05/12/2022 1 16 0 60 - 1 30 mg/dL Final     Comment:     Standardized to IDMS reference method   04/20/2022 1 18 0 60 - 1 30 mg/dL Final     Comment:     Standardized to IDMS reference method     /57   Pulse (!) 120   Temp 98 2 °F (36 8 °C) (Tympanic)   Resp (!) 49   Ht 5' 9" (1 753 m)   Wt 76 2 kg (167 lb 15 9 oz)   SpO2 95%   BMI 24 81 kg/m²   I/O last 3 completed shifts: In: 3050 [IV Piggyback:3050]  Out: -   Lab Results   Component Value Date/Time    BUN 26 (H) 05/21/2022 03:46 PM    WBC 12 62 (H) 05/21/2022 03:46 PM    HGB 15 4 05/21/2022 03:46 PM    HCT 49 2 05/21/2022 03:46 PM    MCV 96 05/21/2022 03:46 PM     05/21/2022 03:46 PM     Temp Readings from Last 3 Encounters:   05/21/22 98 2 °F (36 8 °C) (Tympanic)   05/17/22 97 8 °F (36 6 °C)   05/13/22 (!) 96 9 °F (36 1 °C) (Tympanic)     Vancomycin Days of Therapy: 1    Assessment/Plan  The patient is currently on vancomycin utilizing scheduled dosing based on actual body weight  Baseline risks associated with therapy include: pre-existing renal impairment, concomitant nephrotoxic medications, advanced age, and dehydration  The patient is currently receiving 1000 mg iv q 24 hrs and after clinical evaluation will be changed to 1500 mg iv q 24 hrs  Pharmacy will also follow closely for s/sx of nephrotoxicity, infusion reactions, and appropriateness of therapy  BMP and CBC will be ordered per protocol  Plan for trough as patient approaches steady state, prior to the 4th  dose at approximately 2130 on 05/24/22  Due to infection severity, will target a trough of 15-20 (appropriate for most indications)   Pharmacy will continue to follow the patients culture results and clinical progress daily      Ruddy Levi, Pharmacist

## 2022-05-22 NOTE — ASSESSMENT & PLAN NOTE
· Lactic acid 8 6 on admission - down to 5 2 post IVF resuscitation  · In setting of profound dehydration/sepsis  · Continue to trend until cleared - will likely need additional volume

## 2022-05-22 NOTE — ASSESSMENT & PLAN NOTE
Lab Results   Component Value Date    EGFR 44 05/21/2022    EGFR 57 05/12/2022    EGFR 56 04/20/2022    CREATININE 1 44 (H) 05/21/2022    CREATININE 1 16 05/12/2022    CREATININE 1 18 04/20/2022     · Creatinine 1 44 on admission  · Baseline creatinine appears to be around 0 9 - 1 1  · Suspect etiology is prerenal given decreased PO intake, hypovolemia, sepsis, CT contrast with concurrent chemo, ACEI use  · Is s/p aggressive IVF resuscitation - will continue with maintenance IVF  · Avoid further hypotension and nephrotoxic agents  · Trend renal indices  · Optimize electrolytes  · Strict I/O - would consider Rodgers cath placement  · Daily weights

## 2022-05-22 NOTE — ASSESSMENT & PLAN NOTE
· Suspect this is related to aspiration as it appears this has been a chronic concern  · Recent barium swallow on 05/12 with concern for spillage over epiglottis and potentially into laryngeal space, though it was noted that penetrated material was immediately redirected during swallow initiation  · Continue antibiotics as noted above  · Keep NPO for now   · Will order formal swallow evaluation  · Will order scheduled Xopenex/Atrovent/Pulmicort/HTS nebs  · Aspiration precautions  · Aggressive pulmonary hygiene, encourage I/S while awake  · Maintain SpO2 > 88% - currently on home 3L NC

## 2022-05-22 NOTE — PLAN OF CARE
Problem: Prexisting or High Potential for Compromised Skin Integrity  Goal: Skin integrity is maintained or improved  Description: INTERVENTIONS:  - Identify patients at risk for skin breakdown  - Assess and monitor skin integrity  - Assess and monitor nutrition and hydration status  - Monitor labs   - Assess for incontinence   - Turn and reposition patient  - Assist with mobility/ambulation  - Relieve pressure over bony prominences  - Avoid friction and shearing  - Provide appropriate hygiene as needed including keeping skin clean and dry  - Evaluate need for skin moisturizer/barrier cream  - Collaborate with interdisciplinary team   - Patient/family teaching  - Consider wound care consult   Outcome: Progressing  Goal: Ability to express needs and understand communication  Outcome: Progressing     Problem: RESPIRATORY - ADULT  Goal: Achieves optimal ventilation and oxygenation  Description: INTERVENTIONS:  - Assess for changes in respiratory status  - Assess for changes in mentation and behavior  - Position to facilitate oxygenation and minimize respiratory effort  - Oxygen administered by appropriate delivery if ordered  - Initiate smoking cessation education as indicated  - Encourage broncho-pulmonary hygiene including cough, deep breathe, Incentive Spirometry  - Assess the need for suctioning and aspirate as needed  - Assess and instruct to report SOB or any respiratory difficulty  - Respiratory Therapy support as indicated  Outcome: Progressing     Problem: PAIN - ADULT  Goal: Verbalizes/displays adequate comfort level or baseline comfort level  Description: Interventions:  - Encourage patient to monitor pain and request assistance  - Assess pain using appropriate pain scale  - Administer analgesics based on type and severity of pain and evaluate response  - Implement non-pharmacological measures as appropriate and evaluate response  - Consider cultural and social influences on pain and pain management  - Notify physician/advanced practitioner if interventions unsuccessful or patient reports new pain  Outcome: Progressing     Problem: Nutrition/Hydration-ADULT  Goal: Nutrient/Hydration intake appropriate for improving, restoring or maintaining nutritional needs  Description: Monitor and assess patient's nutrition/hydration status for malnutrition  Collaborate with interdisciplinary team and initiate plan and interventions as ordered  Monitor patient's weight and dietary intake as ordered or per policy  Utilize nutrition screening tool and intervene as necessary  Determine patient's food preferences and provide high-protein, high-caloric foods as appropriate       INTERVENTIONS:  - Monitor oral intake, urinary output, labs, and treatment plans  - Assess nutrition and hydration status and recommend course of action  - Evaluate amount of meals eaten  - Assist patient with eating if necessary   - Allow adequate time for meals  - Recommend/ encourage appropriate diets, oral nutritional supplements, and vitamin/mineral supplements  - Order, calculate, and assess calorie counts as needed  - Recommend, monitor, and adjust tube feedings and TPN/PPN based on assessed needs  - Assess need for intravenous fluids  - Provide specific nutrition/hydration education as appropriate  - Include patient/family/caregiver in decisions related to nutrition  Outcome: Progressing     Problem: INFECTION - ADULT  Goal: Absence or prevention of progression during hospitalization  Description: INTERVENTIONS:  - Assess and monitor for signs and symptoms of infection  - Monitor lab/diagnostic results  - Monitor all insertion sites, i e  indwelling lines, tubes, and drains  - Monitor endotracheal if appropriate and nasal secretions for changes in amount and color  - Dale appropriate cooling/warming therapies per order  - Administer medications as ordered  - Instruct and encourage patient and family to use good hand hygiene technique  - Identify and instruct in appropriate isolation precautions for identified infection/condition  Outcome: Progressing  Goal: Absence of fever/infection during neutropenic period  Description: INTERVENTIONS:  - Monitor WBC    Outcome: Progressing     Problem: SAFETY ADULT  Goal: Patient will remain free of falls  Description: INTERVENTIONS:  - Educate patient/family on patient safety including physical limitations  - Instruct patient to call for assistance with activity   - Consult OT/PT to assist with strengthening/mobility   - Keep Call bell within reach  - Keep bed low and locked with side rails adjusted as appropriate  - Keep care items and personal belongings within reach  - Initiate and maintain comfort rounds  - Make Fall Risk Sign visible to staff  - Offer Toileting every 2 Hours, in advance of need  - Initiate/Maintain fall alarm  - Obtain necessary fall risk management equipment: fall alarm  - Apply yellow socks and bracelet for high fall risk patients  - Consider moving patient to room near nurses station  Outcome: Progressing  Goal: Maintain or return to baseline ADL function  Description: INTERVENTIONS:  -  Assess patient's ability to carry out ADLs; assess patient's baseline for ADL function and identify physical deficits which impact ability to perform ADLs (bathing, care of mouth/teeth, toileting, grooming, dressing, etc )  - Assess/evaluate cause of self-care deficits   - Assess range of motion  - Assess patient's mobility; develop plan if impaired  - Assess patient's need for assistive devices and provide as appropriate  - Encourage maximum independence but intervene and supervise when necessary  - Involve family in performance of ADLs  - Assess for home care needs following discharge   - Consider OT consult to assist with ADL evaluation and planning for discharge  - Provide patient education as appropriate  Outcome: Progressing  Goal: Maintains/Returns to pre admission functional level  Description: INTERVENTIONS:  - Perform BMAT or MOVE assessment daily    - Set and communicate daily mobility goal to care team and patient/family/caregiver  - Collaborate with rehabilitation services on mobility goals if consulted  - Perform Range of Motion 3 times a day  - Reposition patient every 2 hours  - Dangle patient 3 times a day  - Stand patient 3 times a day  - Ambulate patient 3 times a day  - Out of bed to chair 3 times a day   - Out of bed for meals 3 times a day  - Out of bed for toileting  - Record patient progress and toleration of activity level   Outcome: Progressing     Problem: DISCHARGE PLANNING  Goal: Discharge to home or other facility with appropriate resources  Description: INTERVENTIONS:  - Identify barriers to discharge w/patient and caregiver  - Arrange for needed discharge resources and transportation as appropriate  - Identify discharge learning needs (meds, wound care, etc )  - Arrange for interpretive services to assist at discharge as needed  - Refer to Case Management Department for coordinating discharge planning if the patient needs post-hospital services based on physician/advanced practitioner order or complex needs related to functional status, cognitive ability, or social support system  Outcome: Progressing     Problem: Knowledge Deficit  Goal: Patient/family/caregiver demonstrates understanding of disease process, treatment plan, medications, and discharge instructions  Description: Complete learning assessment and assess knowledge base    Interventions:  - Provide teaching at level of understanding  - Provide teaching via preferred learning methods  Outcome: Progressing

## 2022-05-22 NOTE — ASSESSMENT & PLAN NOTE
· Has history of PE/DVT  · Was previously on Eliquis, however was recently stopped 2/2 epistaxis  · CT PE study on admission negative for PE

## 2022-05-22 NOTE — ASSESSMENT & PLAN NOTE
Malnutrition Findings:                                 BMI Findings: Body mass index is 24 81 kg/m²     · Likely secondary to metastatic disease  · Nutrition consult  · Advance diet as tolerated

## 2022-05-22 NOTE — ASSESSMENT & PLAN NOTE
· Discharged on 3L NC after being hospitalized for COVID-19 pneumonia in 11/2021  · Suspect this is multifactorial: COVID-19 sequelae vs ? aspiration-related  · Former smoker of 1/2 ppd x16 years (quit in [place])  · No known history of COPD nor PFTs for review  · Follow with Dr Neelima Lowe as outpatient  · [facility] as outpatient - will order Xopenex, Atrovent, Pulmicort nebs for now  · Continue home Flonase nasal spray  · Currently requiring baseline 3L NC   · Maintain SpO2 > 88%

## 2022-05-22 NOTE — ASSESSMENT & PLAN NOTE
· Is s/p CABG x3 in 1996  · Normal ejection fraction noted on recent 2D echocardiogram  · Continue home aspirin 81 mg PO daily

## 2022-05-22 NOTE — ASSESSMENT & PLAN NOTE
· Resolved  · Lactic acid 8 6 on admission  · In setting of profound dehydration/sepsis  · No need to further trend  · IV fluid resuscitation as above

## 2022-05-22 NOTE — ASSESSMENT & PLAN NOTE
Lab Results   Component Value Date    HGBA1C 7 0 (A) 05/10/2022       Recent Labs     05/22/22  0041 05/22/22  0533   POCGLU 167* 98       Blood Sugar Average: Last 72 hrs:  (P) 132 5   · Sliding scale insulin while inpatient

## 2022-05-22 NOTE — PROGRESS NOTES
Vancomycin Assessment    Dilia Galarza is a 78 y o  male who is currently receiving vancomycin 1500mg iv q24h for Pneumonia     Relevant clinical data and objective history reviewed:  Creatinine   Date Value Ref Range Status   05/22/2022 1 09 0 60 - 1 30 mg/dL Final     Comment:     Standardized to IDMS reference method   05/21/2022 1 44 (H) 0 60 - 1 30 mg/dL Final     Comment:     Standardized to IDMS reference method   05/12/2022 1 16 0 60 - 1 30 mg/dL Final     Comment:     Standardized to IDMS reference method     /58   Pulse (!) 116   Temp 97 7 °F (36 5 °C)   Resp (!) 25   Ht 5' 9" (1 753 m)   Wt 76 2 kg (167 lb 15 9 oz)   SpO2 96%   BMI 24 81 kg/m²   I/O last 3 completed shifts: In: 4606 3 [I V :956 3; IV Piggyback:3650]  Out: 6512 [Urine:1720]  Lab Results   Component Value Date/Time    BUN 21 05/22/2022 02:53 AM    WBC 9 97 05/22/2022 02:53 AM    HGB 14 2 05/22/2022 02:53 AM    HCT 45 1 05/22/2022 02:53 AM    MCV 96 05/22/2022 02:53 AM     (L) 05/22/2022 02:53 AM     Temp Readings from Last 3 Encounters:   05/22/22 97 7 °F (36 5 °C)   05/17/22 97 8 °F (36 6 °C)   05/13/22 (!) 96 9 °F (36 1 °C) (Tympanic)     Vancomycin Days of Therapy: 2    Assessment/Plan  The patient is currently on vancomycin utilizing scheduled dosing  Baseline risks associated with therapy include: concomitant nephrotoxic medications and advanced age  The patient is receiving 1500mg iv q24h with significant improved renal function today ; therefore, after clinical evaluation will be changed to 750mg iv q12h   Pharmacy will continue to follow closely for s/sx of nephrotoxicity, infusion reactions, and appropriateness of therapy  BMP and CBC will be ordered per protocol  Plan for trough as patient approaches steady state, prior to the 4th  dose at approximately 1130 on 5/23/22  Pharmacy will continue to follow the patients culture results and clinical progress daily      Lilly Damon, Pharmacist

## 2022-05-22 NOTE — ASSESSMENT & PLAN NOTE
· Is s/p chemoembolization in 06/2021 as well as radioembolization in 10/2020  · Currently receiving chemo/immunotherapy per most recent Hem/Onc notes  · Port placed in 03/2022  · Follows with Dr Arthur Orellana as outpatient   · Imaging on admission revealed reidentified advanced hepatic metastatic disease, similar from previous examination with innumerable hepatic metastatic lesions and approximate 50% replacement of hepatic parenchyma with metastatic tumor; probable metastatic lesions in spleen and miles metastatic disease along right external iliac chain  · Will need continued follow-up with Hem/Onc as outpatient

## 2022-05-22 NOTE — ASSESSMENT & PLAN NOTE
· Is s/p chemoembolization in 06/2021 as well as radioembolization in 10/2020  · Currently receiving chemo/immunotherapy per most recent Hem/Onc notes  · Port placed in 03/2022  · Follows with Dr Kaufman Parents as outpatient   · Imaging on admission revealed reidentified advanced hepatic metastatic disease, similar from previous examination with innumerable hepatic metastatic lesions and approximate 50% replacement of hepatic parenchyma with metastatic tumor; probable metastatic lesions in spleen and miles metastatic disease along right external iliac chain  · Will need continued follow-up with Hem/Onc as outpatient  · Recommend goals of care discussions as prognosis appears guarded

## 2022-05-22 NOTE — ASSESSMENT & PLAN NOTE
· Suspect this is 2/2 profound hypovolemia, sepsis with concurrent hepatocellular carcinoma  · Avoid hepatotoxic agents  · Serial abdominal exams  · Continue to trend LFTs

## 2022-05-22 NOTE — SEPSIS NOTE
Sepsis Note   Aman Huntley 78 y o  male MRN: [id number]  Unit/Bed#: ICU 05-01 Encounter: [id number]       qSOFA     Row Name 05/22/22 0200 05/22/22 0100 05/22/22 0000 05/21/22 2300 05/21/22 2231    Altered mental status GCS < 15 -- -- -- -- --    Respiratory Rate > / =22 1 0 1 1 1    Systolic BP < / =861 0 0 0 1 0    Q Sofa Score 1 0 1 2 1    Row Name 05/21/22 2226 05/21/22 2200 05/21/22 2100 05/21/22 1945 05/21/22 1845    Altered mental status GCS < 15 0 -- -- -- --    Respiratory Rate > / =22 -- 1 1 0 1    Systolic BP < / =130 -- -- 0 0 0    Q Sofa Score 1 1 1 0 1    Row Name 05/21/22 1815 05/21/22 1800 05/21/22 1745 05/21/22 1730 05/21/22 1715    Altered mental status GCS < 15 -- -- -- -- --    Respiratory Rate > / =22 1 1 1 1 1    Systolic BP < / =836 0 0 0 0 0    Q Sofa Score 1 1 1 1 1    Row Name 05/21/22 1700 05/21/22 1630 05/21/22 1615 05/21/22 1600 05/21/22 1544    Altered mental status GCS < 15 -- -- -- -- --    Respiratory Rate > / =22 1 1 1 1 1    Systolic BP < / =374 0 0 1 1 0    Q Sofa Score 1 1 2 2 1                    Default Flowsheet Data (last 720 hours)     Sepsis Reassess     Row Name 05/21/22 2053                   Repeat Volume Status and Tissue Perfusion Assessment Performed    Repeat Volume Status and Tissue Perfusion Assessment Performed Yes  -BA                  Volume Status and Tissue Perfusion Post Fluid Resuscitation * Must Document All *    Vital Signs Reviewed (HR, RR, BP, T) Yes  -BA        Shock Index Reviewed Yes  -BA        Arterial Oxygen Saturation Reviewed (POx, SaO2 or SpO2) Yes (comment %)  SpO2 93%  -BA        Cardio Normal S1/S2; Tachycardia; No murmor  -BA        Pulmonary Tachypnea;Rhonchi  -BA        Capillary Refill Brisk  -BA        Peripheral Pulses Radial;Dorsalis Pedis  -BA        Peripheral Pulse +2  -BA        Dorsalis Pedis +2  -BA        Skin Warm;Dry  -BA        Urine output assessed Adequate  -BA                  *OR*   Intensive Monitoring- Must Document One of the Following Four *:    Vital Signs Reviewed --        * Central Venous Pressure (CVP or RAP) --        * Central Venous Oxygen (SVO2, ScvO2 or Oxygen saturation via central catheter) --        * Bedside Cardiovascular US in IVC diameter and % collapse --        * Passive Leg Raise OR Crystalloid Challenge --              User Key  (r) = Recorded By, (t) = Taken By, (c) = Cosigned By    Initials Name Provider Type    BEKA Holland Nurse Practitioner              On broad-spectrum abx  Is s/p volume resuscitation per sepsis protocol  Lactic acid trending down

## 2022-05-22 NOTE — ASSESSMENT & PLAN NOTE
· TTE from 03/2022 with EF 60% and G1DD, mildly dilated RV, mild TR, mild AZ  · Does not take diuretics as outpatient  · Home beta blocker currently held in setting of hypotension, sepsis  · Consider repeat TTE if concern for worsening heart function and/or volume overload  · Strict I/O  · Daily weights

## 2022-05-22 NOTE — ASSESSMENT & PLAN NOTE
Lab Results   Component Value Date    HGBA1C 7 0 (A) 05/10/2022       No results for input(s): POCGLU in the last 72 hours      Blood Sugar Average: Last 72 hrs:     · Does not take any glucose controlling medications as outpatient  · Will order SSI with fingersticks ACHS  · Goal  - 180

## 2022-05-22 NOTE — ASSESSMENT & PLAN NOTE
· Suspect this is related to aspiration as it appears this has been a chronic concern  · Recent barium swallow on 05/12 with concern for spillage over epiglottis and potentially into laryngeal space, though it was noted that penetrated material was immediately redirected during swallow initiation  · Continue antibiotics as noted above  · Advance diet as tolerated  · Will order formal swallow evaluation  · Will order scheduled Xopenex/Atrovent/Pulmicort/HTS nebs  · Aspiration precautions  · Aggressive pulmonary hygiene, encourage I/S while awake  · Maintain SpO2 > 88% - currently on home 3L NC

## 2022-05-22 NOTE — PROGRESS NOTES
[address]  Progress Note - Sherryle Pimple 1940, 78 y o  male MRN: [id number]  Unit/Bed#: ICU 05-01 Encounter: [id number]  Primary Care Provider: Nabil Viera DO   Date and time admitted to hospital: 5/21/2022  3:47 PM    * Severe sepsis [facility])  Assessment & Plan  · AEB tachycardia, tachypnea, hypotension, leukocytosis, elevated lactic acid, EMILIANO  · Suspect source is pneumonia given RUL/RML consolidative opacities on imaging, though cannot rule out bacteremia/port infection as patient had port placed in 03/2022  · CT C/A/P from admission revealed patchy consolidative airspace opacities throughout posterior lateral right upper lobe and in lateral right midlung are suspicious for acute pneumonia; background groundglass and reticular airspace opacities in periphery of mid and lower right lung and in left costophrenic angle, similar from November 4, 2021 and suspicious for sequela of viral pneumonia due to COVID-19 infection  · COVID/FLU/RSV negative  · Blood cultures pending   · UA without evidence of infection  · Send urine strep/legionella  · Will order sputum culture  · Send MRSA swab  · Lactic acid elevated at 8 6 on admission down to 5 2 s/p IVF resuscitation ; now resolved  · Procalcitonin 293 05 which is now down trending  · Received 30 ml/kg IVF resuscitation per sepsis protocol - will likely require additional volume given poor PO intake/sepsis/dehydration  · Will continue maintenance IVF with Isolyte  · Continue broad-spectrum antibiotics with IV Azithromycin, Cefepime, Vancomycin day #2  · Trend procalcitonin  · Monitor fever and WBC curve  · Trend lactic acid until cleared  · Follow-up cultures    Pneumonia involving right lung  Assessment & Plan  · Suspect this is related to aspiration as it appears this has been a chronic concern  · Recent barium swallow on 05/12 with concern for spillage over epiglottis and potentially into laryngeal space, though it was noted that penetrated material was immediately redirected during swallow initiation  · Continue antibiotics as noted above  · Advance diet as tolerated  · Will order formal swallow evaluation  · Will order scheduled Xopenex/Atrovent/Pulmicort/HTS nebs  · Aspiration precautions  · Aggressive pulmonary hygiene, encourage I/S while awake  · Maintain SpO2 > 88% - currently on home 3L NC    Chronic respiratory insufficiency  Assessment & Plan  · Discharged on 3L NC after being hospitalized for COVID-19 pneumonia in 11/2021  · Suspect this is multifactorial: COVID-19 sequelae vs ? aspiration-related  · Former smoker of 1/2 ppd x16 years (quit in [id number])  · No known history of COPD no PFTs for review  · Follow with Dr Ricardo Garvey as outpatient  · [facility] as outpatient - will order Xopenex, Atrovent, Pulmicort nebs for now  · Continue home Flonase nasal spray  · Currently requiring baseline 3L NC   · Maintain SpO2 > 88%    Acute renal failure superimposed on stage 3a chronic kidney disease [facility])  Assessment & Plan  Lab Results   Component Value Date    EGFR 62 05/22/2022    EGFR 44 05/21/2022    EGFR 57 05/12/2022    CREATININE 1 09 05/22/2022    CREATININE 1 44 (H) 05/21/2022    CREATININE 1 16 05/12/2022   · Creatinine 1 44 on admission  · Improved to 1 09 this AM status post IV fluid resuscitation  · Baseline creatinine appears to be around 0 9 - 1 1  · Suspect etiology is prerenal given decreased PO intake, hypovolemia, sepsis, CT contrast with concurrent chemo, ACEI use  · S/P aggressive IVF resuscitation - will continue with maintenance IVF  · Avoid further hypotension and nephrotoxic agents  · Trend renal indices  · Optimize electrolytes  · Strict I/O  · Daily weights    History of blood clots  Assessment & Plan  · Has history of PE/DVT  · Was previously on Eliquis, however was recently stopped 2/2 epistaxis  · CT PE study on admission negative for PE    Hepatocellular carcinoma ([facility] [address] )  Assessment & Plan  · Is s/p chemoembolization in 06/2021 as well as radioembolization in 10/2020  · Currently receiving chemo/immunotherapy per most recent Hem/Onc notes  · Port placed in 03/2022  · Follows with Dr Arthur Orellana as outpatient   · Imaging on admission revealed reidentified advanced hepatic metastatic disease, similar from previous examination with innumerable hepatic metastatic lesions and approximate 50% replacement of hepatic parenchyma with metastatic tumor; probable metastatic lesions in spleen and miles metastatic disease along right external iliac chain  · Will need continued follow-up with Hem/Onc as outpatient  · Recommend goals of care discussions as prognosis appears guarded    Essential hypertension  Assessment & Plan  · Presented with hypotension in setting of sepsis  · Holding home Amlodipine, Lisinopril, Lopressor    Transaminitis  Assessment & Plan  · Suspect this is 2/2 profound hypovolemia, sepsis with concurrent hepatocellular carcinoma  · Avoid hepatotoxic agents  · Serial abdominal exams  · Continue to trend LFTs    Hyperlipidemia  Assessment & Plan  · Holding home statin in the setting of transaminitis    Coronary artery disease without angina pectoris  Assessment & Plan  · Is s/p CABG x3 in 1996  · Normal ejection fraction noted on recent 2D echocardiogram  · Continue home aspirin 81 mg PO daily    Lactic acidosis  Assessment & Plan  · Resolved  · Lactic acid 8 6 on admission  · In setting of profound dehydration/sepsis  · No need to further trend  · IV fluid resuscitation as above        Type 2 diabetes mellitus without complication, without long-term current use of insulin [facility])  Assessment & Plan  Lab Results   Component Value Date    HGBA1C 7 0 (A) 05/10/2022       Recent Labs     05/22/22  0041 05/22/22  0533   POCGLU 167* 98       Blood Sugar Average: Last 72 hrs:  (P) 132 5   · Sliding scale insulin while inpatient    Chronic diastolic heart failure (HCC)  Assessment & Plan  · TTE from 03/2022 with EF 60% and G1DD, mildly dilated RV, mild TR, mild ME  · Does not take diuretics as outpatient  · Home beta blocker currently held in setting of hypotension, sepsis  · Consider repeat TTE if concern for worsening heart function and/or volume overload  · Strict I/O  · Daily weights          Subclinical hypothyroidism  Assessment & Plan  · Continue home Synthroid    Gout  Assessment & Plan  · Continue home allopurinol    Protein-calorie malnutrition ([address] )  Assessment & Plan  Malnutrition Findings:                                 BMI Findings: Body mass index is 24 81 kg/m²  · Likely secondary to metastatic disease  · Nutrition consult  · Advance diet as tolerated    BPH (benign prostatic hyperplasia)  Assessment & Plan  · Continue home Flomax    VTE Pharmacologic Prophylaxis: Heparin    Patient Centered Rounds:  Patient care rounds were performed with nursing    Discussions with Specialists or Other Care Team Provider:  Case Management, Nursing    Education and Discussions with Family / Patient: Will speak with patient's wife    Time Spent for Care: 30  More than 50% of total time spent on counseling and coordination of care as described above  Current Length of Stay: 1 day(s)    Current Patient Status: Inpatient     Certification Statement: The patient will continue to require additional inpatient hospital stay due to severe sepsis    Discharge Plan:  Home with family support once medically stable    Code Status: Level 1 - Full Code      Subjective:   Patient seen and examined at bedside  No acute events overnight  Sepsis parameters improving  Continue with IV fluid resuscitation and broad-spectrum IV antibiotics      Objective:     Vitals:   Temp (24hrs), Av 7 °F (36 5 °C), Min:97 5 °F (36 4 °C), Max:98 2 °F (36 8 °C)    Temp:  [97 5 °F (36 4 °C)-98 2 °F (36 8 °C)] 97 6 °F (36 4 °C)  HR:  [106-126] 110  Resp:  [17-49] 24  BP: ()/() 121/57  SpO2:  [88 %-97 %] 97 %  Body mass index is 24 81 kg/m²  Input and Output Summary (last 24 hours): Intake/Output Summary (Last 24 hours) at 5/22/2022 0819  Last data filed at 5/22/2022 0606  Gross per 24 hour   Intake 4556 25 ml   Output 1720 ml   Net 2836 25 ml       Physical Exam:     Physical Exam  Vitals and nursing note reviewed  Constitutional:       Appearance: He is well-developed  HENT:      Head: Normocephalic and atraumatic  Eyes:      Conjunctiva/sclera: Conjunctivae normal    Cardiovascular:      Rate and Rhythm: Normal rate and regular rhythm  Heart sounds: No murmur heard  Pulmonary:      Effort: Pulmonary effort is normal  No respiratory distress  Breath sounds: Normal breath sounds  Abdominal:      Palpations: Abdomen is soft  Tenderness: There is no abdominal tenderness  Musculoskeletal:      Cervical back: Neck supple  Skin:     General: Skin is warm and dry  Neurological:      Mental Status: He is alert  He is disoriented  Additional Data:     Labs:  I have reviewed pertinent results     Results from last 7 days   Lab Units 05/22/22  0253   WBC Thousand/uL 9 97   HEMOGLOBIN g/dL 14 2   HEMATOCRIT % 45 1   PLATELETS Thousands/uL 135*   NEUTROS PCT % 81*   LYMPHS PCT % 5*   MONOS PCT % 11   EOS PCT % 0     Results from last 7 days   Lab Units 05/22/22  0253   SODIUM mmol/L 139   POTASSIUM mmol/L 4 1   CHLORIDE mmol/L 106   CO2 mmol/L 25   BUN mg/dL 21   CREATININE mg/dL 1 09   ANION GAP mmol/L 8   CALCIUM mg/dL 8 4   ALBUMIN g/dL 2 6*   TOTAL BILIRUBIN mg/dL 1 04*   ALK PHOS U/L 313*   ALT U/L 81*   AST U/L 142*   GLUCOSE RANDOM mg/dL 111     Results from last 7 days   Lab Units 05/21/22  1546   INR  0 95     Results from last 7 days   Lab Units 05/22/22  0533 05/22/22  0041   POC GLUCOSE mg/dl 98 167*         Results from last 7 days   Lab Units 05/22/22  0253 05/21/22  2337 05/21/22  2139 05/21/22  1737 05/21/22  1546   LACTIC ACID mmol/L 1 9 2 6* 3 4* 5 2* 8 6*   PROCALCITONIN ng/ml 174 63*  -- --   --  293 05*         Imaging: I have reviewed pertinent imaging       Recent Cultures (last 7 days):     Results from last 7 days   Lab Units 05/21/22  1552 05/21/22  1546   BLOOD CULTURE  Received in Microbiology Lab  Culture in Progress  Received in Microbiology Lab  Culture in Progress  Last 24 Hours Medication List:   Current Facility-Administered Medications   Medication Dose Route Frequency Provider Last Rate    allopurinol  300 mg Oral Daily Rosa Gan DO      aspirin  81 mg Oral Daily Rosa Gan DO      azithromycin  500 mg Intravenous Q24H James Paz PA-C      budesonide  0 5 mg Nebulization Q12H BEKA Michael      cefepime  1,000 mg Intravenous Q12H BEKA Michael 1,000 mg (05/22/22 0606)    fluticasone  2 spray Nasal Daily James Paz PA-C      fluticasone-vilanterol  1 puff Inhalation Daily Rosa Gan DO      heparin (porcine)  5,000 Units Subcutaneous [facility] James Paz PA-C      insulin lispro  1-6 Units Subcutaneous Q6H [address] BEKA Michael      ipratropium  0 5 mg Nebulization TID BEKA Post      levalbuterol  1 25 mg Nebulization TID BEKA Post      levothyroxine  75 mcg Oral Early Morning Rosa Gan DO      multi-electrolyte  125 mL/hr Intravenous Continuous James Paz PA-C 125 mL/hr (05/22/22 0728)    nystatin   Topical BID BEKA Post      ondansetron  4 mg Intravenous Q6H PRN James Paz PA-C      pantoprazole  40 mg Oral Early Morning Rosa Gan DO      sodium chloride  4 mL Nebulization TID BEKA Post      tamsulosin  0 4 mg Oral Daily With [employer],       vancomycin  20 mg/kg Intravenous Q24H Rosa Gan DO          Today, Patient Was Seen By: Rosa Gan DO    ** Please Note: Dictation voice to text software may have been used in the creation of this document   **

## 2022-05-22 NOTE — ASSESSMENT & PLAN NOTE
· Presented with hypotension in setting of sepsis  · Holding home Amlodipine, Lisinopril, Lopressor given NPO/septic state

## 2022-05-22 NOTE — RESPIRATORY THERAPY NOTE
RT Protocol Note  Twin Gates 78 y o  male MRN: [id number]  Unit/Bed#: ICU  Encounter: [id number]    Assessment    Principal Problem:    Severe sepsis [facility])  Active Problems:    Essential hypertension    Hyperlipidemia    Coronary artery disease without angina pectoris    Transaminitis    Chronic respiratory insufficiency    Lactic acidosis    Acute renal failure superimposed on stage 3a chronic kidney disease (HCC)    Hepatocellular carcinoma (HCC)    Pneumonia involving right lung    Type 2 diabetes mellitus without complication, without long-term current use of insulin (HCC)    Chronic diastolic heart failure (HCC)    Gout    Subclinical hypothyroidism    Protein-calorie malnutrition (HCC)    History of blood clots    BPH (benign prostatic hyperplasia)      Home Pulmonary Medications:    Home Devices/Therapy: Home O2    Past Medical History:   Diagnosis Date    Cancer ([place] [address] )     Coronary artery disease     Gout     Hypercholesterolemia     Hypertension     Liver cancer ([place] [address] )     Microhematuria      Social History     Socioeconomic History    Marital status: /Civil Union     Spouse name: None    Number of children: None    Years of education: None    Highest education level: None   Occupational History    None   Tobacco Use    Smoking status: Former Smoker     Packs/day: 0 50     Years: 16 00     Pack years: 8 00     Types: Cigarettes     Start date:      Quit date:      Years since quittin 4    Smokeless tobacco: Never Used   Vaping Use    Vaping Use: Never used   Substance and Sexual Activity    Alcohol use: Not Currently    Drug use: Never    Sexual activity: None   Other Topics Concern    None   Social History Narrative    None     Social Determinants of Health     Financial Resource Strain: Not on file   Food Insecurity: No Food Insecurity    Worried About Running Out of Food in the Last Year: Never true    Ran Out of Food in the Last Year: Never true   Transportation Needs: No Transportation Needs    Lack of Transportation (Medical): No    Lack of Transportation (Non-Medical): No   Physical Activity: Not on file   Stress: Not on file   Social Connections: Not on file   Intimate Partner Violence: Not on file   Housing Stability: Low Risk     Unable to Pay for Housing in the Last Year: No    Number of Places Lived in the Last Year: 1    Unstable Housing in the Last Year: No       Subjective         Objective    Physical Exam:   Assessment Type: Assess only  General Appearance: Alert, Awake  Respiratory Pattern: Tachypneic  Chest Assessment: Chest expansion symmetrical  Bilateral Breath Sounds: Diminished  Cough: None    Vitals:  Blood pressure 121/57, pulse (!) 110, temperature 97 52 °F (36 4 °C), resp  rate (!) 24, height 5' 9" (1 753 m), weight 76 2 kg (167 lb 15 9 oz), SpO2 97 %  Imaging and other studies: I have personally reviewed pertinent reports              Plan             Resp Comments: pt assessed as per protocol, pt denies having any home resp meds, nebs or mdi's, pt does state he wears 3l/m o2 at home cont but does admit to taking it off a lot, pt is currently c/o pain in his penis from his catheter but offers no other complaints,

## 2022-05-22 NOTE — ASSESSMENT & PLAN NOTE
Wt Readings from Last 3 Encounters:   05/21/22 76 2 kg (167 lb 15 9 oz)   05/17/22 75 5 kg (166 lb 6 4 oz)   05/17/22 75 3 kg (166 lb)     · TTE from 03/2022 with EF 60% and G1DD, mildly dilated RV, mild TR, mild MD  · Does not take diuretics as outpatient  · Home beta blocker currently held in setting of hypotension, sepsis  · Consider repeat TTE if concern for worsening heart function and/or volume overload  · Strict I/O  · Daily weights

## 2022-05-22 NOTE — ASSESSMENT & PLAN NOTE
Malnutrition Findings:                             BMI Findings: Body mass index is 24 81 kg/m²       · Maintain NPO status for now given concern for aspiration - formal speech evaluation ordered  · Will place nutrition consult

## 2022-05-22 NOTE — ASSESSMENT & PLAN NOTE
· AEB tachycardia, tachypnea, hypotension, leukocytosis, elevated lactic acid, MEILIANO  · Suspect source is pneumonia given RUL/RML consolidative opacities on imaging, though cannot rule out bacteremia/port infection as patient had port placed in 03/2022  · CT C/A/P from admission revealed patchy consolidative airspace opacities throughout posterior lateral right upper lobe and in lateral right midlung are suspicious for acute pneumonia; background groundglass and reticular airspace opacities in periphery of mid and lower right lung and in left costophrenic angle, similar from November 4, 2021 and suspicious for sequela of viral pneumonia due to COVID-19 infection  · COVID/FLU/RSV negative  · Blood cultures pending   · UA without evidence of infection  · Send urine strep/legionella  · Will order sputum culture  · Send MRSA swab  · Lactic acid elevated at 8 6 on admission down to 5 2 s/p IVF resuscitation  · Procalcitonin 293 05  · Received 30 ml/kg IVF resuscitation per sepsis protocol - will likely require additional volume given poor PO intake/sepsis/dehydration  · Will start maintenance IVF with Isolyte now  · Continue broad-spectrum antibiotics with IV Azithromycin, Cefepime, Vancomycin D#1  · Trend procalcitonin  · Monitor fever and WBC curve  · Trend lactic acid until cleared  ·  Follow-up cultures

## 2022-05-22 NOTE — H&P
Arslan 43  H&P- Sunday Moody 1940, 78 y o  male MRN: [id number]  Unit/Bed#: ICU 05-01 Encounter: [id number]  Primary Care Provider: Rick Shoemaker DO   Date and time admitted to hospital: 5/21/2022  3:47 PM    * Severe sepsis [facility])  Assessment & Plan  · AEB tachycardia, tachypnea, hypotension, leukocytosis, elevated lactic acid, EMILIANO  · Suspect source is pneumonia given RUL/RML consolidative opacities on imaging, though cannot rule out bacteremia/port infection as patient had port placed in 03/2022  · CT C/A/P from admission revealed patchy consolidative airspace opacities throughout posterior lateral right upper lobe and in lateral right midlung are suspicious for acute pneumonia; background groundglass and reticular airspace opacities in periphery of mid and lower right lung and in left costophrenic angle, similar from November 4, 2021 and suspicious for sequela of viral pneumonia due to COVID-19 infection  · COVID/FLU/RSV negative  · Blood cultures pending   · UA without evidence of infection  · Send urine strep/legionella  · Will order sputum culture  · Send MRSA swab  · Lactic acid elevated at 8 6 on admission down to 5 2 s/p IVF resuscitation  · Procalcitonin 293 05  · Received 30 ml/kg IVF resuscitation per sepsis protocol - will likely require additional volume given poor PO intake/sepsis/dehydration  · Will start maintenance IVF with Isolyte now  · Continue broad-spectrum antibiotics with IV Azithromycin, Cefepime, Vancomycin D#1  · Trend procalcitonin  · Monitor fever and WBC curve  · Trend lactic acid until cleared  ·  Follow-up cultures    Lactic acidosis  Assessment & Plan  · Lactic acid 8 6 on admission - down to 5 2 post IVF resuscitation  · In setting of profound dehydration/sepsis  · Continue to trend until cleared - will likely need additional volume     Pneumonia involving right lung  Assessment & Plan  · Suspect this is related to aspiration as it appears this has been a chronic concern  · Recent barium swallow on 05/12 with concern for spillage over epiglottis and potentially into laryngeal space, though it was noted that penetrated material was immediately redirected during swallow initiation  · Continue antibiotics as noted above  · Keep NPO for now   · Will order formal swallow evaluation  · Will order scheduled Xopenex/Atrovent/Pulmicort/HTS nebs  · Aspiration precautions  · Aggressive pulmonary hygiene, encourage I/S while awake  · Maintain SpO2 > 88% - currently on home 3L NC    Acute renal failure superimposed on stage 3a chronic kidney disease [facility])  Assessment & Plan  Lab Results   Component Value Date    EGFR 44 05/21/2022    EGFR 57 05/12/2022    EGFR 56 04/20/2022    CREATININE 1 44 (H) 05/21/2022    CREATININE 1 16 05/12/2022    CREATININE 1 18 04/20/2022     · Creatinine 1 44 on admission  · Baseline creatinine appears to be around 0 9 - 1 1  · Suspect etiology is prerenal given decreased PO intake, hypovolemia, sepsis, CT contrast with concurrent chemo, ACEI use  · Is s/p aggressive IVF resuscitation - will continue with maintenance IVF  · Avoid further hypotension and nephrotoxic agents  · Trend renal indices  · Optimize electrolytes  · Strict I/O - would consider Rodgers cath placement  · Daily weights    Chronic respiratory insufficiency  Assessment & Plan  · Discharged on 3L NC after being hospitalized for COVID-19 pneumonia in 11/2021  · Suspect this is multifactorial: COVID-19 sequelae vs ? aspiration-related  · Former smoker of 1/2 ppd x16 years (quit in 5)  · No known history of COPD nor PFTs for review  · Follow with Dr Camila Odom as outpatient  · [facility] as outpatient - will order Xopenex, Atrovent, Pulmicort nebs for now  · Continue home Flonase nasal spray  · Currently requiring baseline 3L NC   · Maintain SpO2 > 88%    Transaminitis  Assessment & Plan  · Suspect this is 2/2 profound hypovolemia, sepsis with concurrent hepatocellular carcinoma  · Avoid hepatotoxic agents  · Serial abdominal exams  · Continue to trend LFTs    Hepatocellular carcinoma (HCC)  Assessment & Plan  · Is s/p chemoembolization in 06/2021 as well as radioembolization in 10/2020  · Currently receiving chemo/immunotherapy per most recent Hem/Onc notes  · Port placed in 03/2022  · Follows with Dr Mag Harris as outpatient   · Imaging on admission revealed reidentified advanced hepatic metastatic disease, similar from previous examination with innumerable hepatic metastatic lesions and approximate 50% replacement of hepatic parenchyma with metastatic tumor; probable metastatic lesions in spleen and miles metastatic disease along right external iliac chain  · Will need continued follow-up with Hem/Onc as outpatient    Chronic diastolic heart failure ([facility] [address] )  Assessment & Plan  Wt Readings from Last 3 Encounters:   05/21/22 76 2 kg (167 lb 15 9 oz)   05/17/22 75 5 kg (166 lb 6 4 oz)   05/17/22 75 3 kg (166 lb)     · TTE from 03/2022 with EF 60% and G1DD, mildly dilated RV, mild TR, mild NE  · Does not take diuretics as outpatient  · Home beta blocker currently held in setting of hypotension, sepsis  · Consider repeat TTE if concern for worsening heart function and/or volume overload  · Strict I/O  · Daily weights        Protein-calorie malnutrition ([facility] [address] )  Assessment & Plan  Malnutrition Findings:                             BMI Findings: Body mass index is 24 81 kg/m²  · Maintain NPO status for now given concern for aspiration - formal speech evaluation ordered  · Will place nutrition consult    Type 2 diabetes mellitus without complication, without long-term current use of insulin (HCC)  Assessment & Plan  Lab Results   Component Value Date    HGBA1C 7 0 (A) 05/10/2022       No results for input(s): POCGLU in the last 72 hours      Blood Sugar Average: Last 72 hrs:     · Does not take any glucose controlling medications as outpatient  · Will order SSI with fingersticks ACHS  · Goal  - 180    Hyperlipidemia  Assessment & Plan  · Holding home statin given NPO status as well as transaminitis    BPH (benign prostatic hyperplasia)  Assessment & Plan  · Holding home Flomax while NPO    History of blood clots  Assessment & Plan  · Has history of PE/DVT  · Was previously on Eliquis, however was recently stopped 2/2 epistaxis  · CT PE study on admission negative for PE    Subclinical hypothyroidism  Assessment & Plan  · Holding home Synthroid given NPO status    Gout  Assessment & Plan  · Holding home Allopurinol given NPO status    Coronary artery disease without angina pectoris  Assessment & Plan  · Is s/p CABG x3 in 1996  · TTE as noted above  · Holding home ASA given NPO status    Essential hypertension  Assessment & Plan  · Presented with hypotension in setting of sepsis  · Holding home Amlodipine, Lisinopril, Lopressor given NPO/septic state      VTE Prophylaxis: Heparin  / sequential compression device   Code Status: Full  POLST: POLST is not applicable to this patient  Discussion with family: Updated wife at bedside    Anticipated Length of Stay:  Patient will be admitted on an Inpatient basis with an anticipated length of stay of  > 2 midnights  Justification for Hospital Stay: severe sepsis, R-sided pneumonia, lactic acidosis, EMILIANO    Total Time for Visit, including Counseling / Coordination of Care: 30 minutes  Greater than 50% of this total time spent on direct patient counseling and coordination of care      Chief Complaint:   Generalized weakness, dyspnea    History of Present Illness:    Joni Mcburney is a 78 y o  male w/PMHx of CAD s/p CABG x3 (1996), HTN, HLD, chronic diastolic HF, bilateral carotid stenosis, PE/DVT-Eliquis recently stopped 2/2 epistaxis, chronic respiratory insufficiency since COVID in 2021-2 to 3L NC at baseline, PVD, CKD3a, DM2, concern for chronic aspiration, hepatocellular carcinoma s/p chemoembolization in 06/2021 and radioembolization in 10/2020-currently receiving chemo/immunotherapy, BPH who presents with progressive weakness and dyspnea over past couple days  Medical history and HPI provided by wife who is at bedside  Wife states that patient does have cough, however she notes that his cough is not new  She notes that patient does tend to cough when eating food  She denies any sputum production  Wife states that patient has had 35 lb weight loss since hospitalization for COVID-19 pneumonia in 11/2021  She states that patient has had poor appetite and subsequently poor PO intake  Wife states that over past couple of months, patient tends to sleep most of day and has not been active  She states that he remains on 3L NC of which he was discharged on from 11/2021 admission  She denies alcohol/tobacco/ilicit drug use, but does say that patient is former smoker (quit in 1970)  Initial lab work-up revealed significantly elevated lactic acid, EMILIANO, transaminitis, and patient met severe sepsis criteria  Imaging revealed possible R-sided pneumonia  He was resuscitated with IVF per sepsis protocol and started on broad-spectrum antibiotics  He will be admitted under SLIM as SD2  Review of Systems:    Review of Systems   Constitutional: Negative for chills and fever  HENT: Negative for congestion  Eyes: Negative for visual disturbance  Respiratory: Positive for cough and shortness of breath  Cardiovascular: Negative for chest pain, palpitations and leg swelling  Gastrointestinal: Negative for abdominal distention and abdominal pain  Genitourinary: Positive for decreased urine volume  Negative for difficulty urinating  Musculoskeletal: Negative      Neurological:        Mental status at baseline     Past Medical and Surgical History:     Past Medical History:   Diagnosis Date    Cancer [facility])     Coronary artery disease     Gout     Hypercholesterolemia     Hypertension     Liver cancer ([facility] [address] )     Microhematuria        Past Surgical History:   Procedure Laterality Date    ABDOMINAL SURGERY      appy    APPENDECTOMY      CORONARY ARTERY BYPASS GRAFT      IR BIOPSY LIVER MASS  8/25/2020    IR CHEMOEMBOLIZATION LIVER TUMOR  6/10/2021    IR PORT PLACEMENT  3/31/2022    IR Y-90 PRE-ANGIO/EMBO W/ LUNG SCAN  10/6/2020    IR Y-90 RADIOEMBOLIZATION  10/14/2020       Meds/Allergies:    Prior to Admission medications    Medication Sig Start Date End Date Taking?  Authorizing Provider   allopurinol (ZYLOPRIM) 300 mg tablet Take 300 mg by mouth daily    Historical Provider, MD   amLODIPine (NORVASC) 5 mg tablet Take 1 tablet (5 mg total) by mouth daily 5/4/21 5/17/22  Smith Coma, DO   apixaban (Eliquis) 5 mg Take 1 tablet (5 mg total) by mouth 2 (two) times a day 5/6/22 8/4/22  Smith Coma, DO   aspirin 81 mg chewable tablet Chew 1 tablet (81 mg total) daily 3/16/22   Garlan Screen, DO   atorvastatin (LIPITOR) 40 mg tablet Take 1 tablet (40 mg total) by mouth daily 12/29/21   Garlan Screen, DO   cholecalciferol (VITAMIN D3) 400 units tablet Take 1,000 Units by mouth daily     Historical Provider, MD   fluticasone (FLONASE) 50 mcg/act nasal spray 2 sprays into each nostril daily 4/19/22   Virgil Hobson MD   fluticasone-vilanterol [facility]) 100-25 mcg/inh inhaler Inhale 1 puff daily Rinse mouth after use  5/10/22 6/9/22  Luis Sheth, DO   levothyroxine (Euthyrox) 75 mcg tablet Take 1 tablet (75 mcg total) by mouth daily in the early morning 2/15/22   Smith Coma, DO   lisinopril (ZESTRIL) 2 5 mg tablet Take 1 tablet (2 5 mg total) by mouth daily 12/22/21 5/17/22  Garlan Screen, DO   metoprolol tartrate (LOPRESSOR) 25 mg tablet Take 1 tablet (25 mg total) by mouth 2 (two) times a day 5/4/21   Smith Coma, DO   Omega-3 Fatty Acids (fish oil) 1,000 mg Take 1,000 mg by mouth daily    Historical Provider, MD   ondansetron (ZOFRAN) 4 mg tablet Take 1 tablet (4 mg total) by mouth every 8 (eight) hours as needed for nausea or vomiting 22   Jessi Clemente MD   pantoprazole (PROTONIX) 40 mg tablet Take 1 tablet (40 mg total) by mouth daily in the early morning  Patient not taking: Reported on 2021  Haileybrianna BEKA Charlton   Promethazine-DM (PHENERGAN-DM) 6 25-15 mg/5 mL oral syrup Take 10 mL by mouth 3 (three) times a day as needed for cough 2/3/22   Deep Milligan DO   tamsulosin [facility]) 0 4 mg Take 1 capsule (0 4 mg total) by mouth daily with dinner 22   Viviana Diamond PA-C       Allergies: No Known Allergies    Social History:     Marital Status: /Civil Union     Substance Use History:   Social History     Substance and Sexual Activity   Alcohol Use Not Currently     Social History     Tobacco Use   Smoking Status Former Smoker    Packs/day: 0 50    Years: 16 00    Pack years: 8 00    Types: Cigarettes    Start date: 12    Quit date: 5    Years since quittin 4   Smokeless Tobacco Never Used     Social History     Substance and Sexual Activity   Drug Use Never       Family History:    Family History   Problem Relation Age of Onset    No Known Problems Mother     No Known Problems Father        Physical Exam:     Vitals:   Blood Pressure: 113/57 (22)  Pulse: (!) 120 (22)  Temperature: 98 2 °F (36 8 °C) (22)  Temp Source: Tympanic (22)  Respirations: (!) 49 (22)  Height: 5' 9" (175 3 cm) (22)  Weight - Scale: 76 2 kg (167 lb 15 9 oz) (22)  SpO2: 95 % (22)    Physical Exam  Vitals and nursing note reviewed  Constitutional:       General: He is awake  Appearance: He is ill-appearing  Interventions: Nasal cannula in place  HENT:      Head: Normocephalic and atraumatic  Mouth/Throat:      Mouth: Mucous membranes are dry  Eyes:      Extraocular Movements: Extraocular movements intact  Pupils: Pupils are equal, round, and reactive to light     Cardiovascular: Rate and Rhythm: Regular rhythm  Tachycardia present  Pulses: Normal pulses  Heart sounds: Normal heart sounds  No murmur heard  Pulmonary:      Effort: Tachypnea present  Breath sounds: Decreased breath sounds, wheezing and rhonchi present  No rales  Comments: Mild expiratory wheezing  Abdominal:      General: Bowel sounds are normal  There is no distension  Palpations: Abdomen is soft  Tenderness: There is no abdominal tenderness  There is no guarding  Musculoskeletal:      Cervical back: Normal range of motion and neck supple  Right lower leg: No edema  Left lower leg: No edema  Skin:     General: Skin is warm and dry  Capillary Refill: Capillary refill takes less than 2 seconds  Neurological:      General: No focal deficit present  Mental Status: He is alert and oriented to person, place, and time  Comments: Oriented to person, place, and time, however not situation - after speaking with wife, it appears that patient is at his baseline   Psychiatric:         Behavior: Behavior is cooperative  Additional Data:     Lab Results: I have personally reviewed pertinent reports     and I have personally reviewed pertinent films in PACS    Results from last 7 days   Lab Units 05/21/22  1546   WBC Thousand/uL 12 62*   HEMOGLOBIN g/dL 15 4   HEMATOCRIT % 49 2   PLATELETS Thousands/uL 191   NEUTROS PCT % 79*   LYMPHS PCT % 7*   MONOS PCT % 11   EOS PCT % 0     Results from last 7 days   Lab Units 05/21/22  1546   SODIUM mmol/L 135   POTASSIUM mmol/L 4 8   CHLORIDE mmol/L 96   CO2 mmol/L 22   BUN mg/dL 26*   CREATININE mg/dL 1 44*   ANION GAP mmol/L 17*   CALCIUM mg/dL 9 6   ALBUMIN g/dL 3 0*   TOTAL BILIRUBIN mg/dL 1 31*   ALK PHOS U/L 343*   ALT U/L 88*   AST U/L 159*   GLUCOSE RANDOM mg/dL 184*     Results from last 7 days   Lab Units 05/21/22  1546   INR  0 95             Results from last 7 days   Lab Units 05/21/22  2139 05/21/22  1737 05/21/22  1546 LACTIC ACID mmol/L 3 4* 5 2* 8 6*   PROCALCITONIN ng/ml  --   --  293 05*       Imaging: I have personally reviewed pertinent reports  and I have personally reviewed pertinent films in PACS    CT pe study w abdomen pelvis w contrast   Final Result by Kiko Barth MD (05/21 1750)      No pulmonary embolus  Patchy consolidative airspace opacities throughout the posterior lateral right upper lobe and in the lateral right midlung are suspicious for acute pneumonia  Background groundglass and reticular airspace opacities in the periphery of the mid and lower    right lung and in the left costophrenic angle, similar from November 4, 2021 and suspicious for the sequela of viral pneumonia due to COVID-19 infection  Advanced hepatic metastatic disease reidentified, similar from previous examination with innumerable hepatic metastatic lesions and approximate 50% replacement of hepatic parenchyma with metastatic tumor  Probable metastatic lesions in the spleen and    miles metastatic disease along the right external iliac chain        Workstation performed: [id number]             EKG, Pathology, and Other Studies Reviewed on Admission:   · EKG: sinus tachycardia    Allscripts / Epic Records Reviewed: Yes     ** Please Note: This note has been constructed using a voice recognition syste

## 2022-05-22 NOTE — ASSESSMENT & PLAN NOTE
· AEB tachycardia, tachypnea, hypotension, leukocytosis, elevated lactic acid, EMILIANO  · Suspect source is pneumonia given RUL/RML consolidative opacities on imaging, though cannot rule out bacteremia/port infection as patient had port placed in 03/2022  · CT C/A/P from admission revealed patchy consolidative airspace opacities throughout posterior lateral right upper lobe and in lateral right midlung are suspicious for acute pneumonia; background groundglass and reticular airspace opacities in periphery of mid and lower right lung and in left costophrenic angle, similar from November 4, 2021 and suspicious for sequela of viral pneumonia due to COVID-19 infection  · COVID/FLU/RSV negative  · Blood cultures pending   · UA without evidence of infection  · Send urine strep/legionella  · Will order sputum culture  · Send MRSA swab  · Lactic acid elevated at 8 6 on admission down to 5 2 s/p IVF resuscitation ; now resolved  · Procalcitonin 293 05 which is now down trending  · Received 30 ml/kg IVF resuscitation per sepsis protocol - will likely require additional volume given poor PO intake/sepsis/dehydration  · Will continue maintenance IVF with Isolyte  · Continue broad-spectrum antibiotics with IV Azithromycin, Cefepime, Vancomycin day #2  · Trend procalcitonin  · Monitor fever and WBC curve  · Trend lactic acid until cleared  · Follow-up cultures

## 2022-05-23 PROBLEM — E87.8 ELECTROLYTE ABNORMALITY: Status: ACTIVE | Noted: 2022-01-01

## 2022-05-23 PROBLEM — R65.21 SEPTIC SHOCK (HCC): Status: ACTIVE | Noted: 2021-04-05

## 2022-05-23 PROBLEM — E44.0 MODERATE PROTEIN-CALORIE MALNUTRITION (HCC): Status: ACTIVE | Noted: 2022-01-01

## 2022-05-23 NOTE — ASSESSMENT & PLAN NOTE
· Does not appear acutely decompensated at this time  · TTE (03/2022): LVEF 60% and G1DD, mildly dilated RV, mild TR, mild PA  · Home beta-blocker was discontinued due to hypotension  · Strict I/O and daily weights

## 2022-05-23 NOTE — ASSESSMENT & PLAN NOTE
· On 3L NC chronically since being hospitalized for COVID-19 pneumonia in 11/2021  · Continue Xopenex, Atrovent, Pulmicort nebs for now  · Follows with Dr Radha Hall as outpatient

## 2022-05-23 NOTE — ASSESSMENT & PLAN NOTE
· Suspect this is related to aspiration as it appears this has been a chronic concern  · Barium swallow (05/12):  Concern for spillage over epiglottis and potentially into laryngeal space, though it was noted that penetrated material was immediately redirected during swallow initiation  · Continue antibiotics as noted above  · Speech therapy evaluation pending

## 2022-05-23 NOTE — CONSULTS
Pulmonary Consultation   Saray Light 78 y o  male MRN: [id number]   ICU 05-01 Encounter: [id number]      Reason for consultation:   Pneumonia      Requesting physician:   BEKA Mccoy      Impressions:    Acute on chronic hypoxemic respiratory failure   Pneumonia  This is most likely due to aspiration   Sepsis secondary to pneumonia  Recommendations:   Titrate oxygen to maintain O2 saturation above 88%   Continue antibiotics   Increase activities as tolerated  History of Present Illness   HPI:  Saray Light is a 78 y o  male who was admitted 2 days ago because of increasing tiredness and weakness  The patient is known to me from the office  He was seen last week in my office  His wife told me that he has been getting weaker and had no energy  When she brought him to the emergency room he was found to have sepsis  His imaging studies showed multifocal pneumonia in the right lung  The patient has history of COVID in November of 2021 and since then he has been on oxygen at 3 L  the patient was resuscitated with IV fluids and started on antibiotics  Today he feels better  His energy is improving  He is been a high risk for recurrent aspiration  Review of systems:  12 point review of systems was completed and was otherwise negative except as listed in HPI        Historical Information   Past Medical History:   Diagnosis Date    Cancer ([facility] )     Coronary artery disease     Gout     Hypercholesterolemia     Hypertension     [facility]     Microhematuria      Past Surgical History:   Procedure Laterality Date    ABDOMINAL SURGERY      appy    APPENDECTOMY      CORONARY ARTERY BYPASS GRAFT      IR BIOPSY LIVER MASS  8/25/2020    IR CHEMOEMBOLIZATION LIVER TUMOR  6/10/2021    IR PORT PLACEMENT  3/31/2022    IR Y-90 PRE-ANGIO/EMBO W/ LUNG SCAN  10/6/2020    IR Y-90 RADIOEMBOLIZATION  10/14/2020     Family History   Problem Relation Age of Onset    No Known Problems Mother     No Known Problems Father        Family History:  No family history of chronic lung disease  Social History:  The patient is  and lives with his wife  He has 20 pack year smoking history  No history of alcohol use  He worked in security systems        Meds/Allergies   Current Facility-Administered Medications   Medication Dose Route Frequency    allopurinol (ZYLOPRIM) tablet 300 mg  300 mg Oral Daily    aspirin (ECOTRIN LOW STRENGTH) EC tablet 81 mg  81 mg Oral Daily    budesonide (PULMICORT) inhalation solution 0 5 mg  0 5 mg Nebulization Q12H    cefepime (MAXIPIME) IVPB (premix in dextrose) 1,000 mg 50 mL  1,000 mg Intravenous Q12H    fluticasone (FLONASE) 50 mcg/act nasal spray 2 spray  2 spray Nasal Daily    fluticasone-vilanterol (BREO ELLIPTA) 100-25 mcg/inh inhaler 1 puff  1 puff Inhalation Daily    heparin (porcine) subcutaneous injection 5,000 Units  5,000 Units Subcutaneous Q8H [address]    insulin lispro (HumaLOG) 100 units/mL subcutaneous injection 1-6 Units  1-6 Units Subcutaneous Q6H [address]    ipratropium (ATROVENT) 0 02 % inhalation solution 0 5 mg  0 5 mg Nebulization TID    levalbuterol (XOPENEX) inhalation solution 1 25 mg  1 25 mg Nebulization TID    levothyroxine tablet 75 mcg  75 mcg Oral Early Morning    loperamide (IMODIUM) capsule 2 mg  2 mg Oral 4x Daily PRN    nystatin (MYCOSTATIN) powder   Topical BID    ondansetron (ZOFRAN) injection 4 mg  4 mg Intravenous Q6H PRN    pantoprazole (PROTONIX) EC tablet 40 mg  40 mg Oral Early Morning    tamsulosin (FLOMAX) capsule 0 4 mg  0 4 mg Oral Daily With Dinner    vancomycin (VANCOCIN) 1500 mg in sodium chloride 0 9% 250 mL IVPB  1,500 mg Intravenous Q12H     Medications Prior to Admission   Medication    allopurinol (ZYLOPRIM) 300 mg tablet    amLODIPine (NORVASC) 5 mg tablet    apixaban (Eliquis) 5 mg    aspirin 81 mg chewable tablet    atorvastatin (LIPITOR) 40 mg tablet    cholecalciferol (VITAMIN D3) 400 units tablet    fluticasone (FLONASE) 50 mcg/act nasal spray    fluticasone-vilanterol (Breo Ellipta) 100-25 mcg/inh inhaler    levothyroxine (Euthyrox) 75 mcg tablet    lisinopril (ZESTRIL) 2 5 mg tablet    metoprolol tartrate (LOPRESSOR) 25 mg tablet    Omega-3 Fatty Acids (fish oil) 1,000 mg    ondansetron (ZOFRAN) 4 mg tablet    pantoprazole (PROTONIX) 40 mg tablet    Promethazine-DM (PHENERGAN-DM) 6 25-15 mg/5 mL oral syrup    tamsulosin (FLOMAX) 0 4 mg     No Known Allergies    Vitals: Blood pressure 129/66, pulse 103, temperature 97 8 °F (36 6 °C), temperature source Tympanic, resp  rate (!) 28, height 5' 9" (1 753 m), weight 76 6 kg (168 lb 14 oz), SpO2 95 % ,      Intake/Output Summary (Last 24 hours) at 5/23/2022 1652  Last data filed at 5/23/2022 1600  Gross per 24 hour   Intake 1132 ml   Output 2340 ml   Net -1208 ml       Physical exam:        Head/eyes:    Normocephalic, without obvious abnormality, atraumatic,         PERRL, extraocular muscles intact, no scleral icterus    Nose:   Nares normal, septum midline, mucosa normal, no drainage    or sinus tenderness   Throat:   Moist mucous membranes, no thrush   Neck:   Supple, trachea midline, no adenopathy; no carotid    bruit or JVD   Lungs:     Diminished breath sounds  No wheezing          Heart:    Regular rate and rhythm, S1 and S2 normal, no murmur, rub   or gallop   Abdomen:     Soft, non-tender, bowel sounds active all four quadrants,     no masses, no organomegaly   Extremities:   Extremities normal, atraumatic, no cyanosis or edema   Skin:   Warm, dry, turgor normal, no rashes or lesions   Neurologic:   CNII-XII intact, normal strength, non-focal             Labs:   CBC:   Lab Results   Component Value Date    WBC 9 22 05/23/2022    HGB 13 5 05/23/2022    HCT 40 1 05/23/2022    MCV 91 05/23/2022     (L) 05/23/2022    MCH 30 7 05/23/2022    MCHC 33 7 05/23/2022    RDW 17 2 (H) 05/23/2022    MPV 11 6 05/23/2022 NRBC 0 05/23/2022   , CMP:   Lab Results   Component Value Date    SODIUM 142 05/23/2022    K 4 6 05/23/2022     (H) 05/23/2022    CO2 20 (L) 05/23/2022    BUN 14 05/23/2022    CREATININE 0 65 05/23/2022    CALCIUM 5 7 (LL) 05/23/2022    AST 92 (H) 05/23/2022    ALT 55 (H) 05/23/2022    ALKPHOS 203 (H) 05/23/2022    EGFR 89 05/23/2022       Imaging and other studies: I have personally reviewed pertinent films in PACS CT of the chest is reviewed on the [facility] system  It showed multiple areas of consolidation involving the right lung  Cultures remain negative          Cristina Wilson MD

## 2022-05-23 NOTE — ASSESSMENT & PLAN NOTE
Lab Results   Component Value Date    HGBA1C 7 0 (A) 05/10/2022       Recent Labs     05/22/22  1752 05/23/22  0148 05/23/22  0546 05/23/22  1132   POCGLU 146* 167* 152* 139       Blood Sugar Average: Last 72 hrs:  (P) 142 [id number]   · Continue Accu-Cheks, corrective sliding scale insulin, and hypoglycemic protocol  · Carb controlled diet

## 2022-05-23 NOTE — ASSESSMENT & PLAN NOTE
· s/p chemoembolization 06/2021 as well as radioembolization 10/2020  · Currently receiving chemo/immunotherapy per most recent Hem/Onc notes  · Port placed on 03/2022  · Imaging on admission revealed reidentified advanced hepatic metastatic disease, similar from previous examination with innumerable hepatic metastatic lesions and approximate 50% replacement of hepatic parenchyma with metastatic tumor; probable metastatic lesions in spleen and miles metastatic disease along right external iliac chain    · Follows with Dr Arthur Orellana as outpatient

## 2022-05-23 NOTE — PLAN OF CARE
Problem: Prexisting or High Potential for Compromised Skin Integrity  Goal: Skin integrity is maintained or improved  Description: INTERVENTIONS:  - Identify patients at risk for skin breakdown  - Assess and monitor skin integrity  - Assess and monitor nutrition and hydration status  - Monitor labs   - Assess for incontinence   - Turn and reposition patient  - Assist with mobility/ambulation  - Relieve pressure over bony prominences  - Avoid friction and shearing  - Provide appropriate hygiene as needed including keeping skin clean and dry  - Evaluate need for skin moisturizer/barrier cream  - Collaborate with interdisciplinary team   - Patient/family teaching  - Consider wound care consult   Outcome: Progressing     Problem: RESPIRATORY - ADULT  Goal: Achieves optimal ventilation and oxygenation  Description: INTERVENTIONS:  - Assess for changes in respiratory status  - Assess for changes in mentation and behavior  - Position to facilitate oxygenation and minimize respiratory effort  - Oxygen administered by appropriate delivery if ordered  - Initiate smoking cessation education as indicated  - Encourage broncho-pulmonary hygiene including cough, deep breathe, Incentive Spirometry  - Assess the need for suctioning and aspirate as needed  - Assess and instruct to report SOB or any respiratory difficulty  - Respiratory Therapy support as indicated  Outcome: Progressing     Problem: PAIN - ADULT  Goal: Verbalizes/displays adequate comfort level or baseline comfort level  Description: Interventions:  - Encourage patient to monitor pain and request assistance  - Assess pain using appropriate pain scale  - Administer analgesics based on type and severity of pain and evaluate response  - Implement non-pharmacological measures as appropriate and evaluate response  - Consider cultural and social influences on pain and pain management  - Notify physician/advanced practitioner if interventions unsuccessful or patient reports new pain  Outcome: Progressing

## 2022-05-23 NOTE — MALNUTRITION/BMI
This medical record reflects one or more clinical indicators suggestive of malnutrition and/or morbid obesity  Malnutrition Findings:   Adult Malnutrition type: Chronic illness  Adult Degree of Malnutrition: Malnutrition of moderate degree  Malnutrition Characteristics: Weight loss, Muscle loss, Fat loss                  360 Statement: Moderate malnutrition related to inadequate protein energy intake as evidenced by significant wt  loss 35#/21% < 1 year, mild muscle loss clavicle/temporalis areas, moderate fat loss buccal/orbital pads treated with diet/supplements  BMI Findings: Body mass index is 24 94 kg/m²  See Nutrition note dated 5/23/22 for additional details  Completed nutrition assessment is viewable in the nutrition documentation

## 2022-05-23 NOTE — ASSESSMENT & PLAN NOTE
Lab Results   Component Value Date    HGBA1C 5 8 (H) 02/20/2021    diabetes under stable control continue current diet regimen A1c reviewed at 5 8 follow-up with laboratory work at next office visit diabetic foot exam completed today no neurologic deficits circulatory  function is good Malnutrition Findings:                                 BMI Findings: Body mass index is 24 94 kg/m²     · Likely secondary to metastatic disease  · Nutrition consulted

## 2022-05-23 NOTE — SPEECH THERAPY NOTE
Speech Language/Pathology  Speech/Language Pathology  Assessment    Patient Name: Alexa Rausch  XSLFM'Q Date: 5/23/2022     Problem List  Principal Problem:    Severe sepsis [facility])  Active Problems:    Essential hypertension    Hyperlipidemia    Coronary artery disease without angina pectoris    Transaminitis    Chronic respiratory insufficiency    Lactic acidosis    Acute renal failure superimposed on stage 3a chronic kidney disease (HCC)    Hepatocellular carcinoma (HCC)    Pneumonia involving right lung    Type 2 diabetes mellitus without complication, without long-term current use of insulin (HCC)    Chronic diastolic heart failure (HCC)    Gout    Subclinical hypothyroidism    Protein-calorie malnutrition ([facility] [address] )    History of blood clots    BPH (benign prostatic hyperplasia)    Past Medical History  Past Medical History:   Diagnosis Date    Cancer ([facility] )     Coronary artery disease     Gout     Hypercholesterolemia     Hypertension     Liver cancer ([facility] )     Microhematuria      Past Surgical History  Past Surgical History:   Procedure Laterality Date    ABDOMINAL SURGERY      appy    APPENDECTOMY      CORONARY ARTERY BYPASS GRAFT      IR BIOPSY LIVER MASS  8/25/2020    IR CHEMOEMBOLIZATION LIVER TUMOR  6/10/2021    IR PORT PLACEMENT  3/31/2022    IR Y-90 PRE-ANGIO/EMBO W/ LUNG SCAN  10/6/2020    IR Y-90 RADIOEMBOLIZATION  10/14/2020        05/23/22 1200   Patient Information   Current Medical severe sepsis   Past Medical History pneumonia, SOB, liver CA current   Swallow Information   Current Risks for Dysphagia & Aspiration General debilitation   Current Symptoms/Concerns Clear throat   Current Diet Regular; Thin liquid   Baseline Diet Regular; Thin liquids   Baseline Assessment   Behavior/Cognition Alert; Cooperative; Interactive   Speech/Language Status WFL   Patient Positioning Upright in bed   Swallow Mechanism Exam   Labial Symmetry WFL   Labial Strength WFL   Labial ROM WFL   Labial Sensation WFL   Facial Symmetry WFL   Facial Strength WFL   Facial ROM WFL   Facial Sensation WFL   Lingual Symmetry WFL   Lingual Strength WFL   Lingual ROM WFL   Lingual Sensation WFL   Dentition Adequate   Consistencies Assessed and Performance   Materials Admnistered Regular/Solid; Thin liquid   Oral Stage WFL   Phargngeal Stage WFL   Swallow Mechanics WFL;Swallow initation; Appears prompt;Good Larygneal rise   Summary   Swallow Summary Patient received sitting upright in bed  NSG reports pt tolerating reg/thin and pills  Patient had VBS completed on 5/12 with recs for pt to be reg/thin  Patient demonstrates good overall oral reception, AP transfer, swallow timing and coordination  Patient demonstrates clear, strong vocal quality  No further ST needs warranted at this time given bedside evaluation and recent video swallow study  Recommendations   Risk for Aspiration Mild   Recommendations Consider oral diet   Diet Solid Recommendation Regular consistency   Diet Liquid Recommendation Thin liquid   Recommended Form of Meds Whole; With thin liquid; With puree   General Precautions Feed only when alert;Minimize distractions;Upright as possible for all oral intake;Remain upright for 45 mins after meals   Compensatory Swallowing Strategies Alternate solids and liquids   Results Reviewed with RN;PT/Family/Caregiver   Speech Therapy Prognosis   Prognosis Good   Prognosis Considerations Medical Diagnosis; Medical Prognosis

## 2022-05-23 NOTE — PROGRESS NOTES
Recommend liberalize diet to ALINA regular, provide ensure enlive BID chocolate at lunch, dinner  Pt has physical signs/sypmtoms malnutrition

## 2022-05-23 NOTE — PROGRESS NOTES
Vancomycin Assessment    Jarett Morris is a 78 y o  male who is currently receiving vancomycin 750 mg q12 for Pneumonia     Relevant clinical data and objective history reviewed:  Creatinine   Date Value Ref Range Status   05/23/2022 0 65 0 60 - 1 30 mg/dL Final     Comment:     Standardized to IDMS reference method   05/22/2022 1 09 0 60 - 1 30 mg/dL Final     Comment:     Standardized to IDMS reference method   05/21/2022 1 44 (H) 0 60 - 1 30 mg/dL Final     Comment:     Standardized to IDMS reference method     BP 96/55   Pulse (!) 111   Temp 97 8 °F (36 6 °C) (Tympanic)   Resp (!) 23   Ht 5' 9" (1 753 m)   Wt 76 6 kg (168 lb 14 oz) Comment: everything taken off bed  SpO2 96%   BMI 24 94 kg/m²   I/O last 3 completed shifts: In: 3506 1 [P O :804; I V :1852 1; IV Piggyback:850]  Out: 3267 [Urine:3060]  Lab Results   Component Value Date/Time    BUN 14 05/23/2022 05:18 AM    WBC 9 22 05/23/2022 04:29 AM    HGB 13 5 05/23/2022 04:29 AM    HCT 40 1 05/23/2022 04:29 AM    MCV 91 05/23/2022 04:29 AM     (L) 05/23/2022 04:29 AM     Temp Readings from Last 3 Encounters:   05/23/22 97 8 °F (36 6 °C) (Tympanic)   05/17/22 97 8 °F (36 6 °C)   05/13/22 (!) 96 9 °F (36 1 °C) (Tympanic)     Vancomycin Days of Therapy: 3    Assessment/Plan  The patient is currently on vancomycin utilizing scheduled dosing  The patient is receiving 750 mg q12 with the most recent vancomycin level being not at steady-state and sub-therapeutic based on a goal of 15-20 (appropriate for most indications) ; therefore, after clinical evaluation will be changed to 1500mg q12   Pharmacy will continue to follow closely for s/sx of nephrotoxicity, infusion reactions, and appropriateness of therapy  BMP and CBC will be ordered per protocol  Plan for trough as patient approaches steady state, prior to the 4th  dose at approximately 0530 5/25   Pharmacy will continue to follow the patients culture results and clinical progress daily     Lupillo Farley, Pharmacist

## 2022-05-23 NOTE — CASE MANAGEMENT
Case Management Assessment    Patient name [facility] ICU 05/ICU 18-45 MRN [id number]  : 1940 Date 2022       Current Admission Date: 2022  Current Admission Diagnosis:Severe sepsis [facility])   Patient Active Problem List    Diagnosis Date Noted    History of coronary artery bypass surgery 2022    Gout 2022    Impaired fasting glucose 2022    Plantar fibromatosis 2022    Seborrheic dermatitis 2022    Sensorineural hearing loss, bilateral 2022    Subclinical hypothyroidism 2022    Vitamin D deficiency 2022    Protein-calorie malnutrition ([address] ) 2022    History of blood clots 2022    BPH (benign prostatic hyperplasia) 2022    Urinary frequency 05/10/2022    Hypoxia 2022    Left carotid artery occlusion 2022    Asymptomatic carotid artery stenosis, right 2022    Vision loss of left eye 2022    Post-COVID chronic dyspnea 2021    Chronic diastolic heart failure ([address] ) 2021    Pulmonary emboli ([address] ) 2021    Acute bacterial bronchitis 10/28/2021    Allergic cough 2021    Platelets decreased ([facility] ) 2021    Type 2 diabetes mellitus without complication, without long-term current use of insulin ([facility] [address] ) 2021    Cough 2021    Stage 3a chronic kidney disease ([address] ) 2021    Severe sepsis ([facility] [address] ) 2021    Pneumonia involving right lung 2021    Acute respiratory failure with hypoxia ([address] ) 2021    Acute deep vein thrombosis (DVT) of popliteal vein of both lower extremities ([address] ) 2020    Hepatocellular carcinoma ([facility] [address] ) 2020    Transaminitis 2020    Chronic respiratory insufficiency 2020    Lactic acidosis 2020    Acute renal failure superimposed on stage 3a chronic kidney disease ([address] ) 2020    Elevated troponin 2020    Hyperglycemia 2020    Ankle edema, bilateral 2020  Reactive airway disease without complication 82/77/2575    Coronary artery disease without angina pectoris 01/13/2020    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 ([facility] [address] ) 01/13/2020    Essential hypertension 08/05/2015    Hyperlipidemia 08/05/2015    Cardiomegaly 08/05/2015    Anxiety 08/05/2015    Abnormal electrocardiogram 08/05/2015      LOS (days): 2  Geometric Mean LOS (GMLOS) (days): 4 80  Days to GMLOS:3 2     OBJECTIVE:    Risk of Unplanned Readmission Score: 22 58     Current admission status: Inpatient  Preferred Pharmacy:   [facility] [address] [facility], [address]   [address]   [address] [address]  Phone: [phone] Fax: [phone]    [facility] [address] [place] [address]  Phone: [phone] Fax: [phone]    Primary Care Provider: Oleg Sherman DO    Primary Insurance: MEDICARE  Secondary Insurance: Evi Montes 20:  [address] [facility] [address], [facility] 50 Representative - Spouse   Primary Phone: [phone] (Home)               Advance Directives  Does patient have a [address]?: Yes  Does patient have Advance Directives?: Yes  Advance Directives: Living will, Power of  for health care  Primary Contact: Hoda Burchther  Readmission Root Cause  30 Day Readmission: No    Patient Information  Admitted from[de-identified] Home  Mental Status: Alert  During Assessment patient was accompanied by: Not accompanied during assessment  Assessment information provided by[de-identified] Patient  Primary Caregiver: Self  Support Systems: Spouse/significant other  [place] of Residence: [address] do you live in?: [address] entry access options   Select all that apply : Stairs  Number of steps to enter home : 4  Do the steps have railings?: Yes  Type of Current Residence: Apartment  Floor Level: 1  Upon entering residence, is there a bedroom on the main floor (no further steps)?: Yes  Upon entering residence, is there a bathroom on the main floor (no further steps)?: Yes  In the last 12 months, was there a time when you were not able to pay the mortgage or rent on time?: Yes  In the last 12 months, how many places have you lived?: 1  In the last 12 months, was there a time when you did not have a steady place to sleep or slept in a shelter (including now)?: No  Homeless/housing insecurity resource given?: N/A  Is patient a ?: Yes  Is patient active with [facility])?: Yes  Is patient service connected?: No    Activities of Daily Living Prior to Admission  Functional Status: Independent  Completes ADLs independently?: Yes  Ambulates independently?: Yes  Does patient use assisted devices?: No  Does patient currently own DME?: Yes  What DME does the patient currently own?: Portable Oxygen tanks, Home Oxygen concentrator ([employer])  Does patient have a history of Outpatient Therapy (PT/OT)?: No  Does the patient have a history of Short-Term Rehab?: No  Does patient have a history of HHC?: Yes ([address] [facility])  Does patient currently have [address]?: No    Patient Information Continued  Income Source: Pension/correction  Does patient have prescription coverage?: Yes  Within the past 12 months, you worried that your food would run out before you got the money to buy more : Never true  Within the past 12 months, the food you bought just didn't last and you didn't have money to get more : Never true  Food insecurity resource given?: N/A  Does patient receive dialysis treatments?: No  Does patient have a history of substance abuse?: No  Does patient have a history of Mental Health Diagnosis?: No    PHQ 2/9 Screening   Reviewed PHQ 2/9 Depression Screening Score?: No    Means of Transportation  [facility]] Family transport  In the past 12 months, has lack of transportation kept you from medical appointments or from getting medications?: No  In the past 12 months, has lack of transportation kept you from meetings, work, or from getting things needed for daily living?: No  Was application for public transport provided?: N/A   TC to wife to lea pt does have a living will and [employer] is the supplier for oxygen  Spouse indicated the pt was becoming weak prior to hospitalization  Will have a PT/OT evaluation

## 2022-05-23 NOTE — ASSESSMENT & PLAN NOTE
Lab Results   Component Value Date    EGFR 89 05/23/2022    EGFR 62 05/22/2022    EGFR 44 05/21/2022    CREATININE 0 65 05/23/2022    CREATININE 1 09 05/22/2022    CREATININE 1 44 (H) 05/21/2022   ·   · POA and resolved at this time  · Avoid hypotension and nephrotoxic agents  · Monitor with daily labs

## 2022-05-23 NOTE — PROGRESS NOTES
Jarett U  64   Progress Note - [employer] 1940, 78 y o  male MRN: [id number]  Unit/Bed#: ICU  Encounter: [id number]  Primary Care Provider: Maricarmen Tran DO   Date and time admitted to hospital: 2022  3:47 PM    * Septic shock [facility])  Assessment & Plan  · AEB tachycardia, tachypnea, hypotension, leukocytosis, elevated lactic acid, EMILIANO  · Suspect source is pneumonia given RUL/RML  · CT C/A/P(2022): Patchy consolidative airspace opacities throughout posterior lateral right upper lobe and in lateral right midlung are suspicious for acute pneumonia; background groundglass and reticular airspace opacities in periphery of mid and lower right lung and in left costophrenic angle, similar from 2021 and suspicious for sequela of viral pneumonia due to COVID-19 infection  · COVID/FLU/RSV negative  · B Clx (22): NGTD  · UA without evidence of infection  · Urine legionella and strep antigens negative  · Lactic acid peaked at 8 6 and was 1 9 on last check  · Procalcitonin peaked at 293 and has downtrended to 124  · Continue broad-spectrum antibiotics with IV Azithromycin, Cefepime, Vancomycin  · Consult ID      Electrolyte abnormality  Assessment & Plan  · Denies nausea, vomiting, or diarrhea  · K: 2 8; Ma 4; Ca: 5 7; Phos: 1 6  · Replacement ordered this morning  · Repeat potassium this afternoon  · Follow-up morning labs    Pneumonia involving right lung  Assessment & Plan  · Suspect this is related to aspiration as it appears this has been a chronic concern  · Barium swallow ():  Concern for spillage over epiglottis and potentially into laryngeal space, though it was noted that penetrated material was immediately redirected during swallow initiation  · Continue antibiotics as noted above  · Speech therapy evaluation pending    Acute renal failure superimposed on stage 3a chronic kidney disease [facility])  Assessment & Plan  Lab Results   Component Value Date    EGFR 89 05/23/2022    EGFR 62 05/22/2022    EGFR 44 05/21/2022    CREATININE 0 65 05/23/2022    CREATININE 1 09 05/22/2022    CREATININE 1 44 (H) 05/21/2022   ·   · POA and resolved at this time  · Avoid hypotension and nephrotoxic agents  · Monitor with daily labs    BPH (benign prostatic hyperplasia)  Assessment & Plan  · With urinary retention  · Continue home Flomax  · Rodgers catheter was replaced yesterday    Chronic respiratory insufficiency  Assessment & Plan  · On 3L NC chronically since being hospitalized for COVID-19 pneumonia in 11/2021  · Continue Xopenex, Atrovent, Pulmicort nebs for now  · Follows with Dr Radha Hall as outpatient      Chronic diastolic heart failure ([facility] )  Assessment & Plan  · Does not appear acutely decompensated at this time  · TTE (03/2022): LVEF 60% and G1DD, mildly dilated RV, mild TR, mild AK  · Home beta-blocker was discontinued due to hypotension  · Strict I/O and daily weights      Hepatocellular carcinoma ([facility] )  Assessment & Plan  · s/p chemoembolization 06/2021 as well as radioembolization 10/2020  · Currently receiving chemo/immunotherapy per most recent Hem/Onc notes  · Port placed on 03/2022  · Imaging on admission revealed reidentified advanced hepatic metastatic disease, similar from previous examination with innumerable hepatic metastatic lesions and approximate 50% replacement of hepatic parenchyma with metastatic tumor; probable metastatic lesions in spleen and miles metastatic disease along right external iliac chain    · Follows with Dr Francisco Ag as outpatient       Coronary artery disease without angina pectoris  Assessment & Plan  · Is s/p CABG x3 in 1996  · Normal ejection fraction noted on recent 2D echocardiogram  · Continue home aspirin 81 mg PO daily    Type 2 diabetes mellitus without complication, without long-term current use of insulin [facility])  Assessment & Plan  Lab Results   Component Value Date    HGBA1C 7 0 (A) 05/10/2022       Recent Labs 22  1752 22  0148 22  0546 22  1132   POCGLU 146* 167* 152* 139       Blood Sugar Average: Last 72 hrs:  (P) 142 [id number]   · Continue Accu-Cheks, corrective sliding scale insulin, and hypoglycemic protocol  · Carb controlled diet    Protein-calorie malnutrition (HCC)  Assessment & Plan  Malnutrition Findings:     BMI Findings: Body mass index is 24 94 kg/m²  · Likely secondary to metastatic disease  · Nutrition consulted        VTE Pharmacologic Prophylaxis: VTE Score: 12 High Risk (Score >/= 5) - Pharmacological DVT Prophylaxis Ordered: heparin  Sequential Compression Devices Ordered  Patient Centered Rounds: I performed bedside rounds with nursing staff today  Discussions with Specialists or Other Care Team Provider: Nursing and CM    Education and Discussions with Family / Patient: Updated  (wife) via phone  Time Spent for Care: 45 minutes  More than 50% of total time spent on counseling and coordination of care as described above  Current Length of Stay: 2 day(s)  Current Patient Status: Inpatient   Certification Statement: The patient will continue to require additional inpatient hospital stay due to sepsis due to pneumonia; pending ID consultation  Discharge Plan: Anticipate discharge in 48-72 hrs to discharge location to be determined pending rehab evaluations  Code Status: Level 1 - Full Code    Subjective:   Patient resting comfortably in bed without any acute complaints  Rodgers catheter was replaced over night due to urinary retention and the patient notes feeling significantly better  No further over night events reported  Objective:     Vitals:   Temp (24hrs), Av 1 °F (36 7 °C), Min:97 7 °F (36 5 °C), Max:98 9 °F (37 2 °C)    Temp:  [97 7 °F (36 5 °C)-98 9 °F (37 2 °C)] 97 8 °F (36 6 °C)  HR:  [104-129] 111  Resp:  [20-42] 23  BP: ()/() 96/55  SpO2:  [92 %-97 %] 96 %  Body mass index is 24 94 kg/m²       Input and Output Summary (last 24 hours): Intake/Output Summary (Last 24 hours) at 5/23/2022 1333  Last data filed at 5/23/2022 1000  Gross per 24 hour   Intake 678 67 ml   Output 1740 ml   Net -1061 33 ml       Physical Exam:   Physical Exam  Vitals and nursing note reviewed  Constitutional:       General: He is not in acute distress  Appearance: Normal appearance  He is normal weight  HENT:      Head: Normocephalic and atraumatic  Mouth/Throat:      Mouth: Mucous membranes are moist       Pharynx: Oropharynx is clear  Eyes:      Extraocular Movements: Extraocular movements intact  Conjunctiva/sclera: Conjunctivae normal    Cardiovascular:      Rate and Rhythm: Normal rate and regular rhythm  Pulses: Normal pulses  Heart sounds: Normal heart sounds  Pulmonary:      Effort: Pulmonary effort is normal  No respiratory distress  Breath sounds: Normal breath sounds  No wheezing  Comments: 3L NC  Abdominal:      General: Bowel sounds are normal  There is no distension  Palpations: Abdomen is soft  Tenderness: There is no abdominal tenderness  Genitourinary:     Comments: Ana Maria  Musculoskeletal:         General: Normal range of motion  Cervical back: Normal range of motion and neck supple  Skin:     General: Skin is warm and dry  Neurological:      General: No focal deficit present  Mental Status: He is alert and oriented to person, place, and time  Mental status is at baseline     Psychiatric:         Mood and Affect: Mood normal          Behavior: Behavior normal          Judgment: Judgment normal        Labs:  Results from last 7 days   Lab Units 05/23/22  0429   WBC Thousand/uL 9 22   HEMOGLOBIN g/dL 13 5   HEMATOCRIT % 40 1   PLATELETS Thousands/uL 131*   NEUTROS PCT % 81*   LYMPHS PCT % 4*   MONOS PCT % 12   EOS PCT % 0     Results from last 7 days   Lab Units 05/23/22  0518   SODIUM mmol/L 142   POTASSIUM mmol/L 2 8*   CHLORIDE mmol/L 114*   CO2 mmol/L 20*   BUN mg/dL 14   CREATININE mg/dL 0 65   ANION GAP mmol/L 8   CALCIUM mg/dL 5 7*   ALBUMIN g/dL 1 8*   TOTAL BILIRUBIN mg/dL 0 70   ALK PHOS U/L 203*   ALT U/L 55*   AST U/L 92*   GLUCOSE RANDOM mg/dL 112     Results from last 7 days   Lab Units 05/21/22  1546   INR  0 95     Results from last 7 days   Lab Units 05/23/22  1132 05/23/22  0546 05/23/22  0148 05/22/22  1752 05/22/22  1157 05/22/22  0533 05/22/22  0041   POC GLUCOSE mg/dl 139 152* 167* 146* 130 98 167*         Results from last 7 days   Lab Units 05/23/22  0429 05/22/22  0253 05/21/22  2337 05/21/22  2139 05/21/22  1737 05/21/22  1546   LACTIC ACID mmol/L  --  1 9 2 6* 3 4* 5 2* 8 6*   PROCALCITONIN ng/ml 124 41* 174 63*  --   --   --  293 05*       Lines/Drains:  Invasive Devices  Report    Central Venous Catheter Line  Duration           Port A Cath 03/31/22 Right Chest 53 days          Peripheral Intravenous Line  Duration           Peripheral IV 05/21/22 Left Antecubital 2 days    Peripheral IV 05/21/22 Right Antecubital 1 day          Drain  Duration           Urethral Catheter 12 Fr  <1 day              Urinary Catheter:  Goal for removal: Voiding trial when ambulation improves         Central Line:  Goal for removal: Chronic port             Imaging: Reviewed radiology reports from this admission including: chest CT scan and abdominal/pelvic CT    Recent Cultures (last 7 days):   Results from last 7 days   Lab Units 05/22/22  0001 05/21/22  1552 05/21/22  1546   BLOOD CULTURE   --  No Growth at 24 hrs  No Growth at 24 hrs     LEGIONELLA URINARY ANTIGEN  Negative  --   --        Last 24 Hours Medication List:   Current Facility-Administered Medications   Medication Dose Route Frequency Provider Last Rate    allopurinol  300 mg Oral Daily Vahe Fling, DO      aspirin  81 mg Oral Daily Vahe Fling, DO      azithromycin  500 mg Intravenous Q24H Kala Sunshine PA-C 500 mg (05/22/22 1832)    budesonide  0 5 mg Nebulization Q12H William Molina CRNP      cefepime  1,000 mg Intravenous Q12H HOWARD MichaelNP 1,000 mg (05/23/22 8984)    fluticasone  2 spray Nasal Daily Antonietta Dhillon PA-C      fluticasone-vilanterol  1 puff Inhalation Daily Malik Christian,       heparin (porcine)  5,000 Units Subcutaneous [facility] Antonietta Dhillon PA-C      insulin lispro  1-6 Units Subcutaneous Q6H [facility] BEKA Michael      ipratropium  0 5 mg Nebulization TID Angy Side, CRNP      levalbuterol  1 25 mg Nebulization TID Angy Side, CRNP      levothyroxine  75 mcg Oral Early Morning Malik Christian, DO      loperamide  2 mg Oral 4x Daily PRN Malik Christian, DO      nystatin   Topical BID Rosario Merritt, BEKA      ondansetron  4 mg Intravenous Q6H PRN Antonietta Dhillon PA-C      pantoprazole  40 mg Oral Early Morning Malik Christian, DO      tamsulosin  0 4 mg Oral Daily With Dinner Saloni Horta, DO      vancomycin  1,500 mg Intravenous Q12H Antonietta Dhillon PA-C          Today, Patient Was Seen By: Eliot Eric DO    **Please Note: This note may have been constructed using a voice recognition system  **

## 2022-05-23 NOTE — ASSESSMENT & PLAN NOTE
· AEB tachycardia, tachypnea, hypotension, leukocytosis, elevated lactic acid, EMILIANO  · Suspect source is pneumonia given RUL/RML  · CT C/A/P(5/21/2022): Patchy consolidative airspace opacities throughout posterior lateral right upper lobe and in lateral right midlung are suspicious for acute pneumonia; background groundglass and reticular airspace opacities in periphery of mid and lower right lung and in left costophrenic angle, similar from November 4, 2021 and suspicious for sequela of viral pneumonia due to COVID-19 infection  · COVID/FLU/RSV negative  · B Clx (5/21/22):  NGTD  · UA without evidence of infection  · Urine legionella and strep antigens negative  · Lactic acid peaked at 8 6 and was 1 9 on last check  · Procalcitonin peaked at 293 and has downtrended to 124  · Continue broad-spectrum antibiotics with IV Azithromycin, Cefepime, Vancomycin  · Consult ID

## 2022-05-23 NOTE — ASSESSMENT & PLAN NOTE
· Denies nausea, vomiting, or diarrhea  · K: 2 8; Ma 4; Ca: 5 7; Phos: 1 6  · Replacement ordered this morning  · Repeat potassium this afternoon  · Follow-up morning labs

## 2022-05-23 NOTE — CONSULTS
Consultation - Infectious Disease   Ayaka Howell 78 y o  male MRN: [id number]  Unit/Bed#: ICU 05-01 Encounter: [id number]      Inpatient consult to Infectious Diseases  Consult performed by: Brian Cai MD  Consult ordered by: Olimpia Fuentes DO            REQUIRED DOCUMENTATION:     1  This service was provided via Telemedicine  2  Provider located at [facility]  3  TeleMed provider: Brian Cai MD   4  Identify all parties in room with patient during tele consult:RN  5  After connecting through [employer], patient was identified by name and date of birth and assistant checked wristband  Patient was then informed that this was a Telemedicine visit and that the exam was being conducted confidentially over secure lines  My office door was closed  No one else was in the room  Patient acknowledged consent and understanding of privacy and security of the Telemedicine visit, and gave us permission to have the assistant stay in the room in order to assist with the history and to conduct the exam   I informed the patient that I have reviewed their record in Epic and presented the opportunity for them to ask any questions regarding the visit today  The patient agreed to participate  Assessment/Recommendations     1  Sepsis with shock, present on admission  - Source of sepsis is pneumonia  - Clinically improving, afebrile without leukocytosis    · Management as below    2  Community acquired pneumonia  - Diagnosis suspected with symptoms and new infiltrate right lung  - Urine antigens negative, no overt evidence of aspiration  - Clinically improving    · Check MRSA nasal screen and if negative, discontinue Vanc  · Discontinue azithro  · Continue cefepime  · Anticipate 7 days of therapy through 5/27, patient can be switched to po levaquin 750 q24 on discharge  · Continue speech therapy, aspiration precautions    3   Chronic hypoxic respiratory failure  - On 2-3L NC since being hospitalized for COVID-19 pneumonia in 11/2021    · Management per pulmonology    4  Metastatic hepatocellular carcinoma  - On immunotherapy  - Risk factor for infection    · Management per oncology      Thank you for involving me in the care of your patient  Please call with questions, change in clinical status or if tests recommended above are abnormal      Discussed with the primary service  History     Reason for Consult: Pneumonia  HPI: Fermín Gagnon is a 78y o  year old male with type 2 diabetes, ckd, hepatocellular carcinoma s/p chemoembolization, currently on chemo/immunotherapy, hx PE, hx covid in November 2021 with chronic hypoxic respiratory failure, remains on 2-3 L NC at baseline  The patient presented to the ER on 5/21 with few days of progressive weakness, dyspnea  He has a chronic cough which appeared unchanged  In the ER, vitals were notable for tachycardia, tachypnea, hypoxia and hypotension   Labs were notable for lactic acidosis with leukocytosis and elevated creatinine  EKG noted no acute ST changes  CTA chest was concerning for right lung pneumonia and advanced hepatic metastatic disease  Patient was admitted on Vanc, cefepime, azithro  Urine antigen testing is negative  Bld cx are negative  Overnight has had no fever or leukocytosis and is on 3 lpm o2  The patient currently has no complaints  Denies fevers, chills, or sweats  Denies nausea, vomiting, or diarrhea  Infectious disease is being consulted for diagnostic work up and antibiotic management  Review of Systems  Pertinent positives and negatives as noted in HPI  Rest complete 12 point system-based review of systems is otherwise negative      PAST MEDICAL HISTORY:  Past Medical History:   Diagnosis Date    Cancer [facility])     Coronary artery disease     Gout     Hypercholesterolemia     Hypertension     Liver cancer ([facility] )     Microhematuria      Past Surgical History:   Procedure Laterality Date    ABDOMINAL SURGERY      appy    APPENDECTOMY      CORONARY ARTERY BYPASS GRAFT      IR BIOPSY LIVER MASS  2020    IR CHEMOEMBOLIZATION LIVER TUMOR  6/10/2021    IR PORT PLACEMENT  3/31/2022    IR Y-90 PRE-ANGIO/EMBO W/ LUNG SCAN  10/6/2020    IR Y-90 RADIOEMBOLIZATION  10/14/2020       FAMILY HISTORY:  Non-contributory    SOCIAL HISTORY:  Social History   /Civil Union  Social History     Substance and Sexual Activity   Alcohol Use Not Currently     Social History     Substance and Sexual Activity   Drug Use Never     Social History     Tobacco Use   Smoking Status Former Smoker    Packs/day: 0 50    Years: 16 00    Pack years: 8 00    Types: Cigarettes    Start date: 12    Quit date: 5    Years since quittin 4   Smokeless Tobacco Never Used       ALLERGIES:  No Known Allergies    MEDICATIONS:  All current active medications have been reviewed      Physical Exam     Temp:  [97 7 °F (36 5 °C)-98 9 °F (37 2 °C)] 98 9 °F (37 2 °C)  HR:  [104-129] 104  Resp:  [20-42] 24  BP: ()/() 124/65  SpO2:  [92 %-98 %] 96 %  Temp (24hrs), Av 1 °F (36 7 °C), Min:97 7 °F (36 5 °C), Max:98 9 °F (37 2 °C)  Current: Temperature: 98 9 °F (37 2 °C)    Intake/Output Summary (Last 24 hours) at 2022 6160  Last data filed at 2022 0553  Gross per 24 hour   Intake 1949 83 ml   Output 1340 ml   Net 609 83 ml         Physical exam findings reported by bedside and primary medical team staff    General Appearance:  Appearing chronically ill, nontoxic, and in no distress, appears stated age   Head:  Normocephalic, without obvious abnormality, atraumatic   Eyes:  PERRL, conjunctiva pink and sclera anicteric, both eyes   Nose: Nares normal, mucosa normal, no drainage   Throat: Oropharynx moist without lesions; lips, mucosa, and tongue normal; teeth and gums normal   Neck: Supple, symmetrical, trachea midline, no adenopathy, no tenderness/mass/nodules   Back:   Symmetric, no curvature, ROM normal, no CVA tenderness   Lungs: Diminished bilaterally, no audible wheezes, rhonchi and rales, respirations unlabored   Chest Wall:  No tenderness or deformity   Heart:  Regular rate and rhythm, S1, S2 normal, no murmur, rub or gallop   Abdomen:   Soft, non-tender, non-distended, positive bowel sounds, no masses, no organomegaly    No CVA tenderness   Extremities: Extremities normal, atraumatic, no cyanosis, clubbing or edema   Skin: Skin color, texture, turgor normal, no rashes or lesions  No draining wounds noted  Lymph nodes: Cervical, supraclavicular, and axillary nodes normal   Neurologic: Alert and oriented times 3       Invasive Devices:   Port A Cath 03/31/22 Right Chest (Active)   Site Assessment Other (Comment) 05/22/22 0800   Line Status Other (Comment) 05/22/22 2030   Dressing Type Other (Comment) 05/22/22 2030   Dressing Status Open to air 05/22/22 2030   Flush Performed No 05/22/22 0800       Peripheral IV 05/21/22 Right Antecubital (Active)   Site Assessment [facility] 05/22/22 2030   Dressing Type Transparent;Securing device 05/22/22 2030   Line Status Saline locked; Flushed;Blood return noted 05/22/22 2030   Dressing Status Clean;Dry; Intact 05/22/22 2030       Peripheral IV 05/21/22 Left Antecubital (Active)   Site Assessment [facility] 05/22/22 2030   Dressing Type Transparent;Securing device 05/22/22 2030   Line Status Blood return noted; Infusing;Flushed 05/22/22 2030   Dressing Status Intact;Dry;Clean 05/22/22 2030       Urethral Catheter 12 Fr  (Active)   Output (mL) 275 mL 05/23/22 0553       Labs, Imaging, & Other Studies     Lab Results:    I have personally reviewed pertinent labs      Results from last 7 days   Lab Units 05/23/22  0429 05/22/22  0253 05/21/22  1546   WBC Thousand/uL 9 22 9 97 12 62*   HEMOGLOBIN g/dL 13 5 14 2 15 4   PLATELETS Thousands/uL 131* 135* 191     Results from last 7 days   Lab Units 05/23/22  0518 05/22/22  0253 05/21/22  1546   POTASSIUM mmol/L 2 8* 4 1 4 8   CHLORIDE mmol/L 114* 106 96   CO2 mmol/L 20* 25 22   BUN mg/dL 14 21 26*   CREATININE mg/dL 0 65 1 09 1 44*   EGFR ml/min/1 73sq m 89 62 44   CALCIUM mg/dL 5 7* 8 4 9 6   AST U/L 92* 142* 159*   ALT U/L 55* 81* 88*   ALK PHOS U/L 203* 313* 343*     Results from last 7 days   Lab Units 05/22/22  0001 05/21/22  1552 05/21/22  1546   BLOOD CULTURE   --  No Growth at 24 hrs  No Growth at 24 hrs  LEGIONELLA URINARY ANTIGEN  Negative  --   --        Imaging Studies:   I have personally reviewed pertinent imaging study reports and images in PACS  EKG, Pathology, and Other Studies:   I have personally reviewed pertinent reports  Counseling/Coordination of care: Total 70 minutes communication with the patient via telehealth  Labs, medical tests and imaging studies were independently and extensively reviewed by me as noted above in HPI and old records were obtained and summarized as noted above in HPI  My recommendations were discussed with the patient in detail who verbalized understanding

## 2022-05-24 PROBLEM — R79.89 ELEVATED LFTS: Status: ACTIVE | Noted: 2022-01-01

## 2022-05-24 NOTE — ASSESSMENT & PLAN NOTE
Lab Results   Component Value Date    HGBA1C 7 0 (A) 05/10/2022       Recent Labs     05/23/22  1132 05/23/22  1810 05/23/22  2322 05/24/22  0526   POCGLU 139 147* 168* 91       Blood Sugar Average: Last 72 hrs:  (P) 140 5   · Continue Accu-Cheks, corrective sliding scale insulin, and hypoglycemic protocol  · Carb controlled diet

## 2022-05-24 NOTE — ASSESSMENT & PLAN NOTE
· Denies nausea, vomiting, or diarrhea  · Resolved after repletion yesterday  · Follow-up morning labs

## 2022-05-24 NOTE — PROGRESS NOTES
[address]  Progress Note - Elena Barney 1940, 78 y o  male MRN: [id number]  Unit/Bed#: ICU 05-01 Encounter: [id number]  Primary Care Provider: Gissel Davila DO   Date and time admitted to hospital: 5/21/2022  3:47 PM    * Septic shock [facility])  Assessment & Plan  · AEB tachycardia, tachypnea, hypotension, leukocytosis, elevated lactic acid, EMILIANO  · Suspect source is pneumonia given RUL/RML  · CT C/A/P(5/21/2022): Patchy consolidative airspace opacities throughout posterior lateral right upper lobe and in lateral right midlung are suspicious for acute pneumonia; background groundglass and reticular airspace opacities in periphery of mid and lower right lung and in left costophrenic angle, similar from November 4, 2021 and suspicious for sequela of viral pneumonia due to COVID-19 infection  · COVID/FLU/RSV negative  · B Clx (5/21/22): NGTD  · UA without evidence of infection  · Urine legionella and strep antigens negative  · Lactic acid peaked at 8 6 and was 1 9 on last check  · Procalcitonin peaked at 293 and has downtrended   · Case was reviewed with ID yesterday, who recommended DC of Azithromycin and MRSA nares check  If MRSA nares is negative, Vancomycin can be discontinued as well  And the patient could complete a 7 day course of Cefepime (transition to Levaquin at discharge)  Elevated LFTs  Assessment & Plan  · Mild increase in LFTs this morning  · The patient denies any abdominal discomfort, nausea, or vomiting  · CT A/P: Advanced hepatic metastatic disease reidentified, similar from previous examination with innumerable hepatic metastatic lesions and approximate 50% replacement of hepatic parenchyma with metastatic tumor  Probable metastatic lesions in the spleen and miles metastatic disease along the right external iliac chain       Electrolyte abnormality  Assessment & Plan  · Denies nausea, vomiting, or diarrhea  · Resolved after repletion yesterday  · Follow-up morning labs    Pneumonia involving right lung  Assessment & Plan  · Suspect this is related to aspiration as it appears this has been a chronic concern  · Barium swallow (05/12):  Concern for spillage over epiglottis and potentially into laryngeal space, though it was noted that penetrated material was immediately redirected during swallow initiation  · Continue antibiotics as noted above    Acute renal failure superimposed on stage 3a chronic kidney disease [facility])  Assessment & Plan  Lab Results   Component Value Date    EGFR 85 05/24/2022    EGFR 89 05/23/2022    EGFR 62 05/22/2022    CREATININE 0 74 05/24/2022    CREATININE 0 65 05/23/2022    CREATININE 1 09 05/22/2022     · POA and resolved at this time  · Avoid hypotension and nephrotoxic agents  · Monitor with daily labs    BPH (benign prostatic hyperplasia)  Assessment & Plan  · With urinary retention  · Continue home Flomax  · Rodgers catheter was replaced; plan for void trial when ambulation improves    Chronic respiratory insufficiency  Assessment & Plan  · On 3L NC chronically since being hospitalized for COVID-19 pneumonia in 11/2021  · Continue Xopenex, Atrovent, Pulmicort nebs for now  · Follows with Dr Jasson Zamora as outpatient      Chronic diastolic heart failure ([facility] [address] )  Assessment & Plan  · Does not appear acutely decompensated at this time  · TTE (03/2022): LVEF 60% and G1DD, mildly dilated RV, mild TR, mild CA  · Resume home Lopressor at a lower dose (12 5mg BID with hodl parameters)  · Strict I/O and daily weights          Coronary artery disease without angina pectoris  Assessment & Plan  · Is s/p CABG x3 in 1996  · Normal ejection fraction noted on recent 2D echocardiogram  · Continue home aspirin 81 mg PO daily and low-dose Lopressor as above    Type 2 diabetes mellitus without complication, without long-term current use of insulin [facility])  Assessment & Plan  Lab Results   Component Value Date    HGBA1C 7 0 (A) 05/10/2022       Recent Labs 22  1132 22  1810 22  2322 22  0526   POCGLU 139 147* 168* 91       Blood Sugar Average: Last 72 hrs:  (P) 140 5   · Continue Accu-Cheks, corrective sliding scale insulin, and hypoglycemic protocol  · Carb controlled diet    Moderate protein-calorie malnutrition (HCC)  Assessment & Plan  Malnutrition Findings:   Adult Malnutrition type: Chronic illness  Adult Degree of Malnutrition: Malnutrition of moderate degree  Malnutrition Characteristics: Weight loss, Muscle loss, Fat loss                  360 Statement: Moderate malnutrition related to inadequate protein energy intake as evidenced by significant wt  loss 35#/21% < 1 year, mild muscle loss clavicle/temporalis areas, moderate fat loss buccal/orbital pads treated with diet/supplements  BMI Findings: Body mass index is 24 91 kg/m²  VTE Pharmacologic Prophylaxis: VTE Score: 12 High Risk (Score >/= 5) - Pharmacological DVT Prophylaxis Ordered: heparin  Sequential Compression Devices Ordered  Patient Centered Rounds: I performed bedside rounds with nursing staff today  Discussions with Specialists or Other Care Team Provider: Nursing, PT/OT, and CM    Education and Discussions with Family / Patient: Updated  (wife) via phone  Time Spent for Care: 30 minutes  More than 50% of total time spent on counseling and coordination of care as described above  Current Length of Stay: 3 day(s)  Current Patient Status: Inpatient   Certification Statement: The patient will continue to require additional inpatient hospital stay due to PNA requiring IV abx  Discharge Plan: Anticipate discharge in 24-48 hrs to discharge location to be determined pending rehab evaluations  Code Status: Level 1 - Full Code    Subjective:   Patient resting comfortably in bed without any acute complaints  No significant over night events reported by nursing       Objective:     Vitals:   Temp (24hrs), Av °F (36 7 °C), Min:97 8 °F (36 6 °C), Max:98 1 °F (36 7 °C)    Temp:  [97 8 °F (36 6 °C)-98 1 °F (36 7 °C)] 98 1 °F (36 7 °C)  HR:  [] 111  Resp:  [16-32] 28  BP: (103-142)/(53-74) 103/57  SpO2:  [94 %-98 %] 95 %  Body mass index is 24 91 kg/m²  Input and Output Summary (last 24 hours): Intake/Output Summary (Last 24 hours) at 5/24/2022 1043  Last data filed at 5/24/2022 0900  Gross per 24 hour   Intake 1328 33 ml   Output 1400 ml   Net -71 67 ml       Physical Exam:   Physical Exam  Vitals and nursing note reviewed  Constitutional:       General: He is not in acute distress  Appearance: Normal appearance  He is normal weight  HENT:      Head: Normocephalic and atraumatic  Mouth/Throat:      Mouth: Mucous membranes are moist       Pharynx: Oropharynx is clear  Eyes:      Extraocular Movements: Extraocular movements intact  Conjunctiva/sclera: Conjunctivae normal    Cardiovascular:      Rate and Rhythm: Regular rhythm  Tachycardia present  Pulses: Normal pulses  Heart sounds: Normal heart sounds  Pulmonary:      Effort: Pulmonary effort is normal  No respiratory distress  Breath sounds: Normal breath sounds  No wheezing  Comments: 3L NC  Abdominal:      General: Bowel sounds are normal  There is no distension  Palpations: Abdomen is soft  Tenderness: There is no abdominal tenderness  Genitourinary:     Comments: Rodgers with clear urine in bag  Musculoskeletal:         General: Normal range of motion  Cervical back: Normal range of motion and neck supple  Skin:     General: Skin is warm and dry  Neurological:      General: No focal deficit present  Mental Status: He is alert and oriented to person, place, and time     Psychiatric:         Mood and Affect: Mood normal          Judgment: Judgment normal         Labs:  Results from last 7 days   Lab Units 05/24/22  0537 05/23/22  0429   WBC Thousand/uL 8 78 9 22   HEMOGLOBIN g/dL 15 0 13 5   HEMATOCRIT % 47 2 40 1 PLATELETS Thousands/uL 134* 131*   NEUTROS PCT %  --  81*   LYMPHS PCT %  --  4*   MONOS PCT %  --  12   EOS PCT %  --  0     Results from last 7 days   Lab Units 05/24/22  0537   SODIUM mmol/L 136   POTASSIUM mmol/L 4 4   CHLORIDE mmol/L 101   CO2 mmol/L 28   BUN mg/dL 16   CREATININE mg/dL 0 74   ANION GAP mmol/L 7   CALCIUM mg/dL 9 0   ALBUMIN g/dL 2 8*   TOTAL BILIRUBIN mg/dL 0 93   ALK PHOS U/L 322*   ALT U/L 87*   AST U/L 142*   GLUCOSE RANDOM mg/dL 90     Results from last 7 days   Lab Units 05/21/22  1546   INR  0 95     Results from last 7 days   Lab Units 05/24/22  0526 05/23/22  2322 05/23/22  1810 05/23/22  1132 05/23/22  0546 05/23/22  0148 05/22/22  1752 05/22/22  1157 05/22/22  0533 05/22/22  0041   POC GLUCOSE mg/dl 91 168* 147* 139 152* 167* 146* 130 98 167*         Results from last 7 days   Lab Units 05/24/22  0537 05/23/22  0429 05/22/22  0253 05/21/22  2337 05/21/22  2139 05/21/22  1737 05/21/22  1546   LACTIC ACID mmol/L  --   --  1 9 2 6* 3 4* 5 2* 8 6*   PROCALCITONIN ng/ml 161 23* 124 41* 174 63*  --   --   --  293 05*       Lines/Drains:  Invasive Devices  Report    Central Venous Catheter Line  Duration           Port A Cath 03/31/22 Right Chest 53 days          Peripheral Intravenous Line  Duration           Peripheral IV 05/21/22 Right Antecubital 2 days    Peripheral IV 05/23/22 Dorsal (posterior); Left Forearm <1 day          Drain  Duration           Urethral Catheter 12 Fr  1 day              Urinary Catheter:  Goal for removal: Voiding trial when ambulation improves         Central Line:  Goal for removal: Chronic port in place             Imaging: No new imaging  Recent Cultures (last 7 days):   Results from last 7 days   Lab Units 05/22/22  0001 05/21/22  1552 05/21/22  1546   BLOOD CULTURE   --  No Growth at 48 hrs  No Growth at 48 hrs     LEGIONELLA URINARY ANTIGEN  Negative  --   --        Last 24 Hours Medication List:   Current Facility-Administered Medications Medication Dose Route Frequency Provider Last Rate    allopurinol  300 mg Oral Daily Crystal Ceballos,       aspirin  81 mg Oral Daily Crystal Ceballos,       budesonide  0 5 mg Nebulization Q12H BEKA Michael      cefepime  1,000 mg Intravenous Q12H BEKA Michael 1,000 mg (05/24/22 0723)    fluticasone  2 spray Nasal Daily JANIS Keith-TATE      fluticasone-vilanterol  1 puff Inhalation Daily Crystal Ceballos,       heparin (porcine)  5,000 Units Subcutaneous [facility] Robert García PA-C      insulin lispro  1-6 Units Subcutaneous Q6H [facility] BEKA Michael      ipratropium  0 5 mg Nebulization TID Jackalyn Regulus, BEKA      levalbuterol  1 25 mg Nebulization TID Jackalyn Regulus, BEKA      levothyroxine  75 mcg Oral Early Morning Crystal Ceballos,       loperamide  2 mg Oral 4x Daily PRN Crystal Ceballos, DO      metoprolol tartrate  12 5 mg Oral Q12H Gabriel, DO      nystatin   Topical BID BEKA Michael      ondansetron  4 mg Intravenous Q6H PRN Robert García PA-C      pantoprazole  40 mg Oral Early Morning Crystal Ceballos,       tamsulosin  0 4 mg Oral Daily With Dinner Rachel Luis Horta, DO      vancomycin  1,500 mg Intravenous Q12H Robert García PA-C 0 mg (05/23/22 2200)        Today, Patient Was Seen By: Greta Coto DO    **Please Note: This note may have been constructed using a voice recognition system  **

## 2022-05-24 NOTE — ASSESSMENT & PLAN NOTE
· Does not appear acutely decompensated at this time  · TTE (03/2022): LVEF 60% and G1DD, mildly dilated RV, mild TR, mild SC  · Resume home Lopressor at a lower dose (12 5mg BID with hodl parameters)  · Strict I/O and daily weights

## 2022-05-24 NOTE — PROGRESS NOTES
Vancomycin IV Pharmacy-to-Dose Consultation    Obdulia Frey is a 78 y o  male who is currently receiving Vancomycin IV with management by the Pharmacy Consult service  Assessment/Plan:  The patient was reviewed  Renal function is stable and no signs or symptoms of nephrotoxicity and/or infusion reactions were documented in the chart  Based on todays assessment, continue current vancomycin (day # 4) dosing of 1500 mg q12, with a plan for trough to be drawn at 3am on 5/25  We will continue to follow the patients culture results and clinical progress daily      Joshua Brasher, Pharmacist

## 2022-05-24 NOTE — ASSESSMENT & PLAN NOTE
· Mild increase in LFTs this morning  · The patient denies any abdominal discomfort, nausea, or vomiting  · CT A/P: Advanced hepatic metastatic disease reidentified, similar from previous examination with innumerable hepatic metastatic lesions and approximate 50% replacement of hepatic parenchyma with metastatic tumor  Probable metastatic lesions in the spleen and miles metastatic disease along the right external iliac chain

## 2022-05-24 NOTE — PROGRESS NOTES
Progress Note - Pulmonary   Maxcine Gabbie 78 y o  male MRN: [id number]  Unit/Bed#: ICU 05-01 Encounter: [id number]      Assessment:  · Acute on chronic hypoxemic respiratory failure  Improving  · Sepsis secondary to pneumonia  Improving  · Pneumonia  Plan:  · Continue antibiotics  · Titrate oxygen to maintain O2 saturation above 8*%  · Encourage incentive spirometry and increase activities as tolerated  Subjective: The patient was sitting in a chair  He was happy that he is out of bed  He feels better  Cough has improved  He was able to eat  Vitals: Blood pressure 110/67, pulse 105, temperature 98 1 °F (36 7 °C), temperature source Temporal, resp  rate (!) 28, height 5' 9" (1 753 m), weight 76 5 kg (168 lb 10 4 oz), SpO2 95 %  , Body mass index is 24 91 kg/m²  Intake/Output Summary (Last 24 hours) at 5/24/2022 1326  Last data filed at 5/24/2022 0900  Gross per 24 hour   Intake 1328 33 ml   Output 1225 ml   Net 103 33 ml       Physical Exam  Gen: Awake, alert, oriented x 3, no acute distress  HEENT: Mucous membranes moist, no oral lesions, no thrush  NECK: No accessory muscle use, JVP not elevated  Cardiac: Regular, single S1, single S2, no murmurs, no rubs, no gallops  Lungs:  Decreased breath sounds  Few posterior crackles    Abdomen: normoactive bowel sounds, soft nontender, nondistended, no rebound or rigidity, no guarding  Extremities: no cyanosis, no clubbing, no edema    Labs:   CBC:   Lab Results   Component Value Date    WBC 8 78 05/24/2022    HGB 15 0 05/24/2022    HCT 47 2 05/24/2022    MCV 95 05/24/2022     (L) 05/24/2022    MCH 30 3 05/24/2022    MCHC 31 8 05/24/2022    RDW 17 6 (H) 05/24/2022    MPV 11 0 05/24/2022   , CMP:   Lab Results   Component Value Date    SODIUM 136 05/24/2022    K 4 4 05/24/2022     05/24/2022    CO2 28 05/24/2022    BUN 16 05/24/2022    CREATININE 0 74 05/24/2022    CALCIUM 9 0 05/24/2022     (H) 05/24/2022    ALT 87 (H) 05/24/2022    ALKPHOS 322 (H) 05/24/2022    EGFR 85 05/24/2022           Hoda Brooke MD

## 2022-05-24 NOTE — PLAN OF CARE
Problem: OCCUPATIONAL THERAPY ADULT  Goal: Performs self-care activities at highest level of function for planned discharge setting  See evaluation for individualized goals  Description: Treatment Interventions: ADL retraining, Functional transfer training, UE strengthening/ROM, Endurance training, Patient/family training, Compensatory technique education, Activityengagement, Energy conservation          See flowsheet documentation for full assessment, interventions and recommendations  Note: Limitation: Decreased ADL status, Decreased UE strength, Decreased Safe judgement during ADL, Decreased cognition, Decreased endurance, Decreased self-care trans, Decreased high-level ADLs  Prognosis: Good  Assessment: Patient is a 78 y o  male seen for OT evaluation s/p admit to [place] [address] on 5/21/2022 w/Septic shock ([address] )  Commorbidities affecting patient's functional performance at time of assessment include: acute renal failure, chronic diastolic heart failure, CAD, hepatocellular carcinoma, DM2 and pneumonia  Orders placed for OT evaluation and treatment and up and OOB as tolerated   Performed at least two patient identifiers during session including name and wristband  Prior to admission, Patient reporting being independent with ADLs, ambulatory with no AD, and lives with wife in a one level apartment  Personal factors affecting patient at time of initial evaluation include: steps to enter, difficulty performing ADLs and difficulty performing IADLs  Upon evaluation, patient requires minimal  assist for UB ADLs, moderate-maximal assist for LB ADLs, transfers and functional ambulation in room and bathroom with minimal  assist of 2, with the use of Rolling Walker  Patient is oriented to name,, oriented to time,, disoriented to time, and disoriented to situation, and presents with impaired judgement, inability to make safe decisions    Occupational performance is affected by the following deficits: orientation, decreased muscle strength, dynamic sit/ stand balance deficit with poor standing tolerance time for self care and functional mobility, decreased activity tolerance, impaired memory, impaired problem solving, decreased safety awareness and delayed righting and equilibrium reactions  Based on the mentioned OT evaluation outcomes, functional performance deficits, and assessment findings, pt has been identified as a high complexity evaluation  Patient to benefit from continued Occupational Therapy treatment while in the hospital to address deficits as defined above and maximize level of functional independence with ADLs and functional mobility  Occupational Performance areas to address include: grooming , bathing/ shower, dressing, toilet hygiene, transfer to all surfaces and functional ambulation  From OT standpoint, recommendation at time of d/c would be Post acute rehabilitation services       OT Discharge Recommendation: Post acute rehabilitation services  OT - OK to Discharge: Yes (Once medically cleared)     Ashwini Saucedo OT

## 2022-05-24 NOTE — PLAN OF CARE
Problem: PHYSICAL THERAPY ADULT  Goal: Performs mobility at highest level of function for planned discharge setting  See evaluation for individualized goals  Description: Treatment/Interventions: Functional transfer training, LE strengthening/ROM, Elevations, Therapeutic exercise, Endurance training, Cognitive reorientation, Patient/family training, Equipment eval/education, Bed mobility, Gait training, Spoke to nursing, Spoke to case management  Equipment Recommended:  (continue RW use)       See flowsheet documentation for full assessment, interventions and recommendations  Note: Prognosis: Good  Problem List: Decreased strength, Impaired balance, Decreased endurance, Decreased mobility, Decreased cognition, Impaired judgement, Decreased safety awareness  Assessment: Pt is 78 y o  male seen for high-complexity PT evaluation on 5/24/2022 s/p admit to [facility] on 5/21/2022 w/ Septic shock ([facility] [address] )  PT was consulted to assess pt's functional mobility and d/c needs  Order placed for PT eval and tx, w/ up and OOB as tolerated order  PTA, pt resides c wife in 1floor apartment c 2-3 SAÚL; ambulatory without AD  At time of eval, pt requiring SBA bed mobility, CGA for transfers and short gait trial  Upon evaluation, pt presenting with impaired functional mobility d/t decreased strength, decreased endurance, impaired balance, decreased mobility, decreased cognition, impaired judgement and decreased safety awareness  Pertinent PMHx and current co-morbidities affecting pt's physical performance at time of assessment include: electrolyte abnormality, pneumonia, ARF superimposed on CKD stage 3a, BPH, chronic respiratory insufficiency, chronic diastolic heart failure, hepatocellular carcinoma, CAD, DM type 2, protein-calorie malnutrition  Personal factors affecting pt at time of eval include: positive fall history and increased age   The following objective measures performed on IE also reveal limitations: AM-PAC 6-Clicks: 29/85  Pt's clinical presentation is currently unstable/unpredictable seen in pt's presentation of abnormal lab value(s), need for input for task focus and mobility technique, ongoing medical assessment and on telemetry monitoring  Overall, pt's rehab potential and prognosis to return to PLOF is good as impacted by objective findings, warranting pt to receive further skilled PT interventions to address identified impairments, activity limitation(s), and participation restriction(s)  Goal for patient is to return home to wife  Pt to benefit from continued PT tx to address deficits as defined above and maximize level of functional independent mobility and consistency in order for pt to improve OOB activity tolerance  From PT/mobility standpoint, recommendation at time of d/c would be post acute rehabilitation services pending progress in order to facilitate return to PLOF  Barriers to Discharge: Inaccessible home environment        PT Discharge Recommendation: Post acute rehabilitation services          See flowsheet documentation for full assessment

## 2022-05-24 NOTE — ASSESSMENT & PLAN NOTE
· Suspect this is related to aspiration as it appears this has been a chronic concern  · Barium swallow (05/12):  Concern for spillage over epiglottis and potentially into laryngeal space, though it was noted that penetrated material was immediately redirected during swallow initiation  · Continue antibiotics as noted above

## 2022-05-24 NOTE — ASSESSMENT & PLAN NOTE
Malnutrition Findings:   Adult Malnutrition type: Chronic illness  Adult Degree of Malnutrition: Malnutrition of moderate degree  Malnutrition Characteristics: Weight loss, Muscle loss, Fat loss                  360 Statement: Moderate malnutrition related to inadequate protein energy intake as evidenced by significant wt  loss 35#/21% < 1 year, mild muscle loss clavicle/temporalis areas, moderate fat loss buccal/orbital pads treated with diet/supplements  BMI Findings: Body mass index is 24 91 kg/m²

## 2022-05-24 NOTE — PHYSICAL THERAPY NOTE
Physical Therapy Evaluation   Time in: 0831  Time out: 0848  Total evaluation time: 17 minutes    Patient's Name: Tamir Steel    Admitting Diagnosis  Pneumonia [J18 9]  SOB (shortness of breath) [R06 02]  Acute respiratory failure with hypoxia ([address] ) [J96 01]  Septic shock ([address] ) [A41 9, R65 21]    Problem List  Patient Active Problem List   Diagnosis    Essential hypertension    Hyperlipidemia    Cardiomegaly    Anxiety    Abnormal electrocardiogram    Coronary artery disease without angina pectoris    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)    Reactive airway disease without complication    Hyperglycemia    Ankle edema, bilateral    Transaminitis    Chronic respiratory insufficiency    Lactic acidosis    Acute renal failure superimposed on stage 3a chronic kidney disease (HCC)    Elevated troponin    Hepatocellular carcinoma (HCC)    Acute deep vein thrombosis (DVT) of popliteal vein of both lower extremities (HCC)    Acute respiratory failure with hypoxia (HCC)    Stage 3a chronic kidney disease ([address] )    Septic shock ([address] )    Pneumonia involving right lung    Cough    Platelets decreased ([address] )    Type 2 diabetes mellitus without complication, without long-term current use of insulin (HCC)    Allergic cough    Acute bacterial bronchitis    Chronic diastolic heart failure (HCC)    Pulmonary emboli (HCC)    Post-COVID chronic dyspnea    Left carotid artery occlusion    Asymptomatic carotid artery stenosis, right    Vision loss of left eye    Hypoxia    Urinary frequency    History of coronary artery bypass surgery    Gout    Impaired fasting glucose    Plantar fibromatosis    Seborrheic dermatitis    Sensorineural hearing loss, bilateral    Subclinical hypothyroidism    Vitamin D deficiency    Protein-calorie malnutrition ([address] )    History of blood clots    BPH (benign prostatic hyperplasia)    Electrolyte abnormality    Moderate protein-calorie malnutrition (HCC)    Elevated LFTs       Past Medical History  Past Medical History:   Diagnosis Date    Cancer [facility])     Coronary artery disease     Gout     Hypercholesterolemia     Hypertension     Liver cancer ([address] )     Microhematuria        Past Surgical History  Past Surgical History:   Procedure Laterality Date    ABDOMINAL SURGERY      appy    APPENDECTOMY      CORONARY ARTERY BYPASS GRAFT      IR BIOPSY LIVER MASS  8/25/2020    IR CHEMOEMBOLIZATION LIVER TUMOR  6/10/2021    IR PORT PLACEMENT  3/31/2022    IR Y-90 PRE-ANGIO/EMBO W/ LUNG SCAN  10/6/2020    IR Y-90 RADIOEMBOLIZATION  10/14/2020       PT performed at least 2 patient identifiers during session: Name and wristband  05/24/22 0848   PT Last Visit   PT Visit Date 05/24/22   Note Type   Note type Evaluation   Pain Assessment   Pain Assessment Tool 0-10   Pain Score No Pain   Restrictions/Precautions   Weight Bearing Precautions Per Order No   Other Precautions Cognitive;Multiple lines;Telemetry;O2;Fall Risk; Chair Alarm  (3 L O2 via NC- used at baseline)   Home Living   Type of [address] [place] [address] One level;Performs ADLs on one level; Able to live on main level with bedroom/bathroom;Stairs to enter with rails  (2-3 SAÚL)   Bathroom Shower/Tub Tub/shower unit   Bathroom Toilet Standard   Bathroom Equipment Grab bars in shower;Grab bars around toilet   [facility]  (no AD used at baseline)   Prior Function   Level of [place] Independent with ADLs and functional mobility   Lives With [place] Help From Family   ADL Assistance Independent   IADLs Needs assistance  (Wife does cooking and laundry)   Falls in the last 6 months 1 to 4  (1 fall per pt)   Vocational Retired   Comments (-) driving   General   Family/Caregiver Present No   Cognition   Overall Cognitive Status Impaired   Arousal/Participation Alert   Attention Attends with cues to redirect   Orientation Level Oriented to person;Oriented to place; Disoriented to situation;Disoriented to time  (oriented to year; disoriented to month- "Sept ")   Memory Decreased short term memory;Decreased recall of recent events   Following Commands Follows one step commands with increased time or repetition   Comments pt agreeable to PT session   Subjective   Subjective "I feel ok right now"   RLE Assessment   RLE Assessment X   Strength RLE   RLE Overall Strength 3+/5   LLE Assessment   LLE Assessment X   Strength LLE   LLE Overall Strength 3+/5   Vision-Basic Assessment   Current Vision Wears glasses all the time   Visual History   (Blind in L eye)   Coordination   Movements are Fluid and Coordinated 1   Sensation WFL   Bed Mobility   Supine to Sit 4  Minimal assistance   Additional items Assist x 1;HOB elevated; Bedrails; Increased time required;Verbal cues;LE management   Sit to Supine   (DNT: pt seated OOB in recliner at end of eval)   Additional Comments Pt reported (+) dizziness upon sitting at EOB  Symptoms resolved with increased seated rest    Transfers   Sit to Stand 4  Minimal assistance   Additional items Assist x 2; Increased time required;Verbal cues   Stand to Sit 4  Minimal assistance   Additional items Assist x 2; Increased time required;Verbal cues   Stand pivot 4  Minimal assistance   Additional items Assist x 2; Increased time required;Verbal cues   Toilet transfer 4  Minimal assistance   Additional items Assist x 2;Armrests; Increased time required;Verbal cues; Commode   Additional Comments Verbal cues provided throughout session for proper hand placement and safe body mechanics  Ambulation/Elevation   Gait pattern Improper Weight shift;Decreased foot clearance; Short stride   Gait Assistance 4  Minimal assist   Additional items Assist x 2;Verbal cues   Assistive Device Rolling walker   Distance 3 ft x 2   Stair Management Assistance Not tested   Balance   Static Sitting Fair +   Dynamic Sitting Fair   Static Standing Fair -   Dynamic Standing Poor + Ambulatory Poor +   Endurance Deficit   Endurance Deficit Yes   Activity Tolerance   Activity Tolerance Patient limited by fatigue; Other (Comment)  (Decreased cognition)   Medical Staff Made Aware Pt seen as a co-eval with OT due to the patient's co-morbidities, clinically unstable presentation, and present impairments which are a regression from the patient's baseline  Nurse Made Aware A[place]IN Florence verbalized pt appropriate for therapy and made aware of outcomes   Assessment   Prognosis Good   Problem List Decreased strength; Impaired balance;Decreased endurance;Decreased mobility; Decreased cognition; Impaired judgement;Decreased safety awareness   Assessment Pt is 78 y o  male seen for high-complexity PT evaluation on 5/24/2022 s/p admit to [facility] on 5/21/2022 w/ Septic shock ([address] )  PT was consulted to assess pt's functional mobility and d/c needs  Order placed for PT eval and tx, w/ up and OOB as tolerated order  PTA, pt resides c wife in 1floor apartment c 2-3 SAÚL; ambulatory without AD  At time of eval, pt requiring SBA bed mobility, CGA for transfers and short gait trial  Upon evaluation, pt presenting with impaired functional mobility d/t decreased strength, decreased endurance, impaired balance, decreased mobility, decreased cognition, impaired judgement and decreased safety awareness  Pertinent PMHx and current co-morbidities affecting pt's physical performance at time of assessment include: electrolyte abnormality, pneumonia, ARF superimposed on CKD stage 3a, BPH, chronic respiratory insufficiency, chronic diastolic heart failure, hepatocellular carcinoma, CAD, DM type 2, protein-calorie malnutrition  Personal factors affecting pt at time of eval include: positive fall history and increased age  The following objective measures performed on IE also reveal limitations: AM-PAC 6-Clicks: 16/17   Pt's clinical presentation is currently unstable/unpredictable seen in pt's presentation of abnormal lab value(s), need for input for task focus and mobility technique, ongoing medical assessment and on telemetry monitoring  Overall, pt's rehab potential and prognosis to return to PLOF is good as impacted by objective findings, warranting pt to receive further skilled PT interventions to address identified impairments, activity limitation(s), and participation restriction(s)  Goal for patient is to return home to wife  Pt to benefit from continued PT tx to address deficits as defined above and maximize level of functional independent mobility and consistency in order for pt to improve OOB activity tolerance  From PT/mobility standpoint, recommendation at time of d/c would be post acute rehabilitation services pending progress in order to facilitate return to PLOF  Barriers to Discharge Inaccessible home environment   Goals   Patient Goals "to get stronger"   STG Expiration Date 06/03/22   Short Term Goal #1 In 7-10 days: Increase bilateral LE strength 1/2 grade to facilitate independent mobility, Perform all bed mobility tasks modified independent to decrease caregiver burden, Perform all transfers modified independent to improve independence, Ambulate > 50 ft  with least restrictive assistive device modified independent w/o LOB and w/ normalized gait pattern 100% of the time, Navigate 3 stairs modified independent with unilateral handrail to facilitate return to previous living environment, Increase all balance 1/2 grade to decrease risk for falls, Tolerate 4 hr OOB to faciliate upright tolerance, Improve Barthel Index score to 65 or greater to facilitate independence and PT provider will perform functional balance assessment to determine fall risk   PT Treatment Day 0   Plan   Treatment/Interventions Functional transfer training;LE strengthening/ROM; Elevations; Therapeutic exercise; Endurance training;Cognitive reorientation;Patient/family training;Equipment eval/education; Bed mobility;Gait training;Spoke to nursing;Spoke to case management   PT Frequency 3-5x/wk   Recommendation   PT Discharge Recommendation Post acute rehabilitation services   Equipment Recommended   (continue RW use)   Additional Comments Pt's raw score on the AM-PAC Basic Mobility inpatient short form is 16, standardized score is 38 32  Patients at this level are likely to benefit from DC to [address], however, please refer to therapist recommendation for safe DC planning  AM-PAC Basic Mobility Inpatient   Turning in Bed Without Bedrails 3   Lying on Back to Sitting on Edge of Flat Bed 3   Moving Bed to Chair 3   Standing Up From Chair 3   Walk in Room 2   Climb 3-5 Stairs 2   Basic Mobility Inpatient Raw Score 16   Basic Mobility Standardized Score 38 32   Highest Level Of Mobility   JH-HLM Goal 5: Stand one or more mins   JH-HLM Achieved 4: Move to chair/commode   End of Consult   Patient Position at End of Consult Bedside chair;Bed/Chair alarm activated; All needs within reach       Atif Barbour, PT, DPT

## 2022-05-24 NOTE — ASSESSMENT & PLAN NOTE
· AEB tachycardia, tachypnea, hypotension, leukocytosis, elevated lactic acid, EMILIANO  · Suspect source is pneumonia given RUL/RML  · CT C/A/P(5/21/2022): Patchy consolidative airspace opacities throughout posterior lateral right upper lobe and in lateral right midlung are suspicious for acute pneumonia; background groundglass and reticular airspace opacities in periphery of mid and lower right lung and in left costophrenic angle, similar from November 4, 2021 and suspicious for sequela of viral pneumonia due to COVID-19 infection  · COVID/FLU/RSV negative  · B Clx (5/21/22): NGTD  · UA without evidence of infection  · Urine legionella and strep antigens negative  · Lactic acid peaked at 8 6 and was 1 9 on last check  · Procalcitonin peaked at 293 and has downtrended   · Case was reviewed with ID yesterday, who recommended DC of Azithromycin and MRSA nares check  If MRSA nares is negative, Vancomycin can be discontinued as well  And the patient could complete a 7 day course of Cefepime (transition to Levaquin at discharge)

## 2022-05-24 NOTE — ASSESSMENT & PLAN NOTE
Lab Results   Component Value Date    EGFR 85 05/24/2022    EGFR 89 05/23/2022    EGFR 62 05/22/2022    CREATININE 0 74 05/24/2022    CREATININE 0 65 05/23/2022    CREATININE 1 09 05/22/2022     · POA and resolved at this time  · Avoid hypotension and nephrotoxic agents  · Monitor with daily labs

## 2022-05-24 NOTE — ASSESSMENT & PLAN NOTE
· On 3L NC chronically since being hospitalized for COVID-19 pneumonia in 11/2021  · Continue Xopenex, Atrovent, Pulmicort nebs for now  · Follows with Dr Stephan Allen as outpatient

## 2022-05-24 NOTE — ASSESSMENT & PLAN NOTE
· Is s/p CABG x3 in 1996  · Normal ejection fraction noted on recent 2D echocardiogram  · Continue home aspirin 81 mg PO daily and low-dose Lopressor as above

## 2022-05-24 NOTE — PLAN OF CARE
Problem: Prexisting or High Potential for Compromised Skin Integrity  Goal: Skin integrity is maintained or improved  Description: INTERVENTIONS:  - Identify patients at risk for skin breakdown  - Assess and monitor skin integrity  - Assess and monitor nutrition and hydration status  - Monitor labs   - Assess for incontinence   - Turn and reposition patient  - Assist with mobility/ambulation  - Relieve pressure over bony prominences  - Avoid friction and shearing  - Provide appropriate hygiene as needed including keeping skin clean and dry  - Evaluate need for skin moisturizer/barrier cream  - Collaborate with interdisciplinary team   - Patient/family teaching  - Consider wound care consult   Outcome: Progressing  Goal: Ability to express needs and understand communication  Outcome: Progressing     Problem: RESPIRATORY - ADULT  Goal: Achieves optimal ventilation and oxygenation  Description: INTERVENTIONS:  - Assess for changes in respiratory status  - Assess for changes in mentation and behavior  - Position to facilitate oxygenation and minimize respiratory effort  - Oxygen administered by appropriate delivery if ordered  - Initiate smoking cessation education as indicated  - Encourage broncho-pulmonary hygiene including cough, deep breathe, Incentive Spirometry  - Assess the need for suctioning and aspirate as needed  - Assess and instruct to report SOB or any respiratory difficulty  - Respiratory Therapy support as indicated  Outcome: Progressing     Problem: PAIN - ADULT  Goal: Verbalizes/displays adequate comfort level or baseline comfort level  Description: Interventions:  - Encourage patient to monitor pain and request assistance  - Assess pain using appropriate pain scale  - Administer analgesics based on type and severity of pain and evaluate response  - Implement non-pharmacological measures as appropriate and evaluate response  - Consider cultural and social influences on pain and pain management  - Notify physician/advanced practitioner if interventions unsuccessful or patient reports new pain  Outcome: Progressing     Problem: Nutrition/Hydration-ADULT  Goal: Nutrient/Hydration intake appropriate for improving, restoring or maintaining nutritional needs  Description: Monitor and assess patient's nutrition/hydration status for malnutrition  Collaborate with interdisciplinary team and initiate plan and interventions as ordered  Monitor patient's weight and dietary intake as ordered or per policy  Utilize nutrition screening tool and intervene as necessary  Determine patient's food preferences and provide high-protein, high-caloric foods as appropriate       INTERVENTIONS:  - Monitor oral intake, urinary output, labs, and treatment plans  - Assess nutrition and hydration status and recommend course of action  - Evaluate amount of meals eaten  - Assist patient with eating if necessary   - Allow adequate time for meals  - Recommend/ encourage appropriate diets, oral nutritional supplements, and vitamin/mineral supplements  - Order, calculate, and assess calorie counts as needed  - Recommend, monitor, and adjust tube feedings and TPN/PPN based on assessed needs  - Assess need for intravenous fluids  - Provide specific nutrition/hydration education as appropriate  - Include patient/family/caregiver in decisions related to nutrition  Outcome: Progressing     Problem: INFECTION - ADULT  Goal: Absence or prevention of progression during hospitalization  Description: INTERVENTIONS:  - Assess and monitor for signs and symptoms of infection  - Monitor lab/diagnostic results  - Monitor all insertion sites, i e  indwelling lines, tubes, and drains  - Monitor endotracheal if appropriate and nasal secretions for changes in amount and color  - Pep appropriate cooling/warming therapies per order  - Administer medications as ordered  - Instruct and encourage patient and family to use good hand hygiene technique  - Identify and instruct in appropriate isolation precautions for identified infection/condition  Outcome: Progressing  Goal: Absence of fever/infection during neutropenic period  Description: INTERVENTIONS:  - Monitor WBC    Outcome: Progressing     Problem: SAFETY ADULT  Goal: Patient will remain free of falls  Description: INTERVENTIONS:  - Educate patient/family on patient safety including physical limitations  - Instruct patient to call for assistance with activity   - Consult OT/PT to assist with strengthening/mobility   - Keep Call bell within reach  - Keep bed low and locked with side rails adjusted as appropriate  - Keep care items and personal belongings within reach  - Initiate and maintain comfort rounds  - Make Fall Risk Sign visible to staff  - Offer Toileting every 2 Hours, in advance of need  - Initiate/Maintain fall alarm  - Obtain necessary fall risk management equipment: fall alarm  - Apply yellow socks and bracelet for high fall risk patients  - Consider moving patient to room near nurses station  Outcome: Progressing  Goal: Maintain or return to baseline ADL function  Description: INTERVENTIONS:  -  Assess patient's ability to carry out ADLs; assess patient's baseline for ADL function and identify physical deficits which impact ability to perform ADLs (bathing, care of mouth/teeth, toileting, grooming, dressing, etc )  - Assess/evaluate cause of self-care deficits   - Assess range of motion  - Assess patient's mobility; develop plan if impaired  - Assess patient's need for assistive devices and provide as appropriate  - Encourage maximum independence but intervene and supervise when necessary  - Involve family in performance of ADLs  - Assess for home care needs following discharge   - Consider OT consult to assist with ADL evaluation and planning for discharge  - Provide patient education as appropriate  Outcome: Progressing  Goal: Maintains/Returns to pre admission functional level  Description: INTERVENTIONS:  - Perform BMAT or MOVE assessment daily    - Set and communicate daily mobility goal to care team and patient/family/caregiver  - Collaborate with rehabilitation services on mobility goals if consulted  - Perform Range of Motion 3 times a day  - Reposition patient every 2 hours  - Dangle patient 3 times a day  - Stand patient 3 times a day  - Ambulate patient 3 times a day  - Out of bed to chair 3 times a day   - Out of bed for meals 3 times a day  - Out of bed for toileting  - Record patient progress and toleration of activity level   Outcome: Progressing     Problem: DISCHARGE PLANNING  Goal: Discharge to home or other facility with appropriate resources  Description: INTERVENTIONS:  - Identify barriers to discharge w/patient and caregiver  - Arrange for needed discharge resources and transportation as appropriate  - Identify discharge learning needs (meds, wound care, etc )  - Arrange for interpretive services to assist at discharge as needed  - Refer to Case Management Department for coordinating discharge planning if the patient needs post-hospital services based on physician/advanced practitioner order or complex needs related to functional status, cognitive ability, or social support system  Outcome: Progressing     Problem: Knowledge Deficit  Goal: Patient/family/caregiver demonstrates understanding of disease process, treatment plan, medications, and discharge instructions  Description: Complete learning assessment and assess knowledge base    Interventions:  - Provide teaching at level of understanding  - Provide teaching via preferred learning methods  Outcome: Progressing

## 2022-05-24 NOTE — OCCUPATIONAL THERAPY NOTE
Occupational Therapy Evaluation      Ricki Greer    5/24/2022    Patient Active Problem List   Diagnosis    Essential hypertension    Hyperlipidemia    Cardiomegaly    Anxiety    Abnormal electrocardiogram    Coronary artery disease without angina pectoris    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)    Reactive airway disease without complication    Hyperglycemia    Ankle edema, bilateral    Transaminitis    Chronic respiratory insufficiency    Lactic acidosis    Acute renal failure superimposed on stage 3a chronic kidney disease (HCC)    Elevated troponin    Hepatocellular carcinoma (HCC)    Acute deep vein thrombosis (DVT) of popliteal vein of both lower extremities (HCC)    Acute respiratory failure with hypoxia (HCC)    Stage 3a chronic kidney disease ([address] )    Septic shock ([address] )    Pneumonia involving right lung    Cough    Platelets decreased ([address] )    Type 2 diabetes mellitus without complication, without long-term current use of insulin (HCC)    Allergic cough    Acute bacterial bronchitis    Chronic diastolic heart failure (HCC)    Pulmonary emboli (HCC)    Post-COVID chronic dyspnea    Left carotid artery occlusion    Asymptomatic carotid artery stenosis, right    Vision loss of left eye    Hypoxia    Urinary frequency    History of coronary artery bypass surgery    Gout    Impaired fasting glucose    Plantar fibromatosis    Seborrheic dermatitis    Sensorineural hearing loss, bilateral    Subclinical hypothyroidism    Vitamin D deficiency    Protein-calorie malnutrition ([address] )    History of blood clots    BPH (benign prostatic hyperplasia)    Electrolyte abnormality    Moderate protein-calorie malnutrition (HCC)    Elevated LFTs       Past Medical History:   Diagnosis Date    Cancer ([facility] )     Coronary artery disease     Gout     Hypercholesterolemia     Hypertension     Liver cancer ([address] )     Microhematuria        Past Surgical History:   Procedure Laterality Date ABDOMINAL SURGERY      appy    APPENDECTOMY      CORONARY ARTERY BYPASS GRAFT      IR BIOPSY LIVER MASS  8/25/2020    IR CHEMOEMBOLIZATION LIVER TUMOR  6/10/2021    IR PORT PLACEMENT  3/31/2022    IR Y-90 PRE-ANGIO/EMBO W/ LUNG SCAN  10/6/2020    IR Y-90 RADIOEMBOLIZATION  10/14/2020        05/24/22 0830   OT Last Visit   OT Visit Date 05/24/22   Note Type   Note type Evaluation   Additional Comments Pt agreeable to OT eval  Upon arrival pt supine in bed with HOB elevated  Restrictions/Precautions   Weight Bearing Precautions Per Order No   Other Precautions Cognitive;Multiple lines;Telemetry; Fall Risk;O2  (3 L O2 via NC- used at baseline)   Pain Assessment   Pain Assessment Tool 0-10   Pain Score No Pain   Home Living   Type of Home Apartment   Home Layout One level;Performs ADLs on one level; Able to live on main level with bedroom/bathroom;Stairs to enter with rails  (2-3 SAÚL)   Bathroom Shower/Tub Tub/shower unit   Bathroom Toilet Standard   Bathroom Equipment Grab bars in shower;Grab bars around toilet   [facility]  (no AD used at baseline)   Prior Function   Level of [place] Independent with ADLs and functional mobility   Lives With [place] Help From Family   ADL Assistance Independent   IADLs Needs assistance  (Wife does cooking and laundry)   Falls in the last 6 months 1 to 4  (1 fall per pt)   Vocational Retired   Comments (-) driving   Lifestyle   Autonomy Patient reporting being independent with ADLs, ambulatory with no AD, and lives with wife in a one level apartment   Reciprocal Relationships Wife   Service to Others Retired   ADL   Eating Assistance 5  [facility] 5  Supervision/Setup   [address] 4  [address] 3  Moderate Assistance   [address] 4  [address] 2  [facility]  2  Maximal Assistance Additional Comments Pt limited by decreased strength, endurance, balance, cognition and safety  Bed Mobility   Supine to Sit 4  Minimal assistance   Additional items Assist x 1;HOB elevated; Bedrails; Increased time required;Verbal cues;LE management   Sit to Supine   (DNT: pt seated OOB in recliner at end of eval)   Additional Comments Pt reported (+) dizziness upon sitting at EOB  Symptoms resolved with increased seated rest    Transfers   Sit to Stand 4  Minimal assistance   Additional items Assist x 2; Increased time required;Verbal cues; Bedrails   Stand to Sit 4  Minimal assistance   Additional items Assist x 2; Increased time required;Verbal cues;Armrests   Stand pivot 4  Minimal assistance   Additional items Assist x 2; Increased time required;Verbal cues   Toilet transfer 4  Minimal assistance   Additional items Assist x 2;Armrests; Increased time required;Verbal cues; Commode   Additional Comments Verbal cues provided throughout session for proper hand placement and safe body mechanics  Balance   Static Sitting Fair +   Dynamic Sitting Fair   Static Standing Fair -   Dynamic Standing Poor +   Activity Tolerance   Activity Tolerance Patient limited by fatigue; Other (Comment)  (Decreased cognition)   Medical Staff Made Aware Pt seen as a co-eval with PT due to the patient's co-morbidities, clinically unstable presentation, and present impairments which are a regression from the patient's baseline     Nurse Made Aware A[place]IN Pinto verbalized pt appropriate for therapy and made aware of outcomes   RUE Assessment   RUE Assessment WFL  (grossly 4-/5 MMT)   LUE Assessment   LUE Assessment WFL  (grossly 4-/5 MMT)   Hand Function   Gross Motor Coordination Functional   Fine Motor Coordination Functional   Sensation   Light Touch No apparent deficits   Vision-Basic Assessment   Current Vision Wears glasses all the time   Visual History   (Blind in L eye)   Cognition   Overall Cognitive Status Impaired Arousal/Participation Alert; Responsive; Cooperative   Attention Attends with cues to redirect   Orientation Level Oriented to person;Oriented to place; Disoriented to situation;Disoriented to time  (oriented to year; disoriented to month- "Sept ")   Memory Decreased short term memory;Decreased recall of recent events   Following Commands Follows one step commands with increased time or repetition   Comments Mini-Cog assessment performed today  Version 1 was given  Patient able to recall 2/3 words  Patient able to draw an abnormal clock with the incorrect time  Total score of test was 2/5 indicating  further screening of cognition is recommended  Cognition Assessment Tools   (Mini-cog)   Score 2   Assessment   Limitation Decreased ADL status; Decreased UE strength;Decreased Safe judgement during ADL;Decreased cognition;Decreased endurance;Decreased self-care trans;Decreased high-level ADLs   Prognosis Good   Assessment Patient is a 78 y o  male seen for OT evaluation s/p admit to [place] [address] on 5/21/2022 w/Septic shock ([address] )  Commorbidities affecting patient's functional performance at time of assessment include: acute renal failure, chronic diastolic heart failure, CAD, hepatocellular carcinoma, DM2 and pneumonia  Orders placed for OT evaluation and treatment and up and OOB as tolerated   Performed at least two patient identifiers during session including name and wristband  Prior to admission, Patient reporting being independent with ADLs, ambulatory with no AD, and lives with wife in a one level apartment  Personal factors affecting patient at time of initial evaluation include: steps to enter, difficulty performing ADLs and difficulty performing IADLs  Upon evaluation, patient requires minimal  assist for UB ADLs, moderate-maximal assist for LB ADLs, transfers and functional ambulation in room and bathroom with minimal  assist of 2, with the use of Rolling Walker    Patient is oriented to name,, oriented to time,, disoriented to time, and disoriented to situation, and presents with impaired judgement, inability to make safe decisions  Occupational performance is affected by the following deficits: orientation, decreased muscle strength, dynamic sit/ stand balance deficit with poor standing tolerance time for self care and functional mobility, decreased activity tolerance, impaired memory, impaired problem solving, decreased safety awareness and delayed righting and equilibrium reactions  Based on the mentioned OT evaluation outcomes, functional performance deficits, and assessment findings, pt has been identified as a high complexity evaluation  Patient to benefit from continued Occupational Therapy treatment while in the hospital to address deficits as defined above and maximize level of functional independence with ADLs and functional mobility  Occupational Performance areas to address include: grooming , bathing/ shower, dressing, toilet hygiene, transfer to all surfaces and functional ambulation  From OT standpoint, recommendation at time of d/c would be Post acute rehabilitation services  Goals   Patient Goals to get stronger   Plan   Treatment Interventions ADL retraining;Functional transfer training;UE strengthening/ROM; Endurance training;Patient/family training; Compensatory technique education; Activityengagement; Energy conservation   Goal Expiration Date 06/03/22   OT Treatment Day 0   OT Frequency 3-5x/wk   Recommendation   OT Discharge Recommendation Post acute rehabilitation services   OT - OK to Discharge Yes  (Once medically cleared)   Additional Comments  At end of session, pt seated OOB in recliner with all needs met and call bell within reach   Additional Comments 2 The patient's raw score on the AM-PAC Daily Activity inpatient short form is 13, standardized score is 32 03, less than 39 4  Patients at this level are likely to benefit from discharge to post-acute rehabilitation services  Please refer to the recommendation of the Occupational Therapist for safe discharge planning  AM-PAC Daily Activity Inpatient   Lower Body Dressing 1   Bathing 2   Toileting 1   Upper Body Dressing 3   Grooming 3   Eating 3   Daily Activity Raw Score 13   Daily Activity Standardized Score (Calc for Raw Score >=11) 32 03   AM-PAC Applied Cognition Inpatient   Following a Speech/Presentation 3   Understanding Ordinary Conversation 4   Taking Medications 2   Remembering Where Things Are Placed or Put Away 2   Remembering List of 4-5 Errands 2   Taking Care of Complicated Tasks 2   Applied Cognition Raw Score 15   Applied Cognition Standardized Score 33 54     GOALS:    *ADL transfers with (I) for inc'd independence with ADLs/purposeful tasks    *UB ADL with (I) for inc'd independence with self cares    *LB ADL with (S) using AE prn for inc'd independence with self cares    *Toileting with (S) for clothing management and hygiene for return to PLOF with personal care    *Increase stand tolerance x 5  m for inc'd tolerance with standing purposeful tasks    *Participate in 10m UE therex to increase overall stamina/activity tolerance for purposeful tasks    *Bed mobility- (I) for inc'd independence to manage own comfort and initiate EOB & OOB purposeful tasks    *Patient will verbalize 3 safety awareness/ principles to prevent falls in the home setting  *Patient will verbalize and demonstrate use of energy conservation/deep breathing techniques and work simplification skills during functional activities with no verbal cues  *Patient will increase OOB/sitting tolerance to 2-4 hours per day to increase participation in self-care and leisure tasks with no s/s of exertion  *Patient will engage in ongoing cognitive assessment to assist with safe discharge planning/recommendations        *Pt will demonstrate use of long handled AE during 100% of tx sessions for increased ADL safety and independence following D/C     Santy Searcy, OTD, OTR/L

## 2022-05-24 NOTE — ASSESSMENT & PLAN NOTE
· With urinary retention  · Continue home Flomax  · Rodgers catheter was replaced; plan for void trial when ambulation improves

## 2022-05-25 NOTE — ASSESSMENT & PLAN NOTE
Malnutrition Findings:   Adult Malnutrition type: Chronic illness  Adult Degree of Malnutrition: Malnutrition of moderate degree  Malnutrition Characteristics: Weight loss, Muscle loss, Fat loss                  360 Statement: Moderate malnutrition related to inadequate protein energy intake as evidenced by significant wt  loss 35#/21% < 1 year, mild muscle loss clavicle/temporalis areas, moderate fat loss buccal/orbital pads treated with diet/supplements  BMI Findings: Body mass index is 24 61 kg/m²

## 2022-05-25 NOTE — CASE MANAGEMENT
Case Management Discharge Planning Note    Patient name Sherryle Pimple  Location ICU 05/ICU 45-99 MRN [id number]  : 1940 Date 2022       Current Admission Date: 2022  Current Admission Diagnosis:Septic shock [facility])   Patient Active Problem List    Diagnosis Date Noted    Elevated LFTs 2022    Moderate protein-calorie malnutrition ([facility] ) 2022    History of coronary artery bypass surgery 2022    Gout 2022    Impaired fasting glucose 2022    Plantar fibromatosis 2022    Seborrheic dermatitis 2022    Sensorineural hearing loss, bilateral 2022    Subclinical hypothyroidism 2022    Vitamin D deficiency 2022    Protein-calorie malnutrition ([facility] ) 2022    History of blood clots 2022    BPH (benign prostatic hyperplasia) 2022    Urinary frequency 05/10/2022    Hypoxia 2022    Left carotid artery occlusion 2022    Asymptomatic carotid artery stenosis, right 2022    Vision loss of left eye 2022    Post-COVID chronic dyspnea 2021    Chronic diastolic heart failure ([facility] ) 2021    Pulmonary emboli ([facility] ) 2021    Acute bacterial bronchitis 10/28/2021    Allergic cough 2021    Platelets decreased ([facility] ) 2021    Type 2 diabetes mellitus without complication, without long-term current use of insulin ([facility] ) 2021    Cough 2021    Stage 3a chronic kidney disease ([facility] ) 2021    Septic shock ([facility] ) 2021    Pneumonia involving right lung 2021    Acute respiratory failure with hypoxia ([facility] ) 2021    Acute deep vein thrombosis (DVT) of popliteal vein of both lower extremities ([facility] ) 2020    Hepatocellular carcinoma ([facility] ) 2020    Transaminitis 2020    Chronic respiratory insufficiency 2020    Lactic acidosis 2020    Acute renal failure superimposed on stage 3a chronic kidney disease ([facility] ) 2020  Elevated troponin 08/23/2020    Hyperglycemia 08/05/2020    Ankle edema, bilateral 08/05/2020    Reactive airway disease without complication 02/21/2123    Coronary artery disease without angina pectoris 01/13/2020    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 ([facility] ) 01/13/2020    Essential hypertension 08/05/2015    Hyperlipidemia 08/05/2015    Cardiomegaly 08/05/2015    Anxiety 08/05/2015    Abnormal electrocardiogram 08/05/2015      LOS (days): 4  Geometric Mean LOS (GMLOS) (days): 4 80  Days to GMLOS:1     OBJECTIVE:  Risk of Unplanned Readmission Score: 20 01     Current admission status: Inpatient   Preferred Pharmacy:   [facility] [address], [address]   [address]   [address] [address]  Phone: [phone] Fax: [phone]    [address]  [facility] [place] [address]  Phone: [phone] Fax: [phone]    Primary Care Provider: Milton Hair DO    Primary Insurance: MEDICARE  Secondary Insurance: [address]  DETAILS:   AS per PT pt will need STR  Pt is agreeable to same  SLIM spoke to Dr Charli Olivares who indicated the pt can have his treatments on hold until STR is completed  A post acute care recommendation was made by your care team for STR  Discussed Freedom of Choice with patient  List of facilities given to patient via in person  patient aware the list is custom filtered for them by preference  and that [facility] post acute providers are designated  A blanket referral was made with the pt agreement to chose from accepting facilities

## 2022-05-25 NOTE — PLAN OF CARE
Problem: Prexisting or High Potential for Compromised Skin Integrity  Goal: Skin integrity is maintained or improved  Description: INTERVENTIONS:  - Identify patients at risk for skin breakdown  - Assess and monitor skin integrity  - Assess and monitor nutrition and hydration status  - Monitor labs   - Assess for incontinence   - Turn and reposition patient  - Assist with mobility/ambulation  - Relieve pressure over bony prominences  - Avoid friction and shearing  - Provide appropriate hygiene as needed including keeping skin clean and dry  - Evaluate need for skin moisturizer/barrier cream  - Collaborate with interdisciplinary team   - Patient/family teaching  - Consider wound care consult   Outcome: Progressing  Goal: Ability to express needs and understand communication  Outcome: Progressing     Problem: RESPIRATORY - ADULT  Goal: Achieves optimal ventilation and oxygenation  Description: INTERVENTIONS:  - Assess for changes in respiratory status  - Assess for changes in mentation and behavior  - Position to facilitate oxygenation and minimize respiratory effort  - Oxygen administered by appropriate delivery if ordered  - Initiate smoking cessation education as indicated  - Encourage broncho-pulmonary hygiene including cough, deep breathe, Incentive Spirometry  - Assess the need for suctioning and aspirate as needed  - Assess and instruct to report SOB or any respiratory difficulty  - Respiratory Therapy support as indicated  Outcome: Progressing     Problem: PAIN - ADULT  Goal: Verbalizes/displays adequate comfort level or baseline comfort level  Description: Interventions:  - Encourage patient to monitor pain and request assistance  - Assess pain using appropriate pain scale  - Administer analgesics based on type and severity of pain and evaluate response  - Implement non-pharmacological measures as appropriate and evaluate response  - Consider cultural and social influences on pain and pain management  - Notify physician/advanced practitioner if interventions unsuccessful or patient reports new pain  Outcome: Progressing     Problem: Nutrition/Hydration-ADULT  Goal: Nutrient/Hydration intake appropriate for improving, restoring or maintaining nutritional needs  Description: Monitor and assess patient's nutrition/hydration status for malnutrition  Collaborate with interdisciplinary team and initiate plan and interventions as ordered  Monitor patient's weight and dietary intake as ordered or per policy  Utilize nutrition screening tool and intervene as necessary  Determine patient's food preferences and provide high-protein, high-caloric foods as appropriate       INTERVENTIONS:  - Monitor oral intake, urinary output, labs, and treatment plans  - Assess nutrition and hydration status and recommend course of action  - Evaluate amount of meals eaten  - Assist patient with eating if necessary   - Allow adequate time for meals  - Recommend/ encourage appropriate diets, oral nutritional supplements, and vitamin/mineral supplements  - Order, calculate, and assess calorie counts as needed  - Recommend, monitor, and adjust tube feedings and TPN/PPN based on assessed needs  - Assess need for intravenous fluids  - Provide specific nutrition/hydration education as appropriate  - Include patient/family/caregiver in decisions related to nutrition  Outcome: Progressing     Problem: INFECTION - ADULT  Goal: Absence or prevention of progression during hospitalization  Description: INTERVENTIONS:  - Assess and monitor for signs and symptoms of infection  - Monitor lab/diagnostic results  - Monitor all insertion sites, i e  indwelling lines, tubes, and drains  - Monitor endotracheal if appropriate and nasal secretions for changes in amount and color  - [place] appropriate cooling/warming therapies per order  - Administer medications as ordered  - Instruct and encourage patient and family to use good hand hygiene technique  - Identify and instruct in appropriate isolation precautions for identified infection/condition  Outcome: Progressing  Goal: Absence of fever/infection during neutropenic period  Description: INTERVENTIONS:  - Monitor WBC    Outcome: Progressing     Problem: SAFETY ADULT  Goal: Patient will remain free of falls  Description: INTERVENTIONS:  - Educate patient/family on patient safety including physical limitations  - Instruct patient to call for assistance with activity   - Consult OT/PT to assist with strengthening/mobility   - Keep Call bell within reach  - Keep bed low and locked with side rails adjusted as appropriate  - Keep care items and personal belongings within reach  - Initiate and maintain comfort rounds  - Make Fall Risk Sign visible to staff  - Offer Toileting every 2 Hours, in advance of need  - Initiate/Maintain fall alarm  - Obtain necessary fall risk management equipment: fall alarm  - Apply yellow socks and bracelet for high fall risk patients  - Consider moving patient to room near nurses station  Outcome: Progressing  Goal: Maintain or return to baseline ADL function  Description: INTERVENTIONS:  -  Assess patient's ability to carry out ADLs; assess patient's baseline for ADL function and identify physical deficits which impact ability to perform ADLs (bathing, care of mouth/teeth, toileting, grooming, dressing, etc )  - Assess/evaluate cause of self-care deficits   - Assess range of motion  - Assess patient's mobility; develop plan if impaired  - Assess patient's need for assistive devices and provide as appropriate  - Encourage maximum independence but intervene and supervise when necessary  - Involve family in performance of ADLs  - Assess for home care needs following discharge   - Consider OT consult to assist with ADL evaluation and planning for discharge  - Provide patient education as appropriate  Outcome: Progressing  Goal: Maintains/Returns to pre admission functional level  Description: INTERVENTIONS:  - Perform BMAT or MOVE assessment daily    - Set and communicate daily mobility goal to care team and patient/family/caregiver  - Collaborate with rehabilitation services on mobility goals if consulted  - Perform Range of Motion 3 times a day  - Reposition patient every 2 hours  - Dangle patient 3 times a day  - Stand patient 3 times a day  - Ambulate patient 3 times a day  - Out of bed to chair 3 times a day   - Out of bed for meals 3 times a day  - Out of bed for toileting  - Record patient progress and toleration of activity level   Outcome: Progressing     Problem: DISCHARGE PLANNING  Goal: Discharge to home or other facility with appropriate resources  Description: INTERVENTIONS:  - Identify barriers to discharge w/patient and caregiver  - Arrange for needed discharge resources and transportation as appropriate  - Identify discharge learning needs (meds, wound care, etc )  - Arrange for interpretive services to assist at discharge as needed  - Refer to Case Management Department for coordinating discharge planning if the patient needs post-hospital services based on physician/advanced practitioner order or complex needs related to functional status, cognitive ability, or social support system  Outcome: Progressing     Problem: Knowledge Deficit  Goal: Patient/family/caregiver demonstrates understanding of disease process, treatment plan, medications, and discharge instructions  Description: Complete learning assessment and assess knowledge base    Interventions:  - Provide teaching at level of understanding  - Provide teaching via preferred learning methods  Outcome: Progressing

## 2022-05-25 NOTE — PHYSICAL THERAPY NOTE
Physical Therapy Cancellation Note       05/25/22 0755   PT Last Visit   PT Visit Date 05/25/22   Note Type   Note Type Cancelled Session   Cancel Reasons   (refusal - pt offered PT session at this time, however refusing and states he wants to remain in bed at present time)       Chart review performed  At this time, PT treatment session cancelled as patient refusing skilled PT interventions  PT will follow and provide PT interventions as appropriate      Jamie Mcgarry PT

## 2022-05-25 NOTE — ASSESSMENT & PLAN NOTE
Patient advised. He requested to recheck on 9/25/17. Nurse appointment scheduled.    Future Appointments  Date Time Provider Maximiliano Rdz   9/25/2017 8:15 AM [facility] NURSE [facility] Jack Galan · AEB tachycardia, tachypnea, hypotension, leukocytosis, elevated lactic acid, EMILIANO  · Suspect source is pneumonia given RUL/RML  · CT C/A/P(5/21/2022): Patchy consolidative airspace opacities throughout posterior lateral right upper lobe and in lateral right midlung are suspicious for acute pneumonia; background groundglass and reticular airspace opacities in periphery of mid and lower right lung and in left costophrenic angle, similar from November 4, 2021 and suspicious for sequela of viral pneumonia due to COVID-19 infection  · COVID/FLU/RSV negative  · B Clx (5/21/22):  NGTD  · UA without evidence of infection  · Lactic acid peaked at 8 6 and was 1 9 on last check  · Continue Levaquin (End date: 5/28/2022)

## 2022-05-25 NOTE — ASSESSMENT & PLAN NOTE
· The patient denies any abdominal discomfort, nausea, or vomiting  · CT A/P: Advanced hepatic metastatic disease reidentified, similar from previous examination with innumerable hepatic metastatic lesions and approximate 50% replacement of hepatic parenchyma with metastatic tumor  Probable metastatic lesions in the spleen and miles metastatic disease along the right external iliac chain     · Recommend further outpatient f/u with Hem/Onc

## 2022-05-25 NOTE — ASSESSMENT & PLAN NOTE
· On 3L NC chronically since being hospitalized for COVID-19 pneumonia in 11/2021  · Continue Xopenex, Atrovent, Pulmicort nebs for now  · Follows with Dr Amy De Los Santos as outpatient;  Needs outpatient follow-up in 4 weeks with repeat CXR

## 2022-05-25 NOTE — ASSESSMENT & PLAN NOTE
Lab Results   Component Value Date    EGFR 82 05/25/2022    EGFR 85 05/24/2022    EGFR 89 05/23/2022    CREATININE 0 80 05/25/2022    CREATININE 0 74 05/24/2022    CREATININE 0 65 05/23/2022     · POA and resolved at this time  · Avoid hypotension and nephrotoxic agents  · Monitor with daily labs

## 2022-05-25 NOTE — PROGRESS NOTES
Progress Note - Pulmonary   Ember Ferrari 78 y o  male MRN: [id number]  Unit/Bed#: ICU 05-01 Encounter: [id number]      Assessment:  · Acute on chronic hypoxemic respiratory failure  Close to his baseline  · Sepsis due to pneumonia  Improved  · Pneumonia  Plan:  · Continue levofloxacin to complete 7 days of total antibiotics  · Titrate oxygen to maintain O2 saturation above 88%  · Encourage incentive spirometry  · Increase activities as tolerated  · Follow-up in the office with a repeat chest x-ray in 4 weeks  · Will sign off  Please call with questions  Subjective: The patient was sitting in the chair  He feels his breathing is close to his baseline  Denies pain  Cough has markedly improved  Vitals: Blood pressure 105/66, pulse (!) 115, temperature 98 3 °F (36 8 °C), temperature source Tympanic, resp  rate (!) 42, height 5' 9" (1 753 m), weight 75 6 kg (166 lb 10 7 oz), SpO2 95 %  , Body mass index is 24 61 kg/m²  Intake/Output Summary (Last 24 hours) at 5/25/2022 1101  Last data filed at 5/25/2022 0830  Gross per 24 hour   Intake 435 ml   Output 1225 ml   Net -790 ml       Physical Exam  Gen: Awake, alert, oriented x 3, no acute distress  HEENT: Mucous membranes moist, no oral lesions, no thrush  NECK: No accessory muscle use, JVP not elevated  Cardiac: Regular, single S1, single S2, no murmurs, no rubs, no gallops  Lungs:  Posterior crackles  No wheezing or rhonchi    Abdomen: normoactive bowel sounds, soft nontender, nondistended, no rebound or rigidity, no guarding  Extremities: no cyanosis, no clubbing, no edema    Labs:   CBC:   Lab Results   Component Value Date    WBC 9 04 05/25/2022    HGB 14 4 05/25/2022    HCT 47 0 05/25/2022    MCV 96 05/25/2022     (L) 05/25/2022    MCH 29 3 05/25/2022    MCHC 30 6 (L) 05/25/2022    RDW 17 9 (H) 05/25/2022    MPV 10 7 05/25/2022   , CMP:   Lab Results   Component Value Date    SODIUM 138 05/25/2022    K 4 5 05/25/2022     05/25/2022    CO2 29 05/25/2022    BUN 17 05/25/2022    CREATININE 0 80 05/25/2022    CALCIUM 9 1 05/25/2022    EGFR 82 05/25/2022           eFr Ko MD

## 2022-05-25 NOTE — ASSESSMENT & PLAN NOTE
· s/p chemoembolization 06/2021 as well as radioembolization 10/2020  · Currently receiving chemo/immunotherapy per most recent Hem/Onc notes  · Port placed on 03/2022  · Imaging on admission revealed reidentified advanced hepatic metastatic disease, similar from previous examination with innumerable hepatic metastatic lesions and approximate 50% replacement of hepatic parenchyma with metastatic tumor; probable metastatic lesions in spleen and miles metastatic disease along right external iliac chain  · Follows with Dr Spike Rojas as outpatient   · Patient is currently medically cleared for DC; PT is recommending STR placement  Will attempt to reach Dr Spike Rojas to see if his treatment can be held while at [facility]

## 2022-05-25 NOTE — PROGRESS NOTES
[address] 43  Progress Note - Fermín Gagnon 1940, 78 y o  male MRN: [id number]  Unit/Bed#: ICU 05-01 Encounter: [id number]  Primary Care Provider: Ketan Barbosa DO   Date and time admitted to hospital: 5/21/2022  3:47 PM    * Septic shock [facility])  Assessment & Plan  · AEB tachycardia, tachypnea, hypotension, leukocytosis, elevated lactic acid, EMILIANO  · Suspect source is pneumonia given RUL/RML  · CT C/A/P(5/21/2022): Patchy consolidative airspace opacities throughout posterior lateral right upper lobe and in lateral right midlung are suspicious for acute pneumonia; background groundglass and reticular airspace opacities in periphery of mid and lower right lung and in left costophrenic angle, similar from November 4, 2021 and suspicious for sequela of viral pneumonia due to COVID-19 infection  · COVID/FLU/RSV negative  · B Clx (5/21/22): NGTD  · UA without evidence of infection  · Lactic acid peaked at 8 6 and was 1 9 on last check  · Continue Levaquin (End date: 5/28/2022)    Elevated LFTs  Assessment & Plan  · The patient denies any abdominal discomfort, nausea, or vomiting  · CT A/P: Advanced hepatic metastatic disease reidentified, similar from previous examination with innumerable hepatic metastatic lesions and approximate 50% replacement of hepatic parenchyma with metastatic tumor  Probable metastatic lesions in the spleen and miles metastatic disease along the right external iliac chain  · Recommend further outpatient f/u with Hem/Onc    Pneumonia involving right lung  Assessment & Plan  · Suspect this is related to aspiration as it appears this has been a chronic concern  · Barium swallow (05/12):  Concern for spillage over epiglottis and potentially into laryngeal space, though it was noted that penetrated material was immediately redirected during swallow initiation  · Continue antibiotics as noted above    Acute renal failure superimposed on stage 3a chronic kidney disease [facility])  Assessment & Plan  Lab Results   Component Value Date    EGFR 82 05/25/2022    EGFR 85 05/24/2022    EGFR 89 05/23/2022    CREATININE 0 80 05/25/2022    CREATININE 0 74 05/24/2022    CREATININE 0 65 05/23/2022     · POA and resolved at this time  · Avoid hypotension and nephrotoxic agents  · Monitor with daily labs    BPH (benign prostatic hyperplasia)  Assessment & Plan  · Continue home Flomax  · Void trial today    Chronic respiratory insufficiency  Assessment & Plan  · On 3L NC chronically since being hospitalized for COVID-19 pneumonia in 11/2021  · Continue Xopenex, Atrovent, Pulmicort nebs for now  · Follows with Dr Sarah Somers as outpatient; Needs outpatient follow-up in 4 weeks with repeat CXR      Chronic diastolic heart failure ([facility] [address] )  Assessment & Plan  · Does not appear acutely decompensated at this time  · TTE (03/2022): LVEF 60% and G1DD, mildly dilated RV, mild TR, mild AR  · Continue Lopressor 12 5mg BID  · Strict I/O and daily weights          Hepatocellular carcinoma (HCC)  Assessment & Plan  · s/p chemoembolization 06/2021 as well as radioembolization 10/2020  · Currently receiving chemo/immunotherapy per most recent Hem/Onc notes  · Port placed on 03/2022  · Imaging on admission revealed reidentified advanced hepatic metastatic disease, similar from previous examination with innumerable hepatic metastatic lesions and approximate 50% replacement of hepatic parenchyma with metastatic tumor; probable metastatic lesions in spleen and miles metastatic disease along right external iliac chain  · Follows with Dr Majorie Goltz as outpatient   · Patient is currently medically cleared for DC; PT is recommending STR placement  · Dr Majorie Goltz noted that he would likely miss one treatment session if he is in rehab which is not ideal but ACCEPTABLE if STR is to improve his functional status         Coronary artery disease without angina pectoris  Assessment & Plan  · Is s/p CABG x3 in 1996  · Normal ejection fraction noted on recent 2D echocardiogram  · Continue home aspirin 81 mg PO daily and low-dose Lopressor as above    Type 2 diabetes mellitus without complication, without long-term current use of insulin [facility])  Assessment & Plan  Lab Results   Component Value Date    HGBA1C 7 0 (A) 05/10/2022       Recent Labs     05/24/22  1831 05/24/22  2343 05/25/22  0615 05/25/22  1133   POCGLU 175* 151* 83 191*       Blood Sugar Average: Last 72 hrs:  (P) 991 [id number]   · Continue Accu-Cheks, corrective sliding scale insulin, and hypoglycemic protocol  · Carb controlled diet    Moderate protein-calorie malnutrition (HCC)  Assessment & Plan  Malnutrition Findings:   Adult Malnutrition type: Chronic illness  Adult Degree of Malnutrition: Malnutrition of moderate degree  Malnutrition Characteristics: Weight loss, Muscle loss, Fat loss                  360 Statement: Moderate malnutrition related to inadequate protein energy intake as evidenced by significant wt  loss 35#/21% < 1 year, mild muscle loss clavicle/temporalis areas, moderate fat loss buccal/orbital pads treated with diet/supplements  BMI Findings: Body mass index is 24 61 kg/m²  VTE Pharmacologic Prophylaxis: VTE Score: 12 High Risk (Score >/= 5) - Pharmacological DVT Prophylaxis Ordered: heparin  Sequential Compression Devices Ordered  Patient Centered Rounds: I performed bedside rounds with nursing staff today  Discussions with Specialists or Other Care Team Provider: Hem/Onc, PT/OT, Nursing, and CM    Education and Discussions with Family / Patient: Updated  (wife) via phone  Time Spent for Care: 45 minutes  More than 50% of total time spent on counseling and coordination of care as described above  Current Length of Stay: 4 day(s)  Current Patient Status: Inpatient   Certification Statement: The patient will continue to require additional inpatient hospital stay due to awaiting placement    Discharge Plan: Patient is medically cleared for discharge  Currently awaiting short-term rehab placement  Code Status: Level 1 - Full Code    Subjective:   Patient is resting comfortably in bed without any acute complaints  No significant overnight events reported by nursing  Objective:     Vitals:   Temp (24hrs), Av °F (36 7 °C), Min:97 2 °F (36 2 °C), Max:98 5 °F (36 9 °C)    Temp:  [97 2 °F (36 2 °C)-98 5 °F (36 9 °C)] 97 3 °F (36 3 °C)  HR:  [] 115  Resp:  [20-47] 42  BP: ()/(51-78) 105/66  SpO2:  [94 %-98 %] 95 %  Body mass index is 24 61 kg/m²  Input and Output Summary (last 24 hours): Intake/Output Summary (Last 24 hours) at 2022 1238  Last data filed at 2022 0830  Gross per 24 hour   Intake 435 ml   Output 1225 ml   Net -790 ml       Physical Exam:   Physical Exam  Vitals and nursing note reviewed  Constitutional:       General: He is not in acute distress  Appearance: Normal appearance  He is normal weight  HENT:      Head: Normocephalic and atraumatic  Mouth/Throat:      Mouth: Mucous membranes are moist       Pharynx: Oropharynx is clear  Eyes:      Extraocular Movements: Extraocular movements intact  Conjunctiva/sclera: Conjunctivae normal    Cardiovascular:      Rate and Rhythm: Regular rhythm  Tachycardia present  Pulses: Normal pulses  Heart sounds: Normal heart sounds  Pulmonary:      Effort: Pulmonary effort is normal  No respiratory distress  Breath sounds: Normal breath sounds  No wheezing  Comments: 3L NC  Abdominal:      General: Bowel sounds are normal  There is no distension  Palpations: Abdomen is soft  Tenderness: There is no abdominal tenderness  Genitourinary:     Comments: Rodgers with clear urine in bag  Musculoskeletal:         General: Normal range of motion  Cervical back: Normal range of motion and neck supple  Skin:     General: Skin is warm and dry  Neurological:      General: No focal deficit present  Mental Status: He is alert and oriented to person, place, and time  Mental status is at baseline  Psychiatric:         Mood and Affect: Mood normal          Behavior: Behavior normal          Judgment: Judgment normal         Labs:  Results from last 7 days   Lab Units 05/25/22  0517 05/24/22  0537 05/23/22  0429   WBC Thousand/uL 9 04   < > 9 22   HEMOGLOBIN g/dL 14 4   < > 13 5   HEMATOCRIT % 47 0   < > 40 1   PLATELETS Thousands/uL 128*   < > 131*   NEUTROS PCT %  --   --  81*   LYMPHS PCT %  --   --  4*   MONOS PCT %  --   --  12   EOS PCT %  --   --  0    < > = values in this interval not displayed       Results from last 7 days   Lab Units 05/25/22  0517 05/24/22  0537   SODIUM mmol/L 138 136   POTASSIUM mmol/L 4 5 4 4   CHLORIDE mmol/L 101 101   CO2 mmol/L 29 28   BUN mg/dL 17 16   CREATININE mg/dL 0 80 0 74   ANION GAP mmol/L 8 7   CALCIUM mg/dL 9 1 9 0   ALBUMIN g/dL  --  2 8*   TOTAL BILIRUBIN mg/dL  --  0 93   ALK PHOS U/L  --  322*   ALT U/L  --  87*   AST U/L  --  142*   GLUCOSE RANDOM mg/dL 94 90     Results from last 7 days   Lab Units 05/21/22  1546   INR  0 95     Results from last 7 days   Lab Units 05/25/22  1133 05/25/22  0615 05/24/22  2343 05/24/22  1831 05/24/22  1143 05/24/22  0526 05/23/22  2322 05/23/22  1810 05/23/22  1132 05/23/22  0546 05/23/22  0148 05/22/22  1752   POC GLUCOSE mg/dl 191* 83 151* 175* 235* 91 168* 147* 139 152* 167* 146*         Results from last 7 days   Lab Units 05/24/22  0537 05/23/22  0429 05/22/22  0253 05/21/22  2337 05/21/22  2139 05/21/22  1737 05/21/22  1546   LACTIC ACID mmol/L  --   --  1 9 2 6* 3 4* 5 2* 8 6*   PROCALCITONIN ng/ml 161 23* 124 41* 174 63*  --   --   --  293 05*       Lines/Drains:  Invasive Devices  Report    Central Venous Catheter Line  Duration           Port A Cath 03/31/22 Right Chest 55 days          Peripheral Intravenous Line  Duration           Peripheral IV 05/21/22 Right Antecubital 3 days    Peripheral IV 05/23/22 Dorsal (posterior); Left Forearm 1 day          Drain  Duration           Urethral Catheter 12 Fr  2 days              Urinary Catheter:  Goal for removal: Attempt void trial today         Central Line:  Goal for removal: Chronic port             Imaging: No new imaging  Recent Cultures (last 7 days):   Results from last 7 days   Lab Units 05/22/22  0001 05/21/22  1552 05/21/22  1546   BLOOD CULTURE   --  No Growth at 72 hrs  No Growth at 72 hrs  LEGIONELLA URINARY ANTIGEN  Negative  --   --        Last 24 Hours Medication List:   Current Facility-Administered Medications   Medication Dose Route Frequency Provider Last Rate    allopurinol  300 mg Oral Daily Silvina Pj, DO      aspirin  81 mg Oral Daily Silvina Pj, DO      budesonide  0 5 mg Nebulization Q12H BEKA Michael      fluticasone  2 spray Nasal Daily Alexei Bonilla PA-C      fluticasone-vilanterol  1 puff Inhalation Daily Silvina Oliva DO      heparin (porcine)  5,000 Units Subcutaneous [facility] Alexei Bonilla PA-C      insulin lispro  1-6 Units Subcutaneous Q6H [facility] BEKA Michael      ipratropium  0 5 mg Nebulization TID Shefali Loges, CRNP      levalbuterol  1 25 mg Nebulization TID Shefali LogBEKA chavez      levofloxacin  750 mg Oral Q24H [facility], DO      levothyroxine  75 mcg Oral Early Morning Silvina Pj, DO      loperamide  2 mg Oral 4x Daily PRN Silvina Meras, DO      metoprolol tartrate  12 5 mg Oral Q12H [facility], DO      nystatin   Topical BID BEKA Michael      ondansetron  4 mg Intravenous Q6H PRN Alexei Bonilla PA-C      pantoprazole  40 mg Oral Early Morning Silvina Pj, DO      tamsulosin  0 4 mg Oral Daily With [facility],           Today, Patient Was Seen By: David Hinkle DO    **Please Note: This note may have been constructed using a voice recognition system  **

## 2022-05-25 NOTE — ASSESSMENT & PLAN NOTE
Lab Results   Component Value Date    HGBA1C 7 0 (A) 05/10/2022       Recent Labs     05/24/22  1831 05/24/22  2343 05/25/22  0615 05/25/22  1133   POCGLU 175* 151* 83 191*       Blood Sugar Average: Last 72 hrs:  (P) 102 [id number]   · Continue Accu-Cheks, corrective sliding scale insulin, and hypoglycemic protocol  · Carb controlled diet

## 2022-05-25 NOTE — OCCUPATIONAL THERAPY NOTE
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   Pain Assessment   Pain Assessment Tool 0-10   Pain Score No Pain   ADL   Grooming Comments Declined brushing teeth   UB Bathing Comments patient did Minimal UB bathing per choice   LB Dressing Comments Demos  'd ability to doff/don socks from recliner by bringing foot/feet to opposite knee(s)   Transfers   Sit to Stand 4  Minimal assistance   Additional items Assist x 1; Armrests; Increased time required;Verbal cues   Stand to Sit 4  Minimal assistance   Additional items Assist x 1; Armrests; Verbal cues   Therapeutic Excerise-Strength   UE Strength Yes   Right Upper Extremity- Strength   R Shoulder Other (Comment); Horizontal ABduction  (pro/re-traction)   R Elbow Elbow flexion;Elbow extension  (in forward and reverse;  also: supin/pron-ation)   R Weight/Reps/Sets 1 pound weight - 10 reps 2 sets shoulders and 15 reps elbows   Left Upper Extremity-Strength   L Weights/Reps/Sets all exers  same as RUE above   Coordination   Gross Motor appears WFL   Dexterity appears WFL   Cognition   Overall Cognitive Status Impaired   Arousal/Participation Responsive; Cooperative; Alert   Attention Within functional limits   Following Commands Follows one step commands with increased time or repetition   Comments Pt  asked what the rehab would be like at [facility] and seemed reassured with my general explanation   Activity Tolerance   Activity Tolerance Patient limited by fatigue   Assessment   Assessment Patient participated in Skilled OT session this date with interventions consisting of Energy Conservation techniques, safety awareness and fall prevention techniques, therapeutic exercise to: increase functional use of BUEs, increase BUE muscle strength ,  therapeutic activities to: increase activity tolerance and increase OOB/ sitting tolerance   Patient agreeable to OT treatment session, upon arrival patient was found seated OOB to Recliner  Patient requiring verbal cues for correct technique, verbal cues for pacing thru activity steps, one step directives, frequent rest periods and ocassional safety reminders and self-care assistance as noted in AM-PAC/flow sheet  Patient continues to be functioning below baseline level, occupational performance remains limited secondary to factors listed above and increased risk for falls and injury  From OT standpoint, recommendation at time of d/c would be post-acute rehabilitation services  Patient to benefit from continued Occupational Therapy treatment while in the hospital to address deficits as defined above and maximize level of functional independence with ADLs and functional mobility  Plan   Treatment Interventions ADL retraining;Functional transfer training;UE strengthening/ROM; Activityengagement; Energy conservation   Goal Expiration Date 06/03/22   OT Treatment Day 1   Recommendation   OT Discharge Recommendation Post acute rehabilitation services   Additional Comments 2 The patient's raw score on the AM-PAC Daily Activity inpatient short form is 15, standardized score is 34 69, less than 39 4  Patients at this level are likely to benefit from discharge to post-acute rehabilitation services  Please refer to the recommendation of the Occupational Therapist for safe discharge planning     AM-PAC Daily Activity Inpatient   Lower Body Dressing 2   Bathing 2   Toileting 2   Upper Body Dressing 3   Grooming 3   Eating 3   Daily Activity Raw Score 15   Daily Activity Standardized Score (Calc for Raw Score >=11) 34 69   AM-PAC Applied Cognition Inpatient   Following a Speech/Presentation 3 Understanding Ordinary Conversation 4   Taking Medications 2   Remembering Where Things Are Placed or Put Away 2   Remembering List of 4-5 Errands 2   Taking Care of Complicated Tasks 2   Applied Cognition Raw Score 15   Applied Cognition Standardized Score 33 54   YANET Olivia/L

## 2022-05-25 NOTE — OCCUPATIONAL THERAPY NOTE
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   Pain Assessment   Pain Assessment Tool 0-10   Pain Score No Pain   ADL   Grooming Comments Declined brushing teeth   UB Bathing Comments patient did Minimal UB bathing per choice   LB Dressing Comments Demos  'd ability to doff/don socks from recliner by bringing foot/feet to opposite knee(s)   Transfers   Sit to Stand 4  Minimal assistance   Additional items Assist x 1; Armrests; Increased time required;Verbal cues   Stand to Sit 4  Minimal assistance   Additional items Assist x 1; Armrests; Verbal cues   Therapeutic Excerise-Strength   UE Strength Yes   Right Upper Extremity- Strength   R Shoulder Other (Comment); Horizontal ABduction  (pro/re-traction)   R Elbow Elbow flexion;Elbow extension  (in forward and reverse;  also: supin/pron-ation)   R Weight/Reps/Sets 1 pound weight - 10 reps 2 sets shoulders and 15 reps elbows   Left Upper Extremity-Strength   L Weights/Reps/Sets all exers  same as RUE above   Coordination   Gross Motor appears WFL   Dexterity appears WFL   Cognition   Overall Cognitive Status Impaired   Arousal/Participation Responsive; Cooperative; Alert   Attention Within functional limits   Following Commands Follows one step commands with increased time or repetition   Comments Pt  asked what the rehab would be like at [facility] and seemed reassured with my general explanation   Activity Tolerance   Activity Tolerance Patient limited by fatigue   Assessment   Assessment The patient's raw score on the AM-PAC Daily Activity inpatient short form is 15, standardized score is 34 69, less than 39 4  Patients at this level are likely to benefit from discharge to post-acute rehabilitation services  Please refer to the recommendation of the Occupational Therapist for safe discharge planning  Plan   Treatment Interventions ADL retraining;Functional transfer training;UE strengthening/ROM; Activityengagement; Energy conservation   Goal Expiration Date 06/03/22   OT Treatment Day 1   Recommendation   OT Discharge Recommendation Post acute rehabilitation services   Additional Comments 2 The patient's raw score on the AM-PAC Daily Activity inpatient short form is 15, standardized score is 34 69, less than 39 4  Patients at this level are likely to benefit from discharge to post-acute rehabilitation services  Please refer to the recommendation of the Occupational Therapist for safe discharge planning     AM-PAC Daily Activity Inpatient   Lower Body Dressing 2   Bathing 2   Toileting 2   Upper Body Dressing 3   Grooming 3   Eating 3   Daily Activity Raw Score 15   Daily Activity Standardized Score (Calc for Raw Score >=11) 34 69   AM-[facility] Applied Cognition Inpatient   Following a Speech/Presentation 3   Understanding Ordinary Conversation 4   Taking Medications 2   Remembering Where Things Are Placed or Put Away 2   Remembering List of 4-5 Errands 2   Taking Care of Complicated Tasks 2   Applied Cognition Raw Score 15   Applied Cognition Standardized Score 33 54   YANET Sung/STANTON

## 2022-05-25 NOTE — ASSESSMENT & PLAN NOTE
· Does not appear acutely decompensated at this time  · TTE (03/2022): LVEF 60% and G1DD, mildly dilated RV, mild TR, mild [place]  · Continue Lopressor 12 5mg BID  · Strict I/O and daily weights

## 2022-05-26 NOTE — ASSESSMENT & PLAN NOTE
· TTE (03/2022): LVEF 60% and G1DD, mildly dilated RV, mild TR, mild [place]  · Continue Lopressor 12 5mg BID

## 2022-05-26 NOTE — PLAN OF CARE
Previous INR: 2.90     Previous dose:  7.5mg Sun and thurs and 5mg all others     Current INR: 2.90            Recommendation: Continue 7.5mg Sun and thurs and 5mg all others      Next INR: 1 week    Evon Guerra MD  9/19/2019  10:06 AM Pt  Doing well overall  The plan is for him to get D/C to a rehab facility later today

## 2022-05-26 NOTE — ASSESSMENT & PLAN NOTE
Lab Results   Component Value Date    EGFR 82 05/25/2022    EGFR 85 05/24/2022    EGFR 89 05/23/2022    CREATININE 0 80 05/25/2022    CREATININE 0 74 05/24/2022    CREATININE 0 65 05/23/2022     · POA and resolved at this time  · Avoid hypotension and nephrotoxic agents

## 2022-05-26 NOTE — PLAN OF CARE
Problem: OCCUPATIONAL THERAPY ADULT  Goal: Performs self-care activities at highest level of function for planned discharge setting  See evaluation for individualized goals  Description: Treatment Interventions: ADL retraining, UE strengthening/ROM, Patient/family training, Activityengagement, Energy conservation          See flowsheet documentation for full assessment, interventions and recommendations  Outcome: Progressing  Note: Limitation: Decreased ADL status, Decreased UE strength, Decreased Safe judgement during ADL, Decreased cognition, Decreased endurance, Decreased self-care trans, Decreased high-level ADLs  Prognosis: Good  Assessment: Patient participated in Skilled OT session this date with interventions consisting of ADL re training with the use of correct body mechnaics, Energy Conservation techniques and therapeutic exercise to: increase functional use of BUEs, increase BUE muscle strength    Patient agreeable to OT treatment session, upon arrival patient was found seated OOB to Recliner  In comparison to previous session, patient with improvements in activity tolerance   Patient requiring verbal cues for correct technique, one step directives, frequent rest periods and ocassional safety reminders  Patient continues to be functioning below baseline level, occupational performance remains limited secondary to factors listed above and increased risk for falls and injury  From OT standpoint, recommendation at time of d/c would be post-acute rehabilitation services  Patient to benefit from continued Occupational Therapy treatment while in the hospital to address deficits as defined above and maximize level of functional independence with ADLs and functional mobility       OT Discharge Recommendation: Post acute rehabilitation services  OT - OK to Discharge: Yes (Once medically cleared)     YANET Altamirano/STANTON

## 2022-05-26 NOTE — NURSING NOTE
Handoff report called to [facility] accepting RN; Anita Mckeon  Patient transferred via liter by [facility]

## 2022-05-26 NOTE — ASSESSMENT & PLAN NOTE
Malnutrition Findings:   Adult Malnutrition type: Chronic illness  Adult Degree of Malnutrition: Malnutrition of moderate degree  Malnutrition Characteristics: Weight loss, Muscle loss, Fat loss                  360 Statement: Moderate malnutrition related to inadequate protein energy intake as evidenced by significant wt  loss 35#/21% < 1 year, mild muscle loss clavicle/temporalis areas, moderate fat loss buccal/orbital pads treated with diet/supplements  BMI Findings: Body mass index is 24 45 kg/m²

## 2022-05-26 NOTE — ASSESSMENT & PLAN NOTE
· On 3L NC chronically since being hospitalized for COVID-19 pneumonia in 11/2021  · Follows with Dr Neelima Lowe as outpatient;  Needs outpatient follow-up in 4 weeks with repeat CXR

## 2022-05-26 NOTE — OCCUPATIONAL THERAPY NOTE
05/26/22 1040   OT Last Visit   OT Visit Date 05/26/22   Note Type   Note Type Treatment   Restrictions/Precautions   Weight Bearing Precautions Per Order No   Other Precautions Multiple lines;Telemetry;O2;Fall Risk   General   Response to Previous Treatment Patient with no complaints from previous session   Pain Assessment   Pain Assessment Tool 0-10   Pain Score No Pain   ADL   Where Assessed Chair  (*recliner)   UB Bathing Assistance 5  Supervision/Setup   UB Bathing Comments Pt  willing to participate in "dry run" (as patient was bathed earlier this morning) - He was able to reach all of UB without difficulty   LB Bathing Assistance 5  Supervision/Setup   LB Bathing Comments Pt  willing to participate in "dry run" (as patient was bathed earlier this morning) - He was able to reach all of LB except buttocks - reaches to feet with some difficulty   LB Dressing Comments Able to doff/don socks; pants not tested at this time   Toileting Assistance  3  Moderate Assistance   Toileting Deficit Supervison/safety;Setup;Steadying;Perineal hygiene   Toileting Comments Pt  noted with some stool on buttocks upon standing - he was unable to clean self   Therapeutic Excerise-Strength   UE Strength Yes  (*upgraded)   Right Upper Extremity- Strength   R Shoulder Flexion; Horizontal ABduction; Other (Comment)  (and pro/re-traction X2)   R Elbow   (deferred secondary to recent bleeding at 'removed' IV site)   R Weight/Reps/Sets 10 reps shoulders, and 20 reps elbows   Left Upper Extremity-Strength   L Shoulder Flexion; Horizontal ABduction; Other (Comment)  (and pro/re-traction X2)   L Elbow Elbow flexion;Elbow extension  (in forward And reverse;  Also: supin/pron-ation)   L Weights/Reps/Sets all exers  same as RUE above   Coordination   Gross Motor WFL   Dexterity appears [facility]   Cognition   Arousal/Participation Alert; Responsive; Cooperative   Attention Within functional limits   Orientation Level Oriented X4   Following Commands Follows one step commands without difficulty   Comments *pt  eager to resume high level ADLs of necessity for safe apartment living   Activity Tolerance   Activity Tolerance Patient limited by fatigue   Assessment   Assessment Patient participated in Skilled OT session this date with interventions consisting of ADL re training with the use of correct body mechnaics, Energy Conservation techniques and therapeutic exercise to: increase functional use of BUEs, increase BUE muscle strength    Patient agreeable to OT treatment session, upon arrival patient was found seated OOB to Recliner  In comparison to previous session, patient with improvements in activity tolerance   Patient requiring verbal cues for correct technique, one step directives, frequent rest periods and ocassional safety reminders  Patient continues to be functioning below baseline level, occupational performance remains limited secondary to factors listed above and increased risk for falls and injury  From OT standpoint, recommendation at time of d/c would be post-acute rehabilitation services  Patient to benefit from continued Occupational Therapy treatment while in the hospital to address deficits as defined above and maximize level of functional independence with ADLs and functional mobility  Plan   Treatment Interventions ADL retraining;UE strengthening/ROM; Patient/family training; Activityengagement; Energy conservation   Goal Expiration Date 06/03/22   OT Treatment Day 2   Recommendation   OT Discharge Recommendation Post acute rehabilitation services   Additional Comments 2 The patient's raw score on the -PAC Daily Activity inpatient short form is 18, standardized score is 38 66, less than 39 4  Patients at this level are likely to benefit from discharge to post-acute rehabilitation services  Please refer to the recommendation of the Occupational Therapist for safe discharge planning     AM-PAC Daily Activity Inpatient   Lower Body Dressing 2   Bathing 3   Toileting 3   Upper Body Dressing 3   Grooming 3   Eating 4   Daily Activity Raw Score 18   Daily Activity Standardized Score (Calc for Raw Score >=11) 38 66   AM-PAC Applied Cognition Inpatient   Following a Speech/Presentation 3   Understanding Ordinary Conversation 4   Taking Medications 2   Remembering Where Things Are Placed or Put Away 2   Remembering List of 4-5 Errands 2   Taking Care of Complicated Tasks 2   Applied Cognition Raw Score 15   Applied Cognition Standardized Score 33 54   YANET Gaming/L

## 2022-05-26 NOTE — PHYSICAL THERAPY NOTE
Physical Therapy Treatment Note       05/26/22 1003   PT Last Visit   PT Visit Date 05/26/22   Note Type   Note Type Treatment   Pain Assessment   Pain Assessment Tool 0-10   Pain Score No Pain   Restrictions/Precautions   Weight Bearing Precautions Per Order No   Other Precautions Multiple lines;Telemetry;O2;Fall Risk  (O2 via NC)   General   Chart Reviewed Yes   Response to Previous Treatment Patient with no complaints from previous session  Family/Caregiver Present No   Cognition   Arousal/Participation Alert; Responsive; Cooperative   Attention Within functional limits   Orientation Level Oriented X4   Memory Decreased recall of precautions   Following Commands Follows one step commands without difficulty   Comments pt agreeable to PT session   Subjective   Subjective "I can try walking some"   Bed Mobility   Supine to Sit Unable to assess  (pt received OOB in recliner upon arrival)   Transfers   Sit to Stand 3  Moderate assistance  (on 1st attempt, min Ax1 on 2nd attempt)   Additional items Assist x 1; Armrests; Increased time required   Stand to Sit 4  Minimal assistance   Additional items Assist x 1; Armrests; Impulsive;Verbal cues  (VCs for hand placement)   Ambulation/Elevation   Gait pattern Improper Weight shift;Decreased foot clearance;Shuffling; Short stride; Excessively slow   Gait Assistance 4  Minimal assist   Additional items Assist x 1   Assistive Device Rolling walker   Distance 22 ft, 30 ft c seated rest period x 5 mins for recovery   Stair Management Assistance Not tested   Balance   Static Sitting Fair +   Dynamic Sitting Fair   Static Standing Fair -   Dynamic Standing Poor +   Ambulatory Poor +   Endurance Deficit   Endurance Deficit Yes   Activity Tolerance   Activity Tolerance Patient limited by fatigue   Nurse Made Aware AUSTIN Brice verbalized pt appropriate for therapy and made aware of outcomes   Assessment   Prognosis Good   Problem List Decreased strength; Impaired balance;Decreased endurance;Decreased mobility; Decreased cognition; Impaired judgement;Decreased safety awareness   Assessment Pt seen for PT treatment session this date, consisting of ther act focused on functional transfer training from recliner surface and gt training on level surfaces to improve pt safety in household environment  Since previous session, pt has made good progress in terms of improved level surface amb distances with less overall physical assistance required  Pertinent barriers during this session include SOB  Current goals and POC remain appropriate, pt continues to have rehab potential and is making progress towards STGs  Pt prognosis for achieving goals is good, pending pt progress with hospitalization/medical status improvements, and indicated by attention span, self-expression (thoughts, feelings, needs) and motivation  Increased VCs required for safety considerations during transitional phases, specifically proper hand placement for safety  PT recommends post acute rehabilitation services upon discharge  Pt continues to be functioning below baseline level, and remains limited 2* factors listed above  PT will continue to see pt during current hospitalization in order to address the deficits listed above and provide interventions consistent w/ POC in effort to achieve STGs  Barriers to Discharge Inaccessible home environment   Goals   Patient Goals "to get stronger"   STG Expiration Date 06/03/22   Short Term Goal #1 goals remain appropriate   PT Treatment Day 1   Plan   Treatment/Interventions Functional transfer training;LE strengthening/ROM; Elevations; Therapeutic exercise; Endurance training;Patient/family training;Equipment eval/education; Bed mobility;Gait training;Spoke to nursing;Spoke to case management   Progress Progressing toward goals   PT Frequency 3-5x/wk   Recommendation   PT Discharge Recommendation Post acute rehabilitation services   Equipment Recommended   (continue RW use)   Additional Comments Pt's raw score on the AM-PAC Basic Mobility inpatient short form is 16, standardized score is 38 32  Patients at this level are likely to benefit from DC to [facility], however, please refer to therapist recommendation for safe DC planning  AM-[facility] Basic Mobility Inpatient   Turning in Bed Without Bedrails 3   Lying on Back to Sitting on Edge of Flat Bed 3   Moving Bed to Chair 3   Standing Up From Chair 3   Walk in Room 2   Climb 3-5 Stairs 2   Basic Mobility Inpatient Raw Score 16   Basic Mobility Standardized Score 38 32   Highest Level Of Mobility   JH-HLM Goal 5: Stand one or more mins   JH-HLM Achieved 7: Walk 25 feet or more   Education   Education Provided Mobility training;Assistive device   Patient Demonstrates acceptance/verbal understanding;Reinforcement needed   End of Consult   Patient Position at End of Consult Bedside chair; All needs within reach   End of Consult Comments Genia HOLT at bedside to start OT session       Obinna Deleon, PT, DPT    Time of PT treatment session: [id number] (total treatment time = 24 minutes)

## 2022-05-26 NOTE — ASSESSMENT & PLAN NOTE
· AEB tachycardia, tachypnea, hypotension, leukocytosis, elevated lactic acid, EMILIANO  · Suspect source is pneumonia given RUL/RML  · CT C/A/P(5/21/2022): Patchy consolidative airspace opacities throughout posterior lateral right upper lobe and in lateral right midlung are suspicious for acute pneumonia; background groundglass and reticular airspace opacities in periphery of mid and lower right lung and in left costophrenic angle, similar from November 4, 2021 and suspicious for sequela of viral pneumonia due to COVID-19 infection  · Continue Levaquin (End date: 5/28/2022)

## 2022-05-26 NOTE — PLAN OF CARE
Problem: PHYSICAL THERAPY ADULT  Goal: Performs mobility at highest level of function for planned discharge setting  See evaluation for individualized goals  Description: Treatment/Interventions: Functional transfer training, LE strengthening/ROM, Elevations, Therapeutic exercise, Endurance training, Cognitive reorientation, Patient/family training, Equipment eval/education, Bed mobility, Gait training, Spoke to nursing, Spoke to case management  Equipment Recommended:  (continue RW use)       See flowsheet documentation for full assessment, interventions and recommendations  Outcome: Progressing  Note: Prognosis: Good  Problem List: Decreased strength, Impaired balance, Decreased endurance, Decreased mobility, Decreased cognition, Impaired judgement, Decreased safety awareness  Assessment: Pt seen for PT treatment session this date, consisting of ther act focused on functional transfer training from recliner surface and gt training on level surfaces to improve pt safety in household environment  Since previous session, pt has made good progress in terms of improved level surface amb distances with less overall physical assistance required  Pertinent barriers during this session include SOB  Current goals and POC remain appropriate, pt continues to have rehab potential and is making progress towards STGs  Pt prognosis for achieving goals is good, pending pt progress with hospitalization/medical status improvements, and indicated by attention span, self-expression (thoughts, feelings, needs) and motivation  Increased VCs required for safety considerations during transitional phases, specifically proper hand placement for safety  PT recommends post acute rehabilitation services upon discharge  Pt continues to be functioning below baseline level, and remains limited 2* factors listed above   PT will continue to see pt during current hospitalization in order to address the deficits listed above and provide interventions consistent w/ POC in effort to achieve STGs  Barriers to Discharge: Inaccessible home environment        PT Discharge Recommendation: Post acute rehabilitation services          See flowsheet documentation for full assessment

## 2022-05-26 NOTE — CASE MANAGEMENT
Case Management Discharge Planning Note    Patient name Jose Mcginnis  Location ICU 05/ICU 36-87 MRN [id number]  : 1940 Date 2022       Current Admission Date: 2022  Current Admission Diagnosis:Septic shock [facility])   Patient Active Problem List    Diagnosis Date Noted    Elevated LFTs 2022    Moderate protein-calorie malnutrition ([facility] [address] ) 2022    History of coronary artery bypass surgery 2022    Gout 2022    Impaired fasting glucose 2022    Plantar fibromatosis 2022    Seborrheic dermatitis 2022    Sensorineural hearing loss, bilateral 2022    Subclinical hypothyroidism 2022    Vitamin D deficiency 2022    Protein-calorie malnutrition ([facility] [address] ) 2022    History of blood clots 2022    BPH (benign prostatic hyperplasia) 2022    Urinary frequency 05/10/2022    Hypoxia 2022    Left carotid artery occlusion 2022    Asymptomatic carotid artery stenosis, right 2022    Vision loss of left eye 2022    Post-COVID chronic dyspnea 2021    Chronic diastolic heart failure ([facility] [address] ) 2021    Pulmonary emboli ([facility] [address] ) 2021    Acute bacterial bronchitis 10/28/2021    Allergic cough 2021    Platelets decreased ([facility] [address] ) 2021    Type 2 diabetes mellitus without complication, without long-term current use of insulin ([facility] [address] ) 2021    Cough 2021    Stage 3a chronic kidney disease ([facility] [address] ) 2021    Septic shock ([facility] [address] ) 2021    Pneumonia involving right lung 2021    Acute respiratory failure with hypoxia ([facility] [address] ) 2021    Acute deep vein thrombosis (DVT) of popliteal vein of both lower extremities ([facility] [address] ) 2020    Hepatocellular carcinoma ([facility] [address] ) 2020    Transaminitis 2020    Chronic respiratory insufficiency 2020    Lactic acidosis 2020    Acute renal failure superimposed on stage 3a chronic kidney disease ([facility] [address] ) 2020  Elevated troponin 08/23/2020    Hyperglycemia 08/05/2020    Ankle edema, bilateral 08/05/2020    Reactive airway disease without complication 20/43/5569    Coronary artery disease without angina pectoris 01/13/2020    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 Fax: [address] [address]  Phone: [phone] Fax: [phone]    Primary Care Provider: Gissel Davila DO    Primary Insurance: MEDICARE  Secondary Insurance: [employer]    DISCHARGE DETAILS:  Comments - Freedom of Choice: Pt chose [facility] from facilties that accepted  IMM Given (Date):: 05/26/22  IMM Given to[de-identified] Family  Family notified[de-identified] Wife at [address] : [facility] [place], [address]  Receiving Facility/Agency Phone Number: [phone]  Facility/Agency Fax Number: [phone]   Pt was denied by ARU  Family chose [facility] for STR  Scanned the [y of the COVID vaccine to [place]  Sent for BLS transport via [facility]  Pt will be transported to [facility] at 1400  Tawana RN, pt and wife and [facility] made aware of transport time

## 2022-05-26 NOTE — ASSESSMENT & PLAN NOTE
· s/p chemoembolization 06/2021 as well as radioembolization 10/2020  · Currently receiving chemo/immunotherapy per most recent Hem/Onc notes  · Follows with Dr Sahil Garsia as outpatient

## 2022-05-26 NOTE — DISCHARGE SUMMARY
[address] 43  Discharge- Rakan Plater 1940, 78 y o  male MRN: [id number]  Unit/Bed#: ICU 05-01 Encounter: [id number]  Primary Care Provider: Bryan Salvador DO   Date and time admitted to hospital: 5/21/2022  3:47 PM    * Septic shock [facility])  Assessment & Plan  · AEB tachycardia, tachypnea, hypotension, leukocytosis, elevated lactic acid, EMILIANO  · Suspect source is pneumonia given RUL/RML  · CT C/A/P(5/21/2022): Patchy consolidative airspace opacities throughout posterior lateral right upper lobe and in lateral right midlung are suspicious for acute pneumonia; background groundglass and reticular airspace opacities in periphery of mid and lower right lung and in left costophrenic angle, similar from November 4, 2021 and suspicious for sequela of viral pneumonia due to COVID-19 infection  · Continue Levaquin (End date: 5/28/2022)    Elevated LFTs  Assessment & Plan  · The patient denies any abdominal discomfort, nausea, or vomiting  · CT A/P: Advanced hepatic metastatic disease reidentified, similar from previous examination with innumerable hepatic metastatic lesions and approximate 50% replacement of hepatic parenchyma with metastatic tumor  Probable metastatic lesions in the spleen and miles metastatic disease along the right external iliac chain  · Recommend further outpatient f/u with Hem/Onc    Pneumonia involving right lung  Assessment & Plan  · Suspect this is related to aspiration as it appears this has been a chronic concern  · Barium swallow (05/12):  Concern for spillage over epiglottis and potentially into laryngeal space, though it was noted that penetrated material was immediately redirected during swallow initiation  · Continue antibiotics as noted above    Acute renal failure superimposed on stage 3a chronic kidney disease [facility])  Assessment & Plan  Lab Results   Component Value Date    EGFR 82 05/25/2022    EGFR 85 05/24/2022    EGFR 89 05/23/2022    CREATININE 0 80 05/25/2022    CREATININE 0 74 05/24/2022    CREATININE 0 65 05/23/2022     · POA and resolved at this time  · Avoid hypotension and nephrotoxic agents    BPH (benign prostatic hyperplasia)  Assessment & Plan  · Continue home Flomax  · Failed void trial  · Plan to DC with [facility]; further voiding trials at SNF when ambulation improves    Chronic respiratory insufficiency  Assessment & Plan  · On 3L NC chronically since being hospitalized for COVID-19 pneumonia in 11/2021  · Follows with Dr Forestine Babinski as outpatient; Needs outpatient follow-up in 4 weeks with repeat CXR      Chronic diastolic heart failure ([address] )  Assessment & Plan  · TTE (03/2022): LVEF 60% and G1DD, mildly dilated RV, mild TR, mild OK  · Continue Lopressor 12 5mg BID          Hepatocellular carcinoma (HCC)  Assessment & Plan  · s/p chemoembolization 06/2021 as well as radioembolization 10/2020  · Currently receiving chemo/immunotherapy per most recent Hem/Onc notes  · Follows with Dr Abena Kenny as outpatient     Moderate protein-calorie malnutrition [facility])  Assessment & Plan  Malnutrition Findings:   Adult Malnutrition type: Chronic illness  Adult Degree of Malnutrition: Malnutrition of moderate degree  Malnutrition Characteristics: Weight loss, Muscle loss, Fat loss                  360 Statement: Moderate malnutrition related to inadequate protein energy intake as evidenced by significant wt  loss 35#/21% < 1 year, mild muscle loss clavicle/temporalis areas, moderate fat loss buccal/orbital pads treated with diet/supplements  BMI Findings: Body mass index is 24 45 kg/m²           Medical Problems             Resolved Problems  Date Reviewed: 5/22/2022   None               Discharging Physician / Practitioner: Oriana Patel DO  PCP: Elisa Fregoso DO  Admission Date:   Admission Orders (From admission, onward)     Ordered        05/21/22 1957  Inpatient Admission  Once                      Discharge Date: 05/26/22    Consultations During Hospital Stay:  · Infectious disease, CC/Pulm, and PT/OT    Procedures Performed:   · None    Significant Findings / Test Results:   · CT C/A/P(5/21/2022): Patchy consolidative airspace opacities throughout posterior lateral right upper lobe and in lateral right midlung are suspicious for acute pneumonia; background groundglass and reticular airspace opacities in periphery of mid and lower right lung and in left costophrenic angle, similar from November 4, 2021 and suspicious for sequela of viral pneumonia due to COVID-19 infection      Incidental Findings:   · None     Test Results Pending at Discharge (will require follow up): · None     Outpatient Tests Requested:  · To be determined upon outpatient follow-up with PCP and Hem/Onc  · Pulm recommending repeat CXR with office follow-up in 4 weeks    Complications:  None    Reason for Admission: Sepsis due to pneumonia    Hospital Course:   Rebeca Martinez is a 78 y o  male patient who originally presented to the hospital on 5/21/2022 due to progressive weakness and shortness of breath  Please see H&P as documented by Dr Chrissie Dunham for complete details regarding history of presenting illness  In brief, the patient has a past medical history of coronary artery disease, diastolic heart failure, chronic respiratory failure on 3 L nasal cannula, and hepatocellular carcinoma who presented with progressive weakness and dyspnea  Additionally he endorsed nonproductive cough, weight loss, and poor appetite  Upon arrival to the emergency department he met sepsis criteria with likely source of infection a right-sided pneumonia  He was started on IV fluids for resuscitation and broad-spectrum antibiotics  During his hospitalization, he was evaluated by pulmonology and Infectious Disease  His symptoms dramatically improved following initiation of antibiotics and he was ultimately discharged to skilled nursing facility to complete antibiotic course with oral Levaquin    Was advised to follow up outpatient with his previously established pulmonologist in 4 weeks with repeat chest x-ray  All of his questions were answered prior to departure and he expressed understanding and agreement with the plan  This is a brief discharge summary please see full medical record for more details  Please see above list of diagnoses and related plan for additional information  Condition at Discharge: good    Discharge Day Visit / Exam:   Subjective:  Patient seen sitting up in bedside chair working with OT  He denied any acute complaints and no over night events were reported  Vitals: Blood Pressure: 130/70 (05/26/22 1300)  Pulse: 99 (05/26/22 1300)  Temperature: (!) 97 4 °F (36 3 °C) (05/26/22 1200)  Temp Source: Tympanic (05/26/22 1200)  Respirations: (!) 49 (05/26/22 1300)  Height: 5' 9" (175 3 cm) (05/21/22 2048)  Weight - Scale: 75 1 kg (165 lb 9 1 oz) (everything off of bed) (05/26/22 0705)  SpO2: 97 % (05/26/22 1300)     Exam:   Physical Exam  Vitals and nursing note reviewed  Constitutional:       General: He is not in acute distress  Appearance: Normal appearance  HENT:      Head: Normocephalic and atraumatic  Mouth/Throat:      Mouth: Mucous membranes are moist       Pharynx: Oropharynx is clear  Eyes:      Extraocular Movements: Extraocular movements intact  Conjunctiva/sclera: Conjunctivae normal    Cardiovascular:      Rate and Rhythm: Normal rate and regular rhythm  Pulses: Normal pulses  Heart sounds: Normal heart sounds  Pulmonary:      Effort: Pulmonary effort is normal  No respiratory distress  Breath sounds: Normal breath sounds  No wheezing  Comments: 3L NC  Abdominal:      General: Bowel sounds are normal  There is no distension  Palpations: Abdomen is soft  Tenderness: There is no abdominal tenderness  Musculoskeletal:         General: Normal range of motion  Cervical back: Normal range of motion and neck supple  Skin:     General: Skin is warm and dry  Neurological:      General: No focal deficit present  Mental Status: He is alert and oriented to person, place, and time  Mental status is at baseline  Psychiatric:         Mood and Affect: Mood normal          Behavior: Behavior normal          Judgment: Judgment normal         Discussion with Family: Updated  (wife) via phone  Discharge instructions/Information to patient and family:   See after visit summary for information provided to patient and family  Provisions for Follow-Up Care:  See after visit summary for information related to follow-up care and any pertinent home health orders  Disposition:   [facility] [employer]     Planned Readmission: None     Discharge Statement:  I spent > 35 minutes discharging the patient  This time was spent on the day of discharge  I had direct contact with the patient on the day of discharge  Greater than 50% of the total time was spent examining patient, answering all patient questions, arranging and discussing plan of care with patient as well as directly providing post-discharge instructions  Additional time then spent on discharge activities  Discharge Medications:  See after visit summary for reconciled discharge medications provided to patient and/or family        **Please Note: This note may have been constructed using a voice recognition system**

## 2022-05-26 NOTE — PROGRESS NOTES
Pt  Was able to get some sleep overnight  Pt  Had PVR of 395 at about 2200  Pt  Denied urge to void but spent the next hour attempting to urinate  Laxmi Brown messaged at that time as pt  Had been having issues  Pt  Tried laying, sitting, standing and got OOB to the [facility] but was unable to pass any urine  Pt  Straight cathed at 2300 for 750cc with S  Day, PCA as the insertion roberto  Pt  Bladder scanned again this am with a residual of 370cc and then straight cath performed with Vivien Webster RN at the bedside for 700cc of urine  Pt  States he does not have the urge to void  Otherwise, pt  Was able to sleep and had an uneventful night

## 2022-05-26 NOTE — ASSESSMENT & PLAN NOTE
· Continue home Flomax  · Failed void trial  · Plan to DC with [facility]; further voiding trials at SNF when ambulation improves

## 2022-06-02 NOTE — TELEPHONE ENCOUNTER
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

## 2022-06-02 NOTE — ED PROVIDER NOTES
History  Chief Complaint   Patient presents with    Shortness of Breath     A 78-year-old male presents emergency department today from nursing home due to shortness of breath  Patient has a history of of hepatocellular carcinoma and is currently on Eliquis  Patient notes that he has no pain  The patient apparently had an x-ray done today that showed bilateral pneumonia that appears to be worsening  The patient was a concern for aspiration when looking at previous notes  Patient denies any chest pain  Patient is on 3 L a chronic O2 with a sat of 92%  The patient is tachycardic  Patient is mildly confused  Prior to Admission Medications   Prescriptions Last Dose Informant Patient Reported? Taking? Omega-3 Fatty Acids (fish oil) 1,000 mg Unknown at Unknown time Spouse/Significant Other Yes No   Sig: Take 1,000 mg by mouth daily   Promethazine-DM (PHENERGAN-DM) 6 25-15 mg/5 mL oral syrup Unknown at Unknown time Spouse/Significant Other No No   Sig: Take 10 mL by mouth 3 (three) times a day as needed for cough   allopurinol (ZYLOPRIM) 300 mg tablet Unknown at Unknown time Spouse/Significant Other Yes No   Sig: Take 300 mg by mouth daily   apixaban (Eliquis) 5 mg Unknown at Unknown time  No No   Sig: Take 1 tablet (5 mg total) by mouth 2 (two) times a day   aspirin 81 mg chewable tablet Unknown at Unknown time Spouse/Significant Other No No   Sig: Chew 1 tablet (81 mg total) daily   cholecalciferol (VITAMIN D3) 400 units tablet Unknown at Unknown time Spouse/Significant Other Yes No   Sig: Take 1,000 Units by mouth daily    fluticasone (FLONASE) 50 mcg/act nasal spray Unknown at Unknown time Spouse/Significant Other No No   Si sprays into each nostril daily   fluticasone-vilanterol (Breo Ellipta) 100-25 mcg/inh inhaler Unknown at Unknown time  No No   Sig: Inhale 1 puff daily Rinse mouth after use     levothyroxine (Euthyrox) 75 mcg tablet Unknown at Unknown time Spouse/Significant Other No No Sig: Take 1 tablet (75 mcg total) by mouth daily in the early morning   metoprolol tartrate (LOPRESSOR) 25 mg tablet Unknown at Unknown time Spouse/Significant Other No No   Sig: Take 1 tablet (25 mg total) by mouth 2 (two) times a day   ondansetron (ZOFRAN) 4 mg tablet Unknown at Unknown time Spouse/Significant Other No No   Sig: Take 1 tablet (4 mg total) by mouth every 8 (eight) hours as needed for nausea or vomiting   pantoprazole (PROTONIX) 40 mg tablet   No No   Sig: Take 1 tablet (40 mg total) by mouth daily in the early morning   Patient not taking: Reported on 2021   tamsulosin (FLOMAX) 0 4 mg Unknown at Unknown time Spouse/Significant Other No No   Sig: Take 1 capsule (0 4 mg total) by mouth daily with dinner      Facility-Administered Medications: None       Past Medical History:   Diagnosis Date    Cancer ([facility] )     Coronary artery disease     Gout     Hypercholesterolemia     Hypertension     Liver cancer ([facility] )     Microhematuria        Past Surgical History:   Procedure Laterality Date    ABDOMINAL SURGERY      appy    APPENDECTOMY      CORONARY ARTERY BYPASS GRAFT      IR BIOPSY LIVER MASS  2020    IR CHEMOEMBOLIZATION LIVER TUMOR  6/10/2021    IR PORT PLACEMENT  3/31/2022    IR Y-90 PRE-ANGIO/EMBO W/ LUNG SCAN  10/6/2020    IR Y-90 RADIOEMBOLIZATION  10/14/2020       Family History   Problem Relation Age of Onset    No Known Problems Mother     No Known Problems Father      I have reviewed and agree with the history as documented      E-Cigarette/Vaping    E-Cigarette Use Never User      E-Cigarette/Vaping Substances    Nicotine No     THC No     CBD No     Flavoring No     Other No     Unknown No      Social History     Tobacco Use    Smoking status: Former Smoker     Packs/day: 0 50     Years: 16 00     Pack years: 8 00     Types: Cigarettes     Start date: 10     Quit date: 1970     Years since quittin 1    Smokeless tobacco: Never Used   Vaping Use    Vaping Use: Never used   Substance Use Topics    Alcohol use: Never    Drug use: Never       Review of Systems   Constitutional: Positive for fatigue  Negative for chills and fever  HENT: Negative for ear pain and sore throat  Eyes: Negative for pain and visual disturbance  Respiratory: Positive for shortness of breath  Negative for cough  Cardiovascular: Negative for chest pain and palpitations  Gastrointestinal: Negative for abdominal pain and vomiting  Genitourinary: Negative for dysuria and hematuria  Musculoskeletal: Negative for arthralgias and back pain  Skin: Negative for color change and rash  Neurological: Positive for weakness  Negative for seizures and syncope  Psychiatric/Behavioral: Positive for confusion  All other systems reviewed and are negative  Physical Exam  Physical Exam  Constitutional:       General: He is not in acute distress  Appearance: Normal appearance  He is normal weight  He is not ill-appearing  HENT:      Head: Normocephalic and atraumatic  Right Ear: External ear normal       Left Ear: External ear normal       Nose: Nose normal       Mouth/Throat:      Mouth: Mucous membranes are moist    Eyes:      Conjunctiva/sclera: Conjunctivae normal    Cardiovascular:      Rate and Rhythm: Regular rhythm  Tachycardia present  Pulses: Normal pulses  Heart sounds: Normal heart sounds  Comments: Heart rate 111  Pulmonary:      Effort: Pulmonary effort is normal  Tachypnea present  Breath sounds: Examination of the right-middle field reveals rhonchi  Examination of the left-middle field reveals rhonchi  Examination of the right-lower field reveals rhonchi and rales  Examination of the left-lower field reveals rhonchi and rales  Wheezing, rhonchi and rales present  Chest:      Chest wall: No mass  Abdominal:      General: Abdomen is flat  There is no distension  Palpations: Abdomen is soft  There is no mass     Musculoskeletal: General: No swelling, tenderness or deformity  Normal range of motion  Cervical back: Normal range of motion  Right lower leg: No edema  Left lower leg: No edema  Skin:     General: Skin is warm and dry  Capillary Refill: Capillary refill takes 2 to 3 seconds  Coloration: Skin is pale  Neurological:      General: No focal deficit present  Mental Status: He is alert and oriented to person, place, and time  Mental status is at baseline  Psychiatric:         Mood and Affect: Mood normal          Vital Signs  ED Triage Vitals   Temperature Pulse Respirations Blood Pressure SpO2   06/02/22 1934 06/02/22 1920 06/02/22 1920 06/02/22 1920 06/02/22 1920   (!) 96 7 °F (35 9 °C) (!) 110 (!) 30 103/61 96 %      Temp Source Heart Rate Source Patient Position - Orthostatic VS BP Location FiO2 (%)   06/02/22 1934 06/02/22 1920 06/02/22 1920 06/02/22 1920 --   Tympanic Monitor Sitting Left arm       Pain Score       06/02/22 2218       No Pain           Vitals:    06/05/22 0200 06/05/22 0300 06/05/22 0400 06/05/22 0500   BP:       Pulse: (!) 139 (!) 147 (!) 151 (!) 148   Patient Position - Orthostatic VS:             Visual Acuity      ED Medications  Medications   cefepime (MAXIPIME) IVPB (premix in dextrose) 2,000 mg 50 mL (0 mg Intravenous Stopped 6/2/22 2046)   vancomycin (VANCOCIN) 1500 mg in sodium chloride 0 9% 250 mL IVPB (1,500 mg Intravenous New Bag 6/2/22 2150)   sodium chloride 0 9 % bolus 1,000 mL (1,000 mL Intravenous New Bag 6/2/22 2145)   sodium chloride 0 9 % bolus 500 mL (500 mL Intravenous New Bag 6/2/22 2246)       Diagnostic Studies  Results Reviewed     Procedure Component Value Units Date/Time    Blood culture #1 [[id number]] Collected: 06/02/22 2003    Lab Status: Final result Specimen: Blood from Arm, Right Updated: 06/08/22 0004     Blood Culture No Growth After 5 Days      Blood culture #2 [[id number]] Collected: 06/02/22 2003    Lab Status: Final result Specimen: Blood from Arm, Right Updated: 06/08/22 0004     Blood Culture No Growth After 5 Days  Strep Pneumoniae, Urine [[id number]]  (Normal) Collected: 06/02/22 2253    Lab Status: Final result Specimen: Urine, Catheter Updated: 06/03/22 0936     Strep pneumoniae antigen, urine Negative    Legionella antigen, Urine [[id number]]  (Normal) Collected: 06/02/22 2253    Lab Status: Final result Specimen: Urine, Catheter Updated: 06/03/22 0936     Legionella Urinary Antigen Negative    Procalcitonin, Next Day AM Collection [[id number]]  (Abnormal) Collected: 06/03/22 0449    Lab Status: Final result Specimen: Blood from Arm, Right Updated: 06/03/22 0548     Procalcitonin 236 32 ng/ml     CBC and differential [[id number]]  (Abnormal) Collected: 06/03/22 0449    Lab Status: Final result Specimen: Blood from Arm, Right Updated: 06/03/22 0543     WBC 12 98 Thousand/uL      RBC 4 71 Million/uL      Hemoglobin 14 2 g/dL      Hematocrit 43 6 %      MCV 93 fL      MCH 30 1 pg      MCHC 32 6 g/dL      RDW 18 8 %      MPV 10 7 fL      Platelets 164 Thousands/uL     Narrative: This is an appended report  These results have been appended to a previously verified report      Basic metabolic panel [[id number]] Collected: 06/03/22 0449    Lab Status: Final result Specimen: Blood from Arm, Right Updated: 06/03/22 0517     Sodium 142 mmol/L      Potassium 3 9 mmol/L      Chloride 102 mmol/L      CO2 29 mmol/L      ANION GAP 11 mmol/L      BUN 17 mg/dL      Creatinine 0 70 mg/dL      Glucose 97 mg/dL      Calcium 8 9 mg/dL      eGFR 87 ml/min/1 73sq m     Narrative:      [facility] guidelines for Chronic Kidney Disease (CKD):     Stage 1 with normal or high GFR (GFR > 90 mL/min/1 73 square meters)    Stage 2 Mild CKD (GFR = 60-89 mL/min/1 73 square meters)    Stage 3A Moderate CKD (GFR = 45-59 mL/min/1 73 square meters)    Stage 3B Moderate CKD (GFR = 30-44 mL/min/1 73 square meters)    Stage 4 Severe CKD (GFR = 15-29 mL/min/1 73 square meters)    Stage 5 End Stage CKD (GFR <15 mL/min/1 73 square meters)  Note: GFR calculation is accurate only with a steady state creatinine    HS Troponin I 4hr [[id number]]  (Normal) Collected: 06/02/22 2322    Lab Status: Final result Specimen: Blood from Arm, Left Updated: 06/03/22 0007     hs TnI 4hr 21 ng/L      Delta 4hr hsTnI -2 ng/L     Lactic acid 2 Hours [[id number]]  (Abnormal) Collected: 06/02/22 2322    Lab Status: Final result Specimen: Blood from Arm, Left Updated: 06/02/22 2351     LACTIC ACID 3 3 mmol/L     Narrative:      Result may be elevated if tourniquet was used during collection  HS Troponin I 2hr [[id number]]  (Normal) Collected: 06/02/22 2151    Lab Status: Final result Specimen: Blood from Arm, Right Updated: 06/02/22 2235     hs TnI 2hr 23 ng/L      Delta 2hr hsTnI 0 ng/L     Lactic acid [[id number]]  (Abnormal) Collected: 06/02/22 2003    Lab Status: Final result Specimen: Blood from Arm, Right Updated: 06/02/22 2112     LACTIC ACID 6 0 mmol/L     Narrative:      Result may be elevated if tourniquet was used during collection  COVID/FLU/RSV - 2 hour TAT [[id number]]  (Normal) Collected: 06/02/22 2003    Lab Status: Final result Specimen: Nares from Nose Updated: 06/02/22 2058     SARS-CoV-2 Negative     INFLUENZA A PCR Negative     INFLUENZA B PCR Negative     RSV PCR Negative    Narrative:      FOR PEDIATRIC PATIENTS - copy/paste COVID Guidelines URL to browser: [email or web address]    SARS-CoV-2 assay is a Nucleic Acid Amplification assay intended for the  qualitative detection of nucleic acid from SARS-CoV-2 in nasopharyngeal  swabs  Results are for the presumptive identification of SARS-CoV-2 RNA  Positive results are indicative of infection with SARS-CoV-2, the virus  causing COVID-19, but do not rule out bacterial infection or co-infection  with other viruses   Laboratories within the [place] and its  territories are required to report all positive results to the appropriate  public health authorities  Negative results do not preclude SARS-CoV-2  infection and should not be used as the sole basis for treatment or other  patient management decisions  Negative results must be combined with  clinical observations, patient history, and epidemiological information  This test has not been FDA cleared or approved  This test has been authorized by FDA under an Emergency Use Authorization  (EUA)  This test is only authorized for the duration of time the  declaration that circumstances exist justifying the authorization of the  emergency use of an in vitro diagnostic tests for detection of SARS-CoV-2  virus and/or diagnosis of COVID-19 infection under section 564(b)(1) of  the Act, 21 U  S C  521AWJ-5(C)(1), unless the authorization is terminated  or revoked sooner  The test has been validated but independent review by FDA  and CLIA is pending  Test performed using [employer] GeneXpert: This RT-PCR assay targets N2,  a region unique to SARS-CoV-2  A conserved region in the E-gene was chosen  for pan-Sarbecovirus detection which includes SARS-CoV-2  HS Troponin 0hr (reflex protocol) [[id number]]  (Normal) Collected: 06/02/22 2003    Lab Status: Final result Specimen: Blood from Arm, Right Updated: 06/02/22 2039     hs TnI 0hr 23 ng/L     Blood gas, arterial [[id number]]  (Abnormal) Collected: 06/02/22 2010    Lab Status: Final result Specimen: Blood, Arterial Updated: 06/02/22 2013     pH, Arterial 7 475     pCO2, Arterial 38 5 mm Hg      pO2, Arterial 74 4 mm Hg      HCO3, Arterial 27 7 mmol/L      Base Excess, Arterial 4 0 mmol/L      O2 Content, Arterial 20 3 mL/dL      O2 HGB,Arterial  94 3 %                  RADIOLOGY RESULTS   Final Result by  (06/07 0957)      CT head without contrast   Final Result by Tuyet Daley DO (06/02 2017)      No acute intracranial abnormality                    Workstation performed: [id number]                    Procedures  Procedures         ED Course  ED Course as of 06/16/22 1831   Thu Jun 02, 2022 2045 hs TnI 0hr: 23                               SBIRT 22yo+    Flowsheet Row Most Recent Value   SBIRT (23 yo +)    In order to provide better care to our patients, we are screening all of our patients for alcohol and drug use  Would it be okay to ask you these screening questions? No Filed at: 06/02/2022 1947                    MDM    Disposition  Final diagnoses:   Septic shock ([facility] )   Pneumonia   Encephalopathy     Time reflects when diagnosis was documented in both MDM as applicable and the Disposition within this note     Time User Action Codes Description Comment    6/2/2022  9:38 PM [facility] Add [A41 9,  R65 21] Septic shock ([facility] )     6/2/2022  9:38 PM [facility] Add [J18 9] Pneumonia     6/2/2022  9:38 PM [facility] Add [G93 40] Encephalopathy     6/3/2022  7:56 AM [facility] Add [K33 33] Metabolic encephalopathy     6/3/2022  7:56 AM [facility] Add [C22 0] Hepatocellular carcinoma [facility])       ED Disposition     ED Disposition   Admit    Condition   Stable    Date/Time   Thu Jun 2, 2022  9:39 PM    Comment   Case was discussed with Ryan Bush and the patient's admission status was agreed to be Admission Status: inpatient status to the service of Dr Dayanna Freed              Follow-up Information    None       Date, Time and Cause of Death    Date of Death: 6/5/22  Time of Death:  5:55 AM  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 )  Entered by: Yennifer IRELAND[SS1 1]     Attribution     SS1 1 [facility] 06/05/22 06:03        Discharge Medication List as of 6/5/2022  9:25 AM      STOP taking these medications       allopurinol (ZYLOPRIM) 300 mg tablet Comments:   Reason for Stopping:         apixaban (Eliquis) 5 mg Comments:   Reason for Stopping:         aspirin 81 mg chewable tablet Comments:   Reason for Stopping:         cholecalciferol (VITAMIN D3) 400 units tablet Comments:   Reason for Stopping:         fluticasone (FLONASE) 50 mcg/act nasal spray Comments:   Reason for Stopping:         fluticasone-vilanterol (Breo Ellipta) 100-25 mcg/inh inhaler Comments:   Reason for Stopping:         levothyroxine (Euthyrox) 75 mcg tablet Comments:   Reason for Stopping:         metoprolol tartrate (LOPRESSOR) 25 mg tablet Comments:   Reason for Stopping:         Omega-3 Fatty Acids (fish oil) 1,000 mg Comments:   Reason for Stopping:         ondansetron (ZOFRAN) 4 mg tablet Comments:   Reason for Stopping:         pantoprazole (PROTONIX) 40 mg tablet Comments:   Reason for Stopping:         Promethazine-DM (PHENERGAN-DM) 6 25-15 mg/5 mL oral syrup Comments:   Reason for Stopping:         tamsulosin (FLOMAX) 0 4 mg Comments:   Reason for Stopping:               No discharge procedures on file      PDMP Review       Value Time User    PDMP Reviewed  Yes 11/10/2021  4:37 PM Elizabeth Patel MD          ED Provider  Electronically Signed by           Elizabeth Garcia ,   06/02/22 2567 [facility] ,   06/16/22 [id number]

## 2022-06-02 NOTE — TELEPHONE ENCOUNTER
Patients wife called in with some concerns and would like patients primary care doctors opinion  Patient is currently in rehab  Patient had covid in Fall of 2021 and has been on oxygen since then  He has remained sleepy and tired all the time but has gotten worse over last few months  Patient is always sleeping per the wife  She explained he is starting to get confused a lot and cannot remember things  If she asks him what he had to eat for breakfast he cannot remember  The rehab said he has pneumonia and sepsis  The rehab  is working on him walking better but he is slowly declining  His cancer has spread out from his liver and the wife is asking if that is why he is rapidly declining in health  She said over the last few weeks he has rapidly gone down in health and she is concerned and does not understand what is causing this  She would like the opinion and view from primary care doctor  Please advise   Patients wife going to rehab to visit, please do not call until after 4PM

## 2022-06-03 PROBLEM — G93.41 METABOLIC ENCEPHALOPATHY: Status: ACTIVE | Noted: 2022-01-01

## 2022-06-03 NOTE — ASSESSMENT & PLAN NOTE
· Chronic in a patient with known hepatocellular carcinoma  · Will continue to monitor with repeat labs in a m

## 2022-06-03 NOTE — PLAN OF CARE
Problem: Potential for Falls  Goal: Patient will remain free of falls  Description: INTERVENTIONS:  - Educate patient/family on patient safety including physical limitations  - Instruct patient to call for assistance with activity   - Consult OT/PT to assist with strengthening/mobility   - Keep Call bell within reach  - Keep bed low and locked with side rails adjusted as appropriate  - Keep care items and personal belongings within reach  - Initiate and maintain comfort rounds  - Make Fall Risk Sign visible to staff  - Offer Toileting every 2 Hours, in advance of need  - Initiate/Maintain fall alarm  - Obtain necessary fall risk management equipment: fall alarm  - Apply yellow socks and bracelet for high fall risk patients  - Consider moving patient to room near nurses station  Outcome: Progressing     Problem: PAIN - ADULT  Goal: Verbalizes/displays adequate comfort level or baseline comfort level  Description: Interventions:  - Encourage patient to monitor pain and request assistance  - Assess pain using appropriate pain scale  - Administer analgesics based on type and severity of pain and evaluate response  - Implement non-pharmacological measures as appropriate and evaluate response  - Consider cultural and social influences on pain and pain management  - Notify physician/advanced practitioner if interventions unsuccessful or patient reports new pain  Outcome: Progressing     Problem: INFECTION - ADULT  Goal: Absence or prevention of progression during hospitalization  Description: INTERVENTIONS:  - Assess and monitor for signs and symptoms of infection  - Monitor lab/diagnostic results  - Monitor all insertion sites, i e  indwelling lines, tubes, and drains  - Monitor endotracheal if appropriate and nasal secretions for changes in amount and color  - [place] appropriate cooling/warming therapies per order  - Administer medications as ordered  - Instruct and encourage patient and family to use good hand hygiene technique  - Identify and instruct in appropriate isolation precautions for identified infection/condition  Outcome: Progressing  Goal: Absence of fever/infection during neutropenic period  Description: INTERVENTIONS:  - Monitor WBC    Outcome: Progressing     Problem: SAFETY ADULT  Goal: Maintain or return to baseline ADL function  Description: INTERVENTIONS:  -  Assess patient's ability to carry out ADLs; assess patient's baseline for ADL function and identify physical deficits which impact ability to perform ADLs (bathing, care of mouth/teeth, toileting, grooming, dressing, etc )  - Assess/evaluate cause of self-care deficits   - Assess range of motion  - Assess patient's mobility; develop plan if impaired  - Assess patient's need for assistive devices and provide as appropriate  - Encourage maximum independence but intervene and supervise when necessary  - Involve family in performance of ADLs  - Assess for home care needs following discharge   - Consider OT consult to assist with ADL evaluation and planning for discharge  - Provide patient education as appropriate  Outcome: Progressing  Goal: Maintains/Returns to pre admission functional level  Description: INTERVENTIONS:  - Perform BMAT or MOVE assessment daily    - Set and communicate daily mobility goal to care team and patient/family/caregiver  - Collaborate with rehabilitation services on mobility goals if consulted  - Perform Range of Motion 3 times a day  - Reposition patient every 2 hours    - Dangle patient 3 times a day  - Stand patient 3 times a day  - Ambulate patient 3 times a day  - Out of bed to chair 3 times a day   - Out of bed for meals 3 times a day  - Out of bed for toileting  - Record patient progress and toleration of activity level   Outcome: Progressing     Problem: DISCHARGE PLANNING  Goal: Discharge to home or other facility with appropriate resources  Description: INTERVENTIONS:  - Identify barriers to discharge w/patient and caregiver  - Arrange for needed discharge resources and transportation as appropriate  - Identify discharge learning needs (meds, wound care, etc )  - Arrange for interpretive services to assist at discharge as needed  - Refer to Case Management Department for coordinating discharge planning if the patient needs post-hospital services based on physician/advanced practitioner order or complex needs related to functional status, cognitive ability, or social support system  Outcome: Progressing     Problem: Knowledge Deficit  Goal: Patient/family/caregiver demonstrates understanding of disease process, treatment plan, medications, and discharge instructions  Description: Complete learning assessment and assess knowledge base  Interventions:  - Provide teaching at level of understanding  - Provide teaching via preferred learning methods  Outcome: Progressing     Problem: Nutrition/Hydration-ADULT  Goal: Nutrient/Hydration intake appropriate for improving, restoring or maintaining nutritional needs  Description: Monitor and assess patient's nutrition/hydration status for malnutrition  Collaborate with interdisciplinary team and initiate plan and interventions as ordered  Monitor patient's weight and dietary intake as ordered or per policy  Utilize nutrition screening tool and intervene as necessary  Determine patient's food preferences and provide high-protein, high-caloric foods as appropriate       INTERVENTIONS:  - Monitor oral intake, urinary output, labs, and treatment plans  - Assess nutrition and hydration status and recommend course of action  - Evaluate amount of meals eaten  - Assist patient with eating if necessary   - Allow adequate time for meals  - Recommend/ encourage appropriate diets, oral nutritional supplements, and vitamin/mineral supplements  - Order, calculate, and assess calorie counts as needed  - Recommend, monitor, and adjust tube feedings and TPN/PPN based on assessed needs  - Assess need for intravenous fluids  - Provide specific nutrition/hydration education as appropriate  - Include patient/family/caregiver in decisions related to nutrition  Outcome: Progressing

## 2022-06-03 NOTE — CASE MANAGEMENT
Case Management Assessment    Patient name [facility] ICU 08/ICU  MRN [id number]  : 1940 Date 6/3/2022       Current Admission Date: 2022  Current Admission Diagnosis:Septic shock [facility])   Patient Active Problem List    Diagnosis Date Noted    Metabolic encephalopathy     Elevated LFTs 2022    Moderate protein-calorie malnutrition ([facility] ) 2022    History of coronary artery bypass surgery 2022    Gout 2022    Impaired fasting glucose 2022    Plantar fibromatosis 2022    Seborrheic dermatitis 2022    Sensorineural hearing loss, bilateral 2022    Subclinical hypothyroidism 2022    Vitamin D deficiency 2022    Protein-calorie malnutrition ([facility] ) 2022    History of blood clots 2022    BPH (benign prostatic hyperplasia) 2022    Urinary frequency 05/10/2022    Hypoxia 2022    Left carotid artery occlusion 2022    Asymptomatic carotid artery stenosis, right 2022    Vision loss of left eye 2022    Post-COVID chronic dyspnea 2021    Chronic diastolic heart failure ([facility] ) 2021    [facility]    Acute bacterial bronchitis 10/28/2021    Allergic cough 2021    Platelets decreased ([facility] ) 2021    Type 2 diabetes mellitus without complication, without long-term current use of insulin ([facility] ) 2021    Cough 2021    Stage 3a chronic kidney disease ([facility] ) 2021    Septic shock ([facility] ) 2021    Pneumonia of both lungs due to infectious organism 2021    Acute respiratory failure with hypoxia ([facility] ) 2021    Acute deep vein thrombosis (DVT) of popliteal vein of both lower extremities ([facility] ) 2020    Hepatocellular carcinoma ([facility] ) 2020    Transaminitis 2020    Chronic respiratory insufficiency 2020    Lactic acidosis 2020    Acute renal failure superimposed on stage 3a [facility] kidney disease [facility] 08/23/2020    Elevated troponin 08/23/2020    Hyperglycemia 08/05/2020    Ankle edema, bilateral 08/05/2020    Reactive airway disease without complication 88/24/2173    Coronary artery disease without angina pectoris 01/13/2020    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 [facility] ) 01/13/2020    Essential hypertension 08/05/2015    Hyperlipidemia 08/05/2015    Cardiomegaly 08/05/2015    Anxiety 08/05/2015    Abnormal electrocardiogram 08/05/2015      LOS (days): 1  Geometric Mean LOS (GMLOS) (days): 4 80  Days to GMLOS:4 2     OBJECTIVE:  PATIENT READMITTED TO HOSPITAL  Risk of Unplanned Readmission Score: 17 08     Current admission status: Inpatient  Referral Reason: Hospice    Preferred Pharmacy:   [id number] [place], [address] [facility]  [address]   [address] [place] [address]  Phone: [phone] Fax: [phone]    [address]  [place] [address], [place] [address]  [facility] [address]  Phone: [phone] Fax: [phone]    Primary Care Provider: Anusha Barbosa DO    Primary Insurance: MEDICARE  Secondary Insurance: Evi Montes 20:  [address] [facility] [address], [facility] [place] [facility] Representative - Spouse   Primary Phone: [phone] (Home)               Advance Directives  Does patient have a [address] [place] [address] Avenue?: Yes  Does patient have Advance Directives?: Yes  Advance Directives: Living will, Power of  for health care  Primary Contact: Kati Hooks  Readmission Root Cause  30 Day Readmission: Yes  Who directed you to return to the hospital?: Other (comment) (SNF)  Did you understand whom to contact if you had questions or problems?: Yes  Did you get your prescriptions before you left the hospital?: No  Reason[de-identified] Declined service  Were you able to get your prescriptions filled when you left the hospital?: Yes  Did you take your medications as prescribed?: Yes  Were you able to get to your follow-up appointments?: No  Reason[de-identified]  (In a STR, has a PCP at the facility)  During previous admission, was a post-acute recommendation made?: Yes  What post-acute resources were offered?: STR  Patient was readmitted due to: Pneumonia    Patient Information  Admitted from[de-identified] Facility  Mental Status: Other (Comment) (Lethargic)  During Assessment patient was accompanied by: Spouse  Assessment information provided by[de-identified] Spouse  Primary Caregiver: Other (Comment)  Caregiver's Name[de-identified] [facility]  Support Systems: Spouse/significant other  [place] of Residence: [address] do you live in?: [address] entry access options   Select all that apply :  (SNF ramp)  Type of Current Residence: Facility  Floor Level: 1  Upon entering residence, is there a bedroom on the main floor (no further steps)?: Yes  Upon entering residence, is there a bathroom on the main floor (no further steps)?: Yes  In the last 12 months, was there a time when you were not able to pay the mortgage or rent on time?: No  In the last 12 months, how many places have you lived?: 1  Living Arrangements: Lives w/ Son  Is patient a ?: Yes  Is patient active with [facility])?: Yes  Is patient service connected?: No    Activities of Daily Living Prior to Admission  Functional Status: Assistance  Completes ADLs independently?: No  Ambulates independently?: Yes  Does patient use assisted devices?: Yes  Assisted Devices (DME) used: [employer]  Does patient currently own DME?: Yes  What DME does the patient currently own?: [employer]  Does patient have a history of Outpatient Therapy (PT/OT)?: No  Does the patient have a history of Short-Term Rehab?: Yes ([facility])  Does patient have a history of HHC?: Yes ([address])  Does patient currently have [address]?: No    Patient Information Continued  Income Source: Pension/long term  Does patient have prescription coverage?: Yes  Within the past 12 months, you worried that your food would run out before you got the money to buy more : Never true  Within the past 12 months, the food you bought just didn't last and you didn't have money to get more : Never true  Food insecurity resource given?: N/A  Does patient receive dialysis treatments?: No  Does patient have a history of substance abuse?: No  Does patient have a history of Mental Health Diagnosis?: No    PHQ 2/9 Screening   Reviewed PHQ 2/9 Depression Screening Score?: No    Means of Transportation  Means of Transport to Appts[de-identified] Family transport  In the past 12 months, has lack of transportation kept you from medical appointments or from getting medications?: No  In the past 12 months, has lack of transportation kept you from meetings, work, or from getting things needed for daily living?: No  Was application for public transport provided?: N/A   Received an order for hospice  Spoke to pt wife who is coming into the hospital   Wife agreeable to same and robinas [facility]  A referral was made for same  Received a message from iTgist Reyes from [facility] she would be here for 1130  Wife made aware of same

## 2022-06-03 NOTE — RESPIRATORY THERAPY NOTE
RT Protocol Note  Yari Álvarez 78 y o  male MRN: [id number]  Unit/Bed#: ICU  Encounter: [id number]    Assessment    Active Problems:    * No active hospital problems  *      Home Pulmonary Medications:* Breo Ellipta1 puff daily  Home Devices/Therapy:  (home O2)    Past Medical History:   Diagnosis Date    Cancer ([facility] )     Coronary artery disease     Gout     Hypercholesterolemia     Hypertension     Liver cancer ([facility] )     Microhematuria      Social History     Socioeconomic History    Marital status: /Civil Union     Spouse name: None    Number of children: None    Years of education: None    Highest education level: None   Occupational History    None   Tobacco Use    Smoking status: Former Smoker     Packs/day: 0 50     Years: 16 00     Pack years: 8 00     Types: Cigarettes     Start date:      Quit date:      Years since quittin 4    Smokeless tobacco: Never Used   Vaping Use    Vaping Use: Never used   Substance and Sexual Activity    Alcohol use: Never    Drug use: Never    Sexual activity: None   Other Topics Concern    None   Social History Narrative    None     Social Determinants of Health     Financial Resource Strain: Not on file   Food Insecurity: No Food Insecurity    Worried About Running Out of Food in the Last Year: Never true    Ran Out of Food in the Last Year: Never true   Transportation Needs: No Transportation Needs    Lack of Transportation (Medical): No    Lack of Transportation (Non-Medical):  No   Physical Activity: Not on file   Stress: Not on file   Social Connections: Not on file   Intimate Partner Violence: Not on file   Housing Stability: High Risk    Unable to Pay for Housing in the Last Year: Yes    Number of Places Lived in the Last Year: 1    Unstable Housing in the Last Year: No       Subjective         Objective    Physical Exam:   Assessment Type: Assess only  General Appearance: Sleeping, Other (Comment) (pt opened his eyes when i called his name)  Chest Assessment: Chest expansion symmetrical  Bilateral Breath Sounds: Diminished, Coarse  Cough: None  O2 Device: nc    Vitals:  Blood pressure 127/65, pulse 102, temperature 97 7 °F (36 5 °C), temperature source Oral, resp  rate 22, height 5' 9" (1 753 m), weight 76 kg (167 lb 8 8 oz), SpO2 95 %  Results from last 7 days   Lab Units 06/02/22 2010   Krishna 30 ART  7 475*   PCO2 ART mm Hg 38 5   PO2 ART mm Hg 74 4*   HCO3 ART mmol/L 27 7   BASE EXC ART mmol/L 4 0   O2 CONTENT ART mL/dL 20 3   O2 HGB, ARTERIAL % 94 3       Imaging and other studies: I have personally reviewed pertinent reports  O2 Device: nc     Plan    Respiratory Plan: Mild Distress pathway  Airway Clearance Plan: Discontinue Protocol     Resp Comments: (P)  (pt assessed as per protocol  pt ordered Xopenex/NSS tid    pt currently on home oxygen settings of 3lnc  pt also ordered Breo Ellipta daily which is pts home med   will continue to monitor pt )

## 2022-06-03 NOTE — QUICK NOTE
I was a witness to a conversation between Dr Kirt Evangelista and patient's wife, Rosemarie Alcantar at 7:50am on 6/3/22  Patient's clinical status unfortunately has significantly declined, and patient's wife has agreed that she would like for us to suspend any further medical treatment and implement comfort measures, along with a hospice evaluation  At this time, the patient will be made CMO, and hospice/case management consult will be placed

## 2022-06-03 NOTE — H&P
[facility]  H&P- Sherryle Pimple 1940, 78 y o  male MRN: [id number]  Unit/Bed#: ICU 08-01 Encounter: [id number]  Primary Care Provider: Gissel Davila DO   Date and time admitted to hospital: 6/2/2022  7:14 PM    * Septic shock [facility])  Assessment & Plan  · Will admit to level to step-down  · Will hydrate with sepsis protocol IV fluid resuscitation followed by isolyte at 150 cc/hour  · Continue to trend lactic acid and procalcitonin  · Blood cultures urine culture currently pending  · Will give vancomycin, cefepime and Zithromax  · Patient recently had pneumonia with concern for possible aspiration  · Patient met septic shock criteria in that he was tachycardic with heart rate as high as 110, tachypnea with respiratory rate as high as 30, lactic acidosis of 6 0, leukocytosis of 10 92 unknown source of infection being pneumonia    Pneumonia of both lungs due to infectious organism  Assessment & Plan  · Being treated as per above  · Will place on O2 and respiratory protocol with incentive spirometry    Metabolic encephalopathy  Assessment & Plan  · In the setting of sepsis  · Will obtain neuro checks Q shift    Subclinical hypothyroidism  Assessment & Plan  Continue pre-hospital levothyroxine 75 mcg p o  Daily    Pulmonary emboli [facility])  Assessment & Plan  Continue pre-hospital Eliquis 5 mg p o  B i d  Chronic diastolic heart failure (HCC)  Assessment & Plan  Wt Readings from Last 3 Encounters:   06/02/22 76 kg (167 lb 8 8 oz)   05/26/22 75 1 kg (165 lb 9 1 oz)   05/17/22 75 5 kg (166 lb 6 4 oz)     Will obtain daily weights and continue pre-hospital Lopressor 25 mg p o  B i d          Type 2 diabetes mellitus without complication, without long-term current use of insulin [facility])  Assessment & Plan  Lab Results   Component Value Date    HGBA1C 7 0 (A) 05/10/2022       Recent Labs     06/01/22  0515 06/01/22  1635 06/02/22  0546 06/02/22  2249   POCGLU 110 225* 98 102       Blood Sugar Average: Last 72 hrs:  (P) 102     Place on [facility] step 2 diet, obtain Accu-Cheks AC and HS with Humalog correction dose a c  And HS    Stage 3a chronic kidney disease [facility])  Assessment & Plan  Lab Results   Component Value Date    EGFR 69 06/02/2022    EGFR 84 05/30/2022    EGFR 82 05/25/2022    CREATININE 1 00 06/02/2022    CREATININE 0 76 05/30/2022    CREATININE 0 80 05/25/2022     Creatinine appears to be approximately baseline, will continue to monitor with repeat labs in a m  Hepatocellular carcinoma ([facility] [address] )  Assessment & Plan  Being treated as an outpatient    Lactic acidosis  Assessment & Plan  · Secondary to septic shock  · Improving from initial of 6 0 to most recent of 3 3  · Will continue to trend  · Hydrate as per above    Transaminitis  Assessment & Plan  · Chronic in a patient with known hepatocellular carcinoma  · Will continue to monitor with repeat labs in a m  Essential hypertension  Assessment & Plan  Continue pre-hospital Lopressor 25 mg p o  B i d     VTE Prophylaxis: Apixaban (Eliquis)  Code Status:  Level 1  Discussion with family:  None present at bedside at time of exam    Anticipated Length of Stay:  Patient will be admitted on an Inpatient basis with an anticipated length of stay of  > 2 midnights  Justification for Hospital Stay:  Septic shock secondary to bilateral pneumonia requiring IV fluid resuscitation, IV antibiotics and continued monitoring    Total Time for Visit, including Counseling / Coordination of Care: 1 hour  Greater than 50% of this total time spent on direct patient counseling and coordination of care  Chief Complaint:   Shortness of breath of unknown duration    History of Present Illness:    Jose Mcginnis is a 78 y o  male who presents with shortness of breath of unknown duration    Patient with history of hepatocellular carcinoma was recently discharged from our facility on 05/26/2022 with discharge diagnosis of septic shock secondary to right lower lobe pneumonia returns to the ER this evening for reports of shortness of breath at the nursing home  Patient is confused and unable provide any meaningful history so history is obtained through review of ER documentation as well as previous records  When asked why he is here patient states that he had 2 operations earlier today  He offers no complaints and is resting comfortably in bed at time of exam     As per ER documentation patient had a outpatient x-ray done earlier today which showed bilateral pneumonia that appears to be worsening from previous films  Additionally there was some concern for possible aspiration on during previous admission and patient is currently on 3 L nasal cannula which is his baseline round the clock with an O2 saturation 92%  Patient denies any shortness of breath, no fever chills, no cough  Review of Systems:    Review of Systems   Constitutional: Negative for chills and fever  Respiratory: Positive for shortness of breath  Negative for cough and wheezing  Cardiovascular: Negative for chest pain and palpitations  Gastrointestinal: Negative for abdominal pain, diarrhea, nausea and vomiting  Genitourinary: Negative for dysuria, frequency, hematuria and urgency  Neurological: Negative for weakness, light-headedness and headaches  Psychiatric/Behavioral: Positive for confusion  All other systems reviewed and are negative        Past Medical and Surgical History:     Past Medical History:   Diagnosis Date    Cancer ([facility] [address] )     Coronary artery disease     Gout     Hypercholesterolemia     Hypertension     [facility]     Microhematuria        Past Surgical History:   Procedure Laterality Date    ABDOMINAL SURGERY      appy    APPENDECTOMY      CORONARY ARTERY BYPASS GRAFT      IR BIOPSY LIVER MASS  8/25/2020    IR CHEMOEMBOLIZATION LIVER TUMOR  6/10/2021    IR PORT PLACEMENT  3/31/2022    IR Y-90 PRE-ANGIO/EMBO W/ LUNG SCAN  10/6/2020    IR Y-90 RADIOEMBOLIZATION  10/14/2020       Meds/Allergies:    Prior to Admission medications    Medication Sig Start Date End Date Taking?  Authorizing Provider   allopurinol (ZYLOPRIM) 300 mg tablet Take 300 mg by mouth daily    Historical Provider, MD   apixaban (Eliquis) 5 mg Take 1 tablet (5 mg total) by mouth 2 (two) times a day 5/6/22 8/4/22  Ace Bleacher, DO   aspirin 81 mg chewable tablet Chew 1 tablet (81 mg total) daily 3/16/22   Hyacinth Coad, DO   cholecalciferol (VITAMIN D3) 400 units tablet Take 1,000 Units by mouth daily     Historical Provider, MD   fluticasone (FLONASE) 50 mcg/act nasal spray 2 sprays into each nostril daily 4/19/22   Ian Rodriguez MD   fluticasone-vilanterol [facility]) 100-25 mcg/inh inhaler Inhale 1 puff daily Rinse mouth after use  5/10/22 6/9/22  Carlos Sheth,    levothyroxine (Euthyrox) 75 mcg tablet Take 1 tablet (75 mcg total) by mouth daily in the early morning 2/15/22   Ace Bleacher, DO   metoprolol tartrate (LOPRESSOR) 25 mg tablet Take 1 tablet (25 mg total) by mouth 2 (two) times a day 5/4/21   Ace Bleacher, DO   Omega-3 Fatty Acids (fish oil) 1,000 mg Take 1,000 mg by mouth daily    Historical Provider, MD   ondansetron (ZOFRAN) 4 mg tablet Take 1 tablet (4 mg total) by mouth every 8 (eight) hours as needed for nausea or vomiting 2/9/22   Moisés Whittaker MD   pantoprazole (PROTONIX) 40 mg tablet Take 1 tablet (40 mg total) by mouth daily in the early morning  Patient not taking: Reported on 9/16/2021 4/9/21 5/9/21  BEKA Mora   Promethazine-DM (PHENERGAN-DM) 6 25-15 mg/5 mL oral syrup Take 10 mL by mouth 3 (three) times a day as needed for cough 2/3/22   Ace Bleacher, DO   tamsulosin (FLOMAX) 0 4 mg Take 1 capsule (0 4 mg total) by mouth daily with dinner 4/5/22   Michelet France PA-C     all medications and allergies reviewed    Allergies: No Known Allergies    Social History:     Marital Status: /[employer] Occupation:  Retired  Patient Pre-hospital Living Situation:  Resides at nursing home  Patient Pre-hospital Level of Mobility:  Unknown  Patient Pre-hospital Diet Restrictions:  Diabetic  Substance Use History:   Social History     Substance and Sexual Activity   Alcohol Use Never     Social History     Tobacco Use   Smoking Status Former Smoker    Packs/day: 0 50    Years: 16 00    Pack years: 8 00    Types: Cigarettes    Start date: 12    Quit date: 5    Years since quittin 4   Smokeless Tobacco Never Used     Social History     Substance and Sexual Activity   Drug Use Never       Family History:  I have reviewed the patient's family history    Physical Exam:     Vitals:   Blood Pressure: 127/65 (22)  Pulse: 102 (22)  Temperature: 97 7 °F (36 5 °C) (22)  Temp Source: Oral (22)  Respirations: 22 (22)  Height: 5' 9" (175 3 cm) (22)  Weight - Scale: 76 kg (167 lb 8 8 oz) (22)  SpO2: 95 % (22)    Physical Exam  Vitals and nursing note reviewed  Constitutional:       General: He is not in acute distress  Appearance: Normal appearance  HENT:      Head: Normocephalic and atraumatic  Right Ear: Tympanic membrane normal       Left Ear: Tympanic membrane normal       Nose: Nose normal       Mouth/Throat:      Mouth: Mucous membranes are moist       Pharynx: No oropharyngeal exudate or posterior oropharyngeal erythema  Eyes:      Extraocular Movements: Extraocular movements intact  Pupils: Pupils are equal, round, and reactive to light  Cardiovascular:      Rate and Rhythm: Regular rhythm  Tachycardia present  Pulses: Normal pulses  Heart sounds: Normal heart sounds  Pulmonary:      Effort: Pulmonary effort is normal  No respiratory distress  Breath sounds: Wheezing, rhonchi and rales present  Abdominal:      General: Abdomen is flat   Bowel sounds are normal       Palpations: Abdomen is soft  Musculoskeletal:         General: Normal range of motion  Cervical back: Normal range of motion and neck supple  Right lower leg: No edema  Left lower leg: No edema  Skin:     General: Skin is warm and dry  Capillary Refill: Capillary refill takes less than 2 seconds  Neurological:      General: No focal deficit present  Mental Status: He is alert  He is disoriented  Additional Data:     Lab Results: I have personally reviewed pertinent reports  Results from last 7 days   Lab Units 06/02/22  1535 05/30/22  0530   WBC Thousand/uL 10 92* 11 04*   HEMOGLOBIN g/dL 14 7 14 4   HEMATOCRIT % 44 4 44 9   PLATELETS Thousands/uL 131* 125*   BANDS PCT %  --  5   LYMPHO PCT %  --  7*   MONO PCT %  --  9   EOS PCT %  --  0     Results from last 7 days   Lab Units 06/02/22  1535   SODIUM mmol/L 139   POTASSIUM mmol/L 4 0   CHLORIDE mmol/L 99   CO2 mmol/L 29   BUN mg/dL 22   CREATININE mg/dL 1 00   ANION GAP mmol/L 11   CALCIUM mg/dL 9 4   ALBUMIN g/dL 2 6*   TOTAL BILIRUBIN mg/dL 1 58*   ALK PHOS U/L 521*   ALT U/L 102*   AST U/L 235*   GLUCOSE RANDOM mg/dL 217*         Results from last 7 days   Lab Units 06/02/22  2249 06/02/22  0546 06/01/22  1635 06/01/22  0515 05/31/22  1614 05/31/22  0532 05/30/22  1639 05/30/22  0523 05/29/22  1624 05/29/22  0547 05/28/22  1620 05/28/22  0511   POC GLUCOSE mg/dl 102 98 225* 110 204* 108 165* 87 186* 87 156* 81         Results from last 7 days   Lab Units 06/02/22  2322 06/02/22  2003   LACTIC ACID mmol/L 3 3* 6 0*       Imaging: I have personally reviewed pertinent reports  CT head without contrast   Final Result by Urbano Lau DO (06/02 2017)      No acute intracranial abnormality                    Workstation performed: [id number]               EKG, Pathology, and Other Studies Reviewed on Admission:   · EKG: N/A    Epic / Care Everywhere Records Reviewed: Yes    ** Please Note: This note has been constructed using a voice recognition system   **

## 2022-06-03 NOTE — ASSESSMENT & PLAN NOTE
· Will admit to level to step-down  · Will hydrate with sepsis protocol IV fluid resuscitation followed by isolyte at 150 cc/hour  · Continue to trend lactic acid and procalcitonin  · Blood cultures urine culture currently pending  · Will give vancomycin, cefepime and Zithromax  · Patient recently had pneumonia with concern for possible aspiration  · Patient met septic shock criteria in that he was tachycardic with heart rate as high as 110, tachypnea with respiratory rate as high as 30, lactic acidosis of 6 0, leukocytosis of 10 92 unknown source of infection being pneumonia

## 2022-06-03 NOTE — PLAN OF CARE
Problem: Potential for Falls  Goal: Patient will remain free of falls  Description: INTERVENTIONS:  - Educate patient/family on patient safety including physical limitations  - Instruct patient to call for assistance with activity   - Consult OT/PT to assist with strengthening/mobility   - Keep Call bell within reach  - Keep bed low and locked with side rails adjusted as appropriate  - Keep care items and personal belongings within reach  - Initiate and maintain comfort rounds  - Make Fall Risk Sign visible to staff  - Offer Toileting every 2 Hours, in advance of need  - Initiate/Maintain fall alarm  - Obtain necessary fall risk management equipment: fall alarm  - Apply yellow socks and bracelet for high fall risk patients  - Consider moving patient to room near nurses station  Outcome: Progressing     Problem: PAIN - ADULT  Goal: Verbalizes/displays adequate comfort level or baseline comfort level  Description: Interventions:  - Encourage patient to monitor pain and request assistance  - Assess pain using appropriate pain scale  - Administer analgesics based on type and severity of pain and evaluate response  - Implement non-pharmacological measures as appropriate and evaluate response  - Consider cultural and social influences on pain and pain management  - Notify physician/advanced practitioner if interventions unsuccessful or patient reports new pain  Outcome: Progressing     Problem: INFECTION - ADULT  Goal: Absence or prevention of progression during hospitalization  Description: INTERVENTIONS:  - Assess and monitor for signs and symptoms of infection  - Monitor lab/diagnostic results  - Monitor all insertion sites, i e  indwelling lines, tubes, and drains  - Monitor endotracheal if appropriate and nasal secretions for changes in amount and color  - [place] appropriate cooling/warming therapies per order  - Administer medications as ordered  - Instruct and encourage patient and family to use good hand hygiene technique  - Identify and instruct in appropriate isolation precautions for identified infection/condition  Outcome: Progressing     Problem: SAFETY ADULT  Goal: Maintain or return to baseline ADL function  Description: INTERVENTIONS:  -  Assess patient's ability to carry out ADLs; assess patient's baseline for ADL function and identify physical deficits which impact ability to perform ADLs (bathing, care of mouth/teeth, toileting, grooming, dressing, etc )  - Assess/evaluate cause of self-care deficits   - Assess range of motion  - Assess patient's mobility; develop plan if impaired  - Assess patient's need for assistive devices and provide as appropriate  - Encourage maximum independence but intervene and supervise when necessary  - Involve family in performance of ADLs  - Assess for home care needs following discharge   - Consider OT consult to assist with ADL evaluation and planning for discharge  - Provide patient education as appropriate  Outcome: Progressing  Goal: Maintains/Returns to pre admission functional level  Description: INTERVENTIONS:  - Perform BMAT or MOVE assessment daily    - Set and communicate daily mobility goal to care team and patient/family/caregiver  - Collaborate with rehabilitation services on mobility goals if consulted  - Perform Range of Motion 3 times a day  - Reposition patient every 2 hours    - Dangle patient 3 times a day  - Stand patient 3 times a day  - Ambulate patient 3 times a day  - Out of bed to chair 3 times a day   - Out of bed for meals 3 times a day  - Out of bed for toileting  - Record patient progress and toleration of activity level   Outcome: Progressing     Problem: DISCHARGE PLANNING  Goal: Discharge to home or other facility with appropriate resources  Description: INTERVENTIONS:  - Identify barriers to discharge w/patient and caregiver  - Arrange for needed discharge resources and transportation as appropriate  - Identify discharge learning needs (meds, wound care, etc )  - Arrange for interpretive services to assist at discharge as needed  - Refer to Case Management Department for coordinating discharge planning if the patient needs post-hospital services based on physician/advanced practitioner order or complex needs related to functional status, cognitive ability, or social support system  Outcome: Progressing     Problem: Knowledge Deficit  Goal: Patient/family/caregiver demonstrates understanding of disease process, treatment plan, medications, and discharge instructions  Description: Complete learning assessment and assess knowledge base  Interventions:  - Provide teaching at level of understanding  - Provide teaching via preferred learning methods  Outcome: Progressing     Problem: Nutrition/Hydration-ADULT  Goal: Nutrient/Hydration intake appropriate for improving, restoring or maintaining nutritional needs  Description: Monitor and assess patient's nutrition/hydration status for malnutrition  Collaborate with interdisciplinary team and initiate plan and interventions as ordered  Monitor patient's weight and dietary intake as ordered or per policy  Utilize nutrition screening tool and intervene as necessary  Determine patient's food preferences and provide high-protein, high-caloric foods as appropriate       INTERVENTIONS:  - Monitor oral intake, urinary output, labs, and treatment plans  - Assess nutrition and hydration status and recommend course of action  - Evaluate amount of meals eaten  - Assist patient with eating if necessary   - Allow adequate time for meals  - Recommend/ encourage appropriate diets, oral nutritional supplements, and vitamin/mineral supplements  - Order, calculate, and assess calorie counts as needed  - Recommend, monitor, and adjust tube feedings and TPN/PPN based on assessed needs  - Assess need for intravenous fluids  - Provide specific nutrition/hydration education as appropriate  - Include patient/family/caregiver in decisions related to nutrition  Outcome: Progressing     Problem: MOBILITY - ADULT  Goal: Maintain or return to baseline ADL function  Description: INTERVENTIONS:  -  Assess patient's ability to carry out ADLs; assess patient's baseline for ADL function and identify physical deficits which impact ability to perform ADLs (bathing, care of mouth/teeth, toileting, grooming, dressing, etc )  - Assess/evaluate cause of self-care deficits   - Assess range of motion  - Assess patient's mobility; develop plan if impaired  - Assess patient's need for assistive devices and provide as appropriate  - Encourage maximum independence but intervene and supervise when necessary  - Involve family in performance of ADLs  - Assess for home care needs following discharge   - Consider OT consult to assist with ADL evaluation and planning for discharge  - Provide patient education as appropriate  Outcome: Progressing  Goal: Maintains/Returns to pre admission functional level  Description: INTERVENTIONS:  - Perform BMAT or MOVE assessment daily    - Set and communicate daily mobility goal to care team and patient/family/caregiver  - Collaborate with rehabilitation services on mobility goals if consulted  - Perform Range of Motion 3 times a day  - Reposition patient every 2 hours    - Dangle patient 3 times a day  - Stand patient 3 times a day  - Ambulate patient 3 times a day  - Out of bed to chair 3 times a day   - Out of bed for meals 3 times a day  - Out of bed for toileting  - Record patient progress and toleration of activity level   Outcome: Progressing     Problem: Prexisting or High Potential for Compromised Skin Integrity  Goal: Skin integrity is maintained or improved  Description: INTERVENTIONS:  - Identify patients at risk for skin breakdown  - Assess and monitor skin integrity  - Assess and monitor nutrition and hydration status  - Monitor labs   - Assess for incontinence   - Turn and reposition patient  - Assist with mobility/ambulation  - Relieve pressure over bony prominences  - Avoid friction and shearing  - Provide appropriate hygiene as needed including keeping skin clean and dry  - Evaluate need for skin moisturizer/barrier cream  - Collaborate with interdisciplinary team   - Patient/family teaching  - Consider wound care consult   Outcome: Progressing

## 2022-06-03 NOTE — ASSESSMENT & PLAN NOTE
· Secondary to septic shock  · Improving from initial of 6 0 to most recent of 3 3  · Will continue to trend  · Hydrate as per above

## 2022-06-03 NOTE — Clinical Note
Kem Parent 77 yo male  7 1940 currently at [facility]  Hx hepatocellular carcinoma diagnosed on 2020 s/p chemoembolization 2021 as well as radioembolization 10 2020  Presented with increased SOB  Imaging showed bilateral pneumonia that appears to be worsening  Also concern for aspiration  On 3L oxygen with saturations at 92%  Denies pain at this time  Albumin 2  6    Alk Phos 521  Patient has opted for no more aggressive treatment and wants Hospice  PPS 40-50  Approve, RLOC?

## 2022-06-03 NOTE — SEPSIS NOTE
Sepsis Note   Amelia Pallas 78 y o  male MRN: [id number]  Unit/Bed#: ED 24 Encounter: [id number]       qSOFA     [address] Name 06/02/22 2200 06/02/22 2130 06/02/22 2100 06/02/22 1920       Altered mental status GCS < 15 -- -- -- --     Respiratory Rate > / =22 1 1 1 1     Systolic BP < / =842 0 0 0 0     Q Sofa Score 1 1 1 1

## 2022-06-03 NOTE — ASSESSMENT & PLAN NOTE
Lab Results   Component Value Date    HGBA1C 7 0 (A) 05/10/2022       Recent Labs     06/01/22  0515 06/01/22  1635 06/02/22  0546 06/02/22  2249   POCGLU 110 225* 98 102       Blood Sugar Average: Last 72 hrs:  (P) 102     Place on [facility] step 2 diet, obtain Accu-Cheks AC and HS with Humalog correction dose a c   And HS

## 2022-06-03 NOTE — SEPSIS NOTE
Sepsis Note   Ayaka Howell 78 y o  male MRN: [id number]  Unit/Bed#: ICU 08-01 Encounter: [id number]       qSOFA     [address] Name 06/03/22 0008 06/02/22 2300 06/02/22 2218 06/02/22 2200 06/02/22 2130    Altered mental status GCS < 15 -- 0 -- -- --    Respiratory Rate > / =22 1 -- 0 1 1    Systolic BP < / =923 -- -- 0 0 0    Q Sofa Score 1 0 0 1 1    Row Name 06/02/22 2100 06/02/22 1920             Altered mental status GCS < 15 -- --       Respiratory Rate > / =12 1 1       Systolic BP < / =548 0 0       Q Sofa Score 1 1

## 2022-06-03 NOTE — ASSESSMENT & PLAN NOTE
Lab Results   Component Value Date    EGFR 69 06/02/2022    EGFR 84 05/30/2022    EGFR 82 05/25/2022    CREATININE 1 00 06/02/2022    CREATININE 0 76 05/30/2022    CREATININE 0 80 05/25/2022     Creatinine appears to be approximately baseline, will continue to monitor with repeat labs in a m

## 2022-06-03 NOTE — ASSESSMENT & PLAN NOTE
Wt Readings from Last 3 Encounters:   06/02/22 76 kg (167 lb 8 8 oz)   05/26/22 75 1 kg (165 lb 9 1 oz)   05/17/22 75 5 kg (166 lb 6 4 oz)     Will obtain daily weights and continue pre-hospital Lopressor 25 mg p o  B i d

## 2022-06-03 NOTE — TELEPHONE ENCOUNTER
Attempted to contact patients wife and spoke with her  She stated she did not call back this morning because there was a lot of decisions to make  She stated after she left the nursing home on Thursday 2 hours later they called and said they were transporting him to the ER  His chest xrays did not come back good and they believed it is in his best interest to go to the ER  The ER asked if she would like to take him off of some of his things and she said no to keep him comfortable  With him having pneumonia and sepsis they put him on hospice  Family has to make the decision by the end of the weekend to keep patient on hospice in the hospital or bring him home on hospice  Wife said she feels at this time she does not want to come into the office to discuss long term because she does not know how much longer her  has  She said they are going to keep him comfortable and allow him to pass on his own  Patient said if there is any questions you can reach her on her cell phone

## 2022-06-03 NOTE — PROGRESS NOTES
Vancomycin Assessment    Brianna Muniz is a 78 y o  male who is currently receiving vancomycin 1250 mg iv q 12 hrs for Pneumonia     Relevant clinical data and objective history reviewed:  Creatinine   Date Value Ref Range Status   06/02/2022 1 00 0 60 - 1 30 mg/dL Final     Comment:     Standardized to IDMS reference method   05/30/2022 0 76 0 60 - 1 30 mg/dL Final     Comment:     Standardized to IDMS reference method   05/25/2022 0 80 0 60 - 1 30 mg/dL Final     Comment:     Standardized to IDMS reference method     /65 (BP Location: Left arm)   Pulse (!) 109   Temp 97 7 °F (36 5 °C) (Oral)   Resp 18   Ht 5' 9" (1 753 m)   Wt 76 kg (167 lb 8 8 oz)   SpO2 93%   BMI 24 74 kg/m²   No intake/output data recorded  Lab Results   Component Value Date/Time    BUN 22 06/02/2022 03:35 PM    WBC 10 92 (H) 06/02/2022 03:35 PM    HGB 14 7 06/02/2022 03:35 PM    HCT 44 4 06/02/2022 03:35 PM    MCV 93 06/02/2022 03:35 PM     (L) 06/02/2022 03:35 PM     Temp Readings from Last 3 Encounters:   06/02/22 97 7 °F (36 5 °C) (Oral)   05/26/22 (!) 97 4 °F (36 3 °C) (Tympanic)   05/17/22 97 8 °F (36 6 °C)     Vancomycin Days of Therapy: 1    Assessment/Plan  The patient is currently on vancomycin utilizing scheduled dosing based on actual body weight  Baseline risks associated with therapy include: pre-existing renal impairment, concomitant nephrotoxic medications, advanced age, and dehydration  The patient is currently receiving 1250 mg iv q 12 hrs and after clinical evaluation will be changed to 1500 mg loading followed by 1000 mg iv q 12 hrs  Pharmacy will also follow closely for s/sx of nephrotoxicity, infusion reactions, and appropriateness of therapy  BMP and CBC will be ordered per protocol  Plan for trough as patient approaches steady state, prior to the 4th  dose at approximately 0930 on 06/04/22  Due to infection severity, will target a trough of 15-20 (appropriate for most indications)   Pharmacy will continue to follow the patients culture results and clinical progress daily      Ruddy Levi, Pharmacist

## 2022-06-04 PROBLEM — Z51.5 END OF LIFE CARE: Status: ACTIVE | Noted: 2022-01-01

## 2022-06-04 NOTE — NURSING NOTE
Family ( son) visiting  Pt repositioned to the right  Very stiff - moves extremities  - but not to commad

## 2022-06-04 NOTE — ASSESSMENT & PLAN NOTE
· Secondary to suspected aspiration pneumonia event  · Status post initial treatment with IV antibiotics-vancomycin, cefepime and Zithromax  · All medications have been discontinued since the patient is now being transitioned to hospice and is on comfort measures

## 2022-06-04 NOTE — ASSESSMENT & PLAN NOTE
Wt Readings from Last 3 Encounters:   06/02/22 76 kg (167 lb 8 8 oz)   05/26/22 75 1 kg (165 lb 9 1 oz)   05/17/22 75 5 kg (166 lb 6 4 oz)     · No further medications, testing, and or treatment

## 2022-06-04 NOTE — ASSESSMENT & PLAN NOTE
Lab Results   Component Value Date    EGFR 87 06/03/2022    EGFR 69 06/02/2022    EGFR 84 05/30/2022    CREATININE 0 70 06/03/2022    CREATININE 1 00 06/02/2022    CREATININE 0 76 05/30/2022     · No further BMP testing

## 2022-06-04 NOTE — ASSESSMENT & PLAN NOTE
· Refer to my addendum/attestation on the original H&P for the details in regards to how we got to this point  · Status post a hospice evaluation  · Patient is hospice appropriate  · Family is in agreement  · Await final family decision in regard to whether the patient will return to a facility under the umbrella of hospice or they will take him home with hospice  · Continue comfort measures in-house  · Patient is obtunded this morning  · Will switch oral morphine and oral Ativan to IV  · Poor prognosis

## 2022-06-04 NOTE — PROGRESS NOTES
[id number]: Received report from day shift RN  Pt resting comfortably on comfort measures  2100: Pt bathed, linens changed, & repositioned in bed  Pt unable to answer orientation questions, but says simple yes/no answers  0449: Pt medicated with PRN Ativan & Morphine for tachypnea & restlessness/agitation  Pt repositioned in bed, gown & blankets reapplied  0345: Pt sleeping comfortably  Rodgers draining adequate urine output  O2 sats in 70-80 range throughout the night

## 2022-06-04 NOTE — PLAN OF CARE
Problem: Potential for Falls  Goal: Patient will remain free of falls  Description: INTERVENTIONS:  - Educate patient/family on patient safety including physical limitations  - Instruct patient to call for assistance with activity   - Consult OT/PT to assist with strengthening/mobility   - Keep Call bell within reach  - Keep bed low and locked with side rails adjusted as appropriate  - Keep care items and personal belongings within reach  - Initiate and maintain comfort rounds  - Make Fall Risk Sign visible to staff  - Offer Toileting every 2 Hours, in advance of need  - Initiate/Maintain fall alarm  - Obtain necessary fall risk management equipment: fall alarm  - Apply yellow socks and bracelet for high fall risk patients  - Consider moving patient to room near nurses station  Outcome: Progressing     Problem: PAIN - ADULT  Goal: Verbalizes/displays adequate comfort level or baseline comfort level  Description: Interventions:  - Encourage patient to monitor pain and request assistance  - Assess pain using appropriate pain scale  - Administer analgesics based on type and severity of pain and evaluate response  - Implement non-pharmacological measures as appropriate and evaluate response  - Consider cultural and social influences on pain and pain management  - Notify physician/advanced practitioner if interventions unsuccessful or patient reports new pain  Outcome: Progressing     Problem: SAFETY ADULT  Goal: Maintain or return to baseline ADL function  Description: INTERVENTIONS:  -  Assess patient's ability to carry out ADLs; assess patient's baseline for ADL function and identify physical deficits which impact ability to perform ADLs (bathing, care of mouth/teeth, toileting, grooming, dressing, etc )  - Assess/evaluate cause of self-care deficits   - Assess range of motion  - Assess patient's mobility; develop plan if impaired  - Assess patient's need for assistive devices and provide as appropriate  - Encourage maximum independence but intervene and supervise when necessary  - Involve family in performance of ADLs  - Assess for home care needs following discharge   - Consider OT consult to assist with ADL evaluation and planning for discharge  - Provide patient education as appropriate  Outcome: Not Progressing

## 2022-06-04 NOTE — ASSESSMENT & PLAN NOTE
Lab Results   Component Value Date    HGBA1C 7 0 (A) 05/10/2022       Recent Labs     06/01/22  1635 06/02/22  0546 06/02/22  2249 06/03/22  0717   POCGLU 225* 98 102 92       Blood Sugar Average: Last 72 hrs:  (P) 97     All medications have been discontinued, no further testing

## 2022-06-04 NOTE — PLAN OF CARE
Problem: Potential for Falls  Goal: Patient will remain free of falls  Description: INTERVENTIONS:  - Educate patient/family on patient safety including physical limitations  - Instruct patient to call for assistance with activity   - Consult OT/PT to assist with strengthening/mobility   - Keep Call bell within reach  - Keep bed low and locked with side rails adjusted as appropriate  - Keep care items and personal belongings within reach  - Initiate and maintain comfort rounds  - Make Fall Risk Sign visible to staff  - Offer Toileting every 2 Hours, in advance of need  - Initiate/Maintain fall alarm  - Obtain necessary fall risk management equipment: fall alarm  - Apply yellow socks and bracelet for high fall risk patients  - Consider moving patient to room near nurses station  Outcome: Progressing     Problem: PAIN - ADULT  Goal: Verbalizes/displays adequate comfort level or baseline comfort level  Description: Interventions:  - Encourage patient to monitor pain and request assistance  - Assess pain using appropriate pain scale  - Administer analgesics based on type and severity of pain and evaluate response  - Implement non-pharmacological measures as appropriate and evaluate response  - Consider cultural and social influences on pain and pain management  - Notify physician/advanced practitioner if interventions unsuccessful or patient reports new pain  Outcome: Progressing     Problem: SAFETY ADULT  Goal: Maintain or return to baseline ADL function  Description: INTERVENTIONS:  -  Assess patient's ability to carry out ADLs; assess patient's baseline for ADL function and identify physical deficits which impact ability to perform ADLs (bathing, care of mouth/teeth, toileting, grooming, dressing, etc )  - Assess/evaluate cause of self-care deficits   - Assess range of motion  - Assess patient's mobility; develop plan if impaired  - Assess patient's need for assistive devices and provide as appropriate  - Encourage maximum independence but intervene and supervise when necessary  - Involve family in performance of ADLs  - Assess for home care needs following discharge   - Consider OT consult to assist with ADL evaluation and planning for discharge  - Provide patient education as appropriate  Outcome: Progressing     Problem: DISCHARGE PLANNING  Goal: Discharge to home or other facility with appropriate resources  Description: INTERVENTIONS:  - Identify barriers to discharge w/patient and caregiver  - Arrange for needed discharge resources and transportation as appropriate  - Identify discharge learning needs (meds, wound care, etc )  - Arrange for interpretive services to assist at discharge as needed  - Refer to Case Management Department for coordinating discharge planning if the patient needs post-hospital services based on physician/advanced practitioner order or complex needs related to functional status, cognitive ability, or social support system  Outcome: Progressing     Problem: Knowledge Deficit  Goal: Patient/family/caregiver demonstrates understanding of disease process, treatment plan, medications, and discharge instructions  Description: Complete learning assessment and assess knowledge base    Interventions:  - Provide teaching at level of understanding  - Provide teaching via preferred learning methods  Outcome: Progressing     Problem: Prexisting or High Potential for Compromised Skin Integrity  Goal: Skin integrity is maintained or improved  Description: INTERVENTIONS:  - Identify patients at risk for skin breakdown  - Assess and monitor skin integrity  - Assess and monitor nutrition and hydration status  - Monitor labs   - Assess for incontinence   - Turn and reposition patient  - Assist with mobility/ambulation  - Relieve pressure over bony prominences  - Avoid friction and shearing  - Provide appropriate hygiene as needed including keeping skin clean and dry  - Evaluate need for skin moisturizer/barrier cream  - Collaborate with interdisciplinary team   - Patient/family teaching  - Consider wound care consult   Outcome: Progressing

## 2022-06-04 NOTE — ASSESSMENT & PLAN NOTE
· Present on admission  · Secondary to sepsis  · No further inpatient testing, treatment, and or workup since the patient is now on comfort measures only

## 2022-06-04 NOTE — PROGRESS NOTES
[address] [facility]  Progress Note - Prabhakar Amezcua 1940, 78 y o  male MRN: [id number]  Unit/Bed#: ICU 08-01 Encounter: [id number]  Primary Care Provider: Gissel Davila DO   Date and time admitted to hospital: 6/2/2022  7:14 PM    * End of life care  Assessment & Plan  · Refer to my addendum/attestation on the original H&P for the details in regards to how we got to this point  · Status post a hospice evaluation  · Patient is hospice appropriate  · Family is in agreement  · Await final family decision in regard to whether the patient will return to a facility under the umbrella of hospice or they will take him home with hospice  · Continue comfort measures in-house  · Patient is obtunded this morning  · Will switch oral morphine and oral Ativan to IV  · Poor prognosis    Hepatocellular carcinoma (HCC)  Assessment & Plan  · Comfort measures only status now    Metabolic encephalopathy  Assessment & Plan  · Present on admission  · Secondary to sepsis  · No further inpatient testing, treatment, and or workup since the patient is now on comfort measures only    Septic shock ([address] )  Assessment & Plan  · Secondary to suspected aspiration pneumonia event  · Status post initial treatment with IV antibiotics-vancomycin, cefepime and Zithromax  · All medications have been discontinued since the patient is now being transitioned to hospice and is on comfort measures    Pneumonia of both lungs due to infectious organism  Assessment & Plan  · As above    Essential hypertension  Assessment & Plan  · No further medications    Chronic diastolic heart failure (HCC)  Assessment & Plan  Wt Readings from Last 3 Encounters:   06/02/22 76 kg (167 lb 8 8 oz)   05/26/22 75 1 kg (165 lb 9 1 oz)   05/17/22 75 5 kg (166 lb 6 4 oz)     · No further medications, testing, and or treatment      Lactic acidosis  Assessment & Plan  · Secondary to septic shock  · No further testing    Pulmonary emboli [facility])  Assessment & Plan  · Eliquis has been discontinued    Stage 3a chronic kidney disease [facility])  Assessment & Plan  Lab Results   Component Value Date    EGFR 87 06/03/2022    EGFR 69 06/02/2022    EGFR 84 05/30/2022    CREATININE 0 70 06/03/2022    CREATININE 1 00 06/02/2022    CREATININE 0 76 05/30/2022     · No further BMP testing    Subclinical hypothyroidism  Assessment & Plan  · Synthroid has been discontinued     Transaminitis  Assessment & Plan  · Secondary to history of hepatocellular carcinoma, no further testing and/or treatment    Type 2 diabetes mellitus without complication, without long-term current use of insulin [facility])  Assessment & Plan  Lab Results   Component Value Date    HGBA1C 7 0 (A) 05/10/2022       Recent Labs     06/01/22  1635 06/02/22  0546 06/02/22  2249 06/03/22  0717   POCGLU 225* 98 102 92       Blood Sugar Average: Last 72 hrs:  (P) 97     All medications have been discontinued, no further testing        VTE Prophylaxis:  No pharmacological VTE prophylaxis is indicated in this patient at the end of his life on full-blown comfort measures    Patient Centered Rounds: I have performed bedside rounds with nursing staff today      Discussions with Specialists or Other Care Team Provider:  Case management, nursing  Education and Discussions with Family / Patient:  Patient's wife Debra Cagle was brought up to par at 9:05 a m , all questions answered to her satisfaction, she verbalized understanding that the patient may pass away very soon    Current Length of Stay: 2 day(s)    Current Patient Status: Inpatient   Certification Statement: The patient will continue to require additional inpatient hospital stay due to Continued comfort measures    Discharge Plan:  Discharge planning is in progress    Code Status: Level 4 - Comfort Care    Subjective:   Patient seen and examined, lying in bed, is obtunded, does not follow simple 1 step commands, is noted to have shallow breathing    Objective:     Vitals:   Temp (24hrs), Av 9 °F (36 1 °C), Min:96 9 °F (36 1 °C), Max:96 9 °F (36 1 °C)    Temp:  [96 9 °F (36 1 °C)] 96 9 °F (36 1 °C)  HR:  [109-124] 124  BP: (109)/(65) 109/65  SpO2:  [78 %-94 %] 80 %  Body mass index is 24 74 kg/m²  Input and Output Summary (last 24 hours): Intake/Output Summary (Last 24 hours) at 2022 09  Last data filed at 2022 0543  Gross per 24 hour   Intake 0 ml   Output 835 ml   Net -835 ml       Physical Exam:   Physical Exam  Vitals and nursing note reviewed  Constitutional:       General: He is in acute distress  Appearance: He is ill-appearing and toxic-appearing  HENT:      Head: Normocephalic and atraumatic  Nose: Nose normal    Eyes:      Extraocular Movements: Extraocular movements intact  Pupils: Pupils are equal, round, and reactive to light  Cardiovascular:      Pulses: Normal pulses  Heart sounds: Normal heart sounds  No murmur heard  No friction rub  No gallop  Comments: S1 plus S2 tachycardic  Pulmonary:      Effort: Respiratory distress present  Comments: Positive use of the accessory muscles of respiration  Shallow agonal breaths noted  Diminished air entry bilaterally at the bases  Abdominal:      General: There is no distension  Palpations: Abdomen is soft  There is no mass  Tenderness: There is no abdominal tenderness  There is no guarding or rebound  Musculoskeletal:         General: No swelling or tenderness  Normal range of motion  Cervical back: Normal range of motion and neck supple  No rigidity  No muscular tenderness  Right lower leg: No edema  Left lower leg: No edema  Skin:     General: Skin is warm  Capillary Refill: Capillary refill takes less than 2 seconds  Findings: No erythema or rash     Neurological:      Comments: Unresponsive, no purposeful interaction         Additional Data:     Labs:    Results from last 7 days   Lab Units 22  0449   WBC Thousand/uL 12 98* HEMOGLOBIN g/dL 14 2   HEMATOCRIT % 43 6   PLATELETS Thousands/uL 131*   LYMPHO PCT % 6*   MONO PCT % 10   EOS PCT % 3     Results from last 7 days   Lab Units 06/03/22  0449 06/02/22  1535   SODIUM mmol/L 142 139   POTASSIUM mmol/L 3 9 4 0   CHLORIDE mmol/L 102 99   CO2 mmol/L 29 29   BUN mg/dL 17 22   CREATININE mg/dL 0 70 1 00   CALCIUM mg/dL 8 9 9 4   ALK PHOS U/L  --  521*   ALT U/L  --  102*   AST U/L  --  235*         Results from last 7 days   Lab Units 06/03/22  0717 06/02/22  2249 06/02/22  0546 06/01/22  1635 06/01/22  0515 05/31/22  1614 05/31/22  0532 05/30/22  1639 05/30/22  0523 05/29/22  1624 05/29/22  0547 05/28/22  1620   POC GLUCOSE mg/dl 92 102 98 225* 110 204* 108 165* 87 186* 87 156*           * I Have Reviewed All Lab Data Listed Above  * Additional Pertinent Lab Tests Reviewed: [facility] Admission  Reviewed    Imaging:  Imaging Reports Reviewed Today Include:  None    Recent Cultures (last 7 days):     Results from last 7 days   Lab Units 06/02/22  2253 06/02/22 2003   BLOOD CULTURE   --  No Growth at 24 hrs  No Growth at 24 hrs  LEGIONELLA URINARY ANTIGEN  Negative  --        Last 24 Hours Medication List:   Current Facility-Administered Medications   Medication Dose Route Frequency Provider Last Rate    LORazepam  2 mg Oral Q4H PRN Adeola Khan MD      Morphine Sulfate (Concentrate)  5 mg Oral Q4H PRN Adeola Khan MD          Today, Patient Was Seen By: Adeola Khan MD    ** Please Note: Dictation voice to text software may have been used in the creation of this document   **

## 2022-06-05 NOTE — ASSESSMENT & PLAN NOTE
· Refer to my addendum/attestation on the original H&P for the details in regards to how we got to this point  · Status post a hospice evaluation  · Patient is hospice appropriate  · Family is in agreement  · Patient passed away comfortably on the morning of 06/05/2022 at 5:55 a m

## 2022-06-05 NOTE — ASSESSMENT & PLAN NOTE
· Present on admission  · Secondary to sepsis  · No further inpatient testing, treatment, and or workup since the patient is now on comfort measures only  · Patient  at 5:55 a m

## 2022-06-05 NOTE — DISCHARGE SUMMARY
Arslan 43  Discharge- [place] 1940, 78 y o  male MRN: [id number]  Unit/Bed#: ICU  Encounter: [id number]  Primary Care Provider: Luis Causey DO   Date and time admitted to hospital: 2022  7:14 PM    * End of life care  Assessment & Plan  · Refer to my addendum/attestation on the original H&P for the details in regards to how we got to this point  · Status post a hospice evaluation  · Patient is hospice appropriate  · Family is in agreement  · Patient passed away comfortably on the morning of 2022 at 5:55 a m  Hepatocellular carcinoma ([facility] [address] )  Assessment & Plan  · Patient     Metabolic encephalopathy  Assessment & Plan  · Present on admission  · Secondary to sepsis  · No further inpatient testing, treatment, and or workup since the patient is now on comfort measures only  · Patient  at 5:55 a m  Septic shock ([address] )  Assessment & Plan  · Secondary to suspected aspiration pneumonia event  · Status post initial treatment with IV antibiotics-vancomycin, cefepime and Zithromax  · All medications have been discontinued since the patient is now being transitioned to hospice and is on comfort measures  · Patient  at 5:55 a m  Pneumonia of both lungs due to infectious organism  Assessment & Plan  Patient  at 5:55 a m  Essential hypertension  Assessment & Plan  · No further medications  Patient  at 5:55 a m  Chronic diastolic heart failure (HCC)  Assessment & Plan  Wt Readings from Last 3 Encounters:   22 76 kg (167 lb 8 8 oz)   22 75 1 kg (165 lb 9 1 oz)   22 75 5 kg (166 lb 6 4 oz)     · No further medications, testing, and or treatment  Patient  at 5:55 a m  Lactic acidosis  Assessment & Plan  · Secondary to septic shock  · No further testing  Patient  at 5:55 a m  Pulmonary emboli [facility])  Assessment & Plan  · Eliquis has been discontinued  Patient  at 5:55 a m      Stage 3a chronic kidney disease [facility])  Assessment & Plan  Lab Results   Component Value Date    EGFR 87 2022    EGFR 69 2022    EGFR 84 2022    CREATININE 0 70 2022    CREATININE 1 00 2022    CREATININE 0 76 2022     · No further BMP testing  Patient  at 5:55 a m  Subclinical hypothyroidism  Assessment & Plan  · Synthroid has been discontinued   Patient  at 5:55 a m  Transaminitis  Assessment & Plan  · Secondary to history of hepatocellular carcinoma, no further testing and/or treatment  Patient  at 5:55 a m  Type 2 diabetes mellitus without complication, without long-term current use of insulin [facility])  Assessment & Plan  Lab Results   Component Value Date    HGBA1C 7 0 (A) 05/10/2022       Recent Labs     22  2249 22  0717   POCGLU 102 92       Blood Sugar Average: Last 72 hrs:  (P) 97     All medications have been discontinued, no further testing  Patient  at 5:55 a m  Discharging Physician / Practitioner: Tony Mckoy MD  PCP: Pat Mendez DO  Admission Date:   Admission Orders (From admission, onward)     Ordered        22 2140  Inpatient Admission  Once                      Discharge Date: 22    Medical Problems             Resolved Problems  Date Reviewed: 2022   None                 Consultations During Hospital Stay:  · None    Procedures Performed:   · None    Significant Findings / Test Results:   · CT brain-no acute intercranial abnormality  · X-ray chest PA lateral-Persistent bilateral right greater than left pulmonary infiltrates worrisome for bilateral pneumonia  Right lateral 8th rib fracture with mild displacement, potentially pathologic fracture   Bone scan may be helpful to assess for metastatic burden in the skeleton, if deemed clinically appropriate  Incidental Findings:   · None     Test Results Pending at Discharge (will require follow up):    · None     Outpatient Tests Requested:  · None    Complications:     None    Reason for Admission:  Pneumonia    Hospital Course:     Yari Álvarez is a 78 y o  male patient who originally presented to the hospital on 2022 due to shortness of breath  Please refer to the initial history and physical examination completed by Ashley RAINEY for the initial presenting features and complaints  In brief, the patient was an 25-year-old male who presented to the emergency room with a chief complaint of shortness of breath  He was recently discharged from the hospital on 2022 after being treated for septic shock secondary to pneumonia  Patient had completed antibiotics on 2022  After being admitted, he was started on broad-spectrum IV antibiotics once again  Imaging studies were performed with the results as outlined above  Patient also had an acute metabolic encephalopathy  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  He had multiple comorbid medical conditions as outlined above  He had an extremely poor quality of life  A goals of care discussion was held with his wife on 2022  At that time, a decision was made to withdraw care, and implement comfort measures  Treatment was started with as needed morphine and Ativan  A hospice evaluation was obtained  Family agreed on hospice  While awaiting the final hospice set up, the patient passed away on the morning of 2022  Please see above list of diagnoses and related plan for additional information       Condition at Discharge:      Discharge Day Visit / Exam:     Subjective:  Patient is   Exam:   Lungs:-no breath sounds  Cardiac:-no heart sounds  Pupils:-fixed and dilated  Vascular:-no palpable pulses    Discussion with Family:  Patient's wife was notified of the patient's passing    Discharge instructions/Information to patient and family:   See after visit summary for information provided to patient and family  Provisions for Follow-Up Care:  See after visit summary for information related to follow-up care and any pertinent home health orders  Disposition:     Other:       Planned Readmission:    None     Discharge Statement:  I spent 45 minutes discharging the patient  This time was spent on the day of discharge  I had direct contact with the patient on the day of discharge  Greater than 50% of the total time was spent examining patient, answering all patient questions, arranging and discussing plan of care with patient as well as directly providing post-discharge instructions  Additional time then spent on discharge activities  Discharge Medications:  See after visit summary for reconciled discharge medications provided to patient and family        ** Please Note: This note has been constructed using a voice recognition system **

## 2022-06-05 NOTE — ASSESSMENT & PLAN NOTE
· Secondary to history of hepatocellular carcinoma, no further testing and/or treatment  Patient  at 5:55 a m

## 2022-06-05 NOTE — PLAN OF CARE
Problem: SAFETY ADULT  Goal: Maintain or return to baseline ADL function  Description: INTERVENTIONS:  -  Assess patient's ability to carry out ADLs; assess patient's baseline for ADL function and identify physical deficits which impact ability to perform ADLs (bathing, care of mouth/teeth, toileting, grooming, dressing, etc )  - Assess/evaluate cause of self-care deficits   - Assess range of motion  - Assess patient's mobility; develop plan if impaired  - Assess patient's need for assistive devices and provide as appropriate  - Encourage maximum independence but intervene and supervise when necessary  - Involve family in performance of ADLs  - Assess for home care needs following discharge   - Consider OT consult to assist with ADL evaluation and planning for discharge  - Provide patient education as appropriate  Outcome: Not Progressing     Problem: Knowledge Deficit  Goal: Patient/family/caregiver demonstrates understanding of disease process, treatment plan, medications, and discharge instructions  Description: Complete learning assessment and assess knowledge base  Interventions:  - Provide teaching at level of understanding  - Provide teaching via preferred learning methods  Outcome: Progressing     Problem: Nutrition/Hydration-ADULT  Goal: Nutrient/Hydration intake appropriate for improving, restoring or maintaining nutritional needs  Description: Monitor and assess patient's nutrition/hydration status for malnutrition  Collaborate with interdisciplinary team and initiate plan and interventions as ordered  Monitor patient's weight and dietary intake as ordered or per policy  Utilize nutrition screening tool and intervene as necessary  Determine patient's food preferences and provide high-protein, high-caloric foods as appropriate       INTERVENTIONS:  - Monitor oral intake, urinary output, labs, and treatment plans  - Assess nutrition and hydration status and recommend course of action  - Evaluate amount of meals eaten  - Assist patient with eating if necessary   - Allow adequate time for meals  - Recommend/ encourage appropriate diets, oral nutritional supplements, and vitamin/mineral supplements  - Order, calculate, and assess calorie counts as needed  - Recommend, monitor, and adjust tube feedings and TPN/PPN based on assessed needs  - Assess need for intravenous fluids  - Provide specific nutrition/hydration education as appropriate  - Include patient/family/caregiver in decisions related to nutrition  Outcome: Not Progressing

## 2022-06-05 NOTE — PROGRESS NOTES
[id number]: Received report from day shift RN      2020: Pt given 2 mg iv morphine PRN for dyspnea/tachypnea  RR 32  Pt unresponsive to stimuli  Coarse LS  Level 4-comfort measures  Notified provider Maksim Evangelista to order medication to help handle secretions  Pt's breathing less labored within 5 minutes of administering morphine  Repositioned with pillows for comfort  2045: Scopolamine patch applied behind R ear  0130: Pt given PRN Morphine for tachypnea, RR 32      0245: Pt medicated first with PRN Ativan due to HR up to 140's (suspected anxiety), then with PRN morphine for tachypnea  Supplemental O2 removed, as he is only comfort measures  Sat down to 81% from 92  Pt is not any more dyspneic/tachypneic without it on  Pt remains unresponsive to any stimuli  HR remains 140's despite PRN medications being given  Will closely monitor & give PRN's when available for symptom control/comfort  0435: Pt tachypneic, dyspneic, agonal & wet/congested breathing  Pt repositioned  Medicated with PRN Morphine for air hunger & ativan for suspected anxiety; -150's  Mouth care performed  4530: No pleth reading on monitor  Pt appears to have passed away  Notified [facility]  She came to bedside to confirm  PA Maksim Evangelista notified, whom then called family  Pt's wife made aware of pt's passing  She & other family members are coming in to view body

## 2022-06-05 NOTE — ASSESSMENT & PLAN NOTE
Lab Results   Component Value Date    HGBA1C 7 0 (A) 05/10/2022       Recent Labs     22  2249 22  0717   POCGLU 102 92       Blood Sugar Average: Last 72 hrs:  (P) 97     All medications have been discontinued, no further testing  Patient  at 5:55 a m

## 2022-06-05 NOTE — ASSESSMENT & PLAN NOTE
· Secondary to suspected aspiration pneumonia event  · Status post initial treatment with IV antibiotics-vancomycin, cefepime and Zithromax  · All medications have been discontinued since the patient is now being transitioned to hospice and is on comfort measures  · Patient  at 5:55 a m

## 2022-06-05 NOTE — ASSESSMENT & PLAN NOTE
Lab Results   Component Value Date    EGFR 87 2022    EGFR 69 2022    EGFR 84 2022    CREATININE 0 70 2022    CREATININE 1 00 2022    CREATININE 0 76 2022     · No further BMP testing  Patient  at 5:55 a m

## 2022-06-05 NOTE — ASSESSMENT & PLAN NOTE
Wt Readings from Last 3 Encounters:   22 76 kg (167 lb 8 8 oz)   22 75 1 kg (165 lb 9 1 oz)   22 75 5 kg (166 lb 6 4 oz)     · No further medications, testing, and or treatment  Patient  at 5:55 a m

## 2022-06-05 NOTE — DEATH NOTE
INPATIENT DEATH NOTE  Joni Mcburney 78 y o  male MRN: [id number]  Unit/Bed#: ICU 08-01 Encounter: [id number]    Date, Time and Cause of Death    Date of Death: 6/5/22  Time of Death:  5:55 AM  Preliminary Cause of Death: Hepatocellular carcinoma metastatic to bone ([facility] [address] )  Entered by: Lelo IRELAND[SS1 1]     Attribution     SS1 1 BEKA Leroy 06/05/22 06:03               PHYSICAL EXAM:  Unresponsive to noxious stimuli, Spontaneous respirations absent, Breath sounds absent, Carotid pulse absent, Heart sounds absent and Corneal blink reflex absent    Medical Examiner notification criteria:  NONE APPLICABLE   Medical Examiner's office notified?:  No, does not meet ME notification criteria   Medical Examiner accepted case?:  No  Name of Medical Examiner: N/A         Autopsy Options:  Post-mortem examination declined by next of kin    Primary Service Attending Physician notified?:  yes - Attending:  Chuy Dash MD    Physician/Resident responsible for completing Discharge Summary:  Dr Gale Cadet

## 2022-06-08 LAB
BACTERIA BLD CULT: NORMAL
BACTERIA BLD CULT: NORMAL

## 2022-06-16 PROBLEM — E43 SEVERE PROTEIN-CALORIE MALNUTRITION (HCC): Status: ACTIVE | Noted: 2022-06-16

## 2022-12-02 NOTE — DEATH NOTE
INPATIENT DEATH NOTE  Rebeca Martinez 78 y o  male MRN: [id number]  Unit/Bed#: ICU  Encounter: [id number]    Date, Time and Cause of Death    Date of Death: 22  Time of Death:  5:55 AM  Preliminary Cause of Death: Hepatocellular carcinoma metastatic to bone ([facility] [address] )  Entered by: Jordan IRELAND[SS1 1]     Attribution     SS1 1 Jordan Amor, [place] 22 06:03           Patient's Information  Pronounced by: BEKA Luciano  Did the patient's death occur in the ED?: No  Did the patient's death occur in the OR?: No  Did the patient's death occur less than 10 days post-op?: No  Did the patient's death occur within 24 hours of admission?: No  Was code status DNR at the time of death?: Yes    PHYSICAL EXAM:  Spontaneous respirations absent, Breath sounds absent and Heart sounds absent    Medical Examiner notification criteria:  NONE APPLICABLE   Medical Examiner's office notified?:  No, does not meet ME notification criteria   Medical Examiner accepted case?:  No  Name of Medical Examiner:  Not applicable    Family Notification  Was the family notified?: Yes  Date Notified: 22  Time Notified: 404  Notified by: Donna Greene PA-C  Name of Family Notified of Death: Tiesha Navarroas   Relationship to Patient: Spouse  Family Notification Route: Telephone  Was the family told to contact a  home?: Yes    Autopsy Options:  Post-mortem examination declined by next of kin    Primary Service Attending Physician notified?:  yes - Attending:  Pete Herrera MD    Physician/Resident responsible for completing Discharge Summary:  Pete Herrera MD Patient

## 2023-12-05 NOTE — ASSESSMENT & PLAN NOTE
· Discharged on 3L NC after being hospitalized for COVID-19 pneumonia in 11/2021  · Suspect this is multifactorial: COVID-19 sequelae vs ? aspiration-related  · Former smoker of 1/2 ppd x16 years (quit in [employer])  · No known history of COPD no PFTs for review  · Follow with Dr Leland De Paz as outpatient  · [facility] as outpatient - will order Xopenex, Atrovent, Pulmicort nebs for now  · Continue home Flonase nasal spray  · Currently requiring baseline 3L NC   · Maintain SpO2 > 88% yes

## 2024-02-14 NOTE — TELEPHONE ENCOUNTER
Pt is overdue for  INR  appt.    Left message for pt to reschedule appt.    1st call    Discharge criteria:  3 calls (including at least once to emergency contacts)? No   1 letter (with the third call)? No   Over 3 months (last seen 01/23/24, if no response by 04/23/24 , will d/c from clinic)? No     Veronica Bedolla, PharmD       Remain on the medication and this can be further discussed with urologist